# Patient Record
Sex: MALE | Race: WHITE | NOT HISPANIC OR LATINO | Employment: OTHER
[De-identification: names, ages, dates, MRNs, and addresses within clinical notes are randomized per-mention and may not be internally consistent; named-entity substitution may affect disease eponyms.]

---

## 2017-02-21 ENCOUNTER — LAB SERVICES (OUTPATIENT)
Dept: OTHER | Age: 62
End: 2017-02-21

## 2017-02-21 ENCOUNTER — CHARTING TRANS (OUTPATIENT)
Dept: OTHER | Age: 62
End: 2017-02-21

## 2017-02-21 ASSESSMENT — PAIN SCALES - GENERAL: PAINLEVEL_OUTOF10: 10

## 2017-02-23 ENCOUNTER — CHARTING TRANS (OUTPATIENT)
Dept: OTHER | Age: 62
End: 2017-02-23

## 2017-02-23 LAB
ALBUMIN SERPL-MCNC: 3.4 G/DL (ref 3.6–5.1)
ALBUMIN/GLOB SERPL: 1 (ref 1–2.4)
ALP SERPL-CCNC: 114 UNITS/L (ref 45–117)
ALT SERPL-CCNC: 72 UNITS/L
ANION GAP SERPL CALC-SCNC: 14 MMOL/L (ref 10–20)
AST SERPL-CCNC: 52 UNITS/L
BILIRUB SERPL-MCNC: 0.9 MG/DL (ref 0.2–1)
BUN SERPL-MCNC: 15 MG/DL (ref 6–20)
BUN/CREAT SERPL: 18 (ref 7–25)
CALCIUM SERPL-MCNC: 9 MG/DL (ref 8.4–10.2)
CHLORIDE SERPL-SCNC: 99 MMOL/L (ref 98–107)
CHOLEST SERPL-MCNC: 238 MG/DL
CHOLEST/HDLC SERPL: 6.8
CO2 SERPL-SCNC: 28 MMOL/L (ref 21–32)
CREAT SERPL-MCNC: 0.82 MG/DL (ref 0.67–1.17)
GLOBULIN SER-MCNC: 3.5 G/DL (ref 2–4)
GLUCOSE SERPL-MCNC: 361 MG/DL (ref 65–99)
HBA1C MFR BLD: 13.3 % (ref 4.5–5.6)
HDLC SERPL-MCNC: 35 MG/DL
LDLC SERPL CALC-MCNC: 160 MG/DL
LENGTH OF FAST TIME PATIENT: ABNORMAL HRS
LENGTH OF FAST TIME PATIENT: ABNORMAL HRS
NONHDLC SERPL-MCNC: 203 MG/DL
POTASSIUM SERPL-SCNC: 4.1 MMOL/L (ref 3.4–5.1)
SODIUM SERPL-SCNC: 137 MMOL/L (ref 135–145)
TOTAL PROTEIN: 6.9 G/DL (ref 6.4–8.2)
TRIGL SERPL-MCNC: 213 MG/DL

## 2017-03-03 ENCOUNTER — CHARTING TRANS (OUTPATIENT)
Dept: OTHER | Age: 62
End: 2017-03-03

## 2017-04-11 ENCOUNTER — LAB SERVICES (OUTPATIENT)
Dept: OTHER | Age: 62
End: 2017-04-11

## 2017-04-11 ENCOUNTER — CHARTING TRANS (OUTPATIENT)
Dept: OTHER | Age: 62
End: 2017-04-11

## 2017-04-11 LAB
APPEARANCE: CLEAR
BILIRUBIN: NEGATIVE
COLOR: YELLOW
GLUCOSE U: 1000
GLUCOSE: 202
KETONES: NORMAL
LEUKOCYTE ESTERASE: NEGATIVE
NITRITE: NEGATIVE
OCCULT BLOOD: NORMAL
PH: 5
PROTEIN: 30
URINE SPEC GRAVITY: 1.02
UROBILINOGEN: 0.2

## 2017-04-13 ENCOUNTER — HOSPITAL (OUTPATIENT)
Dept: OTHER | Age: 62
End: 2017-04-13
Attending: INTERNAL MEDICINE

## 2017-04-13 ENCOUNTER — CHARTING TRANS (OUTPATIENT)
Dept: OTHER | Age: 62
End: 2017-04-13

## 2017-04-24 ENCOUNTER — CHARTING TRANS (OUTPATIENT)
Dept: OTHER | Age: 62
End: 2017-04-24

## 2017-12-14 ENCOUNTER — LAB SERVICES (OUTPATIENT)
Dept: OTHER | Age: 62
End: 2017-12-14

## 2017-12-15 LAB
ALBUMIN SERPL-MCNC: 3.4 G/DL (ref 3.6–5.1)
ALBUMIN/GLOB SERPL: 0.9 (ref 1–2.4)
ALP SERPL-CCNC: 124 UNITS/L (ref 45–117)
ALT SERPL-CCNC: 60 UNITS/L
ANION GAP SERPL CALC-SCNC: 14 MMOL/L (ref 10–20)
AST SERPL-CCNC: 33 UNITS/L
BILIRUB SERPL-MCNC: 0.8 MG/DL (ref 0.2–1)
BUN SERPL-MCNC: 15 MG/DL (ref 6–20)
BUN/CREAT SERPL: 15 (ref 7–25)
CALCIUM SERPL-MCNC: 9.3 MG/DL (ref 8.4–10.2)
CHLORIDE SERPL-SCNC: 98 MMOL/L (ref 98–107)
CHOLEST SERPL-MCNC: 282 MG/DL
CHOLEST/HDLC SERPL: 7.6
CO2 SERPL-SCNC: 31 MMOL/L (ref 21–32)
CREAT SERPL-MCNC: 0.98 MG/DL (ref 0.67–1.17)
CREATININE RANDOM URINE: 102 MG/DL
GLOBULIN SER-MCNC: 3.7 G/DL (ref 2–4)
GLUCOSE SERPL-MCNC: 417 MG/DL (ref 65–99)
HBA1C MFR BLD: 16.4 % (ref 4.5–5.6)
HDLC SERPL-MCNC: 37 MG/DL
LDLC SERPL CALC-MCNC: 177 MG/DL
LENGTH OF FAST TIME PATIENT: 12 HRS
LENGTH OF FAST TIME PATIENT: 12 HRS
MICROALBUMIN UR-MCNC: 24.6 MG/DL
MICROALBUMIN/CREAT UR: 241.2 MCG/MG
NONHDLC SERPL-MCNC: 245 MG/DL
POTASSIUM SERPL-SCNC: 4.3 MMOL/L (ref 3.4–5.1)
SODIUM SERPL-SCNC: 139 MMOL/L (ref 135–145)
TOTAL PROTEIN: 7.1 G/DL (ref 6.4–8.2)
TRIGL SERPL-MCNC: 339 MG/DL

## 2017-12-19 ENCOUNTER — CHARTING TRANS (OUTPATIENT)
Dept: OTHER | Age: 62
End: 2017-12-19

## 2018-01-05 ENCOUNTER — CHARTING TRANS (OUTPATIENT)
Dept: OTHER | Age: 63
End: 2018-01-05

## 2018-07-19 ENCOUNTER — LAB SERVICES (OUTPATIENT)
Dept: OTHER | Age: 63
End: 2018-07-19

## 2018-07-20 LAB
ALBUMIN SERPL-MCNC: 3.3 G/DL (ref 3.6–5.1)
ALBUMIN/GLOB SERPL: 0.9 (ref 1–2.4)
ALP SERPL-CCNC: 119 UNITS/L (ref 45–117)
ALT SERPL-CCNC: 34 UNITS/L
ANION GAP SERPL CALC-SCNC: 13 MMOL/L (ref 10–20)
AST SERPL-CCNC: 23 UNITS/L
BILIRUB SERPL-MCNC: 0.7 MG/DL (ref 0.2–1)
BUN SERPL-MCNC: 17 MG/DL (ref 6–20)
BUN/CREAT SERPL: 20 (ref 7–25)
CALCIUM SERPL-MCNC: 8.9 MG/DL (ref 8.4–10.2)
CHLORIDE SERPL-SCNC: 99 MMOL/L (ref 98–107)
CHOLEST SERPL-MCNC: 222 MG/DL
CHOLEST/HDLC SERPL: 7.2
CO2 SERPL-SCNC: 29 MMOL/L (ref 21–32)
CREAT SERPL-MCNC: 0.84 MG/DL (ref 0.67–1.17)
GLOBULIN SER-MCNC: 3.8 G/DL (ref 2–4)
GLUCOSE SERPL-MCNC: 362 MG/DL (ref 65–99)
HBA1C MFR BLD: 13.9 % (ref 4.5–5.6)
HDLC SERPL-MCNC: 31 MG/DL
LDLC SERPL CALC-MCNC: 149 MG/DL
LENGTH OF FAST TIME PATIENT: 12 HRS
LENGTH OF FAST TIME PATIENT: 12 HRS
NONHDLC SERPL-MCNC: 191 MG/DL
POTASSIUM SERPL-SCNC: 4.3 MMOL/L (ref 3.4–5.1)
SODIUM SERPL-SCNC: 137 MMOL/L (ref 135–145)
TOTAL PROTEIN: 7.1 G/DL (ref 6.4–8.2)
TRIGL SERPL-MCNC: 208 MG/DL

## 2018-08-07 ENCOUNTER — CHARTING TRANS (OUTPATIENT)
Dept: OTHER | Age: 63
End: 2018-08-07

## 2018-08-07 ENCOUNTER — LAB SERVICES (OUTPATIENT)
Dept: OTHER | Age: 63
End: 2018-08-07

## 2018-08-07 LAB
APPEARANCE: CLEAR
BILIRUBIN: NEGATIVE
COLOR: YELLOW
GLUCOSE U: >1000
GLUCOSE: 193
KETONES: NEGATIVE
LEUKOCYTE ESTERASE: NEGATIVE
NITRITE: NEGATIVE
OCCULT BLOOD: NORMAL
PH: 5
PROTEIN: 100
URINE SPEC GRAVITY: 1.02
UROBILINOGEN: 0.2

## 2018-10-31 VITALS
SYSTOLIC BLOOD PRESSURE: 162 MMHG | WEIGHT: 315 LBS | HEART RATE: 95 BPM | OXYGEN SATURATION: 93 % | RESPIRATION RATE: 18 BRPM | HEIGHT: 72 IN | DIASTOLIC BLOOD PRESSURE: 94 MMHG | TEMPERATURE: 97.1 F | BODY MASS INDEX: 42.66 KG/M2

## 2018-11-02 VITALS
HEART RATE: 81 BPM | SYSTOLIC BLOOD PRESSURE: 128 MMHG | WEIGHT: 315 LBS | HEIGHT: 72 IN | DIASTOLIC BLOOD PRESSURE: 82 MMHG | TEMPERATURE: 97.3 F | BODY MASS INDEX: 42.66 KG/M2 | RESPIRATION RATE: 18 BRPM

## 2018-11-03 VITALS
HEIGHT: 72 IN | SYSTOLIC BLOOD PRESSURE: 142 MMHG | DIASTOLIC BLOOD PRESSURE: 94 MMHG | TEMPERATURE: 99.1 F | HEART RATE: 94 BPM | RESPIRATION RATE: 18 BRPM | BODY MASS INDEX: 42.66 KG/M2 | WEIGHT: 315 LBS

## 2018-11-03 VITALS
HEIGHT: 72 IN | WEIGHT: 315 LBS | RESPIRATION RATE: 18 BRPM | HEART RATE: 93 BPM | SYSTOLIC BLOOD PRESSURE: 136 MMHG | BODY MASS INDEX: 42.66 KG/M2 | DIASTOLIC BLOOD PRESSURE: 84 MMHG | TEMPERATURE: 97.4 F

## 2018-11-05 VITALS
HEART RATE: 110 BPM | SYSTOLIC BLOOD PRESSURE: 140 MMHG | DIASTOLIC BLOOD PRESSURE: 74 MMHG | WEIGHT: 315 LBS | OXYGEN SATURATION: 96 % | HEIGHT: 72 IN | TEMPERATURE: 97.6 F | BODY MASS INDEX: 42.66 KG/M2

## 2018-11-05 VITALS
SYSTOLIC BLOOD PRESSURE: 142 MMHG | DIASTOLIC BLOOD PRESSURE: 88 MMHG | HEIGHT: 72 IN | HEART RATE: 88 BPM | TEMPERATURE: 97.1 F | RESPIRATION RATE: 18 BRPM | WEIGHT: 315 LBS | BODY MASS INDEX: 42.66 KG/M2

## 2019-02-15 ENCOUNTER — TELEPHONE (OUTPATIENT)
Dept: SCHEDULING | Age: 64
End: 2019-02-15

## 2019-02-15 RX ORDER — GLIMEPIRIDE 2 MG/1
TABLET ORAL
Qty: 14 TABLET | Refills: 0 | Status: SHIPPED | OUTPATIENT
Start: 2019-02-15 | End: 2019-03-11 | Stop reason: SDUPTHER

## 2019-02-15 RX ORDER — METFORMIN HYDROCHLORIDE 500 MG/1
1000 TABLET, EXTENDED RELEASE ORAL 2 TIMES DAILY WITH MEALS
Qty: 56 TABLET | Refills: 0 | Status: SHIPPED | OUTPATIENT
Start: 2019-02-15 | End: 2019-03-11 | Stop reason: SDUPTHER

## 2019-02-15 RX ORDER — GLIMEPIRIDE 2 MG/1
1 TABLET ORAL DAILY
COMMUNITY
Start: 2018-08-07 | End: 2019-02-15 | Stop reason: SDUPTHER

## 2019-02-15 RX ORDER — METFORMIN HYDROCHLORIDE 500 MG/1
2 TABLET, EXTENDED RELEASE ORAL 2 TIMES DAILY WITH MEALS
COMMUNITY
Start: 2018-08-07 | End: 2019-02-15 | Stop reason: SDUPTHER

## 2019-02-19 RX ORDER — LISINOPRIL 10 MG/1
TABLET ORAL
COMMUNITY
Start: 2018-09-10 | End: 2019-03-11 | Stop reason: SDUPTHER

## 2019-02-19 RX ORDER — LANCETS
EACH MISCELLANEOUS
COMMUNITY
Start: 2018-08-07 | End: 2019-08-07

## 2019-02-19 RX ORDER — ATORVASTATIN CALCIUM 10 MG/1
TABLET, FILM COATED ORAL
COMMUNITY
Start: 2018-08-07 | End: 2019-03-11 | Stop reason: SDUPTHER

## 2019-02-26 ENCOUNTER — LAB SERVICES (OUTPATIENT)
Dept: FAMILY MEDICINE | Age: 64
End: 2019-02-26

## 2019-02-26 PROCEDURE — 83036 HEMOGLOBIN GLYCOSYLATED A1C: CPT | Performed by: NURSE PRACTITIONER

## 2019-02-26 PROCEDURE — 80053 COMPREHEN METABOLIC PANEL: CPT | Performed by: NURSE PRACTITIONER

## 2019-02-26 PROCEDURE — 80061 LIPID PANEL: CPT | Performed by: NURSE PRACTITIONER

## 2019-02-26 PROCEDURE — 36415 COLL VENOUS BLD VENIPUNCTURE: CPT

## 2019-02-27 LAB
ALBUMIN SERPL-MCNC: 3.3 G/DL (ref 3.6–5.1)
ALBUMIN/GLOB SERPL: 1 {RATIO} (ref 1–2.4)
ALP SERPL-CCNC: 95 UNITS/L (ref 45–117)
ALT SERPL-CCNC: 33 UNITS/L
ANION GAP SERPL CALC-SCNC: 12 MMOL/L (ref 10–20)
AST SERPL-CCNC: 20 UNITS/L
BILIRUB SERPL-MCNC: 0.9 MG/DL (ref 0.2–1)
BUN SERPL-MCNC: 18 MG/DL (ref 6–20)
BUN/CREAT SERPL: 20 (ref 7–25)
CALCIUM SERPL-MCNC: 8.7 MG/DL (ref 8.4–10.2)
CHLORIDE SERPL-SCNC: 101 MMOL/L (ref 98–107)
CHOLEST SERPL-MCNC: 205 MG/DL
CHOLEST/HDLC SERPL: 5.7 {RATIO}
CO2 SERPL-SCNC: 30 MMOL/L (ref 21–32)
CREAT SERPL-MCNC: 0.92 MG/DL (ref 0.67–1.17)
FASTING STATUS PATIENT QL REPORTED: ABNORMAL HRS
GLOBULIN SER-MCNC: 3.4 G/DL (ref 2–4)
GLUCOSE SERPL-MCNC: 284 MG/DL (ref 65–99)
HBA1C MFR BLD: 12.3 % (ref 4.5–5.6)
HDLC SERPL-MCNC: 36 MG/DL
LDLC SERPL-MCNC: 127 MG/DL
LENGTH OF FAST TIME PATIENT: ABNORMAL HRS
NONHDLC SERPL-MCNC: 169 MG/DL
POTASSIUM SERPL-SCNC: 4.5 MMOL/L (ref 3.4–5.1)
PROT SERPL-MCNC: 6.7 G/DL (ref 6.4–8.2)
SODIUM SERPL-SCNC: 139 MMOL/L (ref 135–145)
TRIGL SERPL-MCNC: 212 MG/DL

## 2019-03-02 RX ORDER — METFORMIN HYDROCHLORIDE 500 MG/1
TABLET, EXTENDED RELEASE ORAL
Qty: 120 TABLET | Refills: 0 | OUTPATIENT
Start: 2019-03-02

## 2019-03-09 ENCOUNTER — TELEPHONE (OUTPATIENT)
Dept: SCHEDULING | Age: 64
End: 2019-03-09

## 2019-03-09 RX ORDER — METFORMIN HYDROCHLORIDE 500 MG/1
1000 TABLET, EXTENDED RELEASE ORAL 2 TIMES DAILY WITH MEALS
Qty: 56 TABLET | Refills: 0 | Status: CANCELLED | OUTPATIENT
Start: 2019-03-09 | End: 2020-04-11

## 2019-03-11 ENCOUNTER — OFFICE VISIT (OUTPATIENT)
Dept: FAMILY MEDICINE | Age: 64
End: 2019-03-11

## 2019-03-11 VITALS
OXYGEN SATURATION: 94 % | HEART RATE: 103 BPM | WEIGHT: 315 LBS | BODY MASS INDEX: 42.66 KG/M2 | DIASTOLIC BLOOD PRESSURE: 84 MMHG | SYSTOLIC BLOOD PRESSURE: 134 MMHG | RESPIRATION RATE: 18 BRPM | HEIGHT: 72 IN

## 2019-03-11 DIAGNOSIS — I10 BENIGN ESSENTIAL HYPERTENSION: ICD-10-CM

## 2019-03-11 DIAGNOSIS — Z71.89 COUNSELING REGARDING GOALS OF CARE: ICD-10-CM

## 2019-03-11 DIAGNOSIS — G89.29 CHRONIC PAIN OF RIGHT KNEE: ICD-10-CM

## 2019-03-11 DIAGNOSIS — M25.561 CHRONIC PAIN OF RIGHT KNEE: ICD-10-CM

## 2019-03-11 DIAGNOSIS — G47.33 OBSTRUCTIVE SLEEP APNEA: ICD-10-CM

## 2019-03-11 DIAGNOSIS — E66.01 MORBID OBESITY WITH BODY MASS INDEX (BMI) GREATER THAN OR EQUAL TO 50 (CMD): ICD-10-CM

## 2019-03-11 DIAGNOSIS — E78.00 HYPERCHOLESTEROLEMIA: ICD-10-CM

## 2019-03-11 DIAGNOSIS — Z79.4 TYPE 2 DIABETES MELLITUS WITH HYPERGLYCEMIA, WITH LONG-TERM CURRENT USE OF INSULIN (CMD): Primary | ICD-10-CM

## 2019-03-11 DIAGNOSIS — E11.65 TYPE 2 DIABETES MELLITUS WITH HYPERGLYCEMIA, WITH LONG-TERM CURRENT USE OF INSULIN (CMD): Primary | ICD-10-CM

## 2019-03-11 PROBLEM — M25.569 KNEE PAIN, CHRONIC: Status: ACTIVE | Noted: 2017-02-21

## 2019-03-11 LAB
ALBUMIN SERPL-MCNC: 10 G/DL
BILIRUB UR QL STRIP: NEGATIVE
CLARITY, POC: CLEAR
COLOR UR: YELLOW
CREAT UR-MCNC: 50 MG/DL
ERYTHROCYTES, POC: NORMAL
GLUCOSE UR-MCNC: NORMAL MG/DL
HYALINE CASTS, POC: NORMAL
KETONES, POC: NEGATIVE
LEUKOCYTE ESTERASE UR QL STRIP: NORMAL
NITRITE UR QL STRIP: NORMAL
OCCULT BLOOD, POC: NEGATIVE
PH UR STRIP: 5.5 [PH]
PROT UR-MCNC: NEGATIVE G/DL
S PYO AG THROAT QL: NORMAL
SP GR UR STRIP: 1.01
SQUAMOUS EPIS, POC: NORMAL
UROBILINOGEN UR-MCNC: 1 MG/DL (ref 0–1)
WBC UR QL: NEGATIVE

## 2019-03-11 PROCEDURE — 82043 UR ALBUMIN QUANTITATIVE: CPT | Performed by: NURSE PRACTITIONER

## 2019-03-11 PROCEDURE — 3075F SYST BP GE 130 - 139MM HG: CPT | Performed by: NURSE PRACTITIONER

## 2019-03-11 PROCEDURE — 99215 OFFICE O/P EST HI 40 MIN: CPT | Performed by: NURSE PRACTITIONER

## 2019-03-11 PROCEDURE — 81003 URINALYSIS AUTO W/O SCOPE: CPT | Performed by: NURSE PRACTITIONER

## 2019-03-11 PROCEDURE — 3079F DIAST BP 80-89 MM HG: CPT | Performed by: NURSE PRACTITIONER

## 2019-03-11 RX ORDER — ATORVASTATIN CALCIUM 10 MG/1
10 TABLET, FILM COATED ORAL DAILY
Qty: 90 TABLET | Refills: 0 | Status: SHIPPED | OUTPATIENT
Start: 2019-03-11 | End: 2019-07-02 | Stop reason: DRUGHIGH

## 2019-03-11 RX ORDER — LISINOPRIL 10 MG/1
10 TABLET ORAL DAILY
Qty: 90 TABLET | Refills: 0 | Status: SHIPPED | OUTPATIENT
Start: 2019-03-11 | End: 2019-06-08 | Stop reason: SDUPTHER

## 2019-03-11 RX ORDER — GLIMEPIRIDE 2 MG/1
TABLET ORAL
Qty: 90 TABLET | Refills: 0 | Status: SHIPPED | OUTPATIENT
Start: 2019-03-11 | End: 2019-08-30 | Stop reason: SDUPTHER

## 2019-03-11 RX ORDER — METFORMIN HYDROCHLORIDE 500 MG/1
1000 TABLET, EXTENDED RELEASE ORAL 2 TIMES DAILY WITH MEALS
Qty: 360 TABLET | Refills: 0 | Status: SHIPPED | OUTPATIENT
Start: 2019-03-11 | End: 2019-09-21 | Stop reason: SDUPTHER

## 2019-03-11 SDOH — HEALTH STABILITY: MENTAL HEALTH: HOW OFTEN DO YOU HAVE A DRINK CONTAINING ALCOHOL?: NEVER

## 2019-03-11 ASSESSMENT — ENCOUNTER SYMPTOMS
ACTIVITY CHANGE: 1
FATIGUE: 1
POLYPHAGIA: 1
GASTROINTESTINAL NEGATIVE: 1
RESPIRATORY NEGATIVE: 1
EYES NEGATIVE: 1
POLYDIPSIA: 1
NUMBNESS: 1

## 2019-03-12 ENCOUNTER — TELEPHONE (OUTPATIENT)
Dept: SCHEDULING | Age: 64
End: 2019-03-12

## 2019-03-13 ENCOUNTER — TELEPHONE (OUTPATIENT)
Dept: SCHEDULING | Age: 64
End: 2019-03-13

## 2019-03-19 ENCOUNTER — TELEPHONE (OUTPATIENT)
Dept: SCHEDULING | Age: 64
End: 2019-03-19

## 2019-05-01 ENCOUNTER — TELEPHONE (OUTPATIENT)
Dept: SCHEDULING | Age: 64
End: 2019-05-01

## 2019-05-02 ENCOUNTER — TELEPHONE (OUTPATIENT)
Dept: SCHEDULING | Age: 64
End: 2019-05-02

## 2019-06-07 ENCOUNTER — TELEPHONE (OUTPATIENT)
Dept: SCHEDULING | Age: 64
End: 2019-06-07

## 2019-06-07 RX ORDER — LISINOPRIL 10 MG/1
10 TABLET ORAL DAILY
Qty: 90 TABLET | Refills: 0 | Status: CANCELLED | OUTPATIENT
Start: 2019-06-07 | End: 2020-06-06

## 2019-06-08 ENCOUNTER — TELEPHONE (OUTPATIENT)
Dept: FAMILY MEDICINE | Age: 64
End: 2019-06-08

## 2019-06-08 DIAGNOSIS — I10 BENIGN ESSENTIAL HYPERTENSION: Primary | ICD-10-CM

## 2019-06-08 RX ORDER — LISINOPRIL 10 MG/1
10 TABLET ORAL DAILY
Qty: 90 TABLET | Refills: 0 | Status: SHIPPED | OUTPATIENT
Start: 2019-06-08 | End: 2019-09-21 | Stop reason: SDUPTHER

## 2019-06-18 ENCOUNTER — TELEPHONE (OUTPATIENT)
Dept: SCHEDULING | Age: 64
End: 2019-06-18

## 2019-06-19 ENCOUNTER — TELEPHONE (OUTPATIENT)
Dept: SCHEDULING | Age: 64
End: 2019-06-19

## 2019-06-19 DIAGNOSIS — E11.65 TYPE 2 DIABETES MELLITUS WITH HYPERGLYCEMIA, WITH LONG-TERM CURRENT USE OF INSULIN (CMD): Primary | ICD-10-CM

## 2019-06-19 DIAGNOSIS — Z79.4 TYPE 2 DIABETES MELLITUS WITH HYPERGLYCEMIA, WITH LONG-TERM CURRENT USE OF INSULIN (CMD): Primary | ICD-10-CM

## 2019-06-19 DIAGNOSIS — E78.00 HYPERCHOLESTEROLEMIA: ICD-10-CM

## 2019-06-19 RX ORDER — PEN NEEDLE, DIABETIC 32GX 5/32"
NEEDLE, DISPOSABLE MISCELLANEOUS
Qty: 100 EACH | Refills: 0 | Status: SHIPPED | OUTPATIENT
Start: 2019-06-19 | End: 2019-09-21 | Stop reason: SDUPTHER

## 2019-06-25 ENCOUNTER — LAB SERVICES (OUTPATIENT)
Dept: FAMILY MEDICINE | Age: 64
End: 2019-06-25

## 2019-06-25 DIAGNOSIS — Z79.4 TYPE 2 DIABETES MELLITUS WITH HYPERGLYCEMIA, WITH LONG-TERM CURRENT USE OF INSULIN (CMD): ICD-10-CM

## 2019-06-25 DIAGNOSIS — E11.65 TYPE 2 DIABETES MELLITUS WITH HYPERGLYCEMIA, WITH LONG-TERM CURRENT USE OF INSULIN (CMD): ICD-10-CM

## 2019-06-25 DIAGNOSIS — E78.00 HYPERCHOLESTEROLEMIA: ICD-10-CM

## 2019-06-25 PROCEDURE — 36415 COLL VENOUS BLD VENIPUNCTURE: CPT

## 2019-06-25 PROCEDURE — 83036 HEMOGLOBIN GLYCOSYLATED A1C: CPT | Performed by: NURSE PRACTITIONER

## 2019-06-25 PROCEDURE — 80061 LIPID PANEL: CPT | Performed by: NURSE PRACTITIONER

## 2019-06-25 PROCEDURE — 80053 COMPREHEN METABOLIC PANEL: CPT | Performed by: NURSE PRACTITIONER

## 2019-06-26 LAB
ALBUMIN SERPL-MCNC: 3.6 G/DL (ref 3.6–5.1)
ALBUMIN/GLOB SERPL: 1.1 {RATIO} (ref 1–2.4)
ALP SERPL-CCNC: 88 UNITS/L (ref 45–117)
ALT SERPL-CCNC: 22 UNITS/L
ANION GAP SERPL CALC-SCNC: 12 MMOL/L (ref 10–20)
AST SERPL-CCNC: 16 UNITS/L
BILIRUB SERPL-MCNC: 1 MG/DL (ref 0.2–1)
BUN SERPL-MCNC: 14 MG/DL (ref 6–20)
BUN/CREAT SERPL: 17 (ref 7–25)
CALCIUM SERPL-MCNC: 8.7 MG/DL (ref 8.4–10.2)
CHLORIDE SERPL-SCNC: 106 MMOL/L (ref 98–107)
CHOLEST SERPL-MCNC: 190 MG/DL
CHOLEST/HDLC SERPL: 4.9 {RATIO}
CO2 SERPL-SCNC: 29 MMOL/L (ref 21–32)
CREAT SERPL-MCNC: 0.82 MG/DL (ref 0.67–1.17)
FASTING STATUS PATIENT QL REPORTED: ABNORMAL HRS
GLOBULIN SER-MCNC: 3.4 G/DL (ref 2–4)
GLUCOSE SERPL-MCNC: 143 MG/DL (ref 65–99)
HBA1C MFR BLD: 9.2 % (ref 4.5–5.6)
HDLC SERPL-MCNC: 39 MG/DL
LDLC SERPL-MCNC: 129 MG/DL
LENGTH OF FAST TIME PATIENT: ABNORMAL HRS
NONHDLC SERPL-MCNC: 151 MG/DL
POTASSIUM SERPL-SCNC: 4.2 MMOL/L (ref 3.4–5.1)
PROT SERPL-MCNC: 7 G/DL (ref 6.4–8.2)
SODIUM SERPL-SCNC: 143 MMOL/L (ref 135–145)
TRIGL SERPL-MCNC: 109 MG/DL

## 2019-07-01 ENCOUNTER — APPOINTMENT (OUTPATIENT)
Dept: FAMILY MEDICINE | Age: 64
End: 2019-07-01

## 2019-07-01 DIAGNOSIS — E11.65 TYPE 2 DIABETES MELLITUS WITH HYPERGLYCEMIA, WITH LONG-TERM CURRENT USE OF INSULIN (CMD): Primary | ICD-10-CM

## 2019-07-01 DIAGNOSIS — Z79.4 TYPE 2 DIABETES MELLITUS WITH HYPERGLYCEMIA, WITH LONG-TERM CURRENT USE OF INSULIN (CMD): Primary | ICD-10-CM

## 2019-07-02 ENCOUNTER — OFFICE VISIT (OUTPATIENT)
Dept: FAMILY MEDICINE | Age: 64
End: 2019-07-02

## 2019-07-02 VITALS
RESPIRATION RATE: 18 BRPM | WEIGHT: 315 LBS | TEMPERATURE: 96.6 F | SYSTOLIC BLOOD PRESSURE: 146 MMHG | HEIGHT: 74 IN | BODY MASS INDEX: 40.43 KG/M2 | HEART RATE: 119 BPM | DIASTOLIC BLOOD PRESSURE: 86 MMHG

## 2019-07-02 DIAGNOSIS — Z71.89 COUNSELING REGARDING GOALS OF CARE: ICD-10-CM

## 2019-07-02 DIAGNOSIS — E78.00 HYPERCHOLESTEROLEMIA: ICD-10-CM

## 2019-07-02 DIAGNOSIS — E11.65 TYPE 2 DIABETES MELLITUS WITH HYPERGLYCEMIA, WITH LONG-TERM CURRENT USE OF INSULIN (CMD): Primary | ICD-10-CM

## 2019-07-02 DIAGNOSIS — R06.09 DYSPNEA ON EXERTION: ICD-10-CM

## 2019-07-02 DIAGNOSIS — I10 BENIGN ESSENTIAL HYPERTENSION: ICD-10-CM

## 2019-07-02 DIAGNOSIS — E66.01 MORBID OBESITY WITH BODY MASS INDEX (BMI) GREATER THAN OR EQUAL TO 50 (CMD): ICD-10-CM

## 2019-07-02 DIAGNOSIS — Z79.4 TYPE 2 DIABETES MELLITUS WITH HYPERGLYCEMIA, WITH LONG-TERM CURRENT USE OF INSULIN (CMD): Primary | ICD-10-CM

## 2019-07-02 PROCEDURE — 99214 OFFICE O/P EST MOD 30 MIN: CPT | Performed by: NURSE PRACTITIONER

## 2019-07-02 PROCEDURE — 3079F DIAST BP 80-89 MM HG: CPT | Performed by: NURSE PRACTITIONER

## 2019-07-02 PROCEDURE — 3077F SYST BP >= 140 MM HG: CPT | Performed by: NURSE PRACTITIONER

## 2019-07-02 RX ORDER — MOMETASONE FUROATE 1 MG/G
OINTMENT TOPICAL DAILY
Qty: 45 G | Refills: 0 | Status: SHIPPED | OUTPATIENT
Start: 2019-07-02 | End: 2019-11-10 | Stop reason: SDUPTHER

## 2019-07-02 RX ORDER — ATORVASTATIN CALCIUM 20 MG/1
20 TABLET, FILM COATED ORAL DAILY
Qty: 90 TABLET | Refills: 3 | Status: SHIPPED | OUTPATIENT
Start: 2019-07-02 | End: 2019-08-13 | Stop reason: DRUGHIGH

## 2019-07-02 ASSESSMENT — ENCOUNTER SYMPTOMS
POLYPHAGIA: 0
EYES NEGATIVE: 1
NUMBNESS: 1
UNEXPECTED WEIGHT CHANGE: 1
CONSTIPATION: 1
SHORTNESS OF BREATH: 1
FATIGUE: 1
POLYDIPSIA: 0
NERVOUS/ANXIOUS: 1
WHEEZING: 0
ALLERGIC/IMMUNOLOGIC NEGATIVE: 1
BACK PAIN: 1

## 2019-08-02 ENCOUNTER — TELEPHONE (OUTPATIENT)
Dept: INTERNAL MEDICINE | Age: 64
End: 2019-08-02

## 2019-08-09 ASSESSMENT — ENCOUNTER SYMPTOMS
VOMITING: 0
SHORTNESS OF BREATH: 0
PARESTHESIAS: 0
ALTERED MENTAL STATUS: 0
NEAR-SYNCOPE: 0
CHILLS: 0
NUMBNESS: 0
COLOR CHANGE: 0
NAUSEA: 0
SYNCOPE: 0
DIZZINESS: 0
ABDOMINAL PAIN: 0

## 2019-08-13 ENCOUNTER — OFFICE VISIT (OUTPATIENT)
Dept: CARDIOLOGY | Age: 64
End: 2019-08-13

## 2019-08-13 VITALS
WEIGHT: 315 LBS | DIASTOLIC BLOOD PRESSURE: 74 MMHG | HEART RATE: 100 BPM | SYSTOLIC BLOOD PRESSURE: 132 MMHG | BODY MASS INDEX: 44.1 KG/M2 | HEIGHT: 71 IN

## 2019-08-13 DIAGNOSIS — E78.00 HYPERCHOLESTEROLEMIA: ICD-10-CM

## 2019-08-13 DIAGNOSIS — Z79.4 TYPE 2 DIABETES MELLITUS WITH HYPERGLYCEMIA, WITH LONG-TERM CURRENT USE OF INSULIN (CMD): Primary | ICD-10-CM

## 2019-08-13 DIAGNOSIS — E11.65 TYPE 2 DIABETES MELLITUS WITH HYPERGLYCEMIA, WITH LONG-TERM CURRENT USE OF INSULIN (CMD): Primary | ICD-10-CM

## 2019-08-13 DIAGNOSIS — I10 BENIGN ESSENTIAL HYPERTENSION: ICD-10-CM

## 2019-08-13 DIAGNOSIS — E66.01 MORBID OBESITY WITH BODY MASS INDEX (BMI) GREATER THAN OR EQUAL TO 50 (CMD): ICD-10-CM

## 2019-08-13 DIAGNOSIS — R06.09 DYSPNEA ON EXERTION: ICD-10-CM

## 2019-08-13 PROCEDURE — 99244 OFF/OP CNSLTJ NEW/EST MOD 40: CPT | Performed by: INTERNAL MEDICINE

## 2019-08-13 PROCEDURE — 3078F DIAST BP <80 MM HG: CPT | Performed by: INTERNAL MEDICINE

## 2019-08-13 PROCEDURE — 3075F SYST BP GE 130 - 139MM HG: CPT | Performed by: INTERNAL MEDICINE

## 2019-08-13 RX ORDER — ATORVASTATIN CALCIUM 40 MG/1
40 TABLET, FILM COATED ORAL DAILY
Qty: 30 TABLET | Refills: 3 | Status: SHIPPED | OUTPATIENT
Start: 2019-08-13 | End: 2020-01-29 | Stop reason: SDUPTHER

## 2019-08-13 SDOH — HEALTH STABILITY: PHYSICAL HEALTH: ON AVERAGE, HOW MANY MINUTES DO YOU ENGAGE IN EXERCISE AT THIS LEVEL?: PATIENT DECLINED

## 2019-08-13 SDOH — HEALTH STABILITY: PHYSICAL HEALTH
ON AVERAGE, HOW MANY DAYS PER WEEK DO YOU ENGAGE IN MODERATE TO STRENUOUS EXERCISE (LIKE A BRISK WALK)?: PATIENT DECLINED

## 2019-08-13 SDOH — HEALTH STABILITY: MENTAL HEALTH: HOW OFTEN DO YOU HAVE A DRINK CONTAINING ALCOHOL?: NEVER

## 2019-08-13 ASSESSMENT — PATIENT HEALTH QUESTIONNAIRE - PHQ9
SUM OF ALL RESPONSES TO PHQ9 QUESTIONS 1 AND 2: 0
1. LITTLE INTEREST OR PLEASURE IN DOING THINGS: NOT AT ALL
SUM OF ALL RESPONSES TO PHQ9 QUESTIONS 1 AND 2: 0
2. FEELING DOWN, DEPRESSED OR HOPELESS: NOT AT ALL

## 2019-08-21 ENCOUNTER — TELEPHONE (OUTPATIENT)
Dept: CARDIOLOGY | Age: 64
End: 2019-08-21

## 2019-08-21 ENCOUNTER — DIAGNOSTIC TRANS (OUTPATIENT)
Dept: OTHER | Age: 64
End: 2019-08-21

## 2019-08-21 LAB — LV EF: NORMAL %

## 2019-08-21 PROCEDURE — 93306 TTE W/DOPPLER COMPLETE: CPT | Performed by: INTERNAL MEDICINE

## 2019-08-23 ENCOUNTER — HOSPITAL (OUTPATIENT)
Dept: OTHER | Age: 64
End: 2019-08-23
Attending: INTERNAL MEDICINE

## 2019-08-23 PROCEDURE — 78452 HT MUSCLE IMAGE SPECT MULT: CPT | Performed by: INTERNAL MEDICINE

## 2019-08-23 PROCEDURE — 93016 CV STRESS TEST SUPVJ ONLY: CPT | Performed by: INTERNAL MEDICINE

## 2019-08-23 PROCEDURE — 93018 CV STRESS TEST I&R ONLY: CPT | Performed by: INTERNAL MEDICINE

## 2019-08-26 ENCOUNTER — CLINICAL ABSTRACT (OUTPATIENT)
Dept: CARDIOLOGY | Age: 64
End: 2019-08-26

## 2019-08-27 ENCOUNTER — TELEPHONE (OUTPATIENT)
Dept: CARDIOLOGY | Age: 64
End: 2019-08-27

## 2019-08-30 DIAGNOSIS — E11.65 TYPE 2 DIABETES MELLITUS WITH HYPERGLYCEMIA, WITH LONG-TERM CURRENT USE OF INSULIN (CMD): Primary | ICD-10-CM

## 2019-08-30 DIAGNOSIS — Z79.4 TYPE 2 DIABETES MELLITUS WITH HYPERGLYCEMIA, WITH LONG-TERM CURRENT USE OF INSULIN (CMD): Primary | ICD-10-CM

## 2019-08-30 RX ORDER — GLIMEPIRIDE 2 MG/1
TABLET ORAL
Qty: 30 TABLET | Refills: 0 | Status: SHIPPED | OUTPATIENT
Start: 2019-08-30 | End: 2019-11-12 | Stop reason: SDUPTHER

## 2019-09-11 ENCOUNTER — HOSPITAL (OUTPATIENT)
Dept: OTHER | Age: 64
End: 2019-09-11
Attending: INTERNAL MEDICINE

## 2019-09-21 DIAGNOSIS — I10 BENIGN ESSENTIAL HYPERTENSION: ICD-10-CM

## 2019-09-23 RX ORDER — LISINOPRIL 10 MG/1
10 TABLET ORAL DAILY
Qty: 90 TABLET | Refills: 0 | Status: SHIPPED | OUTPATIENT
Start: 2019-09-23 | End: 2020-01-29 | Stop reason: SDUPTHER

## 2019-09-23 RX ORDER — METFORMIN HYDROCHLORIDE 500 MG/1
1000 TABLET, EXTENDED RELEASE ORAL 2 TIMES DAILY WITH MEALS
Qty: 360 TABLET | Refills: 0 | Status: SHIPPED | OUTPATIENT
Start: 2019-09-23 | End: 2019-11-20 | Stop reason: SDUPTHER

## 2019-09-24 ENCOUNTER — TELEPHONE (OUTPATIENT)
Dept: SCHEDULING | Age: 64
End: 2019-09-24

## 2019-10-23 ENCOUNTER — TELEPHONE (OUTPATIENT)
Dept: FAMILY MEDICINE | Age: 64
End: 2019-10-23

## 2019-10-24 ENCOUNTER — TELEPHONE (OUTPATIENT)
Dept: FAMILY MEDICINE | Age: 64
End: 2019-10-24

## 2019-10-30 ENCOUNTER — LAB SERVICES (OUTPATIENT)
Dept: FAMILY MEDICINE | Age: 64
End: 2019-10-30

## 2019-10-30 DIAGNOSIS — E11.65 TYPE 2 DIABETES MELLITUS WITH HYPERGLYCEMIA, WITH LONG-TERM CURRENT USE OF INSULIN (CMD): ICD-10-CM

## 2019-10-30 DIAGNOSIS — Z79.4 TYPE 2 DIABETES MELLITUS WITH HYPERGLYCEMIA, WITH LONG-TERM CURRENT USE OF INSULIN (CMD): ICD-10-CM

## 2019-10-30 PROCEDURE — 83036 HEMOGLOBIN GLYCOSYLATED A1C: CPT | Performed by: NURSE PRACTITIONER

## 2019-10-30 PROCEDURE — 36415 COLL VENOUS BLD VENIPUNCTURE: CPT

## 2019-10-31 LAB — HBA1C MFR BLD: 11.6 % (ref 4.5–5.6)

## 2019-11-02 ENCOUNTER — TELEPHONE (OUTPATIENT)
Dept: FAMILY MEDICINE | Age: 64
End: 2019-11-02

## 2019-11-11 RX ORDER — MOMETASONE FUROATE 1 MG/G
OINTMENT TOPICAL
Qty: 45 G | Refills: 0 | Status: SHIPPED | OUTPATIENT
Start: 2019-11-11 | End: 2020-01-29

## 2019-11-12 ENCOUNTER — OFFICE VISIT (OUTPATIENT)
Dept: FAMILY MEDICINE | Age: 64
End: 2019-11-12

## 2019-11-12 ENCOUNTER — APPOINTMENT (OUTPATIENT)
Dept: CARDIOLOGY | Age: 64
End: 2019-11-12

## 2019-11-12 ENCOUNTER — TELEPHONE (OUTPATIENT)
Dept: FAMILY MEDICINE | Age: 64
End: 2019-11-12

## 2019-11-12 ENCOUNTER — TELEPHONE (OUTPATIENT)
Dept: SCHEDULING | Age: 64
End: 2019-11-12

## 2019-11-12 VITALS
HEIGHT: 71 IN | DIASTOLIC BLOOD PRESSURE: 86 MMHG | WEIGHT: 315 LBS | BODY MASS INDEX: 44.1 KG/M2 | TEMPERATURE: 96.8 F | RESPIRATION RATE: 20 BRPM | OXYGEN SATURATION: 96 % | SYSTOLIC BLOOD PRESSURE: 158 MMHG | HEART RATE: 104 BPM

## 2019-11-12 DIAGNOSIS — G89.29 CHRONIC PAIN OF RIGHT KNEE: ICD-10-CM

## 2019-11-12 DIAGNOSIS — Z79.4 TYPE 2 DIABETES MELLITUS WITH HYPERGLYCEMIA, WITH LONG-TERM CURRENT USE OF INSULIN (CMD): ICD-10-CM

## 2019-11-12 DIAGNOSIS — Z79.4 TYPE 2 DIABETES MELLITUS WITH HYPERGLYCEMIA, WITH LONG-TERM CURRENT USE OF INSULIN (CMD): Primary | ICD-10-CM

## 2019-11-12 DIAGNOSIS — Z28.21 REFUSED INFLUENZA VACCINE: ICD-10-CM

## 2019-11-12 DIAGNOSIS — M25.561 CHRONIC PAIN OF RIGHT KNEE: ICD-10-CM

## 2019-11-12 DIAGNOSIS — E11.65 TYPE 2 DIABETES MELLITUS WITH HYPERGLYCEMIA, WITH LONG-TERM CURRENT USE OF INSULIN (CMD): ICD-10-CM

## 2019-11-12 DIAGNOSIS — E11.65 TYPE 2 DIABETES MELLITUS WITH HYPERGLYCEMIA, WITH LONG-TERM CURRENT USE OF INSULIN (CMD): Primary | ICD-10-CM

## 2019-11-12 PROBLEM — N20.0 KIDNEY STONE: Status: ACTIVE | Noted: 2019-11-12

## 2019-11-12 PROBLEM — G47.33 OSA (OBSTRUCTIVE SLEEP APNEA): Status: ACTIVE | Noted: 2019-11-12

## 2019-11-12 PROBLEM — N20.0 KIDNEY STONE: Status: RESOLVED | Noted: 2019-11-12 | Resolved: 2019-11-12

## 2019-11-12 PROCEDURE — 3077F SYST BP >= 140 MM HG: CPT | Performed by: PHYSICIAN ASSISTANT

## 2019-11-12 PROCEDURE — 3079F DIAST BP 80-89 MM HG: CPT | Performed by: PHYSICIAN ASSISTANT

## 2019-11-12 PROCEDURE — 99212 OFFICE O/P EST SF 10 MIN: CPT | Performed by: PHYSICIAN ASSISTANT

## 2019-11-12 RX ORDER — GLIMEPIRIDE 2 MG/1
TABLET ORAL
Qty: 30 TABLET | Refills: 0 | Status: CANCELLED | OUTPATIENT
Start: 2019-11-12

## 2019-11-12 RX ORDER — GLIMEPIRIDE 2 MG/1
TABLET ORAL
Qty: 14 TABLET | Refills: 0 | Status: SHIPPED | OUTPATIENT
Start: 2019-11-12 | End: 2019-11-13 | Stop reason: SDUPTHER

## 2019-11-13 RX ORDER — GLIMEPIRIDE 2 MG/1
TABLET ORAL
Qty: 14 TABLET | Refills: 0 | Status: SHIPPED | OUTPATIENT
Start: 2019-11-13 | End: 2019-11-20

## 2019-11-13 RX ORDER — GLIMEPIRIDE 2 MG/1
TABLET ORAL
Qty: 90 TABLET | Refills: 0 | OUTPATIENT
Start: 2019-11-13

## 2019-11-20 ENCOUNTER — OFFICE VISIT (OUTPATIENT)
Dept: FAMILY MEDICINE | Age: 64
End: 2019-11-20

## 2019-11-20 VITALS
DIASTOLIC BLOOD PRESSURE: 80 MMHG | TEMPERATURE: 97.8 F | RESPIRATION RATE: 18 BRPM | BODY MASS INDEX: 44.1 KG/M2 | WEIGHT: 315 LBS | HEIGHT: 71 IN | SYSTOLIC BLOOD PRESSURE: 146 MMHG | HEART RATE: 108 BPM

## 2019-11-20 DIAGNOSIS — Z91.148 NON COMPLIANCE W MEDICATION REGIMEN: ICD-10-CM

## 2019-11-20 DIAGNOSIS — M25.561 CHRONIC PAIN OF RIGHT KNEE: ICD-10-CM

## 2019-11-20 DIAGNOSIS — E11.65 TYPE 2 DIABETES MELLITUS WITH HYPERGLYCEMIA, WITH LONG-TERM CURRENT USE OF INSULIN (CMD): Primary | ICD-10-CM

## 2019-11-20 DIAGNOSIS — G89.29 CHRONIC PAIN OF RIGHT KNEE: ICD-10-CM

## 2019-11-20 DIAGNOSIS — Z79.4 TYPE 2 DIABETES MELLITUS WITH HYPERGLYCEMIA, WITH LONG-TERM CURRENT USE OF INSULIN (CMD): Primary | ICD-10-CM

## 2019-11-20 DIAGNOSIS — I10 BENIGN ESSENTIAL HYPERTENSION: ICD-10-CM

## 2019-11-20 DIAGNOSIS — E66.01 MORBID OBESITY WITH BODY MASS INDEX (BMI) GREATER THAN OR EQUAL TO 50 (CMD): ICD-10-CM

## 2019-11-20 DIAGNOSIS — Z71.89 COUNSELING REGARDING GOALS OF CARE: ICD-10-CM

## 2019-11-20 PROCEDURE — 99214 OFFICE O/P EST MOD 30 MIN: CPT | Performed by: NURSE PRACTITIONER

## 2019-11-20 PROCEDURE — 3077F SYST BP >= 140 MM HG: CPT | Performed by: NURSE PRACTITIONER

## 2019-11-20 PROCEDURE — 3079F DIAST BP 80-89 MM HG: CPT | Performed by: NURSE PRACTITIONER

## 2019-11-20 RX ORDER — METFORMIN HYDROCHLORIDE 500 MG/1
1000 TABLET, EXTENDED RELEASE ORAL 2 TIMES DAILY WITH MEALS
Qty: 360 TABLET | Refills: 0 | Status: SHIPPED | OUTPATIENT
Start: 2019-11-20 | End: 2020-01-29 | Stop reason: SDUPTHER

## 2019-11-20 ASSESSMENT — ENCOUNTER SYMPTOMS
HEMATOLOGIC/LYMPHATIC NEGATIVE: 1
ALLERGIC/IMMUNOLOGIC NEGATIVE: 1
WHEEZING: 0
POLYPHAGIA: 1
SHORTNESS OF BREATH: 1
FATIGUE: 1
POLYDIPSIA: 1
BACK PAIN: 1
GASTROINTESTINAL NEGATIVE: 1
ACTIVITY CHANGE: 1

## 2019-11-24 ENCOUNTER — TELEPHONE (OUTPATIENT)
Dept: SCHEDULING | Age: 64
End: 2019-11-24

## 2019-11-26 ENCOUNTER — TELEPHONE (OUTPATIENT)
Dept: SCHEDULING | Age: 64
End: 2019-11-26

## 2019-11-29 ENCOUNTER — TELEPHONE (OUTPATIENT)
Dept: SCHEDULING | Age: 64
End: 2019-11-29

## 2019-12-02 ENCOUNTER — TELEPHONE (OUTPATIENT)
Dept: SCHEDULING | Age: 64
End: 2019-12-02

## 2019-12-05 ENCOUNTER — TELEPHONE (OUTPATIENT)
Dept: SCHEDULING | Age: 64
End: 2019-12-05

## 2019-12-09 ENCOUNTER — CASE MANAGEMENT (OUTPATIENT)
Dept: CARE COORDINATION | Age: 64
End: 2019-12-09

## 2019-12-12 ENCOUNTER — CASE MANAGEMENT (OUTPATIENT)
Dept: CARE COORDINATION | Age: 64
End: 2019-12-12

## 2019-12-13 ENCOUNTER — TELEPHONE (OUTPATIENT)
Dept: SCHEDULING | Age: 64
End: 2019-12-13

## 2019-12-31 ENCOUNTER — CASE MANAGEMENT (OUTPATIENT)
Dept: CARE COORDINATION | Age: 64
End: 2019-12-31

## 2020-01-10 ENCOUNTER — CASE MANAGEMENT (OUTPATIENT)
Dept: CARE COORDINATION | Age: 65
End: 2020-01-10

## 2020-01-10 ENCOUNTER — TELEPHONE (OUTPATIENT)
Dept: CARE COORDINATION | Age: 65
End: 2020-01-10

## 2020-01-17 ENCOUNTER — CASE MANAGEMENT (OUTPATIENT)
Dept: CARE COORDINATION | Age: 65
End: 2020-01-17

## 2020-01-17 SDOH — ECONOMIC STABILITY: INCOME INSECURITY: HOW HARD IS IT FOR YOU TO PAY FOR THE VERY BASICS LIKE FOOD, HOUSING, MEDICAL CARE, AND HEATING?: NOT HARD AT ALL

## 2020-01-17 SDOH — ECONOMIC STABILITY: HOUSING INSECURITY: WHAT IS YOUR LIVING SITUATION TODAY?: I HAVE HOUSING

## 2020-01-17 SDOH — HEALTH STABILITY: MENTAL HEALTH
STRESS IS WHEN SOMEONE FEELS TENSE, NERVOUS, ANXIOUS, OR CAN'T SLEEP AT NIGHT BECAUSE THEIR MIND IS TROUBLED. HOW STRESSED ARE YOU?: TO SOME EXTENT

## 2020-01-17 SDOH — ECONOMIC STABILITY: FOOD INSECURITY: WITHIN THE PAST 12 MONTHS, THE FOOD YOU BOUGHT JUST DIDN'T LAST AND YOU DIDN'T HAVE MONEY TO GET MORE.: NEVER TRUE

## 2020-01-17 SDOH — ECONOMIC STABILITY: FOOD INSECURITY: WITHIN THE PAST 12 MONTHS, YOU WORRIED THAT YOUR FOOD WOULD RUN OUT BEFORE YOU GOT MONEY TO BUY MORE.: NEVER TRUE

## 2020-01-17 SDOH — ECONOMIC STABILITY: TRANSPORTATION INSECURITY
IN THE PAST 12 MONTHS, HAS THE LACK OF TRANSPORTATION KEPT YOU FROM MEDICAL APPOINTMENTS OR FROM GETTING MEDICATIONS?: NO

## 2020-01-17 SDOH — ECONOMIC STABILITY: TRANSPORTATION INSECURITY
IN THE PAST 12 MONTHS, HAS LACK OF TRANSPORTATION KEPT YOU FROM MEETINGS, WORK, OR FROM GETTING THINGS NEEDED FOR DAILY LIVING?: NO

## 2020-01-17 SDOH — HEALTH STABILITY: PHYSICAL HEALTH: ON AVERAGE, HOW MANY MINUTES DO YOU ENGAGE IN EXERCISE AT THIS LEVEL?: 0 MIN

## 2020-01-17 SDOH — HEALTH STABILITY: PHYSICAL HEALTH: ON AVERAGE, HOW MANY DAYS PER WEEK DO YOU ENGAGE IN MODERATE TO STRENUOUS EXERCISE (LIKE A BRISK WALK)?: 0 DAYS

## 2020-01-17 ASSESSMENT — ACTIVITIES OF DAILY LIVING (ADL)
BATHING: INDEPENDENT
MOBILITY: NO IMPAIRMENT OF MUSCULOSKELETAL, FINE OR GROSS MOTOR SKILLS
AMBULATION: INDEPENDENT
TOILETING: INDEPENDENT
EATING: INDEPENDENT
DME_IN_USE: GLUCOMETER
DRIVING: INDEPENDENT
FUNCTIONAL_EVALUATION: PERFORMS ADLS INDEPENDENTLY
GROOMING: INDEPENDENT
DME_IN_USE: YES
DRESSING: INDEPENDENT

## 2020-01-17 ASSESSMENT — SLEEP AND FATIGUE QUESTIONNAIRES
HOURS OF UNINTERRUPTED SLEEP: 8
SLEEP DESCRIPTORS: REPORTS NO PROBLEM

## 2020-01-20 ENCOUNTER — TELEPHONE (OUTPATIENT)
Dept: FAMILY MEDICINE | Age: 65
End: 2020-01-20

## 2020-01-29 ENCOUNTER — OFFICE VISIT (OUTPATIENT)
Dept: FAMILY MEDICINE | Age: 65
End: 2020-01-29

## 2020-01-29 ENCOUNTER — CASE MANAGEMENT (OUTPATIENT)
Dept: CARE COORDINATION | Age: 65
End: 2020-01-29

## 2020-01-29 VITALS
HEIGHT: 71 IN | TEMPERATURE: 97 F | DIASTOLIC BLOOD PRESSURE: 88 MMHG | BODY MASS INDEX: 44.1 KG/M2 | WEIGHT: 315 LBS | HEART RATE: 100 BPM | RESPIRATION RATE: 16 BRPM | SYSTOLIC BLOOD PRESSURE: 138 MMHG

## 2020-01-29 DIAGNOSIS — I10 BENIGN ESSENTIAL HYPERTENSION: ICD-10-CM

## 2020-01-29 DIAGNOSIS — Z71.89 GOALS OF CARE, COUNSELING/DISCUSSION: ICD-10-CM

## 2020-01-29 DIAGNOSIS — E78.00 HYPERCHOLESTEROLEMIA: ICD-10-CM

## 2020-01-29 DIAGNOSIS — E66.01 CLASS 3 SEVERE OBESITY DUE TO EXCESS CALORIES WITH SERIOUS COMORBIDITY AND BODY MASS INDEX (BMI) OF 45.0 TO 49.9 IN ADULT (CMD): ICD-10-CM

## 2020-01-29 DIAGNOSIS — E11.65 TYPE 2 DIABETES MELLITUS WITH HYPERGLYCEMIA, WITH LONG-TERM CURRENT USE OF INSULIN (CMD): Primary | ICD-10-CM

## 2020-01-29 DIAGNOSIS — B37.89 CANDIDA RASH OF GROIN: ICD-10-CM

## 2020-01-29 DIAGNOSIS — Z79.4 TYPE 2 DIABETES MELLITUS WITH HYPERGLYCEMIA, WITH LONG-TERM CURRENT USE OF INSULIN (CMD): Primary | ICD-10-CM

## 2020-01-29 PROCEDURE — 83036 HEMOGLOBIN GLYCOSYLATED A1C: CPT | Performed by: NURSE PRACTITIONER

## 2020-01-29 PROCEDURE — 3075F SYST BP GE 130 - 139MM HG: CPT | Performed by: NURSE PRACTITIONER

## 2020-01-29 PROCEDURE — 99214 OFFICE O/P EST MOD 30 MIN: CPT | Performed by: NURSE PRACTITIONER

## 2020-01-29 PROCEDURE — 3079F DIAST BP 80-89 MM HG: CPT | Performed by: NURSE PRACTITIONER

## 2020-01-29 RX ORDER — ATORVASTATIN CALCIUM 40 MG/1
40 TABLET, FILM COATED ORAL DAILY
Qty: 30 TABLET | Refills: 3 | Status: SHIPPED | OUTPATIENT
Start: 2020-01-29 | End: 2020-05-04 | Stop reason: SDUPTHER

## 2020-01-29 RX ORDER — TERBINAFINE HYDROCHLORIDE 250 MG/1
250 TABLET ORAL DAILY
Qty: 14 TABLET | Refills: 0 | Status: SHIPPED | OUTPATIENT
Start: 2020-01-29 | End: 2020-07-08

## 2020-01-29 RX ORDER — INSULIN LISPRO 100 [IU]/ML
INJECTION, SOLUTION INTRAVENOUS; SUBCUTANEOUS
Qty: 15 ML | Refills: 3 | Status: SHIPPED | OUTPATIENT
Start: 2020-01-29 | End: 2020-03-04

## 2020-01-29 RX ORDER — METFORMIN HYDROCHLORIDE 500 MG/1
1000 TABLET, EXTENDED RELEASE ORAL 2 TIMES DAILY WITH MEALS
Qty: 360 TABLET | Refills: 0 | Status: SHIPPED | OUTPATIENT
Start: 2020-01-29 | End: 2020-07-31

## 2020-01-29 RX ORDER — LISINOPRIL 10 MG/1
10 TABLET ORAL DAILY
Qty: 90 TABLET | Refills: 3 | Status: SHIPPED | OUTPATIENT
Start: 2020-01-29 | End: 2020-07-31

## 2020-01-29 RX ORDER — NYSTATIN 100000 U/G
CREAM TOPICAL 2 TIMES DAILY
Qty: 60 G | Refills: 1 | Status: SHIPPED | OUTPATIENT
Start: 2020-01-29 | End: 2020-07-08

## 2020-01-29 SDOH — ECONOMIC STABILITY: HOUSING INSECURITY: WHAT IS YOUR LIVING SITUATION TODAY?: I HAVE HOUSING

## 2020-01-29 SDOH — ECONOMIC STABILITY: HOUSING INSECURITY: ARE YOU WORRIED ABOUT LOSING YOUR HOUSING?: NO

## 2020-01-29 ASSESSMENT — ENCOUNTER SYMPTOMS
APPETITE CHANGE: 0
WEAKNESS: 0
PHOTOPHOBIA: 0
ACTIVITY CHANGE: 0
RHINORRHEA: 0
DIARRHEA: 0
GASTROINTESTINAL NEGATIVE: 1
CHILLS: 0
SINUS PRESSURE: 0
LIGHT-HEADEDNESS: 0
FATIGUE: 0
DIZZINESS: 0
COUGH: 0
EYES NEGATIVE: 1
FEVER: 0
SORE THROAT: 0
COLOR CHANGE: 0
NUMBNESS: 1
POLYPHAGIA: 0
CONSTITUTIONAL NEGATIVE: 1
DIAPHORESIS: 0
HEADACHES: 0
SHORTNESS OF BREATH: 0
CHEST TIGHTNESS: 0
POLYDIPSIA: 0
WHEEZING: 0
RESPIRATORY NEGATIVE: 1
ALLERGIC/IMMUNOLOGIC NEGATIVE: 1
ABDOMINAL PAIN: 0
HEMATOLOGIC/LYMPHATIC NEGATIVE: 1
VOMITING: 0
CONSTIPATION: 0
ENDOCRINE NEGATIVE: 1

## 2020-01-30 LAB — HBA1C MFR BLD: 11.1 % (ref 4.5–5.6)

## 2020-02-03 ENCOUNTER — TELEPHONE (OUTPATIENT)
Dept: SCHEDULING | Age: 65
End: 2020-02-03

## 2020-02-03 ENCOUNTER — TELEPHONE (OUTPATIENT)
Dept: FAMILY MEDICINE | Age: 65
End: 2020-02-03

## 2020-02-04 ENCOUNTER — TELEPHONE (OUTPATIENT)
Dept: SCHEDULING | Age: 65
End: 2020-02-04

## 2020-02-05 ENCOUNTER — TELEPHONE (OUTPATIENT)
Dept: SCHEDULING | Age: 65
End: 2020-02-05

## 2020-02-20 ENCOUNTER — CASE MANAGEMENT (OUTPATIENT)
Dept: CARE COORDINATION | Age: 65
End: 2020-02-20

## 2020-02-21 ENCOUNTER — CASE MANAGEMENT (OUTPATIENT)
Dept: CARE COORDINATION | Age: 65
End: 2020-02-21

## 2020-02-24 ENCOUNTER — CASE MANAGEMENT (OUTPATIENT)
Dept: CARE COORDINATION | Age: 65
End: 2020-02-24

## 2020-02-24 ENCOUNTER — TELEPHONE (OUTPATIENT)
Dept: CARE COORDINATION | Age: 65
End: 2020-02-24

## 2020-03-04 ENCOUNTER — OFFICE VISIT (OUTPATIENT)
Dept: FAMILY MEDICINE | Age: 65
End: 2020-03-04

## 2020-03-04 VITALS
OXYGEN SATURATION: 99 % | HEIGHT: 71 IN | SYSTOLIC BLOOD PRESSURE: 144 MMHG | RESPIRATION RATE: 20 BRPM | TEMPERATURE: 96.9 F | HEART RATE: 111 BPM | WEIGHT: 315 LBS | DIASTOLIC BLOOD PRESSURE: 94 MMHG | BODY MASS INDEX: 44.1 KG/M2

## 2020-03-04 DIAGNOSIS — Z79.4 TYPE 2 DIABETES MELLITUS WITH HYPERGLYCEMIA, WITH LONG-TERM CURRENT USE OF INSULIN (CMD): Primary | ICD-10-CM

## 2020-03-04 DIAGNOSIS — E78.00 HYPERCHOLESTEROLEMIA: ICD-10-CM

## 2020-03-04 DIAGNOSIS — Z71.89 COUNSELING REGARDING GOALS OF CARE: ICD-10-CM

## 2020-03-04 DIAGNOSIS — E11.65 TYPE 2 DIABETES MELLITUS WITH HYPERGLYCEMIA, WITH LONG-TERM CURRENT USE OF INSULIN (CMD): Primary | ICD-10-CM

## 2020-03-04 DIAGNOSIS — I10 BENIGN ESSENTIAL HYPERTENSION: ICD-10-CM

## 2020-03-04 DIAGNOSIS — R06.09 DYSPNEA ON EXERTION: ICD-10-CM

## 2020-03-04 PROCEDURE — 3080F DIAST BP >= 90 MM HG: CPT | Performed by: NURSE PRACTITIONER

## 2020-03-04 PROCEDURE — 3077F SYST BP >= 140 MM HG: CPT | Performed by: NURSE PRACTITIONER

## 2020-03-04 PROCEDURE — 99214 OFFICE O/P EST MOD 30 MIN: CPT | Performed by: NURSE PRACTITIONER

## 2020-03-04 ASSESSMENT — ENCOUNTER SYMPTOMS
SORE THROAT: 0
RESPIRATORY NEGATIVE: 1
APPETITE CHANGE: 0
GASTROINTESTINAL NEGATIVE: 1
NEUROLOGICAL NEGATIVE: 1
EYE PAIN: 0
EYES NEGATIVE: 1
DIAPHORESIS: 0
DIZZINESS: 0
COUGH: 0
WEAKNESS: 0
ENDOCRINE NEGATIVE: 1
RHINORRHEA: 0
ALLERGIC/IMMUNOLOGIC NEGATIVE: 1
HEADACHES: 0
POLYPHAGIA: 0
DIARRHEA: 0
CONSTITUTIONAL NEGATIVE: 1
WHEEZING: 0
VOMITING: 0
CHEST TIGHTNESS: 0
POLYDIPSIA: 0
ABDOMINAL PAIN: 0
FEVER: 0
FATIGUE: 0
TROUBLE SWALLOWING: 0
CHILLS: 0
PSYCHIATRIC NEGATIVE: 1
NAUSEA: 0
ABDOMINAL DISTENTION: 0
SHORTNESS OF BREATH: 0
ACTIVITY CHANGE: 0
CONSTIPATION: 0
SINUS PAIN: 0
LIGHT-HEADEDNESS: 0
HEMATOLOGIC/LYMPHATIC NEGATIVE: 1

## 2020-03-13 ENCOUNTER — TELEPHONE (OUTPATIENT)
Dept: SCHEDULING | Age: 65
End: 2020-03-13

## 2020-03-13 DIAGNOSIS — E11.65 TYPE 2 DIABETES MELLITUS WITH HYPERGLYCEMIA, WITH LONG-TERM CURRENT USE OF INSULIN (CMD): Primary | ICD-10-CM

## 2020-03-13 DIAGNOSIS — Z79.4 TYPE 2 DIABETES MELLITUS WITH HYPERGLYCEMIA, WITH LONG-TERM CURRENT USE OF INSULIN (CMD): Primary | ICD-10-CM

## 2020-03-14 ENCOUNTER — TELEPHONE (OUTPATIENT)
Dept: SCHEDULING | Age: 65
End: 2020-03-14

## 2020-03-16 ENCOUNTER — TELEPHONE (OUTPATIENT)
Dept: SCHEDULING | Age: 65
End: 2020-03-16

## 2020-03-18 ENCOUNTER — CASE MANAGEMENT (OUTPATIENT)
Dept: CARE COORDINATION | Age: 65
End: 2020-03-18

## 2020-04-09 ENCOUNTER — CASE MANAGEMENT (OUTPATIENT)
Dept: CARE COORDINATION | Age: 65
End: 2020-04-09

## 2020-05-04 ENCOUNTER — CASE MANAGEMENT (OUTPATIENT)
Dept: CARE COORDINATION | Age: 65
End: 2020-05-04

## 2020-05-04 DIAGNOSIS — E11.65 TYPE 2 DIABETES MELLITUS WITH HYPERGLYCEMIA, WITH LONG-TERM CURRENT USE OF INSULIN (CMD): ICD-10-CM

## 2020-05-04 DIAGNOSIS — Z79.4 TYPE 2 DIABETES MELLITUS WITH HYPERGLYCEMIA, WITH LONG-TERM CURRENT USE OF INSULIN (CMD): ICD-10-CM

## 2020-05-04 RX ORDER — ATORVASTATIN CALCIUM 40 MG/1
40 TABLET, FILM COATED ORAL DAILY
Qty: 90 TABLET | Refills: 2 | Status: SHIPPED | OUTPATIENT
Start: 2020-05-04 | End: 2020-05-07 | Stop reason: SDUPTHER

## 2020-05-07 ENCOUNTER — CASE MANAGEMENT (OUTPATIENT)
Dept: CARE COORDINATION | Age: 65
End: 2020-05-07

## 2020-05-07 ENCOUNTER — OFFICE VISIT (OUTPATIENT)
Dept: FAMILY MEDICINE | Age: 65
End: 2020-05-07

## 2020-05-07 ENCOUNTER — TELEPHONE (OUTPATIENT)
Dept: SCHEDULING | Age: 65
End: 2020-05-07

## 2020-05-07 DIAGNOSIS — I10 BENIGN ESSENTIAL HYPERTENSION: ICD-10-CM

## 2020-05-07 DIAGNOSIS — Z71.89 COUNSELING REGARDING GOALS OF CARE: ICD-10-CM

## 2020-05-07 DIAGNOSIS — R60.0 EDEMA OF BOTH LEGS: ICD-10-CM

## 2020-05-07 DIAGNOSIS — N50.89 SCROTAL SWELLING: ICD-10-CM

## 2020-05-07 DIAGNOSIS — E11.65 TYPE 2 DIABETES MELLITUS WITH HYPERGLYCEMIA, WITH LONG-TERM CURRENT USE OF INSULIN (CMD): Primary | ICD-10-CM

## 2020-05-07 DIAGNOSIS — R06.09 DYSPNEA ON EXERTION: ICD-10-CM

## 2020-05-07 DIAGNOSIS — G89.29 CHRONIC PAIN OF RIGHT KNEE: ICD-10-CM

## 2020-05-07 DIAGNOSIS — Z79.4 TYPE 2 DIABETES MELLITUS WITH HYPERGLYCEMIA, WITH LONG-TERM CURRENT USE OF INSULIN (CMD): Primary | ICD-10-CM

## 2020-05-07 DIAGNOSIS — E66.01 MORBID OBESITY WITH BODY MASS INDEX (BMI) GREATER THAN OR EQUAL TO 50 (CMD): ICD-10-CM

## 2020-05-07 DIAGNOSIS — M25.561 CHRONIC PAIN OF RIGHT KNEE: ICD-10-CM

## 2020-05-07 PROCEDURE — 36415 COLL VENOUS BLD VENIPUNCTURE: CPT

## 2020-05-07 PROCEDURE — 99215 OFFICE O/P EST HI 40 MIN: CPT | Performed by: FAMILY MEDICINE

## 2020-05-07 RX ORDER — ATORVASTATIN CALCIUM 40 MG/1
40 TABLET, FILM COATED ORAL DAILY
Qty: 90 TABLET | Refills: 2 | Status: SHIPPED | OUTPATIENT
Start: 2020-05-07 | End: 2020-11-11 | Stop reason: SDUPTHER

## 2020-05-07 SDOH — HEALTH STABILITY: MENTAL HEALTH: HOW OFTEN DO YOU HAVE A DRINK CONTAINING ALCOHOL?: NEVER

## 2020-05-07 ASSESSMENT — ENCOUNTER SYMPTOMS
FEVER: 0
NUMBNESS: 0
WHEEZING: 0
FATIGUE: 1
DIAPHORESIS: 0
APPETITE CHANGE: 0
GASTROINTESTINAL NEGATIVE: 1
ACTIVITY CHANGE: 1
AGITATION: 0
CHILLS: 0
SHORTNESS OF BREATH: 1
COUGH: 0
WEAKNESS: 1

## 2020-05-07 ASSESSMENT — PATIENT HEALTH QUESTIONNAIRE - PHQ9
1. LITTLE INTEREST OR PLEASURE IN DOING THINGS: NOT AT ALL
2. FEELING DOWN, DEPRESSED OR HOPELESS: NOT AT ALL
SUM OF ALL RESPONSES TO PHQ9 QUESTIONS 1 AND 2: 0
SUM OF ALL RESPONSES TO PHQ9 QUESTIONS 1 AND 2: 0

## 2020-05-07 ASSESSMENT — PAIN SCALES - GENERAL: PAINLEVEL: 9-10

## 2020-05-08 LAB
ALBUMIN SERPL-MCNC: 3 G/DL (ref 3.6–5.1)
ALBUMIN/GLOB SERPL: 0.8 {RATIO} (ref 1–2.4)
ALP SERPL-CCNC: 91 UNITS/L (ref 45–117)
ALT SERPL-CCNC: 31 UNITS/L
ANION GAP SERPL CALC-SCNC: 9 MMOL/L (ref 10–20)
AST SERPL-CCNC: 20 UNITS/L
BASOPHILS # BLD: 0.1 K/MCL (ref 0–0.3)
BASOPHILS NFR BLD: 1 %
BILIRUB SERPL-MCNC: 0.6 MG/DL (ref 0.2–1)
BUN SERPL-MCNC: 20 MG/DL (ref 6–20)
BUN/CREAT SERPL: 19 (ref 7–25)
CALCIUM SERPL-MCNC: 8.8 MG/DL (ref 8.4–10.2)
CHLORIDE SERPL-SCNC: 106 MMOL/L (ref 98–107)
CHOLEST SERPL-MCNC: 196 MG/DL
CHOLEST/HDLC SERPL: 7.3 {RATIO}
CO2 SERPL-SCNC: 29 MMOL/L (ref 21–32)
CREAT SERPL-MCNC: 1.08 MG/DL (ref 0.67–1.17)
DIFFERENTIAL METHOD BLD: ABNORMAL
EOSINOPHIL # BLD: 0.2 K/MCL (ref 0.1–0.5)
EOSINOPHIL NFR BLD: 3 %
ERYTHROCYTE [DISTWIDTH] IN BLOOD: 14.1 % (ref 11–15)
GLOBULIN SER-MCNC: 3.7 G/DL (ref 2–4)
GLUCOSE SERPL-MCNC: 255 MG/DL (ref 65–99)
HBA1C MFR BLD: 11.1 % (ref 4.5–5.6)
HCT VFR BLD CALC: 44.8 % (ref 39–51)
HDLC SERPL-MCNC: 27 MG/DL
HGB BLD-MCNC: 14.2 G/DL (ref 13–17)
IMM GRANULOCYTES # BLD AUTO: 0.1 K/MCL (ref 0–0.2)
IMM GRANULOCYTES NFR BLD: 1 %
LDLC SERPL CALC-MCNC: 121 MG/DL
LENGTH OF FAST TIME PATIENT: ABNORMAL HRS
LENGTH OF FAST TIME PATIENT: ABNORMAL HRS
LYMPHOCYTES # BLD: 2.1 K/MCL (ref 1–4)
LYMPHOCYTES NFR BLD: 26 %
MCH RBC QN AUTO: 28.3 PG (ref 26–34)
MCHC RBC AUTO-ENTMCNC: 31.7 G/DL (ref 32–36.5)
MCV RBC AUTO: 89.2 FL (ref 78–100)
MONOCYTES # BLD: 0.7 K/MCL (ref 0.3–0.9)
MONOCYTES NFR BLD: 8 %
NEUTROPHILS # BLD: 5 K/MCL (ref 1.8–7.7)
NEUTROPHILS NFR BLD: 61 %
NONHDLC SERPL-MCNC: 169 MG/DL
NRBC BLD MANUAL-RTO: 0 /100 WBC
PLATELET # BLD: 221 K/MCL (ref 140–450)
POTASSIUM SERPL-SCNC: 4.1 MMOL/L (ref 3.4–5.1)
PROT SERPL-MCNC: 6.7 G/DL (ref 6.4–8.2)
RBC # BLD: 5.02 MIL/MCL (ref 4.5–5.9)
SODIUM SERPL-SCNC: 140 MMOL/L (ref 135–145)
TRIGL SERPL-MCNC: 240 MG/DL
WBC # BLD: 8.1 K/MCL (ref 4.2–11)

## 2020-05-09 ENCOUNTER — TELEPHONE (OUTPATIENT)
Dept: SCHEDULING | Age: 65
End: 2020-05-09

## 2020-05-11 ENCOUNTER — TELEPHONE (OUTPATIENT)
Dept: SCHEDULING | Age: 65
End: 2020-05-11

## 2020-05-13 ENCOUNTER — TELEPHONE (OUTPATIENT)
Dept: SCHEDULING | Age: 65
End: 2020-05-13

## 2020-05-14 ENCOUNTER — TELEPHONE (OUTPATIENT)
Dept: CARE COORDINATION | Age: 65
End: 2020-05-14

## 2020-05-15 ENCOUNTER — APPOINTMENT (OUTPATIENT)
Dept: CT IMAGING | Age: 65
DRG: 292 | End: 2020-05-15
Attending: EMERGENCY MEDICINE

## 2020-05-15 ENCOUNTER — HOSPITAL ENCOUNTER (INPATIENT)
Age: 65
LOS: 6 days | Discharge: HOME HEALTH | DRG: 292 | End: 2020-05-21
Attending: EMERGENCY MEDICINE | Admitting: INTERNAL MEDICINE

## 2020-05-15 DIAGNOSIS — I70.0 AORTIC ATHEROSCLEROSIS (CMD): ICD-10-CM

## 2020-05-15 DIAGNOSIS — I25.10 CORONARY ARTERY DISEASE INVOLVING NATIVE CORONARY ARTERY OF NATIVE HEART WITHOUT ANGINA PECTORIS: ICD-10-CM

## 2020-05-15 DIAGNOSIS — E66.01 MORBID OBESITY WITH BODY MASS INDEX OF 50.0-59.9 IN ADULT (CMD): ICD-10-CM

## 2020-05-15 DIAGNOSIS — E11.65 UNCONTROLLED TYPE 2 DIABETES MELLITUS WITH HYPERGLYCEMIA (CMD): ICD-10-CM

## 2020-05-15 DIAGNOSIS — I24.89 DEMAND ISCHEMIA OF MYOCARDIUM: ICD-10-CM

## 2020-05-15 DIAGNOSIS — G47.33 OBSTRUCTIVE SLEEP APNEA SYNDROME: ICD-10-CM

## 2020-05-15 DIAGNOSIS — R60.1 GENERALIZED EDEMA: ICD-10-CM

## 2020-05-15 DIAGNOSIS — E66.01 MORBID OBESITY WITH BODY MASS INDEX (BMI) GREATER THAN OR EQUAL TO 50 (CMD): ICD-10-CM

## 2020-05-15 DIAGNOSIS — Z91.199 NONCOMPLIANCE WITH CPAP TREATMENT: ICD-10-CM

## 2020-05-15 DIAGNOSIS — E78.5 DYSLIPIDEMIA: ICD-10-CM

## 2020-05-15 DIAGNOSIS — I10 UNCONTROLLED HYPERTENSION: ICD-10-CM

## 2020-05-15 DIAGNOSIS — R59.1 LYMPHADENOPATHY, GENERALIZED: ICD-10-CM

## 2020-05-15 DIAGNOSIS — I50.31 ACUTE DIASTOLIC HEART FAILURE (CMD): ICD-10-CM

## 2020-05-15 DIAGNOSIS — I50.33 ACUTE ON CHRONIC DIASTOLIC CONGESTIVE HEART FAILURE (CMD): Primary | ICD-10-CM

## 2020-05-15 PROBLEM — N28.9 RENAL INSUFFICIENCY: Status: ACTIVE | Noted: 2020-05-15

## 2020-05-15 LAB
ALBUMIN SERPL-MCNC: 2.9 G/DL (ref 3.6–5.1)
ALBUMIN/GLOB SERPL: 0.7 {RATIO} (ref 1–2.4)
ALP SERPL-CCNC: 97 UNITS/L (ref 45–117)
ALT SERPL-CCNC: 32 UNITS/L
ANION GAP SERPL CALC-SCNC: 10 MMOL/L (ref 10–20)
APPEARANCE UR: CLEAR
AST SERPL-CCNC: 26 UNITS/L
BASOPHILS # BLD: 0.1 K/MCL (ref 0–0.3)
BASOPHILS NFR BLD: 1 %
BILIRUB SERPL-MCNC: 0.5 MG/DL (ref 0.2–1)
BILIRUB UR QL STRIP: NEGATIVE
BUN SERPL-MCNC: 20 MG/DL (ref 6–20)
BUN/CREAT SERPL: 17 (ref 7–25)
CALCIUM SERPL-MCNC: 8.7 MG/DL (ref 8.4–10.2)
CHLORIDE SERPL-SCNC: 109 MMOL/L (ref 98–107)
CO2 SERPL-SCNC: 29 MMOL/L (ref 21–32)
COLOR UR: YELLOW
CREAT SERPL-MCNC: 1.19 MG/DL (ref 0.67–1.17)
DIFFERENTIAL METHOD BLD: NORMAL
EOSINOPHIL # BLD: 0.2 K/MCL (ref 0.1–0.5)
EOSINOPHIL NFR BLD: 2 %
ERYTHROCYTE [DISTWIDTH] IN BLOOD: 14 % (ref 11–15)
GLOBULIN SER-MCNC: 4.3 G/DL (ref 2–4)
GLUCOSE SERPL-MCNC: 160 MG/DL (ref 65–99)
GLUCOSE UR STRIP-MCNC: NEGATIVE MG/DL
HCT VFR BLD CALC: 43.2 % (ref 39–51)
HGB BLD-MCNC: 13.9 G/DL (ref 13–17)
HGB UR QL STRIP: NEGATIVE
KETONES UR STRIP-MCNC: NEGATIVE MG/DL
LEUKOCYTE ESTERASE UR QL STRIP: NEGATIVE
LYMPHOCYTES # BLD: 2.6 K/MCL (ref 1–4)
LYMPHOCYTES NFR BLD: 27 %
MAGNESIUM SERPL-MCNC: 2 MG/DL (ref 1.7–2.4)
MCH RBC QN AUTO: 28.1 PG (ref 26–34)
MCHC RBC AUTO-ENTMCNC: 32.2 G/DL (ref 32–36.5)
MCV RBC AUTO: 87.3 FL (ref 78–100)
MONOCYTES # BLD: 0.8 K/MCL (ref 0.3–0.9)
MONOCYTES NFR BLD: 8 %
NEUTROPHILS # BLD: 6.1 K/MCL (ref 1.8–7.7)
NEUTROPHILS NFR BLD: 62 %
NITRITE UR QL STRIP: NEGATIVE
NRBC BLD MANUAL-RTO: 0 /100 WBC
NT-PROBNP SERPL-MCNC: 456 PG/ML
PH UR STRIP: 6 UNITS (ref 5–7)
PLATELET # BLD: 240 K/MCL (ref 140–450)
POTASSIUM SERPL-SCNC: 4 MMOL/L (ref 3.4–5.1)
PROCALCITONIN SERPL IA-MCNC: <0.05 NG/ML
PROT SERPL-MCNC: 7.2 G/DL (ref 6.4–8.2)
PROT UR STRIP-MCNC: NEGATIVE MG/DL
RBC # BLD: 4.95 MIL/MCL (ref 4.5–5.9)
SODIUM SERPL-SCNC: 144 MMOL/L (ref 135–145)
SP GR UR STRIP: 1.02 (ref 1–1.03)
SPECIMEN SOURCE: ABNORMAL
TROPONIN I SERPL HS-MCNC: 0.08 NG/ML
UROBILINOGEN UR STRIP-MCNC: 4 MG/DL (ref 0–1)
WBC # BLD: 9.8 K/MCL (ref 4.2–11)

## 2020-05-15 PROCEDURE — 99223 1ST HOSP IP/OBS HIGH 75: CPT | Performed by: INTERNAL MEDICINE

## 2020-05-15 PROCEDURE — 71260 CT THORAX DX C+: CPT

## 2020-05-15 PROCEDURE — 99285 EMERGENCY DEPT VISIT HI MDM: CPT

## 2020-05-15 PROCEDURE — 10002800 HB RX 250 W HCPCS: Performed by: EMERGENCY MEDICINE

## 2020-05-15 PROCEDURE — 84484 ASSAY OF TROPONIN QUANT: CPT

## 2020-05-15 PROCEDURE — 93005 ELECTROCARDIOGRAM TRACING: CPT | Performed by: EMERGENCY MEDICINE

## 2020-05-15 PROCEDURE — 80053 COMPREHEN METABOLIC PANEL: CPT

## 2020-05-15 PROCEDURE — 96374 THER/PROPH/DIAG INJ IV PUSH: CPT

## 2020-05-15 PROCEDURE — 74177 CT ABD & PELVIS W/CONTRAST: CPT

## 2020-05-15 PROCEDURE — 10002805 HB CONTRAST AGENT: Performed by: EMERGENCY MEDICINE

## 2020-05-15 PROCEDURE — 10003585 HB ROOM CHARGE INTERMEDIATE CARE

## 2020-05-15 PROCEDURE — 83735 ASSAY OF MAGNESIUM: CPT

## 2020-05-15 PROCEDURE — 87635 SARS-COV-2 COVID-19 AMP PRB: CPT

## 2020-05-15 PROCEDURE — 84145 PROCALCITONIN (PCT): CPT

## 2020-05-15 PROCEDURE — 81003 URINALYSIS AUTO W/O SCOPE: CPT

## 2020-05-15 PROCEDURE — 83880 ASSAY OF NATRIURETIC PEPTIDE: CPT

## 2020-05-15 PROCEDURE — 85025 COMPLETE CBC W/AUTO DIFF WBC: CPT

## 2020-05-15 RX ORDER — FUROSEMIDE 10 MG/ML
80 INJECTION INTRAMUSCULAR; INTRAVENOUS ONCE
Status: COMPLETED | OUTPATIENT
Start: 2020-05-15 | End: 2020-05-15

## 2020-05-15 RX ADMIN — IOHEXOL 110 ML: 350 INJECTION, SOLUTION INTRAVENOUS at 21:45

## 2020-05-15 RX ADMIN — FUROSEMIDE 80 MG: 10 INJECTION, SOLUTION INTRAMUSCULAR; INTRAVENOUS at 21:54

## 2020-05-15 SDOH — HEALTH STABILITY: MENTAL HEALTH: HOW OFTEN DO YOU HAVE A DRINK CONTAINING ALCOHOL?: NEVER

## 2020-05-15 ASSESSMENT — ENCOUNTER SYMPTOMS
CHEST TIGHTNESS: 0
SLEEP DISTURBANCE: 0
BACK PAIN: 1
CONSTITUTIONAL NEGATIVE: 1
SHORTNESS OF BREATH: 1
ALLERGIC/IMMUNOLOGIC NEGATIVE: 1
DIARRHEA: 0
PSYCHIATRIC NEGATIVE: 1
CHILLS: 0
HEMATOLOGIC/LYMPHATIC NEGATIVE: 1
EYES NEGATIVE: 1
NAUSEA: 0
WEAKNESS: 1
WHEEZING: 1
ABDOMINAL PAIN: 0
BRUISES/BLEEDS EASILY: 0
BACK PAIN: 0
WOUND: 0
FEVER: 0
SORE THROAT: 0
ABDOMINAL DISTENTION: 1
NEUROLOGICAL NEGATIVE: 1
ENDOCRINE COMMENTS: HYPERGLYCEMIA
VOMITING: 0
COUGH: 0
HEADACHES: 0

## 2020-05-15 ASSESSMENT — PAIN DESCRIPTION - PAIN TYPE: TYPE: ACUTE PAIN

## 2020-05-15 ASSESSMENT — PAIN SCALES - GENERAL: PAINLEVEL_OUTOF10: 3

## 2020-05-16 LAB
ALBUMIN SERPL-MCNC: 3 G/DL (ref 3.6–5.1)
ALBUMIN/GLOB SERPL: 0.7 {RATIO} (ref 1–2.4)
ALP SERPL-CCNC: 88 UNITS/L (ref 45–117)
ALT SERPL-CCNC: 28 UNITS/L
ANION GAP SERPL CALC-SCNC: 8 MMOL/L (ref 10–20)
AST SERPL-CCNC: 18 UNITS/L
ATRIAL RATE (BPM): 100
ATRIAL RATE (BPM): 94
BASOPHILS # BLD: 0.1 K/MCL (ref 0–0.3)
BASOPHILS NFR BLD: 1 %
BILIRUB SERPL-MCNC: 0.6 MG/DL (ref 0.2–1)
BUN SERPL-MCNC: 20 MG/DL (ref 6–20)
BUN/CREAT SERPL: 19 (ref 7–25)
CALCIUM SERPL-MCNC: 8.5 MG/DL (ref 8.4–10.2)
CHLORIDE SERPL-SCNC: 104 MMOL/L (ref 98–107)
CO2 SERPL-SCNC: 34 MMOL/L (ref 21–32)
CREAT SERPL-MCNC: 1.08 MG/DL (ref 0.67–1.17)
DIFFERENTIAL METHOD BLD: ABNORMAL
EOSINOPHIL # BLD: 0.2 K/MCL (ref 0.1–0.5)
EOSINOPHIL NFR BLD: 3 %
ERYTHROCYTE [DISTWIDTH] IN BLOOD: 13.9 % (ref 11–15)
GLOBULIN SER-MCNC: 4.2 G/DL (ref 2–4)
GLUCOSE BLDC GLUCOMTR-MCNC: 108 MG/DL (ref 70–99)
GLUCOSE BLDC GLUCOMTR-MCNC: 171 MG/DL (ref 70–99)
GLUCOSE BLDC GLUCOMTR-MCNC: 183 MG/DL (ref 70–99)
GLUCOSE BLDC GLUCOMTR-MCNC: 249 MG/DL (ref 70–99)
GLUCOSE BLDC GLUCOMTR-MCNC: 95 MG/DL (ref 70–99)
GLUCOSE SERPL-MCNC: 100 MG/DL (ref 65–99)
HCT VFR BLD CALC: 43.4 % (ref 39–51)
HGB BLD-MCNC: 14.1 G/DL (ref 13–17)
IMM GRANULOCYTES # BLD AUTO: 0 K/MCL (ref 0–0.2)
IMM GRANULOCYTES NFR BLD: 0 %
LYMPHOCYTES # BLD: 1.8 K/MCL (ref 1–4)
LYMPHOCYTES NFR BLD: 20 %
MAGNESIUM SERPL-MCNC: 2.1 MG/DL (ref 1.7–2.4)
MCH RBC QN AUTO: 28.5 PG (ref 26–34)
MCHC RBC AUTO-ENTMCNC: 32.5 G/DL (ref 32–36.5)
MCV RBC AUTO: 87.9 FL (ref 78–100)
MONOCYTES # BLD: 1 K/MCL (ref 0.3–0.9)
MONOCYTES NFR BLD: 11 %
NEUTROPHILS # BLD: 5.7 K/MCL (ref 1.8–7.7)
NEUTROPHILS NFR BLD: 65 %
NRBC BLD MANUAL-RTO: 0 /100 WBC
P AXIS (DEGREES): 50
P AXIS (DEGREES): 54
PLATELET # BLD: 239 K/MCL (ref 140–450)
POTASSIUM SERPL-SCNC: 3.4 MMOL/L (ref 3.4–5.1)
PR-INTERVAL (MSEC): 182
PR-INTERVAL (MSEC): 184
PROT SERPL-MCNC: 7.2 G/DL (ref 6.4–8.2)
QRS-INTERVAL (MSEC): 84
QRS-INTERVAL (MSEC): 96
QT-INTERVAL (MSEC): 324
QT-INTERVAL (MSEC): 358
QTC: 418
QTC: 448
R AXIS (DEGREES): 55
R AXIS (DEGREES): 57
RBC # BLD: 4.94 MIL/MCL (ref 4.5–5.9)
REPORT TEXT: NORMAL
REPORT TEXT: NORMAL
SARS-COV-2 RNA SPEC QL NAA+PROBE: NOT DETECTED
SERVICE CMNT-IMP: NORMAL
SODIUM SERPL-SCNC: 143 MMOL/L (ref 135–145)
SPECIMEN SOURCE: NORMAL
T AXIS (DEGREES): -133
T AXIS (DEGREES): -137
TROPONIN I SERPL HS-MCNC: 0.09 NG/ML
TROPONIN I SERPL HS-MCNC: 0.09 NG/ML
VENTRICULAR RATE EKG/MIN (BPM): 100
VENTRICULAR RATE EKG/MIN (BPM): 94
WBC # BLD: 8.8 K/MCL (ref 4.2–11)

## 2020-05-16 PROCEDURE — 10002803 HB RX 637: Performed by: INTERNAL MEDICINE

## 2020-05-16 PROCEDURE — 10004651 HB RX, NO CHARGE ITEM: Performed by: INTERNAL MEDICINE

## 2020-05-16 PROCEDURE — 80053 COMPREHEN METABOLIC PANEL: CPT

## 2020-05-16 PROCEDURE — 93005 ELECTROCARDIOGRAM TRACING: CPT | Performed by: INTERNAL MEDICINE

## 2020-05-16 PROCEDURE — 10002800 HB RX 250 W HCPCS: Performed by: INTERNAL MEDICINE

## 2020-05-16 PROCEDURE — 99223 1ST HOSP IP/OBS HIGH 75: CPT | Performed by: INTERNAL MEDICINE

## 2020-05-16 PROCEDURE — 36415 COLL VENOUS BLD VENIPUNCTURE: CPT

## 2020-05-16 PROCEDURE — 99233 SBSQ HOSP IP/OBS HIGH 50: CPT | Performed by: INTERNAL MEDICINE

## 2020-05-16 PROCEDURE — 85025 COMPLETE CBC W/AUTO DIFF WBC: CPT

## 2020-05-16 PROCEDURE — 84484 ASSAY OF TROPONIN QUANT: CPT

## 2020-05-16 PROCEDURE — 83735 ASSAY OF MAGNESIUM: CPT

## 2020-05-16 PROCEDURE — 10003585 HB ROOM CHARGE INTERMEDIATE CARE

## 2020-05-16 RX ORDER — INSULIN GLARGINE 100 [IU]/ML
50 INJECTION, SOLUTION SUBCUTANEOUS
Status: DISCONTINUED | OUTPATIENT
Start: 2020-05-17 | End: 2020-05-17

## 2020-05-16 RX ORDER — 0.9 % SODIUM CHLORIDE 0.9 %
2 VIAL (ML) INJECTION EVERY 12 HOURS SCHEDULED
Status: DISCONTINUED | OUTPATIENT
Start: 2020-05-16 | End: 2020-05-21 | Stop reason: HOSPADM

## 2020-05-16 RX ORDER — NITROGLYCERIN 0.4 MG/1
0.4 TABLET SUBLINGUAL EVERY 5 MIN PRN
Status: DISCONTINUED | OUTPATIENT
Start: 2020-05-16 | End: 2020-05-21 | Stop reason: HOSPADM

## 2020-05-16 RX ORDER — DEXTROSE MONOHYDRATE 50 MG/ML
INJECTION, SOLUTION INTRAVENOUS CONTINUOUS PRN
Status: DISCONTINUED | OUTPATIENT
Start: 2020-05-16 | End: 2020-05-21 | Stop reason: HOSPADM

## 2020-05-16 RX ORDER — GLUCOSAMINE/MSM/CHONDROITIN A 500-83-400
50 TABLET ORAL DAILY
COMMUNITY
End: 2021-04-09 | Stop reason: ALTCHOICE

## 2020-05-16 RX ORDER — POLYETHYLENE GLYCOL 3350 17 G/17G
17 POWDER, FOR SOLUTION ORAL DAILY PRN
Status: DISCONTINUED | OUTPATIENT
Start: 2020-05-16 | End: 2020-05-21 | Stop reason: HOSPADM

## 2020-05-16 RX ORDER — MAGNESIUM SULFATE 1 G/100ML
1 INJECTION INTRAVENOUS DAILY PRN
Status: DISCONTINUED | OUTPATIENT
Start: 2020-05-16 | End: 2020-05-21 | Stop reason: HOSPADM

## 2020-05-16 RX ORDER — INSULIN GLARGINE 100 [IU]/ML
50 INJECTION, SOLUTION SUBCUTANEOUS EVERY 12 HOURS
Status: DISCONTINUED | OUTPATIENT
Start: 2020-05-16 | End: 2020-05-16

## 2020-05-16 RX ORDER — POTASSIUM CHLORIDE 20 MEQ/1
40 TABLET, EXTENDED RELEASE ORAL EVERY 4 HOURS PRN
Status: DISCONTINUED | OUTPATIENT
Start: 2020-05-16 | End: 2020-05-21 | Stop reason: HOSPADM

## 2020-05-16 RX ORDER — POTASSIUM CHLORIDE 20 MEQ/1
20 TABLET, EXTENDED RELEASE ORAL 2 TIMES DAILY WITH MEALS
Status: DISCONTINUED | OUTPATIENT
Start: 2020-05-16 | End: 2020-05-21 | Stop reason: HOSPADM

## 2020-05-16 RX ORDER — ATORVASTATIN CALCIUM 40 MG/1
40 TABLET, FILM COATED ORAL DAILY
Status: DISCONTINUED | OUTPATIENT
Start: 2020-05-16 | End: 2020-05-21 | Stop reason: HOSPADM

## 2020-05-16 RX ORDER — NYSTATIN 100000 U/G
CREAM TOPICAL 2 TIMES DAILY
Status: DISCONTINUED | OUTPATIENT
Start: 2020-05-16 | End: 2020-05-21 | Stop reason: HOSPADM

## 2020-05-16 RX ORDER — ENOXAPARIN SODIUM 100 MG/ML
40 INJECTION SUBCUTANEOUS DAILY
Status: DISCONTINUED | OUTPATIENT
Start: 2020-05-16 | End: 2020-05-16

## 2020-05-16 RX ORDER — ACETAMINOPHEN 325 MG/1
650 TABLET ORAL EVERY 4 HOURS PRN
Status: DISCONTINUED | OUTPATIENT
Start: 2020-05-16 | End: 2020-05-21 | Stop reason: HOSPADM

## 2020-05-16 RX ORDER — NICOTINE POLACRILEX 4 MG
30 LOZENGE BUCCAL PRN
Status: DISCONTINUED | OUTPATIENT
Start: 2020-05-16 | End: 2020-05-21 | Stop reason: HOSPADM

## 2020-05-16 RX ORDER — POTASSIUM CHLORIDE 20 MEQ/1
20 TABLET, EXTENDED RELEASE ORAL EVERY 4 HOURS PRN
Status: DISCONTINUED | OUTPATIENT
Start: 2020-05-16 | End: 2020-05-21 | Stop reason: HOSPADM

## 2020-05-16 RX ORDER — POTASSIUM CHLORIDE 14.9 MG/ML
20 INJECTION INTRAVENOUS EVERY 4 HOURS PRN
Status: DISCONTINUED | OUTPATIENT
Start: 2020-05-16 | End: 2020-05-21 | Stop reason: HOSPADM

## 2020-05-16 RX ORDER — ONDANSETRON 2 MG/ML
4 INJECTION INTRAMUSCULAR; INTRAVENOUS 2 TIMES DAILY PRN
Status: DISCONTINUED | OUTPATIENT
Start: 2020-05-16 | End: 2020-05-21 | Stop reason: HOSPADM

## 2020-05-16 RX ORDER — FUROSEMIDE 10 MG/ML
40 INJECTION INTRAMUSCULAR; INTRAVENOUS 2 TIMES DAILY
Status: DISCONTINUED | OUTPATIENT
Start: 2020-05-16 | End: 2020-05-17

## 2020-05-16 RX ORDER — LISINOPRIL 10 MG/1
10 TABLET ORAL DAILY
Status: DISCONTINUED | OUTPATIENT
Start: 2020-05-16 | End: 2020-05-21 | Stop reason: HOSPADM

## 2020-05-16 RX ORDER — NICOTINE POLACRILEX 4 MG
15 LOZENGE BUCCAL PRN
Status: DISCONTINUED | OUTPATIENT
Start: 2020-05-16 | End: 2020-05-21 | Stop reason: HOSPADM

## 2020-05-16 RX ORDER — AMOXICILLIN 250 MG
2 CAPSULE ORAL DAILY PRN
Status: DISCONTINUED | OUTPATIENT
Start: 2020-05-16 | End: 2020-05-21 | Stop reason: HOSPADM

## 2020-05-16 RX ORDER — DEXTROSE MONOHYDRATE 25 G/50ML
25 INJECTION, SOLUTION INTRAVENOUS PRN
Status: DISCONTINUED | OUTPATIENT
Start: 2020-05-16 | End: 2020-05-21 | Stop reason: HOSPADM

## 2020-05-16 RX ORDER — ENOXAPARIN SODIUM 100 MG/ML
40 INJECTION SUBCUTANEOUS EVERY 12 HOURS
Status: DISCONTINUED | OUTPATIENT
Start: 2020-05-16 | End: 2020-05-21 | Stop reason: HOSPADM

## 2020-05-16 RX ORDER — MAGNESIUM HYDROXIDE/ALUMINUM HYDROXICE/SIMETHICONE 120; 1200; 1200 MG/30ML; MG/30ML; MG/30ML
30 SUSPENSION ORAL EVERY 4 HOURS PRN
Status: DISCONTINUED | OUTPATIENT
Start: 2020-05-16 | End: 2020-05-21 | Stop reason: HOSPADM

## 2020-05-16 RX ORDER — POTASSIUM CHLORIDE 1.5 G/1.58G
40 POWDER, FOR SOLUTION ORAL EVERY 4 HOURS PRN
Status: DISCONTINUED | OUTPATIENT
Start: 2020-05-16 | End: 2020-05-21 | Stop reason: HOSPADM

## 2020-05-16 RX ORDER — DEXTROSE MONOHYDRATE 25 G/50ML
12.5 INJECTION, SOLUTION INTRAVENOUS PRN
Status: DISCONTINUED | OUTPATIENT
Start: 2020-05-16 | End: 2020-05-21 | Stop reason: HOSPADM

## 2020-05-16 RX ORDER — POTASSIUM CHLORIDE 1.5 G/1.58G
20 POWDER, FOR SOLUTION ORAL EVERY 4 HOURS PRN
Status: DISCONTINUED | OUTPATIENT
Start: 2020-05-16 | End: 2020-05-21 | Stop reason: HOSPADM

## 2020-05-16 RX ADMIN — SODIUM CHLORIDE, PRESERVATIVE FREE 2 ML: 5 INJECTION INTRAVENOUS at 02:23

## 2020-05-16 RX ADMIN — POTASSIUM CHLORIDE 20 MEQ: 20 TABLET, EXTENDED RELEASE ORAL at 16:30

## 2020-05-16 RX ADMIN — LISINOPRIL 10 MG: 10 TABLET ORAL at 09:09

## 2020-05-16 RX ADMIN — FUROSEMIDE 40 MG: 10 INJECTION, SOLUTION INTRAMUSCULAR; INTRAVENOUS at 21:24

## 2020-05-16 RX ADMIN — FUROSEMIDE 40 MG: 10 INJECTION, SOLUTION INTRAMUSCULAR; INTRAVENOUS at 09:14

## 2020-05-16 RX ADMIN — INSULIN GLARGINE 50 UNITS: 100 INJECTION, SOLUTION SUBCUTANEOUS at 02:22

## 2020-05-16 RX ADMIN — ENOXAPARIN SODIUM 40 MG: 40 INJECTION SUBCUTANEOUS at 09:14

## 2020-05-16 RX ADMIN — ENOXAPARIN SODIUM 40 MG: 40 INJECTION SUBCUTANEOUS at 21:27

## 2020-05-16 RX ADMIN — SODIUM CHLORIDE, PRESERVATIVE FREE 2 ML: 5 INJECTION INTRAVENOUS at 21:26

## 2020-05-16 RX ADMIN — INSULIN LISPRO 2 UNITS: 100 INJECTION, SOLUTION INTRAVENOUS; SUBCUTANEOUS at 09:10

## 2020-05-16 RX ADMIN — NYSTATIN: 100000 CREAM TOPICAL at 21:26

## 2020-05-16 RX ADMIN — INSULIN LISPRO 4 UNITS: 100 INJECTION, SOLUTION INTRAVENOUS; SUBCUTANEOUS at 13:02

## 2020-05-16 RX ADMIN — POTASSIUM CHLORIDE 20 MEQ: 20 TABLET, EXTENDED RELEASE ORAL at 11:09

## 2020-05-16 RX ADMIN — DESMOPRESSIN ACETATE 40 MG: 0.2 TABLET ORAL at 09:09

## 2020-05-16 RX ADMIN — SODIUM CHLORIDE, PRESERVATIVE FREE 2 ML: 5 INJECTION INTRAVENOUS at 09:15

## 2020-05-16 RX ADMIN — INSULIN LISPRO 4 UNITS: 100 INJECTION, SOLUTION INTRAVENOUS; SUBCUTANEOUS at 16:30

## 2020-05-16 SDOH — HEALTH STABILITY: MENTAL HEALTH: HOW OFTEN DO YOU HAVE A DRINK CONTAINING ALCOHOL?: NEVER

## 2020-05-16 ASSESSMENT — LIFESTYLE VARIABLES
HOW MANY STANDARD DRINKS CONTAINING ALCOHOL DO YOU HAVE ON A TYPICAL DAY: 0,1 OR 2
ALCOHOL_USE_STATUS: NO OR LOW RISK WITH VALIDATED TOOL
AUDIT-C TOTAL SCORE: 1
HOW OFTEN DO YOU HAVE 6 OR MORE DRINKS ON ONE OCCASION: NEVER
HOW OFTEN DO YOU HAVE A DRINK CONTAINING ALCOHOL: MONTHLY OR LESS

## 2020-05-16 ASSESSMENT — PATIENT HEALTH QUESTIONNAIRE - PHQ9
IS PATIENT ABLE TO COMPLETE PHQ2 OR PHQ9: NO, DEFER TO LATER TIME
2. FEELING DOWN, DEPRESSED OR HOPELESS: NOT AT ALL
SUM OF ALL RESPONSES TO PHQ9 QUESTIONS 1 AND 2: 0
1. LITTLE INTEREST OR PLEASURE IN DOING THINGS: NOT AT ALL
IS PATIENT ABLE TO COMPLETE PHQ2 OR PHQ9: YES
IS PATIENT ABLE TO COMPLETE PHQ2 OR PHQ9: NO, DEFER TO LATER TIME

## 2020-05-16 ASSESSMENT — COGNITIVE AND FUNCTIONAL STATUS - GENERAL
BECAUSE OF A PHYSICAL, MENTAL, OR EMOTIONAL CONDITION, DO YOU HAVE SERIOUS DIFFICULTY CONCENTRATING, REMEMBERING OR MAKING DECISIONS: NO
ARE YOU BLIND OR DO YOU HAVE SERIOUS DIFFICULTY SEEING, EVEN WHEN WEARING GLASSES: NO
DO YOU HAVE SERIOUS DIFFICULTY WALKING OR CLIMBING STAIRS: YES
ARE YOU DEAF OR DO YOU HAVE SERIOUS DIFFICULTY  HEARING: NO
BECAUSE OF A PHYSICAL, MENTAL, OR EMOTIONAL CONDITION, DO YOU HAVE DIFFICULTY DOING ERRANDS ALONE: YES
DO YOU HAVE DIFFICULTY DRESSING OR BATHING: YES

## 2020-05-16 ASSESSMENT — COLUMBIA-SUICIDE SEVERITY RATING SCALE - C-SSRS
1. WITHIN THE PAST MONTH, HAVE YOU WISHED YOU WERE DEAD OR WISHED YOU COULD GO TO SLEEP AND NOT WAKE UP?: NO
2. HAVE YOU ACTUALLY HAD ANY THOUGHTS OF KILLING YOURSELF?: NO
6. HAVE YOU EVER DONE ANYTHING, STARTED TO DO ANYTHING, OR PREPARED TO DO ANYTHING TO END YOUR LIFE?: NO
IS THE PATIENT ABLE TO COMPLETE C-SSRS: YES

## 2020-05-16 ASSESSMENT — ACTIVITIES OF DAILY LIVING (ADL)
CHRONIC_PAIN_PRESENT: NO
RECENT_DECLINE_ADL: NO
ADL_SHORT_OF_BREATH: YES
ADL_SCORE: 12
ADL_BEFORE_ADMISSION: INDEPENDENT

## 2020-05-16 ASSESSMENT — PAIN SCALES - GENERAL
PAINLEVEL_OUTOF10: 0
PAINLEVEL_OUTOF10: 0
PAINLEVEL_OUTOF10: 2

## 2020-05-17 LAB
ANION GAP SERPL CALC-SCNC: 7 MMOL/L (ref 10–20)
BUN SERPL-MCNC: 20 MG/DL (ref 6–20)
BUN/CREAT SERPL: 19 (ref 7–25)
CALCIUM SERPL-MCNC: 9.1 MG/DL (ref 8.4–10.2)
CHLORIDE SERPL-SCNC: 102 MMOL/L (ref 98–107)
CO2 SERPL-SCNC: 34 MMOL/L (ref 21–32)
CREAT SERPL-MCNC: 1.04 MG/DL (ref 0.67–1.17)
GLUCOSE BLDC GLUCOMTR-MCNC: 157 MG/DL (ref 70–99)
GLUCOSE BLDC GLUCOMTR-MCNC: 180 MG/DL (ref 70–99)
GLUCOSE BLDC GLUCOMTR-MCNC: 211 MG/DL (ref 70–99)
GLUCOSE BLDC GLUCOMTR-MCNC: 258 MG/DL (ref 70–99)
GLUCOSE SERPL-MCNC: 208 MG/DL (ref 65–99)
POTASSIUM SERPL-SCNC: 4.3 MMOL/L (ref 3.4–5.1)
SODIUM SERPL-SCNC: 139 MMOL/L (ref 135–145)

## 2020-05-17 PROCEDURE — 10004651 HB RX, NO CHARGE ITEM: Performed by: INTERNAL MEDICINE

## 2020-05-17 PROCEDURE — 36415 COLL VENOUS BLD VENIPUNCTURE: CPT

## 2020-05-17 PROCEDURE — 80048 BASIC METABOLIC PNL TOTAL CA: CPT

## 2020-05-17 PROCEDURE — 10002803 HB RX 637: Performed by: INTERNAL MEDICINE

## 2020-05-17 PROCEDURE — 99233 SBSQ HOSP IP/OBS HIGH 50: CPT | Performed by: INTERNAL MEDICINE

## 2020-05-17 PROCEDURE — 10002800 HB RX 250 W HCPCS: Performed by: INTERNAL MEDICINE

## 2020-05-17 PROCEDURE — 10003585 HB ROOM CHARGE INTERMEDIATE CARE

## 2020-05-17 RX ORDER — FUROSEMIDE 10 MG/ML
40 INJECTION INTRAMUSCULAR; INTRAVENOUS EVERY 8 HOURS
Status: DISCONTINUED | OUTPATIENT
Start: 2020-05-17 | End: 2020-05-20

## 2020-05-17 RX ORDER — INSULIN GLARGINE 100 [IU]/ML
50 INJECTION, SOLUTION SUBCUTANEOUS EVERY 12 HOURS SCHEDULED
Status: DISCONTINUED | OUTPATIENT
Start: 2020-05-17 | End: 2020-05-21 | Stop reason: HOSPADM

## 2020-05-17 RX ADMIN — INSULIN LISPRO 6 UNITS: 100 INJECTION, SOLUTION INTRAVENOUS; SUBCUTANEOUS at 08:39

## 2020-05-17 RX ADMIN — SODIUM CHLORIDE, PRESERVATIVE FREE 2 ML: 5 INJECTION INTRAVENOUS at 08:38

## 2020-05-17 RX ADMIN — INSULIN LISPRO 4 UNITS: 100 INJECTION, SOLUTION INTRAVENOUS; SUBCUTANEOUS at 16:56

## 2020-05-17 RX ADMIN — FUROSEMIDE 40 MG: 10 INJECTION, SOLUTION INTRAMUSCULAR; INTRAVENOUS at 08:38

## 2020-05-17 RX ADMIN — ACETAMINOPHEN 650 MG: 325 TABLET ORAL at 08:37

## 2020-05-17 RX ADMIN — POTASSIUM CHLORIDE 20 MEQ: 20 TABLET, EXTENDED RELEASE ORAL at 16:56

## 2020-05-17 RX ADMIN — SODIUM CHLORIDE, PRESERVATIVE FREE 2 ML: 5 INJECTION INTRAVENOUS at 21:15

## 2020-05-17 RX ADMIN — ENOXAPARIN SODIUM 40 MG: 40 INJECTION SUBCUTANEOUS at 08:37

## 2020-05-17 RX ADMIN — NYSTATIN: 100000 CREAM TOPICAL at 08:41

## 2020-05-17 RX ADMIN — INSULIN GLARGINE 50 UNITS: 100 INJECTION, SOLUTION SUBCUTANEOUS at 08:38

## 2020-05-17 RX ADMIN — DESMOPRESSIN ACETATE 40 MG: 0.2 TABLET ORAL at 08:38

## 2020-05-17 RX ADMIN — INSULIN GLARGINE 50 UNITS: 100 INJECTION, SOLUTION SUBCUTANEOUS at 21:13

## 2020-05-17 RX ADMIN — NYSTATIN: 100000 CREAM TOPICAL at 21:16

## 2020-05-17 RX ADMIN — FUROSEMIDE 40 MG: 10 INJECTION, SOLUTION INTRAMUSCULAR; INTRAVENOUS at 16:56

## 2020-05-17 RX ADMIN — POTASSIUM CHLORIDE 20 MEQ: 20 TABLET, EXTENDED RELEASE ORAL at 08:38

## 2020-05-17 RX ADMIN — INSULIN LISPRO 8 UNITS: 100 INJECTION, SOLUTION INTRAVENOUS; SUBCUTANEOUS at 12:08

## 2020-05-17 RX ADMIN — ENOXAPARIN SODIUM 40 MG: 40 INJECTION SUBCUTANEOUS at 21:14

## 2020-05-17 RX ADMIN — LISINOPRIL 10 MG: 10 TABLET ORAL at 08:38

## 2020-05-17 ASSESSMENT — PAIN SCALES - GENERAL
PAINLEVEL_OUTOF10: 3
PAINLEVEL_OUTOF10: 3
PAINLEVEL_OUTOF10: 0

## 2020-05-17 ASSESSMENT — PATIENT HEALTH QUESTIONNAIRE - PHQ9: IS PATIENT ABLE TO COMPLETE PHQ2 OR PHQ9: NO, DEFER TO LATER TIME

## 2020-05-18 ENCOUNTER — CASE MANAGEMENT (OUTPATIENT)
Dept: CARE COORDINATION | Age: 65
End: 2020-05-18

## 2020-05-18 ENCOUNTER — APPOINTMENT (OUTPATIENT)
Dept: CARDIOLOGY | Age: 65
DRG: 292 | End: 2020-05-18
Attending: INTERNAL MEDICINE

## 2020-05-18 LAB
ANION GAP SERPL CALC-SCNC: 7 MMOL/L (ref 10–20)
BUN SERPL-MCNC: 22 MG/DL (ref 6–20)
BUN/CREAT SERPL: 20 (ref 7–25)
CALCIUM SERPL-MCNC: 8.9 MG/DL (ref 8.4–10.2)
CHLORIDE SERPL-SCNC: 99 MMOL/L (ref 98–107)
CO2 SERPL-SCNC: 37 MMOL/L (ref 21–32)
CREAT SERPL-MCNC: 1.09 MG/DL (ref 0.67–1.17)
GLUCOSE BLDC GLUCOMTR-MCNC: 149 MG/DL (ref 70–99)
GLUCOSE BLDC GLUCOMTR-MCNC: 154 MG/DL (ref 70–99)
GLUCOSE BLDC GLUCOMTR-MCNC: 159 MG/DL (ref 70–99)
GLUCOSE SERPL-MCNC: 160 MG/DL (ref 65–99)
POTASSIUM SERPL-SCNC: 3.7 MMOL/L (ref 3.4–5.1)
SODIUM SERPL-SCNC: 139 MMOL/L (ref 135–145)

## 2020-05-18 PROCEDURE — 10003585 HB ROOM CHARGE INTERMEDIATE CARE

## 2020-05-18 PROCEDURE — 99232 SBSQ HOSP IP/OBS MODERATE 35: CPT | Performed by: INTERNAL MEDICINE

## 2020-05-18 PROCEDURE — 78452 HT MUSCLE IMAGE SPECT MULT: CPT

## 2020-05-18 PROCEDURE — 93016 CV STRESS TEST SUPVJ ONLY: CPT | Performed by: INTERNAL MEDICINE

## 2020-05-18 PROCEDURE — 93306 TTE W/DOPPLER COMPLETE: CPT | Performed by: INTERNAL MEDICINE

## 2020-05-18 PROCEDURE — 10006150 HB RX 343: Performed by: INTERNAL MEDICINE

## 2020-05-18 PROCEDURE — 10002800 HB RX 250 W HCPCS: Performed by: INTERNAL MEDICINE

## 2020-05-18 PROCEDURE — 93017 CV STRESS TEST TRACING ONLY: CPT

## 2020-05-18 PROCEDURE — 78452 HT MUSCLE IMAGE SPECT MULT: CPT | Performed by: INTERNAL MEDICINE

## 2020-05-18 PROCEDURE — 80048 BASIC METABOLIC PNL TOTAL CA: CPT

## 2020-05-18 PROCEDURE — A9500 TC99M SESTAMIBI: HCPCS | Performed by: INTERNAL MEDICINE

## 2020-05-18 PROCEDURE — 10004651 HB RX, NO CHARGE ITEM: Performed by: INTERNAL MEDICINE

## 2020-05-18 PROCEDURE — 10002803 HB RX 637: Performed by: INTERNAL MEDICINE

## 2020-05-18 PROCEDURE — 93018 CV STRESS TEST I&R ONLY: CPT | Performed by: INTERNAL MEDICINE

## 2020-05-18 PROCEDURE — 10002805 HB CONTRAST AGENT: Performed by: INTERNAL MEDICINE

## 2020-05-18 PROCEDURE — 93306 TTE W/DOPPLER COMPLETE: CPT

## 2020-05-18 PROCEDURE — 36415 COLL VENOUS BLD VENIPUNCTURE: CPT

## 2020-05-18 RX ORDER — VITAMIN B COMPLEX
50 TABLET ORAL DAILY
Status: DISCONTINUED | OUTPATIENT
Start: 2020-05-18 | End: 2020-05-21 | Stop reason: HOSPADM

## 2020-05-18 RX ORDER — TETRAKIS(2-METHOXYISOBUTYLISOCYANIDE)COPPER(I) TETRAFLUOROBORATE 1 MG/ML
24 INJECTION, POWDER, LYOPHILIZED, FOR SOLUTION INTRAVENOUS ONCE
Status: COMPLETED | OUTPATIENT
Start: 2020-05-18 | End: 2020-05-18

## 2020-05-18 RX ORDER — CARVEDILOL 6.25 MG/1
6.25 TABLET ORAL EVERY 12 HOURS SCHEDULED
Status: DISCONTINUED | OUTPATIENT
Start: 2020-05-18 | End: 2020-05-20

## 2020-05-18 RX ORDER — REGADENOSON 0.08 MG/ML
0.4 INJECTION, SOLUTION INTRAVENOUS ONCE
Status: COMPLETED | OUTPATIENT
Start: 2020-05-18 | End: 2020-05-18

## 2020-05-18 RX ORDER — TETRAKIS(2-METHOXYISOBUTYLISOCYANIDE)COPPER(I) TETRAFLUOROBORATE 1 MG/ML
8 INJECTION, POWDER, LYOPHILIZED, FOR SOLUTION INTRAVENOUS ONCE
Status: COMPLETED | OUTPATIENT
Start: 2020-05-18 | End: 2020-05-18

## 2020-05-18 RX ADMIN — VITAMIN D, TAB 1000IU (100/BT) 50 MCG: 25 TAB at 15:19

## 2020-05-18 RX ADMIN — ENOXAPARIN SODIUM 40 MG: 40 INJECTION SUBCUTANEOUS at 21:17

## 2020-05-18 RX ADMIN — REGADENOSON 0.4 MG: 0.08 INJECTION, SOLUTION INTRAVENOUS at 10:00

## 2020-05-18 RX ADMIN — DESMOPRESSIN ACETATE 40 MG: 0.2 TABLET ORAL at 11:41

## 2020-05-18 RX ADMIN — FUROSEMIDE 40 MG: 10 INJECTION, SOLUTION INTRAMUSCULAR; INTRAVENOUS at 01:17

## 2020-05-18 RX ADMIN — INSULIN GLARGINE 50 UNITS: 100 INJECTION, SOLUTION SUBCUTANEOUS at 21:17

## 2020-05-18 RX ADMIN — INSULIN LISPRO 4 UNITS: 100 INJECTION, SOLUTION INTRAVENOUS; SUBCUTANEOUS at 07:47

## 2020-05-18 RX ADMIN — INSULIN LISPRO 4 UNITS: 100 INJECTION, SOLUTION INTRAVENOUS; SUBCUTANEOUS at 17:56

## 2020-05-18 RX ADMIN — TETRAKIS(2-METHOXYISOBUTYLISOCYANIDE)COPPER(I) TETRAFLUOROBORATE 11 MILLICURIE: 1 INJECTION, POWDER, LYOPHILIZED, FOR SOLUTION INTRAVENOUS at 07:35

## 2020-05-18 RX ADMIN — FUROSEMIDE 40 MG: 10 INJECTION, SOLUTION INTRAMUSCULAR; INTRAVENOUS at 17:56

## 2020-05-18 RX ADMIN — PERFLUTREN 3 ML: 6.52 INJECTION, SUSPENSION INTRAVENOUS at 11:15

## 2020-05-18 RX ADMIN — INSULIN LISPRO 2 UNITS: 100 INJECTION, SOLUTION INTRAVENOUS; SUBCUTANEOUS at 12:46

## 2020-05-18 RX ADMIN — NYSTATIN: 100000 CREAM TOPICAL at 11:41

## 2020-05-18 RX ADMIN — POTASSIUM CHLORIDE 20 MEQ: 20 TABLET, EXTENDED RELEASE ORAL at 17:56

## 2020-05-18 RX ADMIN — LISINOPRIL 10 MG: 10 TABLET ORAL at 11:41

## 2020-05-18 RX ADMIN — SODIUM CHLORIDE, PRESERVATIVE FREE 2 ML: 5 INJECTION INTRAVENOUS at 11:44

## 2020-05-18 RX ADMIN — POTASSIUM CHLORIDE 20 MEQ: 20 TABLET, EXTENDED RELEASE ORAL at 11:44

## 2020-05-18 RX ADMIN — SODIUM CHLORIDE, PRESERVATIVE FREE 2 ML: 5 INJECTION INTRAVENOUS at 21:17

## 2020-05-18 RX ADMIN — TETRAKIS(2-METHOXYISOBUTYLISOCYANIDE)COPPER(I) TETRAFLUOROBORATE 29.8 MILLICURIE: 1 INJECTION, POWDER, LYOPHILIZED, FOR SOLUTION INTRAVENOUS at 09:55

## 2020-05-18 RX ADMIN — ENOXAPARIN SODIUM 40 MG: 40 INJECTION SUBCUTANEOUS at 11:43

## 2020-05-18 RX ADMIN — INSULIN GLARGINE 50 UNITS: 100 INJECTION, SOLUTION SUBCUTANEOUS at 07:47

## 2020-05-18 RX ADMIN — CARVEDILOL 6.25 MG: 6.25 TABLET, FILM COATED ORAL at 17:55

## 2020-05-18 RX ADMIN — FUROSEMIDE 40 MG: 10 INJECTION, SOLUTION INTRAMUSCULAR; INTRAVENOUS at 11:43

## 2020-05-18 ASSESSMENT — PAIN SCALES - GENERAL
PAINLEVEL_OUTOF10: 0
PAINLEVEL_OUTOF10: 0

## 2020-05-18 ASSESSMENT — PATIENT HEALTH QUESTIONNAIRE - PHQ9: SUM OF ALL RESPONSES TO PHQ9 QUESTIONS 1 AND 2: 0

## 2020-05-19 LAB
ANION GAP SERPL CALC-SCNC: 10 MMOL/L (ref 10–20)
BUN SERPL-MCNC: 24 MG/DL (ref 6–20)
BUN/CREAT SERPL: 21 (ref 7–25)
CALCIUM SERPL-MCNC: 8.8 MG/DL (ref 8.4–10.2)
CHLORIDE SERPL-SCNC: 100 MMOL/L (ref 98–107)
CO2 SERPL-SCNC: 34 MMOL/L (ref 21–32)
CREAT SERPL-MCNC: 1.17 MG/DL (ref 0.67–1.17)
GLUCOSE BLDC GLUCOMTR-MCNC: 148 MG/DL (ref 70–99)
GLUCOSE BLDC GLUCOMTR-MCNC: 152 MG/DL (ref 70–99)
GLUCOSE BLDC GLUCOMTR-MCNC: 204 MG/DL (ref 70–99)
GLUCOSE BLDC GLUCOMTR-MCNC: 227 MG/DL (ref 70–99)
GLUCOSE BLDC GLUCOMTR-MCNC: 227 MG/DL (ref 70–99)
GLUCOSE SERPL-MCNC: 145 MG/DL (ref 65–99)
MAGNESIUM SERPL-MCNC: 2.1 MG/DL (ref 1.7–2.4)
NT-PROBNP SERPL-MCNC: 226 PG/ML
POTASSIUM SERPL-SCNC: 4 MMOL/L (ref 3.4–5.1)
SODIUM SERPL-SCNC: 140 MMOL/L (ref 135–145)

## 2020-05-19 PROCEDURE — 10002800 HB RX 250 W HCPCS: Performed by: INTERNAL MEDICINE

## 2020-05-19 PROCEDURE — 99233 SBSQ HOSP IP/OBS HIGH 50: CPT | Performed by: INTERNAL MEDICINE

## 2020-05-19 PROCEDURE — 10002803 HB RX 637: Performed by: INTERNAL MEDICINE

## 2020-05-19 PROCEDURE — 10003585 HB ROOM CHARGE INTERMEDIATE CARE

## 2020-05-19 PROCEDURE — 99232 SBSQ HOSP IP/OBS MODERATE 35: CPT | Performed by: INTERNAL MEDICINE

## 2020-05-19 PROCEDURE — 83880 ASSAY OF NATRIURETIC PEPTIDE: CPT

## 2020-05-19 PROCEDURE — 36415 COLL VENOUS BLD VENIPUNCTURE: CPT

## 2020-05-19 PROCEDURE — 10004651 HB RX, NO CHARGE ITEM: Performed by: INTERNAL MEDICINE

## 2020-05-19 PROCEDURE — 80048 BASIC METABOLIC PNL TOTAL CA: CPT

## 2020-05-19 PROCEDURE — 83735 ASSAY OF MAGNESIUM: CPT

## 2020-05-19 RX ORDER — SPIRONOLACTONE 25 MG/1
25 TABLET ORAL DAILY
Status: DISCONTINUED | OUTPATIENT
Start: 2020-05-19 | End: 2020-05-21 | Stop reason: HOSPADM

## 2020-05-19 RX ADMIN — FUROSEMIDE 40 MG: 10 INJECTION, SOLUTION INTRAMUSCULAR; INTRAVENOUS at 09:00

## 2020-05-19 RX ADMIN — NYSTATIN: 100000 CREAM TOPICAL at 09:00

## 2020-05-19 RX ADMIN — ENOXAPARIN SODIUM 40 MG: 40 INJECTION SUBCUTANEOUS at 21:32

## 2020-05-19 RX ADMIN — CARVEDILOL 6.25 MG: 6.25 TABLET, FILM COATED ORAL at 09:00

## 2020-05-19 RX ADMIN — SPIRONOLACTONE 25 MG: 25 TABLET, FILM COATED ORAL at 09:00

## 2020-05-19 RX ADMIN — INSULIN GLARGINE 50 UNITS: 100 INJECTION, SOLUTION SUBCUTANEOUS at 09:00

## 2020-05-19 RX ADMIN — VITAMIN D, TAB 1000IU (100/BT) 50 MCG: 25 TAB at 09:00

## 2020-05-19 RX ADMIN — SODIUM CHLORIDE, PRESERVATIVE FREE 2 ML: 5 INJECTION INTRAVENOUS at 21:32

## 2020-05-19 RX ADMIN — CARVEDILOL 6.25 MG: 6.25 TABLET, FILM COATED ORAL at 21:31

## 2020-05-19 RX ADMIN — FUROSEMIDE 40 MG: 10 INJECTION, SOLUTION INTRAMUSCULAR; INTRAVENOUS at 16:04

## 2020-05-19 RX ADMIN — ACETAMINOPHEN 650 MG: 325 TABLET ORAL at 00:27

## 2020-05-19 RX ADMIN — POTASSIUM CHLORIDE 20 MEQ: 20 TABLET, EXTENDED RELEASE ORAL at 09:00

## 2020-05-19 RX ADMIN — INSULIN GLARGINE 50 UNITS: 100 INJECTION, SOLUTION SUBCUTANEOUS at 22:07

## 2020-05-19 RX ADMIN — ACETAMINOPHEN 650 MG: 325 TABLET ORAL at 22:06

## 2020-05-19 RX ADMIN — SODIUM CHLORIDE, PRESERVATIVE FREE 2 ML: 5 INJECTION INTRAVENOUS at 09:00

## 2020-05-19 RX ADMIN — POTASSIUM CHLORIDE 20 MEQ: 20 TABLET, EXTENDED RELEASE ORAL at 16:05

## 2020-05-19 RX ADMIN — ENOXAPARIN SODIUM 40 MG: 40 INJECTION SUBCUTANEOUS at 09:00

## 2020-05-19 RX ADMIN — FUROSEMIDE 40 MG: 10 INJECTION, SOLUTION INTRAMUSCULAR; INTRAVENOUS at 00:28

## 2020-05-19 RX ADMIN — DESMOPRESSIN ACETATE 40 MG: 0.2 TABLET ORAL at 09:00

## 2020-05-19 RX ADMIN — LISINOPRIL 10 MG: 10 TABLET ORAL at 09:00

## 2020-05-19 RX ADMIN — INSULIN LISPRO 4 UNITS: 100 INJECTION, SOLUTION INTRAVENOUS; SUBCUTANEOUS at 12:15

## 2020-05-19 RX ADMIN — INSULIN LISPRO 4 UNITS: 100 INJECTION, SOLUTION INTRAVENOUS; SUBCUTANEOUS at 17:00

## 2020-05-19 ASSESSMENT — PAIN SCALES - GENERAL
PAINLEVEL_OUTOF10: 3
PAINLEVEL_OUTOF10: 2
PAINLEVEL_OUTOF10: 0
PAINLEVEL_OUTOF10: 3

## 2020-05-20 LAB
ANION GAP SERPL CALC-SCNC: 6 MMOL/L (ref 10–20)
BUN SERPL-MCNC: 29 MG/DL (ref 6–20)
BUN/CREAT SERPL: 24 (ref 7–25)
CALCIUM SERPL-MCNC: 8.7 MG/DL (ref 8.4–10.2)
CHLORIDE SERPL-SCNC: 100 MMOL/L (ref 98–107)
CO2 SERPL-SCNC: 36 MMOL/L (ref 21–32)
CREAT SERPL-MCNC: 1.22 MG/DL (ref 0.67–1.17)
GLUCOSE BLDC GLUCOMTR-MCNC: 106 MG/DL (ref 70–99)
GLUCOSE BLDC GLUCOMTR-MCNC: 136 MG/DL (ref 70–99)
GLUCOSE BLDC GLUCOMTR-MCNC: 153 MG/DL (ref 70–99)
GLUCOSE BLDC GLUCOMTR-MCNC: 155 MG/DL (ref 70–99)
GLUCOSE BLDC GLUCOMTR-MCNC: 211 MG/DL (ref 70–99)
GLUCOSE SERPL-MCNC: 121 MG/DL (ref 65–99)
MAGNESIUM SERPL-MCNC: 2.2 MG/DL (ref 1.7–2.4)
POTASSIUM SERPL-SCNC: 3.8 MMOL/L (ref 3.4–5.1)
SODIUM SERPL-SCNC: 138 MMOL/L (ref 135–145)

## 2020-05-20 PROCEDURE — 10002800 HB RX 250 W HCPCS: Performed by: INTERNAL MEDICINE

## 2020-05-20 PROCEDURE — 99232 SBSQ HOSP IP/OBS MODERATE 35: CPT | Performed by: INTERNAL MEDICINE

## 2020-05-20 PROCEDURE — 10002803 HB RX 637: Performed by: INTERNAL MEDICINE

## 2020-05-20 PROCEDURE — 10004651 HB RX, NO CHARGE ITEM: Performed by: INTERNAL MEDICINE

## 2020-05-20 PROCEDURE — 80048 BASIC METABOLIC PNL TOTAL CA: CPT

## 2020-05-20 PROCEDURE — 83735 ASSAY OF MAGNESIUM: CPT

## 2020-05-20 PROCEDURE — 10003585 HB ROOM CHARGE INTERMEDIATE CARE

## 2020-05-20 PROCEDURE — 99233 SBSQ HOSP IP/OBS HIGH 50: CPT | Performed by: INTERNAL MEDICINE

## 2020-05-20 PROCEDURE — 36415 COLL VENOUS BLD VENIPUNCTURE: CPT

## 2020-05-20 RX ORDER — TORSEMIDE 20 MG/1
20 TABLET ORAL 2 TIMES DAILY
Status: DISCONTINUED | OUTPATIENT
Start: 2020-05-20 | End: 2020-05-21 | Stop reason: HOSPADM

## 2020-05-20 RX ORDER — TORSEMIDE 20 MG/1
20 TABLET ORAL 2 TIMES DAILY
Status: DISCONTINUED | OUTPATIENT
Start: 2020-05-20 | End: 2020-05-20

## 2020-05-20 RX ADMIN — INSULIN GLARGINE 50 UNITS: 100 INJECTION, SOLUTION SUBCUTANEOUS at 21:01

## 2020-05-20 RX ADMIN — VITAMIN D, TAB 1000IU (100/BT) 50 MCG: 25 TAB at 09:06

## 2020-05-20 RX ADMIN — POTASSIUM CHLORIDE 20 MEQ: 20 TABLET, EXTENDED RELEASE ORAL at 17:53

## 2020-05-20 RX ADMIN — POTASSIUM CHLORIDE 20 MEQ: 20 TABLET, EXTENDED RELEASE ORAL at 09:06

## 2020-05-20 RX ADMIN — NYSTATIN: 100000 CREAM TOPICAL at 21:03

## 2020-05-20 RX ADMIN — ACETAMINOPHEN 650 MG: 325 TABLET ORAL at 04:59

## 2020-05-20 RX ADMIN — LISINOPRIL 10 MG: 10 TABLET ORAL at 09:06

## 2020-05-20 RX ADMIN — INSULIN LISPRO 4 UNITS: 100 INJECTION, SOLUTION INTRAVENOUS; SUBCUTANEOUS at 17:53

## 2020-05-20 RX ADMIN — NYSTATIN: 100000 CREAM TOPICAL at 09:08

## 2020-05-20 RX ADMIN — SODIUM CHLORIDE, PRESERVATIVE FREE 2 ML: 5 INJECTION INTRAVENOUS at 21:03

## 2020-05-20 RX ADMIN — POTASSIUM CHLORIDE 20 MEQ: 1500 TABLET, EXTENDED RELEASE ORAL at 21:01

## 2020-05-20 RX ADMIN — SPIRONOLACTONE 25 MG: 25 TABLET, FILM COATED ORAL at 09:06

## 2020-05-20 RX ADMIN — TORSEMIDE 20 MG: 20 TABLET ORAL at 17:52

## 2020-05-20 RX ADMIN — CARVEDILOL 6.25 MG: 6.25 TABLET, FILM COATED ORAL at 09:06

## 2020-05-20 RX ADMIN — SODIUM CHLORIDE, PRESERVATIVE FREE 2 ML: 5 INJECTION INTRAVENOUS at 09:08

## 2020-05-20 RX ADMIN — ENOXAPARIN SODIUM 40 MG: 40 INJECTION SUBCUTANEOUS at 09:07

## 2020-05-20 RX ADMIN — ENOXAPARIN SODIUM 40 MG: 40 INJECTION SUBCUTANEOUS at 21:01

## 2020-05-20 RX ADMIN — INSULIN LISPRO 2 UNITS: 100 INJECTION, SOLUTION INTRAVENOUS; SUBCUTANEOUS at 09:07

## 2020-05-20 RX ADMIN — INSULIN GLARGINE 50 UNITS: 100 INJECTION, SOLUTION SUBCUTANEOUS at 09:07

## 2020-05-20 RX ADMIN — FUROSEMIDE 40 MG: 10 INJECTION, SOLUTION INTRAMUSCULAR; INTRAVENOUS at 00:39

## 2020-05-20 RX ADMIN — INSULIN LISPRO 4 UNITS: 100 INJECTION, SOLUTION INTRAVENOUS; SUBCUTANEOUS at 12:53

## 2020-05-20 RX ADMIN — DESMOPRESSIN ACETATE 40 MG: 0.2 TABLET ORAL at 09:06

## 2020-05-20 RX ADMIN — FUROSEMIDE 40 MG: 10 INJECTION, SOLUTION INTRAMUSCULAR; INTRAVENOUS at 09:06

## 2020-05-20 ASSESSMENT — PAIN SCALES - GENERAL
PAINLEVEL_OUTOF10: 0
PAINLEVEL_OUTOF10: 3
PAINLEVEL_OUTOF10: 0

## 2020-05-21 ENCOUNTER — TELEPHONE (OUTPATIENT)
Dept: SCHEDULING | Age: 65
End: 2020-05-21

## 2020-05-21 VITALS
OXYGEN SATURATION: 93 % | SYSTOLIC BLOOD PRESSURE: 108 MMHG | BODY MASS INDEX: 42.66 KG/M2 | DIASTOLIC BLOOD PRESSURE: 73 MMHG | TEMPERATURE: 97.7 F | WEIGHT: 315 LBS | HEIGHT: 72 IN | RESPIRATION RATE: 16 BRPM | HEART RATE: 79 BPM

## 2020-05-21 DIAGNOSIS — I50.33 ACUTE ON CHRONIC DIASTOLIC CONGESTIVE HEART FAILURE (CMD): Primary | ICD-10-CM

## 2020-05-21 LAB
ANION GAP SERPL CALC-SCNC: 8 MMOL/L (ref 10–20)
BASOPHILS # BLD: 0.1 K/MCL (ref 0–0.3)
BASOPHILS NFR BLD: 1 %
BUN SERPL-MCNC: 28 MG/DL (ref 6–20)
BUN/CREAT SERPL: 24 (ref 7–25)
CALCIUM SERPL-MCNC: 8.7 MG/DL (ref 8.4–10.2)
CHLORIDE SERPL-SCNC: 101 MMOL/L (ref 98–107)
CO2 SERPL-SCNC: 36 MMOL/L (ref 21–32)
CREAT SERPL-MCNC: 1.19 MG/DL (ref 0.67–1.17)
DIFFERENTIAL METHOD BLD: NORMAL
EOSINOPHIL # BLD: 0.2 K/MCL (ref 0.1–0.5)
EOSINOPHIL NFR BLD: 2 %
ERYTHROCYTE [DISTWIDTH] IN BLOOD: 13.7 % (ref 11–15)
GLUCOSE BLDC GLUCOMTR-MCNC: 79 MG/DL (ref 70–99)
GLUCOSE BLDC GLUCOMTR-MCNC: 81 MG/DL (ref 70–99)
GLUCOSE SERPL-MCNC: 85 MG/DL (ref 65–99)
HCT VFR BLD CALC: 46.4 % (ref 39–51)
HGB BLD-MCNC: 15.2 G/DL (ref 13–17)
IMM GRANULOCYTES # BLD AUTO: 0 K/MCL (ref 0–0.2)
IMM GRANULOCYTES NFR BLD: 1 %
LYMPHOCYTES # BLD: 2.2 K/MCL (ref 1–4)
LYMPHOCYTES NFR BLD: 25 %
MAGNESIUM SERPL-MCNC: 2.3 MG/DL (ref 1.7–2.4)
MCH RBC QN AUTO: 28.5 PG (ref 26–34)
MCHC RBC AUTO-ENTMCNC: 32.8 G/DL (ref 32–36.5)
MCV RBC AUTO: 87.1 FL (ref 78–100)
MONOCYTES # BLD: 0.9 K/MCL (ref 0.3–0.9)
MONOCYTES NFR BLD: 10 %
NEUTROPHILS # BLD: 5.4 K/MCL (ref 1.8–7.7)
NEUTROPHILS NFR BLD: 61 %
NRBC BLD MANUAL-RTO: 0 /100 WBC
NT-PROBNP SERPL-MCNC: 152 PG/ML
PLATELET # BLD: 262 K/MCL (ref 140–450)
POTASSIUM SERPL-SCNC: 3.9 MMOL/L (ref 3.4–5.1)
RBC # BLD: 5.33 MIL/MCL (ref 4.5–5.9)
SODIUM SERPL-SCNC: 141 MMOL/L (ref 135–145)
WBC # BLD: 8.8 K/MCL (ref 4.2–11)

## 2020-05-21 PROCEDURE — 10002803 HB RX 637: Performed by: INTERNAL MEDICINE

## 2020-05-21 PROCEDURE — 99232 SBSQ HOSP IP/OBS MODERATE 35: CPT | Performed by: INTERNAL MEDICINE

## 2020-05-21 PROCEDURE — 10004651 HB RX, NO CHARGE ITEM: Performed by: INTERNAL MEDICINE

## 2020-05-21 PROCEDURE — 83735 ASSAY OF MAGNESIUM: CPT

## 2020-05-21 PROCEDURE — 83880 ASSAY OF NATRIURETIC PEPTIDE: CPT

## 2020-05-21 PROCEDURE — 10002800 HB RX 250 W HCPCS: Performed by: INTERNAL MEDICINE

## 2020-05-21 PROCEDURE — 85025 COMPLETE CBC W/AUTO DIFF WBC: CPT

## 2020-05-21 PROCEDURE — 36415 COLL VENOUS BLD VENIPUNCTURE: CPT

## 2020-05-21 PROCEDURE — 99239 HOSP IP/OBS DSCHRG MGMT >30: CPT | Performed by: INTERNAL MEDICINE

## 2020-05-21 PROCEDURE — 80048 BASIC METABOLIC PNL TOTAL CA: CPT

## 2020-05-21 RX ORDER — SPIRONOLACTONE 25 MG/1
25 TABLET ORAL DAILY
Qty: 30 TABLET | Refills: 3 | Status: ON HOLD | OUTPATIENT
Start: 2020-05-22 | End: 2020-07-13 | Stop reason: HOSPADM

## 2020-05-21 RX ORDER — TORSEMIDE 20 MG/1
20 TABLET ORAL 2 TIMES DAILY
Qty: 60 TABLET | Refills: 3 | Status: SHIPPED | OUTPATIENT
Start: 2020-05-21 | End: 2020-07-01 | Stop reason: ALTCHOICE

## 2020-05-21 RX ADMIN — DESMOPRESSIN ACETATE 40 MG: 0.2 TABLET ORAL at 08:06

## 2020-05-21 RX ADMIN — SODIUM CHLORIDE, PRESERVATIVE FREE 2 ML: 5 INJECTION INTRAVENOUS at 08:08

## 2020-05-21 RX ADMIN — INSULIN GLARGINE 50 UNITS: 100 INJECTION, SOLUTION SUBCUTANEOUS at 08:08

## 2020-05-21 RX ADMIN — TORSEMIDE 20 MG: 20 TABLET ORAL at 08:05

## 2020-05-21 RX ADMIN — INSULIN LISPRO 4 UNITS: 100 INJECTION, SOLUTION INTRAVENOUS; SUBCUTANEOUS at 08:07

## 2020-05-21 RX ADMIN — ENOXAPARIN SODIUM 40 MG: 40 INJECTION SUBCUTANEOUS at 08:07

## 2020-05-21 RX ADMIN — NYSTATIN: 100000 CREAM TOPICAL at 08:08

## 2020-05-21 RX ADMIN — SPIRONOLACTONE 25 MG: 25 TABLET, FILM COATED ORAL at 08:05

## 2020-05-21 RX ADMIN — VITAMIN D, TAB 1000IU (100/BT) 50 MCG: 25 TAB at 08:06

## 2020-05-21 RX ADMIN — LISINOPRIL 10 MG: 10 TABLET ORAL at 08:06

## 2020-05-21 ASSESSMENT — PAIN SCALES - GENERAL: PAINLEVEL_OUTOF10: 0

## 2020-05-22 ENCOUNTER — TELEPHONE (OUTPATIENT)
Dept: SCHEDULING | Age: 65
End: 2020-05-22

## 2020-05-22 ENCOUNTER — CASE MANAGEMENT (OUTPATIENT)
Dept: CARE COORDINATION | Age: 65
End: 2020-05-22

## 2020-05-22 ASSESSMENT — ACTIVITIES OF DAILY LIVING (ADL)
DME_IN_USE: GLUCOMETER;SCALE
FUNCTIONAL_EVALUATION: PERFORMS ADLS INDEPENDENTLY
EATING: INDEPENDENT
TOILETING: INDEPENDENT
GROOMING: INDEPENDENT
BATHING: INDEPENDENT
AMBULATION: INDEPENDENT
DRESSING: INDEPENDENT
DME_IN_USE: YES
DRIVING: INDEPENDENT

## 2020-05-22 ASSESSMENT — SLEEP AND FATIGUE QUESTIONNAIRES
SLEEP DESCRIPTORS: OTHER: FREE TEXT BOX
HOURS OF UNINTERRUPTED SLEEP: 3

## 2020-05-26 ENCOUNTER — TELEPHONE (OUTPATIENT)
Dept: CARDIOLOGY | Age: 65
End: 2020-05-26

## 2020-05-27 ENCOUNTER — CASE MANAGEMENT (OUTPATIENT)
Dept: CARE COORDINATION | Age: 65
End: 2020-05-27

## 2020-05-28 ENCOUNTER — CASE MANAGEMENT (OUTPATIENT)
Dept: CARE COORDINATION | Age: 65
End: 2020-05-28

## 2020-06-01 ENCOUNTER — TELEPHONE (OUTPATIENT)
Dept: SCHEDULING | Age: 65
End: 2020-06-01

## 2020-06-01 ENCOUNTER — CASE MANAGEMENT (OUTPATIENT)
Dept: CARE COORDINATION | Age: 65
End: 2020-06-01

## 2020-06-01 ASSESSMENT — SLEEP AND FATIGUE QUESTIONNAIRES: HOURS OF UNINTERRUPTED SLEEP: 4

## 2020-06-08 ENCOUNTER — CASE MANAGEMENT (OUTPATIENT)
Dept: CARE COORDINATION | Age: 65
End: 2020-06-08

## 2020-06-08 ASSESSMENT — SLEEP AND FATIGUE QUESTIONNAIRES: HOURS OF UNINTERRUPTED SLEEP: 4

## 2020-06-09 ENCOUNTER — TELEPHONE (OUTPATIENT)
Dept: SCHEDULING | Age: 65
End: 2020-06-09

## 2020-06-09 DIAGNOSIS — E11.65 TYPE 2 DIABETES MELLITUS WITH HYPERGLYCEMIA, WITH LONG-TERM CURRENT USE OF INSULIN (CMD): ICD-10-CM

## 2020-06-09 DIAGNOSIS — Z79.4 TYPE 2 DIABETES MELLITUS WITH HYPERGLYCEMIA, WITH LONG-TERM CURRENT USE OF INSULIN (CMD): ICD-10-CM

## 2020-06-10 ENCOUNTER — TELEPHONE (OUTPATIENT)
Dept: SCHEDULING | Age: 65
End: 2020-06-10

## 2020-06-15 ENCOUNTER — CASE MANAGEMENT (OUTPATIENT)
Dept: CARE COORDINATION | Age: 65
End: 2020-06-15

## 2020-06-17 ENCOUNTER — CASE MANAGEMENT (OUTPATIENT)
Dept: CARE COORDINATION | Age: 65
End: 2020-06-17

## 2020-06-20 ENCOUNTER — CASE MANAGEMENT (OUTPATIENT)
Dept: CARE COORDINATION | Age: 65
End: 2020-06-20

## 2020-06-26 ENCOUNTER — CASE MANAGEMENT (OUTPATIENT)
Dept: CARE COORDINATION | Age: 65
End: 2020-06-26

## 2020-06-29 ENCOUNTER — TELEPHONE (OUTPATIENT)
Dept: CARDIOLOGY | Age: 65
End: 2020-06-29

## 2020-07-01 ENCOUNTER — OFFICE VISIT (OUTPATIENT)
Dept: CARDIOLOGY | Age: 65
End: 2020-07-01

## 2020-07-01 VITALS
WEIGHT: 310 LBS | HEIGHT: 71 IN | DIASTOLIC BLOOD PRESSURE: 70 MMHG | BODY MASS INDEX: 43.4 KG/M2 | RESPIRATION RATE: 16 BRPM | HEART RATE: 72 BPM | SYSTOLIC BLOOD PRESSURE: 114 MMHG

## 2020-07-01 DIAGNOSIS — I50.33 ACUTE ON CHRONIC DIASTOLIC CONGESTIVE HEART FAILURE (CMD): ICD-10-CM

## 2020-07-01 DIAGNOSIS — E11.65 TYPE 2 DIABETES MELLITUS WITH HYPERGLYCEMIA, WITH LONG-TERM CURRENT USE OF INSULIN (CMD): ICD-10-CM

## 2020-07-01 DIAGNOSIS — E78.00 HYPERCHOLESTEROLEMIA: ICD-10-CM

## 2020-07-01 DIAGNOSIS — Z79.4 TYPE 2 DIABETES MELLITUS WITH HYPERGLYCEMIA, WITH LONG-TERM CURRENT USE OF INSULIN (CMD): ICD-10-CM

## 2020-07-01 DIAGNOSIS — R60.1 ANASARCA: Primary | ICD-10-CM

## 2020-07-01 DIAGNOSIS — I10 BENIGN ESSENTIAL HYPERTENSION: ICD-10-CM

## 2020-07-01 PROCEDURE — 99213 OFFICE O/P EST LOW 20 MIN: CPT | Performed by: INTERNAL MEDICINE

## 2020-07-01 RX ORDER — TORSEMIDE 20 MG/1
40 TABLET ORAL 2 TIMES DAILY
Qty: 60 TABLET | Refills: 3 | Status: SHIPPED | OUTPATIENT
Start: 2020-07-01 | End: 2020-07-31

## 2020-07-01 SDOH — HEALTH STABILITY: MENTAL HEALTH: HOW OFTEN DO YOU HAVE A DRINK CONTAINING ALCOHOL?: NEVER

## 2020-07-01 SDOH — ECONOMIC STABILITY: FOOD INSECURITY: WITHIN THE PAST 12 MONTHS, YOU WORRIED THAT YOUR FOOD WOULD RUN OUT BEFORE YOU GOT MONEY TO BUY MORE.: NEVER TRUE

## 2020-07-01 SDOH — ECONOMIC STABILITY: INCOME INSECURITY: HOW HARD IS IT FOR YOU TO PAY FOR THE VERY BASICS LIKE FOOD, HOUSING, MEDICAL CARE, AND HEATING?: NOT HARD AT ALL

## 2020-07-01 SDOH — ECONOMIC STABILITY: FOOD INSECURITY: WITHIN THE PAST 12 MONTHS, THE FOOD YOU BOUGHT JUST DIDN'T LAST AND YOU DIDN'T HAVE MONEY TO GET MORE.: NEVER TRUE

## 2020-07-01 SDOH — HEALTH STABILITY: PHYSICAL HEALTH: ON AVERAGE, HOW MANY DAYS PER WEEK DO YOU ENGAGE IN MODERATE TO STRENUOUS EXERCISE (LIKE A BRISK WALK)?: 0 DAYS

## 2020-07-01 SDOH — HEALTH STABILITY: PHYSICAL HEALTH: ON AVERAGE, HOW MANY MINUTES DO YOU ENGAGE IN EXERCISE AT THIS LEVEL?: 0 MIN

## 2020-07-01 ASSESSMENT — ENCOUNTER SYMPTOMS
NAUSEA: 0
NUMBNESS: 0
ABDOMINAL PAIN: 0
SYNCOPE: 0
SHORTNESS OF BREATH: 0
COLOR CHANGE: 0
DIZZINESS: 0
ALTERED MENTAL STATUS: 0
PARESTHESIAS: 0
NEAR-SYNCOPE: 0
VOMITING: 0
CHILLS: 0

## 2020-07-01 ASSESSMENT — PATIENT HEALTH QUESTIONNAIRE - PHQ9
SUM OF ALL RESPONSES TO PHQ9 QUESTIONS 1 AND 2: 0
CLINICAL INTERPRETATION OF PHQ9 SCORE: NO FURTHER SCREENING NEEDED
CLINICAL INTERPRETATION OF PHQ2 SCORE: NO FURTHER SCREENING NEEDED
1. LITTLE INTEREST OR PLEASURE IN DOING THINGS: NOT AT ALL
2. FEELING DOWN, DEPRESSED OR HOPELESS: NOT AT ALL
SUM OF ALL RESPONSES TO PHQ9 QUESTIONS 1 AND 2: 0

## 2020-07-07 DIAGNOSIS — I50.33 ACUTE ON CHRONIC DIASTOLIC CONGESTIVE HEART FAILURE (CMD): Primary | ICD-10-CM

## 2020-07-08 ENCOUNTER — APPOINTMENT (OUTPATIENT)
Dept: GENERAL RADIOLOGY | Age: 65
DRG: 853 | End: 2020-07-08
Attending: PODIATRIST

## 2020-07-08 ENCOUNTER — APPOINTMENT (OUTPATIENT)
Dept: ULTRASOUND IMAGING | Age: 65
End: 2020-07-08
Attending: INTERNAL MEDICINE

## 2020-07-08 ENCOUNTER — TELEPHONE (OUTPATIENT)
Dept: CARDIOLOGY | Age: 65
End: 2020-07-08

## 2020-07-08 ENCOUNTER — HOSPITAL ENCOUNTER (INPATIENT)
Age: 65
LOS: 5 days | Discharge: NURSING, REHAB OR LONG-TERM CARE | DRG: 853 | End: 2020-07-13
Attending: EMERGENCY MEDICINE | Admitting: INTERNAL MEDICINE

## 2020-07-08 DIAGNOSIS — L97.528 DIABETIC ULCER OF TOE OF LEFT FOOT ASSOCIATED WITH TYPE 2 DIABETES MELLITUS, WITH OTHER ULCER SEVERITY (CMD): Primary | ICD-10-CM

## 2020-07-08 DIAGNOSIS — E11.621 DIABETIC ULCER OF TOE OF LEFT FOOT ASSOCIATED WITH TYPE 2 DIABETES MELLITUS, WITH OTHER ULCER SEVERITY (CMD): Primary | ICD-10-CM

## 2020-07-08 PROBLEM — A41.9 SEVERE SEPSIS (CMD): Status: ACTIVE | Noted: 2020-07-08

## 2020-07-08 PROBLEM — E13.51: Chronic | Status: ACTIVE | Noted: 2020-07-08

## 2020-07-08 PROBLEM — R65.20 SEVERE SEPSIS (CMD): Status: ACTIVE | Noted: 2020-07-08

## 2020-07-08 PROBLEM — N17.0 ACUTE KIDNEY INJURY (AKI) WITH ACUTE TUBULAR NECROSIS (ATN) (CMD): Status: ACTIVE | Noted: 2020-07-08

## 2020-07-08 LAB
ANION GAP SERPL CALC-SCNC: 15 MMOL/L (ref 10–20)
APTT PPP: 32 SEC (ref 22–32)
BASOPHILS # BLD: 0.1 K/MCL (ref 0–0.3)
BASOPHILS NFR BLD: 0 %
BUN SERPL-MCNC: 92 MG/DL (ref 6–20)
BUN/CREAT SERPL: 30 (ref 7–25)
CALCIUM SERPL-MCNC: 9 MG/DL (ref 8.4–10.2)
CHLORIDE SERPL-SCNC: 91 MMOL/L (ref 98–107)
CO2 SERPL-SCNC: 27 MMOL/L (ref 21–32)
CREAT SERPL-MCNC: 3.05 MG/DL (ref 0.67–1.17)
DIFFERENTIAL METHOD BLD: ABNORMAL
EOSINOPHIL # BLD: 0 K/MCL (ref 0.1–0.5)
EOSINOPHIL NFR BLD: 0 %
ERYTHROCYTE [DISTWIDTH] IN BLOOD: 13.1 % (ref 11–15)
GLUCOSE BLDC GLUCOMTR-MCNC: 216 MG/DL (ref 70–99)
GLUCOSE BLDC GLUCOMTR-MCNC: 233 MG/DL (ref 70–99)
GLUCOSE BLDC GLUCOMTR-MCNC: 234 MG/DL (ref 70–99)
GLUCOSE SERPL-MCNC: 234 MG/DL (ref 65–99)
HCT VFR BLD CALC: 37.1 % (ref 39–51)
HGB BLD-MCNC: 12.4 G/DL (ref 13–17)
IMM GRANULOCYTES # BLD AUTO: 0.5 K/MCL (ref 0–0.2)
IMM GRANULOCYTES NFR BLD: 2 %
INR PPP: 1
LACTATE BLDV-MCNC: 2.2 MMOL/L
LACTATE BLDV-SCNC: 1.4 MMOL/L (ref 0–2)
LYMPHOCYTES # BLD: 1.8 K/MCL (ref 1–4)
LYMPHOCYTES NFR BLD: 8 %
MCH RBC QN AUTO: 28.1 PG (ref 26–34)
MCHC RBC AUTO-ENTMCNC: 33.4 G/DL (ref 32–36.5)
MCV RBC AUTO: 83.9 FL (ref 78–100)
MONOCYTES # BLD: 2.2 K/MCL (ref 0.3–0.9)
MONOCYTES NFR BLD: 10 %
NEUTROPHILS # BLD: 18.6 K/MCL (ref 1.8–7.7)
NEUTROPHILS NFR BLD: 80 %
NRBC BLD MANUAL-RTO: 0 /100 WBC
PLAT MORPH BLD: NORMAL
PLATELET # BLD: 276 K/MCL (ref 140–450)
POTASSIUM SERPL-SCNC: 3.8 MMOL/L (ref 3.4–5.1)
PROTHROMBIN TIME: 10.8 SEC (ref 9.7–11.8)
RBC # BLD: 4.42 MIL/MCL (ref 4.5–5.9)
RBC MORPH BLD: NORMAL
SARS-COV-2 RNA RESP QL NAA+PROBE: NOT DETECTED
SERVICE CMNT-IMP: NORMAL
SODIUM SERPL-SCNC: 129 MMOL/L (ref 135–145)
SPECIMEN SOURCE: NORMAL
WBC # BLD: 23.1 K/MCL (ref 4.2–11)
WBC MORPH BLD: NORMAL

## 2020-07-08 PROCEDURE — 87186 SC STD MICRODIL/AGAR DIL: CPT

## 2020-07-08 PROCEDURE — 73630 X-RAY EXAM OF FOOT: CPT

## 2020-07-08 PROCEDURE — 10000002 HB ROOM CHARGE MED SURG

## 2020-07-08 PROCEDURE — 10002801 HB RX 250 W/O HCPCS: Performed by: INTERNAL MEDICINE

## 2020-07-08 PROCEDURE — 10002807 HB RX 258: Performed by: INTERNAL MEDICINE

## 2020-07-08 PROCEDURE — 10002800 HB RX 250 W HCPCS: Performed by: EMERGENCY MEDICINE

## 2020-07-08 PROCEDURE — 99223 1ST HOSP IP/OBS HIGH 75: CPT

## 2020-07-08 PROCEDURE — 80048 BASIC METABOLIC PNL TOTAL CA: CPT

## 2020-07-08 PROCEDURE — 85730 THROMBOPLASTIN TIME PARTIAL: CPT

## 2020-07-08 PROCEDURE — 99223 1ST HOSP IP/OBS HIGH 75: CPT | Performed by: INTERNAL MEDICINE

## 2020-07-08 PROCEDURE — 83605 ASSAY OF LACTIC ACID: CPT

## 2020-07-08 PROCEDURE — 85025 COMPLETE CBC W/AUTO DIFF WBC: CPT

## 2020-07-08 PROCEDURE — 10002800 HB RX 250 W HCPCS: Performed by: INTERNAL MEDICINE

## 2020-07-08 PROCEDURE — 87077 CULTURE AEROBIC IDENTIFY: CPT

## 2020-07-08 PROCEDURE — 10002807 HB RX 258: Performed by: EMERGENCY MEDICINE

## 2020-07-08 PROCEDURE — 99284 EMERGENCY DEPT VISIT MOD MDM: CPT

## 2020-07-08 PROCEDURE — C9803 HOPD COVID-19 SPEC COLLECT: HCPCS

## 2020-07-08 PROCEDURE — 10004281 HB COUNTER-STAFF TIME PER 15 MIN

## 2020-07-08 PROCEDURE — 85610 PROTHROMBIN TIME: CPT

## 2020-07-08 PROCEDURE — 36415 COLL VENOUS BLD VENIPUNCTURE: CPT

## 2020-07-08 PROCEDURE — 87076 CULTURE ANAEROBE IDENT EACH: CPT

## 2020-07-08 PROCEDURE — 87040 BLOOD CULTURE FOR BACTERIA: CPT

## 2020-07-08 PROCEDURE — 10004651 HB RX, NO CHARGE ITEM: Performed by: INTERNAL MEDICINE

## 2020-07-08 PROCEDURE — 87635 SARS-COV-2 COVID-19 AMP PRB: CPT

## 2020-07-08 PROCEDURE — 87070 CULTURE OTHR SPECIMN AEROBIC: CPT

## 2020-07-08 RX ORDER — INSULIN GLARGINE 100 [IU]/ML
50 INJECTION, SOLUTION SUBCUTANEOUS EVERY 12 HOURS SCHEDULED
Status: DISCONTINUED | OUTPATIENT
Start: 2020-07-08 | End: 2020-07-13 | Stop reason: HOSPADM

## 2020-07-08 RX ORDER — NICOTINE POLACRILEX 4 MG
30 LOZENGE BUCCAL PRN
Status: DISCONTINUED | OUTPATIENT
Start: 2020-07-08 | End: 2020-07-13 | Stop reason: HOSPADM

## 2020-07-08 RX ORDER — DEXTROSE MONOHYDRATE 25 G/50ML
12.5 INJECTION, SOLUTION INTRAVENOUS PRN
Status: DISCONTINUED | OUTPATIENT
Start: 2020-07-08 | End: 2020-07-13 | Stop reason: HOSPADM

## 2020-07-08 RX ORDER — ATORVASTATIN CALCIUM 40 MG/1
40 TABLET, FILM COATED ORAL DAILY
Status: DISCONTINUED | OUTPATIENT
Start: 2020-07-08 | End: 2020-07-08

## 2020-07-08 RX ORDER — ENOXAPARIN SODIUM 100 MG/ML
40 INJECTION SUBCUTANEOUS EVERY EVENING
Status: DISCONTINUED | OUTPATIENT
Start: 2020-07-08 | End: 2020-07-10

## 2020-07-08 RX ORDER — LINEZOLID 2 MG/ML
600 INJECTION, SOLUTION INTRAVENOUS EVERY 12 HOURS SCHEDULED
Status: DISCONTINUED | OUTPATIENT
Start: 2020-07-08 | End: 2020-07-10

## 2020-07-08 RX ORDER — 0.9 % SODIUM CHLORIDE 0.9 %
2 VIAL (ML) INJECTION EVERY 12 HOURS SCHEDULED
Status: DISCONTINUED | OUTPATIENT
Start: 2020-07-08 | End: 2020-07-13 | Stop reason: HOSPADM

## 2020-07-08 RX ORDER — ATORVASTATIN CALCIUM 40 MG/1
40 TABLET, FILM COATED ORAL DAILY
Status: DISCONTINUED | OUTPATIENT
Start: 2020-07-09 | End: 2020-07-13 | Stop reason: HOSPADM

## 2020-07-08 RX ORDER — DEXTROSE MONOHYDRATE 50 MG/ML
INJECTION, SOLUTION INTRAVENOUS CONTINUOUS PRN
Status: DISCONTINUED | OUTPATIENT
Start: 2020-07-08 | End: 2020-07-13 | Stop reason: HOSPADM

## 2020-07-08 RX ORDER — SODIUM CHLORIDE 9 MG/ML
INJECTION, SOLUTION INTRAVENOUS CONTINUOUS
Status: DISCONTINUED | OUTPATIENT
Start: 2020-07-08 | End: 2020-07-10

## 2020-07-08 RX ORDER — NICOTINE POLACRILEX 4 MG
15 LOZENGE BUCCAL PRN
Status: DISCONTINUED | OUTPATIENT
Start: 2020-07-08 | End: 2020-07-13 | Stop reason: HOSPADM

## 2020-07-08 RX ORDER — VITAMIN B COMPLEX
50 TABLET ORAL DAILY
Status: DISCONTINUED | OUTPATIENT
Start: 2020-07-09 | End: 2020-07-13 | Stop reason: HOSPADM

## 2020-07-08 RX ORDER — DEXTROSE MONOHYDRATE 25 G/50ML
25 INJECTION, SOLUTION INTRAVENOUS PRN
Status: DISCONTINUED | OUTPATIENT
Start: 2020-07-08 | End: 2020-07-13 | Stop reason: HOSPADM

## 2020-07-08 RX ADMIN — METRONIDAZOLE 500 MG: 500 INJECTION, SOLUTION INTRAVENOUS at 22:20

## 2020-07-08 RX ADMIN — VANCOMYCIN HYDROCHLORIDE 1500 MG: 10 INJECTION, POWDER, LYOPHILIZED, FOR SOLUTION INTRAVENOUS at 17:42

## 2020-07-08 RX ADMIN — SODIUM CHLORIDE: 0.9 INJECTION, SOLUTION INTRAVENOUS at 14:30

## 2020-07-08 RX ADMIN — VANCOMYCIN HYDROCHLORIDE 1000 MG: 1 INJECTION, POWDER, LYOPHILIZED, FOR SOLUTION INTRAVENOUS at 12:41

## 2020-07-08 RX ADMIN — INSULIN LISPRO 2 UNITS: 100 INJECTION, SOLUTION INTRAVENOUS; SUBCUTANEOUS at 17:48

## 2020-07-08 RX ADMIN — INSULIN GLARGINE 50 UNITS: 100 INJECTION, SOLUTION SUBCUTANEOUS at 20:48

## 2020-07-08 RX ADMIN — CEFEPIME HYDROCHLORIDE 1000 MG: 1 INJECTION, POWDER, FOR SOLUTION INTRAMUSCULAR; INTRAVENOUS at 12:11

## 2020-07-08 RX ADMIN — CEFEPIME HYDROCHLORIDE 1000 MG: 1 INJECTION, POWDER, FOR SOLUTION INTRAMUSCULAR; INTRAVENOUS at 20:42

## 2020-07-08 RX ADMIN — SODIUM CHLORIDE, PRESERVATIVE FREE 2 ML: 5 INJECTION INTRAVENOUS at 20:49

## 2020-07-08 RX ADMIN — ENOXAPARIN SODIUM 40 MG: 40 INJECTION SUBCUTANEOUS at 17:42

## 2020-07-08 RX ADMIN — LINEZOLID 600 MG: 600 INJECTION, SOLUTION INTRAVENOUS at 21:13

## 2020-07-08 RX ADMIN — METRONIDAZOLE 500 MG: 500 INJECTION, SOLUTION INTRAVENOUS at 14:55

## 2020-07-08 ASSESSMENT — ACTIVITIES OF DAILY LIVING (ADL)
ADL_BEFORE_ADMISSION: INDEPENDENT
CHRONIC_PAIN_PRESENT: NO
ADL_SHORT_OF_BREATH: YES
ADL_SCORE: 12
RECENT_DECLINE_ADL: YES, ACUTE ILLNESS WITHOUT THERAPY NEEDS

## 2020-07-08 ASSESSMENT — COGNITIVE AND FUNCTIONAL STATUS - GENERAL
DO YOU HAVE SERIOUS DIFFICULTY WALKING OR CLIMBING STAIRS: YES
ARE YOU BLIND OR DO YOU HAVE SERIOUS DIFFICULTY SEEING, EVEN WHEN WEARING GLASSES: NO
BECAUSE OF A PHYSICAL, MENTAL, OR EMOTIONAL CONDITION, DO YOU HAVE SERIOUS DIFFICULTY CONCENTRATING, REMEMBERING OR MAKING DECISIONS: NO
BECAUSE OF A PHYSICAL, MENTAL, OR EMOTIONAL CONDITION, DO YOU HAVE DIFFICULTY DOING ERRANDS ALONE: NO
ARE YOU DEAF OR DO YOU HAVE SERIOUS DIFFICULTY  HEARING: NO
DO YOU HAVE DIFFICULTY DRESSING OR BATHING: NO

## 2020-07-08 ASSESSMENT — LIFESTYLE VARIABLES
AUDIT-C TOTAL SCORE: 3
HOW OFTEN DO YOU HAVE 6 OR MORE DRINKS ON ONE OCCASION: NEVER
HOW MANY STANDARD DRINKS CONTAINING ALCOHOL DO YOU HAVE ON A TYPICAL DAY: 3 OR 4
HOW OFTEN DO YOU HAVE A DRINK CONTAINING ALCOHOL: 2 TO 4 TIMES PER MONTH
ALCOHOL_USE_STATUS: NO OR LOW RISK WITH VALIDATED TOOL

## 2020-07-08 ASSESSMENT — PAIN SCALES - GENERAL
PAINLEVEL_OUTOF10: 3
PAINLEVEL_OUTOF10: 3

## 2020-07-09 ENCOUNTER — APPOINTMENT (OUTPATIENT)
Dept: CARDIOLOGY | Age: 65
End: 2020-07-09
Attending: INTERNAL MEDICINE

## 2020-07-09 ENCOUNTER — ANESTHESIA EVENT (OUTPATIENT)
Dept: SURGERY | Age: 65
DRG: 853 | End: 2020-07-09

## 2020-07-09 ENCOUNTER — APPOINTMENT (OUTPATIENT)
Dept: ULTRASOUND IMAGING | Age: 65
DRG: 853 | End: 2020-07-09
Attending: PODIATRIST

## 2020-07-09 ENCOUNTER — ANESTHESIA (OUTPATIENT)
Dept: SURGERY | Age: 65
DRG: 853 | End: 2020-07-09

## 2020-07-09 ENCOUNTER — CASE MANAGEMENT (OUTPATIENT)
Dept: CARE COORDINATION | Age: 65
End: 2020-07-09

## 2020-07-09 LAB
ANION GAP SERPL CALC-SCNC: 12 MMOL/L (ref 10–20)
BASOPHILS # BLD: 0.1 K/MCL (ref 0–0.3)
BASOPHILS NFR BLD: 0 %
BUN SERPL-MCNC: 84 MG/DL (ref 6–20)
BUN/CREAT SERPL: 43 (ref 7–25)
CALCIUM SERPL-MCNC: 8.3 MG/DL (ref 8.4–10.2)
CHLORIDE SERPL-SCNC: 98 MMOL/L (ref 98–107)
CO2 SERPL-SCNC: 27 MMOL/L (ref 21–32)
CREAT SERPL-MCNC: 1.94 MG/DL (ref 0.67–1.17)
DIFFERENTIAL METHOD BLD: ABNORMAL
EOSINOPHIL # BLD: 0.1 K/MCL (ref 0.1–0.5)
EOSINOPHIL NFR BLD: 0 %
ERYTHROCYTE [DISTWIDTH] IN BLOOD: 12.9 % (ref 11–15)
GLUCOSE BLDC GLUCOMTR-MCNC: 184 MG/DL (ref 70–99)
GLUCOSE BLDC GLUCOMTR-MCNC: 210 MG/DL (ref 70–99)
GLUCOSE BLDC GLUCOMTR-MCNC: 213 MG/DL (ref 70–99)
GLUCOSE SERPL-MCNC: 210 MG/DL (ref 65–99)
HCT VFR BLD CALC: 35.8 % (ref 39–51)
HGB BLD-MCNC: 12.2 G/DL (ref 13–17)
IMM GRANULOCYTES # BLD AUTO: 0.2 K/MCL (ref 0–0.2)
IMM GRANULOCYTES NFR BLD: 1 %
LYMPHOCYTES # BLD: 1.7 K/MCL (ref 1–4)
LYMPHOCYTES NFR BLD: 9 %
MCH RBC QN AUTO: 28.2 PG (ref 26–34)
MCHC RBC AUTO-ENTMCNC: 34.1 G/DL (ref 32–36.5)
MCV RBC AUTO: 82.9 FL (ref 78–100)
MONOCYTES # BLD: 2 K/MCL (ref 0.3–0.9)
MONOCYTES NFR BLD: 11 %
NEUTROPHILS # BLD: 14.5 K/MCL (ref 1.8–7.7)
NEUTROPHILS NFR BLD: 79 %
NRBC BLD MANUAL-RTO: 0 /100 WBC
PLATELET # BLD: 278 K/MCL (ref 140–450)
POTASSIUM SERPL-SCNC: 3.7 MMOL/L (ref 3.4–5.1)
RBC # BLD: 4.32 MIL/MCL (ref 4.5–5.9)
SODIUM SERPL-SCNC: 133 MMOL/L (ref 135–145)
WBC # BLD: 18.6 K/MCL (ref 4.2–11)

## 2020-07-09 PROCEDURE — 10000002 HB ROOM CHARGE MED SURG

## 2020-07-09 PROCEDURE — 80048 BASIC METABOLIC PNL TOTAL CA: CPT

## 2020-07-09 PROCEDURE — 10002803 HB RX 637: Performed by: PODIATRIST

## 2020-07-09 PROCEDURE — 87186 SC STD MICRODIL/AGAR DIL: CPT

## 2020-07-09 PROCEDURE — 13001086 HB INCENTIVE SPIROMETER W INSTRUCT

## 2020-07-09 PROCEDURE — 10002800 HB RX 250 W HCPCS: Performed by: ANESTHESIOLOGY

## 2020-07-09 PROCEDURE — 88305 TISSUE EXAM BY PATHOLOGIST: CPT

## 2020-07-09 PROCEDURE — 10002807 HB RX 258: Performed by: INTERNAL MEDICINE

## 2020-07-09 PROCEDURE — 36415 COLL VENOUS BLD VENIPUNCTURE: CPT

## 2020-07-09 PROCEDURE — 10002801 HB RX 250 W/O HCPCS: Performed by: INTERNAL MEDICINE

## 2020-07-09 PROCEDURE — 10002801 HB RX 250 W/O HCPCS: Performed by: NURSE ANESTHETIST, CERTIFIED REGISTERED

## 2020-07-09 PROCEDURE — 13000115 HB ORTHO BASIC CASE EA ADD MINUTE: Performed by: PODIATRIST

## 2020-07-09 PROCEDURE — 10004651 HB RX, NO CHARGE ITEM: Performed by: INTERNAL MEDICINE

## 2020-07-09 PROCEDURE — 10002800 HB RX 250 W HCPCS: Performed by: NURSE ANESTHETIST, CERTIFIED REGISTERED

## 2020-07-09 PROCEDURE — 10004281 HB COUNTER-STAFF TIME PER 15 MIN

## 2020-07-09 PROCEDURE — 13000003 HB ANESTHESIA  GENERAL EA ADD MINUTE: Performed by: PODIATRIST

## 2020-07-09 PROCEDURE — 0Y6N0ZD DETACHMENT AT LEFT FOOT, PARTIAL 4TH RAY, OPEN APPROACH: ICD-10-PCS | Performed by: PODIATRIST

## 2020-07-09 PROCEDURE — 13000002 HB ANESTHESIA  GENERAL  S/U + 1ST 15 MIN: Performed by: PODIATRIST

## 2020-07-09 PROCEDURE — 10002801 HB RX 250 W/O HCPCS: Performed by: PODIATRIST

## 2020-07-09 PROCEDURE — 10002800 HB RX 250 W HCPCS: Performed by: INTERNAL MEDICINE

## 2020-07-09 PROCEDURE — 0Y6N0ZC DETACHMENT AT LEFT FOOT, PARTIAL 3RD RAY, OPEN APPROACH: ICD-10-PCS | Performed by: PODIATRIST

## 2020-07-09 PROCEDURE — 99233 SBSQ HOSP IP/OBS HIGH 50: CPT | Performed by: INTERNAL MEDICINE

## 2020-07-09 PROCEDURE — 10006023 HB SUPPLY 272: Performed by: PODIATRIST

## 2020-07-09 PROCEDURE — 0Y6N0ZF DETACHMENT AT LEFT FOOT, PARTIAL 5TH RAY, OPEN APPROACH: ICD-10-PCS | Performed by: PODIATRIST

## 2020-07-09 PROCEDURE — 82962 GLUCOSE BLOOD TEST: CPT

## 2020-07-09 PROCEDURE — 88307 TISSUE EXAM BY PATHOLOGIST: CPT

## 2020-07-09 PROCEDURE — 85025 COMPLETE CBC W/AUTO DIFF WBC: CPT

## 2020-07-09 PROCEDURE — 13000114 HB ORTHO BASIC CASE S/U + 1ST 15 MIN: Performed by: PODIATRIST

## 2020-07-09 PROCEDURE — 87075 CULTR BACTERIA EXCEPT BLOOD: CPT

## 2020-07-09 PROCEDURE — 10002803 HB RX 637: Performed by: INTERNAL MEDICINE

## 2020-07-09 PROCEDURE — 87076 CULTURE ANAEROBE IDENT EACH: CPT

## 2020-07-09 PROCEDURE — 87077 CULTURE AEROBIC IDENTIFY: CPT

## 2020-07-09 PROCEDURE — 10002807 HB RX 258: Performed by: NURSE ANESTHETIST, CERTIFIED REGISTERED

## 2020-07-09 PROCEDURE — 88311 DECALCIFY TISSUE: CPT

## 2020-07-09 PROCEDURE — 10004451 HB PACU RECOVERY 1ST 30 MINUTES: Performed by: PODIATRIST

## 2020-07-09 PROCEDURE — 10002016 HB COUNTER INCENTIVE SPIROMETRY

## 2020-07-09 PROCEDURE — 93926 LOWER EXTREMITY STUDY: CPT

## 2020-07-09 PROCEDURE — 10004452 HB PACU ADDL 30 MINUTES: Performed by: PODIATRIST

## 2020-07-09 RX ORDER — ONDANSETRON 2 MG/ML
INJECTION INTRAMUSCULAR; INTRAVENOUS
Status: DISPENSED
Start: 2020-07-09 | End: 2020-07-10

## 2020-07-09 RX ORDER — DEXTROSE MONOHYDRATE 25 G/50ML
25 INJECTION, SOLUTION INTRAVENOUS PRN
Status: DISCONTINUED | OUTPATIENT
Start: 2020-07-09 | End: 2020-07-09 | Stop reason: HOSPADM

## 2020-07-09 RX ORDER — ONDANSETRON 2 MG/ML
INJECTION INTRAMUSCULAR; INTRAVENOUS PRN
Status: DISCONTINUED | OUTPATIENT
Start: 2020-07-09 | End: 2020-07-09

## 2020-07-09 RX ORDER — PROCHLORPERAZINE EDISYLATE 5 MG/ML
5 INJECTION INTRAMUSCULAR; INTRAVENOUS EVERY 4 HOURS PRN
Status: DISCONTINUED | OUTPATIENT
Start: 2020-07-09 | End: 2020-07-09 | Stop reason: HOSPADM

## 2020-07-09 RX ORDER — SODIUM CHLORIDE, SODIUM LACTATE, POTASSIUM CHLORIDE, CALCIUM CHLORIDE 600; 310; 30; 20 MG/100ML; MG/100ML; MG/100ML; MG/100ML
INJECTION, SOLUTION INTRAVENOUS CONTINUOUS
Status: DISCONTINUED | OUTPATIENT
Start: 2020-07-09 | End: 2020-07-09 | Stop reason: HOSPADM

## 2020-07-09 RX ORDER — ONDANSETRON 4 MG/1
4 TABLET, ORALLY DISINTEGRATING ORAL EVERY 12 HOURS PRN
Status: DISCONTINUED | OUTPATIENT
Start: 2020-07-09 | End: 2020-07-13 | Stop reason: HOSPADM

## 2020-07-09 RX ORDER — ALBUTEROL SULFATE 2.5 MG/3ML
5 SOLUTION RESPIRATORY (INHALATION) ONCE
Status: DISCONTINUED | OUTPATIENT
Start: 2020-07-09 | End: 2020-07-09 | Stop reason: HOSPADM

## 2020-07-09 RX ORDER — HYDROCODONE BITARTRATE AND ACETAMINOPHEN 5; 325 MG/1; MG/1
1 TABLET ORAL EVERY 4 HOURS PRN
Status: DISCONTINUED | OUTPATIENT
Start: 2020-07-09 | End: 2020-07-13 | Stop reason: HOSPADM

## 2020-07-09 RX ORDER — LIDOCAINE HYDROCHLORIDE 20 MG/ML
INJECTION, SOLUTION INFILTRATION; PERINEURAL PRN
Status: DISCONTINUED | OUTPATIENT
Start: 2020-07-09 | End: 2020-07-09

## 2020-07-09 RX ORDER — HUMAN INSULIN 100 [IU]/ML
INJECTION, SOLUTION SUBCUTANEOUS
Status: DISCONTINUED | OUTPATIENT
Start: 2020-07-09 | End: 2020-07-09 | Stop reason: HOSPADM

## 2020-07-09 RX ORDER — NICOTINE POLACRILEX 4 MG
30 LOZENGE BUCCAL
Status: DISCONTINUED | OUTPATIENT
Start: 2020-07-09 | End: 2020-07-09 | Stop reason: HOSPADM

## 2020-07-09 RX ORDER — PROPOFOL 10 MG/ML
INJECTION, EMULSION INTRAVENOUS PRN
Status: DISCONTINUED | OUTPATIENT
Start: 2020-07-09 | End: 2020-07-09

## 2020-07-09 RX ORDER — ACETAMINOPHEN 325 MG/1
650 TABLET ORAL EVERY 4 HOURS PRN
Status: DISCONTINUED | OUTPATIENT
Start: 2020-07-09 | End: 2020-07-09 | Stop reason: HOSPADM

## 2020-07-09 RX ORDER — SCOLOPAMINE TRANSDERMAL SYSTEM 1 MG/1
1 PATCH, EXTENDED RELEASE TRANSDERMAL
Status: DISCONTINUED | OUTPATIENT
Start: 2020-07-09 | End: 2020-07-09 | Stop reason: HOSPADM

## 2020-07-09 RX ORDER — LIDOCAINE HYDROCHLORIDE 10 MG/ML
5-10 INJECTION, SOLUTION INFILTRATION; PERINEURAL PRN
Status: DISCONTINUED | OUTPATIENT
Start: 2020-07-09 | End: 2020-07-09 | Stop reason: HOSPADM

## 2020-07-09 RX ORDER — ONDANSETRON 2 MG/ML
4 INJECTION INTRAMUSCULAR; INTRAVENOUS ONCE
Status: COMPLETED | OUTPATIENT
Start: 2020-07-09 | End: 2020-07-09

## 2020-07-09 RX ORDER — HYDRALAZINE HYDROCHLORIDE 20 MG/ML
5 INJECTION INTRAMUSCULAR; INTRAVENOUS EVERY 10 MIN PRN
Status: DISCONTINUED | OUTPATIENT
Start: 2020-07-09 | End: 2020-07-09 | Stop reason: HOSPADM

## 2020-07-09 RX ORDER — MAGNESIUM HYDROXIDE 1200 MG/15ML
LIQUID ORAL PRN
Status: DISCONTINUED | OUTPATIENT
Start: 2020-07-09 | End: 2020-07-09 | Stop reason: HOSPADM

## 2020-07-09 RX ORDER — BUPIVACAINE HYDROCHLORIDE 5 MG/ML
INJECTION, SOLUTION EPIDURAL; INTRACAUDAL PRN
Status: DISCONTINUED | OUTPATIENT
Start: 2020-07-09 | End: 2020-07-09 | Stop reason: HOSPADM

## 2020-07-09 RX ORDER — ONDANSETRON 2 MG/ML
4 INJECTION INTRAMUSCULAR; INTRAVENOUS EVERY 12 HOURS PRN
Status: DISCONTINUED | OUTPATIENT
Start: 2020-07-09 | End: 2020-07-13 | Stop reason: HOSPADM

## 2020-07-09 RX ORDER — FAMOTIDINE 20 MG/1
20 TABLET, FILM COATED ORAL
Status: DISCONTINUED | OUTPATIENT
Start: 2020-07-09 | End: 2020-07-09 | Stop reason: HOSPADM

## 2020-07-09 RX ORDER — SODIUM CHLORIDE 9 MG/ML
INJECTION, SOLUTION INTRAVENOUS CONTINUOUS PRN
Status: DISCONTINUED | OUTPATIENT
Start: 2020-07-09 | End: 2020-07-09

## 2020-07-09 RX ADMIN — INSULIN GLARGINE 25 UNITS: 100 INJECTION, SOLUTION SUBCUTANEOUS at 08:34

## 2020-07-09 RX ADMIN — LIDOCAINE HYDROCHLORIDE 5 ML: 20 INJECTION, SOLUTION INFILTRATION; PERINEURAL at 14:52

## 2020-07-09 RX ADMIN — METRONIDAZOLE 500 MG: 500 INJECTION, SOLUTION INTRAVENOUS at 22:46

## 2020-07-09 RX ADMIN — METRONIDAZOLE 500 MG: 500 INJECTION, SOLUTION INTRAVENOUS at 05:05

## 2020-07-09 RX ADMIN — DESMOPRESSIN ACETATE 40 MG: 0.2 TABLET ORAL at 08:34

## 2020-07-09 RX ADMIN — MORPHINE SULFATE 2 MG: 2 INJECTION, SOLUTION INTRAMUSCULAR; INTRAVENOUS at 21:28

## 2020-07-09 RX ADMIN — SODIUM CHLORIDE, PRESERVATIVE FREE 2 ML: 5 INJECTION INTRAVENOUS at 08:33

## 2020-07-09 RX ADMIN — ONDANSETRON 4 MG: 2 INJECTION INTRAMUSCULAR; INTRAVENOUS at 16:21

## 2020-07-09 RX ADMIN — FENTANYL CITRATE 50 MCG: 50 INJECTION INTRAMUSCULAR; INTRAVENOUS at 15:10

## 2020-07-09 RX ADMIN — VANCOMYCIN HYDROCHLORIDE 1500 MG: 10 INJECTION, POWDER, LYOPHILIZED, FOR SOLUTION INTRAVENOUS at 19:50

## 2020-07-09 RX ADMIN — SODIUM CHLORIDE: 9 INJECTION, SOLUTION INTRAVENOUS at 14:25

## 2020-07-09 RX ADMIN — PROPOFOL 200 MG: 10 INJECTION, EMULSION INTRAVENOUS at 14:52

## 2020-07-09 RX ADMIN — ONDANSETRON 4 MG: 2 INJECTION INTRAMUSCULAR; INTRAVENOUS at 15:22

## 2020-07-09 RX ADMIN — INSULIN GLARGINE 50 UNITS: 100 INJECTION, SOLUTION SUBCUTANEOUS at 21:33

## 2020-07-09 RX ADMIN — SODIUM CHLORIDE, PRESERVATIVE FREE 2 ML: 5 INJECTION INTRAVENOUS at 21:55

## 2020-07-09 RX ADMIN — FENTANYL CITRATE 50 MCG: 50 INJECTION INTRAMUSCULAR; INTRAVENOUS at 15:25

## 2020-07-09 RX ADMIN — LINEZOLID 600 MG: 600 INJECTION, SOLUTION INTRAVENOUS at 21:37

## 2020-07-09 RX ADMIN — LINEZOLID 600 MG: 600 INJECTION, SOLUTION INTRAVENOUS at 08:37

## 2020-07-09 RX ADMIN — CEFEPIME HYDROCHLORIDE 1000 MG: 1 INJECTION, POWDER, FOR SOLUTION INTRAMUSCULAR; INTRAVENOUS at 22:09

## 2020-07-09 RX ADMIN — INSULIN LISPRO 2 UNITS: 100 INJECTION, SOLUTION INTRAVENOUS; SUBCUTANEOUS at 09:39

## 2020-07-09 RX ADMIN — ENOXAPARIN SODIUM 40 MG: 40 INJECTION SUBCUTANEOUS at 19:55

## 2020-07-09 RX ADMIN — FENTANYL CITRATE 50 MCG: 50 INJECTION INTRAMUSCULAR; INTRAVENOUS at 14:48

## 2020-07-09 RX ADMIN — FENTANYL CITRATE 50 MCG: 50 INJECTION INTRAMUSCULAR; INTRAVENOUS at 14:58

## 2020-07-09 RX ADMIN — VITAMIN D, TAB 1000IU (100/BT) 50 MCG: 25 TAB at 08:34

## 2020-07-09 RX ADMIN — METRONIDAZOLE 500 MG: 500 INJECTION, SOLUTION INTRAVENOUS at 13:56

## 2020-07-09 RX ADMIN — HYDROCODONE BITARTRATE AND ACETAMINOPHEN 1 TABLET: 5; 325 TABLET ORAL at 19:52

## 2020-07-09 RX ADMIN — HYDROMORPHONE HYDROCHLORIDE 0.5 MG: 1 INJECTION, SOLUTION INTRAMUSCULAR; INTRAVENOUS; SUBCUTANEOUS at 22:46

## 2020-07-09 RX ADMIN — CEFEPIME HYDROCHLORIDE 1000 MG: 1 INJECTION, POWDER, FOR SOLUTION INTRAMUSCULAR; INTRAVENOUS at 11:08

## 2020-07-09 ASSESSMENT — PAIN SCALES - GENERAL
PAINLEVEL_OUTOF10: 3
PAINLEVEL_OUTOF10: 3
PAINLEVEL_OUTOF10: 7

## 2020-07-09 ASSESSMENT — PAIN SCALES - WONG BAKER: WONGBAKER_NUMERICALRESPONSE: 2

## 2020-07-10 ENCOUNTER — CASE MANAGEMENT (OUTPATIENT)
Dept: CARE COORDINATION | Age: 65
End: 2020-07-10

## 2020-07-10 ENCOUNTER — APPOINTMENT (OUTPATIENT)
Dept: GENERAL RADIOLOGY | Age: 65
DRG: 853 | End: 2020-07-10
Attending: PODIATRIST

## 2020-07-10 LAB
ANION GAP SERPL CALC-SCNC: 13 MMOL/L (ref 10–20)
BASOPHILS # BLD: 0 K/MCL (ref 0–0.3)
BASOPHILS NFR BLD: 0 %
BUN SERPL-MCNC: 46 MG/DL (ref 6–20)
BUN/CREAT SERPL: 35 (ref 7–25)
CALCIUM SERPL-MCNC: 8.7 MG/DL (ref 8.4–10.2)
CHLORIDE SERPL-SCNC: 101 MMOL/L (ref 98–107)
CO2 SERPL-SCNC: 25 MMOL/L (ref 21–32)
CREAT SERPL-MCNC: 1.33 MG/DL (ref 0.67–1.17)
DIFFERENTIAL METHOD BLD: ABNORMAL
EOSINOPHIL # BLD: 0 K/MCL (ref 0.1–0.5)
EOSINOPHIL NFR BLD: 0 %
ERYTHROCYTE [DISTWIDTH] IN BLOOD: 13.1 % (ref 11–15)
GLUCOSE BLDC GLUCOMTR-MCNC: 101 MG/DL (ref 70–99)
GLUCOSE BLDC GLUCOMTR-MCNC: 108 MG/DL (ref 70–99)
GLUCOSE BLDC GLUCOMTR-MCNC: 115 MG/DL (ref 70–99)
GLUCOSE BLDC GLUCOMTR-MCNC: 146 MG/DL (ref 70–99)
GLUCOSE BLDC GLUCOMTR-MCNC: 61 MG/DL (ref 70–99)
GLUCOSE BLDC GLUCOMTR-MCNC: 63 MG/DL (ref 70–99)
GLUCOSE SERPL-MCNC: 152 MG/DL (ref 65–99)
HCT VFR BLD CALC: 36.2 % (ref 39–51)
HGB BLD-MCNC: 12.1 G/DL (ref 13–17)
LYMPHOCYTES # BLD: 1.9 K/MCL (ref 1–4)
LYMPHOCYTES NFR BLD: 10 %
MCH RBC QN AUTO: 28.3 PG (ref 26–34)
MCHC RBC AUTO-ENTMCNC: 33.4 G/DL (ref 32–36.5)
MCV RBC AUTO: 84.6 FL (ref 78–100)
MONOCYTES # BLD: 1.2 K/MCL (ref 0.3–0.9)
MONOCYTES NFR BLD: 7 %
NEUTROPHILS # BLD: 13.9 K/MCL (ref 1.8–7.7)
NEUTS BAND NFR BLD: 4 % (ref 0–10)
NEUTS SEG NFR BLD: 78 %
NRBC BLD MANUAL-RTO: 0 /100 WBC
PLAT MORPH BLD: NORMAL
PLATELET # BLD: 283 K/MCL (ref 140–450)
POTASSIUM SERPL-SCNC: 4.1 MMOL/L (ref 3.4–5.1)
RBC # BLD: 4.28 MIL/MCL (ref 4.5–5.9)
RBC MORPH BLD: NORMAL
SODIUM SERPL-SCNC: 135 MMOL/L (ref 135–145)
VANCOMYCIN TROUGH SERPL-MCNC: 9 MCG/ML (ref 10–20)
VARIANT LYMPHS NFR BLD: 1 % (ref 0–5)
WBC # BLD: 17 K/MCL (ref 4.2–11)
WBC MORPH BLD: NORMAL

## 2020-07-10 PROCEDURE — 97166 OT EVAL MOD COMPLEX 45 MIN: CPT

## 2020-07-10 PROCEDURE — 73630 X-RAY EXAM OF FOOT: CPT

## 2020-07-10 PROCEDURE — 10002807 HB RX 258: Performed by: INTERNAL MEDICINE

## 2020-07-10 PROCEDURE — 10002801 HB RX 250 W/O HCPCS: Performed by: INTERNAL MEDICINE

## 2020-07-10 PROCEDURE — 85025 COMPLETE CBC W/AUTO DIFF WBC: CPT

## 2020-07-10 PROCEDURE — 97606 NEG PRS WND THER DME>50 SQCM: CPT

## 2020-07-10 PROCEDURE — 97161 PT EVAL LOW COMPLEX 20 MIN: CPT

## 2020-07-10 PROCEDURE — 80202 ASSAY OF VANCOMYCIN: CPT

## 2020-07-10 PROCEDURE — 80048 BASIC METABOLIC PNL TOTAL CA: CPT

## 2020-07-10 PROCEDURE — 99232 SBSQ HOSP IP/OBS MODERATE 35: CPT | Performed by: INTERNAL MEDICINE

## 2020-07-10 PROCEDURE — 36415 COLL VENOUS BLD VENIPUNCTURE: CPT

## 2020-07-10 PROCEDURE — 10004651 HB RX, NO CHARGE ITEM: Performed by: INTERNAL MEDICINE

## 2020-07-10 PROCEDURE — 10002803 HB RX 637: Performed by: INTERNAL MEDICINE

## 2020-07-10 PROCEDURE — 10002800 HB RX 250 W HCPCS: Performed by: INTERNAL MEDICINE

## 2020-07-10 PROCEDURE — 10000002 HB ROOM CHARGE MED SURG

## 2020-07-10 PROCEDURE — 97161 PT EVAL LOW COMPLEX 20 MIN: CPT | Performed by: PHYSICAL THERAPIST

## 2020-07-10 PROCEDURE — 99233 SBSQ HOSP IP/OBS HIGH 50: CPT | Performed by: INTERNAL MEDICINE

## 2020-07-10 PROCEDURE — 97530 THERAPEUTIC ACTIVITIES: CPT | Performed by: PHYSICAL THERAPIST

## 2020-07-10 PROCEDURE — 10002803 HB RX 637: Performed by: PODIATRIST

## 2020-07-10 PROCEDURE — 97530 THERAPEUTIC ACTIVITIES: CPT

## 2020-07-10 RX ORDER — TORSEMIDE 20 MG/1
20 TABLET ORAL DAILY
Status: DISCONTINUED | OUTPATIENT
Start: 2020-07-10 | End: 2020-07-13 | Stop reason: HOSPADM

## 2020-07-10 RX ORDER — ENOXAPARIN SODIUM 100 MG/ML
40 INJECTION SUBCUTANEOUS EVERY 12 HOURS
Status: DISCONTINUED | OUTPATIENT
Start: 2020-07-10 | End: 2020-07-13 | Stop reason: HOSPADM

## 2020-07-10 RX ADMIN — INSULIN LISPRO 25 UNITS: 100 INJECTION, SOLUTION INTRAVENOUS; SUBCUTANEOUS at 08:25

## 2020-07-10 RX ADMIN — INSULIN GLARGINE 50 UNITS: 100 INJECTION, SOLUTION SUBCUTANEOUS at 08:29

## 2020-07-10 RX ADMIN — VITAMIN D, TAB 1000IU (100/BT) 50 MCG: 25 TAB at 08:25

## 2020-07-10 RX ADMIN — METRONIDAZOLE 500 MG: 500 INJECTION, SOLUTION INTRAVENOUS at 06:30

## 2020-07-10 RX ADMIN — TORSEMIDE 20 MG: 20 TABLET ORAL at 12:18

## 2020-07-10 RX ADMIN — INSULIN LISPRO 25 UNITS: 100 INJECTION, SOLUTION INTRAVENOUS; SUBCUTANEOUS at 12:18

## 2020-07-10 RX ADMIN — SODIUM CHLORIDE, PRESERVATIVE FREE 2 ML: 5 INJECTION INTRAVENOUS at 21:21

## 2020-07-10 RX ADMIN — CEFEPIME HYDROCHLORIDE 1000 MG: 1 INJECTION, POWDER, FOR SOLUTION INTRAMUSCULAR; INTRAVENOUS at 21:23

## 2020-07-10 RX ADMIN — HYDROCODONE BITARTRATE AND ACETAMINOPHEN 1 TABLET: 5; 325 TABLET ORAL at 12:55

## 2020-07-10 RX ADMIN — LINEZOLID 600 MG: 600 INJECTION, SOLUTION INTRAVENOUS at 08:29

## 2020-07-10 RX ADMIN — INSULIN GLARGINE 50 UNITS: 100 INJECTION, SOLUTION SUBCUTANEOUS at 21:24

## 2020-07-10 RX ADMIN — CEFEPIME HYDROCHLORIDE 1000 MG: 1 INJECTION, POWDER, FOR SOLUTION INTRAMUSCULAR; INTRAVENOUS at 08:28

## 2020-07-10 RX ADMIN — ENOXAPARIN SODIUM 40 MG: 40 INJECTION SUBCUTANEOUS at 20:50

## 2020-07-10 RX ADMIN — DESMOPRESSIN ACETATE 40 MG: 0.2 TABLET ORAL at 08:25

## 2020-07-10 RX ADMIN — HYDROMORPHONE HYDROCHLORIDE 0.5 MG: 1 INJECTION, SOLUTION INTRAMUSCULAR; INTRAVENOUS; SUBCUTANEOUS at 08:14

## 2020-07-10 RX ADMIN — METRONIDAZOLE 500 MG: 500 INJECTION, SOLUTION INTRAVENOUS at 22:07

## 2020-07-10 RX ADMIN — METRONIDAZOLE 500 MG: 500 INJECTION, SOLUTION INTRAVENOUS at 13:01

## 2020-07-10 RX ADMIN — SODIUM CHLORIDE, PRESERVATIVE FREE 2 ML: 5 INJECTION INTRAVENOUS at 08:23

## 2020-07-10 ASSESSMENT — COGNITIVE AND FUNCTIONAL STATUS - GENERAL
BASIC_MOBILITY_RAW_SCORE: 10
HELP NEEDED FOR TOILETING: A LOT
HELP NEEDED DRESSING REGULAR UPPER BODY CLOTHING: A LITTLE
BASIC_MOBILITY_CONVERTED_SCORE: 28.13
APPLIED_COGNITIVE_RAW_SCORE: 24
HELP NEEDED DRESSING REGULAR LOWER BODY CLOTHING: TOTAL
DAILY_ACTIVITY_CONVERTED_SCORE: 35.96
APPLIED_COGNITIVE_CONVERTED_SCORE: 62.21
HELP NEEDED FOR BATHING: A LOT
DAILY_ACTIVITY_RAW_SCORE: 16

## 2020-07-10 ASSESSMENT — PAIN SCALES - GENERAL
PAINLEVEL_OUTOF10: 5
PAINLEVEL_OUTOF10: 3
PAINLEVEL_OUTOF10: 4
PAINLEVEL_OUTOF10: 2
PAINLEVEL_OUTOF10: 3

## 2020-07-10 ASSESSMENT — ACTIVITIES OF DAILY LIVING (ADL)
PRIOR_ADL_TOILETING: INDEPENDENT
PRIOR_ADL: INDEPENDENT
PRIOR_ADL: INDEPENDENT

## 2020-07-11 LAB
ANION GAP SERPL CALC-SCNC: 8 MMOL/L (ref 10–20)
BASOPHILS # BLD: 0.2 K/MCL (ref 0–0.3)
BASOPHILS NFR BLD: 1 %
BUN SERPL-MCNC: 37 MG/DL (ref 6–20)
BUN/CREAT SERPL: 26 (ref 7–25)
CALCIUM SERPL-MCNC: 8.7 MG/DL (ref 8.4–10.2)
CHLORIDE SERPL-SCNC: 101 MMOL/L (ref 98–107)
CO2 SERPL-SCNC: 31 MMOL/L (ref 21–32)
CREAT SERPL-MCNC: 1.42 MG/DL (ref 0.67–1.17)
DIFFERENTIAL METHOD BLD: ABNORMAL
EOSINOPHIL # BLD: 0.2 K/MCL (ref 0.1–0.5)
EOSINOPHIL NFR BLD: 1 %
ERYTHROCYTE [DISTWIDTH] IN BLOOD: 13.2 % (ref 11–15)
GLUCOSE BLDC GLUCOMTR-MCNC: 107 MG/DL (ref 70–99)
GLUCOSE BLDC GLUCOMTR-MCNC: 120 MG/DL (ref 70–99)
GLUCOSE BLDC GLUCOMTR-MCNC: 151 MG/DL (ref 70–99)
GLUCOSE BLDC GLUCOMTR-MCNC: 184 MG/DL (ref 70–99)
GLUCOSE BLDC GLUCOMTR-MCNC: 80 MG/DL (ref 70–99)
GLUCOSE SERPL-MCNC: 76 MG/DL (ref 65–99)
HCT VFR BLD CALC: 36.8 % (ref 39–51)
HGB BLD-MCNC: 12.2 G/DL (ref 13–17)
LYMPHOCYTES # BLD: 2.2 K/MCL (ref 1–4)
LYMPHOCYTES NFR BLD: 12 %
MCH RBC QN AUTO: 28.2 PG (ref 26–34)
MCHC RBC AUTO-ENTMCNC: 33.2 G/DL (ref 32–36.5)
MCV RBC AUTO: 85 FL (ref 78–100)
MONOCYTES # BLD: 1.3 K/MCL (ref 0.3–0.9)
MONOCYTES NFR BLD: 8 %
NEUTROPHILS # BLD: 12.9 K/MCL (ref 1.8–7.7)
NEUTS SEG NFR BLD: 77 %
NRBC BLD MANUAL-RTO: 0 /100 WBC
PLAT MORPH BLD: NORMAL
PLATELET # BLD: 324 K/MCL (ref 140–450)
POTASSIUM SERPL-SCNC: 3.7 MMOL/L (ref 3.4–5.1)
RBC # BLD: 4.33 MIL/MCL (ref 4.5–5.9)
RBC MORPH BLD: NORMAL
SODIUM SERPL-SCNC: 136 MMOL/L (ref 135–145)
VARIANT LYMPHS NFR BLD: 1 % (ref 0–5)
WBC # BLD: 16.7 K/MCL (ref 4.2–11)
WBC MORPH BLD: NORMAL

## 2020-07-11 PROCEDURE — 10002801 HB RX 250 W/O HCPCS: Performed by: INTERNAL MEDICINE

## 2020-07-11 PROCEDURE — 36415 COLL VENOUS BLD VENIPUNCTURE: CPT

## 2020-07-11 PROCEDURE — 10004651 HB RX, NO CHARGE ITEM: Performed by: INTERNAL MEDICINE

## 2020-07-11 PROCEDURE — 85025 COMPLETE CBC W/AUTO DIFF WBC: CPT

## 2020-07-11 PROCEDURE — 13003291 HB INS MIDLINE W/GUIDE INCL CATH

## 2020-07-11 PROCEDURE — 10002807 HB RX 258: Performed by: INTERNAL MEDICINE

## 2020-07-11 PROCEDURE — 80048 BASIC METABOLIC PNL TOTAL CA: CPT

## 2020-07-11 PROCEDURE — 99232 SBSQ HOSP IP/OBS MODERATE 35: CPT | Performed by: NURSE PRACTITIONER

## 2020-07-11 PROCEDURE — 10002803 HB RX 637: Performed by: INTERNAL MEDICINE

## 2020-07-11 PROCEDURE — 10002803 HB RX 637: Performed by: PODIATRIST

## 2020-07-11 PROCEDURE — 99233 SBSQ HOSP IP/OBS HIGH 50: CPT | Performed by: INTERNAL MEDICINE

## 2020-07-11 PROCEDURE — 10000002 HB ROOM CHARGE MED SURG

## 2020-07-11 PROCEDURE — 02HV33Z INSERTION OF INFUSION DEVICE INTO SUPERIOR VENA CAVA, PERCUTANEOUS APPROACH: ICD-10-PCS | Performed by: INTERNAL MEDICINE

## 2020-07-11 PROCEDURE — 10002800 HB RX 250 W HCPCS: Performed by: INTERNAL MEDICINE

## 2020-07-11 RX ADMIN — ENOXAPARIN SODIUM 40 MG: 40 INJECTION SUBCUTANEOUS at 21:16

## 2020-07-11 RX ADMIN — CEFEPIME HYDROCHLORIDE 1000 MG: 1 INJECTION, POWDER, FOR SOLUTION INTRAMUSCULAR; INTRAVENOUS at 21:23

## 2020-07-11 RX ADMIN — METRONIDAZOLE 500 MG: 500 INJECTION, SOLUTION INTRAVENOUS at 15:50

## 2020-07-11 RX ADMIN — VITAMIN D, TAB 1000IU (100/BT) 50 MCG: 25 TAB at 09:50

## 2020-07-11 RX ADMIN — INSULIN GLARGINE 50 UNITS: 100 INJECTION, SOLUTION SUBCUTANEOUS at 21:16

## 2020-07-11 RX ADMIN — HYDROCODONE BITARTRATE AND ACETAMINOPHEN 1 TABLET: 5; 325 TABLET ORAL at 10:08

## 2020-07-11 RX ADMIN — HYDROCODONE BITARTRATE AND ACETAMINOPHEN 1 TABLET: 5; 325 TABLET ORAL at 01:34

## 2020-07-11 RX ADMIN — METRONIDAZOLE 500 MG: 500 INJECTION, SOLUTION INTRAVENOUS at 21:22

## 2020-07-11 RX ADMIN — INSULIN GLARGINE 50 UNITS: 100 INJECTION, SOLUTION SUBCUTANEOUS at 09:51

## 2020-07-11 RX ADMIN — INSULIN LISPRO 26 UNITS: 100 INJECTION, SOLUTION INTRAVENOUS; SUBCUTANEOUS at 13:44

## 2020-07-11 RX ADMIN — METRONIDAZOLE 500 MG: 500 INJECTION, SOLUTION INTRAVENOUS at 07:00

## 2020-07-11 RX ADMIN — SODIUM CHLORIDE, PRESERVATIVE FREE 2 ML: 5 INJECTION INTRAVENOUS at 09:55

## 2020-07-11 RX ADMIN — HYDROCODONE BITARTRATE AND ACETAMINOPHEN 1 TABLET: 5; 325 TABLET ORAL at 22:57

## 2020-07-11 RX ADMIN — DESMOPRESSIN ACETATE 40 MG: 0.2 TABLET ORAL at 09:50

## 2020-07-11 RX ADMIN — INSULIN LISPRO 10 UNITS: 100 INJECTION, SOLUTION INTRAVENOUS; SUBCUTANEOUS at 17:54

## 2020-07-11 RX ADMIN — CEFEPIME HYDROCHLORIDE 1000 MG: 1 INJECTION, POWDER, FOR SOLUTION INTRAMUSCULAR; INTRAVENOUS at 15:52

## 2020-07-11 RX ADMIN — ENOXAPARIN SODIUM 40 MG: 40 INJECTION SUBCUTANEOUS at 09:51

## 2020-07-11 RX ADMIN — TORSEMIDE 20 MG: 20 TABLET ORAL at 09:50

## 2020-07-11 RX ADMIN — HYDROCODONE BITARTRATE AND ACETAMINOPHEN 1 TABLET: 5; 325 TABLET ORAL at 18:53

## 2020-07-11 RX ADMIN — VANCOMYCIN HYDROCHLORIDE 1750 MG: 10 INJECTION, POWDER, LYOPHILIZED, FOR SOLUTION INTRAVENOUS at 12:32

## 2020-07-11 RX ADMIN — CEFEPIME HYDROCHLORIDE 1000 MG: 1 INJECTION, POWDER, FOR SOLUTION INTRAMUSCULAR; INTRAVENOUS at 06:54

## 2020-07-11 RX ADMIN — SODIUM CHLORIDE, PRESERVATIVE FREE 2 ML: 5 INJECTION INTRAVENOUS at 21:23

## 2020-07-11 ASSESSMENT — PAIN SCALES - GENERAL
PAINLEVEL_OUTOF10: 4
PAINLEVEL_OUTOF10: 8
PAINLEVEL_OUTOF10: 0
PAINLEVEL_OUTOF10: 5
PAINLEVEL_OUTOF10: 4
PAINLEVEL_OUTOF10: 4
PAINLEVEL_OUTOF10: 2
PAINLEVEL_OUTOF10: 0

## 2020-07-11 ASSESSMENT — PAIN SCALES - WONG BAKER: WONGBAKER_NUMERICALRESPONSE: 8

## 2020-07-12 LAB
ANION GAP SERPL CALC-SCNC: 8 MMOL/L (ref 10–20)
BUN SERPL-MCNC: 33 MG/DL (ref 6–20)
BUN/CREAT SERPL: 27 (ref 7–25)
CALCIUM SERPL-MCNC: 8.8 MG/DL (ref 8.4–10.2)
CHLORIDE SERPL-SCNC: 100 MMOL/L (ref 98–107)
CO2 SERPL-SCNC: 34 MMOL/L (ref 21–32)
CREAT SERPL-MCNC: 1.22 MG/DL (ref 0.67–1.17)
GLUCOSE BLDC GLUCOMTR-MCNC: 120 MG/DL (ref 70–99)
GLUCOSE BLDC GLUCOMTR-MCNC: 153 MG/DL (ref 70–99)
GLUCOSE BLDC GLUCOMTR-MCNC: 187 MG/DL (ref 70–99)
GLUCOSE BLDC GLUCOMTR-MCNC: 94 MG/DL (ref 70–99)
GLUCOSE SERPL-MCNC: 97 MG/DL (ref 65–99)
POTASSIUM SERPL-SCNC: 3.6 MMOL/L (ref 3.4–5.1)
SODIUM SERPL-SCNC: 138 MMOL/L (ref 135–145)

## 2020-07-12 PROCEDURE — 10002807 HB RX 258: Performed by: INTERNAL MEDICINE

## 2020-07-12 PROCEDURE — 10002803 HB RX 637: Performed by: INTERNAL MEDICINE

## 2020-07-12 PROCEDURE — 99232 SBSQ HOSP IP/OBS MODERATE 35: CPT | Performed by: INTERNAL MEDICINE

## 2020-07-12 PROCEDURE — 10002801 HB RX 250 W/O HCPCS: Performed by: INTERNAL MEDICINE

## 2020-07-12 PROCEDURE — 10000002 HB ROOM CHARGE MED SURG

## 2020-07-12 PROCEDURE — 10004651 HB RX, NO CHARGE ITEM: Performed by: INTERNAL MEDICINE

## 2020-07-12 PROCEDURE — 10004281 HB COUNTER-STAFF TIME PER 15 MIN

## 2020-07-12 PROCEDURE — 10002800 HB RX 250 W HCPCS: Performed by: INTERNAL MEDICINE

## 2020-07-12 PROCEDURE — 36415 COLL VENOUS BLD VENIPUNCTURE: CPT

## 2020-07-12 PROCEDURE — 10002803 HB RX 637: Performed by: PODIATRIST

## 2020-07-12 PROCEDURE — 80048 BASIC METABOLIC PNL TOTAL CA: CPT

## 2020-07-12 RX ORDER — LISINOPRIL 5 MG/1
5 TABLET ORAL DAILY
Status: DISCONTINUED | OUTPATIENT
Start: 2020-07-12 | End: 2020-07-13 | Stop reason: HOSPADM

## 2020-07-12 RX ADMIN — LISINOPRIL 5 MG: 5 TABLET ORAL at 17:57

## 2020-07-12 RX ADMIN — INSULIN GLARGINE 50 UNITS: 100 INJECTION, SOLUTION SUBCUTANEOUS at 11:03

## 2020-07-12 RX ADMIN — INSULIN GLARGINE 50 UNITS: 100 INJECTION, SOLUTION SUBCUTANEOUS at 21:40

## 2020-07-12 RX ADMIN — CEFEPIME HYDROCHLORIDE 1000 MG: 1 INJECTION, POWDER, FOR SOLUTION INTRAMUSCULAR; INTRAVENOUS at 14:57

## 2020-07-12 RX ADMIN — INSULIN LISPRO 11 UNITS: 100 INJECTION, SOLUTION INTRAVENOUS; SUBCUTANEOUS at 17:31

## 2020-07-12 RX ADMIN — HYDROCODONE BITARTRATE AND ACETAMINOPHEN 1 TABLET: 5; 325 TABLET ORAL at 15:30

## 2020-07-12 RX ADMIN — TORSEMIDE 20 MG: 20 TABLET ORAL at 09:07

## 2020-07-12 RX ADMIN — METRONIDAZOLE 500 MG: 500 INJECTION, SOLUTION INTRAVENOUS at 05:53

## 2020-07-12 RX ADMIN — INSULIN LISPRO 10 UNITS: 100 INJECTION, SOLUTION INTRAVENOUS; SUBCUTANEOUS at 13:06

## 2020-07-12 RX ADMIN — ENOXAPARIN SODIUM 40 MG: 40 INJECTION SUBCUTANEOUS at 21:40

## 2020-07-12 RX ADMIN — HYDROCODONE BITARTRATE AND ACETAMINOPHEN 1 TABLET: 5; 325 TABLET ORAL at 22:31

## 2020-07-12 RX ADMIN — CEFEPIME HYDROCHLORIDE 1000 MG: 1 INJECTION, POWDER, FOR SOLUTION INTRAMUSCULAR; INTRAVENOUS at 05:53

## 2020-07-12 RX ADMIN — VITAMIN D, TAB 1000IU (100/BT) 50 MCG: 25 TAB at 09:07

## 2020-07-12 RX ADMIN — HYDROCODONE BITARTRATE AND ACETAMINOPHEN 1 TABLET: 5; 325 TABLET ORAL at 04:25

## 2020-07-12 RX ADMIN — VANCOMYCIN HYDROCHLORIDE 1750 MG: 10 INJECTION, POWDER, LYOPHILIZED, FOR SOLUTION INTRAVENOUS at 12:35

## 2020-07-12 RX ADMIN — SODIUM CHLORIDE, PRESERVATIVE FREE 2 ML: 5 INJECTION INTRAVENOUS at 09:09

## 2020-07-12 RX ADMIN — CEFEPIME HYDROCHLORIDE 1000 MG: 1 INJECTION, POWDER, FOR SOLUTION INTRAMUSCULAR; INTRAVENOUS at 22:01

## 2020-07-12 RX ADMIN — METRONIDAZOLE 500 MG: 500 INJECTION, SOLUTION INTRAVENOUS at 22:02

## 2020-07-12 RX ADMIN — DESMOPRESSIN ACETATE 40 MG: 0.2 TABLET ORAL at 09:07

## 2020-07-12 RX ADMIN — METRONIDAZOLE 500 MG: 500 INJECTION, SOLUTION INTRAVENOUS at 14:58

## 2020-07-12 RX ADMIN — ENOXAPARIN SODIUM 40 MG: 40 INJECTION SUBCUTANEOUS at 09:07

## 2020-07-12 RX ADMIN — SODIUM CHLORIDE, PRESERVATIVE FREE 2 ML: 5 INJECTION INTRAVENOUS at 22:00

## 2020-07-12 ASSESSMENT — PAIN SCALES - GENERAL
PAINLEVEL_OUTOF10: 4
PAINLEVEL_OUTOF10: 0
PAINLEVEL_OUTOF10: 4
PAINLEVEL_OUTOF10: 4
PAINLEVEL_OUTOF10: 0
PAINLEVEL_OUTOF10: 5
PAINLEVEL_OUTOF10: 5
PAINLEVEL_OUTOF10: 3

## 2020-07-12 ASSESSMENT — PAIN DESCRIPTION - PAIN TYPE: TYPE: ACUTE PAIN

## 2020-07-13 ENCOUNTER — TRANSCRIBE ORDERS (OUTPATIENT)
Dept: POST ACUTE CARE | Age: 65
End: 2020-07-13

## 2020-07-13 VITALS
SYSTOLIC BLOOD PRESSURE: 101 MMHG | DIASTOLIC BLOOD PRESSURE: 69 MMHG | OXYGEN SATURATION: 95 % | WEIGHT: 315 LBS | HEART RATE: 96 BPM | BODY MASS INDEX: 44.1 KG/M2 | HEIGHT: 71 IN | RESPIRATION RATE: 16 BRPM | TEMPERATURE: 97.9 F

## 2020-07-13 LAB
ANION GAP SERPL CALC-SCNC: 6 MMOL/L (ref 10–20)
BACTERIA BLD CULT: NORMAL
BACTERIA BLD CULT: NORMAL
BACTERIA SPEC ANAEROBE+AEROBE CULT: ABNORMAL
BASOPHILS # BLD: 0 K/MCL (ref 0–0.3)
BASOPHILS NFR BLD: 0 %
BUN SERPL-MCNC: 36 MG/DL (ref 6–20)
BUN/CREAT SERPL: 27 (ref 7–25)
CALCIUM SERPL-MCNC: 8.7 MG/DL (ref 8.4–10.2)
CHLORIDE SERPL-SCNC: 99 MMOL/L (ref 98–107)
CO2 SERPL-SCNC: 35 MMOL/L (ref 21–32)
CREAT SERPL-MCNC: 1.34 MG/DL (ref 0.67–1.17)
DIFFERENTIAL METHOD BLD: ABNORMAL
EOSINOPHIL # BLD: 0 K/MCL (ref 0.1–0.5)
EOSINOPHIL NFR BLD: 0 %
ERYTHROCYTE [DISTWIDTH] IN BLOOD: 13.2 % (ref 11–15)
GLUCOSE BLDC GLUCOMTR-MCNC: 144 MG/DL (ref 70–99)
GLUCOSE BLDC GLUCOMTR-MCNC: 157 MG/DL (ref 70–99)
GLUCOSE BLDC GLUCOMTR-MCNC: 212 MG/DL (ref 70–99)
GLUCOSE SERPL-MCNC: 141 MG/DL (ref 65–99)
GRAM STN SPEC: ABNORMAL
HCT VFR BLD CALC: 38.3 % (ref 39–51)
HGB BLD-MCNC: 12.6 G/DL (ref 13–17)
LYMPHOCYTES # BLD: 2.4 K/MCL (ref 1–4)
LYMPHOCYTES NFR BLD: 17 %
MCH RBC QN AUTO: 28.1 PG (ref 26–34)
MCHC RBC AUTO-ENTMCNC: 32.9 G/DL (ref 32–36.5)
MCV RBC AUTO: 85.5 FL (ref 78–100)
METAMYELOCYTES NFR BLD: 1 % (ref 0–2)
MONOCYTES # BLD: 0.7 K/MCL (ref 0.3–0.9)
MONOCYTES NFR BLD: 5 %
NEUTROPHILS # BLD: 11 K/MCL (ref 1.8–7.7)
NEUTS SEG NFR BLD: 77 %
NRBC BLD MANUAL-RTO: 0 /100 WBC
ORGANISM: ABNORMAL
PLAT MORPH BLD: NORMAL
PLATELET # BLD: 401 K/MCL (ref 140–450)
POTASSIUM SERPL-SCNC: 3.7 MMOL/L (ref 3.4–5.1)
RBC # BLD: 4.48 MIL/MCL (ref 4.5–5.9)
RBC MORPH BLD: NORMAL
REPORT STATUS (RPT): ABNORMAL
REPORT STATUS (RPT): NORMAL
REPORT STATUS (RPT): NORMAL
SODIUM SERPL-SCNC: 136 MMOL/L (ref 135–145)
SPECIMEN SOURCE: ABNORMAL
SPECIMEN SOURCE: NORMAL
SPECIMEN SOURCE: NORMAL
WBC # BLD: 14.3 K/MCL (ref 4.2–11)
WBC MORPH BLD: NORMAL

## 2020-07-13 PROCEDURE — 10002803 HB RX 637: Performed by: INTERNAL MEDICINE

## 2020-07-13 PROCEDURE — 99239 HOSP IP/OBS DSCHRG MGMT >30: CPT | Performed by: INTERNAL MEDICINE

## 2020-07-13 PROCEDURE — 10002800 HB RX 250 W HCPCS: Performed by: INTERNAL MEDICINE

## 2020-07-13 PROCEDURE — 80048 BASIC METABOLIC PNL TOTAL CA: CPT

## 2020-07-13 PROCEDURE — 10002801 HB RX 250 W/O HCPCS: Performed by: INTERNAL MEDICINE

## 2020-07-13 PROCEDURE — 10002807 HB RX 258: Performed by: INTERNAL MEDICINE

## 2020-07-13 PROCEDURE — 36415 COLL VENOUS BLD VENIPUNCTURE: CPT

## 2020-07-13 PROCEDURE — 10004651 HB RX, NO CHARGE ITEM: Performed by: INTERNAL MEDICINE

## 2020-07-13 PROCEDURE — 85025 COMPLETE CBC W/AUTO DIFF WBC: CPT

## 2020-07-13 RX ORDER — ENOXAPARIN SODIUM 100 MG/ML
40 INJECTION SUBCUTANEOUS EVERY 12 HOURS
INPATIENT
Start: 2020-07-13 | End: 2020-07-28 | Stop reason: ALTCHOICE

## 2020-07-13 RX ORDER — HYDROCODONE BITARTRATE AND ACETAMINOPHEN 5; 325 MG/1; MG/1
1 TABLET ORAL EVERY 6 HOURS PRN
Qty: 20 TABLET | Refills: 0 | Status: SHIPPED | OUTPATIENT
Start: 2020-07-13 | End: 2020-08-04

## 2020-07-13 RX ADMIN — VANCOMYCIN HYDROCHLORIDE 1750 MG: 10 INJECTION, POWDER, LYOPHILIZED, FOR SOLUTION INTRAVENOUS at 13:12

## 2020-07-13 RX ADMIN — INSULIN LISPRO 14 UNITS: 100 INJECTION, SOLUTION INTRAVENOUS; SUBCUTANEOUS at 18:20

## 2020-07-13 RX ADMIN — VITAMIN D, TAB 1000IU (100/BT) 50 MCG: 25 TAB at 09:16

## 2020-07-13 RX ADMIN — METRONIDAZOLE 500 MG: 500 INJECTION, SOLUTION INTRAVENOUS at 05:30

## 2020-07-13 RX ADMIN — HYDROMORPHONE HYDROCHLORIDE 0.5 MG: 1 INJECTION, SOLUTION INTRAMUSCULAR; INTRAVENOUS; SUBCUTANEOUS at 10:20

## 2020-07-13 RX ADMIN — CEFEPIME HYDROCHLORIDE 1000 MG: 1 INJECTION, POWDER, FOR SOLUTION INTRAMUSCULAR; INTRAVENOUS at 05:29

## 2020-07-13 RX ADMIN — INSULIN LISPRO 12 UNITS: 100 INJECTION, SOLUTION INTRAVENOUS; SUBCUTANEOUS at 09:18

## 2020-07-13 RX ADMIN — LISINOPRIL 5 MG: 5 TABLET ORAL at 09:17

## 2020-07-13 RX ADMIN — SODIUM CHLORIDE, PRESERVATIVE FREE 2 ML: 5 INJECTION INTRAVENOUS at 09:20

## 2020-07-13 RX ADMIN — INSULIN GLARGINE 50 UNITS: 100 INJECTION, SOLUTION SUBCUTANEOUS at 09:17

## 2020-07-13 RX ADMIN — DESMOPRESSIN ACETATE 40 MG: 0.2 TABLET ORAL at 09:16

## 2020-07-13 RX ADMIN — TORSEMIDE 20 MG: 20 TABLET ORAL at 09:16

## 2020-07-13 RX ADMIN — PIPERACILLIN AND TAZOBACTAM 3.38 G: 3; .375 INJECTION, POWDER, LYOPHILIZED, FOR SOLUTION INTRAVENOUS; PARENTERAL at 16:12

## 2020-07-13 RX ADMIN — INSULIN LISPRO 13 UNITS: 100 INJECTION, SOLUTION INTRAVENOUS; SUBCUTANEOUS at 13:12

## 2020-07-13 RX ADMIN — ENOXAPARIN SODIUM 40 MG: 40 INJECTION SUBCUTANEOUS at 09:16

## 2020-07-13 ASSESSMENT — PAIN SCALES - GENERAL
PAINLEVEL_OUTOF10: 1
PAINLEVEL_OUTOF10: 0
PAINLEVEL_OUTOF10: 6

## 2020-07-14 ENCOUNTER — NURSING HOME VISIT (OUTPATIENT)
Dept: POST ACUTE CARE | Age: 65
End: 2020-07-14

## 2020-07-14 ENCOUNTER — CASE MANAGEMENT (OUTPATIENT)
Dept: CARE COORDINATION | Age: 65
End: 2020-07-14

## 2020-07-14 DIAGNOSIS — E11.628 TYPE 2 DIABETES MELLITUS WITH OTHER SKIN COMPLICATION, WITH LONG-TERM CURRENT USE OF INSULIN (CMD): ICD-10-CM

## 2020-07-14 DIAGNOSIS — Z89.429 STATUS POST AMPUTATION OF TOE (CMD): ICD-10-CM

## 2020-07-14 DIAGNOSIS — I10 ESSENTIAL HYPERTENSION: ICD-10-CM

## 2020-07-14 DIAGNOSIS — E78.5 HYPERLIPIDEMIA, UNSPECIFIED HYPERLIPIDEMIA TYPE: ICD-10-CM

## 2020-07-14 DIAGNOSIS — N17.9 ACUTE KIDNEY INJURY (CMD): ICD-10-CM

## 2020-07-14 DIAGNOSIS — R53.81 DEBILITY: ICD-10-CM

## 2020-07-14 DIAGNOSIS — Z79.4 TYPE 2 DIABETES MELLITUS WITH OTHER SKIN COMPLICATION, WITH LONG-TERM CURRENT USE OF INSULIN (CMD): ICD-10-CM

## 2020-07-14 DIAGNOSIS — A41.9 SEPSIS, DUE TO UNSPECIFIED ORGANISM, UNSPECIFIED WHETHER ACUTE ORGAN DYSFUNCTION PRESENT (CMD): Primary | ICD-10-CM

## 2020-07-14 DIAGNOSIS — I73.9 PVD (PERIPHERAL VASCULAR DISEASE) (CMD): ICD-10-CM

## 2020-07-14 PROBLEM — Z89.422: Status: ACTIVE | Noted: 2020-07-14

## 2020-07-14 LAB
ANNOTATION COMMENT IMP: ABNORMAL
BACTERIA SPEC ANAEROBE+AEROBE CULT: ABNORMAL
GRAM STN SPEC: ABNORMAL
ORGANISM: ABNORMAL
REPORT STATUS (RPT): ABNORMAL
SPECIMEN SOURCE: ABNORMAL

## 2020-07-14 PROCEDURE — 1160F RVW MEDS BY RX/DR IN RCRD: CPT | Performed by: NURSE PRACTITIONER

## 2020-07-14 PROCEDURE — 1126F AMNT PAIN NOTED NONE PRSNT: CPT | Performed by: NURSE PRACTITIONER

## 2020-07-14 PROCEDURE — 99497 ADVNCD CARE PLAN 30 MIN: CPT | Performed by: NURSE PRACTITIONER

## 2020-07-14 PROCEDURE — 99310 SBSQ NF CARE HIGH MDM 45: CPT | Performed by: NURSE PRACTITIONER

## 2020-07-14 PROCEDURE — 1158F ADVNC CARE PLAN TLK DOCD: CPT | Performed by: NURSE PRACTITIONER

## 2020-07-14 PROCEDURE — 1170F FXNL STATUS ASSESSED: CPT | Performed by: NURSE PRACTITIONER

## 2020-07-14 SDOH — HEALTH STABILITY: MENTAL HEALTH: HOW OFTEN DO YOU HAVE A DRINK CONTAINING ALCOHOL?: NEVER

## 2020-07-14 ASSESSMENT — PAIN SCALES - GENERAL: PAINLEVEL: 0

## 2020-07-16 LAB — PATHOLOGIST NAME: NORMAL

## 2020-07-17 ENCOUNTER — NURSING HOME VISIT (OUTPATIENT)
Dept: POST ACUTE CARE | Age: 65
End: 2020-07-17

## 2020-07-17 DIAGNOSIS — R53.81 DEBILITY: ICD-10-CM

## 2020-07-17 DIAGNOSIS — N17.9 ACUTE KIDNEY INJURY (CMD): ICD-10-CM

## 2020-07-17 DIAGNOSIS — Z89.429 STATUS POST AMPUTATION OF TOE (CMD): ICD-10-CM

## 2020-07-17 DIAGNOSIS — A41.9 SEPSIS, DUE TO UNSPECIFIED ORGANISM, UNSPECIFIED WHETHER ACUTE ORGAN DYSFUNCTION PRESENT (CMD): Primary | ICD-10-CM

## 2020-07-17 PROCEDURE — 99309 SBSQ NF CARE MODERATE MDM 30: CPT | Performed by: NURSE PRACTITIONER

## 2020-07-17 PROCEDURE — 1126F AMNT PAIN NOTED NONE PRSNT: CPT | Performed by: NURSE PRACTITIONER

## 2020-07-17 PROCEDURE — 1170F FXNL STATUS ASSESSED: CPT | Performed by: NURSE PRACTITIONER

## 2020-07-17 SDOH — HEALTH STABILITY: MENTAL HEALTH: HOW OFTEN DO YOU HAVE A DRINK CONTAINING ALCOHOL?: NEVER

## 2020-07-17 ASSESSMENT — PAIN SCALES - GENERAL: PAINLEVEL: 0

## 2020-07-20 ENCOUNTER — TELEPHONE (OUTPATIENT)
Dept: CARE COORDINATION | Age: 65
End: 2020-07-20

## 2020-07-20 ENCOUNTER — CASE MANAGEMENT (OUTPATIENT)
Dept: CARE COORDINATION | Age: 65
End: 2020-07-20

## 2020-07-22 ENCOUNTER — NURSING HOME VISIT (OUTPATIENT)
Dept: POST ACUTE CARE | Age: 65
End: 2020-07-22

## 2020-07-22 PROCEDURE — X1094 NO CHARGE VISIT: HCPCS | Performed by: NURSE PRACTITIONER

## 2020-07-23 ENCOUNTER — OFFICE VISIT (OUTPATIENT)
Dept: CARDIOLOGY | Age: 65
End: 2020-07-23

## 2020-07-23 ENCOUNTER — HOSPITAL ENCOUNTER (OUTPATIENT)
Dept: WOUND CARE | Age: 65
Discharge: STILL ADMITTED OR ADMINISTRATIVE | End: 2020-07-23
Attending: PODIATRIST

## 2020-07-23 VITALS
HEART RATE: 94 BPM | OXYGEN SATURATION: 94 % | SYSTOLIC BLOOD PRESSURE: 182 MMHG | DIASTOLIC BLOOD PRESSURE: 87 MMHG | RESPIRATION RATE: 20 BRPM | TEMPERATURE: 98.6 F

## 2020-07-23 VITALS
BODY MASS INDEX: 41.72 KG/M2 | HEART RATE: 98 BPM | WEIGHT: 298 LBS | DIASTOLIC BLOOD PRESSURE: 70 MMHG | HEIGHT: 71 IN | RESPIRATION RATE: 16 BRPM | SYSTOLIC BLOOD PRESSURE: 122 MMHG

## 2020-07-23 DIAGNOSIS — E11.621 TYPE 2 DIABETES MELLITUS WITH FOOT ULCER, UNSPECIFIED WHETHER LONG TERM INSULIN USE (CMD): Primary | ICD-10-CM

## 2020-07-23 DIAGNOSIS — E11.65 TYPE 2 DIABETES MELLITUS WITH HYPERGLYCEMIA, WITH LONG-TERM CURRENT USE OF INSULIN (CMD): ICD-10-CM

## 2020-07-23 DIAGNOSIS — S98.912D: ICD-10-CM

## 2020-07-23 DIAGNOSIS — E78.00 HYPERCHOLESTEROLEMIA: ICD-10-CM

## 2020-07-23 DIAGNOSIS — I10 BENIGN ESSENTIAL HYPERTENSION: ICD-10-CM

## 2020-07-23 DIAGNOSIS — Z89.422 STATUS POST AMPUTATION OF LESSER TOE, LEFT (CMD): ICD-10-CM

## 2020-07-23 DIAGNOSIS — E13.51 PERIPHERAL VASCULAR DISEASE DUE TO SECONDARY DIABETES MELLITUS (CMD): Chronic | ICD-10-CM

## 2020-07-23 DIAGNOSIS — N17.0 ACUTE KIDNEY INJURY (AKI) WITH ACUTE TUBULAR NECROSIS (ATN) (CMD): ICD-10-CM

## 2020-07-23 DIAGNOSIS — A41.9 SEVERE SEPSIS (CMD): ICD-10-CM

## 2020-07-23 DIAGNOSIS — L97.509 TYPE 2 DIABETES MELLITUS WITH FOOT ULCER, UNSPECIFIED WHETHER LONG TERM INSULIN USE (CMD): Primary | ICD-10-CM

## 2020-07-23 DIAGNOSIS — I50.33 ACUTE ON CHRONIC DIASTOLIC CONGESTIVE HEART FAILURE (CMD): Primary | ICD-10-CM

## 2020-07-23 DIAGNOSIS — Z79.4 TYPE 2 DIABETES MELLITUS WITH HYPERGLYCEMIA, WITH LONG-TERM CURRENT USE OF INSULIN (CMD): ICD-10-CM

## 2020-07-23 DIAGNOSIS — I73.9 PVD (PERIPHERAL VASCULAR DISEASE) (CMD): ICD-10-CM

## 2020-07-23 DIAGNOSIS — R65.20 SEVERE SEPSIS (CMD): ICD-10-CM

## 2020-07-23 DIAGNOSIS — R60.1 ANASARCA: ICD-10-CM

## 2020-07-23 PROCEDURE — 11045 DBRDMT SUBQ TISS EACH ADDL: CPT

## 2020-07-23 PROCEDURE — 99214 OFFICE O/P EST MOD 30 MIN: CPT | Performed by: NURSE PRACTITIONER

## 2020-07-23 PROCEDURE — 11042 DBRDMT SUBQ TIS 1ST 20SQCM/<: CPT

## 2020-07-23 RX ORDER — TRAMADOL HYDROCHLORIDE 50 MG/1
1 TABLET ORAL EVERY 6 HOURS PRN
Refills: 0 | Status: ON HOLD | COMMUNITY
Start: 2020-07-20 | End: 2020-11-02

## 2020-07-23 SDOH — HEALTH STABILITY: MENTAL HEALTH: HOW OFTEN DO YOU HAVE A DRINK CONTAINING ALCOHOL?: NEVER

## 2020-07-23 ASSESSMENT — ENCOUNTER SYMPTOMS
FEVER: 0
BRUISES/BLEEDS EASILY: 0
CHILLS: 0
HEMOPTYSIS: 0
HEMATOCHEZIA: 0
ALLERGIC/IMMUNOLOGIC COMMENTS: NO NEW FOOD ALLERGIES
COUGH: 0
SUSPICIOUS LESIONS: 0
WEIGHT LOSS: 1
WEIGHT GAIN: 0

## 2020-07-23 ASSESSMENT — PAIN SCALES - GENERAL: PAINLEVEL_OUTOF10: 0

## 2020-07-24 ENCOUNTER — NURSING HOME VISIT (OUTPATIENT)
Dept: POST ACUTE CARE | Age: 65
End: 2020-07-24

## 2020-07-24 VITALS
DIASTOLIC BLOOD PRESSURE: 68 MMHG | SYSTOLIC BLOOD PRESSURE: 127 MMHG | BODY MASS INDEX: 43.93 KG/M2 | RESPIRATION RATE: 18 BRPM | TEMPERATURE: 97.9 F | WEIGHT: 315 LBS | OXYGEN SATURATION: 96 % | HEART RATE: 80 BPM

## 2020-07-24 DIAGNOSIS — I10 BENIGN ESSENTIAL HYPERTENSION: ICD-10-CM

## 2020-07-24 DIAGNOSIS — E13.51 PERIPHERAL VASCULAR DISEASE DUE TO SECONDARY DIABETES MELLITUS (CMD): Primary | Chronic | ICD-10-CM

## 2020-07-24 DIAGNOSIS — E11.65 TYPE 2 DIABETES MELLITUS WITH HYPERGLYCEMIA, WITH LONG-TERM CURRENT USE OF INSULIN (CMD): ICD-10-CM

## 2020-07-24 DIAGNOSIS — Z79.4 TYPE 2 DIABETES MELLITUS WITH HYPERGLYCEMIA, WITH LONG-TERM CURRENT USE OF INSULIN (CMD): ICD-10-CM

## 2020-07-24 PROCEDURE — 1160F RVW MEDS BY RX/DR IN RCRD: CPT | Performed by: NURSE PRACTITIONER

## 2020-07-24 PROCEDURE — 1125F AMNT PAIN NOTED PAIN PRSNT: CPT | Performed by: NURSE PRACTITIONER

## 2020-07-24 PROCEDURE — 99309 SBSQ NF CARE MODERATE MDM 30: CPT | Performed by: NURSE PRACTITIONER

## 2020-07-24 PROCEDURE — 1170F FXNL STATUS ASSESSED: CPT | Performed by: NURSE PRACTITIONER

## 2020-07-27 ENCOUNTER — CASE MANAGEMENT (OUTPATIENT)
Dept: CARE COORDINATION | Age: 65
End: 2020-07-27

## 2020-07-28 ENCOUNTER — CASE MANAGEMENT (OUTPATIENT)
Dept: CARE COORDINATION | Age: 65
End: 2020-07-28

## 2020-07-28 ENCOUNTER — NURSING HOME VISIT (OUTPATIENT)
Dept: POST ACUTE CARE | Age: 65
End: 2020-07-28

## 2020-07-28 DIAGNOSIS — I10 BENIGN ESSENTIAL HYPERTENSION: ICD-10-CM

## 2020-07-28 DIAGNOSIS — N17.9 ACUTE KIDNEY INJURY (CMD): ICD-10-CM

## 2020-07-28 DIAGNOSIS — E11.65 TYPE 2 DIABETES MELLITUS WITH HYPERGLYCEMIA, WITH LONG-TERM CURRENT USE OF INSULIN (CMD): ICD-10-CM

## 2020-07-28 DIAGNOSIS — Z89.429 STATUS POST AMPUTATION OF TOE (CMD): ICD-10-CM

## 2020-07-28 DIAGNOSIS — E13.51 PERIPHERAL VASCULAR DISEASE DUE TO SECONDARY DIABETES MELLITUS (CMD): Primary | ICD-10-CM

## 2020-07-28 DIAGNOSIS — R53.81 DEBILITY: ICD-10-CM

## 2020-07-28 DIAGNOSIS — Z79.4 TYPE 2 DIABETES MELLITUS WITH HYPERGLYCEMIA, WITH LONG-TERM CURRENT USE OF INSULIN (CMD): ICD-10-CM

## 2020-07-28 DIAGNOSIS — A41.9 SEPSIS, DUE TO UNSPECIFIED ORGANISM, UNSPECIFIED WHETHER ACUTE ORGAN DYSFUNCTION PRESENT (CMD): ICD-10-CM

## 2020-07-28 PROCEDURE — 99316 NF DSCHRG MGMT 30 MIN+: CPT | Performed by: NURSE PRACTITIONER

## 2020-07-28 PROCEDURE — 1170F FXNL STATUS ASSESSED: CPT | Performed by: NURSE PRACTITIONER

## 2020-07-28 PROCEDURE — 1126F AMNT PAIN NOTED NONE PRSNT: CPT | Performed by: NURSE PRACTITIONER

## 2020-07-28 PROCEDURE — 1160F RVW MEDS BY RX/DR IN RCRD: CPT | Performed by: NURSE PRACTITIONER

## 2020-07-28 SDOH — HEALTH STABILITY: MENTAL HEALTH: HOW OFTEN DO YOU HAVE A DRINK CONTAINING ALCOHOL?: NEVER

## 2020-07-28 ASSESSMENT — PAIN SCALES - GENERAL: PAINLEVEL: 0

## 2020-07-30 ENCOUNTER — HOSPITAL ENCOUNTER (OUTPATIENT)
Dept: WOUND CARE | Age: 65
Discharge: STILL ADMITTED OR ADMINISTRATIVE | End: 2020-07-30
Attending: PODIATRIST

## 2020-07-30 VITALS — TEMPERATURE: 95.5 F

## 2020-07-30 DIAGNOSIS — Z89.422 STATUS POST AMPUTATION OF LESSER TOE, LEFT (CMD): Primary | ICD-10-CM

## 2020-07-30 DIAGNOSIS — S98.312D AMPUTATION AT MIDFOOT, LEFT, SUBSEQUENT ENCOUNTER (CMD): ICD-10-CM

## 2020-07-30 DIAGNOSIS — L97.424 DIABETIC ULCER OF LEFT MIDFOOT ASSOCIATED WITH TYPE 2 DIABETES MELLITUS, WITH NECROSIS OF BONE (CMD): ICD-10-CM

## 2020-07-30 DIAGNOSIS — E11.621 DIABETIC ULCER OF LEFT MIDFOOT ASSOCIATED WITH TYPE 2 DIABETES MELLITUS, WITH NECROSIS OF BONE (CMD): ICD-10-CM

## 2020-07-30 PROCEDURE — 11042 DBRDMT SUBQ TIS 1ST 20SQCM/<: CPT

## 2020-07-30 PROCEDURE — 11045 DBRDMT SUBQ TISS EACH ADDL: CPT

## 2020-07-30 SDOH — HEALTH STABILITY: MENTAL HEALTH: HOW OFTEN DO YOU HAVE A DRINK CONTAINING ALCOHOL?: NEVER

## 2020-07-30 ASSESSMENT — PAIN SCALES - GENERAL: PAINLEVEL_OUTOF10: 0

## 2020-07-31 ENCOUNTER — TELEPHONE (OUTPATIENT)
Dept: CARDIOLOGY | Age: 65
End: 2020-07-31

## 2020-07-31 ENCOUNTER — NURSING HOME VISIT (OUTPATIENT)
Dept: POST ACUTE CARE | Age: 65
End: 2020-07-31

## 2020-07-31 DIAGNOSIS — I10 BENIGN ESSENTIAL HYPERTENSION: ICD-10-CM

## 2020-07-31 DIAGNOSIS — Z79.4 DIABETES MELLITUS WITH INSULIN THERAPY (CMD): ICD-10-CM

## 2020-07-31 DIAGNOSIS — N17.9 ACUTE KIDNEY INJURY (CMD): ICD-10-CM

## 2020-07-31 DIAGNOSIS — E11.9 DIABETES MELLITUS WITH INSULIN THERAPY (CMD): ICD-10-CM

## 2020-07-31 DIAGNOSIS — R53.81 DEBILITY: ICD-10-CM

## 2020-07-31 DIAGNOSIS — Z79.4 TYPE 2 DIABETES MELLITUS WITH HYPERGLYCEMIA, WITH LONG-TERM CURRENT USE OF INSULIN (CMD): ICD-10-CM

## 2020-07-31 DIAGNOSIS — E11.65 TYPE 2 DIABETES MELLITUS WITH HYPERGLYCEMIA, WITH LONG-TERM CURRENT USE OF INSULIN (CMD): ICD-10-CM

## 2020-07-31 DIAGNOSIS — Z79.4 TYPE 2 DIABETES MELLITUS WITH OTHER SKIN COMPLICATION, WITH LONG-TERM CURRENT USE OF INSULIN (CMD): ICD-10-CM

## 2020-07-31 DIAGNOSIS — I73.9 PVD (PERIPHERAL VASCULAR DISEASE) (CMD): ICD-10-CM

## 2020-07-31 DIAGNOSIS — E13.51 PERIPHERAL VASCULAR DISEASE DUE TO SECONDARY DIABETES MELLITUS (CMD): ICD-10-CM

## 2020-07-31 DIAGNOSIS — Z89.429 STATUS POST AMPUTATION OF TOE (CMD): ICD-10-CM

## 2020-07-31 DIAGNOSIS — A41.9 SEPSIS, DUE TO UNSPECIFIED ORGANISM, UNSPECIFIED WHETHER ACUTE ORGAN DYSFUNCTION PRESENT (CMD): Primary | ICD-10-CM

## 2020-07-31 DIAGNOSIS — E78.5 HYPERLIPIDEMIA, UNSPECIFIED HYPERLIPIDEMIA TYPE: ICD-10-CM

## 2020-07-31 DIAGNOSIS — E11.628 TYPE 2 DIABETES MELLITUS WITH OTHER SKIN COMPLICATION, WITH LONG-TERM CURRENT USE OF INSULIN (CMD): ICD-10-CM

## 2020-07-31 PROCEDURE — 1160F RVW MEDS BY RX/DR IN RCRD: CPT | Performed by: CLINICAL NURSE SPECIALIST

## 2020-07-31 PROCEDURE — 99310 SBSQ NF CARE HIGH MDM 45: CPT | Performed by: CLINICAL NURSE SPECIALIST

## 2020-07-31 PROCEDURE — 1170F FXNL STATUS ASSESSED: CPT | Performed by: CLINICAL NURSE SPECIALIST

## 2020-07-31 PROCEDURE — 1126F AMNT PAIN NOTED NONE PRSNT: CPT | Performed by: CLINICAL NURSE SPECIALIST

## 2020-07-31 RX ORDER — ENOXAPARIN SODIUM 100 MG/ML
40 INJECTION SUBCUTANEOUS 2 TIMES DAILY
Status: ON HOLD | COMMUNITY
End: 2020-11-02

## 2020-07-31 RX ORDER — LISINOPRIL 5 MG/1
5 TABLET ORAL DAILY
COMMUNITY
End: 2020-08-04

## 2020-07-31 RX ORDER — FAMOTIDINE 20 MG/1
20 TABLET, FILM COATED ORAL DAILY
COMMUNITY
End: 2020-11-11 | Stop reason: SDUPTHER

## 2020-07-31 RX ORDER — ERTAPENEM 1 G/1
1 INJECTION, POWDER, LYOPHILIZED, FOR SOLUTION INTRAMUSCULAR; INTRAVENOUS EVERY 24 HOURS
COMMUNITY
End: 2020-08-31

## 2020-07-31 RX ORDER — POTASSIUM CHLORIDE 1.5 G/1.58G
20 POWDER, FOR SOLUTION ORAL DAILY
COMMUNITY
End: 2020-08-04

## 2020-07-31 RX ORDER — TORSEMIDE 20 MG/1
40 TABLET ORAL DAILY
COMMUNITY
End: 2020-08-04

## 2020-07-31 RX ORDER — LANOLIN ALCOHOL/MO/W.PET/CERES
3 CREAM (GRAM) TOPICAL NIGHTLY
COMMUNITY
End: 2020-11-11 | Stop reason: ALTCHOICE

## 2020-07-31 ASSESSMENT — ENCOUNTER SYMPTOMS
WEAKNESS: 1
WOUND: 1

## 2020-07-31 ASSESSMENT — PAIN SCALES - GENERAL: PAINLEVEL: 0

## 2020-08-03 ENCOUNTER — CASE MANAGEMENT (OUTPATIENT)
Dept: CARE COORDINATION | Age: 65
End: 2020-08-03

## 2020-08-04 ENCOUNTER — APPOINTMENT (OUTPATIENT)
Dept: CARDIOLOGY | Age: 65
End: 2020-08-04

## 2020-08-04 ENCOUNTER — NURSING HOME VISIT (OUTPATIENT)
Dept: POST ACUTE CARE | Age: 65
End: 2020-08-04

## 2020-08-04 DIAGNOSIS — K59.00 CONSTIPATION, UNSPECIFIED CONSTIPATION TYPE: ICD-10-CM

## 2020-08-04 DIAGNOSIS — I10 BENIGN ESSENTIAL HYPERTENSION: ICD-10-CM

## 2020-08-04 DIAGNOSIS — Z89.429 STATUS POST AMPUTATION OF TOE (CMD): ICD-10-CM

## 2020-08-04 DIAGNOSIS — Z79.4 TYPE 2 DIABETES MELLITUS WITH OTHER SKIN COMPLICATION, WITH LONG-TERM CURRENT USE OF INSULIN (CMD): ICD-10-CM

## 2020-08-04 DIAGNOSIS — R53.81 DEBILITY: ICD-10-CM

## 2020-08-04 DIAGNOSIS — E11.65 TYPE 2 DIABETES MELLITUS WITH HYPERGLYCEMIA, WITH LONG-TERM CURRENT USE OF INSULIN (CMD): ICD-10-CM

## 2020-08-04 DIAGNOSIS — N17.9 ACUTE KIDNEY INJURY (CMD): Primary | ICD-10-CM

## 2020-08-04 DIAGNOSIS — E11.628 TYPE 2 DIABETES MELLITUS WITH OTHER SKIN COMPLICATION, WITH LONG-TERM CURRENT USE OF INSULIN (CMD): ICD-10-CM

## 2020-08-04 DIAGNOSIS — Z79.4 TYPE 2 DIABETES MELLITUS WITH HYPERGLYCEMIA, WITH LONG-TERM CURRENT USE OF INSULIN (CMD): ICD-10-CM

## 2020-08-04 DIAGNOSIS — I73.9 PVD (PERIPHERAL VASCULAR DISEASE) (CMD): ICD-10-CM

## 2020-08-04 DIAGNOSIS — A41.9 SEPSIS, DUE TO UNSPECIFIED ORGANISM, UNSPECIFIED WHETHER ACUTE ORGAN DYSFUNCTION PRESENT (CMD): ICD-10-CM

## 2020-08-04 PROCEDURE — 99310 SBSQ NF CARE HIGH MDM 45: CPT | Performed by: CLINICAL NURSE SPECIALIST

## 2020-08-04 PROCEDURE — 1160F RVW MEDS BY RX/DR IN RCRD: CPT | Performed by: CLINICAL NURSE SPECIALIST

## 2020-08-04 PROCEDURE — 1157F ADVNC CARE PLAN IN RCRD: CPT | Performed by: CLINICAL NURSE SPECIALIST

## 2020-08-04 PROCEDURE — 1126F AMNT PAIN NOTED NONE PRSNT: CPT | Performed by: CLINICAL NURSE SPECIALIST

## 2020-08-04 PROCEDURE — 1170F FXNL STATUS ASSESSED: CPT | Performed by: CLINICAL NURSE SPECIALIST

## 2020-08-04 RX ORDER — HYDROCODONE BITARTRATE AND ACETAMINOPHEN 5; 325 MG/1; MG/1
1 TABLET ORAL 2 TIMES DAILY PRN
COMMUNITY
End: 2020-11-11 | Stop reason: ALTCHOICE

## 2020-08-04 RX ORDER — ACETAMINOPHEN 325 MG/1
650 TABLET ORAL EVERY 6 HOURS PRN
Status: ON HOLD | COMMUNITY
End: 2020-11-11

## 2020-08-04 RX ORDER — POLYETHYLENE GLYCOL 3350 17 G/17G
17 POWDER, FOR SOLUTION ORAL DAILY PRN
COMMUNITY
End: 2020-11-11 | Stop reason: ALTCHOICE

## 2020-08-04 RX ORDER — BISACODYL 10 MG
10 SUPPOSITORY, RECTAL RECTAL DAILY PRN
Status: ON HOLD | COMMUNITY
End: 2020-11-02

## 2020-08-04 RX ORDER — AMOXICILLIN 250 MG
2 CAPSULE ORAL
Status: ON HOLD | COMMUNITY
End: 2020-11-02

## 2020-08-04 ASSESSMENT — PAIN SCALES - GENERAL: PAINLEVEL: 0

## 2020-08-04 ASSESSMENT — ENCOUNTER SYMPTOMS
WOUND: 1
CONSTIPATION: 1
WEAKNESS: 1

## 2020-08-06 ENCOUNTER — HOSPITAL ENCOUNTER (OUTPATIENT)
Dept: WOUND CARE | Age: 65
Discharge: STILL ADMITTED OR ADMINISTRATIVE | End: 2020-08-06
Attending: PODIATRIST

## 2020-08-06 VITALS — TEMPERATURE: 96.7 F

## 2020-08-06 DIAGNOSIS — E11.621 TYPE 2 DIABETES MELLITUS WITH FOOT ULCER, UNSPECIFIED WHETHER LONG TERM INSULIN USE (CMD): ICD-10-CM

## 2020-08-06 DIAGNOSIS — I73.9 PVD (PERIPHERAL VASCULAR DISEASE) (CMD): Primary | ICD-10-CM

## 2020-08-06 DIAGNOSIS — Z89.422 STATUS POST AMPUTATION OF LESSER TOE, LEFT (CMD): ICD-10-CM

## 2020-08-06 DIAGNOSIS — L97.509 TYPE 2 DIABETES MELLITUS WITH FOOT ULCER, UNSPECIFIED WHETHER LONG TERM INSULIN USE (CMD): ICD-10-CM

## 2020-08-06 DIAGNOSIS — S98.312S AMPUTATION AT MIDFOOT, LEFT, SEQUELA (CMD): ICD-10-CM

## 2020-08-06 PROCEDURE — 11045 DBRDMT SUBQ TISS EACH ADDL: CPT

## 2020-08-06 PROCEDURE — 11042 DBRDMT SUBQ TIS 1ST 20SQCM/<: CPT

## 2020-08-06 SDOH — HEALTH STABILITY: MENTAL HEALTH: HOW OFTEN DO YOU HAVE A DRINK CONTAINING ALCOHOL?: NEVER

## 2020-08-06 ASSESSMENT — PAIN SCALES - GENERAL: PAINLEVEL_OUTOF10: 0

## 2020-08-07 ENCOUNTER — NURSING HOME VISIT (OUTPATIENT)
Dept: POST ACUTE CARE | Age: 65
End: 2020-08-07

## 2020-08-07 DIAGNOSIS — Z89.429 STATUS POST AMPUTATION OF TOE (CMD): ICD-10-CM

## 2020-08-07 DIAGNOSIS — I10 BENIGN ESSENTIAL HYPERTENSION: ICD-10-CM

## 2020-08-07 DIAGNOSIS — A41.9 SEPSIS, DUE TO UNSPECIFIED ORGANISM, UNSPECIFIED WHETHER ACUTE ORGAN DYSFUNCTION PRESENT (CMD): ICD-10-CM

## 2020-08-07 DIAGNOSIS — Z79.4 TYPE 2 DIABETES MELLITUS WITH OTHER SKIN COMPLICATION, WITH LONG-TERM CURRENT USE OF INSULIN (CMD): ICD-10-CM

## 2020-08-07 DIAGNOSIS — N17.9 ACUTE KIDNEY INJURY (CMD): Primary | ICD-10-CM

## 2020-08-07 DIAGNOSIS — E11.628 TYPE 2 DIABETES MELLITUS WITH OTHER SKIN COMPLICATION, WITH LONG-TERM CURRENT USE OF INSULIN (CMD): ICD-10-CM

## 2020-08-07 DIAGNOSIS — R53.81 DEBILITY: ICD-10-CM

## 2020-08-07 DIAGNOSIS — I73.9 PVD (PERIPHERAL VASCULAR DISEASE) (CMD): ICD-10-CM

## 2020-08-07 PROCEDURE — 1126F AMNT PAIN NOTED NONE PRSNT: CPT | Performed by: NURSE PRACTITIONER

## 2020-08-07 PROCEDURE — 99309 SBSQ NF CARE MODERATE MDM 30: CPT | Performed by: NURSE PRACTITIONER

## 2020-08-07 PROCEDURE — 1170F FXNL STATUS ASSESSED: CPT | Performed by: NURSE PRACTITIONER

## 2020-08-07 SDOH — HEALTH STABILITY: MENTAL HEALTH: HOW OFTEN DO YOU HAVE A DRINK CONTAINING ALCOHOL?: NEVER

## 2020-08-07 ASSESSMENT — PAIN SCALES - GENERAL: PAINLEVEL: 0

## 2020-08-10 ENCOUNTER — CASE MANAGEMENT (OUTPATIENT)
Dept: CARE COORDINATION | Age: 65
End: 2020-08-10

## 2020-08-11 ENCOUNTER — OFFICE VISIT (OUTPATIENT)
Dept: CARDIOLOGY | Age: 65
End: 2020-08-11

## 2020-08-11 ENCOUNTER — NURSING HOME VISIT (OUTPATIENT)
Dept: POST ACUTE CARE | Age: 65
End: 2020-08-11

## 2020-08-11 ENCOUNTER — APPOINTMENT (OUTPATIENT)
Dept: CARDIOLOGY | Age: 65
End: 2020-08-11

## 2020-08-11 VITALS
HEART RATE: 64 BPM | BODY MASS INDEX: 43.24 KG/M2 | HEIGHT: 71 IN | RESPIRATION RATE: 16 BRPM | SYSTOLIC BLOOD PRESSURE: 100 MMHG | DIASTOLIC BLOOD PRESSURE: 70 MMHG

## 2020-08-11 DIAGNOSIS — Z89.429 STATUS POST AMPUTATION OF TOE (CMD): ICD-10-CM

## 2020-08-11 DIAGNOSIS — I50.33 ACUTE ON CHRONIC DIASTOLIC CONGESTIVE HEART FAILURE (CMD): ICD-10-CM

## 2020-08-11 DIAGNOSIS — E11.65 TYPE 2 DIABETES MELLITUS WITH HYPERGLYCEMIA, WITH LONG-TERM CURRENT USE OF INSULIN (CMD): ICD-10-CM

## 2020-08-11 DIAGNOSIS — A41.9 SEPSIS, DUE TO UNSPECIFIED ORGANISM, UNSPECIFIED WHETHER ACUTE ORGAN DYSFUNCTION PRESENT (CMD): ICD-10-CM

## 2020-08-11 DIAGNOSIS — I10 BENIGN ESSENTIAL HYPERTENSION: ICD-10-CM

## 2020-08-11 DIAGNOSIS — Z89.422 STATUS POST AMPUTATION OF LESSER TOE, LEFT (CMD): Primary | ICD-10-CM

## 2020-08-11 DIAGNOSIS — R53.81 DEBILITY: ICD-10-CM

## 2020-08-11 DIAGNOSIS — N17.9 ACUTE KIDNEY INJURY (CMD): Primary | ICD-10-CM

## 2020-08-11 DIAGNOSIS — E78.5 HYPERLIPIDEMIA, UNSPECIFIED HYPERLIPIDEMIA TYPE: ICD-10-CM

## 2020-08-11 DIAGNOSIS — R06.09 DYSPNEA ON EXERTION: ICD-10-CM

## 2020-08-11 DIAGNOSIS — Z79.4 TYPE 2 DIABETES MELLITUS WITH HYPERGLYCEMIA, WITH LONG-TERM CURRENT USE OF INSULIN (CMD): ICD-10-CM

## 2020-08-11 PROCEDURE — 99213 OFFICE O/P EST LOW 20 MIN: CPT | Performed by: INTERNAL MEDICINE

## 2020-08-11 PROCEDURE — 99309 SBSQ NF CARE MODERATE MDM 30: CPT | Performed by: NURSE PRACTITIONER

## 2020-08-11 PROCEDURE — 1126F AMNT PAIN NOTED NONE PRSNT: CPT | Performed by: NURSE PRACTITIONER

## 2020-08-11 PROCEDURE — 1170F FXNL STATUS ASSESSED: CPT | Performed by: NURSE PRACTITIONER

## 2020-08-11 RX ORDER — ASCORBIC ACID 500 MG
500 TABLET ORAL DAILY
COMMUNITY
End: 2022-09-01

## 2020-08-11 RX ORDER — TRIAMCINOLONE ACETONIDE 40 MG/ML
INJECTION, SUSPENSION INTRA-ARTICULAR; INTRAMUSCULAR
COMMUNITY
End: 2020-08-25 | Stop reason: ALTCHOICE

## 2020-08-11 RX ORDER — ENEMA 19; 7 G/133ML; G/133ML
1 ENEMA RECTAL ONCE
Status: ON HOLD | COMMUNITY
End: 2020-11-02

## 2020-08-11 SDOH — HEALTH STABILITY: MENTAL HEALTH: HOW OFTEN DO YOU HAVE A DRINK CONTAINING ALCOHOL?: NEVER

## 2020-08-11 SDOH — HEALTH STABILITY: PHYSICAL HEALTH: ON AVERAGE, HOW MANY DAYS PER WEEK DO YOU ENGAGE IN MODERATE TO STRENUOUS EXERCISE (LIKE A BRISK WALK)?: 0 DAYS

## 2020-08-11 SDOH — ECONOMIC STABILITY: FOOD INSECURITY: WITHIN THE PAST 12 MONTHS, THE FOOD YOU BOUGHT JUST DIDN'T LAST AND YOU DIDN'T HAVE MONEY TO GET MORE.: NEVER TRUE

## 2020-08-11 SDOH — HEALTH STABILITY: PHYSICAL HEALTH: ON AVERAGE, HOW MANY MINUTES DO YOU ENGAGE IN EXERCISE AT THIS LEVEL?: 0 MIN

## 2020-08-11 SDOH — ECONOMIC STABILITY: FOOD INSECURITY: WITHIN THE PAST 12 MONTHS, YOU WORRIED THAT YOUR FOOD WOULD RUN OUT BEFORE YOU GOT MONEY TO BUY MORE.: NEVER TRUE

## 2020-08-11 SDOH — ECONOMIC STABILITY: INCOME INSECURITY: HOW HARD IS IT FOR YOU TO PAY FOR THE VERY BASICS LIKE FOOD, HOUSING, MEDICAL CARE, AND HEATING?: NOT HARD AT ALL

## 2020-08-11 ASSESSMENT — PATIENT HEALTH QUESTIONNAIRE - PHQ9
1. LITTLE INTEREST OR PLEASURE IN DOING THINGS: NOT AT ALL
SUM OF ALL RESPONSES TO PHQ9 QUESTIONS 1 AND 2: 0
2. FEELING DOWN, DEPRESSED OR HOPELESS: NOT AT ALL
CLINICAL INTERPRETATION OF PHQ2 SCORE: NO FURTHER SCREENING NEEDED
SUM OF ALL RESPONSES TO PHQ9 QUESTIONS 1 AND 2: 0
CLINICAL INTERPRETATION OF PHQ9 SCORE: NO FURTHER SCREENING NEEDED

## 2020-08-11 ASSESSMENT — ENCOUNTER SYMPTOMS
PARESTHESIAS: 0
COLOR CHANGE: 0
ABDOMINAL PAIN: 0
ALTERED MENTAL STATUS: 0
NUMBNESS: 0
NEAR-SYNCOPE: 0
SYNCOPE: 0
DIZZINESS: 0
SHORTNESS OF BREATH: 0
VOMITING: 0
NAUSEA: 0
CHILLS: 0

## 2020-08-11 ASSESSMENT — PAIN SCALES - GENERAL: PAINLEVEL: 0

## 2020-08-12 ENCOUNTER — CASE MANAGEMENT (OUTPATIENT)
Dept: CARE COORDINATION | Age: 65
End: 2020-08-12

## 2020-08-13 ENCOUNTER — HOSPITAL ENCOUNTER (OUTPATIENT)
Dept: WOUND CARE | Age: 65
Discharge: STILL ADMITTED OR ADMINISTRATIVE | End: 2020-08-13
Attending: PODIATRIST

## 2020-08-13 VITALS — TEMPERATURE: 98 F

## 2020-08-13 DIAGNOSIS — L97.423 DIABETIC ULCER OF LEFT MIDFOOT ASSOCIATED WITH TYPE 2 DIABETES MELLITUS, WITH NECROSIS OF MUSCLE (CMD): ICD-10-CM

## 2020-08-13 DIAGNOSIS — L97.509 TYPE 2 DIABETES MELLITUS WITH FOOT ULCER, UNSPECIFIED WHETHER LONG TERM INSULIN USE (CMD): Primary | ICD-10-CM

## 2020-08-13 DIAGNOSIS — E11.621 DIABETIC ULCER OF LEFT MIDFOOT ASSOCIATED WITH TYPE 2 DIABETES MELLITUS, WITH NECROSIS OF MUSCLE (CMD): ICD-10-CM

## 2020-08-13 DIAGNOSIS — Z89.422 STATUS POST AMPUTATION OF LESSER TOE, LEFT (CMD): ICD-10-CM

## 2020-08-13 DIAGNOSIS — I73.9 PVD (PERIPHERAL VASCULAR DISEASE) (CMD): ICD-10-CM

## 2020-08-13 DIAGNOSIS — E11.621 TYPE 2 DIABETES MELLITUS WITH FOOT ULCER, UNSPECIFIED WHETHER LONG TERM INSULIN USE (CMD): Primary | ICD-10-CM

## 2020-08-13 DIAGNOSIS — S98.912S: ICD-10-CM

## 2020-08-13 PROCEDURE — 11042 DBRDMT SUBQ TIS 1ST 20SQCM/<: CPT

## 2020-08-13 PROCEDURE — 11045 DBRDMT SUBQ TISS EACH ADDL: CPT

## 2020-08-13 SDOH — HEALTH STABILITY: MENTAL HEALTH: HOW OFTEN DO YOU HAVE A DRINK CONTAINING ALCOHOL?: NEVER

## 2020-08-13 ASSESSMENT — PAIN SCALES - GENERAL: PAINLEVEL_OUTOF10: 0

## 2020-08-14 ENCOUNTER — NURSING HOME VISIT (OUTPATIENT)
Dept: POST ACUTE CARE | Age: 65
End: 2020-08-14

## 2020-08-14 DIAGNOSIS — N17.9 ACUTE KIDNEY INJURY (CMD): Primary | ICD-10-CM

## 2020-08-14 DIAGNOSIS — A41.9 SEPSIS, DUE TO UNSPECIFIED ORGANISM, UNSPECIFIED WHETHER ACUTE ORGAN DYSFUNCTION PRESENT (CMD): ICD-10-CM

## 2020-08-14 DIAGNOSIS — Z89.429 STATUS POST AMPUTATION OF TOE (CMD): ICD-10-CM

## 2020-08-14 DIAGNOSIS — R53.81 DEBILITY: ICD-10-CM

## 2020-08-14 PROCEDURE — 1170F FXNL STATUS ASSESSED: CPT | Performed by: NURSE PRACTITIONER

## 2020-08-14 PROCEDURE — 99309 SBSQ NF CARE MODERATE MDM 30: CPT | Performed by: NURSE PRACTITIONER

## 2020-08-14 PROCEDURE — 1126F AMNT PAIN NOTED NONE PRSNT: CPT | Performed by: NURSE PRACTITIONER

## 2020-08-14 SDOH — HEALTH STABILITY: MENTAL HEALTH: HOW OFTEN DO YOU HAVE A DRINK CONTAINING ALCOHOL?: NEVER

## 2020-08-14 ASSESSMENT — PAIN SCALES - GENERAL: PAINLEVEL: 0

## 2020-08-18 ENCOUNTER — CASE MANAGEMENT (OUTPATIENT)
Dept: CARE COORDINATION | Age: 65
End: 2020-08-18

## 2020-08-19 ENCOUNTER — V-VISIT (OUTPATIENT)
Dept: POST ACUTE CARE | Age: 65
End: 2020-08-19

## 2020-08-19 DIAGNOSIS — A41.9 SEPSIS, DUE TO UNSPECIFIED ORGANISM, UNSPECIFIED WHETHER ACUTE ORGAN DYSFUNCTION PRESENT (CMD): ICD-10-CM

## 2020-08-19 DIAGNOSIS — Z89.429 STATUS POST AMPUTATION OF TOE (CMD): Primary | ICD-10-CM

## 2020-08-19 DIAGNOSIS — R53.81 DEBILITY: ICD-10-CM

## 2020-08-19 PROCEDURE — 99310 SBSQ NF CARE HIGH MDM 45: CPT | Performed by: NURSE PRACTITIONER

## 2020-08-19 PROCEDURE — 1170F FXNL STATUS ASSESSED: CPT | Performed by: NURSE PRACTITIONER

## 2020-08-19 PROCEDURE — 1126F AMNT PAIN NOTED NONE PRSNT: CPT | Performed by: NURSE PRACTITIONER

## 2020-08-19 SDOH — HEALTH STABILITY: MENTAL HEALTH: HOW OFTEN DO YOU HAVE A DRINK CONTAINING ALCOHOL?: NEVER

## 2020-08-19 ASSESSMENT — PAIN SCALES - GENERAL: PAINLEVEL: 0

## 2020-08-21 ENCOUNTER — NURSING HOME VISIT (OUTPATIENT)
Dept: POST ACUTE CARE | Age: 65
End: 2020-08-21

## 2020-08-21 DIAGNOSIS — Z89.429 STATUS POST AMPUTATION OF TOE (CMD): Primary | ICD-10-CM

## 2020-08-21 DIAGNOSIS — D72.829 LEUKOCYTOSIS, UNSPECIFIED TYPE: ICD-10-CM

## 2020-08-21 DIAGNOSIS — R53.81 DEBILITY: ICD-10-CM

## 2020-08-21 PROCEDURE — 1126F AMNT PAIN NOTED NONE PRSNT: CPT | Performed by: NURSE PRACTITIONER

## 2020-08-21 PROCEDURE — 99310 SBSQ NF CARE HIGH MDM 45: CPT | Performed by: NURSE PRACTITIONER

## 2020-08-21 PROCEDURE — 1160F RVW MEDS BY RX/DR IN RCRD: CPT | Performed by: NURSE PRACTITIONER

## 2020-08-21 SDOH — HEALTH STABILITY: MENTAL HEALTH: HOW OFTEN DO YOU HAVE A DRINK CONTAINING ALCOHOL?: NEVER

## 2020-08-21 ASSESSMENT — PAIN SCALES - GENERAL: PAINLEVEL: 0

## 2020-08-25 ENCOUNTER — NURSING HOME VISIT (OUTPATIENT)
Dept: POST ACUTE CARE | Age: 65
End: 2020-08-25

## 2020-08-25 DIAGNOSIS — Z79.4 TYPE 2 DIABETES MELLITUS WITH OTHER SKIN COMPLICATION, WITH LONG-TERM CURRENT USE OF INSULIN (CMD): ICD-10-CM

## 2020-08-25 DIAGNOSIS — R53.81 DEBILITY: ICD-10-CM

## 2020-08-25 DIAGNOSIS — Z89.429 STATUS POST AMPUTATION OF TOE (CMD): Primary | ICD-10-CM

## 2020-08-25 DIAGNOSIS — D72.829 LEUKOCYTOSIS, UNSPECIFIED TYPE: ICD-10-CM

## 2020-08-25 DIAGNOSIS — E11.628 TYPE 2 DIABETES MELLITUS WITH OTHER SKIN COMPLICATION, WITH LONG-TERM CURRENT USE OF INSULIN (CMD): ICD-10-CM

## 2020-08-25 PROCEDURE — 1126F AMNT PAIN NOTED NONE PRSNT: CPT | Performed by: NURSE PRACTITIONER

## 2020-08-25 PROCEDURE — 99310 SBSQ NF CARE HIGH MDM 45: CPT | Performed by: NURSE PRACTITIONER

## 2020-08-25 PROCEDURE — 1160F RVW MEDS BY RX/DR IN RCRD: CPT | Performed by: NURSE PRACTITIONER

## 2020-08-25 SDOH — HEALTH STABILITY: MENTAL HEALTH: HOW OFTEN DO YOU HAVE A DRINK CONTAINING ALCOHOL?: NEVER

## 2020-08-25 ASSESSMENT — PAIN SCALES - GENERAL: PAINLEVEL: 0

## 2020-08-27 ENCOUNTER — HOSPITAL ENCOUNTER (OUTPATIENT)
Dept: WOUND CARE | Age: 65
Discharge: STILL ADMITTED OR ADMINISTRATIVE | End: 2020-08-27
Attending: PODIATRIST

## 2020-08-27 VITALS — TEMPERATURE: 98.1 F

## 2020-08-27 DIAGNOSIS — S98.912S AMPUTATION OF LEFT FOOT, SEQUELA (CMD): ICD-10-CM

## 2020-08-27 DIAGNOSIS — Z89.422 STATUS POST AMPUTATION OF LESSER TOE, LEFT (CMD): Primary | ICD-10-CM

## 2020-08-27 DIAGNOSIS — E11.621 TYPE 2 DIABETES MELLITUS WITH FOOT ULCER, UNSPECIFIED WHETHER LONG TERM INSULIN USE (CMD): ICD-10-CM

## 2020-08-27 DIAGNOSIS — L97.509 TYPE 2 DIABETES MELLITUS WITH FOOT ULCER, UNSPECIFIED WHETHER LONG TERM INSULIN USE (CMD): ICD-10-CM

## 2020-08-27 DIAGNOSIS — I73.9 PVD (PERIPHERAL VASCULAR DISEASE) (CMD): ICD-10-CM

## 2020-08-27 PROCEDURE — 11045 DBRDMT SUBQ TISS EACH ADDL: CPT

## 2020-08-27 PROCEDURE — 11042 DBRDMT SUBQ TIS 1ST 20SQCM/<: CPT

## 2020-08-27 SDOH — HEALTH STABILITY: MENTAL HEALTH: HOW OFTEN DO YOU HAVE A DRINK CONTAINING ALCOHOL?: NEVER

## 2020-08-31 ENCOUNTER — HOSPITAL ENCOUNTER (OUTPATIENT)
Dept: WOUND CARE | Age: 65
Discharge: STILL ADMITTED OR ADMINISTRATIVE | End: 2020-08-31
Attending: SPECIALIST

## 2020-08-31 VITALS — TEMPERATURE: 97 F

## 2020-08-31 DIAGNOSIS — Z89.422 STATUS POST AMPUTATION OF LESSER TOE, LEFT (CMD): Primary | ICD-10-CM

## 2020-08-31 PROCEDURE — 99213 OFFICE O/P EST LOW 20 MIN: CPT

## 2020-08-31 SDOH — HEALTH STABILITY: MENTAL HEALTH: HOW OFTEN DO YOU HAVE A DRINK CONTAINING ALCOHOL?: NEVER

## 2020-08-31 ASSESSMENT — PAIN SCALES - GENERAL: PAINLEVEL_OUTOF10: 0

## 2020-09-01 ENCOUNTER — CASE MANAGEMENT (OUTPATIENT)
Dept: CARE COORDINATION | Age: 65
End: 2020-09-01

## 2020-09-03 ENCOUNTER — HOSPITAL ENCOUNTER (OUTPATIENT)
Dept: WOUND CARE | Age: 65
Discharge: STILL ADMITTED OR ADMINISTRATIVE | End: 2020-09-03
Attending: SPECIALIST

## 2020-09-03 VITALS — TEMPERATURE: 98.2 F

## 2020-09-03 DIAGNOSIS — L97.529 TYPE 2 DIABETES MELLITUS WITH LEFT DIABETIC FOOT ULCER (CMD): ICD-10-CM

## 2020-09-03 DIAGNOSIS — E11.69 TYPE 2 DIABETES MELLITUS WITH OTHER SPECIFIED COMPLICATION, UNSPECIFIED WHETHER LONG TERM INSULIN USE (CMD): Primary | ICD-10-CM

## 2020-09-03 DIAGNOSIS — Z89.422 STATUS POST AMPUTATION OF LESSER TOE, LEFT (CMD): ICD-10-CM

## 2020-09-03 DIAGNOSIS — Z01.812 PRE-PROCEDURAL LABORATORY EXAMINATION: Primary | ICD-10-CM

## 2020-09-03 DIAGNOSIS — Z89.432 S/P TRANSMETATARSAL AMPUTATION OF FOOT, LEFT (CMD): ICD-10-CM

## 2020-09-03 DIAGNOSIS — E11.621 TYPE 2 DIABETES MELLITUS WITH LEFT DIABETIC FOOT ULCER (CMD): ICD-10-CM

## 2020-09-03 PROCEDURE — 11046 DBRDMT MUSC&/FSCA EA ADDL: CPT

## 2020-09-03 PROCEDURE — 11043 DBRDMT MUSC&/FSCA 1ST 20/<: CPT

## 2020-09-03 RX ORDER — SPIRONOLACTONE 25 MG/1
25 TABLET ORAL DAILY
COMMUNITY
End: 2020-11-11 | Stop reason: SDUPTHER

## 2020-09-03 RX ORDER — TORSEMIDE 10 MG/1
10 TABLET ORAL DAILY
COMMUNITY
End: 2020-11-11 | Stop reason: SDUPTHER

## 2020-09-03 SDOH — HEALTH STABILITY: MENTAL HEALTH: HOW OFTEN DO YOU HAVE A DRINK CONTAINING ALCOHOL?: NEVER

## 2020-09-03 ASSESSMENT — ACTIVITIES OF DAILY LIVING (ADL)
ADL_SCORE: 1
MOBILITY_ASSIST_DEVICES: STANDARD WALKER;WHEELCHAIR
BATHING: NEEDS ASSISTANCE
ADL_BEFORE_ADMISSION: NEEDS/REQUIRES ASSISTANCE

## 2020-09-03 ASSESSMENT — PAIN SCALES - GENERAL: PAINLEVEL_OUTOF10: 0

## 2020-09-05 ENCOUNTER — HOSPITAL ENCOUNTER (OUTPATIENT)
Age: 65
Discharge: NURSING, REHAB OR LONG-TERM CARE | End: 2020-09-05
Attending: SPECIALIST | Admitting: SPECIALIST

## 2020-09-05 ENCOUNTER — ANESTHESIA (OUTPATIENT)
Dept: SURGERY | Age: 65
End: 2020-09-05

## 2020-09-05 ENCOUNTER — ANESTHESIA EVENT (OUTPATIENT)
Dept: SURGERY | Age: 65
End: 2020-09-05

## 2020-09-05 VITALS
TEMPERATURE: 97.8 F | HEART RATE: 86 BPM | WEIGHT: 300 LBS | RESPIRATION RATE: 14 BRPM | DIASTOLIC BLOOD PRESSURE: 80 MMHG | HEIGHT: 72 IN | BODY MASS INDEX: 40.63 KG/M2 | OXYGEN SATURATION: 95 % | SYSTOLIC BLOOD PRESSURE: 138 MMHG

## 2020-09-05 LAB
GLUCOSE BLDC GLUCOMTR-MCNC: 146 MG/DL (ref 70–99)
SARS-COV-2 RNA RESP QL NAA+PROBE: NOT DETECTED
SERVICE CMNT-IMP: NORMAL
SPECIMEN SOURCE: NORMAL

## 2020-09-05 PROCEDURE — 13000003 HB ANESTHESIA  GENERAL EA ADD MINUTE: Performed by: SPECIALIST

## 2020-09-05 PROCEDURE — 10002807 HB RX 258

## 2020-09-05 PROCEDURE — 13000001 HB PHASE II RECOVERY EA 30 MINUTES: Performed by: SPECIALIST

## 2020-09-05 PROCEDURE — 10002800 HB RX 250 W HCPCS

## 2020-09-05 PROCEDURE — 13000036 HB COMPLEX  CASE S/U + 1ST 15 MIN: Performed by: SPECIALIST

## 2020-09-05 PROCEDURE — 10004651 HB RX, NO CHARGE ITEM: Performed by: SPECIALIST

## 2020-09-05 PROCEDURE — 10002803 HB RX 637: Performed by: SPECIALIST

## 2020-09-05 PROCEDURE — 10004451 HB PACU RECOVERY 1ST 30 MINUTES: Performed by: SPECIALIST

## 2020-09-05 PROCEDURE — 82962 GLUCOSE BLOOD TEST: CPT

## 2020-09-05 PROCEDURE — 13000037 HB COMPLEX CASE EACH ADD MINUTE: Performed by: SPECIALIST

## 2020-09-05 PROCEDURE — 10002803 HB RX 637

## 2020-09-05 PROCEDURE — 10004452 HB PACU ADDL 30 MINUTES: Performed by: SPECIALIST

## 2020-09-05 PROCEDURE — 10002801 HB RX 250 W/O HCPCS

## 2020-09-05 PROCEDURE — 10002801 HB RX 250 W/O HCPCS: Performed by: SPECIALIST

## 2020-09-05 PROCEDURE — 87635 SARS-COV-2 COVID-19 AMP PRB: CPT

## 2020-09-05 PROCEDURE — 13000002 HB ANESTHESIA  GENERAL  S/U + 1ST 15 MIN: Performed by: SPECIALIST

## 2020-09-05 RX ORDER — 0.9 % SODIUM CHLORIDE 0.9 %
2 VIAL (ML) INJECTION EVERY 12 HOURS SCHEDULED
Status: DISCONTINUED | OUTPATIENT
Start: 2020-09-05 | End: 2020-09-05 | Stop reason: HOSPADM

## 2020-09-05 RX ORDER — MAGNESIUM HYDROXIDE 1200 MG/15ML
LIQUID ORAL PRN
Status: DISCONTINUED | OUTPATIENT
Start: 2020-09-05 | End: 2020-09-05 | Stop reason: HOSPADM

## 2020-09-05 RX ORDER — LIDOCAINE HYDROCHLORIDE 20 MG/ML
INJECTION, SOLUTION INFILTRATION; PERINEURAL PRN
Status: DISCONTINUED | OUTPATIENT
Start: 2020-09-05 | End: 2020-09-05

## 2020-09-05 RX ORDER — FAMOTIDINE 20 MG/1
20 TABLET, FILM COATED ORAL
Status: DISCONTINUED | OUTPATIENT
Start: 2020-09-05 | End: 2020-09-05 | Stop reason: HOSPADM

## 2020-09-05 RX ORDER — SODIUM CHLORIDE, SODIUM LACTATE, POTASSIUM CHLORIDE, CALCIUM CHLORIDE 600; 310; 30; 20 MG/100ML; MG/100ML; MG/100ML; MG/100ML
INJECTION, SOLUTION INTRAVENOUS CONTINUOUS
Status: DISCONTINUED | OUTPATIENT
Start: 2020-09-05 | End: 2020-09-05 | Stop reason: HOSPADM

## 2020-09-05 RX ORDER — MINERAL OIL
OIL (ML) MISCELLANEOUS PRN
Status: DISCONTINUED | OUTPATIENT
Start: 2020-09-05 | End: 2020-09-05 | Stop reason: HOSPADM

## 2020-09-05 RX ORDER — PROPOFOL 10 MG/ML
INJECTION, EMULSION INTRAVENOUS PRN
Status: DISCONTINUED | OUTPATIENT
Start: 2020-09-05 | End: 2020-09-05

## 2020-09-05 RX ORDER — MIDAZOLAM HYDROCHLORIDE 1 MG/ML
INJECTION, SOLUTION INTRAMUSCULAR; INTRAVENOUS PRN
Status: DISCONTINUED | OUTPATIENT
Start: 2020-09-05 | End: 2020-09-05

## 2020-09-05 RX ORDER — SODIUM CHLORIDE 9 MG/ML
INJECTION, SOLUTION INTRAVENOUS CONTINUOUS
Status: DISCONTINUED | OUTPATIENT
Start: 2020-09-05 | End: 2020-09-05 | Stop reason: HOSPADM

## 2020-09-05 RX ORDER — HYDRALAZINE HYDROCHLORIDE 20 MG/ML
5 INJECTION INTRAMUSCULAR; INTRAVENOUS EVERY 10 MIN PRN
Status: DISCONTINUED | OUTPATIENT
Start: 2020-09-05 | End: 2020-09-05 | Stop reason: HOSPADM

## 2020-09-05 RX ORDER — LIDOCAINE HYDROCHLORIDE 10 MG/ML
5-10 INJECTION, SOLUTION INFILTRATION; PERINEURAL PRN
Status: DISCONTINUED | OUTPATIENT
Start: 2020-09-05 | End: 2020-09-05 | Stop reason: HOSPADM

## 2020-09-05 RX ORDER — ONDANSETRON 2 MG/ML
4 INJECTION INTRAMUSCULAR; INTRAVENOUS 2 TIMES DAILY PRN
Status: DISCONTINUED | OUTPATIENT
Start: 2020-09-05 | End: 2020-09-05 | Stop reason: HOSPADM

## 2020-09-05 RX ORDER — ALBUTEROL SULFATE 2.5 MG/3ML
5 SOLUTION RESPIRATORY (INHALATION) ONCE
Status: DISCONTINUED | OUTPATIENT
Start: 2020-09-05 | End: 2020-09-05 | Stop reason: HOSPADM

## 2020-09-05 RX ORDER — PROCHLORPERAZINE EDISYLATE 5 MG/ML
5 INJECTION INTRAMUSCULAR; INTRAVENOUS EVERY 4 HOURS PRN
Status: DISCONTINUED | OUTPATIENT
Start: 2020-09-05 | End: 2020-09-05 | Stop reason: HOSPADM

## 2020-09-05 RX ORDER — HYDROCODONE BITARTRATE AND ACETAMINOPHEN 5; 325 MG/1; MG/1
1-2 TABLET ORAL EVERY 4 HOURS PRN
Status: DISCONTINUED | OUTPATIENT
Start: 2020-09-05 | End: 2020-09-05 | Stop reason: HOSPADM

## 2020-09-05 RX ORDER — NALOXONE HCL 0.4 MG/ML
0.2 VIAL (ML) INJECTION EVERY 5 MIN PRN
Status: DISCONTINUED | OUTPATIENT
Start: 2020-09-05 | End: 2020-09-05 | Stop reason: HOSPADM

## 2020-09-05 RX ORDER — DEXTROSE MONOHYDRATE 25 G/50ML
25 INJECTION, SOLUTION INTRAVENOUS PRN
Status: DISCONTINUED | OUTPATIENT
Start: 2020-09-05 | End: 2020-09-05 | Stop reason: HOSPADM

## 2020-09-05 RX ORDER — METOCLOPRAMIDE HYDROCHLORIDE 5 MG/ML
5 INJECTION INTRAMUSCULAR; INTRAVENOUS EVERY 6 HOURS PRN
Status: DISCONTINUED | OUTPATIENT
Start: 2020-09-05 | End: 2020-09-05 | Stop reason: HOSPADM

## 2020-09-05 RX ORDER — SCOLOPAMINE TRANSDERMAL SYSTEM 1 MG/1
1 PATCH, EXTENDED RELEASE TRANSDERMAL PRN
Status: DISCONTINUED | OUTPATIENT
Start: 2020-09-05 | End: 2020-09-05 | Stop reason: HOSPADM

## 2020-09-05 RX ORDER — ONDANSETRON 2 MG/ML
4 INJECTION INTRAMUSCULAR; INTRAVENOUS ONCE
Status: DISCONTINUED | OUTPATIENT
Start: 2020-09-05 | End: 2020-09-05 | Stop reason: HOSPADM

## 2020-09-05 RX ORDER — ACETAMINOPHEN 500 MG
1000 TABLET ORAL
Status: COMPLETED | OUTPATIENT
Start: 2020-09-05 | End: 2020-09-05

## 2020-09-05 RX ORDER — ACETAMINOPHEN 325 MG/1
650 TABLET ORAL EVERY 4 HOURS PRN
Status: DISCONTINUED | OUTPATIENT
Start: 2020-09-05 | End: 2020-09-05 | Stop reason: HOSPADM

## 2020-09-05 RX ORDER — SODIUM CHLORIDE, SODIUM LACTATE, POTASSIUM CHLORIDE, CALCIUM CHLORIDE 600; 310; 30; 20 MG/100ML; MG/100ML; MG/100ML; MG/100ML
INJECTION, SOLUTION INTRAVENOUS CONTINUOUS PRN
Status: DISCONTINUED | OUTPATIENT
Start: 2020-09-05 | End: 2020-09-05

## 2020-09-05 RX ORDER — FAMOTIDINE 20 MG/1
20 TABLET, FILM COATED ORAL
Status: COMPLETED | OUTPATIENT
Start: 2020-09-05 | End: 2020-09-05

## 2020-09-05 RX ORDER — HUMAN INSULIN 100 [IU]/ML
INJECTION, SOLUTION SUBCUTANEOUS
Status: DISCONTINUED | OUTPATIENT
Start: 2020-09-05 | End: 2020-09-05 | Stop reason: HOSPADM

## 2020-09-05 RX ORDER — GABAPENTIN 300 MG/1
600 CAPSULE ORAL
Status: COMPLETED | OUTPATIENT
Start: 2020-09-05 | End: 2020-09-05

## 2020-09-05 RX ORDER — CHLORHEXIDINE GLUCONATE ORAL RINSE 1.2 MG/ML
15 SOLUTION DENTAL
Status: COMPLETED | OUTPATIENT
Start: 2020-09-05 | End: 2020-09-05

## 2020-09-05 RX ORDER — DIPHENHYDRAMINE HYDROCHLORIDE 50 MG/ML
25 INJECTION INTRAMUSCULAR; INTRAVENOUS
Status: DISCONTINUED | OUTPATIENT
Start: 2020-09-05 | End: 2020-09-05 | Stop reason: HOSPADM

## 2020-09-05 RX ORDER — ONDANSETRON 2 MG/ML
INJECTION INTRAMUSCULAR; INTRAVENOUS PRN
Status: DISCONTINUED | OUTPATIENT
Start: 2020-09-05 | End: 2020-09-05

## 2020-09-05 RX ORDER — NICOTINE POLACRILEX 4 MG
30 LOZENGE BUCCAL
Status: DISCONTINUED | OUTPATIENT
Start: 2020-09-05 | End: 2020-09-05 | Stop reason: HOSPADM

## 2020-09-05 RX ADMIN — GABAPENTIN 600 MG: 300 CAPSULE ORAL at 09:52

## 2020-09-05 RX ADMIN — CHLORHEXIDINE GLUCONATE 15 ML: 1.2 RINSE ORAL at 09:53

## 2020-09-05 RX ADMIN — MIDAZOLAM HYDROCHLORIDE 2 MG: 1 INJECTION, SOLUTION INTRAMUSCULAR; INTRAVENOUS at 11:33

## 2020-09-05 RX ADMIN — FAMOTIDINE 20 MG: 20 TABLET ORAL at 09:52

## 2020-09-05 RX ADMIN — SODIUM CHLORIDE, SODIUM LACTATE, POTASSIUM CHLORIDE, AND CALCIUM CHLORIDE: .6; .31; .03; .02 INJECTION, SOLUTION INTRAVENOUS at 10:04

## 2020-09-05 RX ADMIN — CEFAZOLIN SODIUM 3000 MG: 300 INJECTION, POWDER, LYOPHILIZED, FOR SOLUTION INTRAVENOUS at 11:43

## 2020-09-05 RX ADMIN — ONDANSETRON 4 MG: 2 INJECTION INTRAMUSCULAR; INTRAVENOUS at 12:17

## 2020-09-05 RX ADMIN — ACETAMINOPHEN 1000 MG: 500 TABLET ORAL at 09:52

## 2020-09-05 RX ADMIN — PROPOFOL 300 MG: 10 INJECTION, EMULSION INTRAVENOUS at 11:35

## 2020-09-05 RX ADMIN — SODIUM CHLORIDE, POTASSIUM CHLORIDE, SODIUM LACTATE AND CALCIUM CHLORIDE: 600; 310; 30; 20 INJECTION, SOLUTION INTRAVENOUS at 11:21

## 2020-09-05 RX ADMIN — LIDOCAINE HYDROCHLORIDE 5 ML: 20 INJECTION, SOLUTION INFILTRATION; PERINEURAL at 11:35

## 2020-09-05 RX ADMIN — FENTANYL CITRATE 100 MCG: 50 INJECTION INTRAMUSCULAR; INTRAVENOUS at 11:35

## 2020-09-05 ASSESSMENT — PAIN SCALES - GENERAL
PAINLEVEL_OUTOF10: 0
PAINLEVEL_OUTOF10: 0

## 2020-09-14 ENCOUNTER — TELEPHONE (OUTPATIENT)
Dept: WOUND CARE | Age: 65
End: 2020-09-14

## 2020-09-21 ENCOUNTER — HOSPITAL ENCOUNTER (OUTPATIENT)
Dept: WOUND CARE | Age: 65
Discharge: STILL ADMITTED OR ADMINISTRATIVE | End: 2020-09-21
Attending: SPECIALIST

## 2020-09-21 VITALS — TEMPERATURE: 96.7 F

## 2020-09-21 DIAGNOSIS — Z89.422 STATUS POST AMPUTATION OF LESSER TOE, LEFT (CMD): Primary | ICD-10-CM

## 2020-09-21 PROCEDURE — 99213 OFFICE O/P EST LOW 20 MIN: CPT

## 2020-09-21 SDOH — HEALTH STABILITY: MENTAL HEALTH: HOW OFTEN DO YOU HAVE A DRINK CONTAINING ALCOHOL?: NEVER

## 2020-09-21 ASSESSMENT — PAIN SCALES - GENERAL: PAINLEVEL_OUTOF10: 0

## 2020-09-24 ENCOUNTER — TELEPHONE (OUTPATIENT)
Dept: SCHEDULING | Age: 65
End: 2020-09-24

## 2020-09-28 ENCOUNTER — HOSPITAL ENCOUNTER (OUTPATIENT)
Dept: WOUND CARE | Age: 65
Discharge: STILL ADMITTED OR ADMINISTRATIVE | End: 2020-09-28
Attending: SPECIALIST

## 2020-09-28 VITALS — TEMPERATURE: 96.9 F

## 2020-09-28 DIAGNOSIS — Z89.422 STATUS POST AMPUTATION OF LESSER TOE, LEFT (CMD): Primary | ICD-10-CM

## 2020-09-28 PROCEDURE — 99213 OFFICE O/P EST LOW 20 MIN: CPT

## 2020-09-28 SDOH — HEALTH STABILITY: MENTAL HEALTH: HOW OFTEN DO YOU HAVE A DRINK CONTAINING ALCOHOL?: NEVER

## 2020-09-28 ASSESSMENT — PAIN SCALES - GENERAL: PAINLEVEL_OUTOF10: 0

## 2020-09-29 ENCOUNTER — TELEPHONE (OUTPATIENT)
Dept: SCHEDULING | Age: 65
End: 2020-09-29

## 2020-09-29 DIAGNOSIS — Z79.4 DIABETES MELLITUS WITH INSULIN THERAPY (CMD): ICD-10-CM

## 2020-09-29 DIAGNOSIS — Z89.429 STATUS POST AMPUTATION OF TOE (CMD): Primary | ICD-10-CM

## 2020-09-29 DIAGNOSIS — E11.9 DIABETES MELLITUS WITH INSULIN THERAPY (CMD): ICD-10-CM

## 2020-09-30 ENCOUNTER — TELEPHONE (OUTPATIENT)
Dept: FAMILY MEDICINE | Age: 65
End: 2020-09-30

## 2020-10-01 ENCOUNTER — TELEPHONE (OUTPATIENT)
Dept: SCHEDULING | Age: 65
End: 2020-10-01

## 2020-10-05 ENCOUNTER — HOSPITAL ENCOUNTER (OUTPATIENT)
Dept: WOUND CARE | Age: 65
Discharge: STILL ADMITTED OR ADMINISTRATIVE | End: 2020-10-05
Attending: SPECIALIST

## 2020-10-05 VITALS — TEMPERATURE: 96.4 F

## 2020-10-05 DIAGNOSIS — Z89.422 STATUS POST AMPUTATION OF LESSER TOE, LEFT (CMD): Primary | ICD-10-CM

## 2020-10-05 PROCEDURE — 99213 OFFICE O/P EST LOW 20 MIN: CPT

## 2020-10-05 SDOH — HEALTH STABILITY: MENTAL HEALTH: HOW OFTEN DO YOU HAVE A DRINK CONTAINING ALCOHOL?: NEVER

## 2020-10-07 ENCOUNTER — TELEPHONE (OUTPATIENT)
Dept: SCHEDULING | Age: 65
End: 2020-10-07

## 2020-10-08 ENCOUNTER — TRANSCRIBE ORDERS (OUTPATIENT)
Dept: POST ACUTE CARE | Age: 65
End: 2020-10-08

## 2020-10-08 DIAGNOSIS — Z79.4 TYPE 2 DIABETES MELLITUS WITH OTHER SKIN COMPLICATION, WITH LONG-TERM CURRENT USE OF INSULIN (CMD): ICD-10-CM

## 2020-10-08 DIAGNOSIS — R53.81 DEBILITY: ICD-10-CM

## 2020-10-08 DIAGNOSIS — A41.9 SEPSIS, DUE TO UNSPECIFIED ORGANISM, UNSPECIFIED WHETHER ACUTE ORGAN DYSFUNCTION PRESENT (CMD): Primary | ICD-10-CM

## 2020-10-08 DIAGNOSIS — Z89.429 STATUS POST AMPUTATION OF TOE (CMD): ICD-10-CM

## 2020-10-08 DIAGNOSIS — D72.829 LEUKOCYTOSIS, UNSPECIFIED TYPE: ICD-10-CM

## 2020-10-08 DIAGNOSIS — Z79.4 TYPE 2 DIABETES MELLITUS WITH HYPERGLYCEMIA, WITH LONG-TERM CURRENT USE OF INSULIN (CMD): ICD-10-CM

## 2020-10-08 DIAGNOSIS — E11.65 TYPE 2 DIABETES MELLITUS WITH HYPERGLYCEMIA, WITH LONG-TERM CURRENT USE OF INSULIN (CMD): ICD-10-CM

## 2020-10-08 DIAGNOSIS — E11.628 TYPE 2 DIABETES MELLITUS WITH OTHER SKIN COMPLICATION, WITH LONG-TERM CURRENT USE OF INSULIN (CMD): ICD-10-CM

## 2020-10-12 ENCOUNTER — TELEPHONE (OUTPATIENT)
Dept: SCHEDULING | Age: 65
End: 2020-10-12

## 2020-10-13 ENCOUNTER — TELEPHONE (OUTPATIENT)
Dept: SCHEDULING | Age: 65
End: 2020-10-13

## 2020-10-13 ENCOUNTER — APPOINTMENT (OUTPATIENT)
Dept: FAMILY MEDICINE | Age: 65
End: 2020-10-13

## 2020-10-19 ENCOUNTER — HOSPITAL ENCOUNTER (OUTPATIENT)
Dept: WOUND CARE | Age: 65
Discharge: STILL ADMITTED OR ADMINISTRATIVE | End: 2020-10-19
Attending: SPECIALIST

## 2020-10-19 ENCOUNTER — APPOINTMENT (OUTPATIENT)
Dept: WOUND CARE | Age: 65
End: 2020-10-19
Attending: SPECIALIST

## 2020-10-19 VITALS — TEMPERATURE: 97.3 F

## 2020-10-19 DIAGNOSIS — Z89.422 STATUS POST AMPUTATION OF LESSER TOE, LEFT (CMD): Primary | ICD-10-CM

## 2020-10-19 PROCEDURE — 99212 OFFICE O/P EST SF 10 MIN: CPT

## 2020-10-19 SDOH — HEALTH STABILITY: MENTAL HEALTH: HOW OFTEN DO YOU HAVE A DRINK CONTAINING ALCOHOL?: NEVER

## 2020-10-19 ASSESSMENT — PAIN SCALES - GENERAL: PAINLEVEL_OUTOF10: 0

## 2020-11-02 ENCOUNTER — HOSPITAL ENCOUNTER (INPATIENT)
Age: 65
LOS: 2 days | Discharge: HOME HEALTH | DRG: 291 | End: 2020-11-04
Attending: EMERGENCY MEDICINE | Admitting: INTERNAL MEDICINE

## 2020-11-02 ENCOUNTER — APPOINTMENT (OUTPATIENT)
Dept: GENERAL RADIOLOGY | Age: 65
DRG: 291 | End: 2020-11-02
Attending: EMERGENCY MEDICINE

## 2020-11-02 DIAGNOSIS — I50.23 ACUTE ON CHRONIC SYSTOLIC CONGESTIVE HEART FAILURE (CMD): Primary | ICD-10-CM

## 2020-11-02 PROBLEM — N18.30 CHRONIC KIDNEY DISEASE, STAGE III (MODERATE) (CMD): Status: ACTIVE | Noted: 2020-11-02

## 2020-11-02 LAB
ALBUMIN SERPL-MCNC: 3 G/DL (ref 3.6–5.1)
ALBUMIN/GLOB SERPL: 0.6 {RATIO} (ref 1–2.4)
ALP SERPL-CCNC: 108 UNITS/L (ref 45–117)
ALT SERPL-CCNC: 20 UNITS/L
ANION GAP SERPL CALC-SCNC: 11 MMOL/L (ref 10–20)
AST SERPL-CCNC: 18 UNITS/L
BASOPHILS # BLD: 0.1 K/MCL (ref 0–0.3)
BASOPHILS NFR BLD: 1 %
BILIRUB SERPL-MCNC: 0.5 MG/DL (ref 0.2–1)
BUN SERPL-MCNC: 44 MG/DL (ref 6–20)
BUN/CREAT SERPL: 28 (ref 7–25)
CALCIUM SERPL-MCNC: 8.8 MG/DL (ref 8.4–10.2)
CHLORIDE SERPL-SCNC: 99 MMOL/L (ref 98–107)
CO2 SERPL-SCNC: 28 MMOL/L (ref 21–32)
CREAT SERPL-MCNC: 1.6 MG/DL (ref 0.67–1.17)
DIFFERENTIAL METHOD BLD: ABNORMAL
EOSINOPHIL # BLD: 0.3 K/MCL (ref 0.1–0.5)
EOSINOPHIL NFR BLD: 3 %
ERYTHROCYTE [DISTWIDTH] IN BLOOD: 14.3 % (ref 11–15)
GLOBULIN SER-MCNC: 5 G/DL (ref 2–4)
GLUCOSE BLDC GLUCOMTR-MCNC: 200 MG/DL (ref 70–99)
GLUCOSE BLDC GLUCOMTR-MCNC: 95 MG/DL (ref 70–99)
GLUCOSE SERPL-MCNC: 154 MG/DL (ref 65–99)
HCT VFR BLD CALC: 39.4 % (ref 39–51)
HGB BLD-MCNC: 12.9 G/DL (ref 13–17)
IMM GRANULOCYTES # BLD AUTO: 0 K/MCL (ref 0–0.2)
IMM GRANULOCYTES NFR BLD: 0 %
LYMPHOCYTES # BLD: 2.5 K/MCL (ref 1–4)
LYMPHOCYTES NFR BLD: 27 %
MCH RBC QN AUTO: 29.1 PG (ref 26–34)
MCHC RBC AUTO-ENTMCNC: 32.7 G/DL (ref 32–36.5)
MCV RBC AUTO: 88.7 FL (ref 78–100)
MONOCYTES # BLD: 0.8 K/MCL (ref 0.3–0.9)
MONOCYTES NFR BLD: 9 %
NEUTROPHILS # BLD: 5.6 K/MCL (ref 1.8–7.7)
NEUTROPHILS NFR BLD: 60 %
NRBC BLD MANUAL-RTO: 0 /100 WBC
NT-PROBNP SERPL-MCNC: 748 PG/ML
PLATELET # BLD: 284 K/MCL (ref 140–450)
POTASSIUM SERPL-SCNC: 4.3 MMOL/L (ref 3.4–5.1)
PROT SERPL-MCNC: 8 G/DL (ref 6.4–8.2)
RBC # BLD: 4.44 MIL/MCL (ref 4.5–5.9)
SODIUM SERPL-SCNC: 134 MMOL/L (ref 135–145)
TROPONIN I SERPL HS-MCNC: 0.15 NG/ML
TROPONIN I SERPL HS-MCNC: 0.15 NG/ML
TROPONIN I SERPL HS-MCNC: 0.16 NG/ML
WBC # BLD: 9.3 K/MCL (ref 4.2–11)

## 2020-11-02 PROCEDURE — 93005 ELECTROCARDIOGRAM TRACING: CPT | Performed by: EMERGENCY MEDICINE

## 2020-11-02 PROCEDURE — 10002800 HB RX 250 W HCPCS: Performed by: INTERNAL MEDICINE

## 2020-11-02 PROCEDURE — 99223 1ST HOSP IP/OBS HIGH 75: CPT | Performed by: INTERNAL MEDICINE

## 2020-11-02 PROCEDURE — 10003585 HB ROOM CHARGE INTERMEDIATE CARE

## 2020-11-02 PROCEDURE — 84484 ASSAY OF TROPONIN QUANT: CPT

## 2020-11-02 PROCEDURE — 10002801 HB RX 250 W/O HCPCS: Performed by: EMERGENCY MEDICINE

## 2020-11-02 PROCEDURE — 83880 ASSAY OF NATRIURETIC PEPTIDE: CPT

## 2020-11-02 PROCEDURE — 80053 COMPREHEN METABOLIC PANEL: CPT

## 2020-11-02 PROCEDURE — 99284 EMERGENCY DEPT VISIT MOD MDM: CPT

## 2020-11-02 PROCEDURE — 71045 X-RAY EXAM CHEST 1 VIEW: CPT

## 2020-11-02 PROCEDURE — 85025 COMPLETE CBC W/AUTO DIFF WBC: CPT

## 2020-11-02 PROCEDURE — 10004281 HB COUNTER-STAFF TIME PER 15 MIN

## 2020-11-02 PROCEDURE — 36415 COLL VENOUS BLD VENIPUNCTURE: CPT

## 2020-11-02 PROCEDURE — 10002803 HB RX 637: Performed by: INTERNAL MEDICINE

## 2020-11-02 PROCEDURE — 10004651 HB RX, NO CHARGE ITEM: Performed by: INTERNAL MEDICINE

## 2020-11-02 PROCEDURE — U0003 INFECTIOUS AGENT DETECTION BY NUCLEIC ACID (DNA OR RNA); SEVERE ACUTE RESPIRATORY SYNDROME CORONAVIRUS 2 (SARS-COV-2) (CORONAVIRUS DISEASE [COVID-19]), AMPLIFIED PROBE TECHNIQUE, MAKING USE OF HIGH THROUGHPUT TECHNOLOGIES AS DESCRIBED BY CMS-2020-01-R: HCPCS

## 2020-11-02 RX ORDER — NICOTINE POLACRILEX 4 MG
15 LOZENGE BUCCAL PRN
Status: DISCONTINUED | OUTPATIENT
Start: 2020-11-02 | End: 2020-11-04 | Stop reason: HOSPADM

## 2020-11-02 RX ORDER — INSULIN GLARGINE 100 [IU]/ML
50 INJECTION, SOLUTION SUBCUTANEOUS EVERY 12 HOURS SCHEDULED
Status: DISCONTINUED | OUTPATIENT
Start: 2020-11-02 | End: 2020-11-04 | Stop reason: HOSPADM

## 2020-11-02 RX ORDER — INSULIN LISPRO 100 [IU]/ML
16 INJECTION, SOLUTION INTRAVENOUS; SUBCUTANEOUS
Status: ON HOLD | COMMUNITY
End: 2020-11-04 | Stop reason: SDUPTHER

## 2020-11-02 RX ORDER — DEXTROSE MONOHYDRATE 25 G/50ML
25 INJECTION, SOLUTION INTRAVENOUS PRN
Status: DISCONTINUED | OUTPATIENT
Start: 2020-11-02 | End: 2020-11-04 | Stop reason: HOSPADM

## 2020-11-02 RX ORDER — BISACODYL 10 MG
10 SUPPOSITORY, RECTAL RECTAL DAILY PRN
Status: DISCONTINUED | OUTPATIENT
Start: 2020-11-02 | End: 2020-11-04 | Stop reason: HOSPADM

## 2020-11-02 RX ORDER — INSULIN GLARGINE 100 [IU]/ML
50 INJECTION, SOLUTION SUBCUTANEOUS 2 TIMES DAILY
Status: ON HOLD | COMMUNITY
End: 2021-04-26 | Stop reason: HOSPADM

## 2020-11-02 RX ORDER — POLYETHYLENE GLYCOL 3350 17 G/17G
17 POWDER, FOR SOLUTION ORAL DAILY PRN
Status: DISCONTINUED | OUTPATIENT
Start: 2020-11-02 | End: 2020-11-04 | Stop reason: HOSPADM

## 2020-11-02 RX ORDER — LANOLIN ALCOHOL/MO/W.PET/CERES
3 CREAM (GRAM) TOPICAL NIGHTLY
Status: DISCONTINUED | OUTPATIENT
Start: 2020-11-02 | End: 2020-11-04 | Stop reason: HOSPADM

## 2020-11-02 RX ORDER — ASCORBIC ACID 500 MG
500 TABLET ORAL 2 TIMES DAILY
Status: DISCONTINUED | OUTPATIENT
Start: 2020-11-02 | End: 2020-11-04 | Stop reason: HOSPADM

## 2020-11-02 RX ORDER — DEXTROSE MONOHYDRATE 50 MG/ML
INJECTION, SOLUTION INTRAVENOUS CONTINUOUS PRN
Status: DISCONTINUED | OUTPATIENT
Start: 2020-11-02 | End: 2020-11-04 | Stop reason: HOSPADM

## 2020-11-02 RX ORDER — ONDANSETRON 2 MG/ML
4 INJECTION INTRAMUSCULAR; INTRAVENOUS 4 TIMES DAILY PRN
Status: DISCONTINUED | OUTPATIENT
Start: 2020-11-02 | End: 2020-11-04 | Stop reason: HOSPADM

## 2020-11-02 RX ORDER — ACETAMINOPHEN 325 MG/1
650 TABLET ORAL EVERY 6 HOURS PRN
Status: DISCONTINUED | OUTPATIENT
Start: 2020-11-02 | End: 2020-11-02 | Stop reason: SDUPTHER

## 2020-11-02 RX ORDER — TRAMADOL HYDROCHLORIDE 50 MG/1
50 TABLET ORAL EVERY 6 HOURS PRN
Status: DISCONTINUED | OUTPATIENT
Start: 2020-11-02 | End: 2020-11-04 | Stop reason: HOSPADM

## 2020-11-02 RX ORDER — BISACODYL 10 MG
10 SUPPOSITORY, RECTAL RECTAL DAILY PRN
Status: DISCONTINUED | OUTPATIENT
Start: 2020-11-02 | End: 2020-11-02 | Stop reason: SDUPTHER

## 2020-11-02 RX ORDER — BUMETANIDE 0.25 MG/ML
1 INJECTION INTRAMUSCULAR; INTRAVENOUS ONCE
Status: COMPLETED | OUTPATIENT
Start: 2020-11-02 | End: 2020-11-02

## 2020-11-02 RX ORDER — ACETAMINOPHEN 325 MG/1
650 TABLET ORAL EVERY 4 HOURS PRN
Status: DISCONTINUED | OUTPATIENT
Start: 2020-11-02 | End: 2020-11-04 | Stop reason: HOSPADM

## 2020-11-02 RX ORDER — FAMOTIDINE 20 MG/1
20 TABLET, FILM COATED ORAL DAILY
Status: DISCONTINUED | OUTPATIENT
Start: 2020-11-02 | End: 2020-11-04 | Stop reason: HOSPADM

## 2020-11-02 RX ORDER — VITAMIN B COMPLEX
50 TABLET ORAL DAILY
Status: DISCONTINUED | OUTPATIENT
Start: 2020-11-02 | End: 2020-11-04 | Stop reason: HOSPADM

## 2020-11-02 RX ORDER — AMOXICILLIN 250 MG
2 CAPSULE ORAL DAILY PRN
Status: DISCONTINUED | OUTPATIENT
Start: 2020-11-02 | End: 2020-11-04 | Stop reason: HOSPADM

## 2020-11-02 RX ORDER — AMOXICILLIN 250 MG
2 CAPSULE ORAL
Status: DISCONTINUED | OUTPATIENT
Start: 2020-11-02 | End: 2020-11-02 | Stop reason: SDUPTHER

## 2020-11-02 RX ORDER — 0.9 % SODIUM CHLORIDE 0.9 %
2 VIAL (ML) INJECTION EVERY 12 HOURS SCHEDULED
Status: DISCONTINUED | OUTPATIENT
Start: 2020-11-02 | End: 2020-11-04 | Stop reason: HOSPADM

## 2020-11-02 RX ORDER — SPIRONOLACTONE 25 MG/1
25 TABLET ORAL DAILY
Status: DISCONTINUED | OUTPATIENT
Start: 2020-11-02 | End: 2020-11-04 | Stop reason: HOSPADM

## 2020-11-02 RX ORDER — DEXTROSE MONOHYDRATE 25 G/50ML
12.5 INJECTION, SOLUTION INTRAVENOUS PRN
Status: DISCONTINUED | OUTPATIENT
Start: 2020-11-02 | End: 2020-11-04 | Stop reason: HOSPADM

## 2020-11-02 RX ORDER — ENOXAPARIN SODIUM 100 MG/ML
40 INJECTION SUBCUTANEOUS 2 TIMES DAILY
Status: DISCONTINUED | OUTPATIENT
Start: 2020-11-02 | End: 2020-11-04 | Stop reason: HOSPADM

## 2020-11-02 RX ORDER — ATORVASTATIN CALCIUM 40 MG/1
40 TABLET, FILM COATED ORAL NIGHTLY
Status: DISCONTINUED | OUTPATIENT
Start: 2020-11-02 | End: 2020-11-04 | Stop reason: HOSPADM

## 2020-11-02 RX ORDER — NICOTINE POLACRILEX 4 MG
30 LOZENGE BUCCAL PRN
Status: DISCONTINUED | OUTPATIENT
Start: 2020-11-02 | End: 2020-11-04 | Stop reason: HOSPADM

## 2020-11-02 RX ADMIN — BUMETANIDE 1 MG: 0.25 INJECTION INTRAMUSCULAR; INTRAVENOUS at 13:58

## 2020-11-02 RX ADMIN — FAMOTIDINE 20 MG: 20 TABLET, FILM COATED ORAL at 21:20

## 2020-11-02 RX ADMIN — SODIUM CHLORIDE 2 ML: 9 INJECTION, SOLUTION INTRAMUSCULAR; INTRAVENOUS; SUBCUTANEOUS at 21:23

## 2020-11-02 RX ADMIN — Medication 50 MCG: at 21:20

## 2020-11-02 RX ADMIN — OXYCODONE HYDROCHLORIDE AND ACETAMINOPHEN 500 MG: 500 TABLET ORAL at 21:21

## 2020-11-02 RX ADMIN — SPIRONOLACTONE 25 MG: 25 TABLET, FILM COATED ORAL at 21:21

## 2020-11-02 RX ADMIN — DESMOPRESSIN ACETATE 40 MG: 0.2 TABLET ORAL at 21:21

## 2020-11-02 RX ADMIN — INSULIN GLARGINE 50 UNITS: 100 INJECTION, SOLUTION SUBCUTANEOUS at 22:11

## 2020-11-02 RX ADMIN — ENOXAPARIN SODIUM 40 MG: 40 INJECTION SUBCUTANEOUS at 22:33

## 2020-11-02 RX ADMIN — Medication 3 MG: at 21:21

## 2020-11-02 SDOH — HEALTH STABILITY: MENTAL HEALTH: HOW OFTEN DO YOU HAVE A DRINK CONTAINING ALCOHOL?: NEVER

## 2020-11-02 ASSESSMENT — LIFESTYLE VARIABLES
HOW MANY STANDARD DRINKS CONTAINING ALCOHOL DO YOU HAVE ON A TYPICAL DAY: 0,1 OR 2
HOW OFTEN DO YOU HAVE A DRINK CONTAINING ALCOHOL: 2 TO 4 TIMES PER MONTH
HOW MANY STANDARD DRINKS CONTAINING ALCOHOL DO YOU HAVE ON A TYPICAL DAY: 0,1 OR 2
AUDIT-C TOTAL SCORE: 2
AUDIT-C TOTAL SCORE: 2
HOW OFTEN DO YOU HAVE 6 OR MORE DRINKS ON ONE OCCASION: NEVER
ALCOHOL_USE_STATUS: NO OR LOW RISK WITH VALIDATED TOOL
ALCOHOL_USE_STATUS: NO OR LOW RISK WITH VALIDATED TOOL
HOW OFTEN DO YOU HAVE 6 OR MORE DRINKS ON ONE OCCASION: NEVER
HOW OFTEN DO YOU HAVE A DRINK CONTAINING ALCOHOL: 2 TO 4 TIMES PER MONTH

## 2020-11-02 ASSESSMENT — PATIENT HEALTH QUESTIONNAIRE - PHQ9
CLINICAL INTERPRETATION OF PHQ9 SCORE: NO FURTHER SCREENING NEEDED
CLINICAL INTERPRETATION OF PHQ2 SCORE: NO FURTHER SCREENING NEEDED
IS PATIENT ABLE TO COMPLETE PHQ2 OR PHQ9: YES
2. FEELING DOWN, DEPRESSED OR HOPELESS: NOT AT ALL
1. LITTLE INTEREST OR PLEASURE IN DOING THINGS: NOT AT ALL
IS PATIENT ABLE TO COMPLETE PHQ2 OR PHQ9: YES
SUM OF ALL RESPONSES TO PHQ9 QUESTIONS 1 AND 2: 0
SUM OF ALL RESPONSES TO PHQ9 QUESTIONS 1 AND 2: 0

## 2020-11-02 ASSESSMENT — ENCOUNTER SYMPTOMS
FEVER: 0
EYES NEGATIVE: 1
ENDOCRINE NEGATIVE: 1
GASTROINTESTINAL NEGATIVE: 1
SHORTNESS OF BREATH: 1
WHEEZING: 0
UNEXPECTED WEIGHT CHANGE: 1
PSYCHIATRIC NEGATIVE: 1
COUGH: 0
HEMATOLOGIC/LYMPHATIC NEGATIVE: 1
ALLERGIC/IMMUNOLOGIC NEGATIVE: 1
FATIGUE: 0

## 2020-11-02 ASSESSMENT — ACTIVITIES OF DAILY LIVING (ADL)
ADL_SHORT_OF_BREATH: NO
CHRONIC_PAIN_PRESENT: NO
RECENT_DECLINE_ADL: NO
RECENT_DECLINE_ADL: NO
CHRONIC_PAIN_PRESENT: NO
ADL_SHORT_OF_BREATH: YES
MOBILITY_ASSIST_DEVICES: STANDARD WALKER
ADL_BEFORE_ADMISSION: INDEPENDENT
ADL_SCORE: 12
ADL_BEFORE_ADMISSION: INDEPENDENT
ADL_SCORE: 12

## 2020-11-02 ASSESSMENT — COGNITIVE AND FUNCTIONAL STATUS - GENERAL
ARE YOU DEAF OR DO YOU HAVE SERIOUS DIFFICULTY  HEARING: NO
ARE YOU BLIND OR DO YOU HAVE SERIOUS DIFFICULTY SEEING, EVEN WHEN WEARING GLASSES: NO
ARE YOU DEAF OR DO YOU HAVE SERIOUS DIFFICULTY  HEARING: NO
BECAUSE OF A PHYSICAL, MENTAL, OR EMOTIONAL CONDITION, DO YOU HAVE SERIOUS DIFFICULTY CONCENTRATING, REMEMBERING OR MAKING DECISIONS: NO
BECAUSE OF A PHYSICAL, MENTAL, OR EMOTIONAL CONDITION, DO YOU HAVE DIFFICULTY DOING ERRANDS ALONE: NO
ARE YOU BLIND OR DO YOU HAVE SERIOUS DIFFICULTY SEEING, EVEN WHEN WEARING GLASSES: NO
DO YOU HAVE DIFFICULTY DRESSING OR BATHING: NO
DO YOU HAVE SERIOUS DIFFICULTY WALKING OR CLIMBING STAIRS: YES

## 2020-11-02 ASSESSMENT — COLUMBIA-SUICIDE SEVERITY RATING SCALE - C-SSRS
HOW LONG AGO DID YOU DO ANY OF THESE?: OVER A YEAR AGO
HOW LONG AGO DID YOU DO ANY OF THESE?: OVER A YEAR AGO
6. HAVE YOU EVER DONE ANYTHING, STARTED TO DO ANYTHING, OR PREPARED TO DO ANYTHING TO END YOUR LIFE?: YES
6. HAVE YOU EVER DONE ANYTHING, STARTED TO DO ANYTHING, OR PREPARED TO DO ANYTHING TO END YOUR LIFE?: YES
IS THE PATIENT ABLE TO COMPLETE C-SSRS: YES
2. HAVE YOU ACTUALLY HAD ANY THOUGHTS OF KILLING YOURSELF?: NO
2. HAVE YOU ACTUALLY HAD ANY THOUGHTS OF KILLING YOURSELF?: NO
1. WITHIN THE PAST MONTH, HAVE YOU WISHED YOU WERE DEAD OR WISHED YOU COULD GO TO SLEEP AND NOT WAKE UP?: NO
1. WITHIN THE PAST MONTH, HAVE YOU WISHED YOU WERE DEAD OR WISHED YOU COULD GO TO SLEEP AND NOT WAKE UP?: NO
IS THE PATIENT ABLE TO COMPLETE C-SSRS: YES

## 2020-11-02 ASSESSMENT — PAIN SCALES - GENERAL
PAINLEVEL_OUTOF10: 0

## 2020-11-03 LAB
ALBUMIN SERPL-MCNC: 2.8 G/DL (ref 3.6–5.1)
ALBUMIN/GLOB SERPL: 0.6 {RATIO} (ref 1–2.4)
ALP SERPL-CCNC: 107 UNITS/L (ref 45–117)
ALT SERPL-CCNC: 18 UNITS/L
ANION GAP SERPL CALC-SCNC: 8 MMOL/L (ref 10–20)
AST SERPL-CCNC: 18 UNITS/L
ATRIAL RATE (BPM): 83
BASOPHILS # BLD: 0.1 K/MCL (ref 0–0.3)
BASOPHILS NFR BLD: 1 %
BILIRUB SERPL-MCNC: 0.6 MG/DL (ref 0.2–1)
BUN SERPL-MCNC: 42 MG/DL (ref 6–20)
BUN/CREAT SERPL: 24 (ref 7–25)
CALCIUM SERPL-MCNC: 9.1 MG/DL (ref 8.4–10.2)
CHLORIDE SERPL-SCNC: 101 MMOL/L (ref 98–107)
CO2 SERPL-SCNC: 33 MMOL/L (ref 21–32)
CREAT SERPL-MCNC: 1.76 MG/DL (ref 0.67–1.17)
DIFFERENTIAL METHOD BLD: ABNORMAL
EOSINOPHIL # BLD: 0.2 K/MCL (ref 0.1–0.5)
EOSINOPHIL NFR BLD: 3 %
ERYTHROCYTE [DISTWIDTH] IN BLOOD: 14.2 % (ref 11–15)
GLOBULIN SER-MCNC: 4.6 G/DL (ref 2–4)
GLUCOSE BLDC GLUCOMTR-MCNC: 137 MG/DL (ref 70–99)
GLUCOSE BLDC GLUCOMTR-MCNC: 73 MG/DL (ref 70–99)
GLUCOSE BLDC GLUCOMTR-MCNC: 96 MG/DL (ref 70–99)
GLUCOSE SERPL-MCNC: 150 MG/DL (ref 65–99)
HBA1C MFR BLD: 7 % (ref 4.5–5.6)
HCT VFR BLD CALC: 36.9 % (ref 39–51)
HGB BLD-MCNC: 12.2 G/DL (ref 13–17)
IMM GRANULOCYTES # BLD AUTO: 0 K/MCL (ref 0–0.2)
IMM GRANULOCYTES NFR BLD: 0 %
LYMPHOCYTES # BLD: 2.6 K/MCL (ref 1–4)
LYMPHOCYTES NFR BLD: 29 %
MAGNESIUM SERPL-MCNC: 2.4 MG/DL (ref 1.7–2.4)
MCH RBC QN AUTO: 29.3 PG (ref 26–34)
MCHC RBC AUTO-ENTMCNC: 33.1 G/DL (ref 32–36.5)
MCV RBC AUTO: 88.7 FL (ref 78–100)
MONOCYTES # BLD: 0.7 K/MCL (ref 0.3–0.9)
MONOCYTES NFR BLD: 8 %
NEUTROPHILS # BLD: 5.4 K/MCL (ref 1.8–7.7)
NEUTROPHILS NFR BLD: 59 %
NRBC BLD MANUAL-RTO: 0 /100 WBC
P AXIS (DEGREES): 63
PLATELET # BLD: 266 K/MCL (ref 140–450)
POTASSIUM SERPL-SCNC: 4.2 MMOL/L (ref 3.4–5.1)
PR-INTERVAL (MSEC): 196
PROT SERPL-MCNC: 7.4 G/DL (ref 6.4–8.2)
QRS-INTERVAL (MSEC): 84
QT-INTERVAL (MSEC): 370
QTC: 435
R AXIS (DEGREES): 54
RBC # BLD: 4.16 MIL/MCL (ref 4.5–5.9)
REPORT TEXT: NORMAL
SARS-COV-2 RNA RESP QL NAA+PROBE: NOT DETECTED
SERVICE CMNT-IMP: NORMAL
SODIUM SERPL-SCNC: 138 MMOL/L (ref 135–145)
SPECIMEN SOURCE: NORMAL
T AXIS (DEGREES): 159
VENTRICULAR RATE EKG/MIN (BPM): 83
WBC # BLD: 9 K/MCL (ref 4.2–11)

## 2020-11-03 PROCEDURE — 10004281 HB COUNTER-STAFF TIME PER 15 MIN

## 2020-11-03 PROCEDURE — 10002801 HB RX 250 W/O HCPCS: Performed by: INTERNAL MEDICINE

## 2020-11-03 PROCEDURE — 10004651 HB RX, NO CHARGE ITEM: Performed by: INTERNAL MEDICINE

## 2020-11-03 PROCEDURE — 99232 SBSQ HOSP IP/OBS MODERATE 35: CPT | Performed by: INTERNAL MEDICINE

## 2020-11-03 PROCEDURE — 99233 SBSQ HOSP IP/OBS HIGH 50: CPT | Performed by: INTERNAL MEDICINE

## 2020-11-03 PROCEDURE — 10002803 HB RX 637: Performed by: INTERNAL MEDICINE

## 2020-11-03 PROCEDURE — 36415 COLL VENOUS BLD VENIPUNCTURE: CPT

## 2020-11-03 PROCEDURE — 10003585 HB ROOM CHARGE INTERMEDIATE CARE

## 2020-11-03 PROCEDURE — 10002800 HB RX 250 W HCPCS: Performed by: INTERNAL MEDICINE

## 2020-11-03 PROCEDURE — 83735 ASSAY OF MAGNESIUM: CPT

## 2020-11-03 PROCEDURE — 85025 COMPLETE CBC W/AUTO DIFF WBC: CPT

## 2020-11-03 PROCEDURE — 80053 COMPREHEN METABOLIC PANEL: CPT

## 2020-11-03 PROCEDURE — 83036 HEMOGLOBIN GLYCOSYLATED A1C: CPT

## 2020-11-03 RX ORDER — BUMETANIDE 0.25 MG/ML
1 INJECTION INTRAMUSCULAR; INTRAVENOUS 2 TIMES DAILY
Status: DISCONTINUED | OUTPATIENT
Start: 2020-11-03 | End: 2020-11-04

## 2020-11-03 RX ORDER — METOPROLOL SUCCINATE 25 MG/1
25 TABLET, EXTENDED RELEASE ORAL 2 TIMES DAILY
Status: DISCONTINUED | OUTPATIENT
Start: 2020-11-03 | End: 2020-11-04 | Stop reason: HOSPADM

## 2020-11-03 RX ORDER — METOPROLOL SUCCINATE 25 MG/1
12.5 TABLET, EXTENDED RELEASE ORAL 2 TIMES DAILY
Status: DISCONTINUED | OUTPATIENT
Start: 2020-11-03 | End: 2020-11-03

## 2020-11-03 RX ADMIN — METOPROLOL SUCCINATE 25 MG: 25 TABLET, EXTENDED RELEASE ORAL at 20:44

## 2020-11-03 RX ADMIN — METOPROLOL SUCCINATE 12.5 MG: 25 TABLET, EXTENDED RELEASE ORAL at 04:02

## 2020-11-03 RX ADMIN — SPIRONOLACTONE 25 MG: 25 TABLET, FILM COATED ORAL at 09:42

## 2020-11-03 RX ADMIN — MORPHINE SULFATE 2 MG: 2 INJECTION, SOLUTION INTRAMUSCULAR; INTRAVENOUS at 04:04

## 2020-11-03 RX ADMIN — SODIUM CHLORIDE 2 ML: 9 INJECTION, SOLUTION INTRAMUSCULAR; INTRAVENOUS; SUBCUTANEOUS at 10:09

## 2020-11-03 RX ADMIN — BUMETANIDE 1 MG: 0.25 INJECTION INTRAMUSCULAR; INTRAVENOUS at 09:43

## 2020-11-03 RX ADMIN — INSULIN GLARGINE 50 UNITS: 100 INJECTION, SOLUTION SUBCUTANEOUS at 20:45

## 2020-11-03 RX ADMIN — BUMETANIDE 1 MG: 0.25 INJECTION INTRAMUSCULAR; INTRAVENOUS at 20:45

## 2020-11-03 RX ADMIN — TRAMADOL HYDROCHLORIDE 50 MG: 50 TABLET, FILM COATED ORAL at 00:58

## 2020-11-03 RX ADMIN — Medication 50 MCG: at 09:39

## 2020-11-03 RX ADMIN — FAMOTIDINE 20 MG: 20 TABLET, FILM COATED ORAL at 09:38

## 2020-11-03 RX ADMIN — OXYCODONE HYDROCHLORIDE AND ACETAMINOPHEN 500 MG: 500 TABLET ORAL at 20:45

## 2020-11-03 RX ADMIN — INSULIN LISPRO 12 UNITS: 100 INJECTION, SOLUTION INTRAVENOUS; SUBCUTANEOUS at 09:43

## 2020-11-03 RX ADMIN — INSULIN GLARGINE 50 UNITS: 100 INJECTION, SOLUTION SUBCUTANEOUS at 09:44

## 2020-11-03 RX ADMIN — ENOXAPARIN SODIUM 40 MG: 40 INJECTION SUBCUTANEOUS at 20:45

## 2020-11-03 RX ADMIN — BUMETANIDE 1 MG: 0.25 INJECTION INTRAMUSCULAR; INTRAVENOUS at 04:02

## 2020-11-03 RX ADMIN — OXYCODONE HYDROCHLORIDE AND ACETAMINOPHEN 500 MG: 500 TABLET ORAL at 09:38

## 2020-11-03 RX ADMIN — ENOXAPARIN SODIUM 40 MG: 40 INJECTION SUBCUTANEOUS at 09:43

## 2020-11-03 RX ADMIN — DESMOPRESSIN ACETATE 40 MG: 0.2 TABLET ORAL at 20:44

## 2020-11-03 RX ADMIN — METOPROLOL SUCCINATE 12.5 MG: 25 TABLET, EXTENDED RELEASE ORAL at 09:45

## 2020-11-03 RX ADMIN — SODIUM CHLORIDE 2 ML: 9 INJECTION, SOLUTION INTRAMUSCULAR; INTRAVENOUS; SUBCUTANEOUS at 20:57

## 2020-11-03 ASSESSMENT — PAIN SCALES - GENERAL
PAINLEVEL_OUTOF10: 0
PAINLEVEL_OUTOF10: 9
PAINLEVEL_OUTOF10: 7
PAINLEVEL_OUTOF10: 0

## 2020-11-03 ASSESSMENT — COGNITIVE AND FUNCTIONAL STATUS - GENERAL
DO YOU HAVE SERIOUS DIFFICULTY WALKING OR CLIMBING STAIRS: YES
DO YOU HAVE DIFFICULTY DRESSING OR BATHING: YES

## 2020-11-03 ASSESSMENT — ACTIVITIES OF DAILY LIVING (ADL): PRIOR_ADL: INDEPENDENT

## 2020-11-04 VITALS
DIASTOLIC BLOOD PRESSURE: 79 MMHG | BODY MASS INDEX: 42.46 KG/M2 | HEIGHT: 72 IN | SYSTOLIC BLOOD PRESSURE: 127 MMHG | TEMPERATURE: 97.9 F | OXYGEN SATURATION: 98 % | WEIGHT: 313.49 LBS | HEART RATE: 76 BPM | RESPIRATION RATE: 18 BRPM

## 2020-11-04 LAB
ANION GAP SERPL CALC-SCNC: 7 MMOL/L (ref 10–20)
APPEARANCE UR: CLEAR
BILIRUB UR QL STRIP: NEGATIVE
BUN SERPL-MCNC: 45 MG/DL (ref 6–20)
BUN/CREAT SERPL: 23 (ref 7–25)
CALCIUM SERPL-MCNC: 9.2 MG/DL (ref 8.4–10.2)
CHLORIDE SERPL-SCNC: 98 MMOL/L (ref 98–107)
CO2 SERPL-SCNC: 35 MMOL/L (ref 21–32)
COLOR UR: YELLOW
CREAT SERPL-MCNC: 1.97 MG/DL (ref 0.67–1.17)
GLUCOSE BLDC GLUCOMTR-MCNC: 175 MG/DL (ref 70–99)
GLUCOSE BLDC GLUCOMTR-MCNC: 94 MG/DL (ref 70–99)
GLUCOSE SERPL-MCNC: 81 MG/DL (ref 65–99)
GLUCOSE UR STRIP-MCNC: NEGATIVE MG/DL
HGB UR QL STRIP: NEGATIVE
KETONES UR STRIP-MCNC: NEGATIVE MG/DL
LEUKOCYTE ESTERASE UR QL STRIP: NEGATIVE
MAGNESIUM SERPL-MCNC: 2.5 MG/DL (ref 1.7–2.4)
NITRITE UR QL STRIP: NEGATIVE
PH UR STRIP: 5 UNITS (ref 5–7)
POTASSIUM SERPL-SCNC: 3.8 MMOL/L (ref 3.4–5.1)
PROT UR STRIP-MCNC: NEGATIVE MG/DL
SODIUM SERPL-SCNC: 136 MMOL/L (ref 135–145)
SP GR UR STRIP: 1.01 (ref 1–1.03)
SPECIMEN SOURCE: NORMAL
UROBILINOGEN UR STRIP-MCNC: 0.2 MG/DL (ref 0–1)

## 2020-11-04 PROCEDURE — 81003 URINALYSIS AUTO W/O SCOPE: CPT

## 2020-11-04 PROCEDURE — 10002803 HB RX 637: Performed by: INTERNAL MEDICINE

## 2020-11-04 PROCEDURE — 80048 BASIC METABOLIC PNL TOTAL CA: CPT

## 2020-11-04 PROCEDURE — 10004651 HB RX, NO CHARGE ITEM: Performed by: INTERNAL MEDICINE

## 2020-11-04 PROCEDURE — 10002800 HB RX 250 W HCPCS: Performed by: INTERNAL MEDICINE

## 2020-11-04 PROCEDURE — 99232 SBSQ HOSP IP/OBS MODERATE 35: CPT | Performed by: INTERNAL MEDICINE

## 2020-11-04 PROCEDURE — 36415 COLL VENOUS BLD VENIPUNCTURE: CPT

## 2020-11-04 PROCEDURE — 83735 ASSAY OF MAGNESIUM: CPT

## 2020-11-04 PROCEDURE — 99239 HOSP IP/OBS DSCHRG MGMT >30: CPT | Performed by: INTERNAL MEDICINE

## 2020-11-04 RX ORDER — INSULIN LISPRO 100 [IU]/ML
12 INJECTION, SOLUTION INTRAVENOUS; SUBCUTANEOUS
Qty: 15 ML | Refills: 12 | Status: ON HOLD | OUTPATIENT
Start: 2020-11-04 | End: 2021-07-01

## 2020-11-04 RX ORDER — METOPROLOL SUCCINATE 25 MG/1
25 TABLET, EXTENDED RELEASE ORAL 2 TIMES DAILY
Qty: 60 TABLET | Refills: 3 | Status: SHIPPED | OUTPATIENT
Start: 2020-11-04 | End: 2021-07-08

## 2020-11-04 RX ORDER — TORSEMIDE 10 MG/1
10 TABLET ORAL DAILY
Status: DISCONTINUED | OUTPATIENT
Start: 2020-11-04 | End: 2020-11-04 | Stop reason: HOSPADM

## 2020-11-04 RX ORDER — POTASSIUM CHLORIDE 20 MEQ/1
40 TABLET, EXTENDED RELEASE ORAL ONCE
Status: COMPLETED | OUTPATIENT
Start: 2020-11-04 | End: 2020-11-04

## 2020-11-04 RX ADMIN — ENOXAPARIN SODIUM 40 MG: 40 INJECTION SUBCUTANEOUS at 08:43

## 2020-11-04 RX ADMIN — TORSEMIDE 10 MG: 10 TABLET ORAL at 08:43

## 2020-11-04 RX ADMIN — SODIUM CHLORIDE 2 ML: 9 INJECTION, SOLUTION INTRAMUSCULAR; INTRAVENOUS; SUBCUTANEOUS at 08:44

## 2020-11-04 RX ADMIN — INSULIN LISPRO 13 UNITS: 100 INJECTION, SOLUTION INTRAVENOUS; SUBCUTANEOUS at 12:44

## 2020-11-04 RX ADMIN — POTASSIUM CHLORIDE 40 MEQ: 1500 TABLET, EXTENDED RELEASE ORAL at 08:42

## 2020-11-04 RX ADMIN — OXYCODONE HYDROCHLORIDE AND ACETAMINOPHEN 500 MG: 500 TABLET ORAL at 08:42

## 2020-11-04 RX ADMIN — Medication 3 MG: at 02:07

## 2020-11-04 RX ADMIN — ACETAMINOPHEN 650 MG: 325 TABLET, FILM COATED ORAL at 08:42

## 2020-11-04 RX ADMIN — Medication 50 MCG: at 08:42

## 2020-11-04 RX ADMIN — SPIRONOLACTONE 25 MG: 25 TABLET, FILM COATED ORAL at 08:42

## 2020-11-04 RX ADMIN — FAMOTIDINE 20 MG: 20 TABLET, FILM COATED ORAL at 08:43

## 2020-11-04 RX ADMIN — METOPROLOL SUCCINATE 25 MG: 25 TABLET, EXTENDED RELEASE ORAL at 08:41

## 2020-11-04 RX ADMIN — INSULIN GLARGINE 50 UNITS: 100 INJECTION, SOLUTION SUBCUTANEOUS at 08:43

## 2020-11-04 ASSESSMENT — PAIN SCALES - GENERAL: PAINLEVEL_OUTOF10: 4

## 2020-11-05 ENCOUNTER — TELEPHONE (OUTPATIENT)
Dept: SCHEDULING | Age: 65
End: 2020-11-05

## 2020-11-06 ENCOUNTER — TELEPHONE (OUTPATIENT)
Dept: SCHEDULING | Age: 65
End: 2020-11-06

## 2020-11-10 ENCOUNTER — TELEPHONE (OUTPATIENT)
Dept: CARDIOLOGY | Age: 65
End: 2020-11-10

## 2020-11-11 ENCOUNTER — OFFICE VISIT (OUTPATIENT)
Dept: CARDIOLOGY | Age: 65
DRG: 622 | End: 2020-11-11
Attending: INTERNAL MEDICINE

## 2020-11-11 ENCOUNTER — APPOINTMENT (OUTPATIENT)
Dept: GENERAL RADIOLOGY | Age: 65
DRG: 622 | End: 2020-11-11

## 2020-11-11 ENCOUNTER — HOSPITAL ENCOUNTER (INPATIENT)
Age: 65
LOS: 2 days | Discharge: HOME HEALTH | DRG: 622 | End: 2020-11-13
Attending: EMERGENCY MEDICINE | Admitting: INTERNAL MEDICINE

## 2020-11-11 ENCOUNTER — APPOINTMENT (OUTPATIENT)
Dept: ULTRASOUND IMAGING | Age: 65
DRG: 622 | End: 2020-11-11

## 2020-11-11 VITALS — BODY MASS INDEX: 44.52 KG/M2 | DIASTOLIC BLOOD PRESSURE: 62 MMHG | SYSTOLIC BLOOD PRESSURE: 112 MMHG | WEIGHT: 315 LBS

## 2020-11-11 DIAGNOSIS — R63.5 WEIGHT GAIN: ICD-10-CM

## 2020-11-11 DIAGNOSIS — M79.672 LEFT FOOT PAIN: ICD-10-CM

## 2020-11-11 DIAGNOSIS — I50.33 ACUTE ON CHRONIC HEART FAILURE WITH PRESERVED EJECTION FRACTION (CMD): Primary | ICD-10-CM

## 2020-11-11 DIAGNOSIS — M86.9 OSTEOMYELITIS OF LEFT FOOT, UNSPECIFIED TYPE (CMD): Primary | ICD-10-CM

## 2020-11-11 DIAGNOSIS — I12.9 HYPERTENSIVE CHRONIC KIDNEY DISEASE, UNSPECIFIED CKD STAGE: ICD-10-CM

## 2020-11-11 DIAGNOSIS — R06.09 DYSPNEA ON EXERTION: ICD-10-CM

## 2020-11-11 LAB
ALBUMIN SERPL-MCNC: 2.9 G/DL (ref 3.6–5.1)
ALBUMIN SERPL-MCNC: 3.2 G/DL (ref 3.6–5.1)
ALBUMIN/GLOB SERPL: 0.6 {RATIO} (ref 1–2.4)
ALBUMIN/GLOB SERPL: 0.7 {RATIO} (ref 1–2.4)
ALP SERPL-CCNC: 103 UNITS/L (ref 45–117)
ALP SERPL-CCNC: 104 UNITS/L (ref 45–117)
ALT SERPL-CCNC: 16 UNITS/L
ALT SERPL-CCNC: 17 UNITS/L
ANION GAP SERPL CALC-SCNC: 10 MMOL/L (ref 10–20)
ANION GAP SERPL CALC-SCNC: 8 MMOL/L (ref 10–20)
AST SERPL-CCNC: 14 UNITS/L
AST SERPL-CCNC: 16 UNITS/L
BASOPHILS # BLD: 0.1 K/MCL (ref 0–0.3)
BASOPHILS NFR BLD: 1 %
BILIRUB SERPL-MCNC: 0.3 MG/DL (ref 0.2–1)
BILIRUB SERPL-MCNC: 0.4 MG/DL (ref 0.2–1)
BUN SERPL-MCNC: 53 MG/DL (ref 6–20)
BUN SERPL-MCNC: 54 MG/DL (ref 6–20)
BUN/CREAT SERPL: 32 (ref 7–25)
BUN/CREAT SERPL: 34 (ref 7–25)
CALCIUM SERPL-MCNC: 8.6 MG/DL (ref 8.4–10.2)
CALCIUM SERPL-MCNC: 9.1 MG/DL (ref 8.4–10.2)
CHLORIDE SERPL-SCNC: 103 MMOL/L (ref 98–107)
CHLORIDE SERPL-SCNC: 104 MMOL/L (ref 98–107)
CO2 SERPL-SCNC: 28 MMOL/L (ref 21–32)
CO2 SERPL-SCNC: 31 MMOL/L (ref 21–32)
CREAT SERPL-MCNC: 1.59 MG/DL (ref 0.67–1.17)
CREAT SERPL-MCNC: 1.68 MG/DL (ref 0.67–1.17)
CRP SERPL-MCNC: 0.5 MG/DL
DIFFERENTIAL METHOD BLD: ABNORMAL
EOSINOPHIL # BLD: 0.3 K/MCL (ref 0.1–0.5)
EOSINOPHIL NFR BLD: 3 %
ERYTHROCYTE [DISTWIDTH] IN BLOOD: 13.9 % (ref 11–15)
ERYTHROCYTE [DISTWIDTH] IN BLOOD: 14 % (ref 11–15)
ERYTHROCYTE [SEDIMENTATION RATE] IN BLOOD BY WESTERGREN METHOD: 69 MM/HR (ref 0–20)
GLOBULIN SER-MCNC: 4.7 G/DL (ref 2–4)
GLOBULIN SER-MCNC: 4.8 G/DL (ref 2–4)
GLUCOSE BLDC GLUCOMTR-MCNC: 139 MG/DL (ref 70–99)
GLUCOSE SERPL-MCNC: 116 MG/DL (ref 65–99)
GLUCOSE SERPL-MCNC: 215 MG/DL (ref 65–99)
HCT VFR BLD CALC: 37.5 % (ref 39–51)
HCT VFR BLD CALC: 37.6 % (ref 39–51)
HGB BLD-MCNC: 12 G/DL (ref 13–17)
HGB BLD-MCNC: 12.2 G/DL (ref 13–17)
IMM GRANULOCYTES # BLD AUTO: 0 K/MCL (ref 0–0.2)
IMM GRANULOCYTES NFR BLD: 0 %
LYMPHOCYTES # BLD: 2.5 K/MCL (ref 1–4)
LYMPHOCYTES NFR BLD: 28 %
MCH RBC QN AUTO: 28.9 PG (ref 26–34)
MCH RBC QN AUTO: 29.1 PG (ref 26–34)
MCHC RBC AUTO-ENTMCNC: 31.9 G/DL (ref 32–36.5)
MCHC RBC AUTO-ENTMCNC: 32.5 G/DL (ref 32–36.5)
MCV RBC AUTO: 89.5 FL (ref 78–100)
MCV RBC AUTO: 90.6 FL (ref 78–100)
MONOCYTES # BLD: 0.8 K/MCL (ref 0.3–0.9)
MONOCYTES NFR BLD: 9 %
NEUTROPHILS # BLD: 5.3 K/MCL (ref 1.8–7.7)
NEUTROPHILS NFR BLD: 59 %
NRBC BLD MANUAL-RTO: 0 /100 WBC
NRBC BLD MANUAL-RTO: 0 /100 WBC
NT-PROBNP SERPL-MCNC: 740 PG/ML
PLATELET # BLD: 273 K/MCL (ref 140–450)
PLATELET # BLD: 284 K/MCL (ref 140–450)
POTASSIUM SERPL-SCNC: 4.4 MMOL/L (ref 3.4–5.1)
POTASSIUM SERPL-SCNC: 5 MMOL/L (ref 3.4–5.1)
PROT SERPL-MCNC: 7.7 G/DL (ref 6.4–8.2)
PROT SERPL-MCNC: 7.9 G/DL (ref 6.4–8.2)
RBC # BLD: 4.15 MIL/MCL (ref 4.5–5.9)
RBC # BLD: 4.19 MIL/MCL (ref 4.5–5.9)
SODIUM SERPL-SCNC: 137 MMOL/L (ref 135–145)
SODIUM SERPL-SCNC: 138 MMOL/L (ref 135–145)
URATE SERPL-MCNC: 8.8 MG/DL (ref 3.5–7.2)
WBC # BLD: 10 K/MCL (ref 4.2–11)
WBC # BLD: 9 K/MCL (ref 4.2–11)

## 2020-11-11 PROCEDURE — 85027 COMPLETE CBC AUTOMATED: CPT

## 2020-11-11 PROCEDURE — 73630 X-RAY EXAM OF FOOT: CPT

## 2020-11-11 PROCEDURE — 83880 ASSAY OF NATRIURETIC PEPTIDE: CPT

## 2020-11-11 PROCEDURE — 99214 OFFICE O/P EST MOD 30 MIN: CPT | Performed by: NURSE PRACTITIONER

## 2020-11-11 PROCEDURE — 84550 ASSAY OF BLOOD/URIC ACID: CPT

## 2020-11-11 PROCEDURE — 10002807 HB RX 258: Performed by: EMERGENCY MEDICINE

## 2020-11-11 PROCEDURE — 80053 COMPREHEN METABOLIC PANEL: CPT

## 2020-11-11 PROCEDURE — 10002801 HB RX 250 W/O HCPCS: Performed by: EMERGENCY MEDICINE

## 2020-11-11 PROCEDURE — 85652 RBC SED RATE AUTOMATED: CPT

## 2020-11-11 PROCEDURE — 85025 COMPLETE CBC W/AUTO DIFF WBC: CPT

## 2020-11-11 PROCEDURE — 87040 BLOOD CULTURE FOR BACTERIA: CPT

## 2020-11-11 PROCEDURE — 36415 COLL VENOUS BLD VENIPUNCTURE: CPT

## 2020-11-11 PROCEDURE — 3078F DIAST BP <80 MM HG: CPT | Performed by: NURSE PRACTITIONER

## 2020-11-11 PROCEDURE — 3074F SYST BP LT 130 MM HG: CPT | Performed by: NURSE PRACTITIONER

## 2020-11-11 PROCEDURE — 93971 EXTREMITY STUDY: CPT

## 2020-11-11 PROCEDURE — 10002803 HB RX 637: Performed by: PHYSICIAN ASSISTANT

## 2020-11-11 PROCEDURE — 99285 EMERGENCY DEPT VISIT HI MDM: CPT

## 2020-11-11 PROCEDURE — U0003 INFECTIOUS AGENT DETECTION BY NUCLEIC ACID (DNA OR RNA); SEVERE ACUTE RESPIRATORY SYNDROME CORONAVIRUS 2 (SARS-COV-2) (CORONAVIRUS DISEASE [COVID-19]), AMPLIFIED PROBE TECHNIQUE, MAKING USE OF HIGH THROUGHPUT TECHNOLOGIES AS DESCRIBED BY CMS-2020-01-R: HCPCS

## 2020-11-11 PROCEDURE — 86140 C-REACTIVE PROTEIN: CPT

## 2020-11-11 PROCEDURE — 10000002 HB ROOM CHARGE MED SURG

## 2020-11-11 PROCEDURE — 10002800 HB RX 250 W HCPCS: Performed by: EMERGENCY MEDICINE

## 2020-11-11 RX ORDER — FAMOTIDINE 20 MG/1
20 TABLET, FILM COATED ORAL DAILY
Qty: 30 TABLET | Refills: 3 | Status: SHIPPED | OUTPATIENT
Start: 2020-11-11 | End: 2021-02-15

## 2020-11-11 RX ORDER — TORSEMIDE 10 MG/1
20 TABLET ORAL DAILY
Qty: 60 TABLET | Refills: 3 | Status: SHIPPED | OUTPATIENT
Start: 2020-11-11 | End: 2021-02-15

## 2020-11-11 RX ORDER — ATORVASTATIN CALCIUM 40 MG/1
40 TABLET, FILM COATED ORAL DAILY
Qty: 90 TABLET | Refills: 2 | Status: ON HOLD | OUTPATIENT
Start: 2020-11-11 | End: 2021-04-17 | Stop reason: SDUPTHER

## 2020-11-11 RX ORDER — ACETAMINOPHEN 325 MG/1
650 TABLET ORAL EVERY 6 HOURS PRN
Status: DISCONTINUED | OUTPATIENT
Start: 2020-11-11 | End: 2020-11-13 | Stop reason: HOSPADM

## 2020-11-11 RX ORDER — HYDROCODONE BITARTRATE AND ACETAMINOPHEN 5; 325 MG/1; MG/1
1 TABLET ORAL ONCE
Status: COMPLETED | OUTPATIENT
Start: 2020-11-11 | End: 2020-11-11

## 2020-11-11 RX ORDER — DEXTROSE MONOHYDRATE 25 G/50ML
12.5 INJECTION, SOLUTION INTRAVENOUS PRN
Status: DISCONTINUED | OUTPATIENT
Start: 2020-11-11 | End: 2020-11-13 | Stop reason: HOSPADM

## 2020-11-11 RX ORDER — DEXTROSE MONOHYDRATE 50 MG/ML
INJECTION, SOLUTION INTRAVENOUS CONTINUOUS PRN
Status: DISCONTINUED | OUTPATIENT
Start: 2020-11-11 | End: 2020-11-13 | Stop reason: HOSPADM

## 2020-11-11 RX ORDER — NICOTINE POLACRILEX 4 MG
15 LOZENGE BUCCAL PRN
Status: DISCONTINUED | OUTPATIENT
Start: 2020-11-11 | End: 2020-11-13 | Stop reason: HOSPADM

## 2020-11-11 RX ORDER — DEXTROSE MONOHYDRATE 25 G/50ML
25 INJECTION, SOLUTION INTRAVENOUS PRN
Status: DISCONTINUED | OUTPATIENT
Start: 2020-11-11 | End: 2020-11-13 | Stop reason: HOSPADM

## 2020-11-11 RX ORDER — NICOTINE POLACRILEX 4 MG
30 LOZENGE BUCCAL PRN
Status: DISCONTINUED | OUTPATIENT
Start: 2020-11-11 | End: 2020-11-13 | Stop reason: HOSPADM

## 2020-11-11 RX ORDER — SPIRONOLACTONE 25 MG/1
25 TABLET ORAL DAILY
Qty: 30 TABLET | Refills: 3 | Status: SHIPPED | OUTPATIENT
Start: 2020-11-11 | End: 2021-02-15

## 2020-11-11 RX ORDER — ALLOPURINOL 100 MG/1
100 TABLET ORAL DAILY
Qty: 30 TABLET | Refills: 3 | Status: SHIPPED | OUTPATIENT
Start: 2020-11-11 | End: 2021-07-12

## 2020-11-11 RX ADMIN — METRONIDAZOLE 500 MG: 500 INJECTION, SOLUTION INTRAVENOUS at 16:45

## 2020-11-11 RX ADMIN — HYDROCODONE BITARTRATE AND ACETAMINOPHEN 1 TABLET: 5; 325 TABLET ORAL at 16:00

## 2020-11-11 RX ADMIN — CEFEPIME HYDROCHLORIDE 1000 MG: 1 INJECTION, POWDER, FOR SOLUTION INTRAMUSCULAR; INTRAVENOUS at 17:20

## 2020-11-11 RX ADMIN — Medication 2000 MG: at 16:55

## 2020-11-11 SDOH — HEALTH STABILITY: MENTAL HEALTH: HOW OFTEN DO YOU HAVE A DRINK CONTAINING ALCOHOL?: NEVER

## 2020-11-11 ASSESSMENT — COGNITIVE AND FUNCTIONAL STATUS - GENERAL
DO YOU HAVE DIFFICULTY DRESSING OR BATHING: NO
DO YOU HAVE SERIOUS DIFFICULTY WALKING OR CLIMBING STAIRS: NO
ARE YOU BLIND OR DO YOU HAVE SERIOUS DIFFICULTY SEEING, EVEN WHEN WEARING GLASSES: NO
ARE YOU DEAF OR DO YOU HAVE SERIOUS DIFFICULTY  HEARING: NO
BECAUSE OF A PHYSICAL, MENTAL, OR EMOTIONAL CONDITION, DO YOU HAVE SERIOUS DIFFICULTY CONCENTRATING, REMEMBERING OR MAKING DECISIONS: NO
BECAUSE OF A PHYSICAL, MENTAL, OR EMOTIONAL CONDITION, DO YOU HAVE DIFFICULTY DOING ERRANDS ALONE: NO

## 2020-11-11 ASSESSMENT — ACTIVITIES OF DAILY LIVING (ADL)
MOBILITY_ASSIST_DEVICES: STANDARD WALKER
RECENT_DECLINE_ADL: NO
ADL_SHORT_OF_BREATH: YES
ADL_BEFORE_ADMISSION: INDEPENDENT
ADL_BEFORE_ADMISSION: INDEPENDENT
ADL_SHORT_OF_BREATH: YES
ADL_SCORE: 12
ADL_SCORE: 12
MOBILITY_ASSIST_DEVICES: STANDARD WALKER;WHEELCHAIR
RECENT_DECLINE_ADL: NO

## 2020-11-11 ASSESSMENT — LIFESTYLE VARIABLES
ALCOHOL_USE_STATUS: NO OR LOW RISK WITH VALIDATED TOOL
AUDIT-C TOTAL SCORE: 0
HOW OFTEN DO YOU HAVE A DRINK CONTAINING ALCOHOL: NEVER
HOW OFTEN DO YOU HAVE 6 OR MORE DRINKS ON ONE OCCASION: NEVER
HOW MANY STANDARD DRINKS CONTAINING ALCOHOL DO YOU HAVE ON A TYPICAL DAY: 0,1 OR 2
HOW OFTEN DO YOU HAVE 6 OR MORE DRINKS ON ONE OCCASION: NEVER
HOW OFTEN DO YOU HAVE A DRINK CONTAINING ALCOHOL: NEVER
ALCOHOL_USE_STATUS: NO OR LOW RISK WITH VALIDATED TOOL
HOW MANY STANDARD DRINKS CONTAINING ALCOHOL DO YOU HAVE ON A TYPICAL DAY: 0,1 OR 2
AUDIT-C TOTAL SCORE: 0

## 2020-11-11 ASSESSMENT — ENCOUNTER SYMPTOMS
ABDOMINAL PAIN: 0
WOUND: 1
SHORTNESS OF BREATH: 0
BACK PAIN: 0
FEVER: 0
WEAKNESS: 0
NERVOUS/ANXIOUS: 0

## 2020-11-11 ASSESSMENT — PATIENT HEALTH QUESTIONNAIRE - PHQ9
CLINICAL INTERPRETATION OF PHQ2 SCORE: NO FURTHER SCREENING NEEDED
SUM OF ALL RESPONSES TO PHQ9 QUESTIONS 1 AND 2: 0
CLINICAL INTERPRETATION OF PHQ9 SCORE: NO FURTHER SCREENING NEEDED
2. FEELING DOWN, DEPRESSED OR HOPELESS: NOT AT ALL
IS PATIENT ABLE TO COMPLETE PHQ2 OR PHQ9: YES
1. LITTLE INTEREST OR PLEASURE IN DOING THINGS: NOT AT ALL
SUM OF ALL RESPONSES TO PHQ9 QUESTIONS 1 AND 2: 0

## 2020-11-11 ASSESSMENT — COLUMBIA-SUICIDE SEVERITY RATING SCALE - C-SSRS
1. WITHIN THE PAST MONTH, HAVE YOU WISHED YOU WERE DEAD OR WISHED YOU COULD GO TO SLEEP AND NOT WAKE UP?: NO
6. HAVE YOU EVER DONE ANYTHING, STARTED TO DO ANYTHING, OR PREPARED TO DO ANYTHING TO END YOUR LIFE?: NO
IS THE PATIENT ABLE TO COMPLETE C-SSRS: YES
2. HAVE YOU ACTUALLY HAD ANY THOUGHTS OF KILLING YOURSELF?: NO

## 2020-11-11 ASSESSMENT — PAIN SCALES - GENERAL: PAINLEVEL_OUTOF10: 0

## 2020-11-12 ENCOUNTER — APPOINTMENT (OUTPATIENT)
Dept: GENERAL RADIOLOGY | Age: 65
DRG: 622 | End: 2020-11-12
Attending: PODIATRIST

## 2020-11-12 PROBLEM — G47.33 OSA (OBSTRUCTIVE SLEEP APNEA): Status: ACTIVE | Noted: 2019-11-12

## 2020-11-12 PROBLEM — I27.0: Status: ACTIVE | Noted: 2020-11-12

## 2020-11-12 PROBLEM — E66.01 MORBID OBESITY WITH BMI OF 40.0-44.9, ADULT (CMD): Status: ACTIVE | Noted: 2020-11-12

## 2020-11-12 PROBLEM — D64.9 ANEMIA: Status: ACTIVE | Noted: 2020-11-12

## 2020-11-12 PROBLEM — N18.9 CKD (CHRONIC KIDNEY DISEASE): Status: ACTIVE | Noted: 2020-05-15

## 2020-11-12 PROBLEM — R70.0 ELEVATED SED RATE: Status: ACTIVE | Noted: 2020-11-12

## 2020-11-12 PROBLEM — M86.9 OSTEOMYELITIS OF LEFT FOOT (CMD): Status: ACTIVE | Noted: 2020-11-12

## 2020-11-12 LAB
ALBUMIN SERPL-MCNC: 2.6 G/DL (ref 3.6–5.1)
ALBUMIN/GLOB SERPL: 0.6 {RATIO} (ref 1–2.4)
ALP SERPL-CCNC: 91 UNITS/L (ref 45–117)
ALT SERPL-CCNC: 15 UNITS/L
ANION GAP SERPL CALC-SCNC: 8 MMOL/L (ref 10–20)
AST SERPL-CCNC: 18 UNITS/L
BASOPHILS # BLD: 0.1 K/MCL (ref 0–0.3)
BASOPHILS NFR BLD: 1 %
BILIRUB SERPL-MCNC: 0.4 MG/DL (ref 0.2–1)
BUN SERPL-MCNC: 43 MG/DL (ref 6–20)
BUN/CREAT SERPL: 29 (ref 7–25)
CALCIUM SERPL-MCNC: 8.6 MG/DL (ref 8.4–10.2)
CHLORIDE SERPL-SCNC: 110 MMOL/L (ref 98–107)
CO2 SERPL-SCNC: 27 MMOL/L (ref 21–32)
CREAT SERPL-MCNC: 1.48 MG/DL (ref 0.67–1.17)
DIFFERENTIAL METHOD BLD: ABNORMAL
EOSINOPHIL # BLD: 0.3 K/MCL (ref 0.1–0.5)
EOSINOPHIL NFR BLD: 4 %
ERYTHROCYTE [DISTWIDTH] IN BLOOD: 14 % (ref 11–15)
GLOBULIN SER-MCNC: 4.3 G/DL (ref 2–4)
GLUCOSE BLDC GLUCOMTR-MCNC: 102 MG/DL (ref 70–99)
GLUCOSE BLDC GLUCOMTR-MCNC: 116 MG/DL (ref 70–99)
GLUCOSE BLDC GLUCOMTR-MCNC: 194 MG/DL (ref 70–99)
GLUCOSE BLDC GLUCOMTR-MCNC: 203 MG/DL (ref 70–99)
GLUCOSE BLDC GLUCOMTR-MCNC: 66 MG/DL (ref 70–99)
GLUCOSE SERPL-MCNC: 94 MG/DL (ref 65–99)
HBA1C MFR BLD: 7.1 % (ref 4.5–5.6)
HCT VFR BLD CALC: 35 % (ref 39–51)
HGB BLD-MCNC: 11.2 G/DL (ref 13–17)
IMM GRANULOCYTES # BLD AUTO: 0 K/MCL (ref 0–0.2)
IMM GRANULOCYTES NFR BLD: 0 %
LYMPHOCYTES # BLD: 2.3 K/MCL (ref 1–4)
LYMPHOCYTES NFR BLD: 28 %
MAGNESIUM SERPL-MCNC: 2.6 MG/DL (ref 1.7–2.4)
MCH RBC QN AUTO: 28.9 PG (ref 26–34)
MCHC RBC AUTO-ENTMCNC: 32 G/DL (ref 32–36.5)
MCV RBC AUTO: 90.4 FL (ref 78–100)
MONOCYTES # BLD: 0.7 K/MCL (ref 0.3–0.9)
MONOCYTES NFR BLD: 9 %
NEUTROPHILS # BLD: 4.8 K/MCL (ref 1.8–7.7)
NEUTROPHILS NFR BLD: 58 %
NRBC BLD MANUAL-RTO: 0 /100 WBC
PLATELET # BLD: 245 K/MCL (ref 140–450)
POTASSIUM SERPL-SCNC: 4.2 MMOL/L (ref 3.4–5.1)
PROT SERPL-MCNC: 6.9 G/DL (ref 6.4–8.2)
RBC # BLD: 3.87 MIL/MCL (ref 4.5–5.9)
SARS-COV-2 RNA RESP QL NAA+PROBE: NOT DETECTED
SERVICE CMNT-IMP: NORMAL
SODIUM SERPL-SCNC: 141 MMOL/L (ref 135–145)
SPECIMEN SOURCE: NORMAL
WBC # BLD: 8.3 K/MCL (ref 4.2–11)

## 2020-11-12 PROCEDURE — 10002801 HB RX 250 W/O HCPCS: Performed by: INTERNAL MEDICINE

## 2020-11-12 PROCEDURE — 10004651 HB RX, NO CHARGE ITEM: Performed by: INTERNAL MEDICINE

## 2020-11-12 PROCEDURE — 73630 X-RAY EXAM OF FOOT: CPT

## 2020-11-12 PROCEDURE — 85025 COMPLETE CBC W/AUTO DIFF WBC: CPT

## 2020-11-12 PROCEDURE — 0JBR0ZZ EXCISION OF LEFT FOOT SUBCUTANEOUS TISSUE AND FASCIA, OPEN APPROACH: ICD-10-PCS | Performed by: PODIATRIST

## 2020-11-12 PROCEDURE — 83036 HEMOGLOBIN GLYCOSYLATED A1C: CPT

## 2020-11-12 PROCEDURE — 80053 COMPREHEN METABOLIC PANEL: CPT

## 2020-11-12 PROCEDURE — 36415 COLL VENOUS BLD VENIPUNCTURE: CPT

## 2020-11-12 PROCEDURE — 10000002 HB ROOM CHARGE MED SURG

## 2020-11-12 PROCEDURE — 83735 ASSAY OF MAGNESIUM: CPT

## 2020-11-12 PROCEDURE — 10002800 HB RX 250 W HCPCS: Performed by: INTERNAL MEDICINE

## 2020-11-12 PROCEDURE — 10002807 HB RX 258: Performed by: INTERNAL MEDICINE

## 2020-11-12 PROCEDURE — 10002800 HB RX 250 W HCPCS: Performed by: NURSE PRACTITIONER

## 2020-11-12 PROCEDURE — 10002803 HB RX 637: Performed by: NURSE PRACTITIONER

## 2020-11-12 RX ORDER — INSULIN LISPRO 100 [IU]/ML
12 INJECTION, SOLUTION INTRAVENOUS; SUBCUTANEOUS
Status: DISCONTINUED | OUTPATIENT
Start: 2020-11-12 | End: 2020-11-13 | Stop reason: HOSPADM

## 2020-11-12 RX ORDER — TORSEMIDE 20 MG/1
20 TABLET ORAL DAILY
Status: DISCONTINUED | OUTPATIENT
Start: 2020-11-12 | End: 2020-11-13 | Stop reason: HOSPADM

## 2020-11-12 RX ORDER — SPIRONOLACTONE 25 MG/1
25 TABLET ORAL DAILY
Status: DISCONTINUED | OUTPATIENT
Start: 2020-11-12 | End: 2020-11-13 | Stop reason: HOSPADM

## 2020-11-12 RX ORDER — ASCORBIC ACID 500 MG
500 TABLET ORAL 2 TIMES DAILY
Status: DISCONTINUED | OUTPATIENT
Start: 2020-11-12 | End: 2020-11-13 | Stop reason: HOSPADM

## 2020-11-12 RX ORDER — METOPROLOL SUCCINATE 25 MG/1
25 TABLET, EXTENDED RELEASE ORAL 2 TIMES DAILY
Status: DISCONTINUED | OUTPATIENT
Start: 2020-11-12 | End: 2020-11-13 | Stop reason: HOSPADM

## 2020-11-12 RX ORDER — INSULIN GLARGINE 100 [IU]/ML
50 INJECTION, SOLUTION SUBCUTANEOUS EVERY 12 HOURS SCHEDULED
Status: DISCONTINUED | OUTPATIENT
Start: 2020-11-12 | End: 2020-11-13 | Stop reason: HOSPADM

## 2020-11-12 RX ORDER — FAMOTIDINE 20 MG/1
20 TABLET, FILM COATED ORAL DAILY
Status: DISCONTINUED | OUTPATIENT
Start: 2020-11-12 | End: 2020-11-13 | Stop reason: HOSPADM

## 2020-11-12 RX ORDER — ATORVASTATIN CALCIUM 40 MG/1
40 TABLET, FILM COATED ORAL DAILY
Status: DISCONTINUED | OUTPATIENT
Start: 2020-11-12 | End: 2020-11-13 | Stop reason: HOSPADM

## 2020-11-12 RX ORDER — VITAMIN B COMPLEX
50 TABLET ORAL DAILY
Status: DISCONTINUED | OUTPATIENT
Start: 2020-11-12 | End: 2020-11-13 | Stop reason: HOSPADM

## 2020-11-12 RX ORDER — POLYETHYLENE GLYCOL 3350 17 G/17G
17 POWDER, FOR SOLUTION ORAL DAILY PRN
Status: DISCONTINUED | OUTPATIENT
Start: 2020-11-12 | End: 2020-11-13 | Stop reason: HOSPADM

## 2020-11-12 RX ORDER — ALLOPURINOL 100 MG/1
100 TABLET ORAL DAILY
Status: DISCONTINUED | OUTPATIENT
Start: 2020-11-12 | End: 2020-11-13 | Stop reason: HOSPADM

## 2020-11-12 RX ORDER — MAGNESIUM HYDROXIDE/ALUMINUM HYDROXICE/SIMETHICONE 120; 1200; 1200 MG/30ML; MG/30ML; MG/30ML
30 SUSPENSION ORAL EVERY 4 HOURS PRN
Status: DISCONTINUED | OUTPATIENT
Start: 2020-11-12 | End: 2020-11-13 | Stop reason: HOSPADM

## 2020-11-12 RX ADMIN — INSULIN GLARGINE 50 UNITS: 100 INJECTION, SOLUTION SUBCUTANEOUS at 22:43

## 2020-11-12 RX ADMIN — METRONIDAZOLE 500 MG: 500 INJECTION, SOLUTION INTRAVENOUS at 17:08

## 2020-11-12 RX ADMIN — CEFEPIME HYDROCHLORIDE 1000 MG: 1 INJECTION, POWDER, FOR SOLUTION INTRAMUSCULAR; INTRAVENOUS at 17:24

## 2020-11-12 RX ADMIN — CEFEPIME HYDROCHLORIDE 1000 MG: 1 INJECTION, POWDER, FOR SOLUTION INTRAMUSCULAR; INTRAVENOUS at 10:31

## 2020-11-12 RX ADMIN — FAMOTIDINE 20 MG: 20 TABLET, FILM COATED ORAL at 13:08

## 2020-11-12 RX ADMIN — OXYCODONE HYDROCHLORIDE AND ACETAMINOPHEN 500 MG: 500 TABLET ORAL at 22:42

## 2020-11-12 RX ADMIN — OXYCODONE HYDROCHLORIDE AND ACETAMINOPHEN 500 MG: 500 TABLET ORAL at 13:08

## 2020-11-12 RX ADMIN — INSULIN LISPRO 12 UNITS: 100 INJECTION, SOLUTION INTRAVENOUS; SUBCUTANEOUS at 13:35

## 2020-11-12 RX ADMIN — DESMOPRESSIN ACETATE 40 MG: 0.2 TABLET ORAL at 13:09

## 2020-11-12 RX ADMIN — ACETAMINOPHEN 650 MG: 325 TABLET, FILM COATED ORAL at 02:41

## 2020-11-12 RX ADMIN — VANCOMYCIN HYDROCHLORIDE 1000 MG: 1 INJECTION, POWDER, LYOPHILIZED, FOR SOLUTION INTRAVENOUS at 05:04

## 2020-11-12 RX ADMIN — TORSEMIDE 20 MG: 20 TABLET ORAL at 13:09

## 2020-11-12 RX ADMIN — ALLOPURINOL 100 MG: 100 TABLET ORAL at 13:09

## 2020-11-12 RX ADMIN — CEFEPIME HYDROCHLORIDE 1000 MG: 1 INJECTION, POWDER, FOR SOLUTION INTRAMUSCULAR; INTRAVENOUS at 01:48

## 2020-11-12 RX ADMIN — Medication 50 MCG: at 13:09

## 2020-11-12 RX ADMIN — SPIRONOLACTONE 25 MG: 25 TABLET, FILM COATED ORAL at 13:09

## 2020-11-12 RX ADMIN — METRONIDAZOLE 500 MG: 500 INJECTION, SOLUTION INTRAVENOUS at 00:45

## 2020-11-12 RX ADMIN — VANCOMYCIN HYDROCHLORIDE 1250 MG: 10 INJECTION, POWDER, LYOPHILIZED, FOR SOLUTION INTRAVENOUS at 18:07

## 2020-11-12 RX ADMIN — METRONIDAZOLE 500 MG: 500 INJECTION, SOLUTION INTRAVENOUS at 08:21

## 2020-11-12 RX ADMIN — METOPROLOL SUCCINATE 25 MG: 25 TABLET, EXTENDED RELEASE ORAL at 13:09

## 2020-11-12 RX ADMIN — INSULIN LISPRO 2 UNITS: 100 INJECTION, SOLUTION INTRAVENOUS; SUBCUTANEOUS at 13:35

## 2020-11-12 RX ADMIN — METOPROLOL SUCCINATE 25 MG: 25 TABLET, EXTENDED RELEASE ORAL at 22:42

## 2020-11-12 ASSESSMENT — ACTIVITIES OF DAILY LIVING (ADL)
RECENT_DECLINE_ADL: NO
ADL_BEFORE_ADMISSION: INDEPENDENT
CHRONIC_PAIN_PRESENT: NO
ADL_SHORT_OF_BREATH: NO
MOBILITY_ASSIST_DEVICES: WHEELCHAIR;STANDARD WALKER
ADL_SCORE: 12

## 2020-11-12 ASSESSMENT — PAIN SCALES - WONG BAKER
WONGBAKER_NUMERICALRESPONSE: 0
WONGBAKER_NUMERICALRESPONSE: 2

## 2020-11-12 ASSESSMENT — COGNITIVE AND FUNCTIONAL STATUS - GENERAL
ARE YOU DEAF OR DO YOU HAVE SERIOUS DIFFICULTY  HEARING: NO
ARE YOU BLIND OR DO YOU HAVE SERIOUS DIFFICULTY SEEING, EVEN WHEN WEARING GLASSES: NO

## 2020-11-12 ASSESSMENT — LIFESTYLE VARIABLES
HOW OFTEN DO YOU HAVE A DRINK CONTAINING ALCOHOL: NEVER
AUDIT-C TOTAL SCORE: 0
HOW MANY STANDARD DRINKS CONTAINING ALCOHOL DO YOU HAVE ON A TYPICAL DAY: 0,1 OR 2
ALCOHOL_USE_STATUS: NO OR LOW RISK WITH VALIDATED TOOL
HOW OFTEN DO YOU HAVE 6 OR MORE DRINKS ON ONE OCCASION: NEVER

## 2020-11-12 ASSESSMENT — PAIN SCALES - GENERAL
PAINLEVEL_OUTOF10: 0
PAINLEVEL_OUTOF10: 0
PAINLEVEL_OUTOF10: 4
PAINLEVEL_OUTOF10: 0

## 2020-11-13 VITALS
SYSTOLIC BLOOD PRESSURE: 118 MMHG | BODY MASS INDEX: 42.66 KG/M2 | TEMPERATURE: 98.1 F | DIASTOLIC BLOOD PRESSURE: 75 MMHG | HEIGHT: 72 IN | HEART RATE: 86 BPM | RESPIRATION RATE: 16 BRPM | OXYGEN SATURATION: 93 % | WEIGHT: 315 LBS

## 2020-11-13 LAB
ALBUMIN SERPL-MCNC: 2.7 G/DL (ref 3.6–5.1)
ALBUMIN/GLOB SERPL: 0.6 {RATIO} (ref 1–2.4)
ALP SERPL-CCNC: 85 UNITS/L (ref 45–117)
ALT SERPL-CCNC: 17 UNITS/L
ANION GAP SERPL CALC-SCNC: 10 MMOL/L (ref 10–20)
AST SERPL-CCNC: 18 UNITS/L
BILIRUB SERPL-MCNC: 0.4 MG/DL (ref 0.2–1)
BUN SERPL-MCNC: 40 MG/DL (ref 6–20)
BUN/CREAT SERPL: 26 (ref 7–25)
CALCIUM SERPL-MCNC: 8.6 MG/DL (ref 8.4–10.2)
CHLORIDE SERPL-SCNC: 107 MMOL/L (ref 98–107)
CO2 SERPL-SCNC: 28 MMOL/L (ref 21–32)
CREAT SERPL-MCNC: 1.53 MG/DL (ref 0.67–1.17)
ERYTHROCYTE [DISTWIDTH] IN BLOOD: 13.9 % (ref 11–15)
GLOBULIN SER-MCNC: 4.4 G/DL (ref 2–4)
GLUCOSE BLDC GLUCOMTR-MCNC: 115 MG/DL (ref 70–99)
GLUCOSE BLDC GLUCOMTR-MCNC: 99 MG/DL (ref 70–99)
GLUCOSE SERPL-MCNC: 153 MG/DL (ref 65–99)
HCT VFR BLD CALC: 35.5 % (ref 39–51)
HGB BLD-MCNC: 11.6 G/DL (ref 13–17)
MCH RBC QN AUTO: 28.9 PG (ref 26–34)
MCHC RBC AUTO-ENTMCNC: 32.7 G/DL (ref 32–36.5)
MCV RBC AUTO: 88.5 FL (ref 78–100)
NRBC BLD MANUAL-RTO: 0 /100 WBC
PLATELET # BLD: 254 K/MCL (ref 140–450)
POTASSIUM SERPL-SCNC: 4.2 MMOL/L (ref 3.4–5.1)
PROT SERPL-MCNC: 7.1 G/DL (ref 6.4–8.2)
RBC # BLD: 4.01 MIL/MCL (ref 4.5–5.9)
SODIUM SERPL-SCNC: 141 MMOL/L (ref 135–145)
WBC # BLD: 7.7 K/MCL (ref 4.2–11)

## 2020-11-13 PROCEDURE — 10002800 HB RX 250 W HCPCS: Performed by: NURSE PRACTITIONER

## 2020-11-13 PROCEDURE — 10002807 HB RX 258: Performed by: INTERNAL MEDICINE

## 2020-11-13 PROCEDURE — 85027 COMPLETE CBC AUTOMATED: CPT

## 2020-11-13 PROCEDURE — 10002803 HB RX 637: Performed by: NURSE PRACTITIONER

## 2020-11-13 PROCEDURE — 10002801 HB RX 250 W/O HCPCS: Performed by: INTERNAL MEDICINE

## 2020-11-13 PROCEDURE — 36415 COLL VENOUS BLD VENIPUNCTURE: CPT

## 2020-11-13 PROCEDURE — 10002800 HB RX 250 W HCPCS: Performed by: INTERNAL MEDICINE

## 2020-11-13 PROCEDURE — 10004651 HB RX, NO CHARGE ITEM: Performed by: INTERNAL MEDICINE

## 2020-11-13 PROCEDURE — 80053 COMPREHEN METABOLIC PANEL: CPT

## 2020-11-13 RX ORDER — AMOXICILLIN AND CLAVULANATE POTASSIUM 875; 125 MG/1; MG/1
1 TABLET, FILM COATED ORAL 2 TIMES DAILY
Qty: 20 TABLET | Refills: 0 | Status: SHIPPED | OUTPATIENT
Start: 2020-11-13 | End: 2020-11-23

## 2020-11-13 RX ADMIN — INSULIN LISPRO 12 UNITS: 100 INJECTION, SOLUTION INTRAVENOUS; SUBCUTANEOUS at 09:25

## 2020-11-13 RX ADMIN — OXYCODONE HYDROCHLORIDE AND ACETAMINOPHEN 500 MG: 500 TABLET ORAL at 09:26

## 2020-11-13 RX ADMIN — TORSEMIDE 20 MG: 20 TABLET ORAL at 09:26

## 2020-11-13 RX ADMIN — INSULIN LISPRO 12 UNITS: 100 INJECTION, SOLUTION INTRAVENOUS; SUBCUTANEOUS at 12:49

## 2020-11-13 RX ADMIN — VANCOMYCIN HYDROCHLORIDE 1250 MG: 10 INJECTION, POWDER, LYOPHILIZED, FOR SOLUTION INTRAVENOUS at 04:45

## 2020-11-13 RX ADMIN — ACETAMINOPHEN 650 MG: 325 TABLET, FILM COATED ORAL at 04:04

## 2020-11-13 RX ADMIN — DESMOPRESSIN ACETATE 40 MG: 0.2 TABLET ORAL at 09:25

## 2020-11-13 RX ADMIN — METRONIDAZOLE 500 MG: 500 INJECTION, SOLUTION INTRAVENOUS at 09:36

## 2020-11-13 RX ADMIN — ALLOPURINOL 100 MG: 100 TABLET ORAL at 09:25

## 2020-11-13 RX ADMIN — SPIRONOLACTONE 25 MG: 25 TABLET, FILM COATED ORAL at 09:26

## 2020-11-13 RX ADMIN — CEFEPIME HYDROCHLORIDE 1000 MG: 1 INJECTION, POWDER, FOR SOLUTION INTRAMUSCULAR; INTRAVENOUS at 01:59

## 2020-11-13 RX ADMIN — METOPROLOL SUCCINATE 25 MG: 25 TABLET, EXTENDED RELEASE ORAL at 09:25

## 2020-11-13 RX ADMIN — FAMOTIDINE 20 MG: 20 TABLET, FILM COATED ORAL at 09:27

## 2020-11-13 RX ADMIN — Medication 50 MCG: at 09:26

## 2020-11-13 RX ADMIN — CEFEPIME HYDROCHLORIDE 1000 MG: 1 INJECTION, POWDER, FOR SOLUTION INTRAMUSCULAR; INTRAVENOUS at 09:40

## 2020-11-13 RX ADMIN — METRONIDAZOLE 500 MG: 500 INJECTION, SOLUTION INTRAVENOUS at 00:56

## 2020-11-13 RX ADMIN — INSULIN GLARGINE 50 UNITS: 100 INJECTION, SOLUTION SUBCUTANEOUS at 09:29

## 2020-11-13 ASSESSMENT — PAIN SCALES - WONG BAKER
WONGBAKER_NUMERICALRESPONSE: 2
WONGBAKER_NUMERICALRESPONSE: 2
WONGBAKER_NUMERICALRESPONSE: 0

## 2020-11-13 ASSESSMENT — PAIN SCALES - GENERAL
PAINLEVEL_OUTOF10: 0

## 2020-11-16 LAB
BACTERIA BLD CULT: NORMAL
BACTERIA BLD CULT: NORMAL
REPORT STATUS (RPT): NORMAL
REPORT STATUS (RPT): NORMAL
SPECIMEN SOURCE: NORMAL
SPECIMEN SOURCE: NORMAL

## 2020-11-19 ENCOUNTER — HOSPITAL ENCOUNTER (OUTPATIENT)
Dept: WOUND CARE | Age: 65
Discharge: STILL ADMITTED OR ADMINISTRATIVE | End: 2020-11-19
Attending: PODIATRIST

## 2020-11-19 VITALS — TEMPERATURE: 98.2 F

## 2020-11-19 DIAGNOSIS — Z89.432 S/P TRANSMETATARSAL AMPUTATION OF FOOT, LEFT (CMD): ICD-10-CM

## 2020-11-19 DIAGNOSIS — L97.529 TYPE 2 DIABETES MELLITUS WITH LEFT DIABETIC FOOT ULCER (CMD): ICD-10-CM

## 2020-11-19 DIAGNOSIS — S98.912S AMPUTATION OF LEFT FOOT, SEQUELA (CMD): ICD-10-CM

## 2020-11-19 DIAGNOSIS — I73.9 PVD (PERIPHERAL VASCULAR DISEASE) (CMD): ICD-10-CM

## 2020-11-19 DIAGNOSIS — S90.852D FOREIGN BODY IN LEFT FOOT, SUBSEQUENT ENCOUNTER: ICD-10-CM

## 2020-11-19 DIAGNOSIS — E11.621 TYPE 2 DIABETES MELLITUS WITH LEFT DIABETIC FOOT ULCER (CMD): ICD-10-CM

## 2020-11-19 DIAGNOSIS — Z89.422 STATUS POST AMPUTATION OF LESSER TOE, LEFT (CMD): Primary | ICD-10-CM

## 2020-11-19 DIAGNOSIS — E11.69 TYPE 2 DIABETES MELLITUS WITH OTHER SPECIFIED COMPLICATION, UNSPECIFIED WHETHER LONG TERM INSULIN USE (CMD): ICD-10-CM

## 2020-11-19 PROCEDURE — 11042 DBRDMT SUBQ TIS 1ST 20SQCM/<: CPT

## 2020-11-19 SDOH — HEALTH STABILITY: MENTAL HEALTH: HOW OFTEN DO YOU HAVE A DRINK CONTAINING ALCOHOL?: NEVER

## 2020-11-19 ASSESSMENT — PAIN SCALES - GENERAL: PAINLEVEL_OUTOF10: 0

## 2020-11-20 ENCOUNTER — TELEPHONE (OUTPATIENT)
Dept: SCHEDULING | Age: 65
End: 2020-11-20

## 2020-11-21 DIAGNOSIS — Z89.422 STATUS POST AMPUTATION OF LESSER TOE, LEFT (CMD): Primary | ICD-10-CM

## 2020-11-23 ENCOUNTER — HOSPITAL ENCOUNTER (OUTPATIENT)
Dept: LAB | Age: 65
Discharge: HOME | End: 2020-11-23
Attending: PODIATRIST

## 2020-11-23 ENCOUNTER — TELEPHONE (OUTPATIENT)
Dept: SCHEDULING | Age: 65
End: 2020-11-23

## 2020-11-23 PROCEDURE — 88304 TISSUE EXAM BY PATHOLOGIST: CPT

## 2020-11-23 PROCEDURE — 87186 SC STD MICRODIL/AGAR DIL: CPT

## 2020-11-23 PROCEDURE — 87070 CULTURE OTHR SPECIMN AEROBIC: CPT

## 2020-11-23 PROCEDURE — 87077 CULTURE AEROBIC IDENTIFY: CPT

## 2020-11-23 PROCEDURE — 88300 SURGICAL PATH GROSS: CPT

## 2020-11-23 PROCEDURE — 88311 DECALCIFY TISSUE: CPT

## 2020-11-28 LAB
BACTERIA SPEC ANAEROBE+AEROBE CULT: ABNORMAL
GRAM STN SPEC: ABNORMAL
ORGANISM: ABNORMAL
REPORT STATUS (RPT): ABNORMAL
REPORT STATUS (RPT): ABNORMAL
SPECIMEN SOURCE: ABNORMAL
SPECIMEN SOURCE: ABNORMAL

## 2020-11-30 LAB — PATHOLOGIST NAME: NORMAL

## 2020-12-10 ENCOUNTER — HOSPITAL ENCOUNTER (OUTPATIENT)
Dept: WOUND CARE | Age: 65
Discharge: STILL ADMITTED OR ADMINISTRATIVE | End: 2020-12-10
Attending: PODIATRIST

## 2020-12-10 VITALS — TEMPERATURE: 96.5 F

## 2020-12-10 DIAGNOSIS — I73.9 PVD (PERIPHERAL VASCULAR DISEASE) (CMD): ICD-10-CM

## 2020-12-10 DIAGNOSIS — L97.529 TYPE 2 DIABETES MELLITUS WITH LEFT DIABETIC FOOT ULCER (CMD): ICD-10-CM

## 2020-12-10 DIAGNOSIS — S90.852D FOREIGN BODY IN LEFT FOOT, SUBSEQUENT ENCOUNTER: ICD-10-CM

## 2020-12-10 DIAGNOSIS — Z89.432 S/P TRANSMETATARSAL AMPUTATION OF FOOT, LEFT (CMD): ICD-10-CM

## 2020-12-10 DIAGNOSIS — S98.912S AMPUTATION OF LEFT FOOT, SEQUELA (CMD): ICD-10-CM

## 2020-12-10 DIAGNOSIS — E11.69 TYPE 2 DIABETES MELLITUS WITH OTHER SPECIFIED COMPLICATION, UNSPECIFIED WHETHER LONG TERM INSULIN USE (CMD): ICD-10-CM

## 2020-12-10 DIAGNOSIS — Z89.422 STATUS POST AMPUTATION OF LESSER TOE, LEFT (CMD): Primary | ICD-10-CM

## 2020-12-10 DIAGNOSIS — E11.621 TYPE 2 DIABETES MELLITUS WITH LEFT DIABETIC FOOT ULCER (CMD): ICD-10-CM

## 2020-12-10 PROCEDURE — 11042 DBRDMT SUBQ TIS 1ST 20SQCM/<: CPT

## 2020-12-10 SDOH — HEALTH STABILITY: MENTAL HEALTH: HOW OFTEN DO YOU HAVE A DRINK CONTAINING ALCOHOL?: NEVER

## 2020-12-10 ASSESSMENT — PAIN SCALES - GENERAL: PAINLEVEL_OUTOF10: 0

## 2020-12-16 ENCOUNTER — TELEPHONE (OUTPATIENT)
Dept: SCHEDULING | Age: 65
End: 2020-12-16

## 2020-12-16 RX ORDER — INSULIN LISPRO 100 [IU]/ML
12 INJECTION, SOLUTION INTRAVENOUS; SUBCUTANEOUS
Qty: 15 ML | Refills: 12 | Status: CANCELLED | OUTPATIENT
Start: 2020-12-16

## 2020-12-17 ENCOUNTER — HOSPITAL ENCOUNTER (OUTPATIENT)
Dept: WOUND CARE | Age: 65
Discharge: STILL ADMITTED OR ADMINISTRATIVE | End: 2020-12-17
Attending: PODIATRIST

## 2020-12-17 VITALS — TEMPERATURE: 97.2 F

## 2020-12-17 DIAGNOSIS — Z89.432 S/P TRANSMETATARSAL AMPUTATION OF FOOT, LEFT (CMD): ICD-10-CM

## 2020-12-17 DIAGNOSIS — E11.621 TYPE 2 DIABETES MELLITUS WITH LEFT DIABETIC FOOT ULCER (CMD): ICD-10-CM

## 2020-12-17 DIAGNOSIS — L97.529 TYPE 2 DIABETES MELLITUS WITH LEFT DIABETIC FOOT ULCER (CMD): ICD-10-CM

## 2020-12-17 DIAGNOSIS — S98.912S AMPUTATION OF LEFT FOOT, SEQUELA (CMD): ICD-10-CM

## 2020-12-17 DIAGNOSIS — I73.9 PVD (PERIPHERAL VASCULAR DISEASE) (CMD): ICD-10-CM

## 2020-12-17 DIAGNOSIS — E11.69 TYPE 2 DIABETES MELLITUS WITH OTHER SPECIFIED COMPLICATION, UNSPECIFIED WHETHER LONG TERM INSULIN USE (CMD): ICD-10-CM

## 2020-12-17 DIAGNOSIS — Z89.422 STATUS POST AMPUTATION OF LESSER TOE, LEFT (CMD): Primary | ICD-10-CM

## 2020-12-17 DIAGNOSIS — S90.852D FOREIGN BODY IN LEFT FOOT, SUBSEQUENT ENCOUNTER: ICD-10-CM

## 2020-12-17 PROCEDURE — 11042 DBRDMT SUBQ TIS 1ST 20SQCM/<: CPT

## 2020-12-17 RX ORDER — SULFAMETHOXAZOLE AND TRIMETHOPRIM 400; 80 MG/1; MG/1
1 TABLET ORAL 2 TIMES DAILY
Qty: 28 TABLET | Refills: 0 | Status: SHIPPED | OUTPATIENT
Start: 2020-12-17 | End: 2020-12-31

## 2020-12-17 SDOH — HEALTH STABILITY: MENTAL HEALTH: HOW OFTEN DO YOU HAVE A DRINK CONTAINING ALCOHOL?: NEVER

## 2020-12-17 ASSESSMENT — PAIN SCALES - GENERAL: PAINLEVEL_OUTOF10: 0

## 2020-12-22 ENCOUNTER — TELEPHONE (OUTPATIENT)
Dept: SCHEDULING | Age: 65
End: 2020-12-22

## 2020-12-31 ENCOUNTER — APPOINTMENT (OUTPATIENT)
Dept: WOUND CARE | Age: 65
End: 2020-12-31
Attending: PODIATRIST

## 2021-01-07 ENCOUNTER — HOSPITAL ENCOUNTER (OUTPATIENT)
Dept: WOUND CARE | Age: 66
Discharge: STILL ADMITTED OR ADMINISTRATIVE | End: 2021-01-07
Attending: PODIATRIST

## 2021-01-07 VITALS — TEMPERATURE: 95.5 F

## 2021-01-07 DIAGNOSIS — E11.621 TYPE 2 DIABETES MELLITUS WITH LEFT DIABETIC FOOT ULCER (CMD): ICD-10-CM

## 2021-01-07 DIAGNOSIS — E11.69 TYPE 2 DIABETES MELLITUS WITH OTHER SPECIFIED COMPLICATION, UNSPECIFIED WHETHER LONG TERM INSULIN USE (CMD): ICD-10-CM

## 2021-01-07 DIAGNOSIS — Z89.422 STATUS POST AMPUTATION OF LESSER TOE, LEFT (CMD): Primary | ICD-10-CM

## 2021-01-07 DIAGNOSIS — S98.912S AMPUTATION OF LEFT FOOT, SEQUELA (CMD): ICD-10-CM

## 2021-01-07 DIAGNOSIS — I73.9 PVD (PERIPHERAL VASCULAR DISEASE) (CMD): ICD-10-CM

## 2021-01-07 DIAGNOSIS — S90.852D FOREIGN BODY IN LEFT FOOT, SUBSEQUENT ENCOUNTER: ICD-10-CM

## 2021-01-07 DIAGNOSIS — L97.522 ULCER OF LEFT FOOT WITH FAT LAYER EXPOSED (CMD): ICD-10-CM

## 2021-01-07 DIAGNOSIS — L97.529 TYPE 2 DIABETES MELLITUS WITH LEFT DIABETIC FOOT ULCER (CMD): ICD-10-CM

## 2021-01-07 DIAGNOSIS — Z89.432 S/P TRANSMETATARSAL AMPUTATION OF FOOT, LEFT (CMD): ICD-10-CM

## 2021-01-07 PROCEDURE — 11042 DBRDMT SUBQ TIS 1ST 20SQCM/<: CPT

## 2021-01-07 SDOH — HEALTH STABILITY: MENTAL HEALTH: HOW OFTEN DO YOU HAVE A DRINK CONTAINING ALCOHOL?: NEVER

## 2021-01-07 ASSESSMENT — PAIN SCALES - GENERAL: PAINLEVEL_OUTOF10: 0

## 2021-01-21 ENCOUNTER — HOSPITAL ENCOUNTER (OUTPATIENT)
Dept: GENERAL RADIOLOGY | Age: 66
Discharge: HOME | End: 2021-01-21

## 2021-01-21 ENCOUNTER — HOSPITAL ENCOUNTER (OUTPATIENT)
Dept: WOUND CARE | Age: 66
Discharge: STILL ADMITTED OR ADMINISTRATIVE | End: 2021-01-21
Attending: PODIATRIST

## 2021-01-21 VITALS — TEMPERATURE: 96.6 F

## 2021-01-21 DIAGNOSIS — Z89.432 S/P TRANSMETATARSAL AMPUTATION OF FOOT, LEFT (CMD): ICD-10-CM

## 2021-01-21 DIAGNOSIS — S98.912S AMPUTATION OF LEFT FOOT, SEQUELA (CMD): ICD-10-CM

## 2021-01-21 DIAGNOSIS — E11.621 TYPE 2 DIABETES MELLITUS WITH LEFT DIABETIC FOOT ULCER (CMD): ICD-10-CM

## 2021-01-21 DIAGNOSIS — I73.9 PVD (PERIPHERAL VASCULAR DISEASE) (CMD): ICD-10-CM

## 2021-01-21 DIAGNOSIS — E11.69 TYPE 2 DIABETES MELLITUS WITH OTHER SPECIFIED COMPLICATION, UNSPECIFIED WHETHER LONG TERM INSULIN USE (CMD): ICD-10-CM

## 2021-01-21 DIAGNOSIS — L97.522 ULCER OF LEFT FOOT WITH FAT LAYER EXPOSED (CMD): ICD-10-CM

## 2021-01-21 DIAGNOSIS — S90.852D FOREIGN BODY IN LEFT FOOT, SUBSEQUENT ENCOUNTER: ICD-10-CM

## 2021-01-21 DIAGNOSIS — L02.612 ABSCESS OF LEFT FOOT: ICD-10-CM

## 2021-01-21 DIAGNOSIS — Z89.422 STATUS POST AMPUTATION OF LESSER TOE, LEFT (CMD): Primary | ICD-10-CM

## 2021-01-21 DIAGNOSIS — L97.529 TYPE 2 DIABETES MELLITUS WITH LEFT DIABETIC FOOT ULCER (CMD): ICD-10-CM

## 2021-01-21 PROCEDURE — 11042 DBRDMT SUBQ TIS 1ST 20SQCM/<: CPT

## 2021-01-21 PROCEDURE — 87076 CULTURE ANAEROBE IDENT EACH: CPT | Performed by: PODIATRIST

## 2021-01-21 PROCEDURE — 73630 X-RAY EXAM OF FOOT: CPT

## 2021-01-21 RX ORDER — AMOXICILLIN AND CLAVULANATE POTASSIUM 875; 125 MG/1; MG/1
1 TABLET, FILM COATED ORAL 2 TIMES DAILY
Qty: 28 TABLET | Refills: 0 | Status: SHIPPED | OUTPATIENT
Start: 2021-01-21 | End: 2021-02-04

## 2021-01-21 RX ORDER — TRAMADOL HYDROCHLORIDE 50 MG/1
50 TABLET ORAL EVERY 6 HOURS PRN
Qty: 20 TABLET | Refills: 0 | Status: SHIPPED | OUTPATIENT
Start: 2021-01-21 | End: 2021-01-29

## 2021-01-21 SDOH — HEALTH STABILITY: MENTAL HEALTH: HOW OFTEN DO YOU HAVE A DRINK CONTAINING ALCOHOL?: NEVER

## 2021-01-21 ASSESSMENT — PAIN SCALES - GENERAL: PAINLEVEL_OUTOF10: 0

## 2021-01-26 ENCOUNTER — TELEPHONE (OUTPATIENT)
Dept: WOUND CARE | Age: 66
End: 2021-01-26

## 2021-01-27 LAB
BACTERIA SPEC ANAEROBE+AEROBE CULT: ABNORMAL
GRAM STN SPEC: ABNORMAL

## 2021-01-28 ENCOUNTER — HOSPITAL ENCOUNTER (OUTPATIENT)
Dept: WOUND CARE | Age: 66
Discharge: STILL ADMITTED OR ADMINISTRATIVE | End: 2021-01-28
Attending: PODIATRIST

## 2021-01-28 VITALS — TEMPERATURE: 96.6 F

## 2021-01-28 DIAGNOSIS — Z89.432 S/P TRANSMETATARSAL AMPUTATION OF FOOT, LEFT (CMD): ICD-10-CM

## 2021-01-28 DIAGNOSIS — S91.302D UNSPECIFIED OPEN WOUND, LEFT FOOT, SUBSEQUENT ENCOUNTER: ICD-10-CM

## 2021-01-28 DIAGNOSIS — L97.529 TYPE 2 DIABETES MELLITUS WITH LEFT DIABETIC FOOT ULCER (CMD): ICD-10-CM

## 2021-01-28 DIAGNOSIS — S90.852D FOREIGN BODY IN LEFT FOOT, SUBSEQUENT ENCOUNTER: ICD-10-CM

## 2021-01-28 DIAGNOSIS — S98.912S AMPUTATION OF LEFT FOOT, SEQUELA (CMD): ICD-10-CM

## 2021-01-28 DIAGNOSIS — I73.9 PVD (PERIPHERAL VASCULAR DISEASE) (CMD): ICD-10-CM

## 2021-01-28 DIAGNOSIS — Z89.422 STATUS POST AMPUTATION OF LESSER TOE, LEFT (CMD): Primary | ICD-10-CM

## 2021-01-28 DIAGNOSIS — E11.69 TYPE 2 DIABETES MELLITUS WITH OTHER SPECIFIED COMPLICATION, UNSPECIFIED WHETHER LONG TERM INSULIN USE (CMD): ICD-10-CM

## 2021-01-28 DIAGNOSIS — E11.621 TYPE 2 DIABETES MELLITUS WITH LEFT DIABETIC FOOT ULCER (CMD): ICD-10-CM

## 2021-01-28 PROCEDURE — 11042 DBRDMT SUBQ TIS 1ST 20SQCM/<: CPT

## 2021-01-28 SDOH — HEALTH STABILITY: MENTAL HEALTH: HOW OFTEN DO YOU HAVE A DRINK CONTAINING ALCOHOL?: NEVER

## 2021-01-28 ASSESSMENT — PAIN SCALES - GENERAL: PAINLEVEL_OUTOF10: 0

## 2021-02-04 ENCOUNTER — APPOINTMENT (OUTPATIENT)
Dept: WOUND CARE | Age: 66
End: 2021-02-04
Attending: PODIATRIST

## 2021-02-12 ENCOUNTER — APPOINTMENT (OUTPATIENT)
Dept: CARDIOLOGY | Age: 66
End: 2021-02-12

## 2021-02-15 DIAGNOSIS — I50.33 ACUTE ON CHRONIC DIASTOLIC CONGESTIVE HEART FAILURE (CMD): ICD-10-CM

## 2021-02-15 DIAGNOSIS — I12.9 HYPERTENSIVE CHRONIC KIDNEY DISEASE, UNSPECIFIED CKD STAGE: ICD-10-CM

## 2021-02-15 DIAGNOSIS — E78.5 HYPERLIPIDEMIA, UNSPECIFIED HYPERLIPIDEMIA TYPE: ICD-10-CM

## 2021-02-15 DIAGNOSIS — I50.33 ACUTE ON CHRONIC HEART FAILURE WITH PRESERVED EJECTION FRACTION (CMD): Primary | ICD-10-CM

## 2021-02-15 DIAGNOSIS — E11.65 TYPE 2 DIABETES MELLITUS WITH HYPERGLYCEMIA, WITH LONG-TERM CURRENT USE OF INSULIN (CMD): ICD-10-CM

## 2021-02-15 DIAGNOSIS — R06.09 DYSPNEA ON EXERTION: ICD-10-CM

## 2021-02-15 DIAGNOSIS — Z79.4 TYPE 2 DIABETES MELLITUS WITH HYPERGLYCEMIA, WITH LONG-TERM CURRENT USE OF INSULIN (CMD): ICD-10-CM

## 2021-02-15 DIAGNOSIS — R63.5 WEIGHT GAIN: ICD-10-CM

## 2021-02-15 DIAGNOSIS — Z89.422 STATUS POST AMPUTATION OF LESSER TOE, LEFT (CMD): ICD-10-CM

## 2021-02-15 DIAGNOSIS — I50.23 ACUTE ON CHRONIC SYSTOLIC CONGESTIVE HEART FAILURE (CMD): ICD-10-CM

## 2021-02-15 RX ORDER — TORSEMIDE 10 MG/1
20 TABLET ORAL DAILY
Qty: 180 TABLET | Refills: 0 | Status: SHIPPED | OUTPATIENT
Start: 2021-02-15 | End: 2021-03-12 | Stop reason: ALTCHOICE

## 2021-02-15 RX ORDER — FAMOTIDINE 20 MG/1
20 TABLET, FILM COATED ORAL DAILY
Qty: 90 TABLET | Refills: 0 | Status: SHIPPED | OUTPATIENT
Start: 2021-02-15 | End: 2021-07-12

## 2021-02-15 RX ORDER — SPIRONOLACTONE 25 MG/1
25 TABLET ORAL DAILY
Qty: 90 TABLET | Refills: 0 | Status: ON HOLD | OUTPATIENT
Start: 2021-02-15 | End: 2021-04-17 | Stop reason: SDUPTHER

## 2021-02-16 ENCOUNTER — APPOINTMENT (OUTPATIENT)
Dept: CARDIOLOGY | Age: 66
End: 2021-02-16

## 2021-02-16 ENCOUNTER — APPOINTMENT (OUTPATIENT)
Dept: CARDIOLOGY | Age: 66
End: 2021-02-16
Attending: INTERNAL MEDICINE

## 2021-02-18 ENCOUNTER — HOSPITAL ENCOUNTER (OUTPATIENT)
Dept: WOUND CARE | Age: 66
Discharge: STILL ADMITTED OR ADMINISTRATIVE | End: 2021-02-18
Attending: PODIATRIST

## 2021-02-18 VITALS — TEMPERATURE: 96.6 F

## 2021-02-18 DIAGNOSIS — L97.529 TYPE 2 DIABETES MELLITUS WITH LEFT DIABETIC FOOT ULCER (CMD): ICD-10-CM

## 2021-02-18 DIAGNOSIS — S98.912S AMPUTATION OF LEFT FOOT, SEQUELA (CMD): ICD-10-CM

## 2021-02-18 DIAGNOSIS — S90.852D FOREIGN BODY IN LEFT FOOT, SUBSEQUENT ENCOUNTER: ICD-10-CM

## 2021-02-18 DIAGNOSIS — E11.69 TYPE 2 DIABETES MELLITUS WITH OTHER SPECIFIED COMPLICATION, UNSPECIFIED WHETHER LONG TERM INSULIN USE (CMD): ICD-10-CM

## 2021-02-18 DIAGNOSIS — Z89.422 STATUS POST AMPUTATION OF LESSER TOE, LEFT (CMD): Primary | ICD-10-CM

## 2021-02-18 DIAGNOSIS — Z89.432 S/P TRANSMETATARSAL AMPUTATION OF FOOT, LEFT (CMD): ICD-10-CM

## 2021-02-18 DIAGNOSIS — E11.621 TYPE 2 DIABETES MELLITUS WITH LEFT DIABETIC FOOT ULCER (CMD): ICD-10-CM

## 2021-02-18 DIAGNOSIS — I73.9 PVD (PERIPHERAL VASCULAR DISEASE) (CMD): ICD-10-CM

## 2021-02-18 PROCEDURE — 11042 DBRDMT SUBQ TIS 1ST 20SQCM/<: CPT

## 2021-02-18 SDOH — HEALTH STABILITY: MENTAL HEALTH: HOW OFTEN DO YOU HAVE A DRINK CONTAINING ALCOHOL?: NEVER

## 2021-02-18 ASSESSMENT — PAIN SCALES - GENERAL: PAINLEVEL_OUTOF10: 0

## 2021-02-23 ENCOUNTER — ANCILLARY PROCEDURE (OUTPATIENT)
Dept: CARDIOLOGY | Age: 66
End: 2021-02-23
Attending: INTERNAL MEDICINE

## 2021-02-23 ENCOUNTER — TELEPHONE (OUTPATIENT)
Dept: CARDIOLOGY | Age: 66
End: 2021-02-23

## 2021-02-23 ENCOUNTER — HOSPITAL ENCOUNTER (EMERGENCY)
Age: 66
Discharge: HOME | End: 2021-02-23
Attending: EMERGENCY MEDICINE

## 2021-02-23 ENCOUNTER — APPOINTMENT (OUTPATIENT)
Dept: GENERAL RADIOLOGY | Age: 66
End: 2021-02-23
Attending: EMERGENCY MEDICINE

## 2021-02-23 ENCOUNTER — APPOINTMENT (OUTPATIENT)
Dept: CARDIOLOGY | Age: 66
End: 2021-02-23

## 2021-02-23 VITALS
HEART RATE: 79 BPM | DIASTOLIC BLOOD PRESSURE: 85 MMHG | BODY MASS INDEX: 42.66 KG/M2 | SYSTOLIC BLOOD PRESSURE: 135 MMHG | HEIGHT: 72 IN | RESPIRATION RATE: 23 BRPM | WEIGHT: 315 LBS | OXYGEN SATURATION: 97 % | TEMPERATURE: 97.8 F

## 2021-02-23 DIAGNOSIS — E11.65 TYPE 2 DIABETES MELLITUS WITH HYPERGLYCEMIA, WITH LONG-TERM CURRENT USE OF INSULIN (CMD): ICD-10-CM

## 2021-02-23 DIAGNOSIS — Z79.4 TYPE 2 DIABETES MELLITUS WITH HYPERGLYCEMIA, WITH LONG-TERM CURRENT USE OF INSULIN (CMD): ICD-10-CM

## 2021-02-23 DIAGNOSIS — R42 DIZZINESS, NONSPECIFIC: Primary | ICD-10-CM

## 2021-02-23 DIAGNOSIS — I10 BENIGN ESSENTIAL HYPERTENSION: ICD-10-CM

## 2021-02-23 DIAGNOSIS — R60.1 ANASARCA: ICD-10-CM

## 2021-02-23 DIAGNOSIS — I50.33 ACUTE ON CHRONIC DIASTOLIC CONGESTIVE HEART FAILURE (CMD): ICD-10-CM

## 2021-02-23 DIAGNOSIS — E78.00 HYPERCHOLESTEROLEMIA: ICD-10-CM

## 2021-02-23 DIAGNOSIS — R79.89 TROPONIN I ABOVE REFERENCE RANGE: ICD-10-CM

## 2021-02-23 LAB
ALBUMIN SERPL-MCNC: 3 G/DL (ref 3.6–5.1)
ALBUMIN/GLOB SERPL: 0.6 {RATIO} (ref 1–2.4)
ALP SERPL-CCNC: 107 UNITS/L (ref 45–117)
ALT SERPL-CCNC: 25 UNITS/L
ANION GAP SERPL CALC-SCNC: 10 MMOL/L (ref 10–20)
APPEARANCE UR: CLEAR
AST SERPL-CCNC: 20 UNITS/L
ATRIAL RATE (BPM): 82
BACTERIA #/AREA URNS HPF: NORMAL /HPF
BASOPHILS # BLD: 0.1 K/MCL (ref 0–0.3)
BASOPHILS NFR BLD: 1 %
BILIRUB SERPL-MCNC: 0.3 MG/DL (ref 0.2–1)
BILIRUB UR QL STRIP: NEGATIVE
BUN SERPL-MCNC: 40 MG/DL (ref 6–20)
BUN/CREAT SERPL: 25 (ref 7–25)
CALCIUM SERPL-MCNC: 8.5 MG/DL (ref 8.4–10.2)
CHLORIDE SERPL-SCNC: 105 MMOL/L (ref 98–107)
CO2 SERPL-SCNC: 27 MMOL/L (ref 21–32)
COLOR UR: NORMAL
CREAT SERPL-MCNC: 1.61 MG/DL (ref 0.67–1.17)
DEPRECATED RDW RBC: 46.6 FL (ref 39–50)
EOSINOPHIL # BLD: 0.4 K/MCL (ref 0–0.5)
EOSINOPHIL NFR BLD: 4 %
ERYTHROCYTE [DISTWIDTH] IN BLOOD: 14.2 % (ref 11–15)
FASTING DURATION TIME PATIENT: ABNORMAL H
GFR SERPLBLD BASED ON 1.73 SQ M-ARVRAT: 44 ML/MIN/1.73M2
GLOBULIN SER-MCNC: 5.2 G/DL (ref 2–4)
GLUCOSE SERPL-MCNC: 111 MG/DL (ref 65–99)
GLUCOSE UR STRIP-MCNC: NEGATIVE MG/DL
HCT VFR BLD CALC: 36.1 % (ref 39–51)
HGB BLD-MCNC: 11.3 G/DL (ref 13–17)
HGB UR QL STRIP: NEGATIVE
HYALINE CASTS #/AREA URNS LPF: NORMAL /LPF
IMM GRANULOCYTES # BLD AUTO: 0.1 K/MCL (ref 0–0.2)
IMM GRANULOCYTES # BLD: 1 %
KETONES UR STRIP-MCNC: NEGATIVE MG/DL
LEUKOCYTE ESTERASE UR QL STRIP: NEGATIVE
LYMPHOCYTES # BLD: 2 K/MCL (ref 1–4)
LYMPHOCYTES NFR BLD: 21 %
MCH RBC QN AUTO: 28.3 PG (ref 26–34)
MCHC RBC AUTO-ENTMCNC: 31.3 G/DL (ref 32–36.5)
MCV RBC AUTO: 90.3 FL (ref 78–100)
MONOCYTES # BLD: 0.9 K/MCL (ref 0.3–0.9)
MONOCYTES NFR BLD: 9 %
NEUTROPHILS # BLD: 6.4 K/MCL (ref 1.8–7.7)
NEUTROPHILS NFR BLD: 64 %
NITRITE UR QL STRIP: NEGATIVE
NRBC BLD MANUAL-RTO: 0 /100 WBC
P AXIS (DEGREES): 51
PH UR STRIP: 5 [PH] (ref 5–7)
PLATELET # BLD AUTO: 257 K/MCL (ref 140–450)
POTASSIUM SERPL-SCNC: 4 MMOL/L (ref 3.4–5.1)
PR-INTERVAL (MSEC): 208
PROT SERPL-MCNC: 8.2 G/DL (ref 6.4–8.2)
PROT UR STRIP-MCNC: NEGATIVE MG/DL
QRS-INTERVAL (MSEC): 92
QT-INTERVAL (MSEC): 382
QTC: 446
R AXIS (DEGREES): 46
RBC # BLD: 4 MIL/MCL (ref 4.5–5.9)
RBC #/AREA URNS HPF: NORMAL /HPF
REPORT TEXT: NORMAL
SODIUM SERPL-SCNC: 138 MMOL/L (ref 135–145)
SP GR UR STRIP: 1.01 (ref 1–1.03)
SQUAMOUS #/AREA URNS HPF: NORMAL /HPF
T AXIS (DEGREES): 148
TROPONIN I SERPL HS-MCNC: 0.15 NG/ML
UROBILINOGEN UR STRIP-MCNC: 0.2 MG/DL
VENTRICULAR RATE EKG/MIN (BPM): 82
WBC # BLD: 9.8 K/MCL (ref 4.2–11)
WBC #/AREA URNS HPF: NORMAL /HPF

## 2021-02-23 PROCEDURE — 71045 X-RAY EXAM CHEST 1 VIEW: CPT

## 2021-02-23 PROCEDURE — 81001 URINALYSIS AUTO W/SCOPE: CPT | Performed by: EMERGENCY MEDICINE

## 2021-02-23 PROCEDURE — 99285 EMERGENCY DEPT VISIT HI MDM: CPT

## 2021-02-23 PROCEDURE — 85025 COMPLETE CBC W/AUTO DIFF WBC: CPT | Performed by: EMERGENCY MEDICINE

## 2021-02-23 PROCEDURE — 36415 COLL VENOUS BLD VENIPUNCTURE: CPT

## 2021-02-23 PROCEDURE — 80053 COMPREHEN METABOLIC PANEL: CPT | Performed by: EMERGENCY MEDICINE

## 2021-02-23 PROCEDURE — 84484 ASSAY OF TROPONIN QUANT: CPT | Performed by: EMERGENCY MEDICINE

## 2021-02-23 PROCEDURE — 93005 ELECTROCARDIOGRAM TRACING: CPT | Performed by: EMERGENCY MEDICINE

## 2021-02-23 ASSESSMENT — ENCOUNTER SYMPTOMS
CHILLS: 0
FEVER: 0
DIZZINESS: 0
BACK PAIN: 0
LIGHT-HEADEDNESS: 1
SHORTNESS OF BREATH: 0
NAUSEA: 0
VOMITING: 0
ABDOMINAL PAIN: 0
DIARRHEA: 0

## 2021-02-23 ASSESSMENT — PAIN SCALES - GENERAL: PAINLEVEL_OUTOF10: 0

## 2021-02-24 ENCOUNTER — TELEPHONE (OUTPATIENT)
Dept: CARDIOLOGY | Age: 66
End: 2021-02-24

## 2021-02-24 LAB
RAINBOW EXTRA TUBES HOLD SPECIMEN: NORMAL

## 2021-02-25 ENCOUNTER — HOSPITAL ENCOUNTER (OUTPATIENT)
Dept: WOUND CARE | Age: 66
Discharge: STILL ADMITTED OR ADMINISTRATIVE | End: 2021-02-25
Attending: PODIATRIST

## 2021-02-25 ENCOUNTER — TELEPHONE (OUTPATIENT)
Dept: CARDIOLOGY | Age: 66
End: 2021-02-25

## 2021-02-25 VITALS — TEMPERATURE: 97.7 F

## 2021-02-25 DIAGNOSIS — S90.852D FOREIGN BODY IN LEFT FOOT, SUBSEQUENT ENCOUNTER: ICD-10-CM

## 2021-02-25 DIAGNOSIS — Z89.422 STATUS POST AMPUTATION OF LESSER TOE, LEFT (CMD): Primary | ICD-10-CM

## 2021-02-25 DIAGNOSIS — L97.529 TYPE 2 DIABETES MELLITUS WITH LEFT DIABETIC FOOT ULCER (CMD): ICD-10-CM

## 2021-02-25 DIAGNOSIS — Z89.432 S/P TRANSMETATARSAL AMPUTATION OF FOOT, LEFT (CMD): ICD-10-CM

## 2021-02-25 DIAGNOSIS — E11.621 TYPE 2 DIABETES MELLITUS WITH LEFT DIABETIC FOOT ULCER (CMD): ICD-10-CM

## 2021-02-25 DIAGNOSIS — E11.69 TYPE 2 DIABETES MELLITUS WITH OTHER SPECIFIED COMPLICATION, UNSPECIFIED WHETHER LONG TERM INSULIN USE (CMD): ICD-10-CM

## 2021-02-25 DIAGNOSIS — I73.9 PVD (PERIPHERAL VASCULAR DISEASE) (CMD): ICD-10-CM

## 2021-02-25 DIAGNOSIS — S98.912S AMPUTATION OF LEFT FOOT, SEQUELA (CMD): ICD-10-CM

## 2021-02-25 PROCEDURE — 11042 DBRDMT SUBQ TIS 1ST 20SQCM/<: CPT

## 2021-02-25 SDOH — HEALTH STABILITY: MENTAL HEALTH: HOW OFTEN DO YOU HAVE A DRINK CONTAINING ALCOHOL?: NEVER

## 2021-02-25 ASSESSMENT — PAIN SCALES - GENERAL: PAINLEVEL_OUTOF10: 0

## 2021-03-04 ENCOUNTER — APPOINTMENT (OUTPATIENT)
Dept: WOUND CARE | Age: 66
End: 2021-03-04
Attending: PODIATRIST

## 2021-03-04 ENCOUNTER — CASE MANAGEMENT (OUTPATIENT)
Dept: CARE COORDINATION | Age: 66
End: 2021-03-04

## 2021-03-05 ENCOUNTER — CASE MANAGEMENT (OUTPATIENT)
Dept: CARE COORDINATION | Age: 66
End: 2021-03-05

## 2021-03-05 ENCOUNTER — HOSPITAL ENCOUNTER (OUTPATIENT)
Dept: ULTRASOUND IMAGING | Age: 66
Discharge: HOME | End: 2021-03-05
Attending: INTERNAL MEDICINE

## 2021-03-05 PROCEDURE — 93925 LOWER EXTREMITY STUDY: CPT

## 2021-03-09 ENCOUNTER — CASE MANAGEMENT (OUTPATIENT)
Dept: CARE COORDINATION | Age: 66
End: 2021-03-09

## 2021-03-09 SDOH — SOCIAL STABILITY: SOCIAL INSECURITY
WITHIN THE LAST YEAR, HAVE TO BEEN RAPED OR FORCED TO HAVE ANY KIND OF SEXUAL ACTIVITY BY YOUR PARTNER OR EX-PARTNER?: NO

## 2021-03-09 SDOH — SOCIAL STABILITY: SOCIAL INSECURITY
WITHIN THE LAST YEAR, HAVE YOU BEEN KICKED, HIT, SLAPPED, OR OTHERWISE PHYSICALLY HURT BY YOUR PARTNER OR EX-PARTNER?: NO

## 2021-03-09 SDOH — SOCIAL STABILITY: SOCIAL INSECURITY: WITHIN THE LAST YEAR, HAVE YOU BEEN AFRAID OF YOUR PARTNER OR EX-PARTNER?: NO

## 2021-03-09 SDOH — SOCIAL STABILITY: SOCIAL INSECURITY: WITHIN THE LAST YEAR, HAVE YOU BEEN HUMILIATED OR EMOTIONALLY ABUSED IN OTHER WAYS BY YOUR PARTNER OR EX-PARTNER?: NO

## 2021-03-09 SDOH — ECONOMIC STABILITY: FOOD INSECURITY: WITHIN THE PAST 12 MONTHS, THE FOOD YOU BOUGHT JUST DIDN'T LAST AND YOU DIDN'T HAVE MONEY TO GET MORE.: NEVER TRUE

## 2021-03-09 SDOH — HEALTH STABILITY: MENTAL HEALTH: HOW OFTEN DO YOU HAVE A DRINK CONTAINING ALCOHOL?: NEVER

## 2021-03-09 SDOH — ECONOMIC STABILITY: FOOD INSECURITY: WITHIN THE PAST 12 MONTHS, YOU WORRIED THAT YOUR FOOD WOULD RUN OUT BEFORE YOU GOT MONEY TO BUY MORE.: NEVER TRUE

## 2021-03-09 SDOH — HEALTH STABILITY: PHYSICAL HEALTH: ON AVERAGE, HOW MANY DAYS PER WEEK DO YOU ENGAGE IN MODERATE TO STRENUOUS EXERCISE (LIKE A BRISK WALK)?: 0 DAYS

## 2021-03-09 SDOH — HEALTH STABILITY: PHYSICAL HEALTH: ON AVERAGE, HOW MANY MINUTES DO YOU ENGAGE IN EXERCISE AT THIS LEVEL?: 0 MIN

## 2021-03-09 SDOH — ECONOMIC STABILITY: HOUSING INSECURITY: WHAT IS YOUR LIVING SITUATION TODAY?: I HAVE HOUSING

## 2021-03-09 SDOH — ECONOMIC STABILITY: INCOME INSECURITY: HOW HARD IS IT FOR YOU TO PAY FOR THE VERY BASICS LIKE FOOD, HOUSING, MEDICAL CARE, AND HEATING?: NOT HARD AT ALL

## 2021-03-09 SDOH — ECONOMIC STABILITY: HOUSING INSECURITY: ARE YOU WORRIED ABOUT LOSING YOUR HOUSING?: YES

## 2021-03-09 ASSESSMENT — ACTIVITIES OF DAILY LIVING (ADL)
FUNCTIONAL_EVALUATION: PERFORMS ADLS INDEPENDENTLY
AMBULATION: INDEPENDENT
DRIVING: INDEPENDENT
EATING: INDEPENDENT
TOILETING: INDEPENDENT
BATHING: INDEPENDENT
GROOMING: INDEPENDENT
DME_IN_USE: YES
DRESSING: INDEPENDENT
MOBILITY: NO IMPAIRMENT OF MUSCULOSKELETAL, FINE OR GROSS MOTOR SKILLS

## 2021-03-09 ASSESSMENT — PATIENT HEALTH QUESTIONNAIRE - PHQ9
CLINICAL INTERPRETATION OF PHQ2 SCORE: NO FURTHER SCREENING NEEDED
1. LITTLE INTEREST OR PLEASURE IN DOING THINGS: NOT AT ALL
CLINICAL INTERPRETATION OF PHQ9 SCORE: NO FURTHER SCREENING NEEDED
SUM OF ALL RESPONSES TO PHQ9 QUESTIONS 1 AND 2: 0
2. FEELING DOWN, DEPRESSED OR HOPELESS: NOT AT ALL
SUM OF ALL RESPONSES TO PHQ9 QUESTIONS 1 AND 2: 0

## 2021-03-09 ASSESSMENT — SLEEP AND FATIGUE QUESTIONNAIRES: HOURS OF UNINTERRUPTED SLEEP: 6

## 2021-03-11 ENCOUNTER — HOSPITAL ENCOUNTER (OUTPATIENT)
Dept: WOUND CARE | Age: 66
Discharge: STILL ADMITTED OR ADMINISTRATIVE | End: 2021-03-11
Attending: PODIATRIST

## 2021-03-11 VITALS — TEMPERATURE: 96.7 F

## 2021-03-11 DIAGNOSIS — I73.9 PVD (PERIPHERAL VASCULAR DISEASE) (CMD): ICD-10-CM

## 2021-03-11 DIAGNOSIS — E11.621 TYPE 2 DIABETES MELLITUS WITH LEFT DIABETIC FOOT ULCER (CMD): ICD-10-CM

## 2021-03-11 DIAGNOSIS — Z89.432 S/P TRANSMETATARSAL AMPUTATION OF FOOT, LEFT (CMD): ICD-10-CM

## 2021-03-11 DIAGNOSIS — Z89.422 STATUS POST AMPUTATION OF LESSER TOE, LEFT (CMD): Primary | ICD-10-CM

## 2021-03-11 DIAGNOSIS — E11.69 TYPE 2 DIABETES MELLITUS WITH OTHER SPECIFIED COMPLICATION, UNSPECIFIED WHETHER LONG TERM INSULIN USE (CMD): ICD-10-CM

## 2021-03-11 DIAGNOSIS — S98.912S AMPUTATION OF LEFT FOOT, SEQUELA (CMD): ICD-10-CM

## 2021-03-11 DIAGNOSIS — L97.529 TYPE 2 DIABETES MELLITUS WITH LEFT DIABETIC FOOT ULCER (CMD): ICD-10-CM

## 2021-03-11 PROCEDURE — 11042 DBRDMT SUBQ TIS 1ST 20SQCM/<: CPT

## 2021-03-11 ASSESSMENT — PAIN SCALES - GENERAL: PAINLEVEL_OUTOF10: 0

## 2021-03-12 ENCOUNTER — OFFICE VISIT (OUTPATIENT)
Dept: CARDIOLOGY | Age: 66
End: 2021-03-12

## 2021-03-12 VITALS
DIASTOLIC BLOOD PRESSURE: 80 MMHG | HEIGHT: 72 IN | SYSTOLIC BLOOD PRESSURE: 132 MMHG | BODY MASS INDEX: 45.87 KG/M2 | HEART RATE: 72 BPM

## 2021-03-12 DIAGNOSIS — E11.65 TYPE 2 DIABETES MELLITUS WITH HYPERGLYCEMIA, WITH LONG-TERM CURRENT USE OF INSULIN (CMD): Primary | ICD-10-CM

## 2021-03-12 DIAGNOSIS — N18.30 STAGE 3 CHRONIC KIDNEY DISEASE, UNSPECIFIED WHETHER STAGE 3A OR 3B CKD (CMD): ICD-10-CM

## 2021-03-12 DIAGNOSIS — I73.9 PVD (PERIPHERAL VASCULAR DISEASE) (CMD): ICD-10-CM

## 2021-03-12 DIAGNOSIS — Z79.4 TYPE 2 DIABETES MELLITUS WITH HYPERGLYCEMIA, WITH LONG-TERM CURRENT USE OF INSULIN (CMD): Primary | ICD-10-CM

## 2021-03-12 DIAGNOSIS — E78.00 HYPERCHOLESTEROLEMIA: ICD-10-CM

## 2021-03-12 PROCEDURE — 99214 OFFICE O/P EST MOD 30 MIN: CPT | Performed by: INTERNAL MEDICINE

## 2021-03-12 PROCEDURE — 3075F SYST BP GE 130 - 139MM HG: CPT | Performed by: INTERNAL MEDICINE

## 2021-03-12 PROCEDURE — 3079F DIAST BP 80-89 MM HG: CPT | Performed by: INTERNAL MEDICINE

## 2021-03-12 RX ORDER — TORSEMIDE 20 MG/1
40 TABLET ORAL DAILY
Qty: 30 TABLET | Refills: 0 | Status: ON HOLD | OUTPATIENT
Start: 2021-03-12 | End: 2021-04-17 | Stop reason: SDUPTHER

## 2021-03-12 SDOH — HEALTH STABILITY: MENTAL HEALTH: HOW OFTEN DO YOU HAVE A DRINK CONTAINING ALCOHOL?: NOT ASKED

## 2021-03-12 SDOH — ECONOMIC STABILITY: FOOD INSECURITY: WITHIN THE PAST 12 MONTHS, THE FOOD YOU BOUGHT JUST DIDN'T LAST AND YOU DIDN'T HAVE MONEY TO GET MORE.: NEVER TRUE

## 2021-03-12 SDOH — ECONOMIC STABILITY: FOOD INSECURITY: WITHIN THE PAST 12 MONTHS, YOU WORRIED THAT YOUR FOOD WOULD RUN OUT BEFORE YOU GOT MONEY TO BUY MORE.: NEVER TRUE

## 2021-03-12 SDOH — ECONOMIC STABILITY: INCOME INSECURITY: HOW HARD IS IT FOR YOU TO PAY FOR THE VERY BASICS LIKE FOOD, HOUSING, MEDICAL CARE, AND HEATING?: NOT HARD AT ALL

## 2021-03-12 SDOH — HEALTH STABILITY: PHYSICAL HEALTH: ON AVERAGE, HOW MANY DAYS PER WEEK DO YOU ENGAGE IN MODERATE TO STRENUOUS EXERCISE (LIKE A BRISK WALK)?: 0 DAYS

## 2021-03-12 SDOH — HEALTH STABILITY: PHYSICAL HEALTH: ON AVERAGE, HOW MANY MINUTES DO YOU ENGAGE IN EXERCISE AT THIS LEVEL?: 0 MIN

## 2021-03-12 ASSESSMENT — PATIENT HEALTH QUESTIONNAIRE - PHQ9
1. LITTLE INTEREST OR PLEASURE IN DOING THINGS: NOT AT ALL
CLINICAL INTERPRETATION OF PHQ9 SCORE: NO FURTHER SCREENING NEEDED
2. FEELING DOWN, DEPRESSED OR HOPELESS: NOT AT ALL
SUM OF ALL RESPONSES TO PHQ9 QUESTIONS 1 AND 2: 0
CLINICAL INTERPRETATION OF PHQ2 SCORE: NO FURTHER SCREENING NEEDED
SUM OF ALL RESPONSES TO PHQ9 QUESTIONS 1 AND 2: 0

## 2021-03-16 ENCOUNTER — CASE MANAGEMENT (OUTPATIENT)
Dept: CARE COORDINATION | Age: 66
End: 2021-03-16

## 2021-03-18 ENCOUNTER — HOSPITAL ENCOUNTER (OUTPATIENT)
Dept: WOUND CARE | Age: 66
Discharge: STILL ADMITTED OR ADMINISTRATIVE | End: 2021-03-18
Attending: PODIATRIST

## 2021-03-18 VITALS — TEMPERATURE: 96 F

## 2021-03-18 DIAGNOSIS — Z89.422 STATUS POST AMPUTATION OF LESSER TOE, LEFT (CMD): Primary | ICD-10-CM

## 2021-03-18 DIAGNOSIS — L97.529 TYPE 2 DIABETES MELLITUS WITH LEFT DIABETIC FOOT ULCER (CMD): ICD-10-CM

## 2021-03-18 DIAGNOSIS — Z89.432 S/P TRANSMETATARSAL AMPUTATION OF FOOT, LEFT (CMD): ICD-10-CM

## 2021-03-18 DIAGNOSIS — S90.852D FOREIGN BODY IN LEFT FOOT, SUBSEQUENT ENCOUNTER: ICD-10-CM

## 2021-03-18 DIAGNOSIS — E11.69 TYPE 2 DIABETES MELLITUS WITH OTHER SPECIFIED COMPLICATION, UNSPECIFIED WHETHER LONG TERM INSULIN USE (CMD): ICD-10-CM

## 2021-03-18 DIAGNOSIS — S98.912S AMPUTATION OF LEFT FOOT, SEQUELA (CMD): ICD-10-CM

## 2021-03-18 DIAGNOSIS — E11.621 TYPE 2 DIABETES MELLITUS WITH LEFT DIABETIC FOOT ULCER (CMD): ICD-10-CM

## 2021-03-18 DIAGNOSIS — S91.302D UNSPECIFIED OPEN WOUND, LEFT FOOT, SUBSEQUENT ENCOUNTER: ICD-10-CM

## 2021-03-18 DIAGNOSIS — I73.9 PVD (PERIPHERAL VASCULAR DISEASE) (CMD): ICD-10-CM

## 2021-03-18 PROCEDURE — 11042 DBRDMT SUBQ TIS 1ST 20SQCM/<: CPT

## 2021-03-18 SDOH — HEALTH STABILITY: MENTAL HEALTH: HOW OFTEN DO YOU HAVE A DRINK CONTAINING ALCOHOL?: NOT ASKED

## 2021-03-18 ASSESSMENT — PAIN SCALES - GENERAL: PAINLEVEL_OUTOF10: 0

## 2021-03-25 ENCOUNTER — HOSPITAL ENCOUNTER (OUTPATIENT)
Dept: WOUND CARE | Age: 66
Discharge: STILL ADMITTED OR ADMINISTRATIVE | End: 2021-03-25
Attending: PODIATRIST

## 2021-03-25 ENCOUNTER — CASE MANAGEMENT (OUTPATIENT)
Dept: CARE COORDINATION | Age: 66
End: 2021-03-25

## 2021-03-25 VITALS — TEMPERATURE: 96.6 F

## 2021-03-25 DIAGNOSIS — E11.69 TYPE 2 DIABETES MELLITUS WITH OTHER SPECIFIED COMPLICATION, UNSPECIFIED WHETHER LONG TERM INSULIN USE (CMD): ICD-10-CM

## 2021-03-25 DIAGNOSIS — E11.621 TYPE 2 DIABETES MELLITUS WITH LEFT DIABETIC FOOT ULCER (CMD): ICD-10-CM

## 2021-03-25 DIAGNOSIS — S90.852D FOREIGN BODY IN LEFT FOOT, SUBSEQUENT ENCOUNTER: ICD-10-CM

## 2021-03-25 DIAGNOSIS — L97.529 TYPE 2 DIABETES MELLITUS WITH LEFT DIABETIC FOOT ULCER (CMD): ICD-10-CM

## 2021-03-25 DIAGNOSIS — S91.302A WOUND OF LEFT FOOT: ICD-10-CM

## 2021-03-25 DIAGNOSIS — Z89.422 STATUS POST AMPUTATION OF LESSER TOE, LEFT (CMD): Primary | ICD-10-CM

## 2021-03-25 DIAGNOSIS — Z89.432 S/P TRANSMETATARSAL AMPUTATION OF FOOT, LEFT (CMD): ICD-10-CM

## 2021-03-25 DIAGNOSIS — I73.9 PVD (PERIPHERAL VASCULAR DISEASE) (CMD): ICD-10-CM

## 2021-03-25 DIAGNOSIS — S98.912S AMPUTATION OF LEFT FOOT, SEQUELA (CMD): ICD-10-CM

## 2021-03-25 PROCEDURE — 11042 DBRDMT SUBQ TIS 1ST 20SQCM/<: CPT

## 2021-03-25 SDOH — HEALTH STABILITY: MENTAL HEALTH: HOW OFTEN DO YOU HAVE A DRINK CONTAINING ALCOHOL?: NOT ASKED

## 2021-03-25 ASSESSMENT — SLEEP AND FATIGUE QUESTIONNAIRES: HOURS OF UNINTERRUPTED SLEEP: 6

## 2021-03-25 ASSESSMENT — PAIN SCALES - GENERAL: PAINLEVEL_OUTOF10: 0

## 2021-04-01 ENCOUNTER — HOSPITAL ENCOUNTER (OUTPATIENT)
Dept: GENERAL RADIOLOGY | Age: 66
Discharge: HOME | End: 2021-04-01

## 2021-04-01 ENCOUNTER — HOSPITAL ENCOUNTER (OUTPATIENT)
Dept: WOUND CARE | Age: 66
Discharge: STILL ADMITTED OR ADMINISTRATIVE | End: 2021-04-01
Attending: PODIATRIST

## 2021-04-01 ENCOUNTER — HOSPITAL ENCOUNTER (OUTPATIENT)
Dept: LAB | Age: 66
Discharge: HOME | End: 2021-04-01
Attending: INTERNAL MEDICINE

## 2021-04-01 VITALS — TEMPERATURE: 96.5 F

## 2021-04-01 DIAGNOSIS — Z89.422 STATUS POST AMPUTATION OF LESSER TOE, LEFT (CMD): ICD-10-CM

## 2021-04-01 DIAGNOSIS — E11.69 TYPE 2 DIABETES MELLITUS WITH OTHER SPECIFIED COMPLICATION, UNSPECIFIED WHETHER LONG TERM INSULIN USE (CMD): ICD-10-CM

## 2021-04-01 DIAGNOSIS — Z79.4 TYPE 2 DIABETES MELLITUS WITH OTHER SKIN ULCER, WITH LONG-TERM CURRENT USE OF INSULIN (CMD): ICD-10-CM

## 2021-04-01 DIAGNOSIS — E11.622 TYPE 2 DIABETES MELLITUS WITH OTHER SKIN ULCER, WITH LONG-TERM CURRENT USE OF INSULIN (CMD): Primary | ICD-10-CM

## 2021-04-01 DIAGNOSIS — S90.852D FOREIGN BODY IN LEFT FOOT, SUBSEQUENT ENCOUNTER: ICD-10-CM

## 2021-04-01 DIAGNOSIS — E13.51 PERIPHERAL VASCULAR DISEASE DUE TO SECONDARY DIABETES MELLITUS (CMD): ICD-10-CM

## 2021-04-01 DIAGNOSIS — L97.529 TYPE 2 DIABETES MELLITUS WITH LEFT DIABETIC FOOT ULCER (CMD): ICD-10-CM

## 2021-04-01 DIAGNOSIS — E11.622 TYPE 2 DIABETES MELLITUS WITH OTHER SKIN ULCER, WITH LONG-TERM CURRENT USE OF INSULIN (CMD): ICD-10-CM

## 2021-04-01 DIAGNOSIS — I73.9 PVD (PERIPHERAL VASCULAR DISEASE) (CMD): ICD-10-CM

## 2021-04-01 DIAGNOSIS — S98.912S AMPUTATION OF LEFT FOOT, SEQUELA (CMD): ICD-10-CM

## 2021-04-01 DIAGNOSIS — E11.29 TYPE II OR UNSPECIFIED TYPE DIABETES MELLITUS WITH RENAL MANIFESTATIONS, UNCONTROLLED(250.42) (CMD): ICD-10-CM

## 2021-04-01 DIAGNOSIS — E11.621 TYPE 2 DIABETES MELLITUS WITH LEFT DIABETIC FOOT ULCER (CMD): ICD-10-CM

## 2021-04-01 DIAGNOSIS — Z89.429 STATUS POST AMPUTATION OF TOE (CMD): ICD-10-CM

## 2021-04-01 DIAGNOSIS — Z79.4 TYPE 2 DIABETES MELLITUS WITH OTHER SKIN ULCER, WITH LONG-TERM CURRENT USE OF INSULIN (CMD): Primary | ICD-10-CM

## 2021-04-01 DIAGNOSIS — E11.65 TYPE II OR UNSPECIFIED TYPE DIABETES MELLITUS WITH RENAL MANIFESTATIONS, UNCONTROLLED(250.42) (CMD): ICD-10-CM

## 2021-04-01 DIAGNOSIS — I10 ESSENTIAL HYPERTENSION, MALIGNANT: Primary | ICD-10-CM

## 2021-04-01 DIAGNOSIS — Z89.432 S/P TRANSMETATARSAL AMPUTATION OF FOOT, LEFT (CMD): ICD-10-CM

## 2021-04-01 LAB
ALBUMIN SERPL-MCNC: 3 G/DL (ref 3.6–5.1)
ALBUMIN/GLOB SERPL: 0.6 {RATIO} (ref 1–2.4)
ALP SERPL-CCNC: 105 UNITS/L (ref 45–117)
ALT SERPL-CCNC: 19 UNITS/L
ANION GAP SERPL CALC-SCNC: 6 MMOL/L (ref 10–20)
AST SERPL-CCNC: 18 UNITS/L
BASOPHILS # BLD: 0.1 K/MCL (ref 0–0.3)
BASOPHILS NFR BLD: 1 %
BILIRUB SERPL-MCNC: 0.4 MG/DL (ref 0.2–1)
BUN SERPL-MCNC: 51 MG/DL (ref 6–20)
BUN/CREAT SERPL: 27 (ref 7–25)
CALCIUM SERPL-MCNC: 8.9 MG/DL (ref 8.4–10.2)
CHLORIDE SERPL-SCNC: 102 MMOL/L (ref 98–107)
CHOLEST SERPL-MCNC: 134 MG/DL
CHOLEST/HDLC SERPL: 4.2 {RATIO}
CO2 SERPL-SCNC: 34 MMOL/L (ref 21–32)
CREAT SERPL-MCNC: 1.9 MG/DL (ref 0.67–1.17)
CREAT UR-MCNC: 130 MG/DL
DEPRECATED RDW RBC: 44.2 FL (ref 39–50)
EOSINOPHIL # BLD: 0.3 K/MCL (ref 0–0.5)
EOSINOPHIL NFR BLD: 3 %
ERYTHROCYTE [DISTWIDTH] IN BLOOD: 13.8 % (ref 11–15)
FASTING DURATION TIME PATIENT: ABNORMAL H
FASTING DURATION TIME PATIENT: ABNORMAL H
GFR SERPLBLD BASED ON 1.73 SQ M-ARVRAT: 36 ML/MIN/1.73M2
GLOBULIN SER-MCNC: 5.3 G/DL (ref 2–4)
GLUCOSE SERPL-MCNC: 106 MG/DL (ref 65–99)
HCT VFR BLD CALC: 38 % (ref 39–51)
HDLC SERPL-MCNC: 32 MG/DL
HGB BLD-MCNC: 12.1 G/DL (ref 13–17)
IMM GRANULOCYTES # BLD AUTO: 0 K/MCL (ref 0–0.2)
IMM GRANULOCYTES # BLD: 0 %
LDLC SERPL CALC-MCNC: 72 MG/DL
LYMPHOCYTES # BLD: 2.3 K/MCL (ref 1–4)
LYMPHOCYTES NFR BLD: 23 %
MCH RBC QN AUTO: 28.2 PG (ref 26–34)
MCHC RBC AUTO-ENTMCNC: 31.8 G/DL (ref 32–36.5)
MCV RBC AUTO: 88.6 FL (ref 78–100)
MICROALBUMIN UR-MCNC: 3.49 MG/DL
MICROALBUMIN/CREAT UR: 26.8 MG/G
MONOCYTES # BLD: 0.8 K/MCL (ref 0.3–0.9)
MONOCYTES NFR BLD: 9 %
NEUTROPHILS # BLD: 6.4 K/MCL (ref 1.8–7.7)
NEUTROPHILS NFR BLD: 64 %
NONHDLC SERPL-MCNC: 102 MG/DL
NRBC BLD MANUAL-RTO: 0 /100 WBC
PLATELET # BLD AUTO: 303 K/MCL (ref 140–450)
POTASSIUM SERPL-SCNC: 3.9 MMOL/L (ref 3.4–5.1)
PROT SERPL-MCNC: 8.3 G/DL (ref 6.4–8.2)
RBC # BLD: 4.29 MIL/MCL (ref 4.5–5.9)
SODIUM SERPL-SCNC: 138 MMOL/L (ref 135–145)
TRIGL SERPL-MCNC: 148 MG/DL
TSH SERPL-ACNC: 3.92 MCUNITS/ML (ref 0.35–5)
WBC # BLD: 9.9 K/MCL (ref 4.2–11)

## 2021-04-01 PROCEDURE — 87186 SC STD MICRODIL/AGAR DIL: CPT | Performed by: PODIATRIST

## 2021-04-01 PROCEDURE — 73630 X-RAY EXAM OF FOOT: CPT

## 2021-04-01 PROCEDURE — 82570 ASSAY OF URINE CREATININE: CPT | Performed by: CLINICAL MEDICAL LABORATORY

## 2021-04-01 PROCEDURE — 82043 UR ALBUMIN QUANTITATIVE: CPT | Performed by: CLINICAL MEDICAL LABORATORY

## 2021-04-01 PROCEDURE — 11042 DBRDMT SUBQ TIS 1ST 20SQCM/<: CPT

## 2021-04-01 PROCEDURE — 85025 COMPLETE CBC W/AUTO DIFF WBC: CPT | Performed by: CLINICAL MEDICAL LABORATORY

## 2021-04-01 PROCEDURE — 36415 COLL VENOUS BLD VENIPUNCTURE: CPT | Performed by: CLINICAL MEDICAL LABORATORY

## 2021-04-01 PROCEDURE — 84443 ASSAY THYROID STIM HORMONE: CPT | Performed by: CLINICAL MEDICAL LABORATORY

## 2021-04-01 PROCEDURE — 80061 LIPID PANEL: CPT | Performed by: CLINICAL MEDICAL LABORATORY

## 2021-04-01 PROCEDURE — 80053 COMPREHEN METABOLIC PANEL: CPT | Performed by: CLINICAL MEDICAL LABORATORY

## 2021-04-01 RX ORDER — SULFAMETHOXAZOLE AND TRIMETHOPRIM 800; 160 MG/1; MG/1
1 TABLET ORAL 2 TIMES DAILY
Qty: 28 TABLET | Refills: 0 | Status: ON HOLD | OUTPATIENT
Start: 2021-04-01 | End: 2021-04-17

## 2021-04-01 ASSESSMENT — PAIN SCALES - GENERAL: PAINLEVEL_OUTOF10: 0

## 2021-04-02 ENCOUNTER — CASE MANAGEMENT (OUTPATIENT)
Dept: CARE COORDINATION | Age: 66
End: 2021-04-02

## 2021-04-05 LAB
BACTERIA SPEC ANAEROBE+AEROBE CULT: ABNORMAL
BACTERIA SPEC ANAEROBE+AEROBE CULT: ABNORMAL
GRAM STN SPEC: ABNORMAL

## 2021-04-06 ENCOUNTER — CASE MANAGEMENT (OUTPATIENT)
Dept: CARE COORDINATION | Age: 66
End: 2021-04-06

## 2021-04-06 ASSESSMENT — SLEEP AND FATIGUE QUESTIONNAIRES
HOURS OF UNINTERRUPTED SLEEP: 6
SLEEP DESCRIPTORS: RESTLESS SLEEPER

## 2021-04-08 ENCOUNTER — HOSPITAL ENCOUNTER (OUTPATIENT)
Dept: WOUND CARE | Age: 66
Discharge: STILL ADMITTED OR ADMINISTRATIVE | End: 2021-04-08
Attending: PODIATRIST

## 2021-04-08 DIAGNOSIS — I73.9 PVD (PERIPHERAL VASCULAR DISEASE) (CMD): ICD-10-CM

## 2021-04-08 DIAGNOSIS — E13.51 PERIPHERAL VASCULAR DISEASE DUE TO SECONDARY DIABETES MELLITUS (CMD): ICD-10-CM

## 2021-04-08 DIAGNOSIS — S98.912S AMPUTATION OF LEFT FOOT, SEQUELA (CMD): ICD-10-CM

## 2021-04-08 DIAGNOSIS — L97.529 TYPE 2 DIABETES MELLITUS WITH LEFT DIABETIC FOOT ULCER (CMD): ICD-10-CM

## 2021-04-08 DIAGNOSIS — E11.621 TYPE 2 DIABETES MELLITUS WITH LEFT DIABETIC FOOT ULCER (CMD): ICD-10-CM

## 2021-04-08 DIAGNOSIS — S90.852D FOREIGN BODY IN LEFT FOOT, SUBSEQUENT ENCOUNTER: ICD-10-CM

## 2021-04-08 DIAGNOSIS — Z89.422 STATUS POST AMPUTATION OF LESSER TOE, LEFT (CMD): Primary | ICD-10-CM

## 2021-04-08 DIAGNOSIS — E11.69 TYPE 2 DIABETES MELLITUS WITH OTHER SPECIFIED COMPLICATION, UNSPECIFIED WHETHER LONG TERM INSULIN USE (CMD): ICD-10-CM

## 2021-04-08 DIAGNOSIS — Z89.432 S/P TRANSMETATARSAL AMPUTATION OF FOOT, LEFT (CMD): ICD-10-CM

## 2021-04-08 DIAGNOSIS — S98.312S AMPUTATION AT MIDFOOT, LEFT, SEQUELA (CMD): ICD-10-CM

## 2021-04-08 PROCEDURE — 11042 DBRDMT SUBQ TIS 1ST 20SQCM/<: CPT

## 2021-04-08 ASSESSMENT — PAIN SCALES - GENERAL: PAINLEVEL_OUTOF10: 0

## 2021-04-09 RX ORDER — TRAMADOL HYDROCHLORIDE 50 MG/1
50 TABLET ORAL EVERY 6 HOURS PRN
Status: ON HOLD | COMMUNITY
End: 2021-07-07 | Stop reason: HOSPADM

## 2021-04-09 ASSESSMENT — ACTIVITIES OF DAILY LIVING (ADL): MOBILITY_ASSIST_DEVICES: WHEELCHAIR;FRONT-WHEELED WALKER

## 2021-04-12 ENCOUNTER — PREP FOR CASE (OUTPATIENT)
Dept: SURGERY | Age: 66
End: 2021-04-12

## 2021-04-12 ENCOUNTER — ANESTHESIA EVENT (OUTPATIENT)
Dept: SURGERY | Age: 66
DRG: 617 | End: 2021-04-12

## 2021-04-12 DIAGNOSIS — I73.9 PVD (PERIPHERAL VASCULAR DISEASE) (CMD): ICD-10-CM

## 2021-04-12 DIAGNOSIS — E11.621 DIABETIC FOOT ULCER WITH OSTEOMYELITIS (CMD): ICD-10-CM

## 2021-04-12 DIAGNOSIS — S98.312S AMPUTATION AT MIDFOOT, LEFT, SEQUELA (CMD): ICD-10-CM

## 2021-04-12 DIAGNOSIS — L97.524 ULCERATED, FOOT, LEFT, WITH NECROSIS OF BONE (CMD): Primary | ICD-10-CM

## 2021-04-12 DIAGNOSIS — L97.509 DIABETIC FOOT ULCER WITH OSTEOMYELITIS (CMD): ICD-10-CM

## 2021-04-12 DIAGNOSIS — E11.69 DIABETIC FOOT ULCER WITH OSTEOMYELITIS (CMD): ICD-10-CM

## 2021-04-12 DIAGNOSIS — M86.9 DIABETIC FOOT ULCER WITH OSTEOMYELITIS (CMD): ICD-10-CM

## 2021-04-13 ENCOUNTER — ANESTHESIA (OUTPATIENT)
Dept: SURGERY | Age: 66
DRG: 617 | End: 2021-04-13

## 2021-04-13 ENCOUNTER — HOSPITAL ENCOUNTER (INPATIENT)
Age: 66
LOS: 14 days | Discharge: HOME HEALTH | DRG: 617 | End: 2021-04-27
Attending: PODIATRIST | Admitting: PODIATRIST

## 2021-04-13 ENCOUNTER — CASE MANAGEMENT (OUTPATIENT)
Dept: CARE COORDINATION | Age: 66
End: 2021-04-13

## 2021-04-13 DIAGNOSIS — Z89.429 STATUS POST AMPUTATION OF TOE (CMD): Primary | ICD-10-CM

## 2021-04-13 DIAGNOSIS — Z79.4 TYPE 2 DIABETES MELLITUS WITH FOOT ULCER, WITH LONG-TERM CURRENT USE OF INSULIN (CMD): ICD-10-CM

## 2021-04-13 DIAGNOSIS — L97.509 TYPE 2 DIABETES MELLITUS WITH FOOT ULCER, WITH LONG-TERM CURRENT USE OF INSULIN (CMD): ICD-10-CM

## 2021-04-13 DIAGNOSIS — E11.621 TYPE 2 DIABETES MELLITUS WITH FOOT ULCER, WITH LONG-TERM CURRENT USE OF INSULIN (CMD): ICD-10-CM

## 2021-04-13 PROBLEM — I96 GANGRENE OF LEFT FOOT (CMD): Status: ACTIVE | Noted: 2021-04-13

## 2021-04-13 LAB
CREAT SERPL-MCNC: 2.6 MG/DL (ref 0.67–1.17)
GFR SERPLBLD BASED ON 1.73 SQ M-ARVRAT: 25 ML/MIN/1.73M2
GLUCOSE BLDC GLUCOMTR-MCNC: 109 MG/DL (ref 70–99)
GLUCOSE BLDC GLUCOMTR-MCNC: 128 MG/DL (ref 70–99)
GLUCOSE BLDC GLUCOMTR-MCNC: 132 MG/DL (ref 70–99)
GLUCOSE BLDC GLUCOMTR-MCNC: 205 MG/DL (ref 70–99)
SARS-COV-2 RNA RESP QL NAA+PROBE: NOT DETECTED
SERVICE CMNT-IMP: NORMAL
SERVICE CMNT-IMP: NORMAL

## 2021-04-13 PROCEDURE — 13000117 HB ORTHO COMPLEX CASE EA ADD MINUTE: Performed by: PODIATRIST

## 2021-04-13 PROCEDURE — 10004651 HB RX, NO CHARGE ITEM: Performed by: NURSE PRACTITIONER

## 2021-04-13 PROCEDURE — 10002801 HB RX 250 W/O HCPCS: Performed by: PODIATRIST

## 2021-04-13 PROCEDURE — 10004452 HB PACU ADDL 30 MINUTES: Performed by: PODIATRIST

## 2021-04-13 PROCEDURE — 13000116 HB ORTHO COMPLEX CASE S/U + 1ST 15 MIN: Performed by: PODIATRIST

## 2021-04-13 PROCEDURE — 10004651 HB RX, NO CHARGE ITEM: Performed by: PODIATRIST

## 2021-04-13 PROCEDURE — 10002800 HB RX 250 W HCPCS: Performed by: ANESTHESIOLOGY

## 2021-04-13 PROCEDURE — 82962 GLUCOSE BLOOD TEST: CPT

## 2021-04-13 PROCEDURE — U0005 INFEC AGEN DETEC AMPLI PROBE: HCPCS | Performed by: PODIATRIST

## 2021-04-13 PROCEDURE — 88307 TISSUE EXAM BY PATHOLOGIST: CPT | Performed by: PODIATRIST

## 2021-04-13 PROCEDURE — 36415 COLL VENOUS BLD VENIPUNCTURE: CPT | Performed by: PODIATRIST

## 2021-04-13 PROCEDURE — 13000006 HB ANESTHESIA MAC S/U + 1ST 15 MIN: Performed by: PODIATRIST

## 2021-04-13 PROCEDURE — 10004451 HB PACU RECOVERY 1ST 30 MINUTES: Performed by: PODIATRIST

## 2021-04-13 PROCEDURE — 10000002 HB ROOM CHARGE MED SURG

## 2021-04-13 PROCEDURE — 10002807 HB RX 258: Performed by: ANESTHESIOLOGY

## 2021-04-13 PROCEDURE — 0Y6N0ZF DETACHMENT AT LEFT FOOT, PARTIAL 5TH RAY, OPEN APPROACH: ICD-10-PCS | Performed by: PODIATRIST

## 2021-04-13 PROCEDURE — 13000007 HB ANESTHESIA MAC EA ADD MINUTE: Performed by: PODIATRIST

## 2021-04-13 PROCEDURE — 82565 ASSAY OF CREATININE: CPT | Performed by: PODIATRIST

## 2021-04-13 PROCEDURE — 13003289 HB OXYGEN THERAPY DAILY

## 2021-04-13 PROCEDURE — 10002800 HB RX 250 W HCPCS: Performed by: NURSE PRACTITIONER

## 2021-04-13 PROCEDURE — 13001086 HB INCENTIVE SPIROMETER W INSTRUCT

## 2021-04-13 PROCEDURE — 10002803 HB RX 637: Performed by: NURSE PRACTITIONER

## 2021-04-13 PROCEDURE — 0QBP0ZX EXCISION OF LEFT METATARSAL, OPEN APPROACH, DIAGNOSTIC: ICD-10-PCS | Performed by: PODIATRIST

## 2021-04-13 PROCEDURE — 10002803 HB RX 637: Performed by: PODIATRIST

## 2021-04-13 PROCEDURE — 10002801 HB RX 250 W/O HCPCS: Performed by: ANESTHESIOLOGY

## 2021-04-13 PROCEDURE — 10002803 HB RX 637: Performed by: FAMILY MEDICINE

## 2021-04-13 PROCEDURE — 10006023 HB SUPPLY 272: Performed by: PODIATRIST

## 2021-04-13 PROCEDURE — 87186 SC STD MICRODIL/AGAR DIL: CPT | Performed by: PODIATRIST

## 2021-04-13 PROCEDURE — 10002016 HB COUNTER INCENTIVE SPIROMETRY

## 2021-04-13 RX ORDER — TORSEMIDE 20 MG/1
40 TABLET ORAL DAILY
Status: DISCONTINUED | OUTPATIENT
Start: 2021-04-13 | End: 2021-04-17

## 2021-04-13 RX ORDER — AMOXICILLIN 250 MG
2 CAPSULE ORAL DAILY PRN
Status: DISCONTINUED | OUTPATIENT
Start: 2021-04-13 | End: 2021-04-27 | Stop reason: HOSPADM

## 2021-04-13 RX ORDER — ONDANSETRON 2 MG/ML
4 INJECTION INTRAMUSCULAR; INTRAVENOUS 2 TIMES DAILY PRN
Status: DISCONTINUED | OUTPATIENT
Start: 2021-04-13 | End: 2021-04-13 | Stop reason: SDUPTHER

## 2021-04-13 RX ORDER — SODIUM CHLORIDE 9 MG/ML
INJECTION, SOLUTION INTRAVENOUS CONTINUOUS
Status: DISCONTINUED | OUTPATIENT
Start: 2021-04-13 | End: 2021-04-13 | Stop reason: HOSPADM

## 2021-04-13 RX ORDER — OXYCODONE HYDROCHLORIDE 5 MG/1
10 TABLET ORAL EVERY 4 HOURS PRN
Status: DISCONTINUED | OUTPATIENT
Start: 2021-04-13 | End: 2021-04-27 | Stop reason: HOSPADM

## 2021-04-13 RX ORDER — ATORVASTATIN CALCIUM 10 MG/1
10 TABLET, FILM COATED ORAL DAILY
Status: DISCONTINUED | OUTPATIENT
Start: 2021-04-13 | End: 2021-04-27 | Stop reason: HOSPADM

## 2021-04-13 RX ORDER — NICOTINE POLACRILEX 4 MG
30 LOZENGE BUCCAL PRN
Status: DISCONTINUED | OUTPATIENT
Start: 2021-04-13 | End: 2021-04-27 | Stop reason: HOSPADM

## 2021-04-13 RX ORDER — ENOXAPARIN SODIUM 100 MG/ML
40 INJECTION SUBCUTANEOUS DAILY
Status: DISCONTINUED | OUTPATIENT
Start: 2021-04-14 | End: 2021-04-13 | Stop reason: SDUPTHER

## 2021-04-13 RX ORDER — ALLOPURINOL 100 MG/1
100 TABLET ORAL DAILY
Status: DISCONTINUED | OUTPATIENT
Start: 2021-04-13 | End: 2021-04-27 | Stop reason: HOSPADM

## 2021-04-13 RX ORDER — METOCLOPRAMIDE HYDROCHLORIDE 5 MG/ML
5 INJECTION INTRAMUSCULAR; INTRAVENOUS EVERY 6 HOURS PRN
Status: DISCONTINUED | OUTPATIENT
Start: 2021-04-13 | End: 2021-04-13 | Stop reason: HOSPADM

## 2021-04-13 RX ORDER — INSULIN GLARGINE 100 [IU]/ML
50 INJECTION, SOLUTION SUBCUTANEOUS EVERY 12 HOURS SCHEDULED
Status: DISCONTINUED | OUTPATIENT
Start: 2021-04-13 | End: 2021-04-22

## 2021-04-13 RX ORDER — SODIUM CHLORIDE, SODIUM LACTATE, POTASSIUM CHLORIDE, CALCIUM CHLORIDE 600; 310; 30; 20 MG/100ML; MG/100ML; MG/100ML; MG/100ML
INJECTION, SOLUTION INTRAVENOUS CONTINUOUS
Status: DISCONTINUED | OUTPATIENT
Start: 2021-04-13 | End: 2021-04-13 | Stop reason: HOSPADM

## 2021-04-13 RX ORDER — ONDANSETRON 2 MG/ML
4 INJECTION INTRAMUSCULAR; INTRAVENOUS 2 TIMES DAILY PRN
Status: DISCONTINUED | OUTPATIENT
Start: 2021-04-13 | End: 2021-04-13 | Stop reason: HOSPADM

## 2021-04-13 RX ORDER — ASCORBIC ACID 500 MG
500 TABLET ORAL 2 TIMES DAILY
Status: DISCONTINUED | OUTPATIENT
Start: 2021-04-13 | End: 2021-04-27 | Stop reason: HOSPADM

## 2021-04-13 RX ORDER — 0.9 % SODIUM CHLORIDE 0.9 %
2 VIAL (ML) INJECTION EVERY 12 HOURS SCHEDULED
Status: DISCONTINUED | OUTPATIENT
Start: 2021-04-13 | End: 2021-04-16 | Stop reason: SDUPTHER

## 2021-04-13 RX ORDER — ENOXAPARIN SODIUM 100 MG/ML
40 INJECTION SUBCUTANEOUS EVERY 12 HOURS
Status: DISCONTINUED | OUTPATIENT
Start: 2021-04-14 | End: 2021-04-27 | Stop reason: HOSPADM

## 2021-04-13 RX ORDER — DEXTROSE MONOHYDRATE 25 G/50ML
12.5 INJECTION, SOLUTION INTRAVENOUS PRN
Status: DISCONTINUED | OUTPATIENT
Start: 2021-04-13 | End: 2021-04-27 | Stop reason: HOSPADM

## 2021-04-13 RX ORDER — HYDRALAZINE HYDROCHLORIDE 20 MG/ML
5 INJECTION INTRAMUSCULAR; INTRAVENOUS EVERY 10 MIN PRN
Status: DISCONTINUED | OUTPATIENT
Start: 2021-04-13 | End: 2021-04-13 | Stop reason: HOSPADM

## 2021-04-13 RX ORDER — SODIUM CHLORIDE, SODIUM LACTATE, POTASSIUM CHLORIDE, CALCIUM CHLORIDE 600; 310; 30; 20 MG/100ML; MG/100ML; MG/100ML; MG/100ML
INJECTION, SOLUTION INTRAVENOUS CONTINUOUS PRN
Status: DISCONTINUED | OUTPATIENT
Start: 2021-04-13 | End: 2021-04-13

## 2021-04-13 RX ORDER — ACETAMINOPHEN 325 MG/1
650 TABLET ORAL EVERY 4 HOURS PRN
Status: DISCONTINUED | OUTPATIENT
Start: 2021-04-13 | End: 2021-04-27 | Stop reason: HOSPADM

## 2021-04-13 RX ORDER — ONDANSETRON 4 MG/1
4 TABLET, ORALLY DISINTEGRATING ORAL EVERY 12 HOURS PRN
Status: DISCONTINUED | OUTPATIENT
Start: 2021-04-13 | End: 2021-04-27 | Stop reason: HOSPADM

## 2021-04-13 RX ORDER — ONDANSETRON 2 MG/ML
4 INJECTION INTRAMUSCULAR; INTRAVENOUS EVERY 12 HOURS PRN
Status: DISCONTINUED | OUTPATIENT
Start: 2021-04-13 | End: 2021-04-27 | Stop reason: HOSPADM

## 2021-04-13 RX ORDER — FAMOTIDINE 20 MG/1
20 TABLET, FILM COATED ORAL DAILY
Status: DISCONTINUED | OUTPATIENT
Start: 2021-04-13 | End: 2021-04-27 | Stop reason: HOSPADM

## 2021-04-13 RX ORDER — ATORVASTATIN CALCIUM 40 MG/1
40 TABLET, FILM COATED ORAL DAILY
Status: DISCONTINUED | OUTPATIENT
Start: 2021-04-13 | End: 2021-04-13 | Stop reason: DRUGHIGH

## 2021-04-13 RX ORDER — SPIRONOLACTONE 25 MG/1
25 TABLET ORAL DAILY
Status: DISCONTINUED | OUTPATIENT
Start: 2021-04-13 | End: 2021-04-17

## 2021-04-13 RX ORDER — 0.9 % SODIUM CHLORIDE 0.9 %
2 VIAL (ML) INJECTION EVERY 12 HOURS SCHEDULED
Status: DISCONTINUED | OUTPATIENT
Start: 2021-04-13 | End: 2021-04-21

## 2021-04-13 RX ORDER — MIDAZOLAM HYDROCHLORIDE 1 MG/ML
INJECTION, SOLUTION INTRAMUSCULAR; INTRAVENOUS PRN
Status: DISCONTINUED | OUTPATIENT
Start: 2021-04-13 | End: 2021-04-13

## 2021-04-13 RX ORDER — DEXTROSE MONOHYDRATE 25 G/50ML
25 INJECTION, SOLUTION INTRAVENOUS PRN
Status: DISCONTINUED | OUTPATIENT
Start: 2021-04-13 | End: 2021-04-27 | Stop reason: HOSPADM

## 2021-04-13 RX ORDER — METOPROLOL SUCCINATE 25 MG/1
25 TABLET, EXTENDED RELEASE ORAL 2 TIMES DAILY
Status: DISCONTINUED | OUTPATIENT
Start: 2021-04-13 | End: 2021-04-27 | Stop reason: HOSPADM

## 2021-04-13 RX ORDER — HYDROCODONE BITARTRATE AND ACETAMINOPHEN 5; 325 MG/1; MG/1
1 TABLET ORAL EVERY 4 HOURS PRN
Status: DISCONTINUED | OUTPATIENT
Start: 2021-04-13 | End: 2021-04-27 | Stop reason: HOSPADM

## 2021-04-13 RX ORDER — 0.9 % SODIUM CHLORIDE 0.9 %
2 VIAL (ML) INJECTION EVERY 12 HOURS SCHEDULED
Status: DISCONTINUED | OUTPATIENT
Start: 2021-04-13 | End: 2021-04-13 | Stop reason: HOSPADM

## 2021-04-13 RX ORDER — SULFAMETHOXAZOLE AND TRIMETHOPRIM 800; 160 MG/1; MG/1
1 TABLET ORAL 2 TIMES DAILY
Status: DISCONTINUED | OUTPATIENT
Start: 2021-04-13 | End: 2021-04-13 | Stop reason: SDUPTHER

## 2021-04-13 RX ORDER — INSULIN LISPRO 100 [IU]/ML
12 INJECTION, SOLUTION INTRAVENOUS; SUBCUTANEOUS
Status: DISCONTINUED | OUTPATIENT
Start: 2021-04-13 | End: 2021-04-15

## 2021-04-13 RX ORDER — NICOTINE POLACRILEX 4 MG
15 LOZENGE BUCCAL PRN
Status: DISCONTINUED | OUTPATIENT
Start: 2021-04-13 | End: 2021-04-27 | Stop reason: HOSPADM

## 2021-04-13 RX ORDER — MAGNESIUM HYDROXIDE/ALUMINUM HYDROXICE/SIMETHICONE 120; 1200; 1200 MG/30ML; MG/30ML; MG/30ML
30 SUSPENSION ORAL EVERY 4 HOURS PRN
Status: DISCONTINUED | OUTPATIENT
Start: 2021-04-13 | End: 2021-04-27 | Stop reason: HOSPADM

## 2021-04-13 RX ORDER — BUPIVACAINE HYDROCHLORIDE 5 MG/ML
INJECTION, SOLUTION EPIDURAL; INTRACAUDAL PRN
Status: DISCONTINUED | OUTPATIENT
Start: 2021-04-13 | End: 2021-04-13 | Stop reason: HOSPADM

## 2021-04-13 RX ORDER — POLYETHYLENE GLYCOL 3350 17 G/17G
17 POWDER, FOR SOLUTION ORAL DAILY PRN
Status: DISCONTINUED | OUTPATIENT
Start: 2021-04-13 | End: 2021-04-27 | Stop reason: HOSPADM

## 2021-04-13 RX ADMIN — HYDROCODONE BITARTRATE AND ACETAMINOPHEN 1 TABLET: 5; 325 TABLET ORAL at 20:30

## 2021-04-13 RX ADMIN — SODIUM CHLORIDE: 9 INJECTION, SOLUTION INTRAVENOUS at 10:08

## 2021-04-13 RX ADMIN — OXYCODONE HYDROCHLORIDE AND ACETAMINOPHEN 500 MG: 500 TABLET ORAL at 22:14

## 2021-04-13 RX ADMIN — MUPIROCIN 1 APPLICATION.: 20 OINTMENT TOPICAL at 10:04

## 2021-04-13 RX ADMIN — MUPIROCIN 1 APPLICATION.: 20 OINTMENT TOPICAL at 22:19

## 2021-04-13 RX ADMIN — SODIUM CHLORIDE, PRESERVATIVE FREE 2 ML: 5 INJECTION INTRAVENOUS at 22:18

## 2021-04-13 RX ADMIN — METOPROLOL SUCCINATE 25 MG: 25 TABLET, EXTENDED RELEASE ORAL at 22:14

## 2021-04-13 RX ADMIN — VANCOMYCIN HYDROCHLORIDE 2000 MG: 10 INJECTION, POWDER, LYOPHILIZED, FOR SOLUTION INTRAVENOUS at 12:27

## 2021-04-13 RX ADMIN — TORSEMIDE 40 MG: 20 TABLET ORAL at 16:32

## 2021-04-13 RX ADMIN — PROPOFOL 25 MCG/KG/MIN: 10 INJECTION, EMULSION INTRAVENOUS at 11:10

## 2021-04-13 RX ADMIN — INSULIN GLARGINE 50 UNITS: 100 INJECTION, SOLUTION SUBCUTANEOUS at 22:15

## 2021-04-13 RX ADMIN — SODIUM CHLORIDE, POTASSIUM CHLORIDE, SODIUM LACTATE AND CALCIUM CHLORIDE: 600; 310; 30; 20 INJECTION, SOLUTION INTRAVENOUS at 10:44

## 2021-04-13 RX ADMIN — SPIRONOLACTONE 25 MG: 25 TABLET, FILM COATED ORAL at 16:32

## 2021-04-13 RX ADMIN — OXYCODONE HYDROCHLORIDE 10 MG: 5 TABLET ORAL at 22:14

## 2021-04-13 RX ADMIN — ALLOPURINOL 100 MG: 100 TABLET ORAL at 16:32

## 2021-04-13 RX ADMIN — VANCOMYCIN HYDROCHLORIDE 2000 MG: 10 INJECTION, POWDER, LYOPHILIZED, FOR SOLUTION INTRAVENOUS at 12:38

## 2021-04-13 RX ADMIN — HYDROCODONE BITARTRATE AND ACETAMINOPHEN 1 TABLET: 5; 325 TABLET ORAL at 16:37

## 2021-04-13 RX ADMIN — FAMOTIDINE 20 MG: 20 TABLET ORAL at 16:33

## 2021-04-13 RX ADMIN — SODIUM CHLORIDE, PRESERVATIVE FREE 2 ML: 5 INJECTION INTRAVENOUS at 21:00

## 2021-04-13 RX ADMIN — MIDAZOLAM HYDROCHLORIDE 4 MG: 1 INJECTION, SOLUTION INTRAMUSCULAR; INTRAVENOUS at 11:10

## 2021-04-13 RX ADMIN — ATORVASTATIN CALCIUM 10 MG: 10 TABLET, FILM COATED ORAL at 16:36

## 2021-04-13 RX ADMIN — FENTANYL CITRATE 100 MCG: 50 INJECTION, SOLUTION INTRAMUSCULAR; INTRAVENOUS at 11:10

## 2021-04-13 ASSESSMENT — COGNITIVE AND FUNCTIONAL STATUS - GENERAL
ARE YOU BLIND OR DO YOU HAVE SERIOUS DIFFICULTY SEEING, EVEN WHEN WEARING GLASSES: NO
BECAUSE OF A PHYSICAL, MENTAL, OR EMOTIONAL CONDITION, DO YOU HAVE SERIOUS DIFFICULTY CONCENTRATING, REMEMBERING OR MAKING DECISIONS: NO
DO YOU HAVE DIFFICULTY DRESSING OR BATHING: YES
ARE YOU DEAF OR DO YOU HAVE SERIOUS DIFFICULTY  HEARING: NO
DO YOU HAVE SERIOUS DIFFICULTY WALKING OR CLIMBING STAIRS: YES
BECAUSE OF A PHYSICAL, MENTAL, OR EMOTIONAL CONDITION, DO YOU HAVE DIFFICULTY DOING ERRANDS ALONE: YES

## 2021-04-13 ASSESSMENT — COLUMBIA-SUICIDE SEVERITY RATING SCALE - C-SSRS
1. WITHIN THE PAST MONTH, HAVE YOU WISHED YOU WERE DEAD OR WISHED YOU COULD GO TO SLEEP AND NOT WAKE UP?: NO
IS THE PATIENT ABLE TO COMPLETE C-SSRS: YES
6. HAVE YOU EVER DONE ANYTHING, STARTED TO DO ANYTHING, OR PREPARED TO DO ANYTHING TO END YOUR LIFE?: NO
2. HAVE YOU ACTUALLY HAD ANY THOUGHTS OF KILLING YOURSELF?: NO

## 2021-04-13 ASSESSMENT — PAIN SCALES - GENERAL
PAINLEVEL_OUTOF10: 4
PAINLEVEL_OUTOF10: 0
PAINLEVEL_OUTOF10: 2
PAINLEVEL_OUTOF10: 0
PAINLEVEL_OUTOF10: 2
PAINLEVEL_OUTOF10: 4
PAINLEVEL_OUTOF10: 4
PAINLEVEL_OUTOF10: 5

## 2021-04-13 ASSESSMENT — ACTIVITIES OF DAILY LIVING (ADL)
CHRONIC_PAIN_PRESENT: NO
RECENT_DECLINE_ADL: NO
ADL_SHORT_OF_BREATH: NO
ADL_SCORE: 12
ADL_BEFORE_ADMISSION: INDEPENDENT
MOBILITY_ASSIST_DEVICES: STANDARD WALKER;WHEELCHAIR

## 2021-04-13 ASSESSMENT — PATIENT HEALTH QUESTIONNAIRE - PHQ9
CLINICAL INTERPRETATION OF PHQ2 SCORE: NO FURTHER SCREENING NEEDED
IS PATIENT ABLE TO COMPLETE PHQ2 OR PHQ9: YES
CLINICAL INTERPRETATION OF PHQ9 SCORE: NO FURTHER SCREENING NEEDED
1. LITTLE INTEREST OR PLEASURE IN DOING THINGS: NOT AT ALL
SUM OF ALL RESPONSES TO PHQ9 QUESTIONS 1 AND 2: 0
SUM OF ALL RESPONSES TO PHQ9 QUESTIONS 1 AND 2: 0
2. FEELING DOWN, DEPRESSED OR HOPELESS: NOT AT ALL

## 2021-04-13 ASSESSMENT — LIFESTYLE VARIABLES
HOW OFTEN DO YOU HAVE 6 OR MORE DRINKS ON ONE OCCASION: NEVER
HOW OFTEN DO YOU HAVE A DRINK CONTAINING ALCOHOL: NEVER
HOW MANY STANDARD DRINKS CONTAINING ALCOHOL DO YOU HAVE ON A TYPICAL DAY: 0,1 OR 2
ALCOHOL_USE_STATUS: NO OR LOW RISK WITH VALIDATED TOOL
AUDIT-C TOTAL SCORE: 0

## 2021-04-14 ENCOUNTER — APPOINTMENT (OUTPATIENT)
Dept: GENERAL RADIOLOGY | Age: 66
DRG: 617 | End: 2021-04-14
Attending: PODIATRIST

## 2021-04-14 ENCOUNTER — CASE MANAGEMENT (OUTPATIENT)
Dept: CARE COORDINATION | Age: 66
End: 2021-04-14

## 2021-04-14 LAB
ALBUMIN SERPL-MCNC: 2.9 G/DL (ref 3.6–5.1)
ALBUMIN/GLOB SERPL: 0.6 {RATIO} (ref 1–2.4)
ALP SERPL-CCNC: 102 UNITS/L (ref 45–117)
ALT SERPL-CCNC: 19 UNITS/L
ANION GAP SERPL CALC-SCNC: 7 MMOL/L (ref 10–20)
AST SERPL-CCNC: 15 UNITS/L
BASOPHILS # BLD: 0.1 K/MCL (ref 0–0.3)
BASOPHILS NFR BLD: 1 %
BILIRUB SERPL-MCNC: 0.4 MG/DL (ref 0.2–1)
BUN SERPL-MCNC: 53 MG/DL (ref 6–20)
BUN/CREAT SERPL: 22 (ref 7–25)
CALCIUM SERPL-MCNC: 8.4 MG/DL (ref 8.4–10.2)
CHLORIDE SERPL-SCNC: 102 MMOL/L (ref 98–107)
CHOLEST SERPL-MCNC: 126 MG/DL
CHOLEST/HDLC SERPL: 4.1 {RATIO}
CO2 SERPL-SCNC: 30 MMOL/L (ref 21–32)
CREAT SERPL-MCNC: 2.38 MG/DL (ref 0.67–1.17)
DEPRECATED RDW RBC: 46.1 FL (ref 39–50)
EOSINOPHIL # BLD: 0.3 K/MCL (ref 0–0.5)
EOSINOPHIL NFR BLD: 3 %
ERYTHROCYTE [DISTWIDTH] IN BLOOD: 14.3 % (ref 11–15)
FASTING DURATION TIME PATIENT: ABNORMAL H
FASTING DURATION TIME PATIENT: ABNORMAL H
GFR SERPLBLD BASED ON 1.73 SQ M-ARVRAT: 28 ML/MIN/1.73M2
GLOBULIN SER-MCNC: 4.8 G/DL (ref 2–4)
GLUCOSE BLDC GLUCOMTR-MCNC: 124 MG/DL (ref 70–99)
GLUCOSE BLDC GLUCOMTR-MCNC: 144 MG/DL (ref 70–99)
GLUCOSE BLDC GLUCOMTR-MCNC: 146 MG/DL (ref 70–99)
GLUCOSE BLDC GLUCOMTR-MCNC: 199 MG/DL (ref 70–99)
GLUCOSE SERPL-MCNC: 162 MG/DL (ref 65–99)
HBA1C MFR BLD: 7.4 % (ref 4.5–5.6)
HCT VFR BLD CALC: 35.7 % (ref 39–51)
HDLC SERPL-MCNC: 31 MG/DL
HGB BLD-MCNC: 11.4 G/DL (ref 13–17)
IMM GRANULOCYTES # BLD AUTO: 0 K/MCL (ref 0–0.2)
IMM GRANULOCYTES # BLD: 0 %
LDLC SERPL CALC-MCNC: 60 MG/DL
LYMPHOCYTES # BLD: 2.1 K/MCL (ref 1–4)
LYMPHOCYTES NFR BLD: 23 %
MAGNESIUM SERPL-MCNC: 2.4 MG/DL (ref 1.7–2.4)
MCH RBC QN AUTO: 28.4 PG (ref 26–34)
MCHC RBC AUTO-ENTMCNC: 31.9 G/DL (ref 32–36.5)
MCV RBC AUTO: 89 FL (ref 78–100)
MONOCYTES # BLD: 1 K/MCL (ref 0.3–0.9)
MONOCYTES NFR BLD: 10 %
NEUTROPHILS # BLD: 5.8 K/MCL (ref 1.8–7.7)
NEUTROPHILS NFR BLD: 63 %
NONHDLC SERPL-MCNC: 95 MG/DL
NRBC BLD MANUAL-RTO: 0 /100 WBC
PLATELET # BLD AUTO: 248 K/MCL (ref 140–450)
POTASSIUM SERPL-SCNC: 4.1 MMOL/L (ref 3.4–5.1)
PROT SERPL-MCNC: 7.7 G/DL (ref 6.4–8.2)
RBC # BLD: 4.01 MIL/MCL (ref 4.5–5.9)
SODIUM SERPL-SCNC: 135 MMOL/L (ref 135–145)
TRIGL SERPL-MCNC: 175 MG/DL
WBC # BLD: 9.3 K/MCL (ref 4.2–11)

## 2021-04-14 PROCEDURE — 10002803 HB RX 637: Performed by: FAMILY MEDICINE

## 2021-04-14 PROCEDURE — 10002803 HB RX 637: Performed by: NURSE PRACTITIONER

## 2021-04-14 PROCEDURE — 97166 OT EVAL MOD COMPLEX 45 MIN: CPT

## 2021-04-14 PROCEDURE — 10002803 HB RX 637: Performed by: PODIATRIST

## 2021-04-14 PROCEDURE — 13003289 HB OXYGEN THERAPY DAILY

## 2021-04-14 PROCEDURE — 36415 COLL VENOUS BLD VENIPUNCTURE: CPT | Performed by: NURSE PRACTITIONER

## 2021-04-14 PROCEDURE — 10002800 HB RX 250 W HCPCS: Performed by: INTERNAL MEDICINE

## 2021-04-14 PROCEDURE — 10004651 HB RX, NO CHARGE ITEM: Performed by: NURSE PRACTITIONER

## 2021-04-14 PROCEDURE — 10002801 HB RX 250 W/O HCPCS: Performed by: INTERNAL MEDICINE

## 2021-04-14 PROCEDURE — 83735 ASSAY OF MAGNESIUM: CPT | Performed by: NURSE PRACTITIONER

## 2021-04-14 PROCEDURE — 10002800 HB RX 250 W HCPCS: Performed by: NURSE PRACTITIONER

## 2021-04-14 PROCEDURE — 10002807 HB RX 258: Performed by: INTERNAL MEDICINE

## 2021-04-14 PROCEDURE — 83036 HEMOGLOBIN GLYCOSYLATED A1C: CPT | Performed by: NURSE PRACTITIONER

## 2021-04-14 PROCEDURE — 10000002 HB ROOM CHARGE MED SURG

## 2021-04-14 PROCEDURE — 73630 X-RAY EXAM OF FOOT: CPT

## 2021-04-14 PROCEDURE — 80061 LIPID PANEL: CPT | Performed by: NURSE PRACTITIONER

## 2021-04-14 PROCEDURE — 85025 COMPLETE CBC W/AUTO DIFF WBC: CPT | Performed by: NURSE PRACTITIONER

## 2021-04-14 PROCEDURE — 97535 SELF CARE MNGMENT TRAINING: CPT

## 2021-04-14 PROCEDURE — 97162 PT EVAL MOD COMPLEX 30 MIN: CPT

## 2021-04-14 PROCEDURE — 97530 THERAPEUTIC ACTIVITIES: CPT

## 2021-04-14 PROCEDURE — 80053 COMPREHEN METABOLIC PANEL: CPT | Performed by: NURSE PRACTITIONER

## 2021-04-14 RX ADMIN — OXYCODONE HYDROCHLORIDE AND ACETAMINOPHEN 500 MG: 500 TABLET ORAL at 08:33

## 2021-04-14 RX ADMIN — INSULIN LISPRO 12 UNITS: 100 INJECTION, SOLUTION INTRAVENOUS; SUBCUTANEOUS at 18:30

## 2021-04-14 RX ADMIN — OXYCODONE HYDROCHLORIDE 5 MG: 5 TABLET ORAL at 05:00

## 2021-04-14 RX ADMIN — SODIUM CHLORIDE, PRESERVATIVE FREE 2 ML: 5 INJECTION INTRAVENOUS at 08:20

## 2021-04-14 RX ADMIN — VANCOMYCIN HYDROCHLORIDE 1500 MG: 10 INJECTION, POWDER, LYOPHILIZED, FOR SOLUTION INTRAVENOUS at 05:05

## 2021-04-14 RX ADMIN — INSULIN LISPRO 1 UNITS: 100 INJECTION, SOLUTION INTRAVENOUS; SUBCUTANEOUS at 13:14

## 2021-04-14 RX ADMIN — ALLOPURINOL 100 MG: 100 TABLET ORAL at 08:34

## 2021-04-14 RX ADMIN — INSULIN GLARGINE 50 UNITS: 100 INJECTION, SOLUTION SUBCUTANEOUS at 08:28

## 2021-04-14 RX ADMIN — MUPIROCIN 1 APPLICATION.: 20 OINTMENT TOPICAL at 21:30

## 2021-04-14 RX ADMIN — INSULIN LISPRO 12 UNITS: 100 INJECTION, SOLUTION INTRAVENOUS; SUBCUTANEOUS at 13:11

## 2021-04-14 RX ADMIN — ENOXAPARIN SODIUM 40 MG: 40 INJECTION SUBCUTANEOUS at 05:01

## 2021-04-14 RX ADMIN — OXYCODONE HYDROCHLORIDE AND ACETAMINOPHEN 500 MG: 500 TABLET ORAL at 21:29

## 2021-04-14 RX ADMIN — ENOXAPARIN SODIUM 40 MG: 40 INJECTION SUBCUTANEOUS at 17:41

## 2021-04-14 RX ADMIN — Medication 50 MCG: at 08:33

## 2021-04-14 RX ADMIN — SPIRONOLACTONE 25 MG: 25 TABLET, FILM COATED ORAL at 08:34

## 2021-04-14 RX ADMIN — METOPROLOL SUCCINATE 25 MG: 25 TABLET, EXTENDED RELEASE ORAL at 21:29

## 2021-04-14 RX ADMIN — INSULIN GLARGINE 50 UNITS: 100 INJECTION, SOLUTION SUBCUTANEOUS at 21:30

## 2021-04-14 RX ADMIN — MUPIROCIN 1 APPLICATION.: 20 OINTMENT TOPICAL at 08:21

## 2021-04-14 RX ADMIN — ATORVASTATIN CALCIUM 10 MG: 10 TABLET, FILM COATED ORAL at 08:34

## 2021-04-14 RX ADMIN — SODIUM CHLORIDE, PRESERVATIVE FREE 2 ML: 5 INJECTION INTRAVENOUS at 21:33

## 2021-04-14 RX ADMIN — TORSEMIDE 40 MG: 20 TABLET ORAL at 08:33

## 2021-04-14 RX ADMIN — FAMOTIDINE 20 MG: 20 TABLET ORAL at 08:33

## 2021-04-14 ASSESSMENT — COGNITIVE AND FUNCTIONAL STATUS - GENERAL
HELP NEEDED FOR BATHING: A LITTLE
BASIC_MOBILITY_CONVERTED_SCORE: 36.97
BASIC_MOBILITY_RAW_SCORE: 15
BECAUSE OF A PHYSICAL, MENTAL, OR EMOTIONAL CONDITION, DO YOU HAVE SERIOUS DIFFICULTY CONCENTRATING, REMEMBERING OR MAKING DECISIONS: NO
DO YOU HAVE DIFFICULTY DRESSING OR BATHING: NO
BECAUSE OF A PHYSICAL, MENTAL, OR EMOTIONAL CONDITION, DO YOU HAVE DIFFICULTY DOING ERRANDS ALONE: NO
DO YOU HAVE SERIOUS DIFFICULTY WALKING OR CLIMBING STAIRS: YES
HELP NEEDED DRESSING REGULAR LOWER BODY CLOTHING: A LOT

## 2021-04-14 ASSESSMENT — ACTIVITIES OF DAILY LIVING (ADL)
HOME_MANAGEMENT_TIME_ENTRY: 8
PRIOR_ADL: MINIMAL ASSIST (MIN)
PRIOR_ADL: MINIMAL ASSIST (MIN)

## 2021-04-14 ASSESSMENT — PAIN SCALES - GENERAL
PAINLEVEL_OUTOF10: 1
PAINLEVEL_OUTOF10: 2
PAINLEVEL_OUTOF10: 1

## 2021-04-15 LAB
ALBUMIN SERPL-MCNC: 2.8 G/DL (ref 3.6–5.1)
ALBUMIN/GLOB SERPL: 0.5 {RATIO} (ref 1–2.4)
ALP SERPL-CCNC: 101 UNITS/L (ref 45–117)
ALT SERPL-CCNC: 17 UNITS/L
ANION GAP SERPL CALC-SCNC: 7 MMOL/L (ref 10–20)
APPEARANCE UR: CLEAR
AST SERPL-CCNC: 16 UNITS/L
BACTERIA #/AREA URNS HPF: NORMAL /HPF
BILIRUB SERPL-MCNC: 0.6 MG/DL (ref 0.2–1)
BILIRUB UR QL STRIP: NEGATIVE
BUN SERPL-MCNC: 53 MG/DL (ref 6–20)
BUN/CREAT SERPL: 22 (ref 7–25)
CALCIUM SERPL-MCNC: 8.8 MG/DL (ref 8.4–10.2)
CHLORIDE SERPL-SCNC: 102 MMOL/L (ref 98–107)
CHLORIDE UR-SCNC: 78 MMOL/L
CO2 SERPL-SCNC: 32 MMOL/L (ref 21–32)
COLOR UR: NORMAL
CREAT SERPL-MCNC: 2.44 MG/DL (ref 0.67–1.17)
CREAT UR-MCNC: 42.3 MG/DL
DEPRECATED RDW RBC: 45.3 FL (ref 39–50)
ERYTHROCYTE [DISTWIDTH] IN BLOOD: 14.2 % (ref 11–15)
FASTING DURATION TIME PATIENT: ABNORMAL H
GFR SERPLBLD BASED ON 1.73 SQ M-ARVRAT: 27 ML/MIN/1.73M2
GLOBULIN SER-MCNC: 5.1 G/DL (ref 2–4)
GLUCOSE BLDC GLUCOMTR-MCNC: 125 MG/DL (ref 70–99)
GLUCOSE BLDC GLUCOMTR-MCNC: 137 MG/DL (ref 70–99)
GLUCOSE BLDC GLUCOMTR-MCNC: 193 MG/DL (ref 70–99)
GLUCOSE BLDC GLUCOMTR-MCNC: 81 MG/DL (ref 70–99)
GLUCOSE SERPL-MCNC: 90 MG/DL (ref 65–99)
GLUCOSE UR STRIP-MCNC: NEGATIVE MG/DL
HCT VFR BLD CALC: 35.5 % (ref 39–51)
HGB BLD-MCNC: 11.5 G/DL (ref 13–17)
HGB UR QL STRIP: NEGATIVE
HYALINE CASTS #/AREA URNS LPF: NORMAL /LPF
KETONES UR STRIP-MCNC: NEGATIVE MG/DL
LEUKOCYTE ESTERASE UR QL STRIP: NEGATIVE
MCH RBC QN AUTO: 28.5 PG (ref 26–34)
MCHC RBC AUTO-ENTMCNC: 32.4 G/DL (ref 32–36.5)
MCV RBC AUTO: 88.1 FL (ref 78–100)
MUCOUS THREADS URNS QL MICRO: PRESENT
NITRITE UR QL STRIP: NEGATIVE
NRBC BLD MANUAL-RTO: 0 /100 WBC
PH UR STRIP: 6 [PH] (ref 5–7)
PLATELET # BLD AUTO: 250 K/MCL (ref 140–450)
POTASSIUM SERPL-SCNC: 3.8 MMOL/L (ref 3.4–5.1)
POTASSIUM UR-SCNC: 25 MMOL/L
PROT SERPL-MCNC: 7.9 G/DL (ref 6.4–8.2)
PROT UR STRIP-MCNC: NEGATIVE MG/DL
RBC # BLD: 4.03 MIL/MCL (ref 4.5–5.9)
RBC #/AREA URNS HPF: NORMAL /HPF
SODIUM SERPL-SCNC: 137 MMOL/L (ref 135–145)
SP GR UR STRIP: 1.01 (ref 1–1.03)
SQUAMOUS #/AREA URNS HPF: NORMAL /HPF
UROBILINOGEN UR STRIP-MCNC: 0.2 MG/DL
WBC # BLD: 9.9 K/MCL (ref 4.2–11)
WBC #/AREA URNS HPF: NORMAL /HPF

## 2021-04-15 PROCEDURE — 10002803 HB RX 637: Performed by: INTERNAL MEDICINE

## 2021-04-15 PROCEDURE — 97530 THERAPEUTIC ACTIVITIES: CPT

## 2021-04-15 PROCEDURE — 10004651 HB RX, NO CHARGE ITEM: Performed by: NURSE PRACTITIONER

## 2021-04-15 PROCEDURE — 97535 SELF CARE MNGMENT TRAINING: CPT

## 2021-04-15 PROCEDURE — 84133 ASSAY OF URINE POTASSIUM: CPT | Performed by: INTERNAL MEDICINE

## 2021-04-15 PROCEDURE — 10002801 HB RX 250 W/O HCPCS: Performed by: INTERNAL MEDICINE

## 2021-04-15 PROCEDURE — 82043 UR ALBUMIN QUANTITATIVE: CPT | Performed by: INTERNAL MEDICINE

## 2021-04-15 PROCEDURE — 81001 URINALYSIS AUTO W/SCOPE: CPT | Performed by: INTERNAL MEDICINE

## 2021-04-15 PROCEDURE — 10002800 HB RX 250 W HCPCS: Performed by: NURSE PRACTITIONER

## 2021-04-15 PROCEDURE — 36415 COLL VENOUS BLD VENIPUNCTURE: CPT | Performed by: NURSE PRACTITIONER

## 2021-04-15 PROCEDURE — 10002803 HB RX 637: Performed by: FAMILY MEDICINE

## 2021-04-15 PROCEDURE — 10002807 HB RX 258: Performed by: INTERNAL MEDICINE

## 2021-04-15 PROCEDURE — 82570 ASSAY OF URINE CREATININE: CPT | Performed by: INTERNAL MEDICINE

## 2021-04-15 PROCEDURE — 10002803 HB RX 637: Performed by: NURSE PRACTITIONER

## 2021-04-15 PROCEDURE — 85027 COMPLETE CBC AUTOMATED: CPT | Performed by: NURSE PRACTITIONER

## 2021-04-15 PROCEDURE — 82436 ASSAY OF URINE CHLORIDE: CPT | Performed by: INTERNAL MEDICINE

## 2021-04-15 PROCEDURE — 10004651 HB RX, NO CHARGE ITEM: Performed by: PODIATRIST

## 2021-04-15 PROCEDURE — 10002803 HB RX 637: Performed by: PODIATRIST

## 2021-04-15 PROCEDURE — 80053 COMPREHEN METABOLIC PANEL: CPT | Performed by: NURSE PRACTITIONER

## 2021-04-15 PROCEDURE — 10002800 HB RX 250 W HCPCS: Performed by: INTERNAL MEDICINE

## 2021-04-15 PROCEDURE — 10000002 HB ROOM CHARGE MED SURG

## 2021-04-15 RX ORDER — SODIUM CHLORIDE 9 MG/ML
INJECTION, SOLUTION INTRAVENOUS CONTINUOUS
Status: DISCONTINUED | OUTPATIENT
Start: 2021-04-15 | End: 2021-04-16

## 2021-04-15 RX ORDER — POTASSIUM CHLORIDE 20 MEQ/1
40 TABLET, EXTENDED RELEASE ORAL ONCE
Status: COMPLETED | OUTPATIENT
Start: 2021-04-15 | End: 2021-04-15

## 2021-04-15 RX ADMIN — POTASSIUM CHLORIDE 40 MEQ: 1500 TABLET, EXTENDED RELEASE ORAL at 14:21

## 2021-04-15 RX ADMIN — ENOXAPARIN SODIUM 40 MG: 40 INJECTION SUBCUTANEOUS at 05:30

## 2021-04-15 RX ADMIN — SODIUM CHLORIDE: 9 INJECTION, SOLUTION INTRAVENOUS at 22:00

## 2021-04-15 RX ADMIN — ATORVASTATIN CALCIUM 10 MG: 10 TABLET, FILM COATED ORAL at 09:53

## 2021-04-15 RX ADMIN — VANCOMYCIN HYDROCHLORIDE 1500 MG: 10 INJECTION, POWDER, LYOPHILIZED, FOR SOLUTION INTRAVENOUS at 05:29

## 2021-04-15 RX ADMIN — Medication 50 MCG: at 09:53

## 2021-04-15 RX ADMIN — ALLOPURINOL 100 MG: 100 TABLET ORAL at 09:53

## 2021-04-15 RX ADMIN — MUPIROCIN 1 APPLICATION.: 20 OINTMENT TOPICAL at 20:04

## 2021-04-15 RX ADMIN — METOPROLOL SUCCINATE 25 MG: 25 TABLET, EXTENDED RELEASE ORAL at 20:03

## 2021-04-15 RX ADMIN — SODIUM CHLORIDE, PRESERVATIVE FREE 2 ML: 5 INJECTION INTRAVENOUS at 09:51

## 2021-04-15 RX ADMIN — ENOXAPARIN SODIUM 40 MG: 40 INJECTION SUBCUTANEOUS at 18:03

## 2021-04-15 RX ADMIN — OXYCODONE HYDROCHLORIDE AND ACETAMINOPHEN 500 MG: 500 TABLET ORAL at 09:53

## 2021-04-15 RX ADMIN — HYDROCODONE BITARTRATE AND ACETAMINOPHEN 1 TABLET: 5; 325 TABLET ORAL at 23:33

## 2021-04-15 RX ADMIN — OXYCODONE HYDROCHLORIDE AND ACETAMINOPHEN 500 MG: 500 TABLET ORAL at 20:03

## 2021-04-15 RX ADMIN — TORSEMIDE 40 MG: 20 TABLET ORAL at 09:53

## 2021-04-15 RX ADMIN — INSULIN GLARGINE 50 UNITS: 100 INJECTION, SOLUTION SUBCUTANEOUS at 10:55

## 2021-04-15 RX ADMIN — SPIRONOLACTONE 25 MG: 25 TABLET, FILM COATED ORAL at 09:53

## 2021-04-15 RX ADMIN — SODIUM CHLORIDE: 9 INJECTION, SOLUTION INTRAVENOUS at 15:45

## 2021-04-15 RX ADMIN — MUPIROCIN 1 APPLICATION.: 20 OINTMENT TOPICAL at 10:56

## 2021-04-15 RX ADMIN — FAMOTIDINE 20 MG: 20 TABLET ORAL at 09:53

## 2021-04-15 RX ADMIN — HYDROCODONE BITARTRATE AND ACETAMINOPHEN 1 TABLET: 5; 325 TABLET ORAL at 04:07

## 2021-04-15 RX ADMIN — METOPROLOL SUCCINATE 25 MG: 25 TABLET, EXTENDED RELEASE ORAL at 09:53

## 2021-04-15 RX ADMIN — SODIUM CHLORIDE, PRESERVATIVE FREE 2 ML: 5 INJECTION INTRAVENOUS at 05:27

## 2021-04-15 RX ADMIN — INSULIN GLARGINE 50 UNITS: 100 INJECTION, SOLUTION SUBCUTANEOUS at 21:38

## 2021-04-15 ASSESSMENT — COGNITIVE AND FUNCTIONAL STATUS - GENERAL
DAILY_ACTIVITY_CONVERTED_SCORE: 40.22
APPLIED_COGNITIVE_RAW_SCORE: 24
HELP NEEDED FOR TOILETING: A LOT
APPLIED_COGNITIVE_CONVERTED_SCORE: 62.21
HELP NEEDED FOR BATHING: A LITTLE
DAILY_ACTIVITY_RAW_SCORE: 19
HELP NEEDED DRESSING REGULAR LOWER BODY CLOTHING: A LOT
BASIC_MOBILITY_CONVERTED_SCORE: 28.13
BASIC_MOBILITY_RAW_SCORE: 10

## 2021-04-15 ASSESSMENT — ENCOUNTER SYMPTOMS
CHILLS: 0
FEVER: 0
WOUND: 1
FATIGUE: 0
VOMITING: 0
COUGH: 0
BRUISES/BLEEDS EASILY: 0
CONFUSION: 0
CONSTIPATION: 0
ABDOMINAL DISTENTION: 0
COLOR CHANGE: 0
EYES NEGATIVE: 1
EYE DISCHARGE: 0
ACTIVITY CHANGE: 1
WEAKNESS: 0
EYE ITCHING: 0
LIGHT-HEADEDNESS: 0
ABDOMINAL PAIN: 0

## 2021-04-15 ASSESSMENT — PAIN SCALES - GENERAL
PAINLEVEL_OUTOF10: 4
PAINLEVEL_OUTOF10: 0

## 2021-04-15 ASSESSMENT — ACTIVITIES OF DAILY LIVING (ADL): HOME_MANAGEMENT_TIME_ENTRY: 13

## 2021-04-16 ENCOUNTER — APPOINTMENT (OUTPATIENT)
Dept: ULTRASOUND IMAGING | Age: 66
DRG: 617 | End: 2021-04-16
Attending: INTERNAL MEDICINE

## 2021-04-16 LAB
ALBUMIN SERPL-MCNC: 2.7 G/DL (ref 3.6–5.1)
ALBUMIN/GLOB SERPL: 0.5 {RATIO} (ref 1–2.4)
ALP SERPL-CCNC: 92 UNITS/L (ref 45–117)
ALT SERPL-CCNC: 17 UNITS/L
ANION GAP SERPL CALC-SCNC: 8 MMOL/L (ref 10–20)
ASR DISCLAIMER: NORMAL
AST SERPL-CCNC: 17 UNITS/L
BASOPHILS # BLD: 0.1 K/MCL (ref 0–0.3)
BASOPHILS NFR BLD: 1 %
BILIRUB SERPL-MCNC: 0.4 MG/DL (ref 0.2–1)
BUN SERPL-MCNC: 49 MG/DL (ref 6–20)
BUN/CREAT SERPL: 23 (ref 7–25)
CALCIUM SERPL-MCNC: 8.8 MG/DL (ref 8.4–10.2)
CASE RPRT: NORMAL
CHLORIDE SERPL-SCNC: 104 MMOL/L (ref 98–107)
CLINICAL INFO: NORMAL
CO2 SERPL-SCNC: 30 MMOL/L (ref 21–32)
CREAT SERPL-MCNC: 2.14 MG/DL (ref 0.67–1.17)
CREAT UR-MCNC: 42.8 MG/DL
DEPRECATED RDW RBC: 45.6 FL (ref 39–50)
EOSINOPHIL # BLD: 0.4 K/MCL (ref 0–0.5)
EOSINOPHIL NFR BLD: 4 %
ERYTHROCYTE [DISTWIDTH] IN BLOOD: 14.3 % (ref 11–15)
FASTING DURATION TIME PATIENT: ABNORMAL H
GFR SERPLBLD BASED ON 1.73 SQ M-ARVRAT: 31 ML/MIN/1.73M2
GLOBULIN SER-MCNC: 5.3 G/DL (ref 2–4)
GLUCOSE BLDC GLUCOMTR-MCNC: 181 MG/DL (ref 70–99)
GLUCOSE BLDC GLUCOMTR-MCNC: 213 MG/DL (ref 70–99)
GLUCOSE BLDC GLUCOMTR-MCNC: 218 MG/DL (ref 70–99)
GLUCOSE BLDC GLUCOMTR-MCNC: 71 MG/DL (ref 70–99)
GLUCOSE SERPL-MCNC: 70 MG/DL (ref 65–99)
HCT VFR BLD CALC: 36.3 % (ref 39–51)
HGB BLD-MCNC: 11.6 G/DL (ref 13–17)
IMM GRANULOCYTES # BLD AUTO: 0 K/MCL (ref 0–0.2)
IMM GRANULOCYTES # BLD: 1 %
LYMPHOCYTES # BLD: 2.3 K/MCL (ref 1–4)
LYMPHOCYTES NFR BLD: 28 %
MAGNESIUM SERPL-MCNC: 2.5 MG/DL (ref 1.7–2.4)
MCH RBC QN AUTO: 28.1 PG (ref 26–34)
MCHC RBC AUTO-ENTMCNC: 32 G/DL (ref 32–36.5)
MCV RBC AUTO: 87.9 FL (ref 78–100)
MICROALBUMIN UR-MCNC: 1.01 MG/DL
MICROALBUMIN/CREAT UR: 23.6 MG/G
MONOCYTES # BLD: 0.9 K/MCL (ref 0.3–0.9)
MONOCYTES NFR BLD: 10 %
NEUTROPHILS # BLD: 4.8 K/MCL (ref 1.8–7.7)
NEUTROPHILS NFR BLD: 56 %
NRBC BLD MANUAL-RTO: 0 /100 WBC
PATH REPORT.FINAL DX SPEC: NORMAL
PATH REPORT.GROSS SPEC: NORMAL
PHOSPHATE SERPL-MCNC: 3.8 MG/DL (ref 2.4–4.7)
PLATELET # BLD AUTO: 246 K/MCL (ref 140–450)
POTASSIUM SERPL-SCNC: 3.9 MMOL/L (ref 3.4–5.1)
PROT SERPL-MCNC: 8 G/DL (ref 6.4–8.2)
RBC # BLD: 4.13 MIL/MCL (ref 4.5–5.9)
SODIUM SERPL-SCNC: 138 MMOL/L (ref 135–145)
VANCOMYCIN TROUGH SERPL-MCNC: 16.9 MCG/ML (ref 10–20)
WBC # BLD: 8.4 K/MCL (ref 4.2–11)

## 2021-04-16 PROCEDURE — 10000002 HB ROOM CHARGE MED SURG

## 2021-04-16 PROCEDURE — 10002803 HB RX 637: Performed by: PODIATRIST

## 2021-04-16 PROCEDURE — 10002803 HB RX 637: Performed by: NURSE PRACTITIONER

## 2021-04-16 PROCEDURE — 10002807 HB RX 258: Performed by: INTERNAL MEDICINE

## 2021-04-16 PROCEDURE — 85025 COMPLETE CBC W/AUTO DIFF WBC: CPT | Performed by: NURSE PRACTITIONER

## 2021-04-16 PROCEDURE — 80202 ASSAY OF VANCOMYCIN: CPT | Performed by: INTERNAL MEDICINE

## 2021-04-16 PROCEDURE — 80053 COMPREHEN METABOLIC PANEL: CPT | Performed by: NURSE PRACTITIONER

## 2021-04-16 PROCEDURE — 10002801 HB RX 250 W/O HCPCS: Performed by: INTERNAL MEDICINE

## 2021-04-16 PROCEDURE — 10004651 HB RX, NO CHARGE ITEM: Performed by: NURSE PRACTITIONER

## 2021-04-16 PROCEDURE — 97530 THERAPEUTIC ACTIVITIES: CPT

## 2021-04-16 PROCEDURE — 36415 COLL VENOUS BLD VENIPUNCTURE: CPT | Performed by: INTERNAL MEDICINE

## 2021-04-16 PROCEDURE — 84100 ASSAY OF PHOSPHORUS: CPT | Performed by: INTERNAL MEDICINE

## 2021-04-16 PROCEDURE — 10002800 HB RX 250 W HCPCS: Performed by: NURSE PRACTITIONER

## 2021-04-16 PROCEDURE — 83735 ASSAY OF MAGNESIUM: CPT | Performed by: NURSE PRACTITIONER

## 2021-04-16 PROCEDURE — 10002803 HB RX 637: Performed by: FAMILY MEDICINE

## 2021-04-16 PROCEDURE — 10002800 HB RX 250 W HCPCS: Performed by: INTERNAL MEDICINE

## 2021-04-16 PROCEDURE — 76770 US EXAM ABDO BACK WALL COMP: CPT

## 2021-04-16 RX ADMIN — SODIUM CHLORIDE, PRESERVATIVE FREE 2 ML: 5 INJECTION INTRAVENOUS at 22:25

## 2021-04-16 RX ADMIN — FAMOTIDINE 20 MG: 20 TABLET ORAL at 08:46

## 2021-04-16 RX ADMIN — HYDROCODONE BITARTRATE AND ACETAMINOPHEN 1 TABLET: 5; 325 TABLET ORAL at 11:50

## 2021-04-16 RX ADMIN — INSULIN GLARGINE 50 UNITS: 100 INJECTION, SOLUTION SUBCUTANEOUS at 22:25

## 2021-04-16 RX ADMIN — OXYCODONE HYDROCHLORIDE AND ACETAMINOPHEN 500 MG: 500 TABLET ORAL at 21:33

## 2021-04-16 RX ADMIN — MUPIROCIN 1 APPLICATION.: 20 OINTMENT TOPICAL at 22:25

## 2021-04-16 RX ADMIN — Medication 50 MCG: at 08:47

## 2021-04-16 RX ADMIN — HYDROCODONE BITARTRATE AND ACETAMINOPHEN 1 TABLET: 5; 325 TABLET ORAL at 21:34

## 2021-04-16 RX ADMIN — VANCOMYCIN HYDROCHLORIDE 1500 MG: 10 INJECTION, POWDER, LYOPHILIZED, FOR SOLUTION INTRAVENOUS at 08:41

## 2021-04-16 RX ADMIN — METOPROLOL SUCCINATE 25 MG: 25 TABLET, EXTENDED RELEASE ORAL at 21:34

## 2021-04-16 RX ADMIN — ALLOPURINOL 100 MG: 100 TABLET ORAL at 08:46

## 2021-04-16 RX ADMIN — OXYCODONE HYDROCHLORIDE AND ACETAMINOPHEN 500 MG: 500 TABLET ORAL at 08:46

## 2021-04-16 RX ADMIN — INSULIN LISPRO 2 UNITS: 100 INJECTION, SOLUTION INTRAVENOUS; SUBCUTANEOUS at 17:33

## 2021-04-16 RX ADMIN — MUPIROCIN 1 APPLICATION.: 20 OINTMENT TOPICAL at 08:48

## 2021-04-16 RX ADMIN — ATORVASTATIN CALCIUM 10 MG: 10 TABLET, FILM COATED ORAL at 08:46

## 2021-04-16 RX ADMIN — SODIUM CHLORIDE, PRESERVATIVE FREE 2 ML: 5 INJECTION INTRAVENOUS at 08:46

## 2021-04-16 RX ADMIN — METOPROLOL SUCCINATE 25 MG: 25 TABLET, EXTENDED RELEASE ORAL at 08:46

## 2021-04-16 RX ADMIN — ENOXAPARIN SODIUM 40 MG: 40 INJECTION SUBCUTANEOUS at 17:34

## 2021-04-16 RX ADMIN — ENOXAPARIN SODIUM 40 MG: 40 INJECTION SUBCUTANEOUS at 05:00

## 2021-04-16 RX ADMIN — HYDROCODONE BITARTRATE AND ACETAMINOPHEN 1 TABLET: 5; 325 TABLET ORAL at 06:46

## 2021-04-16 ASSESSMENT — COGNITIVE AND FUNCTIONAL STATUS - GENERAL
DAILY_ACTIVITY_CONVERTED_SCORE: 38.66
DAILY_ACTIVITY_RAW_SCORE: 18
HELP NEEDED FOR TOILETING: A LOT
BASIC_MOBILITY_CONVERTED_SCORE: 30.25
HELP NEEDED DRESSING REGULAR LOWER BODY CLOTHING: A LOT
APPLIED_COGNITIVE_RAW_SCORE: 24
HELP NEEDED FOR BATHING: A LOT
BASIC_MOBILITY_RAW_SCORE: 11
APPLIED_COGNITIVE_CONVERTED_SCORE: 62.21

## 2021-04-16 ASSESSMENT — ENCOUNTER SYMPTOMS
BRUISES/BLEEDS EASILY: 0
VOMITING: 0
FATIGUE: 0
CONSTIPATION: 0
WOUND: 1
ABDOMINAL DISTENTION: 0
CONSTITUTIONAL NEGATIVE: 1
ACTIVITY CHANGE: 0
FEVER: 0
CHILLS: 0
LIGHT-HEADEDNESS: 0
EYE ITCHING: 0
COLOR CHANGE: 0
COUGH: 0
WEAKNESS: 0
CONFUSION: 0
EYES NEGATIVE: 1
ABDOMINAL PAIN: 0
EYE DISCHARGE: 0

## 2021-04-16 ASSESSMENT — PAIN SCALES - GENERAL
PAINLEVEL_OUTOF10: 0
PAINLEVEL_OUTOF10: 4
PAINLEVEL_OUTOF10: 4
PAINLEVEL_OUTOF10: 3
PAINLEVEL_OUTOF10: 0
PAINLEVEL_OUTOF10: 0

## 2021-04-17 LAB
ALBUMIN SERPL-MCNC: 2.7 G/DL (ref 3.6–5.1)
ALBUMIN/GLOB SERPL: 0.5 {RATIO} (ref 1–2.4)
ALP SERPL-CCNC: 93 UNITS/L (ref 45–117)
ALT SERPL-CCNC: 17 UNITS/L
ANION GAP SERPL CALC-SCNC: 8 MMOL/L (ref 10–20)
AST SERPL-CCNC: 14 UNITS/L
BACTERIA SPEC ANAEROBE+AEROBE CULT: ABNORMAL
BACTERIA SPEC ANAEROBE+AEROBE CULT: ABNORMAL
BASOPHILS # BLD: 0.1 K/MCL (ref 0–0.3)
BASOPHILS NFR BLD: 1 %
BILIRUB SERPL-MCNC: 0.4 MG/DL (ref 0.2–1)
BUN SERPL-MCNC: 40 MG/DL (ref 6–20)
BUN/CREAT SERPL: 24 (ref 7–25)
CALCIUM SERPL-MCNC: 8.7 MG/DL (ref 8.4–10.2)
CHLORIDE SERPL-SCNC: 106 MMOL/L (ref 98–107)
CO2 SERPL-SCNC: 28 MMOL/L (ref 21–32)
CREAT SERPL-MCNC: 1.69 MG/DL (ref 0.67–1.17)
DEPRECATED RDW RBC: 46.2 FL (ref 39–50)
EOSINOPHIL # BLD: 0.4 K/MCL (ref 0–0.5)
EOSINOPHIL NFR BLD: 4 %
ERYTHROCYTE [DISTWIDTH] IN BLOOD: 14.2 % (ref 11–15)
FASTING DURATION TIME PATIENT: ABNORMAL H
GFR SERPLBLD BASED ON 1.73 SQ M-ARVRAT: 42 ML/MIN/1.73M2
GLOBULIN SER-MCNC: 5.3 G/DL (ref 2–4)
GLUCOSE BLDC GLUCOMTR-MCNC: 129 MG/DL (ref 70–99)
GLUCOSE BLDC GLUCOMTR-MCNC: 157 MG/DL (ref 70–99)
GLUCOSE BLDC GLUCOMTR-MCNC: 203 MG/DL (ref 70–99)
GLUCOSE BLDC GLUCOMTR-MCNC: 210 MG/DL (ref 70–99)
GLUCOSE SERPL-MCNC: 126 MG/DL (ref 65–99)
GRAM STN SPEC: ABNORMAL
HCT VFR BLD CALC: 36.4 % (ref 39–51)
HGB BLD-MCNC: 11.6 G/DL (ref 13–17)
IMM GRANULOCYTES # BLD AUTO: 0 K/MCL (ref 0–0.2)
IMM GRANULOCYTES # BLD: 1 %
LYMPHOCYTES # BLD: 2.1 K/MCL (ref 1–4)
LYMPHOCYTES NFR BLD: 25 %
MAGNESIUM SERPL-MCNC: 2.6 MG/DL (ref 1.7–2.4)
MCH RBC QN AUTO: 28.3 PG (ref 26–34)
MCHC RBC AUTO-ENTMCNC: 31.9 G/DL (ref 32–36.5)
MCV RBC AUTO: 88.8 FL (ref 78–100)
MONOCYTES # BLD: 0.8 K/MCL (ref 0.3–0.9)
MONOCYTES NFR BLD: 9 %
NEUTROPHILS # BLD: 5.3 K/MCL (ref 1.8–7.7)
NEUTROPHILS NFR BLD: 60 %
NRBC BLD MANUAL-RTO: 0 /100 WBC
PLATELET # BLD AUTO: 264 K/MCL (ref 140–450)
POTASSIUM SERPL-SCNC: 3.9 MMOL/L (ref 3.4–5.1)
PROT SERPL-MCNC: 8 G/DL (ref 6.4–8.2)
RBC # BLD: 4.1 MIL/MCL (ref 4.5–5.9)
SODIUM SERPL-SCNC: 138 MMOL/L (ref 135–145)
WBC # BLD: 8.7 K/MCL (ref 4.2–11)

## 2021-04-17 PROCEDURE — 83735 ASSAY OF MAGNESIUM: CPT | Performed by: NURSE PRACTITIONER

## 2021-04-17 PROCEDURE — 10002803 HB RX 637: Performed by: PODIATRIST

## 2021-04-17 PROCEDURE — 10004651 HB RX, NO CHARGE ITEM: Performed by: NURSE PRACTITIONER

## 2021-04-17 PROCEDURE — 10002803 HB RX 637: Performed by: FAMILY MEDICINE

## 2021-04-17 PROCEDURE — 10002800 HB RX 250 W HCPCS: Performed by: INTERNAL MEDICINE

## 2021-04-17 PROCEDURE — 10002807 HB RX 258: Performed by: NURSE PRACTITIONER

## 2021-04-17 PROCEDURE — 10000002 HB ROOM CHARGE MED SURG

## 2021-04-17 PROCEDURE — 85025 COMPLETE CBC W/AUTO DIFF WBC: CPT | Performed by: NURSE PRACTITIONER

## 2021-04-17 PROCEDURE — 10002800 HB RX 250 W HCPCS: Performed by: NURSE PRACTITIONER

## 2021-04-17 PROCEDURE — 10002803 HB RX 637: Performed by: NURSE PRACTITIONER

## 2021-04-17 PROCEDURE — 10002807 HB RX 258: Performed by: INTERNAL MEDICINE

## 2021-04-17 PROCEDURE — 10002801 HB RX 250 W/O HCPCS: Performed by: INTERNAL MEDICINE

## 2021-04-17 PROCEDURE — 80053 COMPREHEN METABOLIC PANEL: CPT | Performed by: NURSE PRACTITIONER

## 2021-04-17 PROCEDURE — 36415 COLL VENOUS BLD VENIPUNCTURE: CPT | Performed by: NURSE PRACTITIONER

## 2021-04-17 PROCEDURE — 10002803 HB RX 637: Performed by: INTERNAL MEDICINE

## 2021-04-17 RX ORDER — SPIRONOLACTONE 25 MG/1
12.5 TABLET ORAL DAILY
Qty: 90 TABLET | Refills: 0 | Status: SHIPPED | OUTPATIENT
Start: 2021-04-17 | End: 2021-04-27 | Stop reason: HOSPADM

## 2021-04-17 RX ORDER — POTASSIUM CHLORIDE 20 MEQ/1
40 TABLET, EXTENDED RELEASE ORAL ONCE
Status: COMPLETED | OUTPATIENT
Start: 2021-04-17 | End: 2021-04-17

## 2021-04-17 RX ORDER — SPIRONOLACTONE 25 MG/1
12.5 TABLET ORAL DAILY
Status: DISCONTINUED | OUTPATIENT
Start: 2021-04-18 | End: 2021-04-21

## 2021-04-17 RX ORDER — DOXYCYCLINE HYCLATE 100 MG
100 TABLET ORAL 2 TIMES DAILY
Qty: 14 TABLET | Refills: 0 | Status: SHIPPED | OUTPATIENT
Start: 2021-04-17 | End: 2021-04-27 | Stop reason: CLARIF

## 2021-04-17 RX ORDER — SULFAMETHOXAZOLE AND TRIMETHOPRIM 800; 160 MG/1; MG/1
1 TABLET ORAL 2 TIMES DAILY
Qty: 14 TABLET | Refills: 0 | Status: SHIPPED | OUTPATIENT
Start: 2021-04-17 | End: 2021-04-17 | Stop reason: HOSPADM

## 2021-04-17 RX ORDER — TORSEMIDE 20 MG/1
20 TABLET ORAL DAILY
Status: DISCONTINUED | OUTPATIENT
Start: 2021-04-18 | End: 2021-04-21

## 2021-04-17 RX ORDER — ATORVASTATIN CALCIUM 10 MG/1
10 TABLET, FILM COATED ORAL DAILY
Qty: 30 TABLET | Refills: 0 | Status: ON HOLD | OUTPATIENT
Start: 2021-04-17 | End: 2021-07-01

## 2021-04-17 RX ORDER — TORSEMIDE 20 MG/1
20 TABLET ORAL DAILY
Qty: 30 TABLET | Refills: 0 | Status: SHIPPED | OUTPATIENT
Start: 2021-04-17 | End: 2021-04-27 | Stop reason: HOSPADM

## 2021-04-17 RX ADMIN — MUPIROCIN 1 APPLICATION.: 20 OINTMENT TOPICAL at 21:40

## 2021-04-17 RX ADMIN — Medication 50 MCG: at 09:48

## 2021-04-17 RX ADMIN — INSULIN LISPRO 2 UNITS: 100 INJECTION, SOLUTION INTRAVENOUS; SUBCUTANEOUS at 14:34

## 2021-04-17 RX ADMIN — SODIUM CHLORIDE, PRESERVATIVE FREE 2 ML: 5 INJECTION INTRAVENOUS at 21:45

## 2021-04-17 RX ADMIN — HYDROCODONE BITARTRATE AND ACETAMINOPHEN 1 TABLET: 5; 325 TABLET ORAL at 23:45

## 2021-04-17 RX ADMIN — METOPROLOL SUCCINATE 25 MG: 25 TABLET, EXTENDED RELEASE ORAL at 09:48

## 2021-04-17 RX ADMIN — ENOXAPARIN SODIUM 40 MG: 40 INJECTION SUBCUTANEOUS at 06:54

## 2021-04-17 RX ADMIN — OXYCODONE HYDROCHLORIDE AND ACETAMINOPHEN 500 MG: 500 TABLET ORAL at 21:40

## 2021-04-17 RX ADMIN — SODIUM CHLORIDE, PRESERVATIVE FREE 2 ML: 5 INJECTION INTRAVENOUS at 09:49

## 2021-04-17 RX ADMIN — INSULIN GLARGINE 50 UNITS: 100 INJECTION, SOLUTION SUBCUTANEOUS at 11:09

## 2021-04-17 RX ADMIN — INSULIN LISPRO 2 UNITS: 100 INJECTION, SOLUTION INTRAVENOUS; SUBCUTANEOUS at 18:54

## 2021-04-17 RX ADMIN — ALLOPURINOL 100 MG: 100 TABLET ORAL at 09:48

## 2021-04-17 RX ADMIN — FAMOTIDINE 20 MG: 20 TABLET ORAL at 09:48

## 2021-04-17 RX ADMIN — HYDROCODONE BITARTRATE AND ACETAMINOPHEN 1 TABLET: 5; 325 TABLET ORAL at 02:40

## 2021-04-17 RX ADMIN — SODIUM CHLORIDE 25 ML: 0.9 INJECTION, SOLUTION INTRAVENOUS at 09:51

## 2021-04-17 RX ADMIN — INSULIN GLARGINE 50 UNITS: 100 INJECTION, SOLUTION SUBCUTANEOUS at 21:52

## 2021-04-17 RX ADMIN — VANCOMYCIN HYDROCHLORIDE 1250 MG: 10 INJECTION, POWDER, LYOPHILIZED, FOR SOLUTION INTRAVENOUS at 09:56

## 2021-04-17 RX ADMIN — MUPIROCIN 1 APPLICATION.: 20 OINTMENT TOPICAL at 09:57

## 2021-04-17 RX ADMIN — POTASSIUM CHLORIDE 40 MEQ: 1500 TABLET, EXTENDED RELEASE ORAL at 14:35

## 2021-04-17 RX ADMIN — METOPROLOL SUCCINATE 25 MG: 25 TABLET, EXTENDED RELEASE ORAL at 21:48

## 2021-04-17 RX ADMIN — ENOXAPARIN SODIUM 40 MG: 40 INJECTION SUBCUTANEOUS at 17:17

## 2021-04-17 RX ADMIN — ATORVASTATIN CALCIUM 10 MG: 10 TABLET, FILM COATED ORAL at 09:48

## 2021-04-17 RX ADMIN — OXYCODONE HYDROCHLORIDE AND ACETAMINOPHEN 500 MG: 500 TABLET ORAL at 09:48

## 2021-04-17 ASSESSMENT — PAIN SCALES - GENERAL
PAINLEVEL_OUTOF10: 0
PAINLEVEL_OUTOF10: 3
PAINLEVEL_OUTOF10: 0

## 2021-04-17 ASSESSMENT — ENCOUNTER SYMPTOMS
LIGHT-HEADEDNESS: 0
FATIGUE: 0
CONSTITUTIONAL NEGATIVE: 1
WOUND: 1
ABDOMINAL PAIN: 0
EYE ITCHING: 0
VOMITING: 0
CONSTIPATION: 0
ACTIVITY CHANGE: 0
ABDOMINAL DISTENTION: 0
COUGH: 0
BRUISES/BLEEDS EASILY: 0
CONFUSION: 0
WEAKNESS: 0
EYES NEGATIVE: 1
CHILLS: 0
COLOR CHANGE: 0
EYE DISCHARGE: 0
FEVER: 0

## 2021-04-18 LAB
GLUCOSE BLDC GLUCOMTR-MCNC: 117 MG/DL (ref 70–99)
GLUCOSE BLDC GLUCOMTR-MCNC: 152 MG/DL (ref 70–99)
GLUCOSE BLDC GLUCOMTR-MCNC: 172 MG/DL (ref 70–99)
GLUCOSE BLDC GLUCOMTR-MCNC: 195 MG/DL (ref 70–99)
GLUCOSE BLDC GLUCOMTR-MCNC: 222 MG/DL (ref 70–99)
POTASSIUM SERPL-SCNC: 4.5 MMOL/L (ref 3.4–5.1)

## 2021-04-18 PROCEDURE — 10002803 HB RX 637: Performed by: PODIATRIST

## 2021-04-18 PROCEDURE — 10002800 HB RX 250 W HCPCS: Performed by: INTERNAL MEDICINE

## 2021-04-18 PROCEDURE — 10002803 HB RX 637: Performed by: NURSE PRACTITIONER

## 2021-04-18 PROCEDURE — 10002801 HB RX 250 W/O HCPCS: Performed by: INTERNAL MEDICINE

## 2021-04-18 PROCEDURE — 10002803 HB RX 637: Performed by: INTERNAL MEDICINE

## 2021-04-18 PROCEDURE — 36415 COLL VENOUS BLD VENIPUNCTURE: CPT | Performed by: INTERNAL MEDICINE

## 2021-04-18 PROCEDURE — 10004651 HB RX, NO CHARGE ITEM: Performed by: NURSE PRACTITIONER

## 2021-04-18 PROCEDURE — 84132 ASSAY OF SERUM POTASSIUM: CPT | Performed by: INTERNAL MEDICINE

## 2021-04-18 PROCEDURE — 10002803 HB RX 637: Performed by: FAMILY MEDICINE

## 2021-04-18 PROCEDURE — 10000002 HB ROOM CHARGE MED SURG

## 2021-04-18 PROCEDURE — 10002807 HB RX 258: Performed by: INTERNAL MEDICINE

## 2021-04-18 PROCEDURE — 10002800 HB RX 250 W HCPCS: Performed by: NURSE PRACTITIONER

## 2021-04-18 RX ADMIN — Medication 50 MCG: at 10:09

## 2021-04-18 RX ADMIN — SPIRONOLACTONE 12.5 MG: 25 TABLET, FILM COATED ORAL at 10:08

## 2021-04-18 RX ADMIN — FAMOTIDINE 20 MG: 20 TABLET ORAL at 10:07

## 2021-04-18 RX ADMIN — INSULIN GLARGINE 50 UNITS: 100 INJECTION, SOLUTION SUBCUTANEOUS at 10:18

## 2021-04-18 RX ADMIN — ENOXAPARIN SODIUM 40 MG: 40 INJECTION SUBCUTANEOUS at 18:24

## 2021-04-18 RX ADMIN — SODIUM CHLORIDE, PRESERVATIVE FREE 2 ML: 5 INJECTION INTRAVENOUS at 10:11

## 2021-04-18 RX ADMIN — SODIUM CHLORIDE, PRESERVATIVE FREE 2 ML: 5 INJECTION INTRAVENOUS at 21:13

## 2021-04-18 RX ADMIN — ENOXAPARIN SODIUM 40 MG: 40 INJECTION SUBCUTANEOUS at 05:35

## 2021-04-18 RX ADMIN — METOPROLOL SUCCINATE 25 MG: 25 TABLET, EXTENDED RELEASE ORAL at 10:07

## 2021-04-18 RX ADMIN — VANCOMYCIN HYDROCHLORIDE 1250 MG: 10 INJECTION, POWDER, LYOPHILIZED, FOR SOLUTION INTRAVENOUS at 10:05

## 2021-04-18 RX ADMIN — HYDROCODONE BITARTRATE AND ACETAMINOPHEN 1 TABLET: 5; 325 TABLET ORAL at 15:54

## 2021-04-18 RX ADMIN — HYDROCODONE BITARTRATE AND ACETAMINOPHEN 1 TABLET: 5; 325 TABLET ORAL at 21:04

## 2021-04-18 RX ADMIN — METOPROLOL SUCCINATE 25 MG: 25 TABLET, EXTENDED RELEASE ORAL at 21:04

## 2021-04-18 RX ADMIN — ATORVASTATIN CALCIUM 10 MG: 10 TABLET, FILM COATED ORAL at 10:07

## 2021-04-18 RX ADMIN — MUPIROCIN 1 APPLICATION.: 20 OINTMENT TOPICAL at 10:05

## 2021-04-18 RX ADMIN — OXYCODONE HYDROCHLORIDE AND ACETAMINOPHEN 500 MG: 500 TABLET ORAL at 10:09

## 2021-04-18 RX ADMIN — TORSEMIDE 20 MG: 20 TABLET ORAL at 10:09

## 2021-04-18 RX ADMIN — ALLOPURINOL 100 MG: 100 TABLET ORAL at 10:07

## 2021-04-18 RX ADMIN — INSULIN GLARGINE 50 UNITS: 100 INJECTION, SOLUTION SUBCUTANEOUS at 21:03

## 2021-04-18 RX ADMIN — INSULIN LISPRO 2 UNITS: 100 INJECTION, SOLUTION INTRAVENOUS; SUBCUTANEOUS at 18:23

## 2021-04-18 RX ADMIN — OXYCODONE HYDROCHLORIDE AND ACETAMINOPHEN 500 MG: 500 TABLET ORAL at 21:04

## 2021-04-18 RX ADMIN — INSULIN LISPRO 1 UNITS: 100 INJECTION, SOLUTION INTRAVENOUS; SUBCUTANEOUS at 15:54

## 2021-04-18 ASSESSMENT — PAIN SCALES - GENERAL
PAINLEVEL_OUTOF10: 0
PAINLEVEL_OUTOF10: 5
PAINLEVEL_OUTOF10: 0
PAINLEVEL_OUTOF10: 3
PAINLEVEL_OUTOF10: 0

## 2021-04-19 LAB
ALBUMIN SERPL-MCNC: 2.7 G/DL (ref 3.6–5.1)
ALBUMIN/GLOB SERPL: 0.5 {RATIO} (ref 1–2.4)
ALP SERPL-CCNC: 93 UNITS/L (ref 45–117)
ALT SERPL-CCNC: 22 UNITS/L
ANION GAP SERPL CALC-SCNC: 8 MMOL/L (ref 10–20)
AST SERPL-CCNC: 20 UNITS/L
BILIRUB SERPL-MCNC: 0.4 MG/DL (ref 0.2–1)
BUN SERPL-MCNC: 37 MG/DL (ref 6–20)
BUN/CREAT SERPL: 24 (ref 7–25)
CALCIUM SERPL-MCNC: 9 MG/DL (ref 8.4–10.2)
CHLORIDE SERPL-SCNC: 107 MMOL/L (ref 98–107)
CO2 SERPL-SCNC: 29 MMOL/L (ref 21–32)
CREAT SERPL-MCNC: 1.57 MG/DL (ref 0.67–1.17)
DEPRECATED RDW RBC: 45.2 FL (ref 39–50)
ERYTHROCYTE [DISTWIDTH] IN BLOOD: 14.1 % (ref 11–15)
FASTING DURATION TIME PATIENT: ABNORMAL H
GFR SERPLBLD BASED ON 1.73 SQ M-ARVRAT: 46 ML/MIN/1.73M2
GLOBULIN SER-MCNC: 5.3 G/DL (ref 2–4)
GLUCOSE BLDC GLUCOMTR-MCNC: 107 MG/DL (ref 70–99)
GLUCOSE BLDC GLUCOMTR-MCNC: 188 MG/DL (ref 70–99)
GLUCOSE BLDC GLUCOMTR-MCNC: 201 MG/DL (ref 70–99)
GLUCOSE BLDC GLUCOMTR-MCNC: 92 MG/DL (ref 70–99)
GLUCOSE SERPL-MCNC: 82 MG/DL (ref 65–99)
HCT VFR BLD CALC: 36.1 % (ref 39–51)
HGB BLD-MCNC: 11.5 G/DL (ref 13–17)
MCH RBC QN AUTO: 28.3 PG (ref 26–34)
MCHC RBC AUTO-ENTMCNC: 31.9 G/DL (ref 32–36.5)
MCV RBC AUTO: 88.7 FL (ref 78–100)
NRBC BLD MANUAL-RTO: 0 /100 WBC
PLATELET # BLD AUTO: 276 K/MCL (ref 140–450)
POTASSIUM SERPL-SCNC: 4.3 MMOL/L (ref 3.4–5.1)
PROT SERPL-MCNC: 8 G/DL (ref 6.4–8.2)
RBC # BLD: 4.07 MIL/MCL (ref 4.5–5.9)
SODIUM SERPL-SCNC: 140 MMOL/L (ref 135–145)
WBC # BLD: 8.6 K/MCL (ref 4.2–11)

## 2021-04-19 PROCEDURE — 10002800 HB RX 250 W HCPCS: Performed by: INTERNAL MEDICINE

## 2021-04-19 PROCEDURE — 10002807 HB RX 258: Performed by: INTERNAL MEDICINE

## 2021-04-19 PROCEDURE — 36415 COLL VENOUS BLD VENIPUNCTURE: CPT | Performed by: NURSE PRACTITIONER

## 2021-04-19 PROCEDURE — 10000002 HB ROOM CHARGE MED SURG

## 2021-04-19 PROCEDURE — 10004651 HB RX, NO CHARGE ITEM: Performed by: INTERNAL MEDICINE

## 2021-04-19 PROCEDURE — 10002803 HB RX 637: Performed by: PODIATRIST

## 2021-04-19 PROCEDURE — 10002803 HB RX 637: Performed by: INTERNAL MEDICINE

## 2021-04-19 PROCEDURE — 80053 COMPREHEN METABOLIC PANEL: CPT | Performed by: NURSE PRACTITIONER

## 2021-04-19 PROCEDURE — 10002803 HB RX 637: Performed by: FAMILY MEDICINE

## 2021-04-19 PROCEDURE — 10002803 HB RX 637: Performed by: NURSE PRACTITIONER

## 2021-04-19 PROCEDURE — 85027 COMPLETE CBC AUTOMATED: CPT | Performed by: NURSE PRACTITIONER

## 2021-04-19 PROCEDURE — 10002800 HB RX 250 W HCPCS: Performed by: NURSE PRACTITIONER

## 2021-04-19 PROCEDURE — 13003291 HB INS MIDLINE W/GUIDE INCL CATH

## 2021-04-19 PROCEDURE — 10004651 HB RX, NO CHARGE ITEM: Performed by: NURSE PRACTITIONER

## 2021-04-19 PROCEDURE — 10002801 HB RX 250 W/O HCPCS: Performed by: INTERNAL MEDICINE

## 2021-04-19 RX ORDER — 0.9 % SODIUM CHLORIDE 0.9 %
10 VIAL (ML) INJECTION EVERY 12 HOURS SCHEDULED
Status: DISCONTINUED | OUTPATIENT
Start: 2021-04-19 | End: 2021-04-27 | Stop reason: HOSPADM

## 2021-04-19 RX ORDER — 0.9 % SODIUM CHLORIDE 0.9 %
10 VIAL (ML) INJECTION PRN
Status: DISCONTINUED | OUTPATIENT
Start: 2021-04-19 | End: 2021-04-26

## 2021-04-19 RX ADMIN — FAMOTIDINE 20 MG: 20 TABLET ORAL at 08:52

## 2021-04-19 RX ADMIN — TORSEMIDE 20 MG: 20 TABLET ORAL at 08:52

## 2021-04-19 RX ADMIN — OXYCODONE HYDROCHLORIDE AND ACETAMINOPHEN 500 MG: 500 TABLET ORAL at 20:35

## 2021-04-19 RX ADMIN — INSULIN LISPRO 1 UNITS: 100 INJECTION, SOLUTION INTRAVENOUS; SUBCUTANEOUS at 18:54

## 2021-04-19 RX ADMIN — VANCOMYCIN HYDROCHLORIDE 1250 MG: 10 INJECTION, POWDER, LYOPHILIZED, FOR SOLUTION INTRAVENOUS at 09:04

## 2021-04-19 RX ADMIN — ENOXAPARIN SODIUM 40 MG: 40 INJECTION SUBCUTANEOUS at 05:15

## 2021-04-19 RX ADMIN — METOPROLOL SUCCINATE 25 MG: 25 TABLET, EXTENDED RELEASE ORAL at 20:35

## 2021-04-19 RX ADMIN — SODIUM CHLORIDE, PRESERVATIVE FREE 10 ML: 5 INJECTION INTRAVENOUS at 20:35

## 2021-04-19 RX ADMIN — METOPROLOL SUCCINATE 25 MG: 25 TABLET, EXTENDED RELEASE ORAL at 08:52

## 2021-04-19 RX ADMIN — INSULIN GLARGINE 50 UNITS: 100 INJECTION, SOLUTION SUBCUTANEOUS at 20:35

## 2021-04-19 RX ADMIN — ATORVASTATIN CALCIUM 10 MG: 10 TABLET, FILM COATED ORAL at 08:54

## 2021-04-19 RX ADMIN — SODIUM CHLORIDE, PRESERVATIVE FREE 2 ML: 5 INJECTION INTRAVENOUS at 20:36

## 2021-04-19 RX ADMIN — HYDROCODONE BITARTRATE AND ACETAMINOPHEN 1 TABLET: 5; 325 TABLET ORAL at 08:52

## 2021-04-19 RX ADMIN — ALLOPURINOL 100 MG: 100 TABLET ORAL at 08:54

## 2021-04-19 RX ADMIN — SODIUM CHLORIDE, PRESERVATIVE FREE 2 ML: 5 INJECTION INTRAVENOUS at 09:07

## 2021-04-19 RX ADMIN — SPIRONOLACTONE 12.5 MG: 25 TABLET, FILM COATED ORAL at 08:53

## 2021-04-19 RX ADMIN — Medication 50 MCG: at 08:52

## 2021-04-19 RX ADMIN — INSULIN GLARGINE 50 UNITS: 100 INJECTION, SOLUTION SUBCUTANEOUS at 09:01

## 2021-04-19 RX ADMIN — ENOXAPARIN SODIUM 40 MG: 40 INJECTION SUBCUTANEOUS at 18:54

## 2021-04-19 RX ADMIN — OXYCODONE HYDROCHLORIDE AND ACETAMINOPHEN 500 MG: 500 TABLET ORAL at 08:52

## 2021-04-19 RX ADMIN — HYDROCODONE BITARTRATE AND ACETAMINOPHEN 1 TABLET: 5; 325 TABLET ORAL at 20:34

## 2021-04-19 ASSESSMENT — PAIN SCALES - GENERAL
PAINLEVEL_OUTOF10: 2
PAINLEVEL_OUTOF10: 0
PAINLEVEL_OUTOF10: 2
PAINLEVEL_OUTOF10: 0
PAINLEVEL_OUTOF10: 2
PAINLEVEL_OUTOF10: 3

## 2021-04-20 LAB
CREAT SERPL-MCNC: 1.79 MG/DL (ref 0.67–1.17)
GFR SERPLBLD BASED ON 1.73 SQ M-ARVRAT: 39 ML/MIN/1.73M2
GLUCOSE BLDC GLUCOMTR-MCNC: 109 MG/DL (ref 70–99)
GLUCOSE BLDC GLUCOMTR-MCNC: 179 MG/DL (ref 70–99)
GLUCOSE BLDC GLUCOMTR-MCNC: 190 MG/DL (ref 70–99)
GLUCOSE BLDC GLUCOMTR-MCNC: 302 MG/DL (ref 70–99)
GLUCOSE BLDC GLUCOMTR-MCNC: 77 MG/DL (ref 70–99)
VANCOMYCIN TROUGH SERPL-MCNC: 14.7 MCG/ML (ref 10–20)
VANCOMYCIN TROUGH SERPL-MCNC: 16 MCG/ML (ref 10–20)

## 2021-04-20 PROCEDURE — 10002807 HB RX 258: Performed by: INTERNAL MEDICINE

## 2021-04-20 PROCEDURE — 10004651 HB RX, NO CHARGE ITEM: Performed by: INTERNAL MEDICINE

## 2021-04-20 PROCEDURE — 10002801 HB RX 250 W/O HCPCS: Performed by: INTERNAL MEDICINE

## 2021-04-20 PROCEDURE — 36415 COLL VENOUS BLD VENIPUNCTURE: CPT | Performed by: INTERNAL MEDICINE

## 2021-04-20 PROCEDURE — 10002803 HB RX 637: Performed by: PODIATRIST

## 2021-04-20 PROCEDURE — 10004651 HB RX, NO CHARGE ITEM: Performed by: NURSE PRACTITIONER

## 2021-04-20 PROCEDURE — 10000002 HB ROOM CHARGE MED SURG

## 2021-04-20 PROCEDURE — 80202 ASSAY OF VANCOMYCIN: CPT | Performed by: INTERNAL MEDICINE

## 2021-04-20 PROCEDURE — 97530 THERAPEUTIC ACTIVITIES: CPT

## 2021-04-20 PROCEDURE — 10002803 HB RX 637: Performed by: INTERNAL MEDICINE

## 2021-04-20 PROCEDURE — 10002803 HB RX 637: Performed by: NURSE PRACTITIONER

## 2021-04-20 PROCEDURE — 10002803 HB RX 637: Performed by: FAMILY MEDICINE

## 2021-04-20 PROCEDURE — 10002800 HB RX 250 W HCPCS: Performed by: NURSE PRACTITIONER

## 2021-04-20 PROCEDURE — 82565 ASSAY OF CREATININE: CPT | Performed by: INTERNAL MEDICINE

## 2021-04-20 PROCEDURE — 10002800 HB RX 250 W HCPCS: Performed by: INTERNAL MEDICINE

## 2021-04-20 RX ADMIN — SODIUM CHLORIDE, PRESERVATIVE FREE 10 ML: 5 INJECTION INTRAVENOUS at 09:19

## 2021-04-20 RX ADMIN — OXYCODONE HYDROCHLORIDE AND ACETAMINOPHEN 500 MG: 500 TABLET ORAL at 20:48

## 2021-04-20 RX ADMIN — METOPROLOL SUCCINATE 25 MG: 25 TABLET, EXTENDED RELEASE ORAL at 09:15

## 2021-04-20 RX ADMIN — ALLOPURINOL 100 MG: 100 TABLET ORAL at 09:15

## 2021-04-20 RX ADMIN — SODIUM CHLORIDE, PRESERVATIVE FREE 2 ML: 5 INJECTION INTRAVENOUS at 21:36

## 2021-04-20 RX ADMIN — ENOXAPARIN SODIUM 40 MG: 40 INJECTION SUBCUTANEOUS at 05:07

## 2021-04-20 RX ADMIN — ENOXAPARIN SODIUM 40 MG: 40 INJECTION SUBCUTANEOUS at 17:08

## 2021-04-20 RX ADMIN — HYDROCODONE BITARTRATE AND ACETAMINOPHEN 1 TABLET: 5; 325 TABLET ORAL at 20:43

## 2021-04-20 RX ADMIN — SODIUM CHLORIDE, PRESERVATIVE FREE 10 ML: 5 INJECTION INTRAVENOUS at 20:48

## 2021-04-20 RX ADMIN — VANCOMYCIN HYDROCHLORIDE 1250 MG: 10 INJECTION, POWDER, LYOPHILIZED, FOR SOLUTION INTRAVENOUS at 10:23

## 2021-04-20 RX ADMIN — ATORVASTATIN CALCIUM 10 MG: 10 TABLET, FILM COATED ORAL at 09:15

## 2021-04-20 RX ADMIN — FAMOTIDINE 20 MG: 20 TABLET ORAL at 09:15

## 2021-04-20 RX ADMIN — OXYCODONE HYDROCHLORIDE AND ACETAMINOPHEN 500 MG: 500 TABLET ORAL at 09:15

## 2021-04-20 RX ADMIN — TORSEMIDE 20 MG: 20 TABLET ORAL at 09:15

## 2021-04-20 RX ADMIN — SPIRONOLACTONE 12.5 MG: 25 TABLET, FILM COATED ORAL at 09:15

## 2021-04-20 RX ADMIN — Medication 50 MCG: at 09:15

## 2021-04-20 RX ADMIN — INSULIN GLARGINE 50 UNITS: 100 INJECTION, SOLUTION SUBCUTANEOUS at 20:48

## 2021-04-20 RX ADMIN — SODIUM CHLORIDE, PRESERVATIVE FREE 2 ML: 5 INJECTION INTRAVENOUS at 09:16

## 2021-04-20 RX ADMIN — METOPROLOL SUCCINATE 25 MG: 25 TABLET, EXTENDED RELEASE ORAL at 20:47

## 2021-04-20 ASSESSMENT — PAIN SCALES - GENERAL
PAINLEVEL_OUTOF10: 0
PAINLEVEL_OUTOF10: 2
PAINLEVEL_OUTOF10: 0
PAINLEVEL_OUTOF10: 0

## 2021-04-20 ASSESSMENT — ENCOUNTER SYMPTOMS: PAIN SEVERITY NOW: 0

## 2021-04-20 ASSESSMENT — COGNITIVE AND FUNCTIONAL STATUS - GENERAL
BASIC_MOBILITY_CONVERTED_SCORE: 35.55
BASIC_MOBILITY_RAW_SCORE: 14

## 2021-04-21 LAB
ALBUMIN SERPL-MCNC: 3 G/DL (ref 3.6–5.1)
ALBUMIN/GLOB SERPL: 0.6 {RATIO} (ref 1–2.4)
ALP SERPL-CCNC: 103 UNITS/L (ref 45–117)
ALT SERPL-CCNC: 26 UNITS/L
ANION GAP SERPL CALC-SCNC: 8 MMOL/L (ref 10–20)
APPEARANCE UR: CLEAR
AST SERPL-CCNC: 18 UNITS/L
BACTERIA #/AREA URNS HPF: NORMAL /HPF
BASOPHILS # BLD: 0.1 K/MCL (ref 0–0.3)
BASOPHILS NFR BLD: 1 %
BILIRUB SERPL-MCNC: 0.4 MG/DL (ref 0.2–1)
BILIRUB UR QL STRIP: NEGATIVE
BUN SERPL-MCNC: 49 MG/DL (ref 6–20)
BUN/CREAT SERPL: 26 (ref 7–25)
CALCIUM SERPL-MCNC: 9.5 MG/DL (ref 8.4–10.2)
CHLORIDE SERPL-SCNC: 103 MMOL/L (ref 98–107)
CO2 SERPL-SCNC: 31 MMOL/L (ref 21–32)
COLOR UR: COLORLESS
CREAT SERPL-MCNC: 1.85 MG/DL (ref 0.67–1.17)
CREAT UR-MCNC: 29.5 MG/DL
DEPRECATED RDW RBC: 46.4 FL (ref 39–50)
EOSINOPHIL # BLD: 0.4 K/MCL (ref 0–0.5)
EOSINOPHIL NFR BLD: 4 %
ERYTHROCYTE [DISTWIDTH] IN BLOOD: 14.3 % (ref 11–15)
FASTING DURATION TIME PATIENT: ABNORMAL H
GFR SERPLBLD BASED ON 1.73 SQ M-ARVRAT: 37 ML/MIN/1.73M2
GLOBULIN SER-MCNC: 5.4 G/DL (ref 2–4)
GLUCOSE BLDC GLUCOMTR-MCNC: 120 MG/DL (ref 70–99)
GLUCOSE BLDC GLUCOMTR-MCNC: 211 MG/DL (ref 70–99)
GLUCOSE BLDC GLUCOMTR-MCNC: 215 MG/DL (ref 70–99)
GLUCOSE BLDC GLUCOMTR-MCNC: 309 MG/DL (ref 70–99)
GLUCOSE SERPL-MCNC: 119 MG/DL (ref 65–99)
GLUCOSE UR STRIP-MCNC: NEGATIVE MG/DL
HCT VFR BLD CALC: 38.2 % (ref 39–51)
HGB BLD-MCNC: 12.2 G/DL (ref 13–17)
HGB UR QL STRIP: NEGATIVE
HYALINE CASTS #/AREA URNS LPF: NORMAL /LPF
IMM GRANULOCYTES # BLD AUTO: 0 K/MCL (ref 0–0.2)
IMM GRANULOCYTES # BLD: 0 %
KETONES UR STRIP-MCNC: NEGATIVE MG/DL
LEUKOCYTE ESTERASE UR QL STRIP: NEGATIVE
LYMPHOCYTES # BLD: 2.7 K/MCL (ref 1–4)
LYMPHOCYTES NFR BLD: 29 %
MCH RBC QN AUTO: 28.4 PG (ref 26–34)
MCHC RBC AUTO-ENTMCNC: 31.9 G/DL (ref 32–36.5)
MCV RBC AUTO: 89 FL (ref 78–100)
MONOCYTES # BLD: 0.8 K/MCL (ref 0.3–0.9)
MONOCYTES NFR BLD: 8 %
MUCOUS THREADS URNS QL MICRO: PRESENT
NEUTROPHILS # BLD: 5.5 K/MCL (ref 1.8–7.7)
NEUTROPHILS NFR BLD: 58 %
NITRITE UR QL STRIP: NEGATIVE
NRBC BLD MANUAL-RTO: 0 /100 WBC
OSMOLALITY UR: 354 MOSM/KG (ref 50–1200)
PH UR STRIP: 5 [PH] (ref 5–7)
PLATELET # BLD AUTO: 294 K/MCL (ref 140–450)
POTASSIUM SERPL-SCNC: 3.9 MMOL/L (ref 3.4–5.1)
PROT SERPL-MCNC: 8.4 G/DL (ref 6.4–8.2)
PROT UR STRIP-MCNC: NEGATIVE MG/DL
RBC # BLD: 4.29 MIL/MCL (ref 4.5–5.9)
RBC #/AREA URNS HPF: NORMAL /HPF
SODIUM SERPL-SCNC: 138 MMOL/L (ref 135–145)
SODIUM UR-SCNC: 95 MMOL/L
SP GR UR STRIP: 1.01 (ref 1–1.03)
SQUAMOUS #/AREA URNS HPF: NORMAL /HPF
UROBILINOGEN UR STRIP-MCNC: 0.2 MG/DL
WBC # BLD: 9.5 K/MCL (ref 4.2–11)
WBC #/AREA URNS HPF: NORMAL /HPF

## 2021-04-21 PROCEDURE — 10002803 HB RX 637: Performed by: FAMILY MEDICINE

## 2021-04-21 PROCEDURE — 10002800 HB RX 250 W HCPCS: Performed by: INTERNAL MEDICINE

## 2021-04-21 PROCEDURE — 10002803 HB RX 637: Performed by: INTERNAL MEDICINE

## 2021-04-21 PROCEDURE — 85025 COMPLETE CBC W/AUTO DIFF WBC: CPT | Performed by: NURSE PRACTITIONER

## 2021-04-21 PROCEDURE — 84540 ASSAY OF URINE/UREA-N: CPT | Performed by: INTERNAL MEDICINE

## 2021-04-21 PROCEDURE — 10004651 HB RX, NO CHARGE ITEM: Performed by: INTERNAL MEDICINE

## 2021-04-21 PROCEDURE — 81001 URINALYSIS AUTO W/SCOPE: CPT | Performed by: INTERNAL MEDICINE

## 2021-04-21 PROCEDURE — 10002801 HB RX 250 W/O HCPCS: Performed by: INTERNAL MEDICINE

## 2021-04-21 PROCEDURE — 82570 ASSAY OF URINE CREATININE: CPT | Performed by: INTERNAL MEDICINE

## 2021-04-21 PROCEDURE — 10002800 HB RX 250 W HCPCS: Performed by: NURSE PRACTITIONER

## 2021-04-21 PROCEDURE — 84300 ASSAY OF URINE SODIUM: CPT | Performed by: INTERNAL MEDICINE

## 2021-04-21 PROCEDURE — 80053 COMPREHEN METABOLIC PANEL: CPT | Performed by: NURSE PRACTITIONER

## 2021-04-21 PROCEDURE — 36415 COLL VENOUS BLD VENIPUNCTURE: CPT | Performed by: NURSE PRACTITIONER

## 2021-04-21 PROCEDURE — 10002807 HB RX 258: Performed by: INTERNAL MEDICINE

## 2021-04-21 PROCEDURE — 83935 ASSAY OF URINE OSMOLALITY: CPT | Performed by: INTERNAL MEDICINE

## 2021-04-21 PROCEDURE — 10002803 HB RX 637: Performed by: NURSE PRACTITIONER

## 2021-04-21 PROCEDURE — 10000002 HB ROOM CHARGE MED SURG

## 2021-04-21 RX ORDER — POTASSIUM CHLORIDE 20 MEQ/1
40 TABLET, EXTENDED RELEASE ORAL ONCE
Status: DISCONTINUED | OUTPATIENT
Start: 2021-04-21 | End: 2021-04-22

## 2021-04-21 RX ADMIN — FAMOTIDINE 20 MG: 20 TABLET ORAL at 09:12

## 2021-04-21 RX ADMIN — VANCOMYCIN HYDROCHLORIDE 1250 MG: 10 INJECTION, POWDER, LYOPHILIZED, FOR SOLUTION INTRAVENOUS at 09:11

## 2021-04-21 RX ADMIN — TORSEMIDE 20 MG: 20 TABLET ORAL at 09:12

## 2021-04-21 RX ADMIN — INSULIN GLARGINE 50 UNITS: 100 INJECTION, SOLUTION SUBCUTANEOUS at 21:06

## 2021-04-21 RX ADMIN — Medication 50 MCG: at 09:12

## 2021-04-21 RX ADMIN — ENOXAPARIN SODIUM 40 MG: 40 INJECTION SUBCUTANEOUS at 17:08

## 2021-04-21 RX ADMIN — ATORVASTATIN CALCIUM 10 MG: 10 TABLET, FILM COATED ORAL at 09:12

## 2021-04-21 RX ADMIN — INSULIN LISPRO 4 UNITS: 100 INJECTION, SOLUTION INTRAVENOUS; SUBCUTANEOUS at 17:59

## 2021-04-21 RX ADMIN — INSULIN LISPRO 2 UNITS: 100 INJECTION, SOLUTION INTRAVENOUS; SUBCUTANEOUS at 11:44

## 2021-04-21 RX ADMIN — ALLOPURINOL 100 MG: 100 TABLET ORAL at 09:12

## 2021-04-21 RX ADMIN — ENOXAPARIN SODIUM 40 MG: 40 INJECTION SUBCUTANEOUS at 05:09

## 2021-04-21 RX ADMIN — SODIUM CHLORIDE, PRESERVATIVE FREE 10 ML: 5 INJECTION INTRAVENOUS at 21:09

## 2021-04-21 RX ADMIN — METOPROLOL SUCCINATE 25 MG: 25 TABLET, EXTENDED RELEASE ORAL at 21:06

## 2021-04-21 RX ADMIN — INSULIN GLARGINE 50 UNITS: 100 INJECTION, SOLUTION SUBCUTANEOUS at 09:13

## 2021-04-21 RX ADMIN — OXYCODONE HYDROCHLORIDE AND ACETAMINOPHEN 500 MG: 500 TABLET ORAL at 21:06

## 2021-04-21 RX ADMIN — SPIRONOLACTONE 12.5 MG: 25 TABLET, FILM COATED ORAL at 09:12

## 2021-04-21 RX ADMIN — OXYCODONE HYDROCHLORIDE AND ACETAMINOPHEN 500 MG: 500 TABLET ORAL at 09:12

## 2021-04-21 ASSESSMENT — PAIN SCALES - GENERAL
PAINLEVEL_OUTOF10: 1
PAINLEVEL_OUTOF10: 0

## 2021-04-21 ASSESSMENT — ENCOUNTER SYMPTOMS: FATIGUE: 1

## 2021-04-22 LAB
ALBUMIN SERPL-MCNC: 2.9 G/DL (ref 3.6–5.1)
ALBUMIN/GLOB SERPL: 0.6 {RATIO} (ref 1–2.4)
ALP SERPL-CCNC: 96 UNITS/L (ref 45–117)
ALT SERPL-CCNC: 24 UNITS/L
ANION GAP SERPL CALC-SCNC: 8 MMOL/L (ref 10–20)
AST SERPL-CCNC: 18 UNITS/L
BASOPHILS # BLD: 0.1 K/MCL (ref 0–0.3)
BASOPHILS NFR BLD: 1 %
BILIRUB SERPL-MCNC: 0.3 MG/DL (ref 0.2–1)
BUN SERPL-MCNC: 51 MG/DL (ref 6–20)
BUN/CREAT SERPL: 28 (ref 7–25)
CALCIUM SERPL-MCNC: 8.8 MG/DL (ref 8.4–10.2)
CHLORIDE SERPL-SCNC: 103 MMOL/L (ref 98–107)
CO2 SERPL-SCNC: 30 MMOL/L (ref 21–32)
CREAT SERPL-MCNC: 1.84 MG/DL (ref 0.67–1.17)
DEPRECATED RDW RBC: 45.1 FL (ref 39–50)
EOSINOPHIL # BLD: 0.3 K/MCL (ref 0–0.5)
EOSINOPHIL NFR BLD: 4 %
ERYTHROCYTE [DISTWIDTH] IN BLOOD: 14.2 % (ref 11–15)
FASTING DURATION TIME PATIENT: ABNORMAL H
GFR SERPLBLD BASED ON 1.73 SQ M-ARVRAT: 37 ML/MIN/1.73M2
GLOBULIN SER-MCNC: 5.2 G/DL (ref 2–4)
GLUCOSE BLDC GLUCOMTR-MCNC: 127 MG/DL (ref 70–99)
GLUCOSE BLDC GLUCOMTR-MCNC: 130 MG/DL (ref 70–99)
GLUCOSE BLDC GLUCOMTR-MCNC: 133 MG/DL (ref 70–99)
GLUCOSE BLDC GLUCOMTR-MCNC: 185 MG/DL (ref 70–99)
GLUCOSE BLDC GLUCOMTR-MCNC: 242 MG/DL (ref 70–99)
GLUCOSE SERPL-MCNC: 180 MG/DL (ref 65–99)
HCT VFR BLD CALC: 38.3 % (ref 39–51)
HGB BLD-MCNC: 12.2 G/DL (ref 13–17)
IMM GRANULOCYTES # BLD AUTO: 0 K/MCL (ref 0–0.2)
IMM GRANULOCYTES # BLD: 0 %
LYMPHOCYTES # BLD: 3 K/MCL (ref 1–4)
LYMPHOCYTES NFR BLD: 31 %
MAGNESIUM SERPL-MCNC: 2.2 MG/DL (ref 1.7–2.4)
MCH RBC QN AUTO: 27.9 PG (ref 26–34)
MCHC RBC AUTO-ENTMCNC: 31.9 G/DL (ref 32–36.5)
MCV RBC AUTO: 87.6 FL (ref 78–100)
MONOCYTES # BLD: 0.7 K/MCL (ref 0.3–0.9)
MONOCYTES NFR BLD: 7 %
NEUTROPHILS # BLD: 5.7 K/MCL (ref 1.8–7.7)
NEUTROPHILS NFR BLD: 57 %
NRBC BLD MANUAL-RTO: 0 /100 WBC
PLATELET # BLD AUTO: 302 K/MCL (ref 140–450)
POTASSIUM SERPL-SCNC: 4 MMOL/L (ref 3.4–5.1)
PROT SERPL-MCNC: 8.1 G/DL (ref 6.4–8.2)
RBC # BLD: 4.37 MIL/MCL (ref 4.5–5.9)
SODIUM SERPL-SCNC: 137 MMOL/L (ref 135–145)
UUN UR-MCNC: 323 MG/DL
WBC # BLD: 9.8 K/MCL (ref 4.2–11)

## 2021-04-22 PROCEDURE — 80053 COMPREHEN METABOLIC PANEL: CPT | Performed by: NURSE PRACTITIONER

## 2021-04-22 PROCEDURE — 10002800 HB RX 250 W HCPCS: Performed by: INTERNAL MEDICINE

## 2021-04-22 PROCEDURE — 10002803 HB RX 637: Performed by: NURSE PRACTITIONER

## 2021-04-22 PROCEDURE — 10002800 HB RX 250 W HCPCS: Performed by: NURSE PRACTITIONER

## 2021-04-22 PROCEDURE — 85025 COMPLETE CBC W/AUTO DIFF WBC: CPT | Performed by: NURSE PRACTITIONER

## 2021-04-22 PROCEDURE — 83735 ASSAY OF MAGNESIUM: CPT | Performed by: NURSE PRACTITIONER

## 2021-04-22 PROCEDURE — 10002803 HB RX 637: Performed by: FAMILY MEDICINE

## 2021-04-22 PROCEDURE — 36415 COLL VENOUS BLD VENIPUNCTURE: CPT | Performed by: NURSE PRACTITIONER

## 2021-04-22 PROCEDURE — 10000002 HB ROOM CHARGE MED SURG

## 2021-04-22 PROCEDURE — 10002801 HB RX 250 W/O HCPCS: Performed by: INTERNAL MEDICINE

## 2021-04-22 PROCEDURE — 10002807 HB RX 258: Performed by: INTERNAL MEDICINE

## 2021-04-22 PROCEDURE — 10004651 HB RX, NO CHARGE ITEM: Performed by: INTERNAL MEDICINE

## 2021-04-22 RX ORDER — INSULIN GLARGINE 100 [IU]/ML
53 INJECTION, SOLUTION SUBCUTANEOUS EVERY 12 HOURS SCHEDULED
Status: DISCONTINUED | OUTPATIENT
Start: 2021-04-22 | End: 2021-04-23

## 2021-04-22 RX ORDER — POTASSIUM CHLORIDE 20 MEQ/1
40 TABLET, EXTENDED RELEASE ORAL ONCE
Status: COMPLETED | OUTPATIENT
Start: 2021-04-22 | End: 2021-04-25

## 2021-04-22 RX ADMIN — SODIUM CHLORIDE, PRESERVATIVE FREE 10 ML: 5 INJECTION INTRAVENOUS at 21:08

## 2021-04-22 RX ADMIN — ENOXAPARIN SODIUM 40 MG: 40 INJECTION SUBCUTANEOUS at 06:10

## 2021-04-22 RX ADMIN — INSULIN LISPRO 1 UNITS: 100 INJECTION, SOLUTION INTRAVENOUS; SUBCUTANEOUS at 08:35

## 2021-04-22 RX ADMIN — METOPROLOL SUCCINATE 25 MG: 25 TABLET, EXTENDED RELEASE ORAL at 08:35

## 2021-04-22 RX ADMIN — OXYCODONE HYDROCHLORIDE AND ACETAMINOPHEN 500 MG: 500 TABLET ORAL at 21:06

## 2021-04-22 RX ADMIN — ATORVASTATIN CALCIUM 10 MG: 10 TABLET, FILM COATED ORAL at 08:37

## 2021-04-22 RX ADMIN — OXYCODONE HYDROCHLORIDE AND ACETAMINOPHEN 500 MG: 500 TABLET ORAL at 08:35

## 2021-04-22 RX ADMIN — FAMOTIDINE 20 MG: 20 TABLET ORAL at 08:35

## 2021-04-22 RX ADMIN — Medication 50 MCG: at 08:35

## 2021-04-22 RX ADMIN — ALLOPURINOL 100 MG: 100 TABLET ORAL at 08:35

## 2021-04-22 RX ADMIN — INSULIN GLARGINE 50 UNITS: 100 INJECTION, SOLUTION SUBCUTANEOUS at 08:36

## 2021-04-22 RX ADMIN — METOPROLOL SUCCINATE 25 MG: 25 TABLET, EXTENDED RELEASE ORAL at 21:06

## 2021-04-22 RX ADMIN — ENOXAPARIN SODIUM 40 MG: 40 INJECTION SUBCUTANEOUS at 18:31

## 2021-04-22 RX ADMIN — INSULIN GLARGINE 53 UNITS: 100 INJECTION, SOLUTION SUBCUTANEOUS at 21:06

## 2021-04-22 RX ADMIN — VANCOMYCIN HYDROCHLORIDE 1250 MG: 10 INJECTION, POWDER, LYOPHILIZED, FOR SOLUTION INTRAVENOUS at 11:04

## 2021-04-22 ASSESSMENT — PAIN SCALES - GENERAL
PAINLEVEL_OUTOF10: 0
PAINLEVEL_OUTOF10: 0

## 2021-04-22 ASSESSMENT — ENCOUNTER SYMPTOMS: FATIGUE: 1

## 2021-04-23 PROBLEM — I50.32 CHRONIC DIASTOLIC HEART FAILURE (CMD): Status: ACTIVE | Noted: 2020-05-15

## 2021-04-23 LAB
ALBUMIN SERPL-MCNC: 2.9 G/DL (ref 3.6–5.1)
ALBUMIN/GLOB SERPL: 0.6 {RATIO} (ref 1–2.4)
ALP SERPL-CCNC: 97 UNITS/L (ref 45–117)
ALT SERPL-CCNC: 25 UNITS/L
ANION GAP SERPL CALC-SCNC: 6 MMOL/L (ref 10–20)
AST SERPL-CCNC: 16 UNITS/L
BASOPHILS # BLD: 0.1 K/MCL (ref 0–0.3)
BASOPHILS NFR BLD: 1 %
BILIRUB SERPL-MCNC: 0.3 MG/DL (ref 0.2–1)
BUN SERPL-MCNC: 43 MG/DL (ref 6–20)
BUN/CREAT SERPL: 27 (ref 7–25)
CALCIUM SERPL-MCNC: 8.9 MG/DL (ref 8.4–10.2)
CHLORIDE SERPL-SCNC: 108 MMOL/L (ref 98–107)
CO2 SERPL-SCNC: 29 MMOL/L (ref 21–32)
CREAT SERPL-MCNC: 1.57 MG/DL (ref 0.67–1.17)
DEPRECATED RDW RBC: 46.1 FL (ref 39–50)
EOSINOPHIL # BLD: 0.3 K/MCL (ref 0–0.5)
EOSINOPHIL NFR BLD: 3 %
ERYTHROCYTE [DISTWIDTH] IN BLOOD: 14.3 % (ref 11–15)
FASTING DURATION TIME PATIENT: ABNORMAL H
GFR SERPLBLD BASED ON 1.73 SQ M-ARVRAT: 45 ML/MIN/1.73M2
GLOBULIN SER-MCNC: 4.8 G/DL (ref 2–4)
GLUCOSE BLDC GLUCOMTR-MCNC: 137 MG/DL (ref 70–99)
GLUCOSE BLDC GLUCOMTR-MCNC: 143 MG/DL (ref 70–99)
GLUCOSE BLDC GLUCOMTR-MCNC: 154 MG/DL (ref 70–99)
GLUCOSE BLDC GLUCOMTR-MCNC: 192 MG/DL (ref 70–99)
GLUCOSE BLDC GLUCOMTR-MCNC: 90 MG/DL (ref 70–99)
GLUCOSE SERPL-MCNC: 149 MG/DL (ref 65–99)
HCT VFR BLD CALC: 38.1 % (ref 39–51)
HGB BLD-MCNC: 11.9 G/DL (ref 13–17)
IMM GRANULOCYTES # BLD AUTO: 0 K/MCL (ref 0–0.2)
IMM GRANULOCYTES # BLD: 0 %
LYMPHOCYTES # BLD: 3 K/MCL (ref 1–4)
LYMPHOCYTES NFR BLD: 32 %
MCH RBC QN AUTO: 27.7 PG (ref 26–34)
MCHC RBC AUTO-ENTMCNC: 31.2 G/DL (ref 32–36.5)
MCV RBC AUTO: 88.8 FL (ref 78–100)
MONOCYTES # BLD: 0.8 K/MCL (ref 0.3–0.9)
MONOCYTES NFR BLD: 8 %
NEUTROPHILS # BLD: 5.4 K/MCL (ref 1.8–7.7)
NEUTROPHILS NFR BLD: 56 %
NRBC BLD MANUAL-RTO: 0 /100 WBC
PLATELET # BLD AUTO: 281 K/MCL (ref 140–450)
POTASSIUM SERPL-SCNC: 3.9 MMOL/L (ref 3.4–5.1)
PROT SERPL-MCNC: 7.7 G/DL (ref 6.4–8.2)
RBC # BLD: 4.29 MIL/MCL (ref 4.5–5.9)
SODIUM SERPL-SCNC: 139 MMOL/L (ref 135–145)
WBC # BLD: 9.6 K/MCL (ref 4.2–11)

## 2021-04-23 PROCEDURE — 10002800 HB RX 250 W HCPCS: Performed by: INTERNAL MEDICINE

## 2021-04-23 PROCEDURE — 10002801 HB RX 250 W/O HCPCS: Performed by: INTERNAL MEDICINE

## 2021-04-23 PROCEDURE — 85025 COMPLETE CBC W/AUTO DIFF WBC: CPT | Performed by: NURSE PRACTITIONER

## 2021-04-23 PROCEDURE — 10002803 HB RX 637: Performed by: PODIATRIST

## 2021-04-23 PROCEDURE — 80053 COMPREHEN METABOLIC PANEL: CPT | Performed by: NURSE PRACTITIONER

## 2021-04-23 PROCEDURE — 10002803 HB RX 637: Performed by: FAMILY MEDICINE

## 2021-04-23 PROCEDURE — 10002800 HB RX 250 W HCPCS: Performed by: NURSE PRACTITIONER

## 2021-04-23 PROCEDURE — 10000002 HB ROOM CHARGE MED SURG

## 2021-04-23 PROCEDURE — 10004651 HB RX, NO CHARGE ITEM: Performed by: INTERNAL MEDICINE

## 2021-04-23 PROCEDURE — 97530 THERAPEUTIC ACTIVITIES: CPT

## 2021-04-23 PROCEDURE — 97535 SELF CARE MNGMENT TRAINING: CPT

## 2021-04-23 PROCEDURE — 10002803 HB RX 637: Performed by: NURSE PRACTITIONER

## 2021-04-23 PROCEDURE — 36415 COLL VENOUS BLD VENIPUNCTURE: CPT | Performed by: NURSE PRACTITIONER

## 2021-04-23 PROCEDURE — 10002807 HB RX 258: Performed by: INTERNAL MEDICINE

## 2021-04-23 RX ORDER — INSULIN GLARGINE 100 [IU]/ML
55 INJECTION, SOLUTION SUBCUTANEOUS EVERY 12 HOURS SCHEDULED
Status: DISCONTINUED | OUTPATIENT
Start: 2021-04-23 | End: 2021-04-26

## 2021-04-23 RX ORDER — TORSEMIDE 10 MG/1
10 TABLET ORAL DAILY
Status: DISCONTINUED | OUTPATIENT
Start: 2021-04-24 | End: 2021-04-27 | Stop reason: HOSPADM

## 2021-04-23 RX ADMIN — OXYCODONE HYDROCHLORIDE AND ACETAMINOPHEN 500 MG: 500 TABLET ORAL at 21:16

## 2021-04-23 RX ADMIN — ENOXAPARIN SODIUM 40 MG: 40 INJECTION SUBCUTANEOUS at 06:23

## 2021-04-23 RX ADMIN — SODIUM CHLORIDE, PRESERVATIVE FREE 10 ML: 5 INJECTION INTRAVENOUS at 21:17

## 2021-04-23 RX ADMIN — ALUMINA, MAGNESIA, AND SIMETHICONE ORAL SUSPENSION REGULAR STRENGTH 30 ML: 1200; 1200; 120 SUSPENSION ORAL at 23:09

## 2021-04-23 RX ADMIN — ENOXAPARIN SODIUM 40 MG: 40 INJECTION SUBCUTANEOUS at 18:29

## 2021-04-23 RX ADMIN — Medication 50 MCG: at 09:37

## 2021-04-23 RX ADMIN — ALLOPURINOL 100 MG: 100 TABLET ORAL at 09:37

## 2021-04-23 RX ADMIN — OXYCODONE HYDROCHLORIDE AND ACETAMINOPHEN 500 MG: 500 TABLET ORAL at 09:36

## 2021-04-23 RX ADMIN — METOPROLOL SUCCINATE 25 MG: 25 TABLET, EXTENDED RELEASE ORAL at 21:16

## 2021-04-23 RX ADMIN — ATORVASTATIN CALCIUM 10 MG: 10 TABLET, FILM COATED ORAL at 09:36

## 2021-04-23 RX ADMIN — INSULIN GLARGINE 55 UNITS: 100 INJECTION, SOLUTION SUBCUTANEOUS at 09:37

## 2021-04-23 RX ADMIN — METOPROLOL SUCCINATE 25 MG: 25 TABLET, EXTENDED RELEASE ORAL at 09:36

## 2021-04-23 RX ADMIN — INSULIN GLARGINE 55 UNITS: 100 INJECTION, SOLUTION SUBCUTANEOUS at 20:24

## 2021-04-23 RX ADMIN — HYDROCODONE BITARTRATE AND ACETAMINOPHEN 1 TABLET: 5; 325 TABLET ORAL at 03:40

## 2021-04-23 RX ADMIN — VANCOMYCIN HYDROCHLORIDE 1250 MG: 10 INJECTION, POWDER, LYOPHILIZED, FOR SOLUTION INTRAVENOUS at 09:40

## 2021-04-23 RX ADMIN — FAMOTIDINE 20 MG: 20 TABLET ORAL at 09:37

## 2021-04-23 ASSESSMENT — COGNITIVE AND FUNCTIONAL STATUS - GENERAL
APPLIED_COGNITIVE_RAW_SCORE: 24
HELP NEEDED FOR BATHING: A LOT
HELP NEEDED DRESSING REGULAR LOWER BODY CLOTHING: A LOT
HELP NEEDED FOR TOILETING: A LOT
APPLIED_COGNITIVE_CONVERTED_SCORE: 62.21
DAILY_ACTIVITY_RAW_SCORE: 17
DAILY_ACTIVITY_CONVERTED_SCORE: 37.26
HELP NEEDED DRESSING REGULAR UPPER BODY CLOTHING: A LITTLE

## 2021-04-23 ASSESSMENT — ENCOUNTER SYMPTOMS: FATIGUE: 1

## 2021-04-23 ASSESSMENT — PAIN SCALES - GENERAL
PAINLEVEL_OUTOF10: 4
PAINLEVEL_OUTOF10: 0
PAINLEVEL_OUTOF10: 1
PAINLEVEL_OUTOF10: 1

## 2021-04-23 ASSESSMENT — ACTIVITIES OF DAILY LIVING (ADL): HOME_MANAGEMENT_TIME_ENTRY: 17

## 2021-04-24 LAB
ALBUMIN SERPL-MCNC: 2.8 G/DL (ref 3.6–5.1)
ALBUMIN/GLOB SERPL: 0.6 {RATIO} (ref 1–2.4)
ALP SERPL-CCNC: 97 UNITS/L (ref 45–117)
ALT SERPL-CCNC: 28 UNITS/L
ANION GAP SERPL CALC-SCNC: 6 MMOL/L (ref 10–20)
AST SERPL-CCNC: 20 UNITS/L
BASOPHILS # BLD: 0.1 K/MCL (ref 0–0.3)
BASOPHILS NFR BLD: 1 %
BILIRUB SERPL-MCNC: 0.2 MG/DL (ref 0.2–1)
BUN SERPL-MCNC: 36 MG/DL (ref 6–20)
BUN/CREAT SERPL: 25 (ref 7–25)
CALCIUM SERPL-MCNC: 8.9 MG/DL (ref 8.4–10.2)
CHLORIDE SERPL-SCNC: 110 MMOL/L (ref 98–107)
CO2 SERPL-SCNC: 29 MMOL/L (ref 21–32)
CREAT SERPL-MCNC: 1.44 MG/DL (ref 0.67–1.17)
DEPRECATED RDW RBC: 46.8 FL (ref 39–50)
EOSINOPHIL # BLD: 0.3 K/MCL (ref 0–0.5)
EOSINOPHIL NFR BLD: 4 %
ERYTHROCYTE [DISTWIDTH] IN BLOOD: 14.2 % (ref 11–15)
FASTING DURATION TIME PATIENT: ABNORMAL H
GFR SERPLBLD BASED ON 1.73 SQ M-ARVRAT: 50 ML/MIN/1.73M2
GLOBULIN SER-MCNC: 4.7 G/DL (ref 2–4)
GLUCOSE BLDC GLUCOMTR-MCNC: 134 MG/DL (ref 70–99)
GLUCOSE BLDC GLUCOMTR-MCNC: 206 MG/DL (ref 70–99)
GLUCOSE BLDC GLUCOMTR-MCNC: 277 MG/DL (ref 70–99)
GLUCOSE SERPL-MCNC: 91 MG/DL (ref 65–99)
HCT VFR BLD CALC: 37.7 % (ref 39–51)
HGB BLD-MCNC: 11.6 G/DL (ref 13–17)
IMM GRANULOCYTES # BLD AUTO: 0.1 K/MCL (ref 0–0.2)
IMM GRANULOCYTES # BLD: 1 %
LYMPHOCYTES # BLD: 2.8 K/MCL (ref 1–4)
LYMPHOCYTES NFR BLD: 31 %
MAGNESIUM SERPL-MCNC: 2.4 MG/DL (ref 1.7–2.4)
MCH RBC QN AUTO: 27.8 PG (ref 26–34)
MCHC RBC AUTO-ENTMCNC: 30.8 G/DL (ref 32–36.5)
MCV RBC AUTO: 90.4 FL (ref 78–100)
MONOCYTES # BLD: 0.6 K/MCL (ref 0.3–0.9)
MONOCYTES NFR BLD: 7 %
NEUTROPHILS # BLD: 5.3 K/MCL (ref 1.8–7.7)
NEUTROPHILS NFR BLD: 56 %
NRBC BLD MANUAL-RTO: 0 /100 WBC
PLATELET # BLD AUTO: 277 K/MCL (ref 140–450)
POTASSIUM SERPL-SCNC: 4 MMOL/L (ref 3.4–5.1)
PROT SERPL-MCNC: 7.5 G/DL (ref 6.4–8.2)
RBC # BLD: 4.17 MIL/MCL (ref 4.5–5.9)
SODIUM SERPL-SCNC: 141 MMOL/L (ref 135–145)
WBC # BLD: 9.2 K/MCL (ref 4.2–11)

## 2021-04-24 PROCEDURE — 83735 ASSAY OF MAGNESIUM: CPT | Performed by: NURSE PRACTITIONER

## 2021-04-24 PROCEDURE — 10002803 HB RX 637: Performed by: FAMILY MEDICINE

## 2021-04-24 PROCEDURE — 36415 COLL VENOUS BLD VENIPUNCTURE: CPT | Performed by: NURSE PRACTITIONER

## 2021-04-24 PROCEDURE — 10002803 HB RX 637: Performed by: NURSE PRACTITIONER

## 2021-04-24 PROCEDURE — 10002803 HB RX 637: Performed by: INTERNAL MEDICINE

## 2021-04-24 PROCEDURE — 10000002 HB ROOM CHARGE MED SURG

## 2021-04-24 PROCEDURE — 85025 COMPLETE CBC W/AUTO DIFF WBC: CPT | Performed by: NURSE PRACTITIONER

## 2021-04-24 PROCEDURE — 10002800 HB RX 250 W HCPCS: Performed by: NURSE PRACTITIONER

## 2021-04-24 PROCEDURE — 10004651 HB RX, NO CHARGE ITEM: Performed by: INTERNAL MEDICINE

## 2021-04-24 PROCEDURE — 80053 COMPREHEN METABOLIC PANEL: CPT | Performed by: NURSE PRACTITIONER

## 2021-04-24 RX ADMIN — METOPROLOL SUCCINATE 25 MG: 25 TABLET, EXTENDED RELEASE ORAL at 21:40

## 2021-04-24 RX ADMIN — INSULIN GLARGINE 55 UNITS: 100 INJECTION, SOLUTION SUBCUTANEOUS at 13:37

## 2021-04-24 RX ADMIN — ATORVASTATIN CALCIUM 10 MG: 10 TABLET, FILM COATED ORAL at 08:50

## 2021-04-24 RX ADMIN — ENOXAPARIN SODIUM 40 MG: 40 INJECTION SUBCUTANEOUS at 06:13

## 2021-04-24 RX ADMIN — SODIUM CHLORIDE, PRESERVATIVE FREE 10 ML: 5 INJECTION INTRAVENOUS at 08:51

## 2021-04-24 RX ADMIN — ENOXAPARIN SODIUM 40 MG: 40 INJECTION SUBCUTANEOUS at 21:40

## 2021-04-24 RX ADMIN — OXYCODONE HYDROCHLORIDE AND ACETAMINOPHEN 500 MG: 500 TABLET ORAL at 21:40

## 2021-04-24 RX ADMIN — FAMOTIDINE 20 MG: 20 TABLET ORAL at 08:50

## 2021-04-24 RX ADMIN — TORSEMIDE 10 MG: 10 TABLET ORAL at 08:50

## 2021-04-24 RX ADMIN — SODIUM CHLORIDE, PRESERVATIVE FREE 10 ML: 5 INJECTION INTRAVENOUS at 21:41

## 2021-04-24 RX ADMIN — METOPROLOL SUCCINATE 25 MG: 25 TABLET, EXTENDED RELEASE ORAL at 08:50

## 2021-04-24 RX ADMIN — ALLOPURINOL 100 MG: 100 TABLET ORAL at 08:50

## 2021-04-24 RX ADMIN — INSULIN GLARGINE 55 UNITS: 100 INJECTION, SOLUTION SUBCUTANEOUS at 21:40

## 2021-04-24 RX ADMIN — OXYCODONE HYDROCHLORIDE AND ACETAMINOPHEN 500 MG: 500 TABLET ORAL at 08:50

## 2021-04-24 RX ADMIN — Medication 50 MCG: at 08:50

## 2021-04-24 ASSESSMENT — PAIN SCALES - GENERAL
PAINLEVEL_OUTOF10: 0
PAINLEVEL_OUTOF10: 0

## 2021-04-25 LAB
ALBUMIN SERPL-MCNC: 2.8 G/DL (ref 3.6–5.1)
ALBUMIN/GLOB SERPL: 0.6 {RATIO} (ref 1–2.4)
ALP SERPL-CCNC: 96 UNITS/L (ref 45–117)
ALT SERPL-CCNC: 26 UNITS/L
ANION GAP SERPL CALC-SCNC: 7 MMOL/L (ref 10–20)
AST SERPL-CCNC: 18 UNITS/L
BASOPHILS # BLD: 0.1 K/MCL (ref 0–0.3)
BASOPHILS NFR BLD: 1 %
BILIRUB SERPL-MCNC: 0.3 MG/DL (ref 0.2–1)
BUN SERPL-MCNC: 37 MG/DL (ref 6–20)
BUN/CREAT SERPL: 26 (ref 7–25)
CALCIUM SERPL-MCNC: 8.8 MG/DL (ref 8.4–10.2)
CHLORIDE SERPL-SCNC: 110 MMOL/L (ref 98–107)
CO2 SERPL-SCNC: 29 MMOL/L (ref 21–32)
CREAT SERPL-MCNC: 1.45 MG/DL (ref 0.67–1.17)
DEPRECATED RDW RBC: 46.8 FL (ref 39–50)
EOSINOPHIL # BLD: 0.3 K/MCL (ref 0–0.5)
EOSINOPHIL NFR BLD: 4 %
ERYTHROCYTE [DISTWIDTH] IN BLOOD: 14.3 % (ref 11–15)
FASTING DURATION TIME PATIENT: ABNORMAL H
GFR SERPLBLD BASED ON 1.73 SQ M-ARVRAT: 50 ML/MIN/1.73M2
GLOBULIN SER-MCNC: 4.7 G/DL (ref 2–4)
GLUCOSE BLDC GLUCOMTR-MCNC: 103 MG/DL (ref 70–99)
GLUCOSE BLDC GLUCOMTR-MCNC: 104 MG/DL (ref 70–99)
GLUCOSE BLDC GLUCOMTR-MCNC: 126 MG/DL (ref 70–99)
GLUCOSE SERPL-MCNC: 66 MG/DL (ref 65–99)
HCT VFR BLD CALC: 36.9 % (ref 39–51)
HGB BLD-MCNC: 11.5 G/DL (ref 13–17)
IMM GRANULOCYTES # BLD AUTO: 0 K/MCL (ref 0–0.2)
IMM GRANULOCYTES # BLD: 0 %
LYMPHOCYTES # BLD: 3.1 K/MCL (ref 1–4)
LYMPHOCYTES NFR BLD: 33 %
MCH RBC QN AUTO: 28 PG (ref 26–34)
MCHC RBC AUTO-ENTMCNC: 31.2 G/DL (ref 32–36.5)
MCV RBC AUTO: 89.8 FL (ref 78–100)
MONOCYTES # BLD: 0.7 K/MCL (ref 0.3–0.9)
MONOCYTES NFR BLD: 8 %
NEUTROPHILS # BLD: 5 K/MCL (ref 1.8–7.7)
NEUTROPHILS NFR BLD: 54 %
NRBC BLD MANUAL-RTO: 0 /100 WBC
PLATELET # BLD AUTO: 271 K/MCL (ref 140–450)
POTASSIUM SERPL-SCNC: 3.7 MMOL/L (ref 3.4–5.1)
PROT SERPL-MCNC: 7.5 G/DL (ref 6.4–8.2)
RBC # BLD: 4.11 MIL/MCL (ref 4.5–5.9)
SODIUM SERPL-SCNC: 142 MMOL/L (ref 135–145)
WBC # BLD: 9.2 K/MCL (ref 4.2–11)

## 2021-04-25 PROCEDURE — 85025 COMPLETE CBC W/AUTO DIFF WBC: CPT | Performed by: INTERNAL MEDICINE

## 2021-04-25 PROCEDURE — 10002803 HB RX 637: Performed by: FAMILY MEDICINE

## 2021-04-25 PROCEDURE — 10004651 HB RX, NO CHARGE ITEM: Performed by: INTERNAL MEDICINE

## 2021-04-25 PROCEDURE — 10002800 HB RX 250 W HCPCS: Performed by: NURSE PRACTITIONER

## 2021-04-25 PROCEDURE — 10002803 HB RX 637: Performed by: INTERNAL MEDICINE

## 2021-04-25 PROCEDURE — 10002803 HB RX 637: Performed by: NURSE PRACTITIONER

## 2021-04-25 PROCEDURE — 10000002 HB ROOM CHARGE MED SURG

## 2021-04-25 PROCEDURE — 36415 COLL VENOUS BLD VENIPUNCTURE: CPT | Performed by: INTERNAL MEDICINE

## 2021-04-25 PROCEDURE — 10002803 HB RX 637: Performed by: PODIATRIST

## 2021-04-25 PROCEDURE — 80053 COMPREHEN METABOLIC PANEL: CPT | Performed by: INTERNAL MEDICINE

## 2021-04-25 RX ADMIN — METOPROLOL SUCCINATE 25 MG: 25 TABLET, EXTENDED RELEASE ORAL at 09:40

## 2021-04-25 RX ADMIN — FAMOTIDINE 20 MG: 20 TABLET ORAL at 09:40

## 2021-04-25 RX ADMIN — ENOXAPARIN SODIUM 40 MG: 40 INJECTION SUBCUTANEOUS at 09:43

## 2021-04-25 RX ADMIN — OXYCODONE HYDROCHLORIDE AND ACETAMINOPHEN 500 MG: 500 TABLET ORAL at 21:20

## 2021-04-25 RX ADMIN — POTASSIUM CHLORIDE 40 MEQ: 1500 TABLET, EXTENDED RELEASE ORAL at 17:25

## 2021-04-25 RX ADMIN — OXYCODONE HYDROCHLORIDE AND ACETAMINOPHEN 500 MG: 500 TABLET ORAL at 09:40

## 2021-04-25 RX ADMIN — SODIUM CHLORIDE, PRESERVATIVE FREE 10 ML: 5 INJECTION INTRAVENOUS at 09:43

## 2021-04-25 RX ADMIN — ENOXAPARIN SODIUM 40 MG: 40 INJECTION SUBCUTANEOUS at 21:20

## 2021-04-25 RX ADMIN — Medication 50 MCG: at 09:40

## 2021-04-25 RX ADMIN — METOPROLOL SUCCINATE 25 MG: 25 TABLET, EXTENDED RELEASE ORAL at 21:25

## 2021-04-25 RX ADMIN — INSULIN GLARGINE 55 UNITS: 100 INJECTION, SOLUTION SUBCUTANEOUS at 21:20

## 2021-04-25 RX ADMIN — INSULIN GLARGINE 55 UNITS: 100 INJECTION, SOLUTION SUBCUTANEOUS at 12:55

## 2021-04-25 RX ADMIN — ALLOPURINOL 100 MG: 100 TABLET ORAL at 09:40

## 2021-04-25 RX ADMIN — SODIUM CHLORIDE, PRESERVATIVE FREE 10 ML: 5 INJECTION INTRAVENOUS at 21:26

## 2021-04-25 RX ADMIN — TORSEMIDE 10 MG: 10 TABLET ORAL at 09:40

## 2021-04-25 RX ADMIN — HYDROCODONE BITARTRATE AND ACETAMINOPHEN 1 TABLET: 5; 325 TABLET ORAL at 10:01

## 2021-04-25 RX ADMIN — ATORVASTATIN CALCIUM 10 MG: 10 TABLET, FILM COATED ORAL at 09:40

## 2021-04-25 ASSESSMENT — PAIN SCALES - GENERAL
PAINLEVEL_OUTOF10: 8
PAINLEVEL_OUTOF10: 2

## 2021-04-26 LAB
GLUCOSE BLDC GLUCOMTR-MCNC: 114 MG/DL (ref 70–99)
GLUCOSE BLDC GLUCOMTR-MCNC: 153 MG/DL (ref 70–99)
GLUCOSE BLDC GLUCOMTR-MCNC: 190 MG/DL (ref 70–99)
GLUCOSE BLDC GLUCOMTR-MCNC: 232 MG/DL (ref 70–99)
GLUCOSE BLDC GLUCOMTR-MCNC: 66 MG/DL (ref 70–99)

## 2021-04-26 PROCEDURE — 10002803 HB RX 637: Performed by: FAMILY MEDICINE

## 2021-04-26 PROCEDURE — 10002803 HB RX 637: Performed by: NURSE PRACTITIONER

## 2021-04-26 PROCEDURE — 10002803 HB RX 637: Performed by: INTERNAL MEDICINE

## 2021-04-26 PROCEDURE — 10002800 HB RX 250 W HCPCS: Performed by: NURSE PRACTITIONER

## 2021-04-26 PROCEDURE — 97530 THERAPEUTIC ACTIVITIES: CPT

## 2021-04-26 PROCEDURE — 10004651 HB RX, NO CHARGE ITEM: Performed by: INTERNAL MEDICINE

## 2021-04-26 PROCEDURE — 10000002 HB ROOM CHARGE MED SURG

## 2021-04-26 RX ORDER — ACETAMINOPHEN 325 MG/1
650 TABLET ORAL EVERY 4 HOURS PRN
Status: DISCONTINUED | OUTPATIENT
Start: 2021-04-26 | End: 2021-04-26 | Stop reason: SDUPTHER

## 2021-04-26 RX ORDER — INSULIN GLARGINE 100 [IU]/ML
52 INJECTION, SOLUTION SUBCUTANEOUS EVERY 12 HOURS SCHEDULED
Qty: 10 ML | Refills: 0 | Status: SHIPPED
Start: 2021-04-26 | End: 2022-09-01

## 2021-04-26 RX ORDER — TORSEMIDE 10 MG/1
10 TABLET ORAL DAILY
Qty: 1 TABLET | Refills: 0 | Status: SHIPPED
Start: 2021-04-27 | End: 2021-04-27

## 2021-04-26 RX ORDER — INSULIN GLARGINE 100 [IU]/ML
52 INJECTION, SOLUTION SUBCUTANEOUS EVERY 12 HOURS SCHEDULED
Status: DISCONTINUED | OUTPATIENT
Start: 2021-04-26 | End: 2021-04-27 | Stop reason: HOSPADM

## 2021-04-26 RX ADMIN — METOPROLOL SUCCINATE 25 MG: 25 TABLET, EXTENDED RELEASE ORAL at 21:55

## 2021-04-26 RX ADMIN — METOPROLOL SUCCINATE 25 MG: 25 TABLET, EXTENDED RELEASE ORAL at 09:07

## 2021-04-26 RX ADMIN — FAMOTIDINE 20 MG: 20 TABLET ORAL at 09:08

## 2021-04-26 RX ADMIN — ENOXAPARIN SODIUM 40 MG: 40 INJECTION SUBCUTANEOUS at 17:16

## 2021-04-26 RX ADMIN — INSULIN GLARGINE 52 UNITS: 100 INJECTION, SOLUTION SUBCUTANEOUS at 21:44

## 2021-04-26 RX ADMIN — OXYCODONE HYDROCHLORIDE AND ACETAMINOPHEN 500 MG: 500 TABLET ORAL at 09:08

## 2021-04-26 RX ADMIN — ATORVASTATIN CALCIUM 10 MG: 10 TABLET, FILM COATED ORAL at 09:08

## 2021-04-26 RX ADMIN — ALLOPURINOL 100 MG: 100 TABLET ORAL at 09:08

## 2021-04-26 RX ADMIN — Medication 50 MCG: at 09:08

## 2021-04-26 RX ADMIN — OXYCODONE HYDROCHLORIDE AND ACETAMINOPHEN 500 MG: 500 TABLET ORAL at 21:44

## 2021-04-26 RX ADMIN — INSULIN LISPRO 2 UNITS: 100 INJECTION, SOLUTION INTRAVENOUS; SUBCUTANEOUS at 18:36

## 2021-04-26 RX ADMIN — TORSEMIDE 10 MG: 10 TABLET ORAL at 09:08

## 2021-04-26 RX ADMIN — INSULIN GLARGINE 52 UNITS: 100 INJECTION, SOLUTION SUBCUTANEOUS at 09:08

## 2021-04-26 RX ADMIN — ENOXAPARIN SODIUM 40 MG: 40 INJECTION SUBCUTANEOUS at 05:07

## 2021-04-26 RX ADMIN — SODIUM CHLORIDE, PRESERVATIVE FREE 10 ML: 5 INJECTION INTRAVENOUS at 21:46

## 2021-04-26 ASSESSMENT — COGNITIVE AND FUNCTIONAL STATUS - GENERAL
BASIC_MOBILITY_CONVERTED_SCORE: 39.67
BASIC_MOBILITY_RAW_SCORE: 17

## 2021-04-26 ASSESSMENT — PAIN SCALES - GENERAL
PAINLEVEL_OUTOF10: 1
PAINLEVEL_OUTOF10: 0

## 2021-04-26 ASSESSMENT — ENCOUNTER SYMPTOMS: PAIN SEVERITY NOW: 0

## 2021-04-27 VITALS
WEIGHT: 313.71 LBS | SYSTOLIC BLOOD PRESSURE: 109 MMHG | BODY MASS INDEX: 42.49 KG/M2 | DIASTOLIC BLOOD PRESSURE: 68 MMHG | HEART RATE: 73 BPM | HEIGHT: 72 IN | TEMPERATURE: 97.7 F | OXYGEN SATURATION: 96 % | RESPIRATION RATE: 16 BRPM

## 2021-04-27 LAB
ALBUMIN SERPL-MCNC: 2.9 G/DL (ref 3.6–5.1)
ALBUMIN/GLOB SERPL: 0.6 {RATIO} (ref 1–2.4)
ALP SERPL-CCNC: 103 UNITS/L (ref 45–117)
ALT SERPL-CCNC: 26 UNITS/L
ANION GAP SERPL CALC-SCNC: 11 MMOL/L (ref 10–20)
AST SERPL-CCNC: 19 UNITS/L
BASOPHILS # BLD: 0.1 K/MCL (ref 0–0.3)
BASOPHILS NFR BLD: 1 %
BILIRUB SERPL-MCNC: 0.3 MG/DL (ref 0.2–1)
BUN SERPL-MCNC: 37 MG/DL (ref 6–20)
BUN/CREAT SERPL: 25 (ref 7–25)
CALCIUM SERPL-MCNC: 9.1 MG/DL (ref 8.4–10.2)
CHLORIDE SERPL-SCNC: 106 MMOL/L (ref 98–107)
CO2 SERPL-SCNC: 26 MMOL/L (ref 21–32)
CREAT SERPL-MCNC: 1.46 MG/DL (ref 0.67–1.17)
DEPRECATED RDW RBC: 45.6 FL (ref 39–50)
EOSINOPHIL # BLD: 0.3 K/MCL (ref 0–0.5)
EOSINOPHIL NFR BLD: 3 %
ERYTHROCYTE [DISTWIDTH] IN BLOOD: 14.2 % (ref 11–15)
FASTING DURATION TIME PATIENT: ABNORMAL H
GFR SERPLBLD BASED ON 1.73 SQ M-ARVRAT: 49 ML/MIN/1.73M2
GLOBULIN SER-MCNC: 4.8 G/DL (ref 2–4)
GLUCOSE BLDC GLUCOMTR-MCNC: 96 MG/DL (ref 70–99)
GLUCOSE SERPL-MCNC: 92 MG/DL (ref 65–99)
HCT VFR BLD CALC: 38.9 % (ref 39–51)
HGB BLD-MCNC: 12.5 G/DL (ref 13–17)
IMM GRANULOCYTES # BLD AUTO: 0 K/MCL (ref 0–0.2)
IMM GRANULOCYTES # BLD: 0 %
LYMPHOCYTES # BLD: 3.1 K/MCL (ref 1–4)
LYMPHOCYTES NFR BLD: 31 %
MAGNESIUM SERPL-MCNC: 2.1 MG/DL (ref 1.7–2.4)
MCH RBC QN AUTO: 28.3 PG (ref 26–34)
MCHC RBC AUTO-ENTMCNC: 32.1 G/DL (ref 32–36.5)
MCV RBC AUTO: 88 FL (ref 78–100)
MONOCYTES # BLD: 0.7 K/MCL (ref 0.3–0.9)
MONOCYTES NFR BLD: 8 %
NEUTROPHILS # BLD: 5.6 K/MCL (ref 1.8–7.7)
NEUTROPHILS NFR BLD: 57 %
NRBC BLD MANUAL-RTO: 0 /100 WBC
PLATELET # BLD AUTO: 267 K/MCL (ref 140–450)
POTASSIUM SERPL-SCNC: 3.7 MMOL/L (ref 3.4–5.1)
PROT SERPL-MCNC: 7.7 G/DL (ref 6.4–8.2)
RBC # BLD: 4.42 MIL/MCL (ref 4.5–5.9)
SODIUM SERPL-SCNC: 139 MMOL/L (ref 135–145)
WBC # BLD: 9.8 K/MCL (ref 4.2–11)

## 2021-04-27 PROCEDURE — 10002803 HB RX 637: Performed by: NURSE PRACTITIONER

## 2021-04-27 PROCEDURE — 36415 COLL VENOUS BLD VENIPUNCTURE: CPT | Performed by: NURSE PRACTITIONER

## 2021-04-27 PROCEDURE — 10002803 HB RX 637: Performed by: FAMILY MEDICINE

## 2021-04-27 PROCEDURE — 80053 COMPREHEN METABOLIC PANEL: CPT | Performed by: NURSE PRACTITIONER

## 2021-04-27 PROCEDURE — 83735 ASSAY OF MAGNESIUM: CPT | Performed by: NURSE PRACTITIONER

## 2021-04-27 PROCEDURE — 10002803 HB RX 637: Performed by: INTERNAL MEDICINE

## 2021-04-27 PROCEDURE — 10002800 HB RX 250 W HCPCS: Performed by: NURSE PRACTITIONER

## 2021-04-27 PROCEDURE — 85025 COMPLETE CBC W/AUTO DIFF WBC: CPT | Performed by: NURSE PRACTITIONER

## 2021-04-27 RX ORDER — POTASSIUM CHLORIDE 20 MEQ/1
40 TABLET, EXTENDED RELEASE ORAL ONCE
Status: DISCONTINUED | OUTPATIENT
Start: 2021-04-27 | End: 2021-04-27 | Stop reason: HOSPADM

## 2021-04-27 RX ORDER — TORSEMIDE 10 MG/1
10 TABLET ORAL DAILY
Qty: 30 TABLET | Refills: 0 | Status: ON HOLD | OUTPATIENT
Start: 2021-04-27 | End: 2021-07-01 | Stop reason: CLARIF

## 2021-04-27 RX ADMIN — Medication 50 MCG: at 08:47

## 2021-04-27 RX ADMIN — FAMOTIDINE 20 MG: 20 TABLET ORAL at 08:47

## 2021-04-27 RX ADMIN — ENOXAPARIN SODIUM 40 MG: 40 INJECTION SUBCUTANEOUS at 05:35

## 2021-04-27 RX ADMIN — OXYCODONE HYDROCHLORIDE AND ACETAMINOPHEN 500 MG: 500 TABLET ORAL at 08:47

## 2021-04-27 RX ADMIN — ATORVASTATIN CALCIUM 10 MG: 10 TABLET, FILM COATED ORAL at 08:47

## 2021-04-27 RX ADMIN — TORSEMIDE 10 MG: 10 TABLET ORAL at 08:47

## 2021-04-27 RX ADMIN — ALLOPURINOL 100 MG: 100 TABLET ORAL at 08:47

## 2021-04-27 RX ADMIN — INSULIN GLARGINE 52 UNITS: 100 INJECTION, SOLUTION SUBCUTANEOUS at 08:47

## 2021-04-27 ASSESSMENT — PAIN SCALES - GENERAL: PAINLEVEL_OUTOF10: 0

## 2021-04-28 ENCOUNTER — CASE MANAGEMENT (OUTPATIENT)
Dept: CARE COORDINATION | Age: 66
End: 2021-04-28

## 2021-04-28 ASSESSMENT — SLEEP AND FATIGUE QUESTIONNAIRES: SLEEP DESCRIPTORS: REPORTS NO PROBLEM

## 2021-05-06 ENCOUNTER — HOSPITAL ENCOUNTER (OUTPATIENT)
Dept: WOUND CARE | Age: 66
Discharge: STILL ADMITTED OR ADMINISTRATIVE | End: 2021-05-06
Attending: PODIATRIST

## 2021-05-06 VITALS — TEMPERATURE: 96.1 F

## 2021-05-06 DIAGNOSIS — L97.529 TYPE 2 DIABETES MELLITUS WITH LEFT DIABETIC FOOT ULCER (CMD): ICD-10-CM

## 2021-05-06 DIAGNOSIS — E11.69 TYPE 2 DIABETES MELLITUS WITH OTHER SPECIFIED COMPLICATION, UNSPECIFIED WHETHER LONG TERM INSULIN USE (CMD): ICD-10-CM

## 2021-05-06 DIAGNOSIS — Z89.432 S/P TRANSMETATARSAL AMPUTATION OF FOOT, LEFT (CMD): ICD-10-CM

## 2021-05-06 DIAGNOSIS — E11.621 TYPE 2 DIABETES MELLITUS WITH LEFT DIABETIC FOOT ULCER (CMD): ICD-10-CM

## 2021-05-06 DIAGNOSIS — Z89.429 STATUS POST AMPUTATION OF TOE (CMD): ICD-10-CM

## 2021-05-06 DIAGNOSIS — S98.312S AMPUTATION AT MIDFOOT, LEFT, SEQUELA (CMD): ICD-10-CM

## 2021-05-06 DIAGNOSIS — I73.9 PVD (PERIPHERAL VASCULAR DISEASE) (CMD): ICD-10-CM

## 2021-05-06 DIAGNOSIS — S98.912S AMPUTATION OF LEFT FOOT, SEQUELA (CMD): ICD-10-CM

## 2021-05-06 DIAGNOSIS — Z89.422 STATUS POST AMPUTATION OF LESSER TOE, LEFT (CMD): Primary | ICD-10-CM

## 2021-05-06 PROCEDURE — 11042 DBRDMT SUBQ TIS 1ST 20SQCM/<: CPT

## 2021-05-06 ASSESSMENT — PAIN SCALES - GENERAL: PAINLEVEL_OUTOF10: 0

## 2021-05-07 ENCOUNTER — LAB REQUISITION (OUTPATIENT)
Dept: LAB | Age: 66
End: 2021-05-07

## 2021-05-07 ENCOUNTER — CASE MANAGEMENT (OUTPATIENT)
Dept: CARE COORDINATION | Age: 66
End: 2021-05-07

## 2021-05-07 DIAGNOSIS — I50.40 UNSPECIFIED COMBINED SYSTOLIC (CONGESTIVE) AND DIASTOLIC (CONGESTIVE) HEART FAILURE (CMD): ICD-10-CM

## 2021-05-07 DIAGNOSIS — E11.8 TYPE 2 DIABETES MELLITUS WITH UNSPECIFIED COMPLICATIONS (CMD): ICD-10-CM

## 2021-05-07 LAB
ALBUMIN SERPL-MCNC: 3 G/DL (ref 3.6–5.1)
ALBUMIN/GLOB SERPL: 0.7 {RATIO} (ref 1–2.4)
ALP SERPL-CCNC: 129 UNITS/L (ref 45–117)
ALT SERPL-CCNC: 33 UNITS/L
ANION GAP SERPL CALC-SCNC: 10 MMOL/L (ref 10–20)
AST SERPL-CCNC: 21 UNITS/L
BASOPHILS # BLD: 0.1 K/MCL (ref 0–0.3)
BASOPHILS NFR BLD: 1 %
BILIRUB SERPL-MCNC: 0.2 MG/DL (ref 0.2–1)
BUN SERPL-MCNC: 34 MG/DL (ref 6–20)
BUN/CREAT SERPL: 20 (ref 7–25)
CALCIUM SERPL-MCNC: 8.5 MG/DL (ref 8.4–10.2)
CHLORIDE SERPL-SCNC: 104 MMOL/L (ref 98–107)
CO2 SERPL-SCNC: 30 MMOL/L (ref 21–32)
CREAT SERPL-MCNC: 1.68 MG/DL (ref 0.67–1.17)
DEPRECATED RDW RBC: 49.7 FL (ref 39–50)
EOSINOPHIL # BLD: 0.3 K/MCL (ref 0–0.5)
EOSINOPHIL NFR BLD: 4 %
ERYTHROCYTE [DISTWIDTH] IN BLOOD: 14.7 % (ref 11–15)
FASTING DURATION TIME PATIENT: ABNORMAL H
GFR SERPLBLD BASED ON 1.73 SQ M-ARVRAT: 42 ML/MIN/1.73M2
GLOBULIN SER-MCNC: 4.1 G/DL (ref 2–4)
GLUCOSE SERPL-MCNC: 103 MG/DL (ref 65–99)
HCT VFR BLD CALC: 37.2 % (ref 39–51)
HGB BLD-MCNC: 11.6 G/DL (ref 13–17)
IMM GRANULOCYTES # BLD AUTO: 0 K/MCL (ref 0–0.2)
IMM GRANULOCYTES # BLD: 0 %
LYMPHOCYTES # BLD: 2.8 K/MCL (ref 1–4)
LYMPHOCYTES NFR BLD: 31 %
MCH RBC QN AUTO: 28.6 PG (ref 26–34)
MCHC RBC AUTO-ENTMCNC: 31.2 G/DL (ref 32–36.5)
MCV RBC AUTO: 91.6 FL (ref 78–100)
MONOCYTES # BLD: 0.6 K/MCL (ref 0.3–0.9)
MONOCYTES NFR BLD: 7 %
NEUTROPHILS # BLD: 5.4 K/MCL (ref 1.8–7.7)
NEUTROPHILS NFR BLD: 57 %
NRBC BLD MANUAL-RTO: 0 /100 WBC
PLATELET # BLD AUTO: 284 K/MCL (ref 140–450)
POTASSIUM SERPL-SCNC: 4.1 MMOL/L (ref 3.4–5.1)
PROT SERPL-MCNC: 7.1 G/DL (ref 6.4–8.2)
RBC # BLD: 4.06 MIL/MCL (ref 4.5–5.9)
SODIUM SERPL-SCNC: 140 MMOL/L (ref 135–145)
WBC # BLD: 9.2 K/MCL (ref 4.2–11)

## 2021-05-11 ENCOUNTER — TELEPHONE (OUTPATIENT)
Dept: WOUND CARE | Age: 66
End: 2021-05-11

## 2021-05-13 ENCOUNTER — HOSPITAL ENCOUNTER (OUTPATIENT)
Dept: WOUND CARE | Age: 66
Discharge: STILL ADMITTED OR ADMINISTRATIVE | End: 2021-05-13
Attending: PODIATRIST

## 2021-05-13 VITALS — TEMPERATURE: 97.6 F

## 2021-05-13 DIAGNOSIS — E13.51 PERIPHERAL VASCULAR DISEASE DUE TO SECONDARY DIABETES MELLITUS (CMD): ICD-10-CM

## 2021-05-13 DIAGNOSIS — E11.622 TYPE 2 DIABETES MELLITUS WITH OTHER SKIN ULCER, WITH LONG-TERM CURRENT USE OF INSULIN (CMD): ICD-10-CM

## 2021-05-13 DIAGNOSIS — L97.529 TYPE 2 DIABETES MELLITUS WITH LEFT DIABETIC FOOT ULCER (CMD): ICD-10-CM

## 2021-05-13 DIAGNOSIS — I73.9 PVD (PERIPHERAL VASCULAR DISEASE) (CMD): ICD-10-CM

## 2021-05-13 DIAGNOSIS — S98.312S AMPUTATION AT MIDFOOT, LEFT, SEQUELA (CMD): ICD-10-CM

## 2021-05-13 DIAGNOSIS — Z89.422 STATUS POST AMPUTATION OF LESSER TOE, LEFT (CMD): Primary | ICD-10-CM

## 2021-05-13 DIAGNOSIS — E11.621 TYPE 2 DIABETES MELLITUS WITH LEFT DIABETIC FOOT ULCER (CMD): ICD-10-CM

## 2021-05-13 DIAGNOSIS — S98.912S AMPUTATION OF LEFT FOOT, SEQUELA (CMD): ICD-10-CM

## 2021-05-13 DIAGNOSIS — E11.69 TYPE 2 DIABETES MELLITUS WITH OTHER SPECIFIED COMPLICATION, UNSPECIFIED WHETHER LONG TERM INSULIN USE (CMD): ICD-10-CM

## 2021-05-13 DIAGNOSIS — Z89.432 S/P TRANSMETATARSAL AMPUTATION OF FOOT, LEFT (CMD): ICD-10-CM

## 2021-05-13 DIAGNOSIS — Z79.4 TYPE 2 DIABETES MELLITUS WITH OTHER SKIN ULCER, WITH LONG-TERM CURRENT USE OF INSULIN (CMD): ICD-10-CM

## 2021-05-13 PROCEDURE — 11042 DBRDMT SUBQ TIS 1ST 20SQCM/<: CPT

## 2021-05-13 ASSESSMENT — PAIN SCALES - GENERAL: PAINLEVEL_OUTOF10: 0

## 2021-05-14 ENCOUNTER — CASE MANAGEMENT (OUTPATIENT)
Dept: CARE COORDINATION | Age: 66
End: 2021-05-14

## 2021-05-14 ASSESSMENT — ANXIETY QUESTIONNAIRES
3. WORRYING TOO MUCH ABOUT DIFFERENT THINGS: NOT AT ALL
1. FEELING NERVOUS, ANXIOUS, OR ON EDGE: 0
1. FEELING NERVOUS, ANXIOUS, OR ON EDGE: NOT AT ALL
6. BECOMING EASILY ANNOYED OR IRRITABLE: NOT AT ALL
2. NOT BEING ABLE TO STOP OR CONTROL WORRYING: 0
7. FEELING AFRAID AS IF SOMETHING AWFUL MIGHT HAPPEN: 0
6. BECOMING EASILY ANNOYED OR IRRITABLE: 0
5. BEING SO RESTLESS THAT IT IS HARD TO SIT STILL: NOT AT ALL
GAD7 TOTAL SCORE: 0
5. BEING SO RESTLESS THAT IT IS HARD TO SIT STILL: 0
7. FEELING AFRAID AS IF SOMETHING AWFUL MIGHT HAPPEN: NOT AT ALL
3. WORRYING TOO MUCH ABOUT DIFFERENT THINGS: 0
4. TROUBLE RELAXING: 0
4. TROUBLE RELAXING: NOT AT ALL
2. NOT BEING ABLE TO STOP OR CONTROL WORRYING: NOT AT ALL

## 2021-05-14 ASSESSMENT — SLEEP AND FATIGUE QUESTIONNAIRES
HOURS OF UNINTERRUPTED SLEEP: 6
SLEEP DESCRIPTORS: REPORTS NO PROBLEM

## 2021-05-20 ENCOUNTER — APPOINTMENT (OUTPATIENT)
Dept: WOUND CARE | Age: 66
End: 2021-05-20
Attending: PODIATRIST

## 2021-05-26 VITALS
BODY MASS INDEX: 43.24 KG/M2 | HEART RATE: 88 BPM | OXYGEN SATURATION: 93 % | SYSTOLIC BLOOD PRESSURE: 124 MMHG | WEIGHT: 310 LBS | RESPIRATION RATE: 20 BRPM | HEART RATE: 100 BPM | SYSTOLIC BLOOD PRESSURE: 149 MMHG | TEMPERATURE: 98.6 F | HEART RATE: 76 BPM | DIASTOLIC BLOOD PRESSURE: 56 MMHG | WEIGHT: 315 LBS | BODY MASS INDEX: 43.24 KG/M2 | SYSTOLIC BLOOD PRESSURE: 134 MMHG | WEIGHT: 315 LBS | TEMPERATURE: 97.6 F | TEMPERATURE: 97.6 F | HEART RATE: 94 BPM | WEIGHT: 310 LBS | HEIGHT: 71 IN | OXYGEN SATURATION: 95 % | BODY MASS INDEX: 45.49 KG/M2 | HEART RATE: 99 BPM | DIASTOLIC BLOOD PRESSURE: 77 MMHG | TEMPERATURE: 97.7 F | RESPIRATION RATE: 20 BRPM | RESPIRATION RATE: 20 BRPM | BODY MASS INDEX: 43.93 KG/M2 | RESPIRATION RATE: 18 BRPM | DIASTOLIC BLOOD PRESSURE: 87 MMHG | RESPIRATION RATE: 18 BRPM | WEIGHT: 310 LBS | SYSTOLIC BLOOD PRESSURE: 136 MMHG | WEIGHT: 310 LBS | HEART RATE: 100 BPM | SYSTOLIC BLOOD PRESSURE: 132 MMHG | HEART RATE: 89 BPM | HEART RATE: 87 BPM | BODY MASS INDEX: 43.24 KG/M2 | WEIGHT: 315 LBS | OXYGEN SATURATION: 98 % | OXYGEN SATURATION: 96 % | BODY MASS INDEX: 44.1 KG/M2 | BODY MASS INDEX: 43.24 KG/M2 | TEMPERATURE: 96.3 F | SYSTOLIC BLOOD PRESSURE: 120 MMHG | WEIGHT: 310 LBS | BODY MASS INDEX: 43.24 KG/M2 | WEIGHT: 310 LBS | TEMPERATURE: 96.8 F | HEART RATE: 94 BPM | SYSTOLIC BLOOD PRESSURE: 131 MMHG | SYSTOLIC BLOOD PRESSURE: 135 MMHG | TEMPERATURE: 96.5 F | HEART RATE: 80 BPM | BODY MASS INDEX: 45.49 KG/M2 | TEMPERATURE: 97.5 F | WEIGHT: 315 LBS | DIASTOLIC BLOOD PRESSURE: 81 MMHG | DIASTOLIC BLOOD PRESSURE: 67 MMHG | HEART RATE: 88 BPM | OXYGEN SATURATION: 91 % | OXYGEN SATURATION: 96 % | HEART RATE: 74 BPM | DIASTOLIC BLOOD PRESSURE: 77 MMHG | SYSTOLIC BLOOD PRESSURE: 124 MMHG | TEMPERATURE: 97.4 F | RESPIRATION RATE: 18 BRPM | RESPIRATION RATE: 22 BRPM | RESPIRATION RATE: 20 BRPM | DIASTOLIC BLOOD PRESSURE: 77 MMHG | DIASTOLIC BLOOD PRESSURE: 66 MMHG | TEMPERATURE: 96.9 F | BODY MASS INDEX: 41.28 KG/M2 | OXYGEN SATURATION: 100 % | OXYGEN SATURATION: 96 % | RESPIRATION RATE: 20 BRPM | DIASTOLIC BLOOD PRESSURE: 71 MMHG | OXYGEN SATURATION: 94 % | RESPIRATION RATE: 18 BRPM | WEIGHT: 296 LBS | DIASTOLIC BLOOD PRESSURE: 63 MMHG | BODY MASS INDEX: 43.24 KG/M2 | RESPIRATION RATE: 18 BRPM | TEMPERATURE: 98 F | SYSTOLIC BLOOD PRESSURE: 114 MMHG | BODY MASS INDEX: 43.24 KG/M2 | DIASTOLIC BLOOD PRESSURE: 70 MMHG | TEMPERATURE: 96.5 F | OXYGEN SATURATION: 98 % | WEIGHT: 310 LBS | SYSTOLIC BLOOD PRESSURE: 140 MMHG | RESPIRATION RATE: 18 BRPM

## 2021-05-27 ENCOUNTER — HOSPITAL ENCOUNTER (OUTPATIENT)
Dept: WOUND CARE | Age: 66
Discharge: STILL ADMITTED OR ADMINISTRATIVE | End: 2021-05-27
Attending: PODIATRIST

## 2021-05-27 VITALS — TEMPERATURE: 95.9 F

## 2021-05-27 DIAGNOSIS — Z89.432 S/P TRANSMETATARSAL AMPUTATION OF FOOT, LEFT (CMD): ICD-10-CM

## 2021-05-27 DIAGNOSIS — S98.912S AMPUTATION OF LEFT FOOT, SEQUELA (CMD): ICD-10-CM

## 2021-05-27 DIAGNOSIS — Z89.422 STATUS POST AMPUTATION OF LESSER TOE, LEFT (CMD): Primary | ICD-10-CM

## 2021-05-27 DIAGNOSIS — E13.51 PERIPHERAL VASCULAR DISEASE DUE TO SECONDARY DIABETES MELLITUS (CMD): ICD-10-CM

## 2021-05-27 DIAGNOSIS — L97.529 TYPE 2 DIABETES MELLITUS WITH LEFT DIABETIC FOOT ULCER (CMD): ICD-10-CM

## 2021-05-27 DIAGNOSIS — E11.621 TYPE 2 DIABETES MELLITUS WITH LEFT DIABETIC FOOT ULCER (CMD): ICD-10-CM

## 2021-05-27 DIAGNOSIS — E11.69 TYPE 2 DIABETES MELLITUS WITH OTHER SPECIFIED COMPLICATION, UNSPECIFIED WHETHER LONG TERM INSULIN USE (CMD): ICD-10-CM

## 2021-05-27 DIAGNOSIS — Z89.429 STATUS POST AMPUTATION OF TOE (CMD): ICD-10-CM

## 2021-05-27 DIAGNOSIS — S98.312S AMPUTATION AT MIDFOOT, LEFT, SEQUELA (CMD): ICD-10-CM

## 2021-05-27 DIAGNOSIS — I73.9 PVD (PERIPHERAL VASCULAR DISEASE) (CMD): ICD-10-CM

## 2021-05-27 PROCEDURE — 11042 DBRDMT SUBQ TIS 1ST 20SQCM/<: CPT

## 2021-05-27 ASSESSMENT — PAIN SCALES - GENERAL: PAINLEVEL_OUTOF10: 0

## 2021-06-03 ENCOUNTER — TELEPHONE (OUTPATIENT)
Dept: CARDIOLOGY | Age: 66
End: 2021-06-03

## 2021-06-17 ENCOUNTER — TELEPHONE (OUTPATIENT)
Dept: CARDIOLOGY | Age: 66
End: 2021-06-17

## 2021-06-17 ENCOUNTER — HOSPITAL ENCOUNTER (OUTPATIENT)
Dept: WOUND CARE | Age: 66
Discharge: STILL ADMITTED OR ADMINISTRATIVE | End: 2021-06-17
Attending: PODIATRIST

## 2021-06-17 ENCOUNTER — CASE MANAGEMENT (OUTPATIENT)
Dept: CARE COORDINATION | Age: 66
End: 2021-06-17

## 2021-06-17 VITALS — TEMPERATURE: 96.1 F

## 2021-06-17 DIAGNOSIS — S98.912S AMPUTATION OF LEFT FOOT, SEQUELA (CMD): ICD-10-CM

## 2021-06-17 DIAGNOSIS — E13.51 PERIPHERAL VASCULAR DISEASE DUE TO SECONDARY DIABETES MELLITUS (CMD): ICD-10-CM

## 2021-06-17 DIAGNOSIS — S98.312S AMPUTATION AT MIDFOOT, LEFT, SEQUELA (CMD): ICD-10-CM

## 2021-06-17 DIAGNOSIS — Z89.429 STATUS POST AMPUTATION OF TOE (CMD): ICD-10-CM

## 2021-06-17 DIAGNOSIS — Z89.422 STATUS POST AMPUTATION OF LESSER TOE, LEFT (CMD): Primary | ICD-10-CM

## 2021-06-17 DIAGNOSIS — I73.9 PVD (PERIPHERAL VASCULAR DISEASE) (CMD): ICD-10-CM

## 2021-06-17 DIAGNOSIS — E11.69 TYPE 2 DIABETES MELLITUS WITH OTHER SPECIFIED COMPLICATION, UNSPECIFIED WHETHER LONG TERM INSULIN USE (CMD): ICD-10-CM

## 2021-06-17 DIAGNOSIS — Z89.432 S/P TRANSMETATARSAL AMPUTATION OF FOOT, LEFT (CMD): ICD-10-CM

## 2021-06-17 DIAGNOSIS — L97.529 TYPE 2 DIABETES MELLITUS WITH LEFT DIABETIC FOOT ULCER (CMD): ICD-10-CM

## 2021-06-17 DIAGNOSIS — E11.621 TYPE 2 DIABETES MELLITUS WITH LEFT DIABETIC FOOT ULCER (CMD): ICD-10-CM

## 2021-06-17 PROCEDURE — 11042 DBRDMT SUBQ TIS 1ST 20SQCM/<: CPT

## 2021-06-22 ENCOUNTER — CASE MANAGEMENT (OUTPATIENT)
Dept: CARE COORDINATION | Age: 66
End: 2021-06-22

## 2021-06-22 ASSESSMENT — ANXIETY QUESTIONNAIRES
5. BEING SO RESTLESS THAT IT IS HARD TO SIT STILL: 0
1. FEELING NERVOUS, ANXIOUS, OR ON EDGE: NOT AT ALL
5. BEING SO RESTLESS THAT IT IS HARD TO SIT STILL: NOT AT ALL
1. FEELING NERVOUS, ANXIOUS, OR ON EDGE: 0
2. NOT BEING ABLE TO STOP OR CONTROL WORRYING: 0
3. WORRYING TOO MUCH ABOUT DIFFERENT THINGS: NOT AT ALL
2. NOT BEING ABLE TO STOP OR CONTROL WORRYING: NOT AT ALL
6. BECOMING EASILY ANNOYED OR IRRITABLE: 0
3. WORRYING TOO MUCH ABOUT DIFFERENT THINGS: 0
GAD7 TOTAL SCORE: 0
6. BECOMING EASILY ANNOYED OR IRRITABLE: NOT AT ALL
4. TROUBLE RELAXING: NOT AT ALL
7. FEELING AFRAID AS IF SOMETHING AWFUL MIGHT HAPPEN: NOT AT ALL
4. TROUBLE RELAXING: 0
7. FEELING AFRAID AS IF SOMETHING AWFUL MIGHT HAPPEN: 0

## 2021-06-22 ASSESSMENT — SLEEP AND FATIGUE QUESTIONNAIRES
SLEEP DESCRIPTORS: REPORTS NO PROBLEM
HOURS OF UNINTERRUPTED SLEEP: 6

## 2021-06-24 ENCOUNTER — APPOINTMENT (OUTPATIENT)
Dept: WOUND CARE | Age: 66
End: 2021-06-24
Attending: PODIATRIST

## 2021-07-01 ENCOUNTER — APPOINTMENT (OUTPATIENT)
Dept: GENERAL RADIOLOGY | Age: 66
DRG: 291 | End: 2021-07-01
Attending: EMERGENCY MEDICINE

## 2021-07-01 ENCOUNTER — HOSPITAL ENCOUNTER (INPATIENT)
Age: 66
LOS: 5 days | Discharge: HOME HEALTH | DRG: 291 | End: 2021-07-07
Attending: EMERGENCY MEDICINE | Admitting: INTERNAL MEDICINE

## 2021-07-01 ENCOUNTER — APPOINTMENT (OUTPATIENT)
Dept: ULTRASOUND IMAGING | Age: 66
End: 2021-07-01
Attending: INTERNAL MEDICINE

## 2021-07-01 ENCOUNTER — APPOINTMENT (OUTPATIENT)
Dept: ULTRASOUND IMAGING | Age: 66
DRG: 291 | End: 2021-07-01
Attending: EMERGENCY MEDICINE

## 2021-07-01 DIAGNOSIS — R60.0 BILATERAL LEG EDEMA: ICD-10-CM

## 2021-07-01 DIAGNOSIS — I50.9 ACUTE ON CHRONIC CONGESTIVE HEART FAILURE, UNSPECIFIED HEART FAILURE TYPE (CMD): ICD-10-CM

## 2021-07-01 DIAGNOSIS — M86.9 OSTEOMYELITIS OF LEFT FOOT, UNSPECIFIED TYPE (CMD): ICD-10-CM

## 2021-07-01 DIAGNOSIS — R53.1 WEAKNESS GENERALIZED: Primary | ICD-10-CM

## 2021-07-01 DIAGNOSIS — N50.89 SCROTAL SWELLING: ICD-10-CM

## 2021-07-01 PROBLEM — R79.89 TROPONIN LEVEL ELEVATED: Status: ACTIVE | Noted: 2021-07-01

## 2021-07-01 PROBLEM — R79.89 ELEVATED BRAIN NATRIURETIC PEPTIDE (BNP) LEVEL: Status: ACTIVE | Noted: 2021-07-01

## 2021-07-01 LAB
ALBUMIN SERPL-MCNC: 3.1 G/DL (ref 3.6–5.1)
ALBUMIN/GLOB SERPL: 0.6 {RATIO} (ref 1–2.4)
ALP SERPL-CCNC: 124 UNITS/L (ref 45–117)
ALT SERPL-CCNC: 19 UNITS/L
ANION GAP SERPL CALC-SCNC: 7 MMOL/L (ref 10–20)
APPEARANCE UR: CLEAR
AST SERPL-CCNC: 14 UNITS/L
ATRIAL RATE (BPM): 82
BASOPHILS # BLD: 0.1 K/MCL (ref 0–0.3)
BASOPHILS NFR BLD: 1 %
BILIRUB SERPL-MCNC: 0.4 MG/DL (ref 0.2–1)
BILIRUB UR QL STRIP: NEGATIVE
BUN SERPL-MCNC: 29 MG/DL (ref 6–20)
BUN/CREAT SERPL: 17 (ref 7–25)
CALCIUM SERPL-MCNC: 9.2 MG/DL (ref 8.4–10.2)
CHLORIDE SERPL-SCNC: 103 MMOL/L (ref 98–107)
CO2 SERPL-SCNC: 33 MMOL/L (ref 21–32)
COLOR UR: YELLOW
CREAT SERPL-MCNC: 1.71 MG/DL (ref 0.67–1.17)
DEPRECATED RDW RBC: 46.1 FL (ref 39–50)
EOSINOPHIL # BLD: 0.2 K/MCL (ref 0–0.5)
EOSINOPHIL NFR BLD: 2 %
ERYTHROCYTE [DISTWIDTH] IN BLOOD: 14.4 % (ref 11–15)
FASTING DURATION TIME PATIENT: ABNORMAL H
GFR SERPLBLD BASED ON 1.73 SQ M-ARVRAT: 41 ML/MIN/1.73M2
GLOBULIN SER-MCNC: 5.4 G/DL (ref 2–4)
GLUCOSE BLDC GLUCOMTR-MCNC: 166 MG/DL (ref 70–99)
GLUCOSE BLDC GLUCOMTR-MCNC: 211 MG/DL (ref 70–99)
GLUCOSE SERPL-MCNC: 93 MG/DL (ref 65–99)
GLUCOSE UR STRIP-MCNC: NEGATIVE MG/DL
HCT VFR BLD CALC: 40.7 % (ref 39–51)
HGB BLD-MCNC: 12.9 G/DL (ref 13–17)
HGB UR QL STRIP: NEGATIVE
IMM GRANULOCYTES # BLD AUTO: 0.1 K/MCL (ref 0–0.2)
IMM GRANULOCYTES # BLD: 1 %
KETONES UR STRIP-MCNC: NEGATIVE MG/DL
LEUKOCYTE ESTERASE UR QL STRIP: NEGATIVE
LYMPHOCYTES # BLD: 2.2 K/MCL (ref 1–4)
LYMPHOCYTES NFR BLD: 21 %
MCH RBC QN AUTO: 27.6 PG (ref 26–34)
MCHC RBC AUTO-ENTMCNC: 31.7 G/DL (ref 32–36.5)
MCV RBC AUTO: 87 FL (ref 78–100)
MONOCYTES # BLD: 0.9 K/MCL (ref 0.3–0.9)
MONOCYTES NFR BLD: 9 %
NEUTROPHILS # BLD: 7 K/MCL (ref 1.8–7.7)
NEUTROPHILS NFR BLD: 66 %
NITRITE UR QL STRIP: NEGATIVE
NRBC BLD MANUAL-RTO: 0 /100 WBC
NT-PROBNP SERPL-MCNC: 1068 PG/ML
P AXIS (DEGREES): 43
PH UR STRIP: 5 [PH] (ref 5–7)
PLATELET # BLD AUTO: 305 K/MCL (ref 140–450)
POTASSIUM SERPL-SCNC: 3.6 MMOL/L (ref 3.4–5.1)
PR-INTERVAL (MSEC): 194
PROT SERPL-MCNC: 8.5 G/DL (ref 6.4–8.2)
PROT UR STRIP-MCNC: NEGATIVE MG/DL
QRS-INTERVAL (MSEC): 84
QT-INTERVAL (MSEC): 396
QTC: 462
R AXIS (DEGREES): 50
RAINBOW EXTRA TUBES HOLD SPECIMEN: NORMAL
RBC # BLD: 4.68 MIL/MCL (ref 4.5–5.9)
REPORT TEXT: NORMAL
SARS-COV-2 RNA RESP QL NAA+PROBE: NOT DETECTED
SERVICE CMNT-IMP: NORMAL
SERVICE CMNT-IMP: NORMAL
SODIUM SERPL-SCNC: 139 MMOL/L (ref 135–145)
SP GR UR STRIP: 1.02 (ref 1–1.03)
T AXIS (DEGREES): 175
TROPONIN I SERPL HS-MCNC: 0.15 NG/ML
TROPONIN I SERPL HS-MCNC: 0.15 NG/ML
TROPONIN I SERPL HS-MCNC: 0.16 NG/ML
UROBILINOGEN UR STRIP-MCNC: 0.2 MG/DL
VENTRICULAR RATE EKG/MIN (BPM): 82
WBC # BLD: 10.5 K/MCL (ref 4.2–11)

## 2021-07-01 PROCEDURE — 10004651 HB RX, NO CHARGE ITEM: Performed by: EMERGENCY MEDICINE

## 2021-07-01 PROCEDURE — 99285 EMERGENCY DEPT VISIT HI MDM: CPT

## 2021-07-01 PROCEDURE — G0378 HOSPITAL OBSERVATION PER HR: HCPCS

## 2021-07-01 PROCEDURE — 93005 ELECTROCARDIOGRAM TRACING: CPT | Performed by: EMERGENCY MEDICINE

## 2021-07-01 PROCEDURE — 80053 COMPREHEN METABOLIC PANEL: CPT | Performed by: EMERGENCY MEDICINE

## 2021-07-01 PROCEDURE — 81003 URINALYSIS AUTO W/O SCOPE: CPT | Performed by: EMERGENCY MEDICINE

## 2021-07-01 PROCEDURE — 84484 ASSAY OF TROPONIN QUANT: CPT | Performed by: EMERGENCY MEDICINE

## 2021-07-01 PROCEDURE — 96374 THER/PROPH/DIAG INJ IV PUSH: CPT

## 2021-07-01 PROCEDURE — 82962 GLUCOSE BLOOD TEST: CPT

## 2021-07-01 PROCEDURE — 36415 COLL VENOUS BLD VENIPUNCTURE: CPT | Performed by: NURSE PRACTITIONER

## 2021-07-01 PROCEDURE — 84484 ASSAY OF TROPONIN QUANT: CPT | Performed by: NURSE PRACTITIONER

## 2021-07-01 PROCEDURE — 83880 ASSAY OF NATRIURETIC PEPTIDE: CPT | Performed by: EMERGENCY MEDICINE

## 2021-07-01 PROCEDURE — 93970 EXTREMITY STUDY: CPT

## 2021-07-01 PROCEDURE — 99215 OFFICE O/P EST HI 40 MIN: CPT | Performed by: INTERNAL MEDICINE

## 2021-07-01 PROCEDURE — 10002800 HB RX 250 W HCPCS: Performed by: NURSE PRACTITIONER

## 2021-07-01 PROCEDURE — 85025 COMPLETE CBC W/AUTO DIFF WBC: CPT | Performed by: EMERGENCY MEDICINE

## 2021-07-01 PROCEDURE — 71045 X-RAY EXAM CHEST 1 VIEW: CPT

## 2021-07-01 PROCEDURE — 93975 VASCULAR STUDY: CPT

## 2021-07-01 PROCEDURE — 10002800 HB RX 250 W HCPCS: Performed by: INTERNAL MEDICINE

## 2021-07-01 PROCEDURE — 84484 ASSAY OF TROPONIN QUANT: CPT | Performed by: INTERNAL MEDICINE

## 2021-07-01 PROCEDURE — U0005 INFEC AGEN DETEC AMPLI PROBE: HCPCS | Performed by: EMERGENCY MEDICINE

## 2021-07-01 PROCEDURE — 10002803 HB RX 637: Performed by: INTERNAL MEDICINE

## 2021-07-01 RX ORDER — POLYETHYLENE GLYCOL 3350 17 G/17G
17 POWDER, FOR SOLUTION ORAL DAILY PRN
Status: DISCONTINUED | OUTPATIENT
Start: 2021-07-01 | End: 2021-07-07 | Stop reason: HOSPADM

## 2021-07-01 RX ORDER — ACETAMINOPHEN 325 MG/1
650 TABLET ORAL EVERY 4 HOURS PRN
Status: DISCONTINUED | OUTPATIENT
Start: 2021-07-01 | End: 2021-07-07 | Stop reason: HOSPADM

## 2021-07-01 RX ORDER — INSULIN ASPART 100 [IU]/ML
17 INJECTION, SOLUTION INTRAVENOUS; SUBCUTANEOUS 4 TIMES DAILY
COMMUNITY

## 2021-07-01 RX ORDER — DEXTROSE MONOHYDRATE 25 G/50ML
12.5 INJECTION, SOLUTION INTRAVENOUS PRN
Status: DISCONTINUED | OUTPATIENT
Start: 2021-07-01 | End: 2021-07-07 | Stop reason: HOSPADM

## 2021-07-01 RX ORDER — DEXTROSE MONOHYDRATE 25 G/50ML
25 INJECTION, SOLUTION INTRAVENOUS PRN
Status: DISCONTINUED | OUTPATIENT
Start: 2021-07-01 | End: 2021-07-07 | Stop reason: HOSPADM

## 2021-07-01 RX ORDER — MAGNESIUM HYDROXIDE/ALUMINUM HYDROXICE/SIMETHICONE 120; 1200; 1200 MG/30ML; MG/30ML; MG/30ML
30 SUSPENSION ORAL EVERY 4 HOURS PRN
Status: DISCONTINUED | OUTPATIENT
Start: 2021-07-01 | End: 2021-07-07 | Stop reason: HOSPADM

## 2021-07-01 RX ORDER — SPIRONOLACTONE 25 MG/1
25 TABLET ORAL 2 TIMES DAILY
Status: ON HOLD | COMMUNITY
End: 2021-07-07 | Stop reason: HOSPADM

## 2021-07-01 RX ORDER — ACETAMINOPHEN 500 MG
1000 TABLET ORAL ONCE
Status: COMPLETED | OUTPATIENT
Start: 2021-07-01 | End: 2021-07-01

## 2021-07-01 RX ORDER — FUROSEMIDE 10 MG/ML
40 INJECTION INTRAMUSCULAR; INTRAVENOUS EVERY 8 HOURS
Status: DISCONTINUED | OUTPATIENT
Start: 2021-07-01 | End: 2021-07-06

## 2021-07-01 RX ORDER — POTASSIUM CHLORIDE 20 MEQ/1
40 TABLET, EXTENDED RELEASE ORAL ONCE
Status: COMPLETED | OUTPATIENT
Start: 2021-07-01 | End: 2021-07-01

## 2021-07-01 RX ORDER — 0.9 % SODIUM CHLORIDE 0.9 %
2 VIAL (ML) INJECTION EVERY 12 HOURS SCHEDULED
Status: DISCONTINUED | OUTPATIENT
Start: 2021-07-01 | End: 2021-07-07 | Stop reason: HOSPADM

## 2021-07-01 RX ORDER — ENOXAPARIN SODIUM 100 MG/ML
40 INJECTION SUBCUTANEOUS EVERY 12 HOURS SCHEDULED
Status: DISCONTINUED | OUTPATIENT
Start: 2021-07-01 | End: 2021-07-07 | Stop reason: HOSPADM

## 2021-07-01 RX ORDER — ONDANSETRON 2 MG/ML
4 INJECTION INTRAMUSCULAR; INTRAVENOUS 2 TIMES DAILY PRN
Status: DISCONTINUED | OUTPATIENT
Start: 2021-07-01 | End: 2021-07-07 | Stop reason: HOSPADM

## 2021-07-01 RX ORDER — FUROSEMIDE 10 MG/ML
40 INJECTION INTRAMUSCULAR; INTRAVENOUS
Status: DISCONTINUED | OUTPATIENT
Start: 2021-07-01 | End: 2021-07-01

## 2021-07-01 RX ORDER — NICOTINE POLACRILEX 4 MG
15 LOZENGE BUCCAL PRN
Status: DISCONTINUED | OUTPATIENT
Start: 2021-07-01 | End: 2021-07-07 | Stop reason: HOSPADM

## 2021-07-01 RX ORDER — AMOXICILLIN 250 MG
2 CAPSULE ORAL DAILY PRN
Status: DISCONTINUED | OUTPATIENT
Start: 2021-07-01 | End: 2021-07-07 | Stop reason: HOSPADM

## 2021-07-01 RX ORDER — NICOTINE POLACRILEX 4 MG
30 LOZENGE BUCCAL PRN
Status: DISCONTINUED | OUTPATIENT
Start: 2021-07-01 | End: 2021-07-07 | Stop reason: HOSPADM

## 2021-07-01 RX ADMIN — POTASSIUM CHLORIDE 40 MEQ: 1500 TABLET, EXTENDED RELEASE ORAL at 18:02

## 2021-07-01 RX ADMIN — FUROSEMIDE 40 MG: 10 INJECTION, SOLUTION INTRAMUSCULAR; INTRAVENOUS at 18:02

## 2021-07-01 RX ADMIN — INSULIN LISPRO 2 UNITS: 100 INJECTION, SOLUTION INTRAVENOUS; SUBCUTANEOUS at 18:58

## 2021-07-01 RX ADMIN — ACETAMINOPHEN 1000 MG: 500 TABLET ORAL at 09:08

## 2021-07-01 ASSESSMENT — ENCOUNTER SYMPTOMS
ACTIVITY CHANGE: 1
APPETITE CHANGE: 1
SHORTNESS OF BREATH: 1
CHILLS: 0
FATIGUE: 1
FEVER: 0
ABDOMINAL PAIN: 0
ABDOMINAL DISTENTION: 1
WEAKNESS: 1

## 2021-07-01 ASSESSMENT — ACTIVITIES OF DAILY LIVING (ADL)
ADL_BEFORE_ADMISSION: INDEPENDENT
RECENT_DECLINE_ADL: YES, DECLINE IN BATHING/DRESSING/FEEDING, COLLABORATE WITH PROVIDER (T);YES, DECLINE IN AMBULATION/TRANSFERRING, COLLABORATE WITH PROVIDER (T)
MOBILITY_ASSIST_DEVICES: WHEELCHAIR
ADL_SHORT_OF_BREATH: NO
ADL_SCORE: 12
CHRONIC_PAIN_PRESENT: NO

## 2021-07-01 ASSESSMENT — PATIENT HEALTH QUESTIONNAIRE - PHQ9
CLINICAL INTERPRETATION OF PHQ9 SCORE: NO FURTHER SCREENING NEEDED
IS PATIENT ABLE TO COMPLETE PHQ2 OR PHQ9: YES
1. LITTLE INTEREST OR PLEASURE IN DOING THINGS: NOT AT ALL
SUM OF ALL RESPONSES TO PHQ9 QUESTIONS 1 AND 2: 0
CLINICAL INTERPRETATION OF PHQ2 SCORE: NO FURTHER SCREENING NEEDED
SUM OF ALL RESPONSES TO PHQ9 QUESTIONS 1 AND 2: 0
2. FEELING DOWN, DEPRESSED OR HOPELESS: NOT AT ALL

## 2021-07-01 ASSESSMENT — PAIN SCALES - GENERAL
PAINLEVEL_OUTOF10: 0
PAINLEVEL_OUTOF10: 0
PAINLEVEL_OUTOF10: 2

## 2021-07-01 ASSESSMENT — LIFESTYLE VARIABLES
ALCOHOL_USE_STATUS: NO OR LOW RISK WITH VALIDATED TOOL
AUDIT-C TOTAL SCORE: 3
HOW OFTEN DO YOU HAVE 6 OR MORE DRINKS ON ONE OCCASION: NEVER
HOW MANY STANDARD DRINKS CONTAINING ALCOHOL DO YOU HAVE ON A TYPICAL DAY: 3 OR 4
HOW OFTEN DO YOU HAVE A DRINK CONTAINING ALCOHOL: 2 TO 4 TIMES PER MONTH

## 2021-07-01 ASSESSMENT — COLUMBIA-SUICIDE SEVERITY RATING SCALE - C-SSRS
6. HAVE YOU EVER DONE ANYTHING, STARTED TO DO ANYTHING, OR PREPARED TO DO ANYTHING TO END YOUR LIFE?: NO
2. HAVE YOU ACTUALLY HAD ANY THOUGHTS OF KILLING YOURSELF?: NO
IS THE PATIENT ABLE TO COMPLETE C-SSRS: YES
1. WITHIN THE PAST MONTH, HAVE YOU WISHED YOU WERE DEAD OR WISHED YOU COULD GO TO SLEEP AND NOT WAKE UP?: NO

## 2021-07-01 ASSESSMENT — COGNITIVE AND FUNCTIONAL STATUS - GENERAL
BECAUSE OF A PHYSICAL, MENTAL, OR EMOTIONAL CONDITION, DO YOU HAVE SERIOUS DIFFICULTY CONCENTRATING, REMEMBERING OR MAKING DECISIONS: NO
BECAUSE OF A PHYSICAL, MENTAL, OR EMOTIONAL CONDITION, DO YOU HAVE DIFFICULTY DOING ERRANDS ALONE: YES
ARE YOU BLIND OR DO YOU HAVE SERIOUS DIFFICULTY SEEING, EVEN WHEN WEARING GLASSES: YES
DO YOU HAVE SERIOUS DIFFICULTY WALKING OR CLIMBING STAIRS: YES
DO YOU HAVE DIFFICULTY DRESSING OR BATHING: YES
ARE YOU DEAF OR DO YOU HAVE SERIOUS DIFFICULTY  HEARING: NO

## 2021-07-02 ENCOUNTER — APPOINTMENT (OUTPATIENT)
Dept: CARDIOLOGY | Age: 66
DRG: 291 | End: 2021-07-02
Attending: INTERNAL MEDICINE

## 2021-07-02 ENCOUNTER — APPOINTMENT (OUTPATIENT)
Dept: GENERAL RADIOLOGY | Age: 66
DRG: 291 | End: 2021-07-02
Attending: PODIATRIST

## 2021-07-02 PROBLEM — S91.331A PUNCTURE WOUND OF RIGHT FOOT: Status: ACTIVE | Noted: 2021-07-02

## 2021-07-02 PROBLEM — S91.302A OPEN WOUND OF LEFT FOOT: Status: ACTIVE | Noted: 2021-07-02

## 2021-07-02 LAB
ALBUMIN SERPL-MCNC: 2.7 G/DL (ref 3.6–5.1)
ALBUMIN/GLOB SERPL: 0.5 {RATIO} (ref 1–2.4)
ALP SERPL-CCNC: 111 UNITS/L (ref 45–117)
ALT SERPL-CCNC: 16 UNITS/L
ANION GAP SERPL CALC-SCNC: 7 MMOL/L (ref 10–20)
AST SERPL-CCNC: 14 UNITS/L
BASOPHILS # BLD: 0.1 K/MCL (ref 0–0.3)
BASOPHILS NFR BLD: 1 %
BILIRUB SERPL-MCNC: 0.6 MG/DL (ref 0.2–1)
BUN SERPL-MCNC: 25 MG/DL (ref 6–20)
BUN/CREAT SERPL: 17 (ref 7–25)
CALCIUM SERPL-MCNC: 8.9 MG/DL (ref 8.4–10.2)
CHLORIDE SERPL-SCNC: 106 MMOL/L (ref 98–107)
CO2 SERPL-SCNC: 32 MMOL/L (ref 21–32)
CREAT SERPL-MCNC: 1.51 MG/DL (ref 0.67–1.17)
DEPRECATED RDW RBC: 45.7 FL (ref 39–50)
EOSINOPHIL # BLD: 0.3 K/MCL (ref 0–0.5)
EOSINOPHIL NFR BLD: 3 %
ERYTHROCYTE [DISTWIDTH] IN BLOOD: 14.1 % (ref 11–15)
FASTING DURATION TIME PATIENT: ABNORMAL H
GFR SERPLBLD BASED ON 1.73 SQ M-ARVRAT: 47 ML/MIN/1.73M2
GLOBULIN SER-MCNC: 5.1 G/DL (ref 2–4)
GLUCOSE BLDC GLUCOMTR-MCNC: 151 MG/DL (ref 70–99)
GLUCOSE BLDC GLUCOMTR-MCNC: 154 MG/DL (ref 70–99)
GLUCOSE BLDC GLUCOMTR-MCNC: 155 MG/DL (ref 70–99)
GLUCOSE BLDC GLUCOMTR-MCNC: 181 MG/DL (ref 70–99)
GLUCOSE SERPL-MCNC: 156 MG/DL (ref 65–99)
HBA1C MFR BLD: 7.6 % (ref 4.5–5.6)
HCT VFR BLD CALC: 38.1 % (ref 39–51)
HGB BLD-MCNC: 11.8 G/DL (ref 13–17)
IMM GRANULOCYTES # BLD AUTO: 0 K/MCL (ref 0–0.2)
IMM GRANULOCYTES # BLD: 0 %
LYMPHOCYTES # BLD: 2 K/MCL (ref 1–4)
LYMPHOCYTES NFR BLD: 22 %
MAGNESIUM SERPL-MCNC: 2.3 MG/DL (ref 1.7–2.4)
MCH RBC QN AUTO: 27.2 PG (ref 26–34)
MCHC RBC AUTO-ENTMCNC: 31 G/DL (ref 32–36.5)
MCV RBC AUTO: 87.8 FL (ref 78–100)
MONOCYTES # BLD: 0.7 K/MCL (ref 0.3–0.9)
MONOCYTES NFR BLD: 7 %
NEUTROPHILS # BLD: 6 K/MCL (ref 1.8–7.7)
NEUTROPHILS NFR BLD: 67 %
NRBC BLD MANUAL-RTO: 0 /100 WBC
PLATELET # BLD AUTO: 266 K/MCL (ref 140–450)
POTASSIUM SERPL-SCNC: 3.7 MMOL/L (ref 3.4–5.1)
PROT SERPL-MCNC: 7.8 G/DL (ref 6.4–8.2)
RBC # BLD: 4.34 MIL/MCL (ref 4.5–5.9)
SODIUM SERPL-SCNC: 141 MMOL/L (ref 135–145)
TROPONIN I SERPL HS-MCNC: 0.16 NG/ML
WBC # BLD: 9 K/MCL (ref 4.2–11)

## 2021-07-02 PROCEDURE — 96372 THER/PROPH/DIAG INJ SC/IM: CPT

## 2021-07-02 PROCEDURE — 84484 ASSAY OF TROPONIN QUANT: CPT | Performed by: INTERNAL MEDICINE

## 2021-07-02 PROCEDURE — 85025 COMPLETE CBC W/AUTO DIFF WBC: CPT | Performed by: NURSE PRACTITIONER

## 2021-07-02 PROCEDURE — 93306 TTE W/DOPPLER COMPLETE: CPT

## 2021-07-02 PROCEDURE — 10002803 HB RX 637: Performed by: CLINICAL NURSE SPECIALIST

## 2021-07-02 PROCEDURE — 83036 HEMOGLOBIN GLYCOSYLATED A1C: CPT | Performed by: NURSE PRACTITIONER

## 2021-07-02 PROCEDURE — 10003585 HB ROOM CHARGE INTERMEDIATE CARE

## 2021-07-02 PROCEDURE — 96376 TX/PRO/DX INJ SAME DRUG ADON: CPT

## 2021-07-02 PROCEDURE — 83735 ASSAY OF MAGNESIUM: CPT | Performed by: NURSE PRACTITIONER

## 2021-07-02 PROCEDURE — 82962 GLUCOSE BLOOD TEST: CPT

## 2021-07-02 PROCEDURE — 73630 X-RAY EXAM OF FOOT: CPT

## 2021-07-02 PROCEDURE — 93306 TTE W/DOPPLER COMPLETE: CPT | Performed by: INTERNAL MEDICINE

## 2021-07-02 PROCEDURE — 36415 COLL VENOUS BLD VENIPUNCTURE: CPT | Performed by: NURSE PRACTITIONER

## 2021-07-02 PROCEDURE — 99232 SBSQ HOSP IP/OBS MODERATE 35: CPT | Performed by: INTERNAL MEDICINE

## 2021-07-02 PROCEDURE — 80053 COMPREHEN METABOLIC PANEL: CPT | Performed by: NURSE PRACTITIONER

## 2021-07-02 PROCEDURE — 10002803 HB RX 637: Performed by: INTERNAL MEDICINE

## 2021-07-02 PROCEDURE — 10004651 HB RX, NO CHARGE ITEM: Performed by: NURSE PRACTITIONER

## 2021-07-02 PROCEDURE — 10002805 HB CONTRAST AGENT: Performed by: INTERNAL MEDICINE

## 2021-07-02 PROCEDURE — 10002800 HB RX 250 W HCPCS: Performed by: INTERNAL MEDICINE

## 2021-07-02 PROCEDURE — 10002800 HB RX 250 W HCPCS: Performed by: NURSE PRACTITIONER

## 2021-07-02 PROCEDURE — G0378 HOSPITAL OBSERVATION PER HR: HCPCS

## 2021-07-02 RX ORDER — METOPROLOL SUCCINATE 25 MG/1
25 TABLET, EXTENDED RELEASE ORAL DAILY
Status: DISCONTINUED | OUTPATIENT
Start: 2021-07-02 | End: 2021-07-03

## 2021-07-02 RX ORDER — POTASSIUM CHLORIDE 20 MEQ/1
40 TABLET, EXTENDED RELEASE ORAL ONCE
Status: COMPLETED | OUTPATIENT
Start: 2021-07-02 | End: 2021-07-02

## 2021-07-02 RX ADMIN — FUROSEMIDE 40 MG: 10 INJECTION, SOLUTION INTRAMUSCULAR; INTRAVENOUS at 00:46

## 2021-07-02 RX ADMIN — SODIUM CHLORIDE, PRESERVATIVE FREE 2 ML: 5 INJECTION INTRAVENOUS at 20:06

## 2021-07-02 RX ADMIN — INSULIN LISPRO 1 UNITS: 100 INJECTION, SOLUTION INTRAVENOUS; SUBCUTANEOUS at 09:19

## 2021-07-02 RX ADMIN — INSULIN LISPRO 1 UNITS: 100 INJECTION, SOLUTION INTRAVENOUS; SUBCUTANEOUS at 12:19

## 2021-07-02 RX ADMIN — INSULIN LISPRO 1 UNITS: 100 INJECTION, SOLUTION INTRAVENOUS; SUBCUTANEOUS at 18:22

## 2021-07-02 RX ADMIN — SODIUM CHLORIDE, PRESERVATIVE FREE 2 ML: 5 INJECTION INTRAVENOUS at 09:26

## 2021-07-02 RX ADMIN — ENOXAPARIN SODIUM 40 MG: 40 INJECTION SUBCUTANEOUS at 09:19

## 2021-07-02 RX ADMIN — ACETAMINOPHEN 650 MG: 325 TABLET ORAL at 02:27

## 2021-07-02 RX ADMIN — METOPROLOL SUCCINATE 25 MG: 25 TABLET, EXTENDED RELEASE ORAL at 12:25

## 2021-07-02 RX ADMIN — ENOXAPARIN SODIUM 40 MG: 40 INJECTION SUBCUTANEOUS at 20:06

## 2021-07-02 RX ADMIN — FUROSEMIDE 40 MG: 10 INJECTION, SOLUTION INTRAMUSCULAR; INTRAVENOUS at 09:19

## 2021-07-02 RX ADMIN — POTASSIUM CHLORIDE 40 MEQ: 1500 TABLET, EXTENDED RELEASE ORAL at 09:19

## 2021-07-02 RX ADMIN — PERFLUTREN 3 ML: 6.52 INJECTION, SUSPENSION INTRAVENOUS at 11:48

## 2021-07-02 RX ADMIN — FUROSEMIDE 40 MG: 10 INJECTION, SOLUTION INTRAMUSCULAR; INTRAVENOUS at 17:26

## 2021-07-02 ASSESSMENT — PAIN SCALES - GENERAL
PAINLEVEL_OUTOF10: 0

## 2021-07-02 ASSESSMENT — ENCOUNTER SYMPTOMS: FATIGUE: 1

## 2021-07-03 ENCOUNTER — APPOINTMENT (OUTPATIENT)
Dept: GENERAL RADIOLOGY | Age: 66
DRG: 291 | End: 2021-07-03
Attending: INTERNAL MEDICINE

## 2021-07-03 LAB
ALBUMIN SERPL-MCNC: 3 G/DL (ref 3.6–5.1)
ALBUMIN/GLOB SERPL: 0.6 {RATIO} (ref 1–2.4)
ALP SERPL-CCNC: 116 UNITS/L (ref 45–117)
ALT SERPL-CCNC: 16 UNITS/L
ANION GAP SERPL CALC-SCNC: 8 MMOL/L (ref 10–20)
AST SERPL-CCNC: 13 UNITS/L
BASOPHILS # BLD: 0.1 K/MCL (ref 0–0.3)
BASOPHILS NFR BLD: 1 %
BILIRUB SERPL-MCNC: 0.7 MG/DL (ref 0.2–1)
BUN SERPL-MCNC: 29 MG/DL (ref 6–20)
BUN/CREAT SERPL: 17 (ref 7–25)
CALCIUM SERPL-MCNC: 9 MG/DL (ref 8.4–10.2)
CHLORIDE SERPL-SCNC: 104 MMOL/L (ref 98–107)
CO2 SERPL-SCNC: 30 MMOL/L (ref 21–32)
CREAT SERPL-MCNC: 1.67 MG/DL (ref 0.67–1.17)
DEPRECATED RDW RBC: 45.3 FL (ref 39–50)
EOSINOPHIL # BLD: 0.3 K/MCL (ref 0–0.5)
EOSINOPHIL NFR BLD: 3 %
ERYTHROCYTE [DISTWIDTH] IN BLOOD: 14.1 % (ref 11–15)
FASTING DURATION TIME PATIENT: ABNORMAL H
GFR SERPLBLD BASED ON 1.73 SQ M-ARVRAT: 42 ML/MIN/1.73M2
GLOBULIN SER-MCNC: 5.1 G/DL (ref 2–4)
GLUCOSE BLDC GLUCOMTR-MCNC: 166 MG/DL (ref 70–99)
GLUCOSE BLDC GLUCOMTR-MCNC: 176 MG/DL (ref 70–99)
GLUCOSE BLDC GLUCOMTR-MCNC: 267 MG/DL (ref 70–99)
GLUCOSE SERPL-MCNC: 178 MG/DL (ref 65–99)
HCT VFR BLD CALC: 41.4 % (ref 39–51)
HGB BLD-MCNC: 13 G/DL (ref 13–17)
IMM GRANULOCYTES # BLD AUTO: 0 K/MCL (ref 0–0.2)
IMM GRANULOCYTES # BLD: 0 %
LYMPHOCYTES # BLD: 2.1 K/MCL (ref 1–4)
LYMPHOCYTES NFR BLD: 23 %
MAGNESIUM SERPL-MCNC: 2.3 MG/DL (ref 1.7–2.4)
MCH RBC QN AUTO: 27.7 PG (ref 26–34)
MCHC RBC AUTO-ENTMCNC: 31.4 G/DL (ref 32–36.5)
MCV RBC AUTO: 88.1 FL (ref 78–100)
MONOCYTES # BLD: 0.8 K/MCL (ref 0.3–0.9)
MONOCYTES NFR BLD: 8 %
NEUTROPHILS # BLD: 6 K/MCL (ref 1.8–7.7)
NEUTROPHILS NFR BLD: 65 %
NRBC BLD MANUAL-RTO: 0 /100 WBC
PLATELET # BLD AUTO: 292 K/MCL (ref 140–450)
POTASSIUM SERPL-SCNC: 3.9 MMOL/L (ref 3.4–5.1)
PROT SERPL-MCNC: 8.1 G/DL (ref 6.4–8.2)
RBC # BLD: 4.7 MIL/MCL (ref 4.5–5.9)
SODIUM SERPL-SCNC: 138 MMOL/L (ref 135–145)
WBC # BLD: 9.3 K/MCL (ref 4.2–11)

## 2021-07-03 PROCEDURE — 10002803 HB RX 637: Performed by: CLINICAL NURSE SPECIALIST

## 2021-07-03 PROCEDURE — 97530 THERAPEUTIC ACTIVITIES: CPT

## 2021-07-03 PROCEDURE — 97165 OT EVAL LOW COMPLEX 30 MIN: CPT

## 2021-07-03 PROCEDURE — 73030 X-RAY EXAM OF SHOULDER: CPT

## 2021-07-03 PROCEDURE — 10003585 HB ROOM CHARGE INTERMEDIATE CARE

## 2021-07-03 PROCEDURE — 10002803 HB RX 637: Performed by: NURSE PRACTITIONER

## 2021-07-03 PROCEDURE — 83735 ASSAY OF MAGNESIUM: CPT | Performed by: NURSE PRACTITIONER

## 2021-07-03 PROCEDURE — 97162 PT EVAL MOD COMPLEX 30 MIN: CPT

## 2021-07-03 PROCEDURE — 36415 COLL VENOUS BLD VENIPUNCTURE: CPT | Performed by: NURSE PRACTITIONER

## 2021-07-03 PROCEDURE — 99232 SBSQ HOSP IP/OBS MODERATE 35: CPT | Performed by: INTERNAL MEDICINE

## 2021-07-03 PROCEDURE — 10004651 HB RX, NO CHARGE ITEM: Performed by: NURSE PRACTITIONER

## 2021-07-03 PROCEDURE — 10002800 HB RX 250 W HCPCS: Performed by: NURSE PRACTITIONER

## 2021-07-03 PROCEDURE — 80053 COMPREHEN METABOLIC PANEL: CPT | Performed by: NURSE PRACTITIONER

## 2021-07-03 PROCEDURE — 10002800 HB RX 250 W HCPCS: Performed by: INTERNAL MEDICINE

## 2021-07-03 PROCEDURE — 85025 COMPLETE CBC W/AUTO DIFF WBC: CPT | Performed by: NURSE PRACTITIONER

## 2021-07-03 RX ORDER — METOPROLOL SUCCINATE 25 MG/1
25 TABLET, EXTENDED RELEASE ORAL 2 TIMES DAILY
Status: DISCONTINUED | OUTPATIENT
Start: 2021-07-03 | End: 2021-07-07 | Stop reason: HOSPADM

## 2021-07-03 RX ADMIN — INSULIN LISPRO 1 UNITS: 100 INJECTION, SOLUTION INTRAVENOUS; SUBCUTANEOUS at 09:39

## 2021-07-03 RX ADMIN — METOPROLOL SUCCINATE 25 MG: 25 TABLET, EXTENDED RELEASE ORAL at 20:02

## 2021-07-03 RX ADMIN — METOPROLOL SUCCINATE 25 MG: 25 TABLET, EXTENDED RELEASE ORAL at 09:04

## 2021-07-03 RX ADMIN — FUROSEMIDE 40 MG: 10 INJECTION, SOLUTION INTRAMUSCULAR; INTRAVENOUS at 09:04

## 2021-07-03 RX ADMIN — ENOXAPARIN SODIUM 40 MG: 40 INJECTION SUBCUTANEOUS at 20:02

## 2021-07-03 RX ADMIN — FUROSEMIDE 40 MG: 10 INJECTION, SOLUTION INTRAMUSCULAR; INTRAVENOUS at 01:33

## 2021-07-03 RX ADMIN — ENOXAPARIN SODIUM 40 MG: 40 INJECTION SUBCUTANEOUS at 09:04

## 2021-07-03 RX ADMIN — FUROSEMIDE 40 MG: 10 INJECTION, SOLUTION INTRAMUSCULAR; INTRAVENOUS at 18:20

## 2021-07-03 RX ADMIN — SODIUM CHLORIDE, PRESERVATIVE FREE 2 ML: 5 INJECTION INTRAVENOUS at 09:37

## 2021-07-03 RX ADMIN — INSULIN LISPRO 1 UNITS: 100 INJECTION, SOLUTION INTRAVENOUS; SUBCUTANEOUS at 19:03

## 2021-07-03 RX ADMIN — SODIUM CHLORIDE, PRESERVATIVE FREE 2 ML: 5 INJECTION INTRAVENOUS at 20:12

## 2021-07-03 RX ADMIN — ACETAMINOPHEN 650 MG: 325 TABLET ORAL at 01:45

## 2021-07-03 ASSESSMENT — COGNITIVE AND FUNCTIONAL STATUS - GENERAL
HELP NEEDED DRESSING REGULAR LOWER BODY CLOTHING: A LOT
BASIC_MOBILITY_CONVERTED_SCORE: 38.32
BASIC_MOBILITY_RAW_SCORE: 16
APPLIED_COGNITIVE_CONVERTED_SCORE: 62.21
DAILY_ACTIVITY_RAW_SCORE: 20
DAILY_ACTIVITY_CONVERTED_SCORE: 42.03
APPLIED_COGNITIVE_RAW_SCORE: 24
HELP NEEDED FOR BATHING: A LOT

## 2021-07-03 ASSESSMENT — PAIN SCALES - GENERAL
PAINLEVEL_OUTOF10: 0
PAINLEVEL_OUTOF10: 0
PAINLEVEL_OUTOF10: 3

## 2021-07-03 ASSESSMENT — ACTIVITIES OF DAILY LIVING (ADL)
PRIOR_ADL_TOILETING: INDEPENDENT
PRIOR_ADL: INDEPENDENT

## 2021-07-03 ASSESSMENT — ENCOUNTER SYMPTOMS
PAIN SEVERITY NOW: 6
FATIGUE: 1

## 2021-07-04 LAB
ANION GAP SERPL CALC-SCNC: 8 MMOL/L (ref 10–20)
BUN SERPL-MCNC: 34 MG/DL (ref 6–20)
BUN/CREAT SERPL: 21 (ref 7–25)
CALCIUM SERPL-MCNC: 9 MG/DL (ref 8.4–10.2)
CHLORIDE SERPL-SCNC: 102 MMOL/L (ref 98–107)
CO2 SERPL-SCNC: 31 MMOL/L (ref 21–32)
CREAT SERPL-MCNC: 1.64 MG/DL (ref 0.67–1.17)
FASTING DURATION TIME PATIENT: ABNORMAL H
GFR SERPLBLD BASED ON 1.73 SQ M-ARVRAT: 43 ML/MIN/1.73M2
GLUCOSE BLDC GLUCOMTR-MCNC: 199 MG/DL (ref 70–99)
GLUCOSE BLDC GLUCOMTR-MCNC: 210 MG/DL (ref 70–99)
GLUCOSE BLDC GLUCOMTR-MCNC: 286 MG/DL (ref 70–99)
GLUCOSE SERPL-MCNC: 207 MG/DL (ref 65–99)
POTASSIUM SERPL-SCNC: 3.8 MMOL/L (ref 3.4–5.1)
RAINBOW EXTRA TUBES HOLD SPECIMEN: NORMAL
SODIUM SERPL-SCNC: 137 MMOL/L (ref 135–145)

## 2021-07-04 PROCEDURE — 80048 BASIC METABOLIC PNL TOTAL CA: CPT | Performed by: INTERNAL MEDICINE

## 2021-07-04 PROCEDURE — 10003585 HB ROOM CHARGE INTERMEDIATE CARE

## 2021-07-04 PROCEDURE — 10002803 HB RX 637: Performed by: NURSE PRACTITIONER

## 2021-07-04 PROCEDURE — 10002800 HB RX 250 W HCPCS: Performed by: INTERNAL MEDICINE

## 2021-07-04 PROCEDURE — 36415 COLL VENOUS BLD VENIPUNCTURE: CPT | Performed by: INTERNAL MEDICINE

## 2021-07-04 PROCEDURE — 10004651 HB RX, NO CHARGE ITEM: Performed by: NURSE PRACTITIONER

## 2021-07-04 PROCEDURE — 99232 SBSQ HOSP IP/OBS MODERATE 35: CPT | Performed by: INTERNAL MEDICINE

## 2021-07-04 PROCEDURE — 10002800 HB RX 250 W HCPCS: Performed by: NURSE PRACTITIONER

## 2021-07-04 RX ADMIN — METOPROLOL SUCCINATE 25 MG: 25 TABLET, EXTENDED RELEASE ORAL at 20:47

## 2021-07-04 RX ADMIN — FUROSEMIDE 40 MG: 10 INJECTION, SOLUTION INTRAMUSCULAR; INTRAVENOUS at 17:37

## 2021-07-04 RX ADMIN — FUROSEMIDE 40 MG: 10 INJECTION, SOLUTION INTRAMUSCULAR; INTRAVENOUS at 10:49

## 2021-07-04 RX ADMIN — METOPROLOL SUCCINATE 25 MG: 25 TABLET, EXTENDED RELEASE ORAL at 10:49

## 2021-07-04 RX ADMIN — ENOXAPARIN SODIUM 40 MG: 40 INJECTION SUBCUTANEOUS at 20:48

## 2021-07-04 RX ADMIN — SODIUM CHLORIDE, PRESERVATIVE FREE 2 ML: 5 INJECTION INTRAVENOUS at 10:19

## 2021-07-04 RX ADMIN — SODIUM CHLORIDE, PRESERVATIVE FREE 2 ML: 5 INJECTION INTRAVENOUS at 20:51

## 2021-07-04 RX ADMIN — FUROSEMIDE 40 MG: 10 INJECTION, SOLUTION INTRAMUSCULAR; INTRAVENOUS at 00:45

## 2021-07-04 RX ADMIN — INSULIN LISPRO 2 UNITS: 100 INJECTION, SOLUTION INTRAVENOUS; SUBCUTANEOUS at 18:02

## 2021-07-04 ASSESSMENT — PAIN SCALES - GENERAL
PAINLEVEL_OUTOF10: 0
PAINLEVEL_OUTOF10: 0

## 2021-07-04 ASSESSMENT — ENCOUNTER SYMPTOMS: FATIGUE: 1

## 2021-07-05 LAB
ANION GAP SERPL CALC-SCNC: 8 MMOL/L (ref 10–20)
BUN SERPL-MCNC: 38 MG/DL (ref 6–20)
BUN/CREAT SERPL: 23 (ref 7–25)
CALCIUM SERPL-MCNC: 9.1 MG/DL (ref 8.4–10.2)
CHLORIDE SERPL-SCNC: 100 MMOL/L (ref 98–107)
CO2 SERPL-SCNC: 32 MMOL/L (ref 21–32)
CREAT SERPL-MCNC: 1.67 MG/DL (ref 0.67–1.17)
FASTING DURATION TIME PATIENT: ABNORMAL H
GFR SERPLBLD BASED ON 1.73 SQ M-ARVRAT: 42 ML/MIN/1.73M2
GLUCOSE BLDC GLUCOMTR-MCNC: 197 MG/DL (ref 70–99)
GLUCOSE BLDC GLUCOMTR-MCNC: 206 MG/DL (ref 70–99)
GLUCOSE BLDC GLUCOMTR-MCNC: 210 MG/DL (ref 70–99)
GLUCOSE BLDC GLUCOMTR-MCNC: 255 MG/DL (ref 70–99)
GLUCOSE SERPL-MCNC: 214 MG/DL (ref 65–99)
POTASSIUM SERPL-SCNC: 3.6 MMOL/L (ref 3.4–5.1)
SODIUM SERPL-SCNC: 136 MMOL/L (ref 135–145)

## 2021-07-05 PROCEDURE — 10004651 HB RX, NO CHARGE ITEM: Performed by: NURSE PRACTITIONER

## 2021-07-05 PROCEDURE — X1094 NO CHARGE VISIT: HCPCS | Performed by: INTERNAL MEDICINE

## 2021-07-05 PROCEDURE — 10002803 HB RX 637: Performed by: INTERNAL MEDICINE

## 2021-07-05 PROCEDURE — 10003585 HB ROOM CHARGE INTERMEDIATE CARE

## 2021-07-05 PROCEDURE — 36415 COLL VENOUS BLD VENIPUNCTURE: CPT | Performed by: INTERNAL MEDICINE

## 2021-07-05 PROCEDURE — 80048 BASIC METABOLIC PNL TOTAL CA: CPT | Performed by: INTERNAL MEDICINE

## 2021-07-05 PROCEDURE — 10002800 HB RX 250 W HCPCS: Performed by: INTERNAL MEDICINE

## 2021-07-05 PROCEDURE — 10002803 HB RX 637: Performed by: NURSE PRACTITIONER

## 2021-07-05 PROCEDURE — 10002800 HB RX 250 W HCPCS: Performed by: NURSE PRACTITIONER

## 2021-07-05 PROCEDURE — 99232 SBSQ HOSP IP/OBS MODERATE 35: CPT | Performed by: INTERNAL MEDICINE

## 2021-07-05 RX ORDER — POTASSIUM CHLORIDE 20 MEQ/1
40 TABLET, EXTENDED RELEASE ORAL ONCE
Status: COMPLETED | OUTPATIENT
Start: 2021-07-05 | End: 2021-07-05

## 2021-07-05 RX ADMIN — FUROSEMIDE 40 MG: 10 INJECTION, SOLUTION INTRAMUSCULAR; INTRAVENOUS at 18:26

## 2021-07-05 RX ADMIN — FUROSEMIDE 40 MG: 10 INJECTION, SOLUTION INTRAMUSCULAR; INTRAVENOUS at 09:33

## 2021-07-05 RX ADMIN — INSULIN LISPRO 1 UNITS: 100 INJECTION, SOLUTION INTRAVENOUS; SUBCUTANEOUS at 09:33

## 2021-07-05 RX ADMIN — FUROSEMIDE 40 MG: 10 INJECTION, SOLUTION INTRAMUSCULAR; INTRAVENOUS at 01:09

## 2021-07-05 RX ADMIN — SODIUM CHLORIDE, PRESERVATIVE FREE 2 ML: 5 INJECTION INTRAVENOUS at 09:38

## 2021-07-05 RX ADMIN — ENOXAPARIN SODIUM 40 MG: 40 INJECTION SUBCUTANEOUS at 21:36

## 2021-07-05 RX ADMIN — METOPROLOL SUCCINATE 25 MG: 25 TABLET, EXTENDED RELEASE ORAL at 09:32

## 2021-07-05 RX ADMIN — INSULIN LISPRO 3 UNITS: 100 INJECTION, SOLUTION INTRAVENOUS; SUBCUTANEOUS at 18:56

## 2021-07-05 RX ADMIN — ENOXAPARIN SODIUM 40 MG: 40 INJECTION SUBCUTANEOUS at 09:33

## 2021-07-05 RX ADMIN — SODIUM CHLORIDE, PRESERVATIVE FREE 2 ML: 5 INJECTION INTRAVENOUS at 21:36

## 2021-07-05 RX ADMIN — POTASSIUM CHLORIDE 40 MEQ: 1500 TABLET, EXTENDED RELEASE ORAL at 09:32

## 2021-07-05 RX ADMIN — INSULIN LISPRO 2 UNITS: 100 INJECTION, SOLUTION INTRAVENOUS; SUBCUTANEOUS at 14:57

## 2021-07-05 RX ADMIN — METOPROLOL SUCCINATE 25 MG: 25 TABLET, EXTENDED RELEASE ORAL at 21:36

## 2021-07-05 ASSESSMENT — ENCOUNTER SYMPTOMS: FATIGUE: 1

## 2021-07-05 ASSESSMENT — PAIN SCALES - GENERAL
PAINLEVEL_OUTOF10: 0
PAINLEVEL_OUTOF10: 0

## 2021-07-06 ENCOUNTER — CASE MANAGEMENT (OUTPATIENT)
Dept: CARE COORDINATION | Age: 66
End: 2021-07-06

## 2021-07-06 LAB
ALBUMIN SERPL-MCNC: 3 G/DL (ref 3.6–5.1)
ALBUMIN/GLOB SERPL: 0.6 {RATIO} (ref 1–2.4)
ALP SERPL-CCNC: 118 UNITS/L (ref 45–117)
ALT SERPL-CCNC: 26 UNITS/L
ANION GAP SERPL CALC-SCNC: 8 MMOL/L (ref 10–20)
AST SERPL-CCNC: 21 UNITS/L
BASOPHILS # BLD: 0.1 K/MCL (ref 0–0.3)
BASOPHILS NFR BLD: 1 %
BILIRUB SERPL-MCNC: 0.4 MG/DL (ref 0.2–1)
BUN SERPL-MCNC: 40 MG/DL (ref 6–20)
BUN/CREAT SERPL: 24 (ref 7–25)
CALCIUM SERPL-MCNC: 9.2 MG/DL (ref 8.4–10.2)
CHLORIDE SERPL-SCNC: 100 MMOL/L (ref 98–107)
CO2 SERPL-SCNC: 32 MMOL/L (ref 21–32)
CREAT SERPL-MCNC: 1.66 MG/DL (ref 0.67–1.17)
DEPRECATED RDW RBC: 44.3 FL (ref 39–50)
EOSINOPHIL # BLD: 0.3 K/MCL (ref 0–0.5)
EOSINOPHIL NFR BLD: 3 %
ERYTHROCYTE [DISTWIDTH] IN BLOOD: 13.9 % (ref 11–15)
FASTING DURATION TIME PATIENT: ABNORMAL H
GFR SERPLBLD BASED ON 1.73 SQ M-ARVRAT: 42 ML/MIN/1.73M2
GLOBULIN SER-MCNC: 5.3 G/DL (ref 2–4)
GLUCOSE BLDC GLUCOMTR-MCNC: 205 MG/DL (ref 70–99)
GLUCOSE BLDC GLUCOMTR-MCNC: 225 MG/DL (ref 70–99)
GLUCOSE BLDC GLUCOMTR-MCNC: 263 MG/DL (ref 70–99)
GLUCOSE BLDC GLUCOMTR-MCNC: 289 MG/DL (ref 70–99)
GLUCOSE SERPL-MCNC: 208 MG/DL (ref 65–99)
HCT VFR BLD CALC: 39.9 % (ref 39–51)
HGB BLD-MCNC: 12.6 G/DL (ref 13–17)
IMM GRANULOCYTES # BLD AUTO: 0 K/MCL (ref 0–0.2)
IMM GRANULOCYTES # BLD: 0 %
LYMPHOCYTES # BLD: 2.9 K/MCL (ref 1–4)
LYMPHOCYTES NFR BLD: 30 %
MCH RBC QN AUTO: 27.8 PG (ref 26–34)
MCHC RBC AUTO-ENTMCNC: 31.6 G/DL (ref 32–36.5)
MCV RBC AUTO: 88.1 FL (ref 78–100)
MONOCYTES # BLD: 0.8 K/MCL (ref 0.3–0.9)
MONOCYTES NFR BLD: 8 %
NEUTROPHILS # BLD: 5.7 K/MCL (ref 1.8–7.7)
NEUTROPHILS NFR BLD: 58 %
NRBC BLD MANUAL-RTO: 0 /100 WBC
PLATELET # BLD AUTO: 317 K/MCL (ref 140–450)
POTASSIUM SERPL-SCNC: 3.8 MMOL/L (ref 3.4–5.1)
PROT SERPL-MCNC: 8.3 G/DL (ref 6.4–8.2)
RBC # BLD: 4.53 MIL/MCL (ref 4.5–5.9)
SODIUM SERPL-SCNC: 136 MMOL/L (ref 135–145)
WBC # BLD: 9.9 K/MCL (ref 4.2–11)

## 2021-07-06 PROCEDURE — 36415 COLL VENOUS BLD VENIPUNCTURE: CPT | Performed by: INTERNAL MEDICINE

## 2021-07-06 PROCEDURE — 10002800 HB RX 250 W HCPCS: Performed by: INTERNAL MEDICINE

## 2021-07-06 PROCEDURE — 99232 SBSQ HOSP IP/OBS MODERATE 35: CPT | Performed by: INTERNAL MEDICINE

## 2021-07-06 PROCEDURE — 10002803 HB RX 637: Performed by: INTERNAL MEDICINE

## 2021-07-06 PROCEDURE — 85025 COMPLETE CBC W/AUTO DIFF WBC: CPT | Performed by: INTERNAL MEDICINE

## 2021-07-06 PROCEDURE — 80053 COMPREHEN METABOLIC PANEL: CPT | Performed by: INTERNAL MEDICINE

## 2021-07-06 PROCEDURE — 10002803 HB RX 637: Performed by: NURSE PRACTITIONER

## 2021-07-06 PROCEDURE — 10002800 HB RX 250 W HCPCS: Performed by: NURSE PRACTITIONER

## 2021-07-06 PROCEDURE — 10004651 HB RX, NO CHARGE ITEM: Performed by: NURSE PRACTITIONER

## 2021-07-06 PROCEDURE — 10003585 HB ROOM CHARGE INTERMEDIATE CARE

## 2021-07-06 RX ORDER — TORSEMIDE 20 MG/1
20 TABLET ORAL
Status: DISCONTINUED | OUTPATIENT
Start: 2021-07-06 | End: 2021-07-07 | Stop reason: HOSPADM

## 2021-07-06 RX ORDER — TORSEMIDE 20 MG/1
40 TABLET ORAL DAILY
Status: DISCONTINUED | OUTPATIENT
Start: 2021-07-07 | End: 2021-07-07 | Stop reason: HOSPADM

## 2021-07-06 RX ORDER — INSULIN GLARGINE 100 [IU]/ML
20 INJECTION, SOLUTION SUBCUTANEOUS EVERY 12 HOURS SCHEDULED
Status: DISCONTINUED | OUTPATIENT
Start: 2021-07-06 | End: 2021-07-07

## 2021-07-06 RX ORDER — POTASSIUM CHLORIDE 20 MEQ/1
40 TABLET, EXTENDED RELEASE ORAL ONCE
Status: COMPLETED | OUTPATIENT
Start: 2021-07-06 | End: 2021-07-06

## 2021-07-06 RX ADMIN — INSULIN LISPRO 3 UNITS: 100 INJECTION, SOLUTION INTRAVENOUS; SUBCUTANEOUS at 13:17

## 2021-07-06 RX ADMIN — METOPROLOL SUCCINATE 25 MG: 25 TABLET, EXTENDED RELEASE ORAL at 20:32

## 2021-07-06 RX ADMIN — INSULIN LISPRO 2 UNITS: 100 INJECTION, SOLUTION INTRAVENOUS; SUBCUTANEOUS at 08:38

## 2021-07-06 RX ADMIN — INSULIN GLARGINE 20 UNITS: 100 INJECTION, SOLUTION SUBCUTANEOUS at 20:33

## 2021-07-06 RX ADMIN — SODIUM CHLORIDE, PRESERVATIVE FREE 2 ML: 5 INJECTION INTRAVENOUS at 20:33

## 2021-07-06 RX ADMIN — SODIUM CHLORIDE, PRESERVATIVE FREE 2 ML: 5 INJECTION INTRAVENOUS at 08:45

## 2021-07-06 RX ADMIN — FUROSEMIDE 40 MG: 10 INJECTION, SOLUTION INTRAMUSCULAR; INTRAVENOUS at 08:38

## 2021-07-06 RX ADMIN — ENOXAPARIN SODIUM 40 MG: 40 INJECTION SUBCUTANEOUS at 08:38

## 2021-07-06 RX ADMIN — INSULIN LISPRO 2 UNITS: 100 INJECTION, SOLUTION INTRAVENOUS; SUBCUTANEOUS at 18:26

## 2021-07-06 RX ADMIN — TORSEMIDE 20 MG: 20 TABLET ORAL at 17:15

## 2021-07-06 RX ADMIN — POTASSIUM CHLORIDE 40 MEQ: 1500 TABLET, EXTENDED RELEASE ORAL at 13:18

## 2021-07-06 RX ADMIN — METOPROLOL SUCCINATE 25 MG: 25 TABLET, EXTENDED RELEASE ORAL at 08:38

## 2021-07-06 RX ADMIN — FUROSEMIDE 40 MG: 10 INJECTION, SOLUTION INTRAMUSCULAR; INTRAVENOUS at 01:36

## 2021-07-06 RX ADMIN — ENOXAPARIN SODIUM 40 MG: 40 INJECTION SUBCUTANEOUS at 20:33

## 2021-07-06 ASSESSMENT — PAIN SCALES - GENERAL
PAINLEVEL_OUTOF10: 0
PAINLEVEL_OUTOF10: 0

## 2021-07-06 ASSESSMENT — ENCOUNTER SYMPTOMS: FATIGUE: 1

## 2021-07-07 VITALS
RESPIRATION RATE: 18 BRPM | WEIGHT: 315 LBS | DIASTOLIC BLOOD PRESSURE: 81 MMHG | BODY MASS INDEX: 42.66 KG/M2 | HEART RATE: 78 BPM | TEMPERATURE: 97.9 F | HEIGHT: 72 IN | SYSTOLIC BLOOD PRESSURE: 134 MMHG | OXYGEN SATURATION: 91 %

## 2021-07-07 LAB
ALBUMIN SERPL-MCNC: 3.1 G/DL (ref 3.6–5.1)
ALBUMIN/GLOB SERPL: 0.6 {RATIO} (ref 1–2.4)
ALP SERPL-CCNC: 115 UNITS/L (ref 45–117)
ALT SERPL-CCNC: 32 UNITS/L
ANION GAP SERPL CALC-SCNC: 10 MMOL/L (ref 10–20)
AST SERPL-CCNC: 23 UNITS/L
BASOPHILS # BLD: 0.1 K/MCL (ref 0–0.3)
BASOPHILS NFR BLD: 1 %
BILIRUB SERPL-MCNC: 0.5 MG/DL (ref 0.2–1)
BUN SERPL-MCNC: 46 MG/DL (ref 6–20)
BUN/CREAT SERPL: 25 (ref 7–25)
CALCIUM SERPL-MCNC: 9.1 MG/DL (ref 8.4–10.2)
CHLORIDE SERPL-SCNC: 100 MMOL/L (ref 98–107)
CO2 SERPL-SCNC: 30 MMOL/L (ref 21–32)
CREAT SERPL-MCNC: 1.83 MG/DL (ref 0.67–1.17)
DEPRECATED RDW RBC: 42.9 FL (ref 39–50)
EOSINOPHIL # BLD: 0.2 K/MCL (ref 0–0.5)
EOSINOPHIL NFR BLD: 2 %
ERYTHROCYTE [DISTWIDTH] IN BLOOD: 13.9 % (ref 11–15)
FASTING DURATION TIME PATIENT: ABNORMAL H
GFR SERPLBLD BASED ON 1.73 SQ M-ARVRAT: 38 ML/MIN/1.73M2
GLOBULIN SER-MCNC: 5 G/DL (ref 2–4)
GLUCOSE BLDC GLUCOMTR-MCNC: 204 MG/DL (ref 70–99)
GLUCOSE BLDC GLUCOMTR-MCNC: 249 MG/DL (ref 70–99)
GLUCOSE BLDC GLUCOMTR-MCNC: 273 MG/DL (ref 70–99)
GLUCOSE SERPL-MCNC: 208 MG/DL (ref 65–99)
HCT VFR BLD CALC: 39.6 % (ref 39–51)
HGB BLD-MCNC: 12.7 G/DL (ref 13–17)
IMM GRANULOCYTES # BLD AUTO: 0 K/MCL (ref 0–0.2)
IMM GRANULOCYTES # BLD: 0 %
LYMPHOCYTES # BLD: 3 K/MCL (ref 1–4)
LYMPHOCYTES NFR BLD: 30 %
MAGNESIUM SERPL-MCNC: 2.4 MG/DL (ref 1.7–2.4)
MCH RBC QN AUTO: 27.5 PG (ref 26–34)
MCHC RBC AUTO-ENTMCNC: 32.1 G/DL (ref 32–36.5)
MCV RBC AUTO: 85.9 FL (ref 78–100)
MONOCYTES # BLD: 0.8 K/MCL (ref 0.3–0.9)
MONOCYTES NFR BLD: 8 %
NEUTROPHILS # BLD: 6 K/MCL (ref 1.8–7.7)
NEUTROPHILS NFR BLD: 59 %
NRBC BLD MANUAL-RTO: 0 /100 WBC
PLATELET # BLD AUTO: 310 K/MCL (ref 140–450)
POTASSIUM SERPL-SCNC: 3.8 MMOL/L (ref 3.4–5.1)
PROT SERPL-MCNC: 8.1 G/DL (ref 6.4–8.2)
RBC # BLD: 4.61 MIL/MCL (ref 4.5–5.9)
SODIUM SERPL-SCNC: 136 MMOL/L (ref 135–145)
WBC # BLD: 10.2 K/MCL (ref 4.2–11)

## 2021-07-07 PROCEDURE — 80053 COMPREHEN METABOLIC PANEL: CPT | Performed by: NURSE PRACTITIONER

## 2021-07-07 PROCEDURE — 10002803 HB RX 637: Performed by: INTERNAL MEDICINE

## 2021-07-07 PROCEDURE — 83735 ASSAY OF MAGNESIUM: CPT | Performed by: NURSE PRACTITIONER

## 2021-07-07 PROCEDURE — 10004651 HB RX, NO CHARGE ITEM: Performed by: NURSE PRACTITIONER

## 2021-07-07 PROCEDURE — 10002803 HB RX 637: Performed by: NURSE PRACTITIONER

## 2021-07-07 PROCEDURE — 10002800 HB RX 250 W HCPCS: Performed by: NURSE PRACTITIONER

## 2021-07-07 PROCEDURE — 36415 COLL VENOUS BLD VENIPUNCTURE: CPT | Performed by: NURSE PRACTITIONER

## 2021-07-07 PROCEDURE — 85025 COMPLETE CBC W/AUTO DIFF WBC: CPT | Performed by: NURSE PRACTITIONER

## 2021-07-07 RX ORDER — TORSEMIDE 20 MG/1
40 TABLET ORAL DAILY
Qty: 60 TABLET | Refills: 0 | Status: SHIPPED | OUTPATIENT
Start: 2021-07-08 | End: 2021-07-15 | Stop reason: DRUGHIGH

## 2021-07-07 RX ORDER — POTASSIUM CHLORIDE 20 MEQ/1
40 TABLET, EXTENDED RELEASE ORAL ONCE
Status: COMPLETED | OUTPATIENT
Start: 2021-07-07 | End: 2021-07-07

## 2021-07-07 RX ORDER — INSULIN GLARGINE 100 [IU]/ML
5 INJECTION, SOLUTION SUBCUTANEOUS ONCE
Status: DISCONTINUED | OUTPATIENT
Start: 2021-07-07 | End: 2021-07-07 | Stop reason: HOSPADM

## 2021-07-07 RX ORDER — INSULIN GLARGINE 100 [IU]/ML
25 INJECTION, SOLUTION SUBCUTANEOUS EVERY 12 HOURS SCHEDULED
Status: DISCONTINUED | OUTPATIENT
Start: 2021-07-07 | End: 2021-07-07 | Stop reason: HOSPADM

## 2021-07-07 RX ORDER — TORSEMIDE 20 MG/1
20 TABLET ORAL
Qty: 30 TABLET | Refills: 0 | Status: SHIPPED | OUTPATIENT
Start: 2021-07-07 | End: 2021-07-15 | Stop reason: CLARIF

## 2021-07-07 RX ADMIN — POTASSIUM CHLORIDE 40 MEQ: 1500 TABLET, EXTENDED RELEASE ORAL at 08:16

## 2021-07-07 RX ADMIN — TORSEMIDE 40 MG: 20 TABLET ORAL at 08:18

## 2021-07-07 RX ADMIN — SODIUM CHLORIDE, PRESERVATIVE FREE 2 ML: 5 INJECTION INTRAVENOUS at 08:17

## 2021-07-07 RX ADMIN — METOPROLOL SUCCINATE 25 MG: 25 TABLET, EXTENDED RELEASE ORAL at 08:16

## 2021-07-07 RX ADMIN — INSULIN GLARGINE 20 UNITS: 100 INJECTION, SOLUTION SUBCUTANEOUS at 08:16

## 2021-07-07 RX ADMIN — INSULIN LISPRO 2 UNITS: 100 INJECTION, SOLUTION INTRAVENOUS; SUBCUTANEOUS at 08:15

## 2021-07-07 RX ADMIN — ENOXAPARIN SODIUM 40 MG: 40 INJECTION SUBCUTANEOUS at 08:17

## 2021-07-07 RX ADMIN — INSULIN LISPRO 2 UNITS: 100 INJECTION, SOLUTION INTRAVENOUS; SUBCUTANEOUS at 12:10

## 2021-07-07 ASSESSMENT — PAIN SCALES - GENERAL: PAINLEVEL_OUTOF10: 0

## 2021-07-08 ENCOUNTER — CASE MANAGEMENT (OUTPATIENT)
Dept: CARE COORDINATION | Age: 66
End: 2021-07-08

## 2021-07-08 ENCOUNTER — APPOINTMENT (OUTPATIENT)
Dept: WOUND CARE | Age: 66
End: 2021-07-08
Attending: PODIATRIST

## 2021-07-08 DIAGNOSIS — I50.23 ACUTE ON CHRONIC SYSTOLIC CONGESTIVE HEART FAILURE (CMD): ICD-10-CM

## 2021-07-08 RX ORDER — METOPROLOL SUCCINATE 25 MG/1
TABLET, EXTENDED RELEASE ORAL
Qty: 180 TABLET | Refills: 3 | Status: SHIPPED | OUTPATIENT
Start: 2021-07-08 | End: 2022-08-26 | Stop reason: DRUGHIGH

## 2021-07-09 ENCOUNTER — CASE MANAGEMENT (OUTPATIENT)
Dept: CARE COORDINATION | Age: 66
End: 2021-07-09

## 2021-07-12 ENCOUNTER — CASE MANAGEMENT (OUTPATIENT)
Dept: CARE COORDINATION | Age: 66
End: 2021-07-12

## 2021-07-12 RX ORDER — ALLOPURINOL 100 MG/1
TABLET ORAL
Qty: 30 TABLET | Refills: 3 | Status: SHIPPED | OUTPATIENT
Start: 2021-07-12

## 2021-07-12 RX ORDER — ALLOPURINOL 100 MG/1
100 TABLET ORAL DAILY
Qty: 30 TABLET | Refills: 3 | Status: SHIPPED | OUTPATIENT
Start: 2021-07-12 | End: 2021-07-15 | Stop reason: SDUPTHER

## 2021-07-12 RX ORDER — FAMOTIDINE 20 MG/1
20 TABLET, FILM COATED ORAL DAILY
Qty: 90 TABLET | Refills: 0 | Status: ON HOLD | OUTPATIENT
Start: 2021-07-12 | End: 2022-02-03

## 2021-07-12 RX ORDER — FAMOTIDINE 20 MG/1
20 TABLET, FILM COATED ORAL DAILY
Qty: 90 TABLET | Refills: 0 | Status: SHIPPED | OUTPATIENT
Start: 2021-07-12 | End: 2022-09-09 | Stop reason: SDUPTHER

## 2021-07-13 ENCOUNTER — CASE MANAGEMENT (OUTPATIENT)
Dept: CARE COORDINATION | Age: 66
End: 2021-07-13

## 2021-07-13 ASSESSMENT — SLEEP AND FATIGUE QUESTIONNAIRES
HOURS OF UNINTERRUPTED SLEEP: 2
SLEEP DESCRIPTORS: ENURESIS

## 2021-07-14 ENCOUNTER — TELEPHONE (OUTPATIENT)
Dept: CARDIOLOGY | Age: 66
End: 2021-07-14

## 2021-07-15 ENCOUNTER — HOSPITAL ENCOUNTER (OUTPATIENT)
Dept: WOUND CARE | Age: 66
Discharge: STILL ADMITTED OR ADMINISTRATIVE | End: 2021-07-15
Attending: PODIATRIST

## 2021-07-15 ENCOUNTER — OFFICE VISIT (OUTPATIENT)
Dept: CARDIOLOGY | Age: 66
End: 2021-07-15
Attending: NURSE PRACTITIONER

## 2021-07-15 VITALS
WEIGHT: 315 LBS | HEART RATE: 80 BPM | DIASTOLIC BLOOD PRESSURE: 72 MMHG | BODY MASS INDEX: 45.75 KG/M2 | SYSTOLIC BLOOD PRESSURE: 120 MMHG

## 2021-07-15 VITALS — TEMPERATURE: 96.1 F

## 2021-07-15 DIAGNOSIS — N18.30 STAGE 3 CHRONIC KIDNEY DISEASE, UNSPECIFIED WHETHER STAGE 3A OR 3B CKD (CMD): ICD-10-CM

## 2021-07-15 DIAGNOSIS — Z89.432 S/P TRANSMETATARSAL AMPUTATION OF FOOT, LEFT (CMD): ICD-10-CM

## 2021-07-15 DIAGNOSIS — Z89.429 STATUS POST AMPUTATION OF TOE (CMD): ICD-10-CM

## 2021-07-15 DIAGNOSIS — S98.912S AMPUTATION OF LEFT FOOT, SEQUELA (CMD): ICD-10-CM

## 2021-07-15 DIAGNOSIS — E11.621 DIABETIC ULCER OF OTHER PART OF RIGHT FOOT ASSOCIATED WITH TYPE 2 DIABETES MELLITUS, UNSPECIFIED ULCER STAGE (CMD): ICD-10-CM

## 2021-07-15 DIAGNOSIS — Z89.422 STATUS POST AMPUTATION OF LESSER TOE, LEFT (CMD): Primary | ICD-10-CM

## 2021-07-15 DIAGNOSIS — E11.69 TYPE 2 DIABETES MELLITUS WITH OTHER SPECIFIED COMPLICATION, UNSPECIFIED WHETHER LONG TERM INSULIN USE (CMD): ICD-10-CM

## 2021-07-15 DIAGNOSIS — L97.529 TYPE 2 DIABETES MELLITUS WITH LEFT DIABETIC FOOT ULCER (CMD): ICD-10-CM

## 2021-07-15 DIAGNOSIS — L97.519 DIABETIC ULCER OF OTHER PART OF RIGHT FOOT ASSOCIATED WITH TYPE 2 DIABETES MELLITUS, UNSPECIFIED ULCER STAGE (CMD): ICD-10-CM

## 2021-07-15 DIAGNOSIS — E13.51 PERIPHERAL VASCULAR DISEASE DUE TO SECONDARY DIABETES MELLITUS (CMD): ICD-10-CM

## 2021-07-15 DIAGNOSIS — S98.312S AMPUTATION AT MIDFOOT, LEFT, SEQUELA (CMD): ICD-10-CM

## 2021-07-15 DIAGNOSIS — I73.9 PVD (PERIPHERAL VASCULAR DISEASE) (CMD): ICD-10-CM

## 2021-07-15 DIAGNOSIS — I50.33 ACUTE ON CHRONIC DIASTOLIC CONGESTIVE HEART FAILURE (CMD): ICD-10-CM

## 2021-07-15 DIAGNOSIS — R06.09 DYSPNEA ON EXERTION: ICD-10-CM

## 2021-07-15 DIAGNOSIS — E11.621 TYPE 2 DIABETES MELLITUS WITH LEFT DIABETIC FOOT ULCER (CMD): ICD-10-CM

## 2021-07-15 DIAGNOSIS — R63.5 WEIGHT GAIN: ICD-10-CM

## 2021-07-15 DIAGNOSIS — E87.6 HYPOKALEMIA: Primary | ICD-10-CM

## 2021-07-15 LAB
ALBUMIN SERPL-MCNC: 3.1 G/DL (ref 3.6–5.1)
ALBUMIN/GLOB SERPL: 0.6 {RATIO} (ref 1–2.4)
ALP SERPL-CCNC: 115 UNITS/L (ref 45–117)
ALT SERPL-CCNC: 24 UNITS/L
ANION GAP SERPL CALC-SCNC: 7 MMOL/L (ref 10–20)
AST SERPL-CCNC: 20 UNITS/L
BILIRUB SERPL-MCNC: 0.6 MG/DL (ref 0.2–1)
BUN SERPL-MCNC: 47 MG/DL (ref 6–20)
BUN/CREAT SERPL: 26 (ref 7–25)
CALCIUM SERPL-MCNC: 9 MG/DL (ref 8.4–10.2)
CHLORIDE SERPL-SCNC: 100 MMOL/L (ref 98–107)
CO2 SERPL-SCNC: 34 MMOL/L (ref 21–32)
CREAT SERPL-MCNC: 1.78 MG/DL (ref 0.67–1.17)
DEPRECATED RDW RBC: 44.4 FL (ref 39–50)
ERYTHROCYTE [DISTWIDTH] IN BLOOD: 13.9 % (ref 11–15)
FASTING DURATION TIME PATIENT: ABNORMAL H
GFR SERPLBLD BASED ON 1.73 SQ M-ARVRAT: 39 ML/MIN/1.73M2
GLOBULIN SER-MCNC: 5.3 G/DL (ref 2–4)
GLUCOSE SERPL-MCNC: 85 MG/DL (ref 65–99)
HCT VFR BLD CALC: 38.8 % (ref 39–51)
HGB BLD-MCNC: 12.3 G/DL (ref 13–17)
MCH RBC QN AUTO: 27.4 PG (ref 26–34)
MCHC RBC AUTO-ENTMCNC: 31.7 G/DL (ref 32–36.5)
MCV RBC AUTO: 86.4 FL (ref 78–100)
NRBC BLD MANUAL-RTO: 0 /100 WBC
NT-PROBNP SERPL-MCNC: 669 PG/ML
PLATELET # BLD AUTO: 302 K/MCL (ref 140–450)
POTASSIUM SERPL-SCNC: 3.4 MMOL/L (ref 3.4–5.1)
PROT SERPL-MCNC: 8.4 G/DL (ref 6.4–8.2)
RBC # BLD: 4.49 MIL/MCL (ref 4.5–5.9)
SODIUM SERPL-SCNC: 138 MMOL/L (ref 135–145)
WBC # BLD: 9.5 K/MCL (ref 4.2–11)

## 2021-07-15 PROCEDURE — 85027 COMPLETE CBC AUTOMATED: CPT | Performed by: NURSE PRACTITIONER

## 2021-07-15 PROCEDURE — 11042 DBRDMT SUBQ TIS 1ST 20SQCM/<: CPT

## 2021-07-15 PROCEDURE — 80053 COMPREHEN METABOLIC PANEL: CPT | Performed by: NURSE PRACTITIONER

## 2021-07-15 PROCEDURE — 99214 OFFICE O/P EST MOD 30 MIN: CPT | Performed by: NURSE PRACTITIONER

## 2021-07-15 PROCEDURE — 10002803 HB RX 637: Performed by: INTERNAL MEDICINE

## 2021-07-15 PROCEDURE — 3074F SYST BP LT 130 MM HG: CPT | Performed by: NURSE PRACTITIONER

## 2021-07-15 PROCEDURE — 36415 COLL VENOUS BLD VENIPUNCTURE: CPT | Performed by: NURSE PRACTITIONER

## 2021-07-15 PROCEDURE — 3078F DIAST BP <80 MM HG: CPT | Performed by: NURSE PRACTITIONER

## 2021-07-15 PROCEDURE — 83880 ASSAY OF NATRIURETIC PEPTIDE: CPT | Performed by: NURSE PRACTITIONER

## 2021-07-15 RX ORDER — POTASSIUM CHLORIDE 20 MEQ/1
40 TABLET, EXTENDED RELEASE ORAL ONCE
Status: COMPLETED | OUTPATIENT
Start: 2021-07-15 | End: 2021-07-15

## 2021-07-15 RX ORDER — TORSEMIDE 20 MG/1
40 TABLET ORAL 2 TIMES DAILY
Qty: 120 TABLET | Refills: 3 | Status: SHIPPED | OUTPATIENT
Start: 2021-07-15 | End: 2021-07-21 | Stop reason: DRUGHIGH

## 2021-07-15 RX ORDER — SPIRONOLACTONE 25 MG/1
12.5 TABLET ORAL DAILY
Qty: 30 TABLET | Refills: 3 | Status: ON HOLD | OUTPATIENT
Start: 2021-07-15 | End: 2022-02-07 | Stop reason: HOSPADM

## 2021-07-15 RX ADMIN — POTASSIUM CHLORIDE 40 MEQ: 20 TABLET, EXTENDED RELEASE ORAL at 13:26

## 2021-07-15 ASSESSMENT — PAIN SCALES - GENERAL: PAINLEVEL_OUTOF10: 0

## 2021-07-19 ENCOUNTER — CASE MANAGEMENT (OUTPATIENT)
Dept: CARE COORDINATION | Age: 66
End: 2021-07-19

## 2021-07-20 ENCOUNTER — CASE MANAGEMENT (OUTPATIENT)
Dept: CARE COORDINATION | Age: 66
End: 2021-07-20

## 2021-07-20 ENCOUNTER — TELEPHONE (OUTPATIENT)
Dept: CARDIOLOGY | Age: 66
End: 2021-07-20

## 2021-07-21 ENCOUNTER — OFFICE VISIT (OUTPATIENT)
Dept: CARDIOLOGY | Age: 66
End: 2021-07-21
Attending: NURSE PRACTITIONER

## 2021-07-21 VITALS
BODY MASS INDEX: 46.58 KG/M2 | WEIGHT: 315 LBS | SYSTOLIC BLOOD PRESSURE: 118 MMHG | HEART RATE: 82 BPM | RESPIRATION RATE: 22 BRPM | DIASTOLIC BLOOD PRESSURE: 66 MMHG

## 2021-07-21 DIAGNOSIS — R63.5 WEIGHT GAIN: ICD-10-CM

## 2021-07-21 DIAGNOSIS — I50.33 ACUTE ON CHRONIC DIASTOLIC CONGESTIVE HEART FAILURE (CMD): Primary | ICD-10-CM

## 2021-07-21 DIAGNOSIS — R06.00 DYSPNEA, UNSPECIFIED TYPE: ICD-10-CM

## 2021-07-21 DIAGNOSIS — R06.09 DYSPNEA ON EXERTION: ICD-10-CM

## 2021-07-21 DIAGNOSIS — N18.30 STAGE 3 CHRONIC KIDNEY DISEASE, UNSPECIFIED WHETHER STAGE 3A OR 3B CKD (CMD): ICD-10-CM

## 2021-07-21 LAB
ALBUMIN SERPL-MCNC: 3 G/DL (ref 3.6–5.1)
ALBUMIN/GLOB SERPL: 0.6 {RATIO} (ref 1–2.4)
ALP SERPL-CCNC: 122 UNITS/L (ref 45–117)
ALT SERPL-CCNC: 28 UNITS/L
ANION GAP SERPL CALC-SCNC: 8 MMOL/L (ref 10–20)
AST SERPL-CCNC: 24 UNITS/L
BILIRUB SERPL-MCNC: 0.3 MG/DL (ref 0.2–1)
BUN SERPL-MCNC: 51 MG/DL (ref 6–20)
BUN/CREAT SERPL: 26 (ref 7–25)
CALCIUM SERPL-MCNC: 9 MG/DL (ref 8.4–10.2)
CHLORIDE SERPL-SCNC: 98 MMOL/L (ref 98–107)
CO2 SERPL-SCNC: 33 MMOL/L (ref 21–32)
CREAT SERPL-MCNC: 1.96 MG/DL (ref 0.67–1.17)
FASTING DURATION TIME PATIENT: ABNORMAL H
GFR SERPLBLD BASED ON 1.73 SQ M-ARVRAT: 35 ML/MIN/1.73M2
GLOBULIN SER-MCNC: 5.2 G/DL (ref 2–4)
GLUCOSE SERPL-MCNC: 248 MG/DL (ref 65–99)
NT-PROBNP SERPL-MCNC: 759 PG/ML
POTASSIUM SERPL-SCNC: 4.1 MMOL/L (ref 3.4–5.1)
PROT SERPL-MCNC: 8.2 G/DL (ref 6.4–8.2)
SODIUM SERPL-SCNC: 135 MMOL/L (ref 135–145)

## 2021-07-21 PROCEDURE — 99214 OFFICE O/P EST MOD 30 MIN: CPT | Performed by: NURSE PRACTITIONER

## 2021-07-21 PROCEDURE — 3078F DIAST BP <80 MM HG: CPT | Performed by: NURSE PRACTITIONER

## 2021-07-21 PROCEDURE — 83880 ASSAY OF NATRIURETIC PEPTIDE: CPT | Performed by: NURSE PRACTITIONER

## 2021-07-21 PROCEDURE — 36415 COLL VENOUS BLD VENIPUNCTURE: CPT | Performed by: NURSE PRACTITIONER

## 2021-07-21 PROCEDURE — 3074F SYST BP LT 130 MM HG: CPT | Performed by: NURSE PRACTITIONER

## 2021-07-21 PROCEDURE — 80053 COMPREHEN METABOLIC PANEL: CPT | Performed by: NURSE PRACTITIONER

## 2021-07-21 RX ORDER — BUMETANIDE 0.25 MG/ML
2 INJECTION INTRAMUSCULAR; INTRAVENOUS ONCE
Status: DISCONTINUED | OUTPATIENT
Start: 2021-07-21 | End: 2021-07-21

## 2021-07-21 RX ORDER — TORSEMIDE 20 MG/1
60 TABLET ORAL DAILY
Qty: 90 TABLET | Refills: 3 | Status: ON HOLD | OUTPATIENT
Start: 2021-07-21 | End: 2022-02-07 | Stop reason: HOSPADM

## 2021-07-21 RX ORDER — TORSEMIDE 20 MG/1
60 TABLET ORAL DAILY
Qty: 90 TABLET | Refills: 3 | Status: SHIPPED | OUTPATIENT
Start: 2021-07-21 | End: 2021-07-21 | Stop reason: SDUPTHER

## 2021-07-22 ENCOUNTER — CASE MANAGEMENT (OUTPATIENT)
Dept: CARE COORDINATION | Age: 66
End: 2021-07-22

## 2021-07-23 ENCOUNTER — CASE MANAGEMENT (OUTPATIENT)
Dept: CARE COORDINATION | Age: 66
End: 2021-07-23

## 2021-07-26 ENCOUNTER — CASE MANAGEMENT (OUTPATIENT)
Dept: CARE COORDINATION | Age: 66
End: 2021-07-26

## 2021-07-27 ENCOUNTER — CASE MANAGEMENT (OUTPATIENT)
Dept: CARE COORDINATION | Age: 66
End: 2021-07-27

## 2021-07-28 ENCOUNTER — TELEPHONE (OUTPATIENT)
Dept: CARDIOLOGY | Age: 66
End: 2021-07-28

## 2021-07-28 ENCOUNTER — CASE MANAGEMENT (OUTPATIENT)
Dept: CARE COORDINATION | Age: 66
End: 2021-07-28

## 2021-07-29 ENCOUNTER — CASE MANAGEMENT (OUTPATIENT)
Dept: CARE COORDINATION | Age: 66
End: 2021-07-29

## 2021-07-29 ENCOUNTER — HOSPITAL ENCOUNTER (OUTPATIENT)
Dept: WOUND CARE | Age: 66
Discharge: STILL ADMITTED OR ADMINISTRATIVE | End: 2021-07-29
Attending: PODIATRIST

## 2021-07-29 VITALS — TEMPERATURE: 96.1 F

## 2021-07-29 DIAGNOSIS — S91.301S UNSPECIFIED OPEN WOUND, RIGHT FOOT, SEQUELA: ICD-10-CM

## 2021-07-29 DIAGNOSIS — S98.912S AMPUTATION OF LEFT FOOT, SEQUELA (CMD): ICD-10-CM

## 2021-07-29 DIAGNOSIS — L97.519 DIABETIC ULCER OF OTHER PART OF RIGHT FOOT ASSOCIATED WITH TYPE 2 DIABETES MELLITUS, UNSPECIFIED ULCER STAGE (CMD): ICD-10-CM

## 2021-07-29 DIAGNOSIS — Z89.429 STATUS POST AMPUTATION OF TOE (CMD): ICD-10-CM

## 2021-07-29 DIAGNOSIS — S98.312S AMPUTATION AT MIDFOOT, LEFT, SEQUELA (CMD): ICD-10-CM

## 2021-07-29 DIAGNOSIS — Z89.422 STATUS POST AMPUTATION OF LESSER TOE, LEFT (CMD): Primary | ICD-10-CM

## 2021-07-29 DIAGNOSIS — I73.9 PVD (PERIPHERAL VASCULAR DISEASE) (CMD): ICD-10-CM

## 2021-07-29 DIAGNOSIS — E11.621 TYPE 2 DIABETES MELLITUS WITH LEFT DIABETIC FOOT ULCER (CMD): ICD-10-CM

## 2021-07-29 DIAGNOSIS — E13.51 PERIPHERAL VASCULAR DISEASE DUE TO SECONDARY DIABETES MELLITUS (CMD): ICD-10-CM

## 2021-07-29 DIAGNOSIS — L97.529 TYPE 2 DIABETES MELLITUS WITH LEFT DIABETIC FOOT ULCER (CMD): ICD-10-CM

## 2021-07-29 DIAGNOSIS — Z89.432 S/P TRANSMETATARSAL AMPUTATION OF FOOT, LEFT (CMD): ICD-10-CM

## 2021-07-29 DIAGNOSIS — E11.621 DIABETIC ULCER OF OTHER PART OF RIGHT FOOT ASSOCIATED WITH TYPE 2 DIABETES MELLITUS, UNSPECIFIED ULCER STAGE (CMD): ICD-10-CM

## 2021-07-29 DIAGNOSIS — E11.69 TYPE 2 DIABETES MELLITUS WITH OTHER SPECIFIED COMPLICATION, UNSPECIFIED WHETHER LONG TERM INSULIN USE (CMD): ICD-10-CM

## 2021-07-29 LAB — GLUCOSE BLDC GLUCOMTR-MCNC: 146 MG/DL (ref 70–99)

## 2021-07-29 PROCEDURE — 82962 GLUCOSE BLOOD TEST: CPT

## 2021-07-29 PROCEDURE — 11042 DBRDMT SUBQ TIS 1ST 20SQCM/<: CPT

## 2021-07-29 ASSESSMENT — PAIN SCALES - GENERAL: PAINLEVEL_OUTOF10: 0

## 2021-07-30 ENCOUNTER — CASE MANAGEMENT (OUTPATIENT)
Dept: CARE COORDINATION | Age: 66
End: 2021-07-30

## 2021-07-30 ENCOUNTER — OFFICE VISIT (OUTPATIENT)
Dept: CARDIOLOGY | Age: 66
End: 2021-07-30

## 2021-07-30 VITALS
WEIGHT: 315 LBS | DIASTOLIC BLOOD PRESSURE: 70 MMHG | HEART RATE: 70 BPM | BODY MASS INDEX: 46.93 KG/M2 | SYSTOLIC BLOOD PRESSURE: 120 MMHG

## 2021-07-30 DIAGNOSIS — I50.32 CHRONIC DIASTOLIC HEART FAILURE (CMD): Primary | ICD-10-CM

## 2021-07-30 PROCEDURE — 3078F DIAST BP <80 MM HG: CPT | Performed by: INTERNAL MEDICINE

## 2021-07-30 PROCEDURE — 99214 OFFICE O/P EST MOD 30 MIN: CPT | Performed by: INTERNAL MEDICINE

## 2021-07-30 PROCEDURE — 3074F SYST BP LT 130 MM HG: CPT | Performed by: INTERNAL MEDICINE

## 2021-07-30 RX ORDER — ISOSORBIDE MONONITRATE 30 MG/1
30 TABLET, EXTENDED RELEASE ORAL DAILY
Qty: 30 TABLET | Refills: 3 | Status: SHIPPED | OUTPATIENT
Start: 2021-07-30 | End: 2023-09-22

## 2021-08-02 ENCOUNTER — CASE MANAGEMENT (OUTPATIENT)
Dept: CARE COORDINATION | Age: 66
End: 2021-08-02

## 2021-08-02 ENCOUNTER — LAB REQUISITION (OUTPATIENT)
Dept: LAB | Age: 66
End: 2021-08-02

## 2021-08-02 DIAGNOSIS — I50.40 UNSPECIFIED COMBINED SYSTOLIC (CONGESTIVE) AND DIASTOLIC (CONGESTIVE) HEART FAILURE (CMD): ICD-10-CM

## 2021-08-02 DIAGNOSIS — E10.8 TYPE 1 DIABETES MELLITUS WITH UNSPECIFIED COMPLICATIONS (CMD): ICD-10-CM

## 2021-08-02 LAB
ALBUMIN SERPL-MCNC: 3 G/DL (ref 3.6–5.1)
ALBUMIN/GLOB SERPL: 0.6 {RATIO} (ref 1–2.4)
ALP SERPL-CCNC: 116 UNITS/L (ref 45–117)
ALT SERPL-CCNC: 30 UNITS/L
ANION GAP SERPL CALC-SCNC: 13 MMOL/L (ref 10–20)
AST SERPL-CCNC: 21 UNITS/L
BILIRUB SERPL-MCNC: 0.4 MG/DL (ref 0.2–1)
BUN SERPL-MCNC: 44 MG/DL (ref 6–20)
BUN/CREAT SERPL: 23 (ref 7–25)
CALCIUM SERPL-MCNC: 8.4 MG/DL (ref 8.4–10.2)
CHLORIDE SERPL-SCNC: 95 MMOL/L (ref 98–107)
CO2 SERPL-SCNC: 30 MMOL/L (ref 21–32)
CREAT SERPL-MCNC: 1.89 MG/DL (ref 0.67–1.17)
FASTING DURATION TIME PATIENT: ABNORMAL H
GFR SERPLBLD BASED ON 1.73 SQ M-ARVRAT: 36 ML/MIN/1.73M2
GLOBULIN SER-MCNC: 5 G/DL (ref 2–4)
GLUCOSE SERPL-MCNC: 288 MG/DL (ref 65–99)
NT-PROBNP SERPL-MCNC: 634 PG/ML
POTASSIUM SERPL-SCNC: 3.8 MMOL/L (ref 3.4–5.1)
PROT SERPL-MCNC: 8 G/DL (ref 6.4–8.2)
SODIUM SERPL-SCNC: 134 MMOL/L (ref 135–145)

## 2021-08-03 ENCOUNTER — CASE MANAGEMENT (OUTPATIENT)
Dept: CARE COORDINATION | Age: 66
End: 2021-08-03

## 2021-08-03 LAB — RAINBOW EXTRA TUBES HOLD SPECIMEN: NORMAL

## 2021-08-26 ENCOUNTER — HOSPITAL ENCOUNTER (OUTPATIENT)
Dept: WOUND CARE | Age: 66
Discharge: STILL ADMITTED OR ADMINISTRATIVE | End: 2021-08-26
Attending: PODIATRIST

## 2021-08-26 VITALS — TEMPERATURE: 97 F

## 2021-08-26 DIAGNOSIS — E11.621 TYPE 2 DIABETES MELLITUS WITH LEFT DIABETIC FOOT ULCER (CMD): ICD-10-CM

## 2021-08-26 DIAGNOSIS — E13.51 PERIPHERAL VASCULAR DISEASE DUE TO SECONDARY DIABETES MELLITUS (CMD): ICD-10-CM

## 2021-08-26 DIAGNOSIS — Z89.432 S/P TRANSMETATARSAL AMPUTATION OF FOOT, LEFT (CMD): ICD-10-CM

## 2021-08-26 DIAGNOSIS — S91.302D OPEN WOUND OF LEFT FOOT, SUBSEQUENT ENCOUNTER: ICD-10-CM

## 2021-08-26 DIAGNOSIS — S98.312S AMPUTATION AT MIDFOOT, LEFT, SEQUELA (CMD): ICD-10-CM

## 2021-08-26 DIAGNOSIS — I73.9 PVD (PERIPHERAL VASCULAR DISEASE) (CMD): ICD-10-CM

## 2021-08-26 DIAGNOSIS — S98.912S AMPUTATION OF LEFT FOOT, SEQUELA (CMD): ICD-10-CM

## 2021-08-26 DIAGNOSIS — E11.69 TYPE 2 DIABETES MELLITUS WITH OTHER SPECIFIED COMPLICATION, UNSPECIFIED WHETHER LONG TERM INSULIN USE (CMD): ICD-10-CM

## 2021-08-26 DIAGNOSIS — L97.412 MIDFOOT ULCERATION, RIGHT, WITH FAT LAYER EXPOSED (CMD): ICD-10-CM

## 2021-08-26 DIAGNOSIS — L97.529 TYPE 2 DIABETES MELLITUS WITH LEFT DIABETIC FOOT ULCER (CMD): ICD-10-CM

## 2021-08-26 DIAGNOSIS — Z89.422 STATUS POST AMPUTATION OF LESSER TOE, LEFT (CMD): Primary | ICD-10-CM

## 2021-08-26 PROCEDURE — 11042 DBRDMT SUBQ TIS 1ST 20SQCM/<: CPT

## 2021-08-26 ASSESSMENT — PAIN SCALES - GENERAL: PAINLEVEL_OUTOF10: 0

## 2021-09-09 ENCOUNTER — HOSPITAL ENCOUNTER (OUTPATIENT)
Dept: WOUND CARE | Age: 66
Discharge: STILL ADMITTED OR ADMINISTRATIVE | End: 2021-09-09
Attending: PODIATRIST

## 2021-09-09 VITALS — TEMPERATURE: 96.2 F

## 2021-09-09 DIAGNOSIS — L97.529 TYPE 2 DIABETES MELLITUS WITH LEFT DIABETIC FOOT ULCER (CMD): ICD-10-CM

## 2021-09-09 DIAGNOSIS — S91.302D OPEN WOUND OF LEFT FOOT, SUBSEQUENT ENCOUNTER: ICD-10-CM

## 2021-09-09 DIAGNOSIS — Z89.432 S/P TRANSMETATARSAL AMPUTATION OF FOOT, LEFT (CMD): ICD-10-CM

## 2021-09-09 DIAGNOSIS — E11.69 TYPE 2 DIABETES MELLITUS WITH OTHER SPECIFIED COMPLICATION, UNSPECIFIED WHETHER LONG TERM INSULIN USE (CMD): ICD-10-CM

## 2021-09-09 DIAGNOSIS — Z89.422 STATUS POST AMPUTATION OF LESSER TOE, LEFT (CMD): ICD-10-CM

## 2021-09-09 DIAGNOSIS — L97.412 MIDFOOT ULCERATION, RIGHT, WITH FAT LAYER EXPOSED (CMD): Primary | ICD-10-CM

## 2021-09-09 DIAGNOSIS — I73.9 PVD (PERIPHERAL VASCULAR DISEASE) (CMD): ICD-10-CM

## 2021-09-09 DIAGNOSIS — E11.621 TYPE 2 DIABETES MELLITUS WITH LEFT DIABETIC FOOT ULCER (CMD): ICD-10-CM

## 2021-09-09 PROCEDURE — 11042 DBRDMT SUBQ TIS 1ST 20SQCM/<: CPT

## 2021-09-09 ASSESSMENT — PAIN SCALES - GENERAL: PAINLEVEL_OUTOF10: 0

## 2021-09-13 ENCOUNTER — LAB REQUISITION (OUTPATIENT)
Dept: LAB | Age: 66
End: 2021-09-13

## 2021-09-13 DIAGNOSIS — I50.41 ACUTE COMBINED SYSTOLIC (CONGESTIVE) AND DIASTOLIC (CONGESTIVE) HEART FAILURE (CMD): ICD-10-CM

## 2021-09-13 LAB
ALBUMIN SERPL-MCNC: 3.2 G/DL (ref 3.6–5.1)
ALBUMIN/GLOB SERPL: 0.7 {RATIO} (ref 1–2.4)
ALP SERPL-CCNC: 127 UNITS/L (ref 45–117)
ALT SERPL-CCNC: 30 UNITS/L
ANION GAP SERPL CALC-SCNC: 9 MMOL/L (ref 10–20)
AST SERPL-CCNC: 22 UNITS/L
BASOPHILS # BLD: 0.1 K/MCL (ref 0–0.3)
BASOPHILS NFR BLD: 1 %
BILIRUB SERPL-MCNC: 0.4 MG/DL (ref 0.2–1)
BUN SERPL-MCNC: 55 MG/DL (ref 6–20)
BUN/CREAT SERPL: 27 (ref 7–25)
CALCIUM SERPL-MCNC: 8.8 MG/DL (ref 8.4–10.2)
CHLORIDE SERPL-SCNC: 101 MMOL/L (ref 98–107)
CO2 SERPL-SCNC: 31 MMOL/L (ref 21–32)
CREAT SERPL-MCNC: 2.05 MG/DL (ref 0.67–1.17)
DEPRECATED RDW RBC: 47.2 FL (ref 39–50)
EOSINOPHIL # BLD: 0.3 K/MCL (ref 0–0.5)
EOSINOPHIL NFR BLD: 3 %
ERYTHROCYTE [DISTWIDTH] IN BLOOD: 14.7 % (ref 11–15)
FASTING DURATION TIME PATIENT: ABNORMAL H
GFR SERPLBLD BASED ON 1.73 SQ M-ARVRAT: 33 ML/MIN
GLOBULIN SER-MCNC: 4.9 G/DL (ref 2–4)
GLUCOSE SERPL-MCNC: 138 MG/DL (ref 65–99)
HCT VFR BLD CALC: 40.4 % (ref 39–51)
HGB BLD-MCNC: 12.7 G/DL (ref 13–17)
IMM GRANULOCYTES # BLD AUTO: 0 K/MCL (ref 0–0.2)
IMM GRANULOCYTES # BLD: 1 %
LYMPHOCYTES # BLD: 2.2 K/MCL (ref 1–4)
LYMPHOCYTES NFR BLD: 25 %
MCH RBC QN AUTO: 27.5 PG (ref 26–34)
MCHC RBC AUTO-ENTMCNC: 31.4 G/DL (ref 32–36.5)
MCV RBC AUTO: 87.6 FL (ref 78–100)
MONOCYTES # BLD: 0.6 K/MCL (ref 0.3–0.9)
MONOCYTES NFR BLD: 7 %
NEUTROPHILS # BLD: 5.7 K/MCL (ref 1.8–7.7)
NEUTROPHILS NFR BLD: 63 %
NRBC BLD MANUAL-RTO: 0 /100 WBC
PLATELET # BLD AUTO: 314 K/MCL (ref 140–450)
POTASSIUM SERPL-SCNC: 4 MMOL/L (ref 3.4–5.1)
PROT SERPL-MCNC: 8.1 G/DL (ref 6.4–8.2)
RBC # BLD: 4.61 MIL/MCL (ref 4.5–5.9)
SODIUM SERPL-SCNC: 137 MMOL/L (ref 135–145)
WBC # BLD: 8.9 K/MCL (ref 4.2–11)

## 2021-09-13 PROCEDURE — 80053 COMPREHEN METABOLIC PANEL: CPT | Performed by: CLINICAL MEDICAL LABORATORY

## 2021-09-13 PROCEDURE — 85025 COMPLETE CBC W/AUTO DIFF WBC: CPT | Performed by: CLINICAL MEDICAL LABORATORY

## 2021-09-14 LAB — RAINBOW EXTRA TUBES HOLD SPECIMEN: NORMAL

## 2021-09-23 ENCOUNTER — HOSPITAL ENCOUNTER (OUTPATIENT)
Dept: WOUND CARE | Age: 66
Discharge: STILL ADMITTED OR ADMINISTRATIVE | End: 2021-09-23
Attending: PODIATRIST

## 2021-09-23 ENCOUNTER — HOSPITAL ENCOUNTER (OUTPATIENT)
Dept: GENERAL RADIOLOGY | Age: 66
Discharge: HOME | End: 2021-09-23

## 2021-09-23 VITALS — TEMPERATURE: 96.8 F

## 2021-09-23 DIAGNOSIS — E13.51 PERIPHERAL VASCULAR DISEASE DUE TO SECONDARY DIABETES MELLITUS (CMD): ICD-10-CM

## 2021-09-23 DIAGNOSIS — S98.912S AMPUTATION OF LEFT FOOT, SEQUELA (CMD): ICD-10-CM

## 2021-09-23 DIAGNOSIS — I73.9 PVD (PERIPHERAL VASCULAR DISEASE) (CMD): Primary | ICD-10-CM

## 2021-09-23 DIAGNOSIS — Z89.422 STATUS POST AMPUTATION OF LESSER TOE, LEFT (CMD): ICD-10-CM

## 2021-09-23 DIAGNOSIS — L97.412 MIDFOOT ULCERATION, RIGHT, WITH FAT LAYER EXPOSED (CMD): ICD-10-CM

## 2021-09-23 DIAGNOSIS — S91.302D OPEN WOUND OF LEFT FOOT, SUBSEQUENT ENCOUNTER: ICD-10-CM

## 2021-09-23 DIAGNOSIS — Z89.432 S/P TRANSMETATARSAL AMPUTATION OF FOOT, LEFT (CMD): ICD-10-CM

## 2021-09-23 DIAGNOSIS — E11.69 TYPE 2 DIABETES MELLITUS WITH OTHER SPECIFIED COMPLICATION, UNSPECIFIED WHETHER LONG TERM INSULIN USE (CMD): ICD-10-CM

## 2021-09-23 DIAGNOSIS — S98.312S AMPUTATION AT MIDFOOT, LEFT, SEQUELA (CMD): ICD-10-CM

## 2021-09-23 DIAGNOSIS — E11.621 TYPE 2 DIABETES MELLITUS WITH LEFT DIABETIC FOOT ULCER (CMD): ICD-10-CM

## 2021-09-23 DIAGNOSIS — L97.529 TYPE 2 DIABETES MELLITUS WITH LEFT DIABETIC FOOT ULCER (CMD): ICD-10-CM

## 2021-09-23 DIAGNOSIS — S91.301A WOUND OF RIGHT FOOT: ICD-10-CM

## 2021-09-23 PROCEDURE — 73630 X-RAY EXAM OF FOOT: CPT

## 2021-09-23 PROCEDURE — 11042 DBRDMT SUBQ TIS 1ST 20SQCM/<: CPT

## 2021-09-23 ASSESSMENT — PAIN SCALES - GENERAL: PAINLEVEL_OUTOF10: 0

## 2021-09-28 ENCOUNTER — HOSPITAL ENCOUNTER (OUTPATIENT)
Dept: MRI IMAGING | Age: 66
Discharge: HOME | End: 2021-09-28
Attending: PODIATRIST

## 2021-09-28 DIAGNOSIS — S91.301A WOUND OF RIGHT FOOT: ICD-10-CM

## 2021-09-28 DIAGNOSIS — L97.529 TYPE 2 DIABETES MELLITUS WITH LEFT DIABETIC FOOT ULCER (CMD): ICD-10-CM

## 2021-09-28 DIAGNOSIS — I73.9 PVD (PERIPHERAL VASCULAR DISEASE) (CMD): ICD-10-CM

## 2021-09-28 DIAGNOSIS — E11.621 TYPE 2 DIABETES MELLITUS WITH LEFT DIABETIC FOOT ULCER (CMD): ICD-10-CM

## 2021-09-28 PROCEDURE — G1004 CDSM NDSC: HCPCS

## 2021-09-28 PROCEDURE — 73718 MRI LOWER EXTREMITY W/O DYE: CPT

## 2021-10-04 ENCOUNTER — CASE MANAGEMENT (OUTPATIENT)
Dept: CARE COORDINATION | Age: 66
End: 2021-10-04

## 2021-10-06 ENCOUNTER — CASE MANAGEMENT (OUTPATIENT)
Dept: CARE COORDINATION | Age: 66
End: 2021-10-06

## 2021-10-07 ENCOUNTER — HOSPITAL ENCOUNTER (OUTPATIENT)
Dept: WOUND CARE | Age: 66
Discharge: STILL ADMITTED OR ADMINISTRATIVE | End: 2021-10-07
Attending: PODIATRIST

## 2021-10-07 VITALS — TEMPERATURE: 97.1 F

## 2021-10-07 DIAGNOSIS — L97.412 MIDFOOT ULCERATION, RIGHT, WITH FAT LAYER EXPOSED (CMD): ICD-10-CM

## 2021-10-07 DIAGNOSIS — S98.912S AMPUTATION OF LEFT FOOT, SEQUELA (CMD): ICD-10-CM

## 2021-10-07 DIAGNOSIS — M86.9 OSTEOMYELITIS OF TOE OF RIGHT FOOT (CMD): ICD-10-CM

## 2021-10-07 DIAGNOSIS — L97.529 TYPE 2 DIABETES MELLITUS WITH LEFT DIABETIC FOOT ULCER (CMD): ICD-10-CM

## 2021-10-07 DIAGNOSIS — S98.312S AMPUTATION AT MIDFOOT, LEFT, SEQUELA (CMD): ICD-10-CM

## 2021-10-07 DIAGNOSIS — S91.302D OPEN WOUND OF LEFT FOOT, SUBSEQUENT ENCOUNTER: ICD-10-CM

## 2021-10-07 DIAGNOSIS — E13.51 PERIPHERAL VASCULAR DISEASE DUE TO SECONDARY DIABETES MELLITUS (CMD): ICD-10-CM

## 2021-10-07 DIAGNOSIS — I73.9 PVD (PERIPHERAL VASCULAR DISEASE) (CMD): ICD-10-CM

## 2021-10-07 DIAGNOSIS — Z89.422 STATUS POST AMPUTATION OF LESSER TOE, LEFT (CMD): Primary | ICD-10-CM

## 2021-10-07 DIAGNOSIS — E11.621 TYPE 2 DIABETES MELLITUS WITH LEFT DIABETIC FOOT ULCER (CMD): ICD-10-CM

## 2021-10-07 DIAGNOSIS — E11.69 TYPE 2 DIABETES MELLITUS WITH OTHER SPECIFIED COMPLICATION, UNSPECIFIED WHETHER LONG TERM INSULIN USE (CMD): ICD-10-CM

## 2021-10-07 DIAGNOSIS — Z89.432 S/P TRANSMETATARSAL AMPUTATION OF FOOT, LEFT (CMD): ICD-10-CM

## 2021-10-07 PROCEDURE — 11042 DBRDMT SUBQ TIS 1ST 20SQCM/<: CPT

## 2021-10-07 ASSESSMENT — PAIN SCALES - GENERAL: PAINLEVEL_OUTOF10: 0

## 2021-10-15 ENCOUNTER — CLINICAL ABSTRACT (OUTPATIENT)
Dept: INFUSION THERAPY | Age: 66
End: 2021-10-15

## 2021-10-15 DIAGNOSIS — M86.171 OTHER ACUTE OSTEOMYELITIS OF RIGHT FOOT (CMD): Primary | ICD-10-CM

## 2021-10-15 PROBLEM — M86.9 OSTEOMYELITIS OF RIGHT FOOT  (CMD): Status: ACTIVE | Noted: 2021-10-15

## 2021-10-15 RX ORDER — HEPARIN SODIUM,PORCINE/PF 10 UNIT/ML
50 SYRINGE (ML) INTRAVENOUS ONCE
Status: CANCELLED | OUTPATIENT
Start: 2021-10-16 | End: 2021-10-16

## 2021-10-15 RX ORDER — 0.9 % SODIUM CHLORIDE 0.9 %
10 VIAL (ML) INJECTION PRN
Status: CANCELLED | OUTPATIENT
Start: 2021-10-16

## 2021-10-15 RX ORDER — HEPARIN SODIUM,PORCINE/PF 10 UNIT/ML
50 SYRINGE (ML) INTRAVENOUS PRN
Status: CANCELLED | OUTPATIENT
Start: 2021-10-16

## 2021-10-16 ENCOUNTER — HOSPITAL ENCOUNTER (OUTPATIENT)
Dept: INFUSION THERAPY | Age: 66
Discharge: STILL ADMITTED OR ADMINISTRATIVE | End: 2021-10-16
Attending: INTERNAL MEDICINE

## 2021-10-16 ENCOUNTER — APPOINTMENT (OUTPATIENT)
Dept: INFUSION THERAPY | Age: 66
End: 2021-10-16

## 2021-10-16 VITALS
RESPIRATION RATE: 16 BRPM | DIASTOLIC BLOOD PRESSURE: 82 MMHG | OXYGEN SATURATION: 98 % | BODY MASS INDEX: 42.66 KG/M2 | TEMPERATURE: 97.8 F | HEIGHT: 72 IN | SYSTOLIC BLOOD PRESSURE: 146 MMHG | WEIGHT: 315 LBS | HEART RATE: 72 BPM

## 2021-10-16 DIAGNOSIS — M86.171 OTHER ACUTE OSTEOMYELITIS OF RIGHT FOOT (CMD): Primary | ICD-10-CM

## 2021-10-16 PROCEDURE — 96365 THER/PROPH/DIAG IV INF INIT: CPT

## 2021-10-16 PROCEDURE — 96366 THER/PROPH/DIAG IV INF ADDON: CPT

## 2021-10-16 PROCEDURE — 10002800 HB RX 250 W HCPCS: Performed by: INTERNAL MEDICINE

## 2021-10-16 PROCEDURE — 10002807 HB RX 258: Performed by: INTERNAL MEDICINE

## 2021-10-16 RX ORDER — 0.9 % SODIUM CHLORIDE 0.9 %
10 VIAL (ML) INJECTION PRN
Status: DISCONTINUED | OUTPATIENT
Start: 2021-10-16 | End: 2021-10-18 | Stop reason: HOSPADM

## 2021-10-16 RX ORDER — 0.9 % SODIUM CHLORIDE 0.9 %
10 VIAL (ML) INJECTION PRN
Status: CANCELLED | OUTPATIENT
Start: 2021-10-17

## 2021-10-16 RX ADMIN — VANCOMYCIN HYDROCHLORIDE 1500 MG: 10 INJECTION, POWDER, LYOPHILIZED, FOR SOLUTION INTRAVENOUS at 10:38

## 2021-10-16 RX ADMIN — SODIUM CHLORIDE 250 ML: 9 INJECTION, SOLUTION INTRAVENOUS at 10:39

## 2021-10-16 ASSESSMENT — PAIN SCALES - GENERAL: PAINLEVEL_OUTOF10: 0

## 2021-10-17 ENCOUNTER — HOSPITAL ENCOUNTER (OUTPATIENT)
Dept: INFUSION THERAPY | Age: 66
Discharge: STILL ADMITTED OR ADMINISTRATIVE | End: 2021-10-17
Attending: INTERNAL MEDICINE

## 2021-10-17 VITALS
TEMPERATURE: 97.6 F | DIASTOLIC BLOOD PRESSURE: 85 MMHG | OXYGEN SATURATION: 96 % | RESPIRATION RATE: 20 BRPM | SYSTOLIC BLOOD PRESSURE: 137 MMHG | HEART RATE: 90 BPM

## 2021-10-17 DIAGNOSIS — M86.171 OTHER ACUTE OSTEOMYELITIS OF RIGHT FOOT (CMD): Primary | ICD-10-CM

## 2021-10-17 PROCEDURE — 10002807 HB RX 258: Performed by: INTERNAL MEDICINE

## 2021-10-17 PROCEDURE — 96366 THER/PROPH/DIAG IV INF ADDON: CPT

## 2021-10-17 PROCEDURE — 96365 THER/PROPH/DIAG IV INF INIT: CPT

## 2021-10-17 PROCEDURE — 10002800 HB RX 250 W HCPCS: Performed by: INTERNAL MEDICINE

## 2021-10-17 RX ORDER — 0.9 % SODIUM CHLORIDE 0.9 %
10 VIAL (ML) INJECTION PRN
Status: DISCONTINUED | OUTPATIENT
Start: 2021-10-17 | End: 2021-10-19 | Stop reason: HOSPADM

## 2021-10-17 RX ORDER — DIPHENHYDRAMINE HYDROCHLORIDE 50 MG/ML
25 INJECTION INTRAMUSCULAR; INTRAVENOUS ONCE
Status: CANCELLED
Start: 2021-10-18 | End: 2021-10-18

## 2021-10-17 RX ORDER — 0.9 % SODIUM CHLORIDE 0.9 %
10 VIAL (ML) INJECTION PRN
Status: CANCELLED | OUTPATIENT
Start: 2021-10-18

## 2021-10-17 RX ADMIN — VANCOMYCIN HYDROCHLORIDE 1500 MG: 1 INJECTION, POWDER, LYOPHILIZED, FOR SOLUTION INTRAVENOUS at 08:47

## 2021-10-17 RX ADMIN — SODIUM CHLORIDE 250 ML: 0.9 INJECTION, SOLUTION INTRAVENOUS at 08:45

## 2021-10-17 ASSESSMENT — PAIN SCALES - GENERAL
PAINLEVEL_OUTOF10: 0
PAINLEVEL: 0

## 2021-10-18 ENCOUNTER — HOSPITAL ENCOUNTER (OUTPATIENT)
Dept: INFUSION THERAPY | Age: 66
Discharge: STILL ADMITTED OR ADMINISTRATIVE | End: 2021-10-18
Attending: INTERNAL MEDICINE

## 2021-10-18 VITALS
SYSTOLIC BLOOD PRESSURE: 109 MMHG | RESPIRATION RATE: 16 BRPM | OXYGEN SATURATION: 95 % | DIASTOLIC BLOOD PRESSURE: 72 MMHG | TEMPERATURE: 97.2 F | HEART RATE: 81 BPM

## 2021-10-18 DIAGNOSIS — M86.171 OTHER ACUTE OSTEOMYELITIS OF RIGHT FOOT (CMD): Primary | ICD-10-CM

## 2021-10-18 LAB
ALBUMIN SERPL-MCNC: 2.8 G/DL (ref 3.6–5.1)
ALBUMIN/GLOB SERPL: 0.5 {RATIO} (ref 1–2.4)
ALP SERPL-CCNC: 96 UNITS/L (ref 45–117)
ALT SERPL-CCNC: 18 UNITS/L
ANION GAP SERPL CALC-SCNC: 10 MMOL/L (ref 10–20)
AST SERPL-CCNC: 20 UNITS/L
BASOPHILS # BLD: 0.1 K/MCL (ref 0–0.3)
BASOPHILS NFR BLD: 1 %
BILIRUB SERPL-MCNC: 0.4 MG/DL (ref 0.2–1)
BUN SERPL-MCNC: 45 MG/DL (ref 6–20)
BUN/CREAT SERPL: 24 (ref 7–25)
CALCIUM SERPL-MCNC: 9.1 MG/DL (ref 8.4–10.2)
CHLORIDE SERPL-SCNC: 102 MMOL/L (ref 98–107)
CO2 SERPL-SCNC: 29 MMOL/L (ref 21–32)
CREAT SERPL-MCNC: 1.87 MG/DL (ref 0.67–1.17)
DEPRECATED RDW RBC: 46.6 FL (ref 39–50)
EOSINOPHIL # BLD: 0.3 K/MCL (ref 0–0.5)
EOSINOPHIL NFR BLD: 3 %
ERYTHROCYTE [DISTWIDTH] IN BLOOD: 14.6 % (ref 11–15)
ERYTHROCYTE [SEDIMENTATION RATE] IN BLOOD BY WESTERGREN METHOD: >120 MM/HR (ref 0–20)
FASTING DURATION TIME PATIENT: ABNORMAL H
GFR SERPLBLD BASED ON 1.73 SQ M-ARVRAT: 37 ML/MIN
GLOBULIN SER-MCNC: 5.8 G/DL (ref 2–4)
GLUCOSE SERPL-MCNC: 123 MG/DL (ref 70–99)
HCT VFR BLD CALC: 37 % (ref 39–51)
HGB BLD-MCNC: 11.6 G/DL (ref 13–17)
IMM GRANULOCYTES # BLD AUTO: 0.1 K/MCL (ref 0–0.2)
IMM GRANULOCYTES # BLD: 1 %
LYMPHOCYTES # BLD: 2.3 K/MCL (ref 1–4)
LYMPHOCYTES NFR BLD: 21 %
MCH RBC QN AUTO: 27.4 PG (ref 26–34)
MCHC RBC AUTO-ENTMCNC: 31.4 G/DL (ref 32–36.5)
MCV RBC AUTO: 87.3 FL (ref 78–100)
MONOCYTES # BLD: 0.7 K/MCL (ref 0.3–0.9)
MONOCYTES NFR BLD: 6 %
NEUTROPHILS # BLD: 7.7 K/MCL (ref 1.8–7.7)
NEUTROPHILS NFR BLD: 68 %
NRBC BLD MANUAL-RTO: 0 /100 WBC
PLATELET # BLD AUTO: 415 K/MCL (ref 140–450)
POTASSIUM SERPL-SCNC: 4.1 MMOL/L (ref 3.4–5.1)
PROT SERPL-MCNC: 8.6 G/DL (ref 6.4–8.2)
RBC # BLD: 4.24 MIL/MCL (ref 4.5–5.9)
SODIUM SERPL-SCNC: 137 MMOL/L (ref 135–145)
VANCOMYCIN TROUGH SERPL-MCNC: 15.7 MCG/ML (ref 10–20)
WBC # BLD: 11.1 K/MCL (ref 4.2–11)

## 2021-10-18 PROCEDURE — 85652 RBC SED RATE AUTOMATED: CPT | Performed by: INTERNAL MEDICINE

## 2021-10-18 PROCEDURE — 80053 COMPREHEN METABOLIC PANEL: CPT | Performed by: INTERNAL MEDICINE

## 2021-10-18 PROCEDURE — 10002800 HB RX 250 W HCPCS: Performed by: INTERNAL MEDICINE

## 2021-10-18 PROCEDURE — 36592 COLLECT BLOOD FROM PICC: CPT

## 2021-10-18 PROCEDURE — 10004651 HB RX, NO CHARGE ITEM: Performed by: INTERNAL MEDICINE

## 2021-10-18 PROCEDURE — 10002807 HB RX 258: Performed by: INTERNAL MEDICINE

## 2021-10-18 PROCEDURE — 85025 COMPLETE CBC W/AUTO DIFF WBC: CPT | Performed by: INTERNAL MEDICINE

## 2021-10-18 PROCEDURE — 80202 ASSAY OF VANCOMYCIN: CPT | Performed by: INTERNAL MEDICINE

## 2021-10-18 PROCEDURE — 96366 THER/PROPH/DIAG IV INF ADDON: CPT

## 2021-10-18 PROCEDURE — 96365 THER/PROPH/DIAG IV INF INIT: CPT

## 2021-10-18 RX ORDER — DIPHENHYDRAMINE HYDROCHLORIDE 50 MG/ML
25 INJECTION INTRAMUSCULAR; INTRAVENOUS ONCE
Status: CANCELLED
Start: 2021-10-25 | End: 2021-10-25

## 2021-10-18 RX ORDER — 0.9 % SODIUM CHLORIDE 0.9 %
10 VIAL (ML) INJECTION PRN
Status: DISCONTINUED | OUTPATIENT
Start: 2021-10-18 | End: 2021-10-20 | Stop reason: HOSPADM

## 2021-10-18 RX ORDER — 0.9 % SODIUM CHLORIDE 0.9 %
10 VIAL (ML) INJECTION PRN
Status: CANCELLED | OUTPATIENT
Start: 2021-10-25

## 2021-10-18 RX ADMIN — SODIUM CHLORIDE 250 ML: 0.9 INJECTION, SOLUTION INTRAVENOUS at 10:57

## 2021-10-18 RX ADMIN — VANCOMYCIN HYDROCHLORIDE 1500 MG: 1 INJECTION, POWDER, LYOPHILIZED, FOR SOLUTION INTRAVENOUS at 10:57

## 2021-10-18 RX ADMIN — SODIUM CHLORIDE, PRESERVATIVE FREE 10 ML: 5 INJECTION INTRAVENOUS at 13:19

## 2021-10-18 ASSESSMENT — PAIN SCALES - GENERAL: PAINLEVEL_OUTOF10: 0

## 2021-10-19 ENCOUNTER — HOSPITAL ENCOUNTER (OUTPATIENT)
Dept: INFUSION THERAPY | Age: 66
Discharge: STILL ADMITTED OR ADMINISTRATIVE | End: 2021-10-19
Attending: INTERNAL MEDICINE

## 2021-10-19 VITALS
HEART RATE: 84 BPM | TEMPERATURE: 97.9 F | DIASTOLIC BLOOD PRESSURE: 79 MMHG | OXYGEN SATURATION: 98 % | RESPIRATION RATE: 16 BRPM | SYSTOLIC BLOOD PRESSURE: 148 MMHG

## 2021-10-19 DIAGNOSIS — M86.171 OTHER ACUTE OSTEOMYELITIS OF RIGHT FOOT (CMD): Primary | ICD-10-CM

## 2021-10-19 PROCEDURE — 10004651 HB RX, NO CHARGE ITEM: Performed by: INTERNAL MEDICINE

## 2021-10-19 PROCEDURE — 10002807 HB RX 258: Performed by: INTERNAL MEDICINE

## 2021-10-19 PROCEDURE — 10002800 HB RX 250 W HCPCS: Performed by: INTERNAL MEDICINE

## 2021-10-19 PROCEDURE — 96366 THER/PROPH/DIAG IV INF ADDON: CPT

## 2021-10-19 PROCEDURE — 96365 THER/PROPH/DIAG IV INF INIT: CPT

## 2021-10-19 RX ORDER — 0.9 % SODIUM CHLORIDE 0.9 %
10 VIAL (ML) INJECTION PRN
Status: DISCONTINUED | OUTPATIENT
Start: 2021-10-19 | End: 2021-10-21 | Stop reason: HOSPADM

## 2021-10-19 RX ORDER — 0.9 % SODIUM CHLORIDE 0.9 %
10 VIAL (ML) INJECTION PRN
Status: CANCELLED | OUTPATIENT
Start: 2021-10-20

## 2021-10-19 RX ORDER — DIPHENHYDRAMINE HYDROCHLORIDE 50 MG/ML
25 INJECTION INTRAMUSCULAR; INTRAVENOUS ONCE
Status: CANCELLED
Start: 2021-10-20 | End: 2021-10-20

## 2021-10-19 RX ADMIN — SODIUM CHLORIDE, PRESERVATIVE FREE 10 ML: 5 INJECTION INTRAVENOUS at 10:54

## 2021-10-19 RX ADMIN — SODIUM CHLORIDE, PRESERVATIVE FREE 10 ML: 5 INJECTION INTRAVENOUS at 12:50

## 2021-10-19 RX ADMIN — SODIUM CHLORIDE 250 ML: 9 INJECTION, SOLUTION INTRAVENOUS at 11:03

## 2021-10-19 RX ADMIN — VANCOMYCIN HYDROCHLORIDE 1500 MG: 1 INJECTION, POWDER, LYOPHILIZED, FOR SOLUTION INTRAVENOUS at 11:04

## 2021-10-19 ASSESSMENT — PAIN SCALES - GENERAL: PAINLEVEL_OUTOF10: 0

## 2021-10-20 ENCOUNTER — HOSPITAL ENCOUNTER (OUTPATIENT)
Dept: INFUSION THERAPY | Age: 66
Discharge: STILL ADMITTED OR ADMINISTRATIVE | End: 2021-10-20
Attending: INTERNAL MEDICINE

## 2021-10-20 VITALS
TEMPERATURE: 97.2 F | OXYGEN SATURATION: 99 % | RESPIRATION RATE: 18 BRPM | SYSTOLIC BLOOD PRESSURE: 121 MMHG | DIASTOLIC BLOOD PRESSURE: 82 MMHG | HEART RATE: 72 BPM

## 2021-10-20 DIAGNOSIS — M86.171 OTHER ACUTE OSTEOMYELITIS OF RIGHT FOOT (CMD): Primary | ICD-10-CM

## 2021-10-20 LAB
ALBUMIN SERPL-MCNC: 2.7 G/DL (ref 3.6–5.1)
ALBUMIN/GLOB SERPL: 0.6 {RATIO} (ref 1–2.4)
ALP SERPL-CCNC: 95 UNITS/L (ref 45–117)
ALT SERPL-CCNC: 19 UNITS/L
ANION GAP SERPL CALC-SCNC: 6 MMOL/L (ref 10–20)
AST SERPL-CCNC: 19 UNITS/L
BILIRUB SERPL-MCNC: 0.3 MG/DL (ref 0.2–1)
BUN SERPL-MCNC: 37 MG/DL (ref 6–20)
BUN/CREAT SERPL: 21 (ref 7–25)
CALCIUM SERPL-MCNC: 9 MG/DL (ref 8.4–10.2)
CHLORIDE SERPL-SCNC: 108 MMOL/L (ref 98–107)
CO2 SERPL-SCNC: 29 MMOL/L (ref 21–32)
CREAT SERPL-MCNC: 1.75 MG/DL (ref 0.67–1.17)
FASTING DURATION TIME PATIENT: ABNORMAL H
GFR SERPLBLD BASED ON 1.73 SQ M-ARVRAT: 40 ML/MIN
GLOBULIN SER-MCNC: 4.7 G/DL (ref 2–4)
GLUCOSE SERPL-MCNC: 133 MG/DL (ref 70–99)
POTASSIUM SERPL-SCNC: 4.6 MMOL/L (ref 3.4–5.1)
PROT SERPL-MCNC: 7.4 G/DL (ref 6.4–8.2)
RAINBOW EXTRA TUBES HOLD SPECIMEN: NORMAL
SODIUM SERPL-SCNC: 138 MMOL/L (ref 135–145)
VANCOMYCIN TROUGH SERPL-MCNC: 16.4 MCG/ML (ref 10–20)

## 2021-10-20 PROCEDURE — 80053 COMPREHEN METABOLIC PANEL: CPT | Performed by: INTERNAL MEDICINE

## 2021-10-20 PROCEDURE — 36592 COLLECT BLOOD FROM PICC: CPT

## 2021-10-20 PROCEDURE — 10004651 HB RX, NO CHARGE ITEM: Performed by: INTERNAL MEDICINE

## 2021-10-20 PROCEDURE — 96365 THER/PROPH/DIAG IV INF INIT: CPT

## 2021-10-20 PROCEDURE — 10002807 HB RX 258: Performed by: INTERNAL MEDICINE

## 2021-10-20 PROCEDURE — 96366 THER/PROPH/DIAG IV INF ADDON: CPT

## 2021-10-20 PROCEDURE — 80202 ASSAY OF VANCOMYCIN: CPT | Performed by: INTERNAL MEDICINE

## 2021-10-20 PROCEDURE — 10002800 HB RX 250 W HCPCS: Performed by: INTERNAL MEDICINE

## 2021-10-20 RX ORDER — 0.9 % SODIUM CHLORIDE 0.9 %
10 VIAL (ML) INJECTION PRN
Status: CANCELLED | OUTPATIENT
Start: 2021-10-25

## 2021-10-20 RX ORDER — DIPHENHYDRAMINE HYDROCHLORIDE 50 MG/ML
25 INJECTION INTRAMUSCULAR; INTRAVENOUS ONCE
Status: CANCELLED
Start: 2021-10-25 | End: 2021-10-25

## 2021-10-20 RX ORDER — 0.9 % SODIUM CHLORIDE 0.9 %
10 VIAL (ML) INJECTION PRN
Status: DISCONTINUED | OUTPATIENT
Start: 2021-10-20 | End: 2021-10-22 | Stop reason: HOSPADM

## 2021-10-20 RX ADMIN — SODIUM CHLORIDE 20 ML: 0.9 INJECTION, SOLUTION INTRAVENOUS at 10:40

## 2021-10-20 RX ADMIN — SODIUM CHLORIDE, PRESERVATIVE FREE 10 ML: 5 INJECTION INTRAVENOUS at 12:30

## 2021-10-20 RX ADMIN — VANCOMYCIN HYDROCHLORIDE 1500 MG: 1 INJECTION, POWDER, LYOPHILIZED, FOR SOLUTION INTRAVENOUS at 10:41

## 2021-10-20 RX ADMIN — SODIUM CHLORIDE, PRESERVATIVE FREE 10 ML: 5 INJECTION INTRAVENOUS at 10:38

## 2021-10-20 ASSESSMENT — PAIN SCALES - GENERAL
PAINLEVEL: 0
PAINLEVEL_OUTOF10: 0

## 2021-10-21 ENCOUNTER — HOSPITAL ENCOUNTER (OUTPATIENT)
Dept: INFUSION THERAPY | Age: 66
Discharge: STILL ADMITTED OR ADMINISTRATIVE | End: 2021-10-21
Attending: INTERNAL MEDICINE

## 2021-10-21 ENCOUNTER — HOSPITAL ENCOUNTER (OUTPATIENT)
Dept: WOUND CARE | Age: 66
Discharge: STILL ADMITTED OR ADMINISTRATIVE | End: 2021-10-21
Attending: PODIATRIST

## 2021-10-21 VITALS
RESPIRATION RATE: 18 BRPM | OXYGEN SATURATION: 96 % | SYSTOLIC BLOOD PRESSURE: 117 MMHG | HEART RATE: 85 BPM | TEMPERATURE: 97.5 F | DIASTOLIC BLOOD PRESSURE: 77 MMHG

## 2021-10-21 DIAGNOSIS — E11.621 TYPE 2 DIABETES MELLITUS WITH LEFT DIABETIC FOOT ULCER (CMD): ICD-10-CM

## 2021-10-21 DIAGNOSIS — L97.529 TYPE 2 DIABETES MELLITUS WITH LEFT DIABETIC FOOT ULCER (CMD): ICD-10-CM

## 2021-10-21 DIAGNOSIS — S91.302D OPEN WOUND OF LEFT FOOT, SUBSEQUENT ENCOUNTER: ICD-10-CM

## 2021-10-21 DIAGNOSIS — L97.412 MIDFOOT ULCERATION, RIGHT, WITH FAT LAYER EXPOSED (CMD): ICD-10-CM

## 2021-10-21 DIAGNOSIS — Z89.422 STATUS POST AMPUTATION OF LESSER TOE, LEFT (CMD): Primary | ICD-10-CM

## 2021-10-21 DIAGNOSIS — M86.9 OSTEOMYELITIS OF TOE OF RIGHT FOOT (CMD): ICD-10-CM

## 2021-10-21 DIAGNOSIS — I73.9 PVD (PERIPHERAL VASCULAR DISEASE) (CMD): ICD-10-CM

## 2021-10-21 DIAGNOSIS — E11.69 TYPE 2 DIABETES MELLITUS WITH OTHER SPECIFIED COMPLICATION, UNSPECIFIED WHETHER LONG TERM INSULIN USE (CMD): ICD-10-CM

## 2021-10-21 DIAGNOSIS — Z89.432 S/P TRANSMETATARSAL AMPUTATION OF FOOT, LEFT (CMD): ICD-10-CM

## 2021-10-21 DIAGNOSIS — M86.171 OTHER ACUTE OSTEOMYELITIS OF RIGHT FOOT (CMD): Primary | ICD-10-CM

## 2021-10-21 DIAGNOSIS — M86.171 OTHER ACUTE OSTEOMYELITIS OF RIGHT FOOT (CMD): ICD-10-CM

## 2021-10-21 PROCEDURE — 11042 DBRDMT SUBQ TIS 1ST 20SQCM/<: CPT

## 2021-10-21 PROCEDURE — 10002800 HB RX 250 W HCPCS: Performed by: INTERNAL MEDICINE

## 2021-10-21 PROCEDURE — 96365 THER/PROPH/DIAG IV INF INIT: CPT

## 2021-10-21 PROCEDURE — 96366 THER/PROPH/DIAG IV INF ADDON: CPT

## 2021-10-21 PROCEDURE — 10002807 HB RX 258: Performed by: INTERNAL MEDICINE

## 2021-10-21 RX ORDER — VANCOMYCIN HYDROCHLORIDE 500 MG/10ML
INJECTION, POWDER, LYOPHILIZED, FOR SOLUTION INTRAVENOUS
COMMUNITY
End: 2021-12-02

## 2021-10-21 RX ORDER — DIPHENHYDRAMINE HYDROCHLORIDE 50 MG/ML
25 INJECTION INTRAMUSCULAR; INTRAVENOUS ONCE
Status: CANCELLED
Start: 2021-10-23 | End: 2021-10-25

## 2021-10-21 RX ORDER — CLINDAMYCIN HYDROCHLORIDE 300 MG/1
CAPSULE ORAL
Status: ON HOLD | COMMUNITY
Start: 2021-10-11 | End: 2022-02-03

## 2021-10-21 RX ORDER — CIPROFLOXACIN 500 MG/1
500 TABLET, FILM COATED ORAL EVERY 24 HOURS
Status: ON HOLD | COMMUNITY
Start: 2021-10-15 | End: 2022-02-03

## 2021-10-21 RX ORDER — 0.9 % SODIUM CHLORIDE 0.9 %
10 VIAL (ML) INJECTION PRN
Status: CANCELLED | OUTPATIENT
Start: 2021-10-25

## 2021-10-21 RX ORDER — 0.9 % SODIUM CHLORIDE 0.9 %
10 VIAL (ML) INJECTION PRN
Status: DISCONTINUED | OUTPATIENT
Start: 2021-10-21 | End: 2021-10-23 | Stop reason: HOSPADM

## 2021-10-21 RX ADMIN — SODIUM CHLORIDE 250 ML: 0.9 INJECTION, SOLUTION INTRAVENOUS at 08:37

## 2021-10-21 RX ADMIN — VANCOMYCIN HYDROCHLORIDE 1500 MG: 1 INJECTION, POWDER, LYOPHILIZED, FOR SOLUTION INTRAVENOUS at 08:41

## 2021-10-21 ASSESSMENT — PAIN SCALES - GENERAL
PAINLEVEL: 0
PAINLEVEL_OUTOF10: 0

## 2021-10-22 ENCOUNTER — HOSPITAL ENCOUNTER (OUTPATIENT)
Dept: INFUSION THERAPY | Age: 66
Discharge: STILL ADMITTED OR ADMINISTRATIVE | End: 2021-10-22
Attending: INTERNAL MEDICINE

## 2021-10-22 VITALS
RESPIRATION RATE: 18 BRPM | OXYGEN SATURATION: 97 % | HEART RATE: 81 BPM | DIASTOLIC BLOOD PRESSURE: 72 MMHG | TEMPERATURE: 98.1 F | SYSTOLIC BLOOD PRESSURE: 106 MMHG

## 2021-10-22 DIAGNOSIS — M86.171 OTHER ACUTE OSTEOMYELITIS OF RIGHT FOOT (CMD): Primary | ICD-10-CM

## 2021-10-22 PROCEDURE — 10002800 HB RX 250 W HCPCS: Performed by: INTERNAL MEDICINE

## 2021-10-22 PROCEDURE — 10002807 HB RX 258: Performed by: INTERNAL MEDICINE

## 2021-10-22 PROCEDURE — 96366 THER/PROPH/DIAG IV INF ADDON: CPT

## 2021-10-22 PROCEDURE — 96365 THER/PROPH/DIAG IV INF INIT: CPT

## 2021-10-22 RX ORDER — 0.9 % SODIUM CHLORIDE 0.9 %
10 VIAL (ML) INJECTION PRN
Status: DISCONTINUED | OUTPATIENT
Start: 2021-10-22 | End: 2021-10-24 | Stop reason: HOSPADM

## 2021-10-22 RX ORDER — DIPHENHYDRAMINE HYDROCHLORIDE 50 MG/ML
25 INJECTION INTRAMUSCULAR; INTRAVENOUS ONCE
Status: CANCELLED
Start: 2021-10-23 | End: 2021-10-25

## 2021-10-22 RX ORDER — 0.9 % SODIUM CHLORIDE 0.9 %
10 VIAL (ML) INJECTION PRN
Status: CANCELLED | OUTPATIENT
Start: 2021-10-23

## 2021-10-22 RX ADMIN — SODIUM CHLORIDE 250 ML: 0.9 INJECTION, SOLUTION INTRAVENOUS at 10:02

## 2021-10-22 RX ADMIN — VANCOMYCIN HYDROCHLORIDE 1500 MG: 1 INJECTION, POWDER, LYOPHILIZED, FOR SOLUTION INTRAVENOUS at 10:03

## 2021-10-23 ENCOUNTER — HOSPITAL ENCOUNTER (OUTPATIENT)
Dept: INFUSION THERAPY | Age: 66
Discharge: STILL ADMITTED OR ADMINISTRATIVE | End: 2021-10-23
Attending: INTERNAL MEDICINE

## 2021-10-23 ENCOUNTER — APPOINTMENT (OUTPATIENT)
Dept: INFUSION THERAPY | Age: 66
End: 2021-10-23

## 2021-10-23 VITALS
OXYGEN SATURATION: 96 % | SYSTOLIC BLOOD PRESSURE: 117 MMHG | DIASTOLIC BLOOD PRESSURE: 79 MMHG | HEART RATE: 77 BPM | TEMPERATURE: 97.8 F | RESPIRATION RATE: 16 BRPM

## 2021-10-23 DIAGNOSIS — M86.171 OTHER ACUTE OSTEOMYELITIS OF RIGHT FOOT (CMD): Primary | ICD-10-CM

## 2021-10-23 PROCEDURE — 10002800 HB RX 250 W HCPCS: Performed by: INTERNAL MEDICINE

## 2021-10-23 PROCEDURE — 10002807 HB RX 258: Performed by: INTERNAL MEDICINE

## 2021-10-23 PROCEDURE — 96365 THER/PROPH/DIAG IV INF INIT: CPT

## 2021-10-23 PROCEDURE — 96366 THER/PROPH/DIAG IV INF ADDON: CPT

## 2021-10-23 RX ORDER — 0.9 % SODIUM CHLORIDE 0.9 %
10 VIAL (ML) INJECTION PRN
Status: DISCONTINUED | OUTPATIENT
Start: 2021-10-23 | End: 2021-10-25 | Stop reason: HOSPADM

## 2021-10-23 RX ORDER — DIPHENHYDRAMINE HYDROCHLORIDE 50 MG/ML
25 INJECTION INTRAMUSCULAR; INTRAVENOUS ONCE
Status: CANCELLED
Start: 2021-10-24 | End: 2021-10-25

## 2021-10-23 RX ORDER — 0.9 % SODIUM CHLORIDE 0.9 %
10 VIAL (ML) INJECTION PRN
Status: CANCELLED | OUTPATIENT
Start: 2021-10-24

## 2021-10-23 RX ADMIN — VANCOMYCIN HYDROCHLORIDE 1500 MG: 10 INJECTION, POWDER, LYOPHILIZED, FOR SOLUTION INTRAVENOUS at 11:09

## 2021-10-23 RX ADMIN — SODIUM CHLORIDE 250 ML: 0.9 INJECTION, SOLUTION INTRAVENOUS at 10:48

## 2021-10-24 ENCOUNTER — HOSPITAL ENCOUNTER (OUTPATIENT)
Dept: INFUSION THERAPY | Age: 66
Discharge: STILL ADMITTED OR ADMINISTRATIVE | End: 2021-10-24
Attending: INTERNAL MEDICINE

## 2021-10-24 ENCOUNTER — APPOINTMENT (OUTPATIENT)
Dept: INFUSION THERAPY | Age: 66
End: 2021-10-24

## 2021-10-24 VITALS
HEART RATE: 84 BPM | DIASTOLIC BLOOD PRESSURE: 67 MMHG | TEMPERATURE: 97.5 F | OXYGEN SATURATION: 95 % | RESPIRATION RATE: 18 BRPM | SYSTOLIC BLOOD PRESSURE: 121 MMHG

## 2021-10-24 DIAGNOSIS — M86.171 OTHER ACUTE OSTEOMYELITIS OF RIGHT FOOT (CMD): Primary | ICD-10-CM

## 2021-10-24 PROCEDURE — 10002800 HB RX 250 W HCPCS: Performed by: INTERNAL MEDICINE

## 2021-10-24 PROCEDURE — 10002807 HB RX 258: Performed by: INTERNAL MEDICINE

## 2021-10-24 PROCEDURE — 96366 THER/PROPH/DIAG IV INF ADDON: CPT

## 2021-10-24 PROCEDURE — 10004651 HB RX, NO CHARGE ITEM: Performed by: INTERNAL MEDICINE

## 2021-10-24 PROCEDURE — 96365 THER/PROPH/DIAG IV INF INIT: CPT

## 2021-10-24 RX ORDER — DIPHENHYDRAMINE HYDROCHLORIDE 50 MG/ML
25 INJECTION INTRAMUSCULAR; INTRAVENOUS ONCE
Status: CANCELLED
Start: 2021-10-25 | End: 2021-10-25

## 2021-10-24 RX ORDER — 0.9 % SODIUM CHLORIDE 0.9 %
10 VIAL (ML) INJECTION PRN
Status: DISCONTINUED | OUTPATIENT
Start: 2021-10-24 | End: 2021-10-26 | Stop reason: HOSPADM

## 2021-10-24 RX ORDER — 0.9 % SODIUM CHLORIDE 0.9 %
10 VIAL (ML) INJECTION PRN
Status: CANCELLED | OUTPATIENT
Start: 2021-10-25

## 2021-10-24 RX ADMIN — VANCOMYCIN HYDROCHLORIDE 1500 MG: 1 INJECTION, POWDER, LYOPHILIZED, FOR SOLUTION INTRAVENOUS at 08:32

## 2021-10-24 RX ADMIN — SODIUM CHLORIDE, PRESERVATIVE FREE 10 ML: 5 INJECTION INTRAVENOUS at 10:23

## 2021-10-24 RX ADMIN — SODIUM CHLORIDE, PRESERVATIVE FREE 10 ML: 5 INJECTION INTRAVENOUS at 08:27

## 2021-10-24 RX ADMIN — SODIUM CHLORIDE 250 ML: 0.9 INJECTION, SOLUTION INTRAVENOUS at 08:30

## 2021-10-24 ASSESSMENT — PAIN SCALES - GENERAL: PAINLEVEL_OUTOF10: 0

## 2021-10-25 ENCOUNTER — APPOINTMENT (OUTPATIENT)
Dept: INFUSION THERAPY | Age: 66
End: 2021-10-25

## 2021-10-25 ENCOUNTER — HOSPITAL ENCOUNTER (OUTPATIENT)
Dept: INFUSION THERAPY | Age: 66
Discharge: STILL ADMITTED OR ADMINISTRATIVE | End: 2021-10-25
Attending: INTERNAL MEDICINE

## 2021-10-25 VITALS
OXYGEN SATURATION: 95 % | TEMPERATURE: 97 F | HEART RATE: 84 BPM | RESPIRATION RATE: 20 BRPM | DIASTOLIC BLOOD PRESSURE: 77 MMHG | SYSTOLIC BLOOD PRESSURE: 122 MMHG

## 2021-10-25 DIAGNOSIS — M86.171 OTHER ACUTE OSTEOMYELITIS OF RIGHT FOOT (CMD): Primary | ICD-10-CM

## 2021-10-25 LAB
ALBUMIN SERPL-MCNC: 2.9 G/DL (ref 3.6–5.1)
ALBUMIN/GLOB SERPL: 0.6 {RATIO} (ref 1–2.4)
ALP SERPL-CCNC: 109 UNITS/L (ref 45–117)
ALT SERPL-CCNC: 22 UNITS/L
ANION GAP SERPL CALC-SCNC: 5 MMOL/L (ref 10–20)
AST SERPL-CCNC: 24 UNITS/L
BASOPHILS # BLD: 0.1 K/MCL (ref 0–0.3)
BASOPHILS NFR BLD: 1 %
BILIRUB SERPL-MCNC: 0.4 MG/DL (ref 0.2–1)
BUN SERPL-MCNC: 45 MG/DL (ref 6–20)
BUN/CREAT SERPL: 26 (ref 7–25)
CALCIUM SERPL-MCNC: 8.8 MG/DL (ref 8.4–10.2)
CHLORIDE SERPL-SCNC: 105 MMOL/L (ref 98–107)
CO2 SERPL-SCNC: 31 MMOL/L (ref 21–32)
CREAT SERPL-MCNC: 1.76 MG/DL (ref 0.67–1.17)
DEPRECATED RDW RBC: 47.4 FL (ref 39–50)
EOSINOPHIL # BLD: 0.3 K/MCL (ref 0–0.5)
EOSINOPHIL NFR BLD: 4 %
ERYTHROCYTE [DISTWIDTH] IN BLOOD: 14.7 % (ref 11–15)
ERYTHROCYTE [SEDIMENTATION RATE] IN BLOOD BY WESTERGREN METHOD: >120 MM/HR (ref 0–20)
FASTING DURATION TIME PATIENT: ABNORMAL H
GFR SERPLBLD BASED ON 1.73 SQ M-ARVRAT: 39 ML/MIN
GLOBULIN SER-MCNC: 4.7 G/DL (ref 2–4)
GLUCOSE SERPL-MCNC: 87 MG/DL (ref 70–99)
HCT VFR BLD CALC: 37.8 % (ref 39–51)
HGB BLD-MCNC: 11.9 G/DL (ref 13–17)
IMM GRANULOCYTES # BLD AUTO: 0 K/MCL (ref 0–0.2)
IMM GRANULOCYTES # BLD: 0 %
LYMPHOCYTES # BLD: 2.3 K/MCL (ref 1–4)
LYMPHOCYTES NFR BLD: 26 %
MCH RBC QN AUTO: 27.6 PG (ref 26–34)
MCHC RBC AUTO-ENTMCNC: 31.5 G/DL (ref 32–36.5)
MCV RBC AUTO: 87.7 FL (ref 78–100)
MONOCYTES # BLD: 0.7 K/MCL (ref 0.3–0.9)
MONOCYTES NFR BLD: 8 %
NEUTROPHILS # BLD: 5.5 K/MCL (ref 1.8–7.7)
NEUTROPHILS NFR BLD: 61 %
NRBC BLD MANUAL-RTO: 0 /100 WBC
PLATELET # BLD AUTO: 321 K/MCL (ref 140–450)
POTASSIUM SERPL-SCNC: 4.4 MMOL/L (ref 3.4–5.1)
PROT SERPL-MCNC: 7.6 G/DL (ref 6.4–8.2)
RAINBOW EXTRA TUBES HOLD SPECIMEN: NORMAL
RBC # BLD: 4.31 MIL/MCL (ref 4.5–5.9)
SODIUM SERPL-SCNC: 137 MMOL/L (ref 135–145)
VANCOMYCIN TROUGH SERPL-MCNC: 20.2 MCG/ML (ref 10–20)
WBC # BLD: 9 K/MCL (ref 4.2–11)

## 2021-10-25 PROCEDURE — 85652 RBC SED RATE AUTOMATED: CPT | Performed by: INTERNAL MEDICINE

## 2021-10-25 PROCEDURE — 80202 ASSAY OF VANCOMYCIN: CPT | Performed by: INTERNAL MEDICINE

## 2021-10-25 PROCEDURE — 85025 COMPLETE CBC W/AUTO DIFF WBC: CPT | Performed by: INTERNAL MEDICINE

## 2021-10-25 PROCEDURE — 96366 THER/PROPH/DIAG IV INF ADDON: CPT

## 2021-10-25 PROCEDURE — 36592 COLLECT BLOOD FROM PICC: CPT

## 2021-10-25 PROCEDURE — 10002807 HB RX 258: Performed by: INTERNAL MEDICINE

## 2021-10-25 PROCEDURE — 10002800 HB RX 250 W HCPCS: Performed by: INTERNAL MEDICINE

## 2021-10-25 PROCEDURE — 10004651 HB RX, NO CHARGE ITEM: Performed by: INTERNAL MEDICINE

## 2021-10-25 PROCEDURE — 80053 COMPREHEN METABOLIC PANEL: CPT | Performed by: INTERNAL MEDICINE

## 2021-10-25 PROCEDURE — 96365 THER/PROPH/DIAG IV INF INIT: CPT

## 2021-10-25 RX ORDER — 0.9 % SODIUM CHLORIDE 0.9 %
10 VIAL (ML) INJECTION PRN
Status: DISCONTINUED | OUTPATIENT
Start: 2021-10-25 | End: 2021-10-27 | Stop reason: HOSPADM

## 2021-10-25 RX ORDER — 0.9 % SODIUM CHLORIDE 0.9 %
10 VIAL (ML) INJECTION PRN
Status: CANCELLED | OUTPATIENT
Start: 2021-10-25

## 2021-10-25 RX ORDER — DIPHENHYDRAMINE HYDROCHLORIDE 50 MG/ML
25 INJECTION INTRAMUSCULAR; INTRAVENOUS ONCE
Status: CANCELLED
Start: 2021-10-25 | End: 2021-11-01

## 2021-10-25 RX ADMIN — VANCOMYCIN HYDROCHLORIDE 1250 MG: 10 INJECTION, POWDER, LYOPHILIZED, FOR SOLUTION INTRAVENOUS at 11:16

## 2021-10-25 RX ADMIN — SODIUM CHLORIDE, PRESERVATIVE FREE 10 ML: 5 INJECTION INTRAVENOUS at 12:55

## 2021-10-25 RX ADMIN — SODIUM CHLORIDE 250 ML: 0.9 INJECTION, SOLUTION INTRAVENOUS at 11:16

## 2021-10-25 ASSESSMENT — PAIN SCALES - GENERAL: PAINLEVEL_OUTOF10: 0

## 2021-10-26 ENCOUNTER — APPOINTMENT (OUTPATIENT)
Dept: INFUSION THERAPY | Age: 66
End: 2021-10-26

## 2021-10-26 ENCOUNTER — HOSPITAL ENCOUNTER (OUTPATIENT)
Dept: INFUSION THERAPY | Age: 66
Discharge: STILL ADMITTED OR ADMINISTRATIVE | End: 2021-10-26
Attending: INTERNAL MEDICINE

## 2021-10-26 VITALS
SYSTOLIC BLOOD PRESSURE: 125 MMHG | HEART RATE: 80 BPM | DIASTOLIC BLOOD PRESSURE: 72 MMHG | RESPIRATION RATE: 18 BRPM | TEMPERATURE: 97.9 F | OXYGEN SATURATION: 96 %

## 2021-10-26 DIAGNOSIS — M86.171 OTHER ACUTE OSTEOMYELITIS OF RIGHT FOOT (CMD): Primary | ICD-10-CM

## 2021-10-26 PROCEDURE — 10002800 HB RX 250 W HCPCS: Performed by: INTERNAL MEDICINE

## 2021-10-26 PROCEDURE — 10002807 HB RX 258: Performed by: INTERNAL MEDICINE

## 2021-10-26 PROCEDURE — 96366 THER/PROPH/DIAG IV INF ADDON: CPT

## 2021-10-26 PROCEDURE — 96365 THER/PROPH/DIAG IV INF INIT: CPT

## 2021-10-26 RX ORDER — DIPHENHYDRAMINE HYDROCHLORIDE 50 MG/ML
25 INJECTION INTRAMUSCULAR; INTRAVENOUS ONCE
Status: CANCELLED
Start: 2021-10-26 | End: 2021-11-01

## 2021-10-26 RX ORDER — 0.9 % SODIUM CHLORIDE 0.9 %
10 VIAL (ML) INJECTION PRN
Status: DISCONTINUED | OUTPATIENT
Start: 2021-10-26 | End: 2021-10-28 | Stop reason: HOSPADM

## 2021-10-26 RX ORDER — 0.9 % SODIUM CHLORIDE 0.9 %
10 VIAL (ML) INJECTION PRN
Status: CANCELLED | OUTPATIENT
Start: 2021-10-26

## 2021-10-26 RX ADMIN — SODIUM CHLORIDE 250 ML: 0.9 INJECTION, SOLUTION INTRAVENOUS at 09:39

## 2021-10-26 RX ADMIN — VANCOMYCIN HYDROCHLORIDE 1250 MG: 1 INJECTION, POWDER, LYOPHILIZED, FOR SOLUTION INTRAVENOUS at 09:38

## 2021-10-26 ASSESSMENT — PAIN SCALES - GENERAL: PAINLEVEL_OUTOF10: 0

## 2021-10-27 ENCOUNTER — APPOINTMENT (OUTPATIENT)
Dept: INFUSION THERAPY | Age: 66
End: 2021-10-27

## 2021-10-27 ENCOUNTER — HOSPITAL ENCOUNTER (OUTPATIENT)
Dept: INFUSION THERAPY | Age: 66
Discharge: STILL ADMITTED OR ADMINISTRATIVE | End: 2021-10-27
Attending: INTERNAL MEDICINE

## 2021-10-27 VITALS
TEMPERATURE: 97.5 F | RESPIRATION RATE: 18 BRPM | SYSTOLIC BLOOD PRESSURE: 131 MMHG | OXYGEN SATURATION: 93 % | HEART RATE: 81 BPM | DIASTOLIC BLOOD PRESSURE: 73 MMHG

## 2021-10-27 DIAGNOSIS — M86.171 OTHER ACUTE OSTEOMYELITIS OF RIGHT FOOT (CMD): Primary | ICD-10-CM

## 2021-10-27 PROCEDURE — 96366 THER/PROPH/DIAG IV INF ADDON: CPT

## 2021-10-27 PROCEDURE — 10002807 HB RX 258: Performed by: INTERNAL MEDICINE

## 2021-10-27 PROCEDURE — 96365 THER/PROPH/DIAG IV INF INIT: CPT

## 2021-10-27 PROCEDURE — 10002800 HB RX 250 W HCPCS: Performed by: INTERNAL MEDICINE

## 2021-10-27 PROCEDURE — 10004651 HB RX, NO CHARGE ITEM: Performed by: INTERNAL MEDICINE

## 2021-10-27 RX ORDER — 0.9 % SODIUM CHLORIDE 0.9 %
10 VIAL (ML) INJECTION PRN
Status: DISCONTINUED | OUTPATIENT
Start: 2021-10-27 | End: 2021-10-29 | Stop reason: HOSPADM

## 2021-10-27 RX ORDER — DIPHENHYDRAMINE HYDROCHLORIDE 50 MG/ML
25 INJECTION INTRAMUSCULAR; INTRAVENOUS ONCE
Status: CANCELLED
Start: 2021-10-29 | End: 2021-10-28

## 2021-10-27 RX ORDER — 0.9 % SODIUM CHLORIDE 0.9 %
10 VIAL (ML) INJECTION PRN
Status: CANCELLED | OUTPATIENT
Start: 2021-10-28

## 2021-10-27 RX ADMIN — SODIUM CHLORIDE 250 ML: 0.9 INJECTION, SOLUTION INTRAVENOUS at 10:10

## 2021-10-27 RX ADMIN — SODIUM CHLORIDE, PRESERVATIVE FREE 10 ML: 5 INJECTION INTRAVENOUS at 10:08

## 2021-10-27 RX ADMIN — VANCOMYCIN HYDROCHLORIDE 1250 MG: 1 INJECTION, POWDER, LYOPHILIZED, FOR SOLUTION INTRAVENOUS at 10:11

## 2021-10-27 RX ADMIN — SODIUM CHLORIDE, PRESERVATIVE FREE 10 ML: 5 INJECTION INTRAVENOUS at 11:50

## 2021-10-27 ASSESSMENT — PAIN SCALES - GENERAL: PAINLEVEL_OUTOF10: 0

## 2021-10-28 ENCOUNTER — HOSPITAL ENCOUNTER (OUTPATIENT)
Dept: INFUSION THERAPY | Age: 66
Discharge: STILL ADMITTED OR ADMINISTRATIVE | End: 2021-10-28
Attending: INTERNAL MEDICINE

## 2021-10-28 ENCOUNTER — APPOINTMENT (OUTPATIENT)
Dept: INFUSION THERAPY | Age: 66
End: 2021-10-28

## 2021-10-28 VITALS
SYSTOLIC BLOOD PRESSURE: 130 MMHG | HEART RATE: 80 BPM | TEMPERATURE: 97.2 F | DIASTOLIC BLOOD PRESSURE: 74 MMHG | OXYGEN SATURATION: 95 % | RESPIRATION RATE: 17 BRPM

## 2021-10-28 DIAGNOSIS — M86.171 OTHER ACUTE OSTEOMYELITIS OF RIGHT FOOT (CMD): Primary | ICD-10-CM

## 2021-10-28 LAB — VANCOMYCIN TROUGH SERPL-MCNC: 14.2 MCG/ML (ref 10–20)

## 2021-10-28 PROCEDURE — 10002807 HB RX 258: Performed by: INTERNAL MEDICINE

## 2021-10-28 PROCEDURE — 96365 THER/PROPH/DIAG IV INF INIT: CPT

## 2021-10-28 PROCEDURE — 10002800 HB RX 250 W HCPCS: Performed by: INTERNAL MEDICINE

## 2021-10-28 PROCEDURE — 80202 ASSAY OF VANCOMYCIN: CPT | Performed by: INTERNAL MEDICINE

## 2021-10-28 PROCEDURE — 96366 THER/PROPH/DIAG IV INF ADDON: CPT

## 2021-10-28 RX ORDER — 0.9 % SODIUM CHLORIDE 0.9 %
10 VIAL (ML) INJECTION PRN
Status: DISCONTINUED | OUTPATIENT
Start: 2021-10-28 | End: 2021-10-30 | Stop reason: HOSPADM

## 2021-10-28 RX ORDER — 0.9 % SODIUM CHLORIDE 0.9 %
10 VIAL (ML) INJECTION PRN
Status: CANCELLED | OUTPATIENT
Start: 2021-10-29

## 2021-10-28 RX ORDER — DIPHENHYDRAMINE HYDROCHLORIDE 50 MG/ML
25 INJECTION INTRAMUSCULAR; INTRAVENOUS ONCE
Status: CANCELLED
Start: 2021-10-29 | End: 2021-11-01

## 2021-10-28 RX ADMIN — VANCOMYCIN HYDROCHLORIDE 1250 MG: 1 INJECTION, POWDER, LYOPHILIZED, FOR SOLUTION INTRAVENOUS at 11:38

## 2021-10-28 RX ADMIN — SODIUM CHLORIDE 250 ML: 0.9 INJECTION, SOLUTION INTRAVENOUS at 11:36

## 2021-10-29 ENCOUNTER — HOSPITAL ENCOUNTER (OUTPATIENT)
Dept: INFUSION THERAPY | Age: 66
Discharge: STILL ADMITTED OR ADMINISTRATIVE | End: 2021-10-29
Attending: INTERNAL MEDICINE

## 2021-10-29 ENCOUNTER — APPOINTMENT (OUTPATIENT)
Dept: INFUSION THERAPY | Age: 66
End: 2021-10-29

## 2021-10-29 VITALS
OXYGEN SATURATION: 97 % | DIASTOLIC BLOOD PRESSURE: 72 MMHG | SYSTOLIC BLOOD PRESSURE: 110 MMHG | TEMPERATURE: 97.8 F | RESPIRATION RATE: 16 BRPM | HEART RATE: 85 BPM

## 2021-10-29 DIAGNOSIS — M86.171 OTHER ACUTE OSTEOMYELITIS OF RIGHT FOOT (CMD): Primary | ICD-10-CM

## 2021-10-29 PROCEDURE — 96365 THER/PROPH/DIAG IV INF INIT: CPT

## 2021-10-29 PROCEDURE — 96366 THER/PROPH/DIAG IV INF ADDON: CPT

## 2021-10-29 PROCEDURE — 10002807 HB RX 258: Performed by: INTERNAL MEDICINE

## 2021-10-29 PROCEDURE — 10002800 HB RX 250 W HCPCS: Performed by: INTERNAL MEDICINE

## 2021-10-29 RX ORDER — DIPHENHYDRAMINE HYDROCHLORIDE 50 MG/ML
25 INJECTION INTRAMUSCULAR; INTRAVENOUS ONCE
Status: CANCELLED
Start: 2021-10-30 | End: 2021-11-01

## 2021-10-29 RX ORDER — 0.9 % SODIUM CHLORIDE 0.9 %
10 VIAL (ML) INJECTION PRN
Status: DISCONTINUED | OUTPATIENT
Start: 2021-10-29 | End: 2021-10-31 | Stop reason: HOSPADM

## 2021-10-29 RX ORDER — 0.9 % SODIUM CHLORIDE 0.9 %
10 VIAL (ML) INJECTION PRN
Status: CANCELLED | OUTPATIENT
Start: 2021-10-30

## 2021-10-29 RX ADMIN — SODIUM CHLORIDE 250 ML: 0.9 INJECTION, SOLUTION INTRAVENOUS at 11:24

## 2021-10-29 RX ADMIN — VANCOMYCIN HYDROCHLORIDE 1250 MG: 1 INJECTION, POWDER, LYOPHILIZED, FOR SOLUTION INTRAVENOUS at 11:25

## 2021-10-29 ASSESSMENT — PAIN SCALES - GENERAL: PAINLEVEL_OUTOF10: 0

## 2021-10-30 ENCOUNTER — HOSPITAL ENCOUNTER (OUTPATIENT)
Dept: INFUSION THERAPY | Age: 66
Discharge: STILL ADMITTED OR ADMINISTRATIVE | End: 2021-10-30
Attending: INTERNAL MEDICINE

## 2021-10-30 ENCOUNTER — APPOINTMENT (OUTPATIENT)
Dept: INFUSION THERAPY | Age: 66
End: 2021-10-30

## 2021-10-30 VITALS
SYSTOLIC BLOOD PRESSURE: 109 MMHG | RESPIRATION RATE: 16 BRPM | HEART RATE: 81 BPM | DIASTOLIC BLOOD PRESSURE: 70 MMHG | TEMPERATURE: 97.6 F | OXYGEN SATURATION: 97 %

## 2021-10-30 DIAGNOSIS — M86.171 OTHER ACUTE OSTEOMYELITIS OF RIGHT FOOT (CMD): Primary | ICD-10-CM

## 2021-10-30 PROCEDURE — 96365 THER/PROPH/DIAG IV INF INIT: CPT

## 2021-10-30 PROCEDURE — 10002807 HB RX 258: Performed by: INTERNAL MEDICINE

## 2021-10-30 PROCEDURE — 96366 THER/PROPH/DIAG IV INF ADDON: CPT

## 2021-10-30 PROCEDURE — 10002800 HB RX 250 W HCPCS: Performed by: INTERNAL MEDICINE

## 2021-10-30 RX ORDER — 0.9 % SODIUM CHLORIDE 0.9 %
10 VIAL (ML) INJECTION PRN
Status: DISCONTINUED | OUTPATIENT
Start: 2021-10-30 | End: 2021-11-01 | Stop reason: HOSPADM

## 2021-10-30 RX ORDER — 0.9 % SODIUM CHLORIDE 0.9 %
10 VIAL (ML) INJECTION PRN
Status: CANCELLED | OUTPATIENT
Start: 2021-10-31

## 2021-10-30 RX ORDER — DIPHENHYDRAMINE HYDROCHLORIDE 50 MG/ML
25 INJECTION INTRAMUSCULAR; INTRAVENOUS ONCE
Status: CANCELLED
Start: 2021-10-31 | End: 2021-11-01

## 2021-10-30 RX ADMIN — VANCOMYCIN HYDROCHLORIDE 1250 MG: 1 INJECTION, POWDER, LYOPHILIZED, FOR SOLUTION INTRAVENOUS at 08:49

## 2021-10-30 RX ADMIN — SODIUM CHLORIDE 250 ML: 0.9 INJECTION, SOLUTION INTRAVENOUS at 08:48

## 2021-10-30 ASSESSMENT — PAIN SCALES - GENERAL
PAINLEVEL_OUTOF10: 0
PAINLEVEL: 0

## 2021-10-31 ENCOUNTER — APPOINTMENT (OUTPATIENT)
Dept: INFUSION THERAPY | Age: 66
End: 2021-10-31

## 2021-10-31 ENCOUNTER — HOSPITAL ENCOUNTER (OUTPATIENT)
Dept: INFUSION THERAPY | Age: 66
Discharge: STILL ADMITTED OR ADMINISTRATIVE | End: 2021-10-31
Attending: INTERNAL MEDICINE

## 2021-10-31 VITALS
OXYGEN SATURATION: 97 % | RESPIRATION RATE: 16 BRPM | SYSTOLIC BLOOD PRESSURE: 121 MMHG | TEMPERATURE: 97.6 F | HEART RATE: 81 BPM | DIASTOLIC BLOOD PRESSURE: 71 MMHG

## 2021-10-31 DIAGNOSIS — M86.171 OTHER ACUTE OSTEOMYELITIS OF RIGHT FOOT (CMD): Primary | ICD-10-CM

## 2021-10-31 PROCEDURE — 10002800 HB RX 250 W HCPCS: Performed by: INTERNAL MEDICINE

## 2021-10-31 PROCEDURE — 10004651 HB RX, NO CHARGE ITEM: Performed by: INTERNAL MEDICINE

## 2021-10-31 PROCEDURE — 96366 THER/PROPH/DIAG IV INF ADDON: CPT

## 2021-10-31 PROCEDURE — 96365 THER/PROPH/DIAG IV INF INIT: CPT

## 2021-10-31 PROCEDURE — 10002807 HB RX 258: Performed by: INTERNAL MEDICINE

## 2021-10-31 RX ORDER — 0.9 % SODIUM CHLORIDE 0.9 %
10 VIAL (ML) INJECTION PRN
Status: DISCONTINUED | OUTPATIENT
Start: 2021-10-31 | End: 2021-11-02 | Stop reason: HOSPADM

## 2021-10-31 RX ORDER — DIPHENHYDRAMINE HYDROCHLORIDE 50 MG/ML
25 INJECTION INTRAMUSCULAR; INTRAVENOUS ONCE
Status: CANCELLED
Start: 2021-11-01 | End: 2021-11-01

## 2021-10-31 RX ORDER — 0.9 % SODIUM CHLORIDE 0.9 %
10 VIAL (ML) INJECTION PRN
Status: CANCELLED | OUTPATIENT
Start: 2021-11-01

## 2021-10-31 RX ADMIN — SODIUM CHLORIDE, PRESERVATIVE FREE 10 ML: 5 INJECTION INTRAVENOUS at 10:25

## 2021-10-31 RX ADMIN — SODIUM CHLORIDE 250 ML: 0.9 INJECTION, SOLUTION INTRAVENOUS at 08:42

## 2021-10-31 RX ADMIN — VANCOMYCIN HYDROCHLORIDE 1250 MG: 1 INJECTION, POWDER, LYOPHILIZED, FOR SOLUTION INTRAVENOUS at 08:43

## 2021-10-31 ASSESSMENT — PAIN SCALES - GENERAL
PAINLEVEL_OUTOF10: 0
PAINLEVEL: 0

## 2021-11-01 ENCOUNTER — HOSPITAL ENCOUNTER (OUTPATIENT)
Dept: INFUSION THERAPY | Age: 66
Discharge: STILL ADMITTED OR ADMINISTRATIVE | End: 2021-11-01
Attending: INTERNAL MEDICINE

## 2021-11-01 ENCOUNTER — APPOINTMENT (OUTPATIENT)
Dept: INFUSION THERAPY | Age: 66
End: 2021-11-01

## 2021-11-01 VITALS
HEART RATE: 81 BPM | RESPIRATION RATE: 20 BRPM | OXYGEN SATURATION: 99 % | DIASTOLIC BLOOD PRESSURE: 79 MMHG | SYSTOLIC BLOOD PRESSURE: 121 MMHG | TEMPERATURE: 98.2 F

## 2021-11-01 DIAGNOSIS — M86.171 OTHER ACUTE OSTEOMYELITIS OF RIGHT FOOT (CMD): Primary | ICD-10-CM

## 2021-11-01 LAB
ALBUMIN SERPL-MCNC: 3 G/DL (ref 3.6–5.1)
ALBUMIN/GLOB SERPL: 0.6 {RATIO} (ref 1–2.4)
ALP SERPL-CCNC: 114 UNITS/L (ref 45–117)
ALT SERPL-CCNC: 31 UNITS/L
ANION GAP SERPL CALC-SCNC: 8 MMOL/L (ref 10–20)
AST SERPL-CCNC: 25 UNITS/L
BASOPHILS # BLD: 0.1 K/MCL (ref 0–0.3)
BASOPHILS NFR BLD: 1 %
BILIRUB SERPL-MCNC: 0.4 MG/DL (ref 0.2–1)
BUN SERPL-MCNC: 43 MG/DL (ref 6–20)
BUN/CREAT SERPL: 23 (ref 7–25)
CALCIUM SERPL-MCNC: 8.7 MG/DL (ref 8.4–10.2)
CHLORIDE SERPL-SCNC: 105 MMOL/L (ref 98–107)
CO2 SERPL-SCNC: 28 MMOL/L (ref 21–32)
CREAT SERPL-MCNC: 1.88 MG/DL (ref 0.67–1.17)
DEPRECATED RDW RBC: 48.6 FL (ref 39–50)
EOSINOPHIL # BLD: 0.5 K/MCL (ref 0–0.5)
EOSINOPHIL NFR BLD: 5 %
ERYTHROCYTE [DISTWIDTH] IN BLOOD: 15.4 % (ref 11–15)
ERYTHROCYTE [SEDIMENTATION RATE] IN BLOOD BY WESTERGREN METHOD: 102 MM/HR (ref 0–20)
FASTING DURATION TIME PATIENT: ABNORMAL H
GFR SERPLBLD BASED ON 1.73 SQ M-ARVRAT: 36 ML/MIN
GLOBULIN SER-MCNC: 5 G/DL (ref 2–4)
GLUCOSE SERPL-MCNC: 142 MG/DL (ref 70–99)
HCT VFR BLD CALC: 37 % (ref 39–51)
HGB BLD-MCNC: 11.5 G/DL (ref 13–17)
IMM GRANULOCYTES # BLD AUTO: 0 K/MCL (ref 0–0.2)
IMM GRANULOCYTES # BLD: 0 %
LYMPHOCYTES # BLD: 2 K/MCL (ref 1–4)
LYMPHOCYTES NFR BLD: 22 %
MCH RBC QN AUTO: 27.2 PG (ref 26–34)
MCHC RBC AUTO-ENTMCNC: 31.1 G/DL (ref 32–36.5)
MCV RBC AUTO: 87.5 FL (ref 78–100)
MONOCYTES # BLD: 0.6 K/MCL (ref 0.3–0.9)
MONOCYTES NFR BLD: 6 %
NEUTROPHILS # BLD: 5.8 K/MCL (ref 1.8–7.7)
NEUTROPHILS NFR BLD: 66 %
NRBC BLD MANUAL-RTO: 0 /100 WBC
PLATELET # BLD AUTO: 287 K/MCL (ref 140–450)
POTASSIUM SERPL-SCNC: 4.2 MMOL/L (ref 3.4–5.1)
PROT SERPL-MCNC: 8 G/DL (ref 6.4–8.2)
RAINBOW EXTRA TUBES HOLD SPECIMEN: NORMAL
RBC # BLD: 4.23 MIL/MCL (ref 4.5–5.9)
SODIUM SERPL-SCNC: 137 MMOL/L (ref 135–145)
VANCOMYCIN TROUGH SERPL-MCNC: 17.6 MCG/ML (ref 10–20)
WBC # BLD: 8.9 K/MCL (ref 4.2–11)

## 2021-11-01 PROCEDURE — 10002800 HB RX 250 W HCPCS: Performed by: INTERNAL MEDICINE

## 2021-11-01 PROCEDURE — 80053 COMPREHEN METABOLIC PANEL: CPT | Performed by: INTERNAL MEDICINE

## 2021-11-01 PROCEDURE — 85652 RBC SED RATE AUTOMATED: CPT | Performed by: INTERNAL MEDICINE

## 2021-11-01 PROCEDURE — 85025 COMPLETE CBC W/AUTO DIFF WBC: CPT | Performed by: INTERNAL MEDICINE

## 2021-11-01 PROCEDURE — 10002807 HB RX 258: Performed by: INTERNAL MEDICINE

## 2021-11-01 PROCEDURE — 96365 THER/PROPH/DIAG IV INF INIT: CPT

## 2021-11-01 PROCEDURE — 80202 ASSAY OF VANCOMYCIN: CPT | Performed by: INTERNAL MEDICINE

## 2021-11-01 PROCEDURE — 96366 THER/PROPH/DIAG IV INF ADDON: CPT

## 2021-11-01 PROCEDURE — 36592 COLLECT BLOOD FROM PICC: CPT

## 2021-11-01 RX ORDER — 0.9 % SODIUM CHLORIDE 0.9 %
10 VIAL (ML) INJECTION PRN
Status: DISCONTINUED | OUTPATIENT
Start: 2021-11-01 | End: 2021-11-03 | Stop reason: HOSPADM

## 2021-11-01 RX ORDER — 0.9 % SODIUM CHLORIDE 0.9 %
10 VIAL (ML) INJECTION PRN
Status: CANCELLED | OUTPATIENT
Start: 2021-11-02

## 2021-11-01 RX ORDER — DIPHENHYDRAMINE HYDROCHLORIDE 50 MG/ML
25 INJECTION INTRAMUSCULAR; INTRAVENOUS ONCE
Status: CANCELLED
Start: 2021-11-02 | End: 2021-11-08

## 2021-11-01 RX ADMIN — VANCOMYCIN HYDROCHLORIDE 1250 MG: 1 INJECTION, POWDER, LYOPHILIZED, FOR SOLUTION INTRAVENOUS at 08:49

## 2021-11-01 RX ADMIN — SODIUM CHLORIDE 250 ML: 0.9 INJECTION, SOLUTION INTRAVENOUS at 08:48

## 2021-11-01 ASSESSMENT — PAIN SCALES - GENERAL
PAINLEVEL: 0
PAINLEVEL_OUTOF10: 0

## 2021-11-02 ENCOUNTER — HOSPITAL ENCOUNTER (OUTPATIENT)
Dept: INFUSION THERAPY | Age: 66
Discharge: STILL ADMITTED OR ADMINISTRATIVE | End: 2021-11-02
Attending: INTERNAL MEDICINE

## 2021-11-02 ENCOUNTER — APPOINTMENT (OUTPATIENT)
Dept: INFUSION THERAPY | Age: 66
End: 2021-11-02

## 2021-11-02 VITALS
DIASTOLIC BLOOD PRESSURE: 68 MMHG | RESPIRATION RATE: 18 BRPM | SYSTOLIC BLOOD PRESSURE: 125 MMHG | OXYGEN SATURATION: 99 % | TEMPERATURE: 97.5 F | HEART RATE: 91 BPM

## 2021-11-02 DIAGNOSIS — M86.171 OTHER ACUTE OSTEOMYELITIS OF RIGHT FOOT (CMD): Primary | ICD-10-CM

## 2021-11-02 PROCEDURE — 10002807 HB RX 258: Performed by: INTERNAL MEDICINE

## 2021-11-02 PROCEDURE — 10002800 HB RX 250 W HCPCS: Performed by: INTERNAL MEDICINE

## 2021-11-02 PROCEDURE — 96366 THER/PROPH/DIAG IV INF ADDON: CPT

## 2021-11-02 PROCEDURE — 10004651 HB RX, NO CHARGE ITEM: Performed by: INTERNAL MEDICINE

## 2021-11-02 PROCEDURE — 96365 THER/PROPH/DIAG IV INF INIT: CPT

## 2021-11-02 RX ORDER — 0.9 % SODIUM CHLORIDE 0.9 %
10 VIAL (ML) INJECTION PRN
Status: CANCELLED | OUTPATIENT
Start: 2021-11-03

## 2021-11-02 RX ORDER — DIPHENHYDRAMINE HYDROCHLORIDE 50 MG/ML
25 INJECTION INTRAMUSCULAR; INTRAVENOUS ONCE
Status: CANCELLED
Start: 2021-11-03 | End: 2021-11-08

## 2021-11-02 RX ORDER — 0.9 % SODIUM CHLORIDE 0.9 %
10 VIAL (ML) INJECTION PRN
Status: DISCONTINUED | OUTPATIENT
Start: 2021-11-02 | End: 2021-11-04 | Stop reason: HOSPADM

## 2021-11-02 RX ADMIN — SODIUM CHLORIDE 250 ML: 0.9 INJECTION, SOLUTION INTRAVENOUS at 11:12

## 2021-11-02 RX ADMIN — SODIUM CHLORIDE, PRESERVATIVE FREE 10 ML: 5 INJECTION INTRAVENOUS at 11:10

## 2021-11-02 RX ADMIN — VANCOMYCIN HYDROCHLORIDE 1250 MG: 1 INJECTION, POWDER, LYOPHILIZED, FOR SOLUTION INTRAVENOUS at 11:14

## 2021-11-02 ASSESSMENT — PAIN SCALES - GENERAL: PAINLEVEL_OUTOF10: 0

## 2021-11-03 ENCOUNTER — APPOINTMENT (OUTPATIENT)
Dept: INFUSION THERAPY | Age: 66
End: 2021-11-03

## 2021-11-03 ENCOUNTER — HOSPITAL ENCOUNTER (OUTPATIENT)
Dept: INFUSION THERAPY | Age: 66
Discharge: STILL ADMITTED OR ADMINISTRATIVE | End: 2021-11-03
Attending: INTERNAL MEDICINE

## 2021-11-03 VITALS
DIASTOLIC BLOOD PRESSURE: 67 MMHG | OXYGEN SATURATION: 98 % | RESPIRATION RATE: 20 BRPM | HEART RATE: 86 BPM | SYSTOLIC BLOOD PRESSURE: 121 MMHG | TEMPERATURE: 97.8 F

## 2021-11-03 DIAGNOSIS — M86.171 OTHER ACUTE OSTEOMYELITIS OF RIGHT FOOT (CMD): Primary | ICD-10-CM

## 2021-11-03 PROCEDURE — 10002800 HB RX 250 W HCPCS: Performed by: INTERNAL MEDICINE

## 2021-11-03 PROCEDURE — 96365 THER/PROPH/DIAG IV INF INIT: CPT

## 2021-11-03 PROCEDURE — 96366 THER/PROPH/DIAG IV INF ADDON: CPT

## 2021-11-03 PROCEDURE — 10002807 HB RX 258: Performed by: INTERNAL MEDICINE

## 2021-11-03 RX ORDER — 0.9 % SODIUM CHLORIDE 0.9 %
10 VIAL (ML) INJECTION PRN
Status: CANCELLED | OUTPATIENT
Start: 2021-11-04

## 2021-11-03 RX ORDER — DIPHENHYDRAMINE HYDROCHLORIDE 50 MG/ML
25 INJECTION INTRAMUSCULAR; INTRAVENOUS ONCE
Status: CANCELLED
Start: 2021-11-04 | End: 2021-11-08

## 2021-11-03 RX ADMIN — SODIUM CHLORIDE 250 ML: 0.9 INJECTION, SOLUTION INTRAVENOUS at 09:14

## 2021-11-03 RX ADMIN — VANCOMYCIN HYDROCHLORIDE 1250 MG: 1 INJECTION, POWDER, LYOPHILIZED, FOR SOLUTION INTRAVENOUS at 09:15

## 2021-11-03 ASSESSMENT — PAIN SCALES - GENERAL: PAINLEVEL_OUTOF10: 0

## 2021-11-04 ENCOUNTER — HOSPITAL ENCOUNTER (OUTPATIENT)
Dept: INFUSION THERAPY | Age: 66
Discharge: STILL ADMITTED OR ADMINISTRATIVE | End: 2021-11-04
Attending: INTERNAL MEDICINE

## 2021-11-04 ENCOUNTER — HOSPITAL ENCOUNTER (OUTPATIENT)
Dept: WOUND CARE | Age: 66
Discharge: STILL ADMITTED OR ADMINISTRATIVE | End: 2021-11-04
Attending: PODIATRIST

## 2021-11-04 ENCOUNTER — APPOINTMENT (OUTPATIENT)
Dept: INFUSION THERAPY | Age: 66
End: 2021-11-04

## 2021-11-04 VITALS
OXYGEN SATURATION: 98 % | TEMPERATURE: 97.5 F | SYSTOLIC BLOOD PRESSURE: 119 MMHG | DIASTOLIC BLOOD PRESSURE: 73 MMHG | HEART RATE: 70 BPM | RESPIRATION RATE: 18 BRPM

## 2021-11-04 VITALS — TEMPERATURE: 97.7 F

## 2021-11-04 DIAGNOSIS — M86.9 OSTEOMYELITIS OF TOE OF RIGHT FOOT (CMD): ICD-10-CM

## 2021-11-04 DIAGNOSIS — S98.912S AMPUTATION OF LEFT FOOT, SEQUELA (CMD): ICD-10-CM

## 2021-11-04 DIAGNOSIS — S98.312S AMPUTATION AT MIDFOOT, LEFT, SEQUELA (CMD): ICD-10-CM

## 2021-11-04 DIAGNOSIS — E11.69 TYPE 2 DIABETES MELLITUS WITH OTHER SPECIFIED COMPLICATION, UNSPECIFIED WHETHER LONG TERM INSULIN USE (CMD): ICD-10-CM

## 2021-11-04 DIAGNOSIS — Z89.432 S/P TRANSMETATARSAL AMPUTATION OF FOOT, LEFT (CMD): ICD-10-CM

## 2021-11-04 DIAGNOSIS — Z89.422 STATUS POST AMPUTATION OF LESSER TOE, LEFT (CMD): ICD-10-CM

## 2021-11-04 DIAGNOSIS — I73.9 PVD (PERIPHERAL VASCULAR DISEASE) (CMD): ICD-10-CM

## 2021-11-04 DIAGNOSIS — L97.412 MIDFOOT ULCERATION, RIGHT, WITH FAT LAYER EXPOSED (CMD): ICD-10-CM

## 2021-11-04 DIAGNOSIS — E11.621 TYPE 2 DIABETES MELLITUS WITH LEFT DIABETIC FOOT ULCER (CMD): ICD-10-CM

## 2021-11-04 DIAGNOSIS — L97.529 TYPE 2 DIABETES MELLITUS WITH LEFT DIABETIC FOOT ULCER (CMD): ICD-10-CM

## 2021-11-04 DIAGNOSIS — M86.171 OTHER ACUTE OSTEOMYELITIS OF RIGHT FOOT (CMD): ICD-10-CM

## 2021-11-04 DIAGNOSIS — M86.171 OTHER ACUTE OSTEOMYELITIS OF RIGHT FOOT (CMD): Primary | ICD-10-CM

## 2021-11-04 DIAGNOSIS — S91.302D OPEN WOUND OF LEFT FOOT, SUBSEQUENT ENCOUNTER: Primary | ICD-10-CM

## 2021-11-04 LAB — VANCOMYCIN TROUGH SERPL-MCNC: 15.2 MCG/ML (ref 10–20)

## 2021-11-04 PROCEDURE — 96365 THER/PROPH/DIAG IV INF INIT: CPT

## 2021-11-04 PROCEDURE — 10002807 HB RX 258: Performed by: INTERNAL MEDICINE

## 2021-11-04 PROCEDURE — 11042 DBRDMT SUBQ TIS 1ST 20SQCM/<: CPT

## 2021-11-04 PROCEDURE — 96366 THER/PROPH/DIAG IV INF ADDON: CPT

## 2021-11-04 PROCEDURE — 80202 ASSAY OF VANCOMYCIN: CPT | Performed by: INTERNAL MEDICINE

## 2021-11-04 PROCEDURE — 36415 COLL VENOUS BLD VENIPUNCTURE: CPT

## 2021-11-04 PROCEDURE — 10002800 HB RX 250 W HCPCS: Performed by: INTERNAL MEDICINE

## 2021-11-04 RX ORDER — 0.9 % SODIUM CHLORIDE 0.9 %
10 VIAL (ML) INJECTION PRN
Status: DISCONTINUED | OUTPATIENT
Start: 2021-11-04 | End: 2021-11-06 | Stop reason: HOSPADM

## 2021-11-04 RX ORDER — 0.9 % SODIUM CHLORIDE 0.9 %
10 VIAL (ML) INJECTION PRN
Status: CANCELLED | OUTPATIENT
Start: 2021-11-05

## 2021-11-04 RX ORDER — DIPHENHYDRAMINE HYDROCHLORIDE 50 MG/ML
25 INJECTION INTRAMUSCULAR; INTRAVENOUS ONCE
Status: CANCELLED
Start: 2021-11-05 | End: 2021-11-08

## 2021-11-04 RX ADMIN — VANCOMYCIN HYDROCHLORIDE 1250 MG: 1 INJECTION, POWDER, LYOPHILIZED, FOR SOLUTION INTRAVENOUS at 13:09

## 2021-11-04 RX ADMIN — SODIUM CHLORIDE 250 ML: 0.9 INJECTION, SOLUTION INTRAVENOUS at 13:08

## 2021-11-05 ENCOUNTER — APPOINTMENT (OUTPATIENT)
Dept: INFUSION THERAPY | Age: 66
End: 2021-11-05

## 2021-11-05 ENCOUNTER — HOSPITAL ENCOUNTER (OUTPATIENT)
Dept: INFUSION THERAPY | Age: 66
Discharge: STILL ADMITTED OR ADMINISTRATIVE | End: 2021-11-05
Attending: INTERNAL MEDICINE

## 2021-11-05 ENCOUNTER — OFFICE VISIT (OUTPATIENT)
Dept: CARDIOLOGY | Age: 66
End: 2021-11-05

## 2021-11-05 VITALS
SYSTOLIC BLOOD PRESSURE: 127 MMHG | RESPIRATION RATE: 16 BRPM | DIASTOLIC BLOOD PRESSURE: 78 MMHG | TEMPERATURE: 97.6 F | HEART RATE: 75 BPM | OXYGEN SATURATION: 96 %

## 2021-11-05 VITALS
SYSTOLIC BLOOD PRESSURE: 122 MMHG | WEIGHT: 315 LBS | DIASTOLIC BLOOD PRESSURE: 64 MMHG | BODY MASS INDEX: 46.52 KG/M2 | HEART RATE: 70 BPM

## 2021-11-05 DIAGNOSIS — I73.9 PVD (PERIPHERAL VASCULAR DISEASE) (CMD): ICD-10-CM

## 2021-11-05 DIAGNOSIS — R06.09 DYSPNEA ON EXERTION: ICD-10-CM

## 2021-11-05 DIAGNOSIS — E78.5 HYPERLIPIDEMIA, UNSPECIFIED HYPERLIPIDEMIA TYPE: ICD-10-CM

## 2021-11-05 DIAGNOSIS — E11.65 TYPE 2 DIABETES MELLITUS WITH HYPERGLYCEMIA, WITH LONG-TERM CURRENT USE OF INSULIN (CMD): ICD-10-CM

## 2021-11-05 DIAGNOSIS — I50.32 CHRONIC DIASTOLIC HEART FAILURE (CMD): ICD-10-CM

## 2021-11-05 DIAGNOSIS — Z79.4 TYPE 2 DIABETES MELLITUS WITH HYPERGLYCEMIA, WITH LONG-TERM CURRENT USE OF INSULIN (CMD): ICD-10-CM

## 2021-11-05 DIAGNOSIS — M86.171 OTHER ACUTE OSTEOMYELITIS OF RIGHT FOOT (CMD): Primary | ICD-10-CM

## 2021-11-05 DIAGNOSIS — I10 BENIGN ESSENTIAL HYPERTENSION: Primary | ICD-10-CM

## 2021-11-05 DIAGNOSIS — I50.23 ACUTE ON CHRONIC SYSTOLIC CONGESTIVE HEART FAILURE (CMD): ICD-10-CM

## 2021-11-05 DIAGNOSIS — E78.00 HYPERCHOLESTEROLEMIA: ICD-10-CM

## 2021-11-05 PROCEDURE — 96366 THER/PROPH/DIAG IV INF ADDON: CPT

## 2021-11-05 PROCEDURE — 96365 THER/PROPH/DIAG IV INF INIT: CPT

## 2021-11-05 PROCEDURE — 99214 OFFICE O/P EST MOD 30 MIN: CPT | Performed by: INTERNAL MEDICINE

## 2021-11-05 PROCEDURE — 3074F SYST BP LT 130 MM HG: CPT | Performed by: INTERNAL MEDICINE

## 2021-11-05 PROCEDURE — 3078F DIAST BP <80 MM HG: CPT | Performed by: INTERNAL MEDICINE

## 2021-11-05 PROCEDURE — 10002807 HB RX 258: Performed by: INTERNAL MEDICINE

## 2021-11-05 PROCEDURE — 10002800 HB RX 250 W HCPCS: Performed by: INTERNAL MEDICINE

## 2021-11-05 RX ORDER — 0.9 % SODIUM CHLORIDE 0.9 %
10 VIAL (ML) INJECTION PRN
Status: DISCONTINUED | OUTPATIENT
Start: 2021-11-05 | End: 2021-11-07 | Stop reason: HOSPADM

## 2021-11-05 RX ORDER — DIPHENHYDRAMINE HYDROCHLORIDE 50 MG/ML
25 INJECTION INTRAMUSCULAR; INTRAVENOUS ONCE
Status: CANCELLED
Start: 2021-11-08 | End: 2021-11-08

## 2021-11-05 RX ORDER — 0.9 % SODIUM CHLORIDE 0.9 %
10 VIAL (ML) INJECTION PRN
Status: CANCELLED | OUTPATIENT
Start: 2021-11-08

## 2021-11-05 RX ADMIN — VANCOMYCIN HYDROCHLORIDE 1250 MG: 1 INJECTION, POWDER, LYOPHILIZED, FOR SOLUTION INTRAVENOUS at 12:23

## 2021-11-05 RX ADMIN — SODIUM CHLORIDE 250 ML: 0.9 INJECTION, SOLUTION INTRAVENOUS at 12:20

## 2021-11-05 SDOH — HEALTH STABILITY: PHYSICAL HEALTH: ON AVERAGE, HOW MANY DAYS PER WEEK DO YOU ENGAGE IN MODERATE TO STRENUOUS EXERCISE (LIKE A BRISK WALK)?: 0 DAYS

## 2021-11-05 SDOH — HEALTH STABILITY: PHYSICAL HEALTH: ON AVERAGE, HOW MANY MINUTES DO YOU ENGAGE IN EXERCISE AT THIS LEVEL?: 0 MIN

## 2021-11-05 ASSESSMENT — PATIENT HEALTH QUESTIONNAIRE - PHQ9
CLINICAL INTERPRETATION OF PHQ2 SCORE: NO FURTHER SCREENING NEEDED
SUM OF ALL RESPONSES TO PHQ9 QUESTIONS 1 AND 2: 0
1. LITTLE INTEREST OR PLEASURE IN DOING THINGS: NOT AT ALL
SUM OF ALL RESPONSES TO PHQ9 QUESTIONS 1 AND 2: 0
SUM OF ALL RESPONSES TO PHQ9 QUESTIONS 1 AND 2: 0
CLINICAL INTERPRETATION OF PHQ9 SCORE: NO FURTHER SCREENING NEEDED
2. FEELING DOWN, DEPRESSED OR HOPELESS: NOT AT ALL

## 2021-11-05 ASSESSMENT — PAIN SCALES - GENERAL: PAINLEVEL_OUTOF10: 0

## 2021-11-06 ENCOUNTER — APPOINTMENT (OUTPATIENT)
Dept: INFUSION THERAPY | Age: 66
End: 2021-11-06

## 2021-11-06 ENCOUNTER — HOSPITAL ENCOUNTER (OUTPATIENT)
Dept: INFUSION THERAPY | Age: 66
Discharge: STILL ADMITTED OR ADMINISTRATIVE | End: 2021-11-06
Attending: INTERNAL MEDICINE

## 2021-11-06 VITALS
HEART RATE: 87 BPM | TEMPERATURE: 97.7 F | SYSTOLIC BLOOD PRESSURE: 122 MMHG | DIASTOLIC BLOOD PRESSURE: 75 MMHG | RESPIRATION RATE: 16 BRPM | OXYGEN SATURATION: 97 %

## 2021-11-06 DIAGNOSIS — M86.171 OTHER ACUTE OSTEOMYELITIS OF RIGHT FOOT (CMD): Primary | ICD-10-CM

## 2021-11-06 PROCEDURE — 10002800 HB RX 250 W HCPCS: Performed by: INTERNAL MEDICINE

## 2021-11-06 PROCEDURE — 10004651 HB RX, NO CHARGE ITEM: Performed by: INTERNAL MEDICINE

## 2021-11-06 PROCEDURE — 96365 THER/PROPH/DIAG IV INF INIT: CPT

## 2021-11-06 PROCEDURE — 10002807 HB RX 258: Performed by: INTERNAL MEDICINE

## 2021-11-06 PROCEDURE — 96366 THER/PROPH/DIAG IV INF ADDON: CPT

## 2021-11-06 RX ORDER — 0.9 % SODIUM CHLORIDE 0.9 %
10 VIAL (ML) INJECTION PRN
Status: CANCELLED | OUTPATIENT
Start: 2021-11-08

## 2021-11-06 RX ORDER — DIPHENHYDRAMINE HYDROCHLORIDE 50 MG/ML
25 INJECTION INTRAMUSCULAR; INTRAVENOUS ONCE
Status: CANCELLED
Start: 2021-11-08 | End: 2021-11-08

## 2021-11-06 RX ORDER — 0.9 % SODIUM CHLORIDE 0.9 %
10 VIAL (ML) INJECTION PRN
Status: DISCONTINUED | OUTPATIENT
Start: 2021-11-06 | End: 2021-11-08 | Stop reason: HOSPADM

## 2021-11-06 RX ADMIN — SODIUM CHLORIDE 10 ML: 9 INJECTION, SOLUTION INTRAMUSCULAR; INTRAVENOUS; SUBCUTANEOUS at 08:05

## 2021-11-06 RX ADMIN — SODIUM CHLORIDE 250 ML: 0.9 INJECTION, SOLUTION INTRAVENOUS at 08:23

## 2021-11-06 RX ADMIN — VANCOMYCIN HYDROCHLORIDE 1250 MG: 1 INJECTION, POWDER, LYOPHILIZED, FOR SOLUTION INTRAVENOUS at 08:24

## 2021-11-06 ASSESSMENT — PAIN SCALES - GENERAL: PAINLEVEL_OUTOF10: 0

## 2021-11-07 ENCOUNTER — HOSPITAL ENCOUNTER (OUTPATIENT)
Dept: INFUSION THERAPY | Age: 66
Discharge: STILL ADMITTED OR ADMINISTRATIVE | End: 2021-11-07
Attending: INTERNAL MEDICINE

## 2021-11-07 ENCOUNTER — APPOINTMENT (OUTPATIENT)
Dept: INFUSION THERAPY | Age: 66
End: 2021-11-07

## 2021-11-07 VITALS
DIASTOLIC BLOOD PRESSURE: 78 MMHG | HEART RATE: 90 BPM | RESPIRATION RATE: 20 BRPM | TEMPERATURE: 98 F | SYSTOLIC BLOOD PRESSURE: 125 MMHG | OXYGEN SATURATION: 98 %

## 2021-11-07 DIAGNOSIS — M86.171 OTHER ACUTE OSTEOMYELITIS OF RIGHT FOOT (CMD): Primary | ICD-10-CM

## 2021-11-07 PROCEDURE — 10002800 HB RX 250 W HCPCS: Performed by: INTERNAL MEDICINE

## 2021-11-07 PROCEDURE — 96365 THER/PROPH/DIAG IV INF INIT: CPT

## 2021-11-07 PROCEDURE — 10002807 HB RX 258

## 2021-11-07 PROCEDURE — 36593 DECLOT VASCULAR DEVICE: CPT

## 2021-11-07 PROCEDURE — 10002807 HB RX 258: Performed by: INTERNAL MEDICINE

## 2021-11-07 RX ORDER — DIPHENHYDRAMINE HYDROCHLORIDE 50 MG/ML
25 INJECTION INTRAMUSCULAR; INTRAVENOUS ONCE
Status: CANCELLED
Start: 2021-11-08 | End: 2021-11-08

## 2021-11-07 RX ORDER — 0.9 % SODIUM CHLORIDE 0.9 %
10 VIAL (ML) INJECTION PRN
Status: CANCELLED | OUTPATIENT
Start: 2021-11-08

## 2021-11-07 RX ORDER — SODIUM CHLORIDE 9 MG/ML
INJECTION, SOLUTION INTRAVENOUS
Status: COMPLETED
Start: 2021-11-07 | End: 2021-11-07

## 2021-11-07 RX ORDER — 0.9 % SODIUM CHLORIDE 0.9 %
10 VIAL (ML) INJECTION PRN
Status: DISCONTINUED | OUTPATIENT
Start: 2021-11-07 | End: 2021-11-09 | Stop reason: HOSPADM

## 2021-11-07 RX ADMIN — SODIUM CHLORIDE 250 ML: 0.9 INJECTION, SOLUTION INTRAVENOUS at 08:42

## 2021-11-07 RX ADMIN — ALTEPLASE 2 MG: 2.2 INJECTION, POWDER, LYOPHILIZED, FOR SOLUTION INTRAVENOUS at 10:13

## 2021-11-07 RX ADMIN — VANCOMYCIN HYDROCHLORIDE 1250 MG: 1 INJECTION, POWDER, LYOPHILIZED, FOR SOLUTION INTRAVENOUS at 08:43

## 2021-11-07 ASSESSMENT — PAIN SCALES - GENERAL
PAINLEVEL_OUTOF10: 0
PAINLEVEL: 0

## 2021-11-08 ENCOUNTER — HOSPITAL ENCOUNTER (OUTPATIENT)
Dept: INFUSION THERAPY | Age: 66
Discharge: STILL ADMITTED OR ADMINISTRATIVE | End: 2021-11-08
Attending: INTERNAL MEDICINE

## 2021-11-08 ENCOUNTER — APPOINTMENT (OUTPATIENT)
Dept: INFUSION THERAPY | Age: 66
End: 2021-11-08

## 2021-11-08 VITALS
TEMPERATURE: 97.7 F | RESPIRATION RATE: 18 BRPM | OXYGEN SATURATION: 98 % | SYSTOLIC BLOOD PRESSURE: 128 MMHG | DIASTOLIC BLOOD PRESSURE: 87 MMHG | HEART RATE: 91 BPM

## 2021-11-08 DIAGNOSIS — M86.171 OTHER ACUTE OSTEOMYELITIS OF RIGHT FOOT (CMD): Primary | ICD-10-CM

## 2021-11-08 LAB
ALBUMIN SERPL-MCNC: 2.9 G/DL (ref 3.6–5.1)
ALBUMIN/GLOB SERPL: 0.6 {RATIO} (ref 1–2.4)
ALP SERPL-CCNC: 112 UNITS/L (ref 45–117)
ALT SERPL-CCNC: 24 UNITS/L
ANION GAP SERPL CALC-SCNC: 6 MMOL/L (ref 10–20)
AST SERPL-CCNC: 14 UNITS/L
BASOPHILS # BLD: 0.1 K/MCL (ref 0–0.3)
BASOPHILS NFR BLD: 1 %
BILIRUB SERPL-MCNC: 0.3 MG/DL (ref 0.2–1)
BUN SERPL-MCNC: 47 MG/DL (ref 6–20)
BUN/CREAT SERPL: 28 (ref 7–25)
CALCIUM SERPL-MCNC: 8.9 MG/DL (ref 8.4–10.2)
CHLORIDE SERPL-SCNC: 110 MMOL/L (ref 98–107)
CO2 SERPL-SCNC: 28 MMOL/L (ref 21–32)
CREAT SERPL-MCNC: 1.67 MG/DL (ref 0.67–1.17)
DEPRECATED RDW RBC: 49.4 FL (ref 39–50)
EOSINOPHIL # BLD: 0.5 K/MCL (ref 0–0.5)
EOSINOPHIL NFR BLD: 6 %
ERYTHROCYTE [DISTWIDTH] IN BLOOD: 15.2 % (ref 11–15)
ERYTHROCYTE [SEDIMENTATION RATE] IN BLOOD BY WESTERGREN METHOD: 81 MM/HR (ref 0–20)
FASTING DURATION TIME PATIENT: ABNORMAL H
GFR SERPLBLD BASED ON 1.73 SQ M-ARVRAT: 42 ML/MIN
GLOBULIN SER-MCNC: 4.5 G/DL (ref 2–4)
GLUCOSE SERPL-MCNC: 135 MG/DL (ref 70–99)
HCT VFR BLD CALC: 34.9 % (ref 39–51)
HGB BLD-MCNC: 10.8 G/DL (ref 13–17)
IMM GRANULOCYTES # BLD AUTO: 0 K/MCL (ref 0–0.2)
IMM GRANULOCYTES # BLD: 0 %
LYMPHOCYTES # BLD: 1.8 K/MCL (ref 1–4)
LYMPHOCYTES NFR BLD: 23 %
MCH RBC QN AUTO: 27.3 PG (ref 26–34)
MCHC RBC AUTO-ENTMCNC: 30.9 G/DL (ref 32–36.5)
MCV RBC AUTO: 88.4 FL (ref 78–100)
MONOCYTES # BLD: 0.6 K/MCL (ref 0.3–0.9)
MONOCYTES NFR BLD: 8 %
NEUTROPHILS # BLD: 5 K/MCL (ref 1.8–7.7)
NEUTROPHILS NFR BLD: 62 %
NRBC BLD MANUAL-RTO: 0 /100 WBC
PLATELET # BLD AUTO: 243 K/MCL (ref 140–450)
POTASSIUM SERPL-SCNC: 4.2 MMOL/L (ref 3.4–5.1)
PROT SERPL-MCNC: 7.4 G/DL (ref 6.4–8.2)
RAINBOW EXTRA TUBES HOLD SPECIMEN: NORMAL
RBC # BLD: 3.95 MIL/MCL (ref 4.5–5.9)
SODIUM SERPL-SCNC: 140 MMOL/L (ref 135–145)
VANCOMYCIN TROUGH SERPL-MCNC: 13.4 MCG/ML (ref 10–20)
WBC # BLD: 8 K/MCL (ref 4.2–11)

## 2021-11-08 PROCEDURE — 36592 COLLECT BLOOD FROM PICC: CPT

## 2021-11-08 PROCEDURE — 10002800 HB RX 250 W HCPCS: Performed by: INTERNAL MEDICINE

## 2021-11-08 PROCEDURE — 96366 THER/PROPH/DIAG IV INF ADDON: CPT

## 2021-11-08 PROCEDURE — 85652 RBC SED RATE AUTOMATED: CPT | Performed by: INTERNAL MEDICINE

## 2021-11-08 PROCEDURE — 85025 COMPLETE CBC W/AUTO DIFF WBC: CPT | Performed by: INTERNAL MEDICINE

## 2021-11-08 PROCEDURE — 80053 COMPREHEN METABOLIC PANEL: CPT | Performed by: INTERNAL MEDICINE

## 2021-11-08 PROCEDURE — 10002807 HB RX 258: Performed by: INTERNAL MEDICINE

## 2021-11-08 PROCEDURE — 96365 THER/PROPH/DIAG IV INF INIT: CPT

## 2021-11-08 PROCEDURE — 10004651 HB RX, NO CHARGE ITEM: Performed by: INTERNAL MEDICINE

## 2021-11-08 PROCEDURE — 80202 ASSAY OF VANCOMYCIN: CPT | Performed by: INTERNAL MEDICINE

## 2021-11-08 RX ORDER — 0.9 % SODIUM CHLORIDE 0.9 %
10 VIAL (ML) INJECTION PRN
Status: DISCONTINUED | OUTPATIENT
Start: 2021-11-08 | End: 2021-11-10 | Stop reason: HOSPADM

## 2021-11-08 RX ORDER — DIPHENHYDRAMINE HYDROCHLORIDE 50 MG/ML
25 INJECTION INTRAMUSCULAR; INTRAVENOUS ONCE
Status: CANCELLED
Start: 2021-11-09 | End: 2021-11-15

## 2021-11-08 RX ORDER — 0.9 % SODIUM CHLORIDE 0.9 %
10 VIAL (ML) INJECTION PRN
Status: CANCELLED | OUTPATIENT
Start: 2021-11-09

## 2021-11-08 RX ADMIN — SODIUM CHLORIDE, PRESERVATIVE FREE 10 ML: 5 INJECTION INTRAVENOUS at 11:05

## 2021-11-08 RX ADMIN — VANCOMYCIN HYDROCHLORIDE 1250 MG: 1 INJECTION, POWDER, LYOPHILIZED, FOR SOLUTION INTRAVENOUS at 09:20

## 2021-11-08 RX ADMIN — SODIUM CHLORIDE 250 ML: 0.9 INJECTION, SOLUTION INTRAVENOUS at 09:07

## 2021-11-08 ASSESSMENT — PAIN SCALES - GENERAL
PAINLEVEL: 0
PAINLEVEL_OUTOF10: 0

## 2021-11-09 ENCOUNTER — HOSPITAL ENCOUNTER (OUTPATIENT)
Dept: INFUSION THERAPY | Age: 66
Discharge: STILL ADMITTED OR ADMINISTRATIVE | End: 2021-11-09
Attending: INTERNAL MEDICINE

## 2021-11-09 ENCOUNTER — APPOINTMENT (OUTPATIENT)
Dept: INFUSION THERAPY | Age: 66
End: 2021-11-09

## 2021-11-09 VITALS
OXYGEN SATURATION: 97 % | DIASTOLIC BLOOD PRESSURE: 80 MMHG | HEART RATE: 89 BPM | TEMPERATURE: 97.5 F | SYSTOLIC BLOOD PRESSURE: 129 MMHG

## 2021-11-09 DIAGNOSIS — M86.171 OTHER ACUTE OSTEOMYELITIS OF RIGHT FOOT (CMD): Primary | ICD-10-CM

## 2021-11-09 PROCEDURE — 10002807 HB RX 258: Performed by: INTERNAL MEDICINE

## 2021-11-09 PROCEDURE — 10002800 HB RX 250 W HCPCS: Performed by: INTERNAL MEDICINE

## 2021-11-09 PROCEDURE — 96365 THER/PROPH/DIAG IV INF INIT: CPT

## 2021-11-09 RX ORDER — DIPHENHYDRAMINE HYDROCHLORIDE 50 MG/ML
25 INJECTION INTRAMUSCULAR; INTRAVENOUS ONCE
Status: CANCELLED
Start: 2021-11-10 | End: 2021-11-15

## 2021-11-09 RX ORDER — 0.9 % SODIUM CHLORIDE 0.9 %
10 VIAL (ML) INJECTION PRN
Status: CANCELLED | OUTPATIENT
Start: 2021-11-10

## 2021-11-09 RX ORDER — 0.9 % SODIUM CHLORIDE 0.9 %
10 VIAL (ML) INJECTION PRN
Status: DISCONTINUED | OUTPATIENT
Start: 2021-11-09 | End: 2021-11-11 | Stop reason: HOSPADM

## 2021-11-09 RX ADMIN — VANCOMYCIN HYDROCHLORIDE 1250 MG: 1 INJECTION, POWDER, LYOPHILIZED, FOR SOLUTION INTRAVENOUS at 11:02

## 2021-11-09 RX ADMIN — SODIUM CHLORIDE 250 ML: 0.9 INJECTION, SOLUTION INTRAVENOUS at 11:01

## 2021-11-10 ENCOUNTER — HOSPITAL ENCOUNTER (OUTPATIENT)
Dept: INFUSION THERAPY | Age: 66
Discharge: STILL ADMITTED OR ADMINISTRATIVE | End: 2021-11-10
Attending: INTERNAL MEDICINE

## 2021-11-10 ENCOUNTER — APPOINTMENT (OUTPATIENT)
Dept: INFUSION THERAPY | Age: 66
End: 2021-11-10

## 2021-11-10 VITALS
DIASTOLIC BLOOD PRESSURE: 70 MMHG | RESPIRATION RATE: 18 BRPM | TEMPERATURE: 98.2 F | SYSTOLIC BLOOD PRESSURE: 119 MMHG | OXYGEN SATURATION: 98 % | HEART RATE: 80 BPM

## 2021-11-10 DIAGNOSIS — M86.171 OTHER ACUTE OSTEOMYELITIS OF RIGHT FOOT (CMD): Primary | ICD-10-CM

## 2021-11-10 PROCEDURE — 96366 THER/PROPH/DIAG IV INF ADDON: CPT

## 2021-11-10 PROCEDURE — 96365 THER/PROPH/DIAG IV INF INIT: CPT

## 2021-11-10 PROCEDURE — 10004651 HB RX, NO CHARGE ITEM: Performed by: INTERNAL MEDICINE

## 2021-11-10 PROCEDURE — 10002800 HB RX 250 W HCPCS: Performed by: INTERNAL MEDICINE

## 2021-11-10 PROCEDURE — 10002807 HB RX 258: Performed by: INTERNAL MEDICINE

## 2021-11-10 RX ORDER — SODIUM CHLORIDE 9 MG/ML
INJECTION, SOLUTION INTRAVENOUS
Status: DISPENSED
Start: 2021-11-10 | End: 2021-11-10

## 2021-11-10 RX ORDER — 0.9 % SODIUM CHLORIDE 0.9 %
10 VIAL (ML) INJECTION PRN
Status: CANCELLED | OUTPATIENT
Start: 2021-11-11

## 2021-11-10 RX ORDER — 0.9 % SODIUM CHLORIDE 0.9 %
10 VIAL (ML) INJECTION PRN
Status: DISCONTINUED | OUTPATIENT
Start: 2021-11-10 | End: 2021-11-12 | Stop reason: HOSPADM

## 2021-11-10 RX ORDER — DIPHENHYDRAMINE HYDROCHLORIDE 50 MG/ML
25 INJECTION INTRAMUSCULAR; INTRAVENOUS ONCE
Status: CANCELLED
Start: 2021-11-11 | End: 2021-11-15

## 2021-11-10 RX ADMIN — SODIUM CHLORIDE 250 ML: 0.9 INJECTION, SOLUTION INTRAVENOUS at 11:14

## 2021-11-10 RX ADMIN — VANCOMYCIN HYDROCHLORIDE 1250 MG: 1 INJECTION, POWDER, LYOPHILIZED, FOR SOLUTION INTRAVENOUS at 11:17

## 2021-11-10 RX ADMIN — SODIUM CHLORIDE, PRESERVATIVE FREE 10 ML: 5 INJECTION INTRAVENOUS at 11:12

## 2021-11-10 ASSESSMENT — PAIN SCALES - GENERAL
PAINLEVEL_OUTOF10: 0
PAINLEVEL: 0

## 2021-11-11 ENCOUNTER — APPOINTMENT (OUTPATIENT)
Dept: INFUSION THERAPY | Age: 66
End: 2021-11-11

## 2021-11-11 ENCOUNTER — HOSPITAL ENCOUNTER (OUTPATIENT)
Dept: INFUSION THERAPY | Age: 66
Discharge: STILL ADMITTED OR ADMINISTRATIVE | End: 2021-11-11
Attending: INTERNAL MEDICINE

## 2021-11-11 VITALS
RESPIRATION RATE: 16 BRPM | DIASTOLIC BLOOD PRESSURE: 76 MMHG | TEMPERATURE: 97.7 F | OXYGEN SATURATION: 96 % | HEART RATE: 78 BPM | SYSTOLIC BLOOD PRESSURE: 131 MMHG

## 2021-11-11 DIAGNOSIS — M86.171 OTHER ACUTE OSTEOMYELITIS OF RIGHT FOOT (CMD): Primary | ICD-10-CM

## 2021-11-11 PROCEDURE — 96366 THER/PROPH/DIAG IV INF ADDON: CPT

## 2021-11-11 PROCEDURE — 10002800 HB RX 250 W HCPCS: Performed by: INTERNAL MEDICINE

## 2021-11-11 PROCEDURE — 10004651 HB RX, NO CHARGE ITEM: Performed by: INTERNAL MEDICINE

## 2021-11-11 PROCEDURE — 10002807 HB RX 258

## 2021-11-11 PROCEDURE — 96365 THER/PROPH/DIAG IV INF INIT: CPT

## 2021-11-11 RX ORDER — SODIUM CHLORIDE 9 MG/ML
INJECTION, SOLUTION INTRAVENOUS
Status: COMPLETED
Start: 2021-11-11 | End: 2021-11-11

## 2021-11-11 RX ORDER — DIPHENHYDRAMINE HYDROCHLORIDE 50 MG/ML
25 INJECTION INTRAMUSCULAR; INTRAVENOUS ONCE
Status: CANCELLED
Start: 2021-11-12 | End: 2021-11-15

## 2021-11-11 RX ORDER — 0.9 % SODIUM CHLORIDE 0.9 %
10 VIAL (ML) INJECTION PRN
Status: DISCONTINUED | OUTPATIENT
Start: 2021-11-11 | End: 2021-11-13 | Stop reason: HOSPADM

## 2021-11-11 RX ORDER — 0.9 % SODIUM CHLORIDE 0.9 %
10 VIAL (ML) INJECTION PRN
Status: CANCELLED | OUTPATIENT
Start: 2021-11-12

## 2021-11-11 RX ADMIN — VANCOMYCIN HYDROCHLORIDE 1250 MG: 10 INJECTION, POWDER, LYOPHILIZED, FOR SOLUTION INTRAVENOUS at 11:02

## 2021-11-11 RX ADMIN — SODIUM CHLORIDE 250 ML: 0.9 INJECTION, SOLUTION INTRAVENOUS at 11:00

## 2021-11-11 RX ADMIN — SODIUM CHLORIDE, PRESERVATIVE FREE 10 ML: 5 INJECTION INTRAVENOUS at 10:59

## 2021-11-11 ASSESSMENT — PAIN SCALES - GENERAL: PAINLEVEL: 0

## 2021-11-12 ENCOUNTER — APPOINTMENT (OUTPATIENT)
Dept: INFUSION THERAPY | Age: 66
End: 2021-11-12

## 2021-11-12 ENCOUNTER — HOSPITAL ENCOUNTER (OUTPATIENT)
Dept: INFUSION THERAPY | Age: 66
Discharge: STILL ADMITTED OR ADMINISTRATIVE | End: 2021-11-12
Attending: INTERNAL MEDICINE

## 2021-11-12 VITALS
SYSTOLIC BLOOD PRESSURE: 117 MMHG | OXYGEN SATURATION: 98 % | TEMPERATURE: 97.4 F | HEART RATE: 81 BPM | RESPIRATION RATE: 16 BRPM | DIASTOLIC BLOOD PRESSURE: 76 MMHG

## 2021-11-12 DIAGNOSIS — M86.171 OTHER ACUTE OSTEOMYELITIS OF RIGHT FOOT (CMD): Primary | ICD-10-CM

## 2021-11-12 PROCEDURE — 96365 THER/PROPH/DIAG IV INF INIT: CPT

## 2021-11-12 PROCEDURE — 10002800 HB RX 250 W HCPCS: Performed by: INTERNAL MEDICINE

## 2021-11-12 PROCEDURE — 96366 THER/PROPH/DIAG IV INF ADDON: CPT

## 2021-11-12 PROCEDURE — 10004651 HB RX, NO CHARGE ITEM: Performed by: INTERNAL MEDICINE

## 2021-11-12 PROCEDURE — 10002807 HB RX 258: Performed by: INTERNAL MEDICINE

## 2021-11-12 RX ORDER — 0.9 % SODIUM CHLORIDE 0.9 %
10 VIAL (ML) INJECTION PRN
Status: CANCELLED | OUTPATIENT
Start: 2021-11-15

## 2021-11-12 RX ORDER — 0.9 % SODIUM CHLORIDE 0.9 %
10 VIAL (ML) INJECTION PRN
Status: DISCONTINUED | OUTPATIENT
Start: 2021-11-12 | End: 2021-11-14 | Stop reason: HOSPADM

## 2021-11-12 RX ORDER — DIPHENHYDRAMINE HYDROCHLORIDE 50 MG/ML
25 INJECTION INTRAMUSCULAR; INTRAVENOUS ONCE
Status: CANCELLED
Start: 2021-11-15 | End: 2021-11-15

## 2021-11-12 RX ADMIN — VANCOMYCIN HYDROCHLORIDE 1250 MG: 1 INJECTION, POWDER, LYOPHILIZED, FOR SOLUTION INTRAVENOUS at 08:27

## 2021-11-12 RX ADMIN — SODIUM CHLORIDE 250 ML: 0.9 INJECTION, SOLUTION INTRAVENOUS at 08:26

## 2021-11-12 RX ADMIN — SODIUM CHLORIDE, PRESERVATIVE FREE 10 ML: 5 INJECTION INTRAVENOUS at 08:07

## 2021-11-12 ASSESSMENT — PAIN SCALES - GENERAL: PAINLEVEL_OUTOF10: 0

## 2021-11-13 ENCOUNTER — APPOINTMENT (OUTPATIENT)
Dept: INFUSION THERAPY | Age: 66
End: 2021-11-13

## 2021-11-13 ENCOUNTER — HOSPITAL ENCOUNTER (OUTPATIENT)
Dept: INFUSION THERAPY | Age: 66
Discharge: STILL ADMITTED OR ADMINISTRATIVE | End: 2021-11-13
Attending: INTERNAL MEDICINE

## 2021-11-13 VITALS
TEMPERATURE: 97.6 F | OXYGEN SATURATION: 98 % | RESPIRATION RATE: 18 BRPM | DIASTOLIC BLOOD PRESSURE: 74 MMHG | HEART RATE: 91 BPM | SYSTOLIC BLOOD PRESSURE: 116 MMHG

## 2021-11-13 DIAGNOSIS — M86.171 OTHER ACUTE OSTEOMYELITIS OF RIGHT FOOT (CMD): Primary | ICD-10-CM

## 2021-11-13 PROCEDURE — 10002807 HB RX 258: Performed by: INTERNAL MEDICINE

## 2021-11-13 PROCEDURE — 10002800 HB RX 250 W HCPCS: Performed by: INTERNAL MEDICINE

## 2021-11-13 PROCEDURE — 10004651 HB RX, NO CHARGE ITEM: Performed by: INTERNAL MEDICINE

## 2021-11-13 PROCEDURE — 96365 THER/PROPH/DIAG IV INF INIT: CPT

## 2021-11-13 RX ORDER — DIPHENHYDRAMINE HYDROCHLORIDE 50 MG/ML
25 INJECTION INTRAMUSCULAR; INTRAVENOUS ONCE
Status: CANCELLED
Start: 2021-11-14 | End: 2021-11-15

## 2021-11-13 RX ORDER — 0.9 % SODIUM CHLORIDE 0.9 %
10 VIAL (ML) INJECTION PRN
Status: DISCONTINUED | OUTPATIENT
Start: 2021-11-13 | End: 2021-11-15 | Stop reason: HOSPADM

## 2021-11-13 RX ORDER — 0.9 % SODIUM CHLORIDE 0.9 %
10 VIAL (ML) INJECTION PRN
Status: CANCELLED | OUTPATIENT
Start: 2021-11-14

## 2021-11-13 RX ADMIN — VANCOMYCIN HYDROCHLORIDE 1250 MG: 1 INJECTION, POWDER, LYOPHILIZED, FOR SOLUTION INTRAVENOUS at 09:46

## 2021-11-13 RX ADMIN — SODIUM CHLORIDE, PRESERVATIVE FREE 10 ML: 5 INJECTION INTRAVENOUS at 09:38

## 2021-11-13 RX ADMIN — SODIUM CHLORIDE, PRESERVATIVE FREE 10 ML: 5 INJECTION INTRAVENOUS at 11:15

## 2021-11-13 RX ADMIN — SODIUM CHLORIDE 250 ML: 0.9 INJECTION, SOLUTION INTRAVENOUS at 09:42

## 2021-11-13 ASSESSMENT — PAIN SCALES - GENERAL
PAINLEVEL_OUTOF10: 0
PAINLEVEL: 0

## 2021-11-14 ENCOUNTER — HOSPITAL ENCOUNTER (OUTPATIENT)
Dept: INFUSION THERAPY | Age: 66
Discharge: STILL ADMITTED OR ADMINISTRATIVE | End: 2021-11-14
Attending: INTERNAL MEDICINE

## 2021-11-14 ENCOUNTER — APPOINTMENT (OUTPATIENT)
Dept: INFUSION THERAPY | Age: 66
End: 2021-11-14

## 2021-11-14 VITALS
RESPIRATION RATE: 16 BRPM | HEART RATE: 89 BPM | SYSTOLIC BLOOD PRESSURE: 121 MMHG | DIASTOLIC BLOOD PRESSURE: 79 MMHG | TEMPERATURE: 97.4 F | OXYGEN SATURATION: 97 %

## 2021-11-14 DIAGNOSIS — M86.171 OTHER ACUTE OSTEOMYELITIS OF RIGHT FOOT (CMD): Primary | ICD-10-CM

## 2021-11-14 PROCEDURE — 36593 DECLOT VASCULAR DEVICE: CPT

## 2021-11-14 PROCEDURE — 96366 THER/PROPH/DIAG IV INF ADDON: CPT

## 2021-11-14 PROCEDURE — 96365 THER/PROPH/DIAG IV INF INIT: CPT

## 2021-11-14 PROCEDURE — 10002800 HB RX 250 W HCPCS: Performed by: INTERNAL MEDICINE

## 2021-11-14 PROCEDURE — 10004651 HB RX, NO CHARGE ITEM: Performed by: INTERNAL MEDICINE

## 2021-11-14 PROCEDURE — 10002807 HB RX 258: Performed by: INTERNAL MEDICINE

## 2021-11-14 RX ORDER — 0.9 % SODIUM CHLORIDE 0.9 %
10 VIAL (ML) INJECTION PRN
Status: CANCELLED | OUTPATIENT
Start: 2021-11-15

## 2021-11-14 RX ORDER — 0.9 % SODIUM CHLORIDE 0.9 %
10 VIAL (ML) INJECTION PRN
Status: DISCONTINUED | OUTPATIENT
Start: 2021-11-14 | End: 2021-11-16 | Stop reason: HOSPADM

## 2021-11-14 RX ORDER — DIPHENHYDRAMINE HYDROCHLORIDE 50 MG/ML
25 INJECTION INTRAMUSCULAR; INTRAVENOUS ONCE
Status: CANCELLED
Start: 2021-11-15 | End: 2021-11-15

## 2021-11-14 RX ADMIN — ALTEPLASE 2 MG: 2.2 INJECTION, POWDER, LYOPHILIZED, FOR SOLUTION INTRAVENOUS at 10:38

## 2021-11-14 RX ADMIN — SODIUM CHLORIDE 250 ML: 0.9 INJECTION, SOLUTION INTRAVENOUS at 08:56

## 2021-11-14 RX ADMIN — VANCOMYCIN HYDROCHLORIDE 1250 MG: 1 INJECTION, POWDER, LYOPHILIZED, FOR SOLUTION INTRAVENOUS at 08:56

## 2021-11-14 RX ADMIN — SODIUM CHLORIDE 10 ML: 9 INJECTION, SOLUTION INTRAMUSCULAR; INTRAVENOUS; SUBCUTANEOUS at 08:55

## 2021-11-14 ASSESSMENT — PAIN SCALES - GENERAL: PAINLEVEL: 0

## 2021-11-15 ENCOUNTER — HOSPITAL ENCOUNTER (OUTPATIENT)
Dept: INFUSION THERAPY | Age: 66
Discharge: STILL ADMITTED OR ADMINISTRATIVE | End: 2021-11-15
Attending: INTERNAL MEDICINE

## 2021-11-15 ENCOUNTER — APPOINTMENT (OUTPATIENT)
Dept: INFUSION THERAPY | Age: 66
End: 2021-11-15

## 2021-11-15 DIAGNOSIS — M86.171 OTHER ACUTE OSTEOMYELITIS OF RIGHT FOOT (CMD): Primary | ICD-10-CM

## 2021-11-15 LAB
ALBUMIN SERPL-MCNC: 3.3 G/DL (ref 3.6–5.1)
ALBUMIN/GLOB SERPL: 0.7 {RATIO} (ref 1–2.4)
ALP SERPL-CCNC: 132 UNITS/L (ref 45–117)
ALT SERPL-CCNC: 23 UNITS/L
ANION GAP SERPL CALC-SCNC: 8 MMOL/L (ref 10–20)
AST SERPL-CCNC: 17 UNITS/L
BASOPHILS # BLD: 0.1 K/MCL (ref 0–0.3)
BASOPHILS NFR BLD: 1 %
BILIRUB SERPL-MCNC: 0.3 MG/DL (ref 0.2–1)
BUN SERPL-MCNC: 65 MG/DL (ref 6–20)
BUN/CREAT SERPL: 33 (ref 7–25)
CALCIUM SERPL-MCNC: 8.7 MG/DL (ref 8.4–10.2)
CHLORIDE SERPL-SCNC: 103 MMOL/L (ref 98–107)
CO2 SERPL-SCNC: 30 MMOL/L (ref 21–32)
CREAT SERPL-MCNC: 2 MG/DL (ref 0.67–1.17)
DEPRECATED RDW RBC: 48.8 FL (ref 39–50)
EOSINOPHIL # BLD: 0.4 K/MCL (ref 0–0.5)
EOSINOPHIL NFR BLD: 4 %
ERYTHROCYTE [DISTWIDTH] IN BLOOD: 15.1 % (ref 11–15)
ERYTHROCYTE [SEDIMENTATION RATE] IN BLOOD BY WESTERGREN METHOD: 101 MM/HR (ref 0–20)
FASTING DURATION TIME PATIENT: ABNORMAL H
GFR SERPLBLD BASED ON 1.73 SQ M-ARVRAT: 34 ML/MIN
GLOBULIN SER-MCNC: 4.9 G/DL (ref 2–4)
GLUCOSE SERPL-MCNC: 137 MG/DL (ref 70–99)
HCT VFR BLD CALC: 38.3 % (ref 39–51)
HGB BLD-MCNC: 12 G/DL (ref 13–17)
IMM GRANULOCYTES # BLD AUTO: 0 K/MCL (ref 0–0.2)
IMM GRANULOCYTES # BLD: 0 %
LYMPHOCYTES # BLD: 1.8 K/MCL (ref 1–4)
LYMPHOCYTES NFR BLD: 19 %
MCH RBC QN AUTO: 27.6 PG (ref 26–34)
MCHC RBC AUTO-ENTMCNC: 31.3 G/DL (ref 32–36.5)
MCV RBC AUTO: 88 FL (ref 78–100)
MONOCYTES # BLD: 0.7 K/MCL (ref 0.3–0.9)
MONOCYTES NFR BLD: 8 %
NEUTROPHILS # BLD: 6.3 K/MCL (ref 1.8–7.7)
NEUTROPHILS NFR BLD: 68 %
NRBC BLD MANUAL-RTO: 0 /100 WBC
PLATELET # BLD AUTO: 271 K/MCL (ref 140–450)
POTASSIUM SERPL-SCNC: 3.9 MMOL/L (ref 3.4–5.1)
PROT SERPL-MCNC: 8.2 G/DL (ref 6.4–8.2)
RAINBOW EXTRA TUBES HOLD SPECIMEN: NORMAL
RBC # BLD: 4.35 MIL/MCL (ref 4.5–5.9)
SODIUM SERPL-SCNC: 137 MMOL/L (ref 135–145)
VANCOMYCIN TROUGH SERPL-MCNC: 17.7 MCG/ML (ref 10–20)
WBC # BLD: 9.3 K/MCL (ref 4.2–11)

## 2021-11-15 PROCEDURE — 85652 RBC SED RATE AUTOMATED: CPT | Performed by: INTERNAL MEDICINE

## 2021-11-15 PROCEDURE — 10002807 HB RX 258: Performed by: INTERNAL MEDICINE

## 2021-11-15 PROCEDURE — 85025 COMPLETE CBC W/AUTO DIFF WBC: CPT | Performed by: INTERNAL MEDICINE

## 2021-11-15 PROCEDURE — 80202 ASSAY OF VANCOMYCIN: CPT | Performed by: INTERNAL MEDICINE

## 2021-11-15 PROCEDURE — 96365 THER/PROPH/DIAG IV INF INIT: CPT

## 2021-11-15 PROCEDURE — 10002800 HB RX 250 W HCPCS: Performed by: INTERNAL MEDICINE

## 2021-11-15 PROCEDURE — 80053 COMPREHEN METABOLIC PANEL: CPT | Performed by: INTERNAL MEDICINE

## 2021-11-15 RX ORDER — DIPHENHYDRAMINE HYDROCHLORIDE 50 MG/ML
25 INJECTION INTRAMUSCULAR; INTRAVENOUS ONCE
Status: CANCELLED
Start: 2021-11-16 | End: 2021-11-22

## 2021-11-15 RX ORDER — 0.9 % SODIUM CHLORIDE 0.9 %
10 VIAL (ML) INJECTION PRN
Status: DISCONTINUED | OUTPATIENT
Start: 2021-11-15 | End: 2021-11-17 | Stop reason: HOSPADM

## 2021-11-15 RX ORDER — 0.9 % SODIUM CHLORIDE 0.9 %
10 VIAL (ML) INJECTION PRN
Status: CANCELLED | OUTPATIENT
Start: 2021-11-16

## 2021-11-15 RX ADMIN — SODIUM CHLORIDE 250 ML: 0.9 INJECTION, SOLUTION INTRAVENOUS at 10:24

## 2021-11-15 RX ADMIN — VANCOMYCIN HYDROCHLORIDE 1250 MG: 1 INJECTION, POWDER, LYOPHILIZED, FOR SOLUTION INTRAVENOUS at 10:25

## 2021-11-15 ASSESSMENT — PAIN SCALES - GENERAL: PAINLEVEL_OUTOF10: 0

## 2021-11-16 ENCOUNTER — APPOINTMENT (OUTPATIENT)
Dept: INFUSION THERAPY | Age: 66
End: 2021-11-16

## 2021-11-16 ENCOUNTER — HOSPITAL ENCOUNTER (OUTPATIENT)
Dept: INFUSION THERAPY | Age: 66
Discharge: STILL ADMITTED OR ADMINISTRATIVE | End: 2021-11-16
Attending: INTERNAL MEDICINE

## 2021-11-16 VITALS
OXYGEN SATURATION: 95 % | HEART RATE: 83 BPM | RESPIRATION RATE: 18 BRPM | SYSTOLIC BLOOD PRESSURE: 124 MMHG | DIASTOLIC BLOOD PRESSURE: 77 MMHG | TEMPERATURE: 97.9 F

## 2021-11-16 DIAGNOSIS — M86.171 OTHER ACUTE OSTEOMYELITIS OF RIGHT FOOT (CMD): Primary | ICD-10-CM

## 2021-11-16 PROCEDURE — 96366 THER/PROPH/DIAG IV INF ADDON: CPT

## 2021-11-16 PROCEDURE — 10002800 HB RX 250 W HCPCS: Performed by: INTERNAL MEDICINE

## 2021-11-16 PROCEDURE — 10002807 HB RX 258: Performed by: INTERNAL MEDICINE

## 2021-11-16 PROCEDURE — 96365 THER/PROPH/DIAG IV INF INIT: CPT

## 2021-11-16 PROCEDURE — 10004651 HB RX, NO CHARGE ITEM: Performed by: INTERNAL MEDICINE

## 2021-11-16 RX ORDER — DIPHENHYDRAMINE HYDROCHLORIDE 50 MG/ML
25 INJECTION INTRAMUSCULAR; INTRAVENOUS ONCE
Status: CANCELLED
Start: 2021-11-17 | End: 2021-11-22

## 2021-11-16 RX ORDER — 0.9 % SODIUM CHLORIDE 0.9 %
10 VIAL (ML) INJECTION PRN
Status: DISCONTINUED | OUTPATIENT
Start: 2021-11-16 | End: 2021-11-18 | Stop reason: HOSPADM

## 2021-11-16 RX ORDER — 0.9 % SODIUM CHLORIDE 0.9 %
10 VIAL (ML) INJECTION PRN
Status: CANCELLED | OUTPATIENT
Start: 2021-11-17

## 2021-11-16 RX ADMIN — SODIUM CHLORIDE, PRESERVATIVE FREE 10 ML: 5 INJECTION INTRAVENOUS at 10:47

## 2021-11-16 RX ADMIN — VANCOMYCIN HYDROCHLORIDE 1250 MG: 1 INJECTION, POWDER, LYOPHILIZED, FOR SOLUTION INTRAVENOUS at 10:50

## 2021-11-16 RX ADMIN — SODIUM CHLORIDE 250 ML: 0.9 INJECTION, SOLUTION INTRAVENOUS at 10:49

## 2021-11-16 ASSESSMENT — PAIN SCALES - GENERAL: PAINLEVEL_OUTOF10: 0

## 2021-11-17 ENCOUNTER — HOSPITAL ENCOUNTER (OUTPATIENT)
Dept: INFUSION THERAPY | Age: 66
Discharge: STILL ADMITTED OR ADMINISTRATIVE | End: 2021-11-17
Attending: INTERNAL MEDICINE

## 2021-11-17 ENCOUNTER — APPOINTMENT (OUTPATIENT)
Dept: INFUSION THERAPY | Age: 66
End: 2021-11-17

## 2021-11-17 VITALS
TEMPERATURE: 98.1 F | DIASTOLIC BLOOD PRESSURE: 75 MMHG | OXYGEN SATURATION: 98 % | HEART RATE: 89 BPM | SYSTOLIC BLOOD PRESSURE: 125 MMHG | RESPIRATION RATE: 17 BRPM

## 2021-11-17 DIAGNOSIS — M86.171 OTHER ACUTE OSTEOMYELITIS OF RIGHT FOOT (CMD): Primary | ICD-10-CM

## 2021-11-17 PROCEDURE — 96365 THER/PROPH/DIAG IV INF INIT: CPT

## 2021-11-17 PROCEDURE — 10002800 HB RX 250 W HCPCS: Performed by: INTERNAL MEDICINE

## 2021-11-17 PROCEDURE — 10002807 HB RX 258: Performed by: INTERNAL MEDICINE

## 2021-11-17 PROCEDURE — 96366 THER/PROPH/DIAG IV INF ADDON: CPT

## 2021-11-17 RX ORDER — 0.9 % SODIUM CHLORIDE 0.9 %
10 VIAL (ML) INJECTION PRN
Status: DISCONTINUED | OUTPATIENT
Start: 2021-11-17 | End: 2021-11-19 | Stop reason: HOSPADM

## 2021-11-17 RX ORDER — DIPHENHYDRAMINE HYDROCHLORIDE 50 MG/ML
25 INJECTION INTRAMUSCULAR; INTRAVENOUS ONCE
Status: CANCELLED
Start: 2021-11-18 | End: 2021-11-22

## 2021-11-17 RX ORDER — SODIUM CHLORIDE 9 MG/ML
INJECTION, SOLUTION INTRAVENOUS
Status: DISPENSED
Start: 2021-11-17 | End: 2021-11-17

## 2021-11-17 RX ORDER — 0.9 % SODIUM CHLORIDE 0.9 %
10 VIAL (ML) INJECTION PRN
Status: CANCELLED | OUTPATIENT
Start: 2021-11-18

## 2021-11-17 RX ADMIN — VANCOMYCIN HYDROCHLORIDE 1250 MG: 1 INJECTION, POWDER, LYOPHILIZED, FOR SOLUTION INTRAVENOUS at 11:17

## 2021-11-17 RX ADMIN — SODIUM CHLORIDE 250 ML: 0.9 INJECTION, SOLUTION INTRAVENOUS at 11:14

## 2021-11-18 ENCOUNTER — CLINICAL ABSTRACT (OUTPATIENT)
Dept: INFUSION THERAPY | Age: 66
End: 2021-11-18

## 2021-11-18 ENCOUNTER — HOSPITAL ENCOUNTER (OUTPATIENT)
Dept: INFUSION THERAPY | Age: 66
Discharge: STILL ADMITTED OR ADMINISTRATIVE | End: 2021-11-18
Attending: INTERNAL MEDICINE

## 2021-11-18 ENCOUNTER — APPOINTMENT (OUTPATIENT)
Dept: INFUSION THERAPY | Age: 66
End: 2021-11-18

## 2021-11-18 ENCOUNTER — HOSPITAL ENCOUNTER (OUTPATIENT)
Dept: WOUND CARE | Age: 66
Discharge: STILL ADMITTED OR ADMINISTRATIVE | End: 2021-11-18
Attending: PODIATRIST

## 2021-11-18 ENCOUNTER — APPOINTMENT (OUTPATIENT)
Dept: INFUSION THERAPY | Age: 66
End: 2021-11-18
Attending: INTERNAL MEDICINE

## 2021-11-18 VITALS — TEMPERATURE: 97.9 F

## 2021-11-18 VITALS
SYSTOLIC BLOOD PRESSURE: 108 MMHG | HEART RATE: 76 BPM | DIASTOLIC BLOOD PRESSURE: 73 MMHG | TEMPERATURE: 97.4 F | RESPIRATION RATE: 20 BRPM | OXYGEN SATURATION: 100 %

## 2021-11-18 DIAGNOSIS — S98.912S AMPUTATION OF LEFT FOOT, SEQUELA (CMD): ICD-10-CM

## 2021-11-18 DIAGNOSIS — S91.301D OPEN WOUND OF RIGHT FOOT, SUBSEQUENT ENCOUNTER: ICD-10-CM

## 2021-11-18 DIAGNOSIS — S91.302D OPEN WOUND OF LEFT FOOT, SUBSEQUENT ENCOUNTER: Primary | ICD-10-CM

## 2021-11-18 DIAGNOSIS — L97.412 MIDFOOT ULCERATION, RIGHT, WITH FAT LAYER EXPOSED (CMD): ICD-10-CM

## 2021-11-18 DIAGNOSIS — M86.171 OTHER ACUTE OSTEOMYELITIS OF RIGHT FOOT (CMD): Primary | ICD-10-CM

## 2021-11-18 DIAGNOSIS — M86.071 ACUTE HEMATOGENOUS OSTEOMYELITIS OF RIGHT FOOT (CMD): ICD-10-CM

## 2021-11-18 DIAGNOSIS — I73.9 PVD (PERIPHERAL VASCULAR DISEASE) (CMD): ICD-10-CM

## 2021-11-18 DIAGNOSIS — Z89.422 STATUS POST AMPUTATION OF LESSER TOE, LEFT (CMD): ICD-10-CM

## 2021-11-18 DIAGNOSIS — Z89.432 S/P TRANSMETATARSAL AMPUTATION OF FOOT, LEFT (CMD): ICD-10-CM

## 2021-11-18 DIAGNOSIS — M86.171 OTHER ACUTE OSTEOMYELITIS OF RIGHT FOOT (CMD): ICD-10-CM

## 2021-11-18 DIAGNOSIS — M86.9 OSTEOMYELITIS OF TOE OF RIGHT FOOT (CMD): ICD-10-CM

## 2021-11-18 DIAGNOSIS — E11.69 TYPE 2 DIABETES MELLITUS WITH OTHER SPECIFIED COMPLICATION, UNSPECIFIED WHETHER LONG TERM INSULIN USE (CMD): ICD-10-CM

## 2021-11-18 DIAGNOSIS — S98.312S AMPUTATION AT MIDFOOT, LEFT, SEQUELA (CMD): ICD-10-CM

## 2021-11-18 PROCEDURE — 10002800 HB RX 250 W HCPCS: Performed by: INTERNAL MEDICINE

## 2021-11-18 PROCEDURE — 96365 THER/PROPH/DIAG IV INF INIT: CPT

## 2021-11-18 PROCEDURE — 11042 DBRDMT SUBQ TIS 1ST 20SQCM/<: CPT

## 2021-11-18 PROCEDURE — 10002807 HB RX 258: Performed by: INTERNAL MEDICINE

## 2021-11-18 PROCEDURE — 10004651 HB RX, NO CHARGE ITEM: Performed by: INTERNAL MEDICINE

## 2021-11-18 PROCEDURE — 11044 DBRDMT BONE 1ST 20 SQ CM/<: CPT

## 2021-11-18 RX ORDER — 0.9 % SODIUM CHLORIDE 0.9 %
10 VIAL (ML) INJECTION PRN
Status: CANCELLED | OUTPATIENT
Start: 2021-11-19

## 2021-11-18 RX ORDER — DIPHENHYDRAMINE HYDROCHLORIDE 50 MG/ML
25 INJECTION INTRAMUSCULAR; INTRAVENOUS ONCE
Status: CANCELLED
Start: 2021-11-19 | End: 2021-11-22

## 2021-11-18 RX ORDER — 0.9 % SODIUM CHLORIDE 0.9 %
10 VIAL (ML) INJECTION PRN
Status: DISCONTINUED | OUTPATIENT
Start: 2021-11-18 | End: 2021-11-20 | Stop reason: HOSPADM

## 2021-11-18 RX ADMIN — SODIUM CHLORIDE, PRESERVATIVE FREE 10 ML: 5 INJECTION INTRAVENOUS at 13:43

## 2021-11-18 RX ADMIN — VANCOMYCIN HYDROCHLORIDE 1250 MG: 1 INJECTION, POWDER, LYOPHILIZED, FOR SOLUTION INTRAVENOUS at 11:53

## 2021-11-18 RX ADMIN — SODIUM CHLORIDE 250 ML: 0.9 INJECTION, SOLUTION INTRAVENOUS at 11:50

## 2021-11-18 RX ADMIN — SODIUM CHLORIDE, PRESERVATIVE FREE 10 ML: 5 INJECTION INTRAVENOUS at 11:32

## 2021-11-18 ASSESSMENT — PAIN SCALES - WONG BAKER
WONGBAKER_NUMERICALRESPONSE: 0
WONGBAKER_NUMERICALRESPONSE: 0

## 2021-11-18 ASSESSMENT — PAIN SCALES - GENERAL
PAINLEVEL_OUTOF10: 0
PAINLEVEL: 0
PAINLEVEL_OUTOF10: 0

## 2021-11-19 ENCOUNTER — HOSPITAL ENCOUNTER (OUTPATIENT)
Dept: INFUSION THERAPY | Age: 66
Discharge: STILL ADMITTED OR ADMINISTRATIVE | End: 2021-11-19
Attending: INTERNAL MEDICINE

## 2021-11-19 ENCOUNTER — APPOINTMENT (OUTPATIENT)
Dept: INFUSION THERAPY | Age: 66
End: 2021-11-19

## 2021-11-19 VITALS
TEMPERATURE: 97.5 F | DIASTOLIC BLOOD PRESSURE: 82 MMHG | OXYGEN SATURATION: 97 % | SYSTOLIC BLOOD PRESSURE: 128 MMHG | HEART RATE: 80 BPM | RESPIRATION RATE: 18 BRPM

## 2021-11-19 DIAGNOSIS — M86.171 OTHER ACUTE OSTEOMYELITIS OF RIGHT FOOT (CMD): Primary | ICD-10-CM

## 2021-11-19 PROCEDURE — 96365 THER/PROPH/DIAG IV INF INIT: CPT

## 2021-11-19 PROCEDURE — 10002800 HB RX 250 W HCPCS: Performed by: INTERNAL MEDICINE

## 2021-11-19 PROCEDURE — 10004651 HB RX, NO CHARGE ITEM: Performed by: INTERNAL MEDICINE

## 2021-11-19 PROCEDURE — 96366 THER/PROPH/DIAG IV INF ADDON: CPT

## 2021-11-19 PROCEDURE — 10002807 HB RX 258: Performed by: INTERNAL MEDICINE

## 2021-11-19 RX ORDER — 0.9 % SODIUM CHLORIDE 0.9 %
10 VIAL (ML) INJECTION PRN
Status: CANCELLED | OUTPATIENT
Start: 2021-11-20

## 2021-11-19 RX ORDER — 0.9 % SODIUM CHLORIDE 0.9 %
10 VIAL (ML) INJECTION PRN
Status: DISCONTINUED | OUTPATIENT
Start: 2021-11-19 | End: 2021-11-21 | Stop reason: HOSPADM

## 2021-11-19 RX ORDER — DIPHENHYDRAMINE HYDROCHLORIDE 50 MG/ML
25 INJECTION INTRAMUSCULAR; INTRAVENOUS ONCE
Status: CANCELLED
Start: 2021-11-20 | End: 2021-11-22

## 2021-11-19 RX ADMIN — VANCOMYCIN HYDROCHLORIDE 1250 MG: 1 INJECTION, POWDER, LYOPHILIZED, FOR SOLUTION INTRAVENOUS at 10:29

## 2021-11-19 RX ADMIN — SODIUM CHLORIDE, PRESERVATIVE FREE 10 ML: 5 INJECTION INTRAVENOUS at 10:26

## 2021-11-19 RX ADMIN — SODIUM CHLORIDE 250 ML: 0.9 INJECTION, SOLUTION INTRAVENOUS at 10:27

## 2021-11-19 ASSESSMENT — PAIN SCALES - WONG BAKER: WONGBAKER_NUMERICALRESPONSE: 0

## 2021-11-20 ENCOUNTER — HOSPITAL ENCOUNTER (OUTPATIENT)
Dept: INFUSION THERAPY | Age: 66
Discharge: STILL ADMITTED OR ADMINISTRATIVE | End: 2021-11-20
Attending: INTERNAL MEDICINE

## 2021-11-20 ENCOUNTER — APPOINTMENT (OUTPATIENT)
Dept: INFUSION THERAPY | Age: 66
End: 2021-11-20

## 2021-11-20 VITALS
HEART RATE: 81 BPM | SYSTOLIC BLOOD PRESSURE: 129 MMHG | RESPIRATION RATE: 16 BRPM | TEMPERATURE: 98.1 F | DIASTOLIC BLOOD PRESSURE: 87 MMHG | OXYGEN SATURATION: 100 %

## 2021-11-20 DIAGNOSIS — M86.171 OTHER ACUTE OSTEOMYELITIS OF RIGHT FOOT (CMD): Primary | ICD-10-CM

## 2021-11-20 PROCEDURE — 10002800 HB RX 250 W HCPCS: Performed by: INTERNAL MEDICINE

## 2021-11-20 PROCEDURE — 10002807 HB RX 258: Performed by: INTERNAL MEDICINE

## 2021-11-20 PROCEDURE — 10004651 HB RX, NO CHARGE ITEM: Performed by: INTERNAL MEDICINE

## 2021-11-20 PROCEDURE — 96366 THER/PROPH/DIAG IV INF ADDON: CPT

## 2021-11-20 PROCEDURE — 96365 THER/PROPH/DIAG IV INF INIT: CPT

## 2021-11-20 RX ORDER — 0.9 % SODIUM CHLORIDE 0.9 %
10 VIAL (ML) INJECTION PRN
Status: CANCELLED | OUTPATIENT
Start: 2021-11-21

## 2021-11-20 RX ORDER — DIPHENHYDRAMINE HYDROCHLORIDE 50 MG/ML
25 INJECTION INTRAMUSCULAR; INTRAVENOUS ONCE
Status: CANCELLED
Start: 2021-11-21 | End: 2021-11-22

## 2021-11-20 RX ORDER — 0.9 % SODIUM CHLORIDE 0.9 %
10 VIAL (ML) INJECTION PRN
Status: DISCONTINUED | OUTPATIENT
Start: 2021-11-20 | End: 2021-11-22 | Stop reason: HOSPADM

## 2021-11-20 RX ADMIN — SODIUM CHLORIDE, PRESERVATIVE FREE 10 ML: 5 INJECTION INTRAVENOUS at 09:20

## 2021-11-20 RX ADMIN — VANCOMYCIN HYDROCHLORIDE 1250 MG: 1 INJECTION, POWDER, LYOPHILIZED, FOR SOLUTION INTRAVENOUS at 09:23

## 2021-11-20 RX ADMIN — SODIUM CHLORIDE 250 ML: 0.9 INJECTION, SOLUTION INTRAVENOUS at 09:22

## 2021-11-20 ASSESSMENT — PAIN SCALES - WONG BAKER: WONGBAKER_NUMERICALRESPONSE: 0

## 2021-11-20 ASSESSMENT — PAIN SCALES - GENERAL: PAINLEVEL: 0

## 2021-11-21 ENCOUNTER — APPOINTMENT (OUTPATIENT)
Dept: INFUSION THERAPY | Age: 66
End: 2021-11-21

## 2021-11-21 ENCOUNTER — HOSPITAL ENCOUNTER (OUTPATIENT)
Dept: INFUSION THERAPY | Age: 66
Discharge: STILL ADMITTED OR ADMINISTRATIVE | End: 2021-11-21
Attending: INTERNAL MEDICINE

## 2021-11-21 VITALS
DIASTOLIC BLOOD PRESSURE: 82 MMHG | HEART RATE: 94 BPM | RESPIRATION RATE: 16 BRPM | OXYGEN SATURATION: 97 % | TEMPERATURE: 97.3 F | SYSTOLIC BLOOD PRESSURE: 135 MMHG

## 2021-11-21 DIAGNOSIS — M86.171 OTHER ACUTE OSTEOMYELITIS OF RIGHT FOOT (CMD): Primary | ICD-10-CM

## 2021-11-21 PROCEDURE — 96365 THER/PROPH/DIAG IV INF INIT: CPT

## 2021-11-21 PROCEDURE — 10002807 HB RX 258: Performed by: INTERNAL MEDICINE

## 2021-11-21 PROCEDURE — 10002800 HB RX 250 W HCPCS: Performed by: INTERNAL MEDICINE

## 2021-11-21 PROCEDURE — 96366 THER/PROPH/DIAG IV INF ADDON: CPT

## 2021-11-21 PROCEDURE — 10004651 HB RX, NO CHARGE ITEM: Performed by: INTERNAL MEDICINE

## 2021-11-21 RX ORDER — DIPHENHYDRAMINE HYDROCHLORIDE 50 MG/ML
25 INJECTION INTRAMUSCULAR; INTRAVENOUS ONCE
Status: CANCELLED
Start: 2021-11-22 | End: 2021-11-22

## 2021-11-21 RX ORDER — 0.9 % SODIUM CHLORIDE 0.9 %
10 VIAL (ML) INJECTION PRN
Status: CANCELLED | OUTPATIENT
Start: 2021-11-22

## 2021-11-21 RX ORDER — 0.9 % SODIUM CHLORIDE 0.9 %
10 VIAL (ML) INJECTION PRN
Status: DISCONTINUED | OUTPATIENT
Start: 2021-11-21 | End: 2021-11-23 | Stop reason: HOSPADM

## 2021-11-21 RX ADMIN — VANCOMYCIN HYDROCHLORIDE 1250 MG: 1 INJECTION, POWDER, LYOPHILIZED, FOR SOLUTION INTRAVENOUS at 08:03

## 2021-11-21 RX ADMIN — SODIUM CHLORIDE, PRESERVATIVE FREE 10 ML: 5 INJECTION INTRAVENOUS at 08:02

## 2021-11-21 RX ADMIN — SODIUM CHLORIDE 250 ML: 0.9 INJECTION, SOLUTION INTRAVENOUS at 08:03

## 2021-11-21 ASSESSMENT — PAIN SCALES - WONG BAKER: WONGBAKER_NUMERICALRESPONSE: 0

## 2021-11-21 ASSESSMENT — PAIN SCALES - GENERAL: PAINLEVEL: 0

## 2021-11-22 ENCOUNTER — HOSPITAL ENCOUNTER (OUTPATIENT)
Dept: INFUSION THERAPY | Age: 66
Discharge: STILL ADMITTED OR ADMINISTRATIVE | End: 2021-11-22
Attending: INTERNAL MEDICINE

## 2021-11-22 ENCOUNTER — APPOINTMENT (OUTPATIENT)
Dept: INFUSION THERAPY | Age: 66
End: 2021-11-22

## 2021-11-22 VITALS
OXYGEN SATURATION: 96 % | TEMPERATURE: 97.6 F | RESPIRATION RATE: 18 BRPM | DIASTOLIC BLOOD PRESSURE: 84 MMHG | SYSTOLIC BLOOD PRESSURE: 131 MMHG | HEART RATE: 78 BPM

## 2021-11-22 DIAGNOSIS — M86.171 OTHER ACUTE OSTEOMYELITIS OF RIGHT FOOT (CMD): Primary | ICD-10-CM

## 2021-11-22 LAB
ALBUMIN SERPL-MCNC: 3.3 G/DL (ref 3.6–5.1)
ALBUMIN/GLOB SERPL: 0.8 {RATIO} (ref 1–2.4)
ALP SERPL-CCNC: 139 UNITS/L (ref 45–117)
ALT SERPL-CCNC: 24 UNITS/L
ANION GAP SERPL CALC-SCNC: 8 MMOL/L (ref 10–20)
AST SERPL-CCNC: 20 UNITS/L
BASOPHILS # BLD: 0.1 K/MCL (ref 0–0.3)
BASOPHILS NFR BLD: 1 %
BILIRUB SERPL-MCNC: 0.5 MG/DL (ref 0.2–1)
BUN SERPL-MCNC: 50 MG/DL (ref 6–20)
BUN/CREAT SERPL: 26 (ref 7–25)
CALCIUM SERPL-MCNC: 8.8 MG/DL (ref 8.4–10.2)
CHLORIDE SERPL-SCNC: 101 MMOL/L (ref 98–107)
CO2 SERPL-SCNC: 32 MMOL/L (ref 21–32)
CREAT SERPL-MCNC: 1.89 MG/DL (ref 0.67–1.17)
DEPRECATED RDW RBC: 48.1 FL (ref 39–50)
EOSINOPHIL # BLD: 0.3 K/MCL (ref 0–0.5)
EOSINOPHIL NFR BLD: 4 %
ERYTHROCYTE [DISTWIDTH] IN BLOOD: 14.9 % (ref 11–15)
ERYTHROCYTE [SEDIMENTATION RATE] IN BLOOD BY WESTERGREN METHOD: 112 MM/HR (ref 0–20)
FASTING DURATION TIME PATIENT: ABNORMAL H
GFR SERPLBLD BASED ON 1.73 SQ M-ARVRAT: 36 ML/MIN
GLOBULIN SER-MCNC: 4.2 G/DL (ref 2–4)
GLUCOSE SERPL-MCNC: 188 MG/DL (ref 70–99)
HCT VFR BLD CALC: 38.6 % (ref 39–51)
HGB BLD-MCNC: 12.1 G/DL (ref 13–17)
IMM GRANULOCYTES # BLD AUTO: 0 K/MCL (ref 0–0.2)
IMM GRANULOCYTES # BLD: 0 %
LYMPHOCYTES # BLD: 1.9 K/MCL (ref 1–4)
LYMPHOCYTES NFR BLD: 21 %
MCH RBC QN AUTO: 27.8 PG (ref 26–34)
MCHC RBC AUTO-ENTMCNC: 31.3 G/DL (ref 32–36.5)
MCV RBC AUTO: 88.7 FL (ref 78–100)
MONOCYTES # BLD: 0.7 K/MCL (ref 0.3–0.9)
MONOCYTES NFR BLD: 8 %
NEUTROPHILS # BLD: 6.1 K/MCL (ref 1.8–7.7)
NEUTROPHILS NFR BLD: 66 %
NRBC BLD MANUAL-RTO: 0 /100 WBC
PLATELET # BLD AUTO: 289 K/MCL (ref 140–450)
POTASSIUM SERPL-SCNC: 4.1 MMOL/L (ref 3.4–5.1)
PROT SERPL-MCNC: 7.5 G/DL (ref 6.4–8.2)
RAINBOW EXTRA TUBES HOLD SPECIMEN: NORMAL
RBC # BLD: 4.35 MIL/MCL (ref 4.5–5.9)
SODIUM SERPL-SCNC: 137 MMOL/L (ref 135–145)
VANCOMYCIN TROUGH SERPL-MCNC: 20 MCG/ML (ref 10–20)
WBC # BLD: 9.2 K/MCL (ref 4.2–11)

## 2021-11-22 PROCEDURE — 96365 THER/PROPH/DIAG IV INF INIT: CPT

## 2021-11-22 PROCEDURE — 80053 COMPREHEN METABOLIC PANEL: CPT | Performed by: INTERNAL MEDICINE

## 2021-11-22 PROCEDURE — 10002807 HB RX 258: Performed by: INTERNAL MEDICINE

## 2021-11-22 PROCEDURE — 10002800 HB RX 250 W HCPCS: Performed by: INTERNAL MEDICINE

## 2021-11-22 PROCEDURE — 10004651 HB RX, NO CHARGE ITEM: Performed by: INTERNAL MEDICINE

## 2021-11-22 PROCEDURE — 85025 COMPLETE CBC W/AUTO DIFF WBC: CPT | Performed by: INTERNAL MEDICINE

## 2021-11-22 PROCEDURE — 85652 RBC SED RATE AUTOMATED: CPT | Performed by: INTERNAL MEDICINE

## 2021-11-22 PROCEDURE — 80202 ASSAY OF VANCOMYCIN: CPT | Performed by: INTERNAL MEDICINE

## 2021-11-22 PROCEDURE — 36592 COLLECT BLOOD FROM PICC: CPT

## 2021-11-22 RX ORDER — 0.9 % SODIUM CHLORIDE 0.9 %
10 VIAL (ML) INJECTION PRN
Status: CANCELLED | OUTPATIENT
Start: 2021-11-23

## 2021-11-22 RX ORDER — 0.9 % SODIUM CHLORIDE 0.9 %
10 VIAL (ML) INJECTION PRN
Status: DISCONTINUED | OUTPATIENT
Start: 2021-11-22 | End: 2021-11-24 | Stop reason: HOSPADM

## 2021-11-22 RX ORDER — DIPHENHYDRAMINE HYDROCHLORIDE 50 MG/ML
25 INJECTION INTRAMUSCULAR; INTRAVENOUS ONCE
Status: CANCELLED
Start: 2021-11-23 | End: 2021-11-29

## 2021-11-22 RX ADMIN — SODIUM CHLORIDE 250 ML: 0.9 INJECTION, SOLUTION INTRAVENOUS at 10:20

## 2021-11-22 RX ADMIN — VANCOMYCIN HYDROCHLORIDE 1000 MG: 1 INJECTION, POWDER, LYOPHILIZED, FOR SOLUTION INTRAVENOUS at 10:22

## 2021-11-22 RX ADMIN — SODIUM CHLORIDE 10 ML: 9 INJECTION, SOLUTION INTRAMUSCULAR; INTRAVENOUS; SUBCUTANEOUS at 08:29

## 2021-11-22 ASSESSMENT — PAIN SCALES - GENERAL: PAINLEVEL_OUTOF10: 0

## 2021-11-23 ENCOUNTER — HOSPITAL ENCOUNTER (OUTPATIENT)
Dept: INFUSION THERAPY | Age: 66
Discharge: STILL ADMITTED OR ADMINISTRATIVE | End: 2021-11-23
Attending: INTERNAL MEDICINE

## 2021-11-23 ENCOUNTER — APPOINTMENT (OUTPATIENT)
Dept: INFUSION THERAPY | Age: 66
End: 2021-11-23

## 2021-11-23 ENCOUNTER — HOSPITAL ENCOUNTER (OUTPATIENT)
Dept: GENERAL RADIOLOGY | Age: 66
Discharge: HOME | End: 2021-11-23
Attending: PODIATRIST

## 2021-11-23 VITALS
RESPIRATION RATE: 16 BRPM | TEMPERATURE: 97.7 F | DIASTOLIC BLOOD PRESSURE: 81 MMHG | SYSTOLIC BLOOD PRESSURE: 129 MMHG | OXYGEN SATURATION: 98 % | HEART RATE: 82 BPM

## 2021-11-23 DIAGNOSIS — M86.171 OTHER ACUTE OSTEOMYELITIS OF RIGHT FOOT (CMD): Primary | ICD-10-CM

## 2021-11-23 DIAGNOSIS — M86.071 ACUTE HEMATOGENOUS OSTEOMYELITIS OF RIGHT FOOT (CMD): ICD-10-CM

## 2021-11-23 PROCEDURE — 96365 THER/PROPH/DIAG IV INF INIT: CPT

## 2021-11-23 PROCEDURE — 10002800 HB RX 250 W HCPCS: Performed by: INTERNAL MEDICINE

## 2021-11-23 PROCEDURE — 73630 X-RAY EXAM OF FOOT: CPT

## 2021-11-23 PROCEDURE — 10002807 HB RX 258: Performed by: INTERNAL MEDICINE

## 2021-11-23 RX ORDER — 0.9 % SODIUM CHLORIDE 0.9 %
10 VIAL (ML) INJECTION PRN
Status: CANCELLED | OUTPATIENT
Start: 2021-11-24

## 2021-11-23 RX ORDER — DIPHENHYDRAMINE HYDROCHLORIDE 50 MG/ML
25 INJECTION INTRAMUSCULAR; INTRAVENOUS ONCE
Status: CANCELLED
Start: 2021-11-24 | End: 2021-11-24

## 2021-11-23 RX ORDER — 0.9 % SODIUM CHLORIDE 0.9 %
10 VIAL (ML) INJECTION PRN
Status: DISCONTINUED | OUTPATIENT
Start: 2021-11-23 | End: 2021-11-25 | Stop reason: HOSPADM

## 2021-11-23 RX ADMIN — SODIUM CHLORIDE 250 ML: 0.9 INJECTION, SOLUTION INTRAVENOUS at 11:25

## 2021-11-23 RX ADMIN — VANCOMYCIN HYDROCHLORIDE 1000 MG: 1 INJECTION, POWDER, LYOPHILIZED, FOR SOLUTION INTRAVENOUS at 11:26

## 2021-11-23 ASSESSMENT — PAIN SCALES - GENERAL: PAINLEVEL_OUTOF10: 0

## 2021-11-24 ENCOUNTER — APPOINTMENT (OUTPATIENT)
Dept: INFUSION THERAPY | Age: 66
End: 2021-11-24

## 2021-11-24 ENCOUNTER — HOSPITAL ENCOUNTER (OUTPATIENT)
Dept: INFUSION THERAPY | Age: 66
Discharge: STILL ADMITTED OR ADMINISTRATIVE | End: 2021-11-24
Attending: INTERNAL MEDICINE

## 2021-11-24 VITALS
SYSTOLIC BLOOD PRESSURE: 136 MMHG | RESPIRATION RATE: 17 BRPM | DIASTOLIC BLOOD PRESSURE: 83 MMHG | OXYGEN SATURATION: 96 % | HEART RATE: 82 BPM | TEMPERATURE: 97.5 F

## 2021-11-24 DIAGNOSIS — M86.171 OTHER ACUTE OSTEOMYELITIS OF RIGHT FOOT (CMD): Primary | ICD-10-CM

## 2021-11-24 LAB — VANCOMYCIN TROUGH SERPL-MCNC: 17.3 MCG/ML (ref 10–20)

## 2021-11-24 PROCEDURE — 10002800 HB RX 250 W HCPCS: Performed by: INTERNAL MEDICINE

## 2021-11-24 PROCEDURE — 80202 ASSAY OF VANCOMYCIN: CPT | Performed by: INTERNAL MEDICINE

## 2021-11-24 PROCEDURE — 96366 THER/PROPH/DIAG IV INF ADDON: CPT

## 2021-11-24 PROCEDURE — 96365 THER/PROPH/DIAG IV INF INIT: CPT

## 2021-11-24 PROCEDURE — 10002807 HB RX 258: Performed by: INTERNAL MEDICINE

## 2021-11-24 PROCEDURE — 36592 COLLECT BLOOD FROM PICC: CPT

## 2021-11-24 RX ORDER — 0.9 % SODIUM CHLORIDE 0.9 %
10 VIAL (ML) INJECTION PRN
Status: CANCELLED | OUTPATIENT
Start: 2021-11-25

## 2021-11-24 RX ORDER — DIPHENHYDRAMINE HYDROCHLORIDE 50 MG/ML
25 INJECTION INTRAMUSCULAR; INTRAVENOUS ONCE
Status: CANCELLED
Start: 2021-11-25 | End: 2021-11-29

## 2021-11-24 RX ORDER — 0.9 % SODIUM CHLORIDE 0.9 %
10 VIAL (ML) INJECTION PRN
Status: DISCONTINUED | OUTPATIENT
Start: 2021-11-24 | End: 2021-11-26 | Stop reason: HOSPADM

## 2021-11-24 RX ADMIN — VANCOMYCIN HYDROCHLORIDE 1000 MG: 1 INJECTION, POWDER, LYOPHILIZED, FOR SOLUTION INTRAVENOUS at 11:39

## 2021-11-24 RX ADMIN — SODIUM CHLORIDE 250 ML: 0.9 INJECTION, SOLUTION INTRAVENOUS at 11:38

## 2021-11-25 ENCOUNTER — HOSPITAL ENCOUNTER (OUTPATIENT)
Dept: INFUSION THERAPY | Age: 66
Discharge: STILL ADMITTED OR ADMINISTRATIVE | End: 2021-11-25
Attending: INTERNAL MEDICINE

## 2021-11-25 ENCOUNTER — APPOINTMENT (OUTPATIENT)
Dept: INFUSION THERAPY | Age: 66
End: 2021-11-25

## 2021-11-25 DIAGNOSIS — M86.171 OTHER ACUTE OSTEOMYELITIS OF RIGHT FOOT (CMD): Primary | ICD-10-CM

## 2021-11-25 PROCEDURE — 10002807 HB RX 258: Performed by: INTERNAL MEDICINE

## 2021-11-25 PROCEDURE — 10002807 HB RX 258

## 2021-11-25 PROCEDURE — 10004651 HB RX, NO CHARGE ITEM: Performed by: INTERNAL MEDICINE

## 2021-11-25 PROCEDURE — 10002800 HB RX 250 W HCPCS: Performed by: INTERNAL MEDICINE

## 2021-11-25 PROCEDURE — 96365 THER/PROPH/DIAG IV INF INIT: CPT

## 2021-11-25 RX ORDER — 0.9 % SODIUM CHLORIDE 0.9 %
10 VIAL (ML) INJECTION PRN
Status: DISCONTINUED | OUTPATIENT
Start: 2021-11-25 | End: 2021-11-27 | Stop reason: HOSPADM

## 2021-11-25 RX ORDER — 0.9 % SODIUM CHLORIDE 0.9 %
10 VIAL (ML) INJECTION PRN
Status: CANCELLED | OUTPATIENT
Start: 2021-11-26

## 2021-11-25 RX ORDER — SODIUM CHLORIDE 9 MG/ML
INJECTION, SOLUTION INTRAVENOUS
Status: COMPLETED
Start: 2021-11-25 | End: 2021-11-25

## 2021-11-25 RX ORDER — DIPHENHYDRAMINE HYDROCHLORIDE 50 MG/ML
25 INJECTION INTRAMUSCULAR; INTRAVENOUS ONCE
Status: CANCELLED
Start: 2021-11-26 | End: 2021-11-29

## 2021-11-25 RX ADMIN — VANCOMYCIN HYDROCHLORIDE 1000 MG: 1 INJECTION, POWDER, LYOPHILIZED, FOR SOLUTION INTRAVENOUS at 08:28

## 2021-11-25 RX ADMIN — SODIUM CHLORIDE, PRESERVATIVE FREE 10 ML: 5 INJECTION INTRAVENOUS at 08:26

## 2021-11-25 RX ADMIN — SODIUM CHLORIDE 250 ML: 0.9 INJECTION, SOLUTION INTRAVENOUS at 08:27

## 2021-11-25 ASSESSMENT — PAIN SCALES - GENERAL: PAINLEVEL_OUTOF10: 0

## 2021-11-26 ENCOUNTER — HOSPITAL ENCOUNTER (OUTPATIENT)
Dept: INFUSION THERAPY | Age: 66
Discharge: STILL ADMITTED OR ADMINISTRATIVE | End: 2021-11-26
Attending: INTERNAL MEDICINE

## 2021-11-26 ENCOUNTER — APPOINTMENT (OUTPATIENT)
Dept: INFUSION THERAPY | Age: 66
End: 2021-11-26

## 2021-11-26 VITALS
TEMPERATURE: 97.9 F | DIASTOLIC BLOOD PRESSURE: 80 MMHG | RESPIRATION RATE: 16 BRPM | HEART RATE: 83 BPM | SYSTOLIC BLOOD PRESSURE: 136 MMHG | OXYGEN SATURATION: 98 %

## 2021-11-26 DIAGNOSIS — M86.171 OTHER ACUTE OSTEOMYELITIS OF RIGHT FOOT (CMD): Primary | ICD-10-CM

## 2021-11-26 PROCEDURE — 10002807 HB RX 258: Performed by: INTERNAL MEDICINE

## 2021-11-26 PROCEDURE — 10002800 HB RX 250 W HCPCS: Performed by: INTERNAL MEDICINE

## 2021-11-26 PROCEDURE — 96365 THER/PROPH/DIAG IV INF INIT: CPT

## 2021-11-26 RX ORDER — 0.9 % SODIUM CHLORIDE 0.9 %
10 VIAL (ML) INJECTION PRN
Status: DISCONTINUED | OUTPATIENT
Start: 2021-11-26 | End: 2021-11-28 | Stop reason: HOSPADM

## 2021-11-26 RX ORDER — 0.9 % SODIUM CHLORIDE 0.9 %
10 VIAL (ML) INJECTION PRN
Status: CANCELLED | OUTPATIENT
Start: 2021-11-27

## 2021-11-26 RX ORDER — DIPHENHYDRAMINE HYDROCHLORIDE 50 MG/ML
25 INJECTION INTRAMUSCULAR; INTRAVENOUS ONCE
Status: CANCELLED
Start: 2021-11-27 | End: 2021-11-29

## 2021-11-26 RX ADMIN — SODIUM CHLORIDE 250 ML: 0.9 INJECTION, SOLUTION INTRAVENOUS at 08:50

## 2021-11-26 RX ADMIN — VANCOMYCIN HYDROCHLORIDE 1000 MG: 1 INJECTION, POWDER, LYOPHILIZED, FOR SOLUTION INTRAVENOUS at 08:51

## 2021-11-27 ENCOUNTER — HOSPITAL ENCOUNTER (OUTPATIENT)
Dept: INFUSION THERAPY | Age: 66
Discharge: STILL ADMITTED OR ADMINISTRATIVE | End: 2021-11-27
Attending: INTERNAL MEDICINE

## 2021-11-27 ENCOUNTER — APPOINTMENT (OUTPATIENT)
Dept: INFUSION THERAPY | Age: 66
End: 2021-11-27

## 2021-11-27 VITALS
SYSTOLIC BLOOD PRESSURE: 124 MMHG | HEART RATE: 75 BPM | TEMPERATURE: 98 F | RESPIRATION RATE: 18 BRPM | OXYGEN SATURATION: 99 % | DIASTOLIC BLOOD PRESSURE: 78 MMHG

## 2021-11-27 DIAGNOSIS — M86.171 OTHER ACUTE OSTEOMYELITIS OF RIGHT FOOT (CMD): Primary | ICD-10-CM

## 2021-11-27 PROCEDURE — 96365 THER/PROPH/DIAG IV INF INIT: CPT

## 2021-11-27 PROCEDURE — 10002807 HB RX 258

## 2021-11-27 PROCEDURE — 10002800 HB RX 250 W HCPCS: Performed by: INTERNAL MEDICINE

## 2021-11-27 PROCEDURE — 10002807 HB RX 258: Performed by: INTERNAL MEDICINE

## 2021-11-27 RX ORDER — SODIUM CHLORIDE 9 MG/ML
INJECTION, SOLUTION INTRAVENOUS
Status: COMPLETED
Start: 2021-11-27 | End: 2021-11-27

## 2021-11-27 RX ORDER — 0.9 % SODIUM CHLORIDE 0.9 %
10 VIAL (ML) INJECTION PRN
Status: CANCELLED | OUTPATIENT
Start: 2021-11-28

## 2021-11-27 RX ORDER — DIPHENHYDRAMINE HYDROCHLORIDE 50 MG/ML
25 INJECTION INTRAMUSCULAR; INTRAVENOUS ONCE
Status: CANCELLED
Start: 2021-11-28 | End: 2021-11-29

## 2021-11-27 RX ORDER — 0.9 % SODIUM CHLORIDE 0.9 %
10 VIAL (ML) INJECTION PRN
Status: DISCONTINUED | OUTPATIENT
Start: 2021-11-27 | End: 2021-11-29 | Stop reason: HOSPADM

## 2021-11-27 RX ADMIN — VANCOMYCIN HYDROCHLORIDE 1000 MG: 1 INJECTION, POWDER, LYOPHILIZED, FOR SOLUTION INTRAVENOUS at 09:44

## 2021-11-27 RX ADMIN — SODIUM CHLORIDE: 9 INJECTION, SOLUTION INTRAVENOUS at 09:44

## 2021-11-27 ASSESSMENT — PATIENT HEALTH QUESTIONNAIRE - PHQ9
1. LITTLE INTEREST OR PLEASURE IN DOING THINGS: NOT AT ALL
SUM OF ALL RESPONSES TO PHQ9 QUESTIONS 1 AND 2: 0
SUM OF ALL RESPONSES TO PHQ9 QUESTIONS 1 AND 2: 0
2. FEELING DOWN, DEPRESSED OR HOPELESS: NOT AT ALL
CLINICAL INTERPRETATION OF PHQ2 SCORE: NO FURTHER SCREENING NEEDED

## 2021-11-27 ASSESSMENT — PAIN SCALES - GENERAL
PAINLEVEL: 0
PAINLEVEL_OUTOF10: 0

## 2021-11-28 ENCOUNTER — HOSPITAL ENCOUNTER (OUTPATIENT)
Dept: INFUSION THERAPY | Age: 66
Discharge: STILL ADMITTED OR ADMINISTRATIVE | End: 2021-11-28

## 2021-11-28 VITALS
DIASTOLIC BLOOD PRESSURE: 81 MMHG | OXYGEN SATURATION: 97 % | TEMPERATURE: 97.6 F | HEART RATE: 110 BPM | RESPIRATION RATE: 18 BRPM | SYSTOLIC BLOOD PRESSURE: 137 MMHG

## 2021-11-28 DIAGNOSIS — M86.171 OTHER ACUTE OSTEOMYELITIS OF RIGHT FOOT (CMD): Primary | ICD-10-CM

## 2021-11-28 PROCEDURE — 10002807 HB RX 258: Performed by: INTERNAL MEDICINE

## 2021-11-28 PROCEDURE — 10002800 HB RX 250 W HCPCS: Performed by: INTERNAL MEDICINE

## 2021-11-28 PROCEDURE — 96365 THER/PROPH/DIAG IV INF INIT: CPT

## 2021-11-28 RX ORDER — 0.9 % SODIUM CHLORIDE 0.9 %
10 VIAL (ML) INJECTION PRN
Status: DISCONTINUED | OUTPATIENT
Start: 2021-11-28 | End: 2021-11-30 | Stop reason: HOSPADM

## 2021-11-28 RX ORDER — 0.9 % SODIUM CHLORIDE 0.9 %
10 VIAL (ML) INJECTION PRN
Status: CANCELLED | OUTPATIENT
Start: 2021-11-29

## 2021-11-28 RX ORDER — DIPHENHYDRAMINE HYDROCHLORIDE 50 MG/ML
25 INJECTION INTRAMUSCULAR; INTRAVENOUS ONCE
Status: CANCELLED
Start: 2021-11-29 | End: 2021-11-29

## 2021-11-28 RX ORDER — 0.9 % SODIUM CHLORIDE 0.9 %
10 VIAL (ML) INJECTION PRN
OUTPATIENT
Start: 2021-11-29

## 2021-11-28 RX ADMIN — SODIUM CHLORIDE 250 ML: 9 INJECTION, SOLUTION INTRAVENOUS at 08:20

## 2021-11-28 RX ADMIN — VANCOMYCIN HYDROCHLORIDE 1000 MG: 1 INJECTION, POWDER, LYOPHILIZED, FOR SOLUTION INTRAVENOUS at 08:20

## 2021-11-28 ASSESSMENT — PATIENT HEALTH QUESTIONNAIRE - PHQ9
CLINICAL INTERPRETATION OF PHQ2 SCORE: NO FURTHER SCREENING NEEDED
1. LITTLE INTEREST OR PLEASURE IN DOING THINGS: NOT AT ALL
SUM OF ALL RESPONSES TO PHQ9 QUESTIONS 1 AND 2: 0
2. FEELING DOWN, DEPRESSED OR HOPELESS: NOT AT ALL
SUM OF ALL RESPONSES TO PHQ9 QUESTIONS 1 AND 2: 0

## 2021-11-28 ASSESSMENT — PAIN SCALES - GENERAL
PAINLEVEL_OUTOF10: 0
PAINLEVEL: 0

## 2021-11-29 ENCOUNTER — HOSPITAL ENCOUNTER (OUTPATIENT)
Dept: HYPERBARIC MEDICINE | Age: 66
Discharge: HOME | End: 2021-11-29
Attending: INTERNAL MEDICINE

## 2021-11-29 ENCOUNTER — OFFICE VISIT (OUTPATIENT)
Dept: WOUND CARE | Age: 66
End: 2021-11-29

## 2021-11-29 ENCOUNTER — HOSPITAL ENCOUNTER (OUTPATIENT)
Dept: INFUSION THERAPY | Age: 66
Discharge: STILL ADMITTED OR ADMINISTRATIVE | End: 2021-11-29

## 2021-11-29 VITALS
SYSTOLIC BLOOD PRESSURE: 128 MMHG | DIASTOLIC BLOOD PRESSURE: 74 MMHG | TEMPERATURE: 96.8 F | HEART RATE: 90 BPM | OXYGEN SATURATION: 95 % | RESPIRATION RATE: 16 BRPM

## 2021-11-29 DIAGNOSIS — M86.171 OTHER ACUTE OSTEOMYELITIS OF RIGHT FOOT (CMD): Primary | ICD-10-CM

## 2021-11-29 DIAGNOSIS — M86.671 OTHER CHRONIC OSTEOMYELITIS OF RIGHT FOOT (CMD): Primary | ICD-10-CM

## 2021-11-29 LAB
ALBUMIN SERPL-MCNC: 3 G/DL (ref 3.6–5.1)
ALBUMIN/GLOB SERPL: 0.8 {RATIO} (ref 1–2.4)
ALP SERPL-CCNC: 119 UNITS/L (ref 45–117)
ALT SERPL-CCNC: 23 UNITS/L
ANION GAP SERPL CALC-SCNC: 5 MMOL/L (ref 10–20)
AST SERPL-CCNC: 20 UNITS/L
BASOPHILS # BLD: 0.1 K/MCL (ref 0–0.3)
BASOPHILS NFR BLD: 1 %
BILIRUB SERPL-MCNC: 0.3 MG/DL (ref 0.2–1)
BUN SERPL-MCNC: 33 MG/DL (ref 6–20)
BUN/CREAT SERPL: 21 (ref 7–25)
CALCIUM SERPL-MCNC: 8.9 MG/DL (ref 8.4–10.2)
CHLORIDE SERPL-SCNC: 108 MMOL/L (ref 98–107)
CO2 SERPL-SCNC: 29 MMOL/L (ref 21–32)
CREAT SERPL-MCNC: 1.54 MG/DL (ref 0.67–1.17)
DEPRECATED RDW RBC: 49.1 FL (ref 39–50)
EOSINOPHIL # BLD: 0.3 K/MCL (ref 0–0.5)
EOSINOPHIL NFR BLD: 4 %
ERYTHROCYTE [DISTWIDTH] IN BLOOD: 15.1 % (ref 11–15)
ERYTHROCYTE [SEDIMENTATION RATE] IN BLOOD BY WESTERGREN METHOD: 101 MM/HR (ref 0–20)
FASTING DURATION TIME PATIENT: ABNORMAL H
GFR SERPLBLD BASED ON 1.73 SQ M-ARVRAT: 46 ML/MIN
GLOBULIN SER-MCNC: 4 G/DL (ref 2–4)
GLUCOSE SERPL-MCNC: 144 MG/DL (ref 70–99)
HCT VFR BLD CALC: 35.8 % (ref 39–51)
HGB BLD-MCNC: 11.3 G/DL (ref 13–17)
IMM GRANULOCYTES # BLD AUTO: 0 K/MCL (ref 0–0.2)
IMM GRANULOCYTES # BLD: 1 %
LYMPHOCYTES # BLD: 2.1 K/MCL (ref 1–4)
LYMPHOCYTES NFR BLD: 24 %
MCH RBC QN AUTO: 28.1 PG (ref 26–34)
MCHC RBC AUTO-ENTMCNC: 31.6 G/DL (ref 32–36.5)
MCV RBC AUTO: 89.1 FL (ref 78–100)
MONOCYTES # BLD: 0.6 K/MCL (ref 0.3–0.9)
MONOCYTES NFR BLD: 7 %
NEUTROPHILS # BLD: 5.6 K/MCL (ref 1.8–7.7)
NEUTROPHILS NFR BLD: 63 %
NRBC BLD MANUAL-RTO: 0 /100 WBC
PLATELET # BLD AUTO: 232 K/MCL (ref 140–450)
POTASSIUM SERPL-SCNC: 4.4 MMOL/L (ref 3.4–5.1)
PROT SERPL-MCNC: 7 G/DL (ref 6.4–8.2)
RAINBOW EXTRA TUBES HOLD SPECIMEN: NORMAL
RBC # BLD: 4.02 MIL/MCL (ref 4.5–5.9)
SODIUM SERPL-SCNC: 138 MMOL/L (ref 135–145)
VANCOMYCIN TROUGH SERPL-MCNC: 11.7 MCG/ML (ref 10–20)
WBC # BLD: 8.7 K/MCL (ref 4.2–11)

## 2021-11-29 PROCEDURE — 80053 COMPREHEN METABOLIC PANEL: CPT | Performed by: INTERNAL MEDICINE

## 2021-11-29 PROCEDURE — 10002807 HB RX 258: Performed by: INTERNAL MEDICINE

## 2021-11-29 PROCEDURE — 80202 ASSAY OF VANCOMYCIN: CPT | Performed by: INTERNAL MEDICINE

## 2021-11-29 PROCEDURE — 10002800 HB RX 250 W HCPCS: Performed by: INTERNAL MEDICINE

## 2021-11-29 PROCEDURE — 85025 COMPLETE CBC W/AUTO DIFF WBC: CPT | Performed by: INTERNAL MEDICINE

## 2021-11-29 PROCEDURE — 85652 RBC SED RATE AUTOMATED: CPT | Performed by: INTERNAL MEDICINE

## 2021-11-29 RX ORDER — 0.9 % SODIUM CHLORIDE 0.9 %
10 VIAL (ML) INJECTION PRN
Status: CANCELLED | OUTPATIENT
Start: 2021-12-06

## 2021-11-29 RX ORDER — 0.9 % SODIUM CHLORIDE 0.9 %
10 VIAL (ML) INJECTION PRN
Status: DISCONTINUED | OUTPATIENT
Start: 2021-11-29 | End: 2021-12-01 | Stop reason: HOSPADM

## 2021-11-29 RX ORDER — DIPHENHYDRAMINE HYDROCHLORIDE 50 MG/ML
25 INJECTION INTRAMUSCULAR; INTRAVENOUS ONCE
Status: CANCELLED
Start: 2021-12-06 | End: 2021-12-06

## 2021-11-29 RX ADMIN — SODIUM CHLORIDE 250 ML: 0.9 INJECTION, SOLUTION INTRAVENOUS at 10:40

## 2021-11-29 RX ADMIN — VANCOMYCIN HYDROCHLORIDE 1000 MG: 1 INJECTION, POWDER, LYOPHILIZED, FOR SOLUTION INTRAVENOUS at 10:40

## 2021-11-30 ENCOUNTER — HOSPITAL ENCOUNTER (OUTPATIENT)
Dept: INFUSION THERAPY | Age: 66
Discharge: STILL ADMITTED OR ADMINISTRATIVE | End: 2021-11-30
Attending: INTERNAL MEDICINE

## 2021-11-30 ENCOUNTER — CLINICAL ABSTRACT (OUTPATIENT)
Dept: INFUSION THERAPY | Age: 66
End: 2021-11-30

## 2021-11-30 VITALS
SYSTOLIC BLOOD PRESSURE: 130 MMHG | OXYGEN SATURATION: 100 % | RESPIRATION RATE: 18 BRPM | TEMPERATURE: 97.5 F | DIASTOLIC BLOOD PRESSURE: 87 MMHG | HEART RATE: 78 BPM

## 2021-11-30 DIAGNOSIS — M86.171 OTHER ACUTE OSTEOMYELITIS OF RIGHT FOOT (CMD): Primary | ICD-10-CM

## 2021-11-30 PROCEDURE — 10004651 HB RX, NO CHARGE ITEM: Performed by: INTERNAL MEDICINE

## 2021-11-30 PROCEDURE — 10002807 HB RX 258: Performed by: INTERNAL MEDICINE

## 2021-11-30 PROCEDURE — 96365 THER/PROPH/DIAG IV INF INIT: CPT

## 2021-11-30 PROCEDURE — 10002800 HB RX 250 W HCPCS: Performed by: INTERNAL MEDICINE

## 2021-11-30 RX ORDER — BACITRACIN ZINC AND POLYMYXIN B SULFATE 500; 1000 [USP'U]/G; [USP'U]/G
OINTMENT TOPICAL ONCE
Status: CANCELLED
Start: 2021-12-01 | End: 2021-11-30

## 2021-11-30 RX ORDER — 0.9 % SODIUM CHLORIDE 0.9 %
10 VIAL (ML) INJECTION PRN
Status: CANCELLED | OUTPATIENT
Start: 2021-12-06

## 2021-11-30 RX ORDER — DIPHENHYDRAMINE HYDROCHLORIDE 50 MG/ML
25 INJECTION INTRAMUSCULAR; INTRAVENOUS ONCE
Status: CANCELLED
Start: 2021-12-06 | End: 2021-12-06

## 2021-11-30 RX ORDER — 0.9 % SODIUM CHLORIDE 0.9 %
10 VIAL (ML) INJECTION PRN
Status: DISCONTINUED | OUTPATIENT
Start: 2021-11-30 | End: 2021-11-30

## 2021-11-30 RX ORDER — BACITRACIN ZINC AND POLYMYXIN B SULFATE 500; 1000 [USP'U]/G; [USP'U]/G
OINTMENT TOPICAL ONCE
Status: CANCELLED
Start: 2021-12-01 | End: 2021-12-01

## 2021-11-30 RX ADMIN — SODIUM CHLORIDE 250 ML: 0.9 INJECTION, SOLUTION INTRAVENOUS at 08:06

## 2021-11-30 RX ADMIN — VANCOMYCIN HYDROCHLORIDE 1000 MG: 1 INJECTION, POWDER, LYOPHILIZED, FOR SOLUTION INTRAVENOUS at 08:07

## 2021-11-30 RX ADMIN — SODIUM CHLORIDE, PRESERVATIVE FREE 10 ML: 5 INJECTION INTRAVENOUS at 08:04

## 2021-11-30 ASSESSMENT — PAIN SCALES - GENERAL: PAINLEVEL_OUTOF10: 0

## 2021-12-01 ENCOUNTER — HOSPITAL ENCOUNTER (OUTPATIENT)
Dept: INFUSION THERAPY | Age: 66
Discharge: STILL ADMITTED OR ADMINISTRATIVE | End: 2021-12-01
Attending: INTERNAL MEDICINE

## 2021-12-01 VITALS
HEART RATE: 88 BPM | SYSTOLIC BLOOD PRESSURE: 129 MMHG | RESPIRATION RATE: 16 BRPM | TEMPERATURE: 97.9 F | DIASTOLIC BLOOD PRESSURE: 74 MMHG | OXYGEN SATURATION: 97 %

## 2021-12-01 DIAGNOSIS — M86.171 OTHER ACUTE OSTEOMYELITIS OF RIGHT FOOT (CMD): Primary | ICD-10-CM

## 2021-12-01 PROCEDURE — 10002803 HB RX 637: Performed by: INTERNAL MEDICINE

## 2021-12-01 PROCEDURE — 99211 OFF/OP EST MAY X REQ PHY/QHP: CPT

## 2021-12-01 RX ORDER — BACITRACIN ZINC AND POLYMYXIN B SULFATE 500; 1000 [USP'U]/G; [USP'U]/G
OINTMENT TOPICAL ONCE
Status: COMPLETED | OUTPATIENT
Start: 2021-12-01 | End: 2021-12-01

## 2021-12-01 RX ORDER — BACITRACIN ZINC AND POLYMYXIN B SULFATE 500; 1000 [USP'U]/G; [USP'U]/G
OINTMENT TOPICAL ONCE
Status: CANCELLED
Start: 2021-12-01 | End: 2021-12-01

## 2021-12-01 RX ORDER — 0.9 % SODIUM CHLORIDE 0.9 %
10 VIAL (ML) INJECTION PRN
OUTPATIENT
Start: 2022-01-13

## 2021-12-01 RX ADMIN — BACITRACIN ZINC AND POLYMYXIN B SULFATE: 500; 10000 OINTMENT TOPICAL at 10:15

## 2021-12-01 ASSESSMENT — PAIN SCALES - GENERAL: PAINLEVEL_OUTOF10: 0

## 2021-12-02 ENCOUNTER — HOSPITAL ENCOUNTER (OUTPATIENT)
Dept: WOUND CARE | Age: 66
Discharge: STILL ADMITTED OR ADMINISTRATIVE | End: 2021-12-02
Attending: PODIATRIST

## 2021-12-02 VITALS — TEMPERATURE: 96.7 F

## 2021-12-02 DIAGNOSIS — Z89.432 S/P TRANSMETATARSAL AMPUTATION OF FOOT, LEFT (CMD): ICD-10-CM

## 2021-12-02 DIAGNOSIS — Z89.422 STATUS POST AMPUTATION OF LESSER TOE, LEFT (CMD): ICD-10-CM

## 2021-12-02 DIAGNOSIS — L97.412 MIDFOOT ULCERATION, RIGHT, WITH FAT LAYER EXPOSED (CMD): ICD-10-CM

## 2021-12-02 DIAGNOSIS — S98.912S AMPUTATION OF LEFT FOOT, SEQUELA (CMD): ICD-10-CM

## 2021-12-02 DIAGNOSIS — E11.69 TYPE 2 DIABETES MELLITUS WITH OTHER SPECIFIED COMPLICATION, UNSPECIFIED WHETHER LONG TERM INSULIN USE (CMD): ICD-10-CM

## 2021-12-02 DIAGNOSIS — S91.302D OPEN WOUND OF LEFT FOOT, SUBSEQUENT ENCOUNTER: Primary | ICD-10-CM

## 2021-12-02 DIAGNOSIS — S98.312S AMPUTATION AT MIDFOOT, LEFT, SEQUELA (CMD): ICD-10-CM

## 2021-12-02 DIAGNOSIS — I73.9 PVD (PERIPHERAL VASCULAR DISEASE) (CMD): ICD-10-CM

## 2021-12-02 DIAGNOSIS — S91.301D OPEN WOUND OF RIGHT FOOT, SUBSEQUENT ENCOUNTER: ICD-10-CM

## 2021-12-02 PROCEDURE — 11042 DBRDMT SUBQ TIS 1ST 20SQCM/<: CPT

## 2021-12-02 ASSESSMENT — PAIN SCALES - GENERAL: PAINLEVEL_OUTOF10: 0

## 2021-12-02 ASSESSMENT — PAIN SCALES - WONG BAKER: WONGBAKER_NUMERICALRESPONSE: 0

## 2021-12-06 PROCEDURE — 80000055 HH EXTERNAL SUPPLIES

## 2021-12-06 PROCEDURE — A6252 ABSORPT DRG >16 <=48 W/O BDR: HCPCS

## 2021-12-06 PROCEDURE — 80000099 HH WOUND CARE SUPPLIES

## 2021-12-06 PROCEDURE — A4450 NON-WATERPROOF TAPE: HCPCS

## 2021-12-06 PROCEDURE — A6248 HYDROGEL DRSG GEL FILLER: HCPCS

## 2021-12-08 PROBLEM — E11.621 TYPE 2 DIABETES MELLITUS WITH RIGHT DIABETIC FOOT ULCER  (CMD): Status: ACTIVE | Noted: 2021-12-08

## 2021-12-08 PROBLEM — L97.519 TYPE 2 DIABETES MELLITUS WITH RIGHT DIABETIC FOOT ULCER (CMD): Status: ACTIVE | Noted: 2021-12-08

## 2021-12-09 ENCOUNTER — APPOINTMENT (OUTPATIENT)
Dept: HYPERBARIC MEDICINE | Age: 66
End: 2021-12-09

## 2021-12-10 ENCOUNTER — HOSPITAL ENCOUNTER (OUTPATIENT)
Dept: HYPERBARIC MEDICINE | Age: 66
Discharge: STILL ADMITTED OR ADMINISTRATIVE | End: 2021-12-10
Attending: INTERNAL MEDICINE

## 2021-12-10 DIAGNOSIS — E11.621 TYPE 2 DIABETES MELLITUS WITH RIGHT DIABETIC FOOT ULCER (CMD): Primary | ICD-10-CM

## 2021-12-10 DIAGNOSIS — L97.519 TYPE 2 DIABETES MELLITUS WITH RIGHT DIABETIC FOOT ULCER (CMD): Primary | ICD-10-CM

## 2021-12-10 LAB
GLUCOSE BLDC GLUCOMTR-MCNC: 104 MG/DL (ref 70–99)
GLUCOSE BLDC GLUCOMTR-MCNC: 149 MG/DL (ref 70–99)

## 2021-12-10 PROCEDURE — G0277 HBOT, FULL BODY CHAMBER, 30M: HCPCS

## 2021-12-10 PROCEDURE — 82962 GLUCOSE BLOOD TEST: CPT

## 2021-12-10 PROCEDURE — 10004281 HB COUNTER-STAFF TIME PER 15 MIN

## 2021-12-13 ENCOUNTER — HOSPITAL ENCOUNTER (OUTPATIENT)
Dept: HYPERBARIC MEDICINE | Age: 66
Discharge: STILL ADMITTED OR ADMINISTRATIVE | End: 2021-12-13

## 2021-12-13 DIAGNOSIS — E11.621 TYPE 2 DIABETES MELLITUS WITH RIGHT DIABETIC FOOT ULCER (CMD): Primary | ICD-10-CM

## 2021-12-13 DIAGNOSIS — L97.519 TYPE 2 DIABETES MELLITUS WITH RIGHT DIABETIC FOOT ULCER (CMD): Primary | ICD-10-CM

## 2021-12-13 LAB
GLUCOSE BLDC GLUCOMTR-MCNC: 120 MG/DL (ref 70–99)
GLUCOSE BLDC GLUCOMTR-MCNC: 84 MG/DL (ref 70–99)

## 2021-12-13 PROCEDURE — 10004281 HB COUNTER-STAFF TIME PER 15 MIN

## 2021-12-13 PROCEDURE — G0277 HBOT, FULL BODY CHAMBER, 30M: HCPCS

## 2021-12-13 PROCEDURE — 82962 GLUCOSE BLOOD TEST: CPT

## 2021-12-14 ENCOUNTER — HOSPITAL ENCOUNTER (OUTPATIENT)
Dept: HYPERBARIC MEDICINE | Age: 66
Discharge: STILL ADMITTED OR ADMINISTRATIVE | End: 2021-12-14

## 2021-12-14 DIAGNOSIS — L97.519 TYPE 2 DIABETES MELLITUS WITH RIGHT DIABETIC FOOT ULCER (CMD): Primary | ICD-10-CM

## 2021-12-14 DIAGNOSIS — E11.621 TYPE 2 DIABETES MELLITUS WITH RIGHT DIABETIC FOOT ULCER (CMD): Primary | ICD-10-CM

## 2021-12-14 LAB
GLUCOSE BLDC GLUCOMTR-MCNC: 104 MG/DL (ref 70–99)
GLUCOSE BLDC GLUCOMTR-MCNC: 82 MG/DL (ref 70–99)

## 2021-12-14 PROCEDURE — 82962 GLUCOSE BLOOD TEST: CPT

## 2021-12-14 PROCEDURE — G0277 HBOT, FULL BODY CHAMBER, 30M: HCPCS

## 2021-12-14 PROCEDURE — 10004281 HB COUNTER-STAFF TIME PER 15 MIN

## 2021-12-15 ENCOUNTER — HOSPITAL ENCOUNTER (OUTPATIENT)
Dept: HYPERBARIC MEDICINE | Age: 66
Discharge: STILL ADMITTED OR ADMINISTRATIVE | End: 2021-12-15

## 2021-12-15 DIAGNOSIS — L97.519 TYPE 2 DIABETES MELLITUS WITH RIGHT DIABETIC FOOT ULCER (CMD): Primary | ICD-10-CM

## 2021-12-15 DIAGNOSIS — E11.621 TYPE 2 DIABETES MELLITUS WITH RIGHT DIABETIC FOOT ULCER (CMD): Primary | ICD-10-CM

## 2021-12-15 LAB
GLUCOSE BLDC GLUCOMTR-MCNC: 116 MG/DL (ref 70–99)
GLUCOSE BLDC GLUCOMTR-MCNC: 165 MG/DL (ref 70–99)

## 2021-12-15 PROCEDURE — 10004281 HB COUNTER-STAFF TIME PER 15 MIN

## 2021-12-15 PROCEDURE — 82962 GLUCOSE BLOOD TEST: CPT

## 2021-12-15 PROCEDURE — G0277 HBOT, FULL BODY CHAMBER, 30M: HCPCS

## 2021-12-16 ENCOUNTER — HOSPITAL ENCOUNTER (OUTPATIENT)
Dept: HYPERBARIC MEDICINE | Age: 66
Discharge: STILL ADMITTED OR ADMINISTRATIVE | End: 2021-12-16

## 2021-12-16 ENCOUNTER — APPOINTMENT (OUTPATIENT)
Dept: WOUND CARE | Age: 66
End: 2021-12-16
Attending: PODIATRIST

## 2021-12-16 DIAGNOSIS — L97.519 TYPE 2 DIABETES MELLITUS WITH RIGHT DIABETIC FOOT ULCER (CMD): Primary | ICD-10-CM

## 2021-12-16 DIAGNOSIS — E11.621 TYPE 2 DIABETES MELLITUS WITH RIGHT DIABETIC FOOT ULCER (CMD): Primary | ICD-10-CM

## 2021-12-16 LAB
GLUCOSE BLDC GLUCOMTR-MCNC: 105 MG/DL (ref 70–99)
GLUCOSE BLDC GLUCOMTR-MCNC: 92 MG/DL (ref 70–99)

## 2021-12-16 PROCEDURE — 82962 GLUCOSE BLOOD TEST: CPT

## 2021-12-16 PROCEDURE — 10004281 HB COUNTER-STAFF TIME PER 15 MIN

## 2021-12-16 PROCEDURE — G0277 HBOT, FULL BODY CHAMBER, 30M: HCPCS

## 2021-12-17 ENCOUNTER — HOSPITAL ENCOUNTER (OUTPATIENT)
Dept: HYPERBARIC MEDICINE | Age: 66
Discharge: STILL ADMITTED OR ADMINISTRATIVE | End: 2021-12-17

## 2021-12-17 DIAGNOSIS — E11.621 TYPE 2 DIABETES MELLITUS WITH RIGHT DIABETIC FOOT ULCER (CMD): Primary | ICD-10-CM

## 2021-12-17 DIAGNOSIS — L97.519 TYPE 2 DIABETES MELLITUS WITH RIGHT DIABETIC FOOT ULCER (CMD): Primary | ICD-10-CM

## 2021-12-17 LAB
GLUCOSE BLDC GLUCOMTR-MCNC: 107 MG/DL (ref 70–99)
GLUCOSE BLDC GLUCOMTR-MCNC: 97 MG/DL (ref 70–99)

## 2021-12-17 PROCEDURE — 10004281 HB COUNTER-STAFF TIME PER 15 MIN

## 2021-12-17 PROCEDURE — G0277 HBOT, FULL BODY CHAMBER, 30M: HCPCS

## 2021-12-17 PROCEDURE — 82962 GLUCOSE BLOOD TEST: CPT

## 2021-12-20 ENCOUNTER — HOSPITAL ENCOUNTER (OUTPATIENT)
Dept: HYPERBARIC MEDICINE | Age: 66
Discharge: STILL ADMITTED OR ADMINISTRATIVE | End: 2021-12-20

## 2021-12-20 DIAGNOSIS — E11.621 TYPE 2 DIABETES MELLITUS WITH RIGHT DIABETIC FOOT ULCER (CMD): Primary | ICD-10-CM

## 2021-12-20 DIAGNOSIS — L97.519 TYPE 2 DIABETES MELLITUS WITH RIGHT DIABETIC FOOT ULCER (CMD): Primary | ICD-10-CM

## 2021-12-20 LAB
GLUCOSE BLDC GLUCOMTR-MCNC: 114 MG/DL (ref 70–99)
GLUCOSE BLDC GLUCOMTR-MCNC: 160 MG/DL (ref 70–99)

## 2021-12-20 PROCEDURE — 82962 GLUCOSE BLOOD TEST: CPT

## 2021-12-20 PROCEDURE — 10004281 HB COUNTER-STAFF TIME PER 15 MIN

## 2021-12-20 PROCEDURE — G0277 HBOT, FULL BODY CHAMBER, 30M: HCPCS

## 2021-12-21 ENCOUNTER — HOSPITAL ENCOUNTER (OUTPATIENT)
Dept: HYPERBARIC MEDICINE | Age: 66
Discharge: STILL ADMITTED OR ADMINISTRATIVE | End: 2021-12-21

## 2021-12-21 DIAGNOSIS — L97.519 TYPE 2 DIABETES MELLITUS WITH RIGHT DIABETIC FOOT ULCER (CMD): Primary | ICD-10-CM

## 2021-12-21 DIAGNOSIS — E11.621 TYPE 2 DIABETES MELLITUS WITH RIGHT DIABETIC FOOT ULCER (CMD): Primary | ICD-10-CM

## 2021-12-21 LAB
GLUCOSE BLDC GLUCOMTR-MCNC: 103 MG/DL (ref 70–99)
GLUCOSE BLDC GLUCOMTR-MCNC: 117 MG/DL (ref 70–99)

## 2021-12-21 PROCEDURE — G0277 HBOT, FULL BODY CHAMBER, 30M: HCPCS

## 2021-12-21 PROCEDURE — 82962 GLUCOSE BLOOD TEST: CPT

## 2021-12-21 PROCEDURE — 10004281 HB COUNTER-STAFF TIME PER 15 MIN

## 2021-12-22 ENCOUNTER — HOSPITAL ENCOUNTER (OUTPATIENT)
Dept: HYPERBARIC MEDICINE | Age: 66
Discharge: STILL ADMITTED OR ADMINISTRATIVE | End: 2021-12-22

## 2021-12-22 DIAGNOSIS — E11.621 TYPE 2 DIABETES MELLITUS WITH RIGHT DIABETIC FOOT ULCER (CMD): Primary | ICD-10-CM

## 2021-12-22 DIAGNOSIS — L97.519 TYPE 2 DIABETES MELLITUS WITH RIGHT DIABETIC FOOT ULCER (CMD): Primary | ICD-10-CM

## 2021-12-22 LAB
GLUCOSE BLDC GLUCOMTR-MCNC: 122 MG/DL (ref 70–99)
GLUCOSE BLDC GLUCOMTR-MCNC: 128 MG/DL (ref 70–99)

## 2021-12-22 PROCEDURE — 10004281 HB COUNTER-STAFF TIME PER 15 MIN

## 2021-12-22 PROCEDURE — G0277 HBOT, FULL BODY CHAMBER, 30M: HCPCS

## 2021-12-22 PROCEDURE — 82962 GLUCOSE BLOOD TEST: CPT

## 2021-12-23 ENCOUNTER — HOSPITAL ENCOUNTER (OUTPATIENT)
Dept: HYPERBARIC MEDICINE | Age: 66
Discharge: STILL ADMITTED OR ADMINISTRATIVE | End: 2021-12-23

## 2021-12-23 ENCOUNTER — APPOINTMENT (OUTPATIENT)
Dept: WOUND CARE | Age: 66
End: 2021-12-23
Attending: PODIATRIST

## 2021-12-23 DIAGNOSIS — E11.621 TYPE 2 DIABETES MELLITUS WITH RIGHT DIABETIC FOOT ULCER (CMD): Primary | ICD-10-CM

## 2021-12-23 DIAGNOSIS — L97.519 TYPE 2 DIABETES MELLITUS WITH RIGHT DIABETIC FOOT ULCER (CMD): Primary | ICD-10-CM

## 2021-12-23 LAB
GLUCOSE BLDC GLUCOMTR-MCNC: 147 MG/DL (ref 70–99)
GLUCOSE BLDC GLUCOMTR-MCNC: 189 MG/DL (ref 70–99)

## 2021-12-23 PROCEDURE — 10004281 HB COUNTER-STAFF TIME PER 15 MIN

## 2021-12-23 PROCEDURE — G0277 HBOT, FULL BODY CHAMBER, 30M: HCPCS

## 2021-12-23 PROCEDURE — 82962 GLUCOSE BLOOD TEST: CPT

## 2021-12-27 ENCOUNTER — HOSPITAL ENCOUNTER (OUTPATIENT)
Dept: HYPERBARIC MEDICINE | Age: 66
Discharge: STILL ADMITTED OR ADMINISTRATIVE | End: 2021-12-27

## 2021-12-27 DIAGNOSIS — E11.621 TYPE 2 DIABETES MELLITUS WITH RIGHT DIABETIC FOOT ULCER (CMD): Primary | ICD-10-CM

## 2021-12-27 DIAGNOSIS — L97.519 TYPE 2 DIABETES MELLITUS WITH RIGHT DIABETIC FOOT ULCER (CMD): Primary | ICD-10-CM

## 2021-12-27 LAB
GLUCOSE BLDC GLUCOMTR-MCNC: 105 MG/DL (ref 70–99)
GLUCOSE BLDC GLUCOMTR-MCNC: 129 MG/DL (ref 70–99)

## 2021-12-27 PROCEDURE — 82962 GLUCOSE BLOOD TEST: CPT

## 2021-12-27 PROCEDURE — 10004281 HB COUNTER-STAFF TIME PER 15 MIN

## 2021-12-27 PROCEDURE — G0277 HBOT, FULL BODY CHAMBER, 30M: HCPCS

## 2021-12-30 ENCOUNTER — HOSPITAL ENCOUNTER (OUTPATIENT)
Dept: HYPERBARIC MEDICINE | Age: 66
Discharge: STILL ADMITTED OR ADMINISTRATIVE | End: 2021-12-30

## 2021-12-30 DIAGNOSIS — E11.621 TYPE 2 DIABETES MELLITUS WITH RIGHT DIABETIC FOOT ULCER (CMD): Primary | ICD-10-CM

## 2021-12-30 DIAGNOSIS — L97.519 TYPE 2 DIABETES MELLITUS WITH RIGHT DIABETIC FOOT ULCER (CMD): Primary | ICD-10-CM

## 2021-12-30 LAB
GLUCOSE BLDC GLUCOMTR-MCNC: 132 MG/DL (ref 70–99)
GLUCOSE BLDC GLUCOMTR-MCNC: 172 MG/DL (ref 70–99)

## 2021-12-30 PROCEDURE — G0277 HBOT, FULL BODY CHAMBER, 30M: HCPCS

## 2021-12-30 PROCEDURE — 10004281 HB COUNTER-STAFF TIME PER 15 MIN

## 2021-12-30 PROCEDURE — 82962 GLUCOSE BLOOD TEST: CPT

## 2022-01-04 ENCOUNTER — HOSPITAL ENCOUNTER (OUTPATIENT)
Dept: HYPERBARIC MEDICINE | Age: 67
Discharge: STILL ADMITTED OR ADMINISTRATIVE | End: 2022-01-04

## 2022-01-04 DIAGNOSIS — L97.519 TYPE 2 DIABETES MELLITUS WITH RIGHT DIABETIC FOOT ULCER (CMD): Primary | ICD-10-CM

## 2022-01-04 DIAGNOSIS — E11.621 TYPE 2 DIABETES MELLITUS WITH RIGHT DIABETIC FOOT ULCER (CMD): Primary | ICD-10-CM

## 2022-01-04 LAB
GLUCOSE BLDC GLUCOMTR-MCNC: 163 MG/DL (ref 70–99)
GLUCOSE BLDC GLUCOMTR-MCNC: 193 MG/DL (ref 70–99)

## 2022-01-04 PROCEDURE — 10004281 HB COUNTER-STAFF TIME PER 15 MIN

## 2022-01-04 PROCEDURE — G0277 HBOT, FULL BODY CHAMBER, 30M: HCPCS

## 2022-01-04 PROCEDURE — 82962 GLUCOSE BLOOD TEST: CPT

## 2022-01-05 ENCOUNTER — HOSPITAL ENCOUNTER (OUTPATIENT)
Dept: HYPERBARIC MEDICINE | Age: 67
Discharge: STILL ADMITTED OR ADMINISTRATIVE | End: 2022-01-05

## 2022-01-05 DIAGNOSIS — E11.621 TYPE 2 DIABETES MELLITUS WITH RIGHT DIABETIC FOOT ULCER (CMD): Primary | ICD-10-CM

## 2022-01-05 DIAGNOSIS — L97.519 TYPE 2 DIABETES MELLITUS WITH RIGHT DIABETIC FOOT ULCER (CMD): Primary | ICD-10-CM

## 2022-01-05 LAB
GLUCOSE BLDC GLUCOMTR-MCNC: 171 MG/DL (ref 70–99)
GLUCOSE BLDC GLUCOMTR-MCNC: 192 MG/DL (ref 70–99)

## 2022-01-05 PROCEDURE — 82962 GLUCOSE BLOOD TEST: CPT

## 2022-01-05 PROCEDURE — 10004281 HB COUNTER-STAFF TIME PER 15 MIN

## 2022-01-05 PROCEDURE — G0277 HBOT, FULL BODY CHAMBER, 30M: HCPCS

## 2022-01-06 ENCOUNTER — HOSPITAL ENCOUNTER (OUTPATIENT)
Dept: GENERAL RADIOLOGY | Age: 67
Discharge: HOME | End: 2022-01-06

## 2022-01-06 ENCOUNTER — HOSPITAL ENCOUNTER (OUTPATIENT)
Dept: HYPERBARIC MEDICINE | Age: 67
Discharge: STILL ADMITTED OR ADMINISTRATIVE | End: 2022-01-06

## 2022-01-06 ENCOUNTER — HOSPITAL ENCOUNTER (OUTPATIENT)
Dept: WOUND CARE | Age: 67
Discharge: STILL ADMITTED OR ADMINISTRATIVE | End: 2022-01-06
Attending: PODIATRIST

## 2022-01-06 DIAGNOSIS — E11.621 TYPE 2 DIABETES MELLITUS WITH RIGHT DIABETIC FOOT ULCER (CMD): Primary | ICD-10-CM

## 2022-01-06 DIAGNOSIS — Z89.429 STATUS POST AMPUTATION OF TOE (CMD): ICD-10-CM

## 2022-01-06 DIAGNOSIS — M86.071 ACUTE HEMATOGENOUS OSTEOMYELITIS OF RIGHT FOOT (CMD): ICD-10-CM

## 2022-01-06 DIAGNOSIS — L97.519 TYPE 2 DIABETES MELLITUS WITH RIGHT DIABETIC FOOT ULCER (CMD): Primary | ICD-10-CM

## 2022-01-06 DIAGNOSIS — S91.301D OPEN WOUND OF RIGHT FOOT, SUBSEQUENT ENCOUNTER: ICD-10-CM

## 2022-01-06 DIAGNOSIS — I73.9 PVD (PERIPHERAL VASCULAR DISEASE) (CMD): ICD-10-CM

## 2022-01-06 DIAGNOSIS — S91.302D OPEN WOUND OF LEFT FOOT, SUBSEQUENT ENCOUNTER: ICD-10-CM

## 2022-01-06 DIAGNOSIS — Z89.422 STATUS POST AMPUTATION OF LESSER TOE, LEFT (CMD): ICD-10-CM

## 2022-01-06 LAB
GLUCOSE BLDC GLUCOMTR-MCNC: 185 MG/DL (ref 70–99)
GLUCOSE BLDC GLUCOMTR-MCNC: 260 MG/DL (ref 70–99)

## 2022-01-06 PROCEDURE — 10004281 HB COUNTER-STAFF TIME PER 15 MIN

## 2022-01-06 PROCEDURE — 11042 DBRDMT SUBQ TIS 1ST 20SQCM/<: CPT

## 2022-01-06 PROCEDURE — 73630 X-RAY EXAM OF FOOT: CPT

## 2022-01-06 PROCEDURE — G0277 HBOT, FULL BODY CHAMBER, 30M: HCPCS

## 2022-01-06 PROCEDURE — 82962 GLUCOSE BLOOD TEST: CPT

## 2022-01-06 ASSESSMENT — PAIN SCALES - GENERAL: PAINLEVEL_OUTOF10: 0

## 2022-01-06 ASSESSMENT — PAIN SCALES - WONG BAKER: WONGBAKER_NUMERICALRESPONSE: 0

## 2022-01-07 ENCOUNTER — HOSPITAL ENCOUNTER (OUTPATIENT)
Dept: HYPERBARIC MEDICINE | Age: 67
Discharge: STILL ADMITTED OR ADMINISTRATIVE | End: 2022-01-07

## 2022-01-07 DIAGNOSIS — E11.621 TYPE 2 DIABETES MELLITUS WITH RIGHT DIABETIC FOOT ULCER (CMD): Primary | ICD-10-CM

## 2022-01-07 DIAGNOSIS — L97.519 TYPE 2 DIABETES MELLITUS WITH RIGHT DIABETIC FOOT ULCER (CMD): Primary | ICD-10-CM

## 2022-01-07 LAB
GLUCOSE BLDC GLUCOMTR-MCNC: 151 MG/DL (ref 70–99)
GLUCOSE BLDC GLUCOMTR-MCNC: 194 MG/DL (ref 70–99)

## 2022-01-07 PROCEDURE — G0277 HBOT, FULL BODY CHAMBER, 30M: HCPCS

## 2022-01-07 PROCEDURE — 82962 GLUCOSE BLOOD TEST: CPT

## 2022-01-07 PROCEDURE — 10004281 HB COUNTER-STAFF TIME PER 15 MIN

## 2022-01-10 ENCOUNTER — HOSPITAL ENCOUNTER (OUTPATIENT)
Dept: HYPERBARIC MEDICINE | Age: 67
Discharge: STILL ADMITTED OR ADMINISTRATIVE | End: 2022-01-10

## 2022-01-10 DIAGNOSIS — E11.621 TYPE 2 DIABETES MELLITUS WITH RIGHT DIABETIC FOOT ULCER (CMD): Primary | ICD-10-CM

## 2022-01-10 DIAGNOSIS — L97.519 TYPE 2 DIABETES MELLITUS WITH RIGHT DIABETIC FOOT ULCER (CMD): Primary | ICD-10-CM

## 2022-01-10 LAB
GLUCOSE BLDC GLUCOMTR-MCNC: 115 MG/DL (ref 70–99)
GLUCOSE BLDC GLUCOMTR-MCNC: 139 MG/DL (ref 70–99)

## 2022-01-10 PROCEDURE — 10004281 HB COUNTER-STAFF TIME PER 15 MIN

## 2022-01-10 PROCEDURE — 82962 GLUCOSE BLOOD TEST: CPT

## 2022-01-10 PROCEDURE — G0277 HBOT, FULL BODY CHAMBER, 30M: HCPCS

## 2022-01-11 ENCOUNTER — HOSPITAL ENCOUNTER (OUTPATIENT)
Dept: HYPERBARIC MEDICINE | Age: 67
Discharge: STILL ADMITTED OR ADMINISTRATIVE | End: 2022-01-11

## 2022-01-11 DIAGNOSIS — L97.519 TYPE 2 DIABETES MELLITUS WITH RIGHT DIABETIC FOOT ULCER (CMD): Primary | ICD-10-CM

## 2022-01-11 DIAGNOSIS — E11.621 TYPE 2 DIABETES MELLITUS WITH RIGHT DIABETIC FOOT ULCER (CMD): Primary | ICD-10-CM

## 2022-01-11 LAB
GLUCOSE BLDC GLUCOMTR-MCNC: 104 MG/DL (ref 70–99)
GLUCOSE BLDC GLUCOMTR-MCNC: 80 MG/DL (ref 70–99)

## 2022-01-11 PROCEDURE — 10004281 HB COUNTER-STAFF TIME PER 15 MIN

## 2022-01-11 PROCEDURE — G0277 HBOT, FULL BODY CHAMBER, 30M: HCPCS

## 2022-01-11 PROCEDURE — 82962 GLUCOSE BLOOD TEST: CPT

## 2022-01-12 ENCOUNTER — HOSPITAL ENCOUNTER (OUTPATIENT)
Dept: HYPERBARIC MEDICINE | Age: 67
Discharge: STILL ADMITTED OR ADMINISTRATIVE | End: 2022-01-12

## 2022-01-12 DIAGNOSIS — L97.519 TYPE 2 DIABETES MELLITUS WITH RIGHT DIABETIC FOOT ULCER (CMD): Primary | ICD-10-CM

## 2022-01-12 DIAGNOSIS — E11.621 TYPE 2 DIABETES MELLITUS WITH RIGHT DIABETIC FOOT ULCER (CMD): Primary | ICD-10-CM

## 2022-01-12 LAB
GLUCOSE BLDC GLUCOMTR-MCNC: 196 MG/DL (ref 70–99)
GLUCOSE BLDC GLUCOMTR-MCNC: 198 MG/DL (ref 70–99)

## 2022-01-12 PROCEDURE — 10004281 HB COUNTER-STAFF TIME PER 15 MIN

## 2022-01-12 PROCEDURE — G0277 HBOT, FULL BODY CHAMBER, 30M: HCPCS

## 2022-01-12 PROCEDURE — 82962 GLUCOSE BLOOD TEST: CPT

## 2022-01-13 ENCOUNTER — HOSPITAL ENCOUNTER (OUTPATIENT)
Dept: HYPERBARIC MEDICINE | Age: 67
Discharge: STILL ADMITTED OR ADMINISTRATIVE | End: 2022-01-13

## 2022-01-13 DIAGNOSIS — E11.621 TYPE 2 DIABETES MELLITUS WITH RIGHT DIABETIC FOOT ULCER (CMD): Primary | ICD-10-CM

## 2022-01-13 DIAGNOSIS — L97.519 TYPE 2 DIABETES MELLITUS WITH RIGHT DIABETIC FOOT ULCER (CMD): Primary | ICD-10-CM

## 2022-01-13 LAB
GLUCOSE BLDC GLUCOMTR-MCNC: 166 MG/DL (ref 70–99)
GLUCOSE BLDC GLUCOMTR-MCNC: 177 MG/DL (ref 70–99)

## 2022-01-13 PROCEDURE — G0277 HBOT, FULL BODY CHAMBER, 30M: HCPCS

## 2022-01-13 PROCEDURE — 82962 GLUCOSE BLOOD TEST: CPT

## 2022-01-13 PROCEDURE — 10004281 HB COUNTER-STAFF TIME PER 15 MIN

## 2022-01-14 ENCOUNTER — HOSPITAL ENCOUNTER (OUTPATIENT)
Dept: HYPERBARIC MEDICINE | Age: 67
Discharge: STILL ADMITTED OR ADMINISTRATIVE | End: 2022-01-14

## 2022-01-14 DIAGNOSIS — E11.621 TYPE 2 DIABETES MELLITUS WITH RIGHT DIABETIC FOOT ULCER (CMD): Primary | ICD-10-CM

## 2022-01-14 DIAGNOSIS — L97.519 TYPE 2 DIABETES MELLITUS WITH RIGHT DIABETIC FOOT ULCER (CMD): Primary | ICD-10-CM

## 2022-01-14 LAB
GLUCOSE BLDC GLUCOMTR-MCNC: 119 MG/DL (ref 70–99)
GLUCOSE BLDC GLUCOMTR-MCNC: 144 MG/DL (ref 70–99)

## 2022-01-14 PROCEDURE — 82962 GLUCOSE BLOOD TEST: CPT

## 2022-01-14 PROCEDURE — G0277 HBOT, FULL BODY CHAMBER, 30M: HCPCS

## 2022-01-14 PROCEDURE — 10004281 HB COUNTER-STAFF TIME PER 15 MIN

## 2022-01-17 ENCOUNTER — HOSPITAL ENCOUNTER (OUTPATIENT)
Dept: HYPERBARIC MEDICINE | Age: 67
Discharge: STILL ADMITTED OR ADMINISTRATIVE | End: 2022-01-17

## 2022-01-17 DIAGNOSIS — E11.621 TYPE 2 DIABETES MELLITUS WITH RIGHT DIABETIC FOOT ULCER (CMD): Primary | ICD-10-CM

## 2022-01-17 DIAGNOSIS — L97.519 TYPE 2 DIABETES MELLITUS WITH RIGHT DIABETIC FOOT ULCER (CMD): Primary | ICD-10-CM

## 2022-01-17 LAB
GLUCOSE BLDC GLUCOMTR-MCNC: 125 MG/DL (ref 70–99)
GLUCOSE BLDC GLUCOMTR-MCNC: 129 MG/DL (ref 70–99)

## 2022-01-17 PROCEDURE — 10004281 HB COUNTER-STAFF TIME PER 15 MIN

## 2022-01-17 PROCEDURE — 82962 GLUCOSE BLOOD TEST: CPT

## 2022-01-17 PROCEDURE — G0277 HBOT, FULL BODY CHAMBER, 30M: HCPCS

## 2022-01-18 ENCOUNTER — HOSPITAL ENCOUNTER (OUTPATIENT)
Dept: HYPERBARIC MEDICINE | Age: 67
Discharge: STILL ADMITTED OR ADMINISTRATIVE | End: 2022-01-18

## 2022-01-18 DIAGNOSIS — E11.621 TYPE 2 DIABETES MELLITUS WITH RIGHT DIABETIC FOOT ULCER (CMD): Primary | ICD-10-CM

## 2022-01-18 DIAGNOSIS — L97.519 TYPE 2 DIABETES MELLITUS WITH RIGHT DIABETIC FOOT ULCER (CMD): Primary | ICD-10-CM

## 2022-01-18 LAB
GLUCOSE BLDC GLUCOMTR-MCNC: 115 MG/DL (ref 70–99)
GLUCOSE BLDC GLUCOMTR-MCNC: 117 MG/DL (ref 70–99)

## 2022-01-18 PROCEDURE — G0277 HBOT, FULL BODY CHAMBER, 30M: HCPCS

## 2022-01-18 PROCEDURE — 10004281 HB COUNTER-STAFF TIME PER 15 MIN

## 2022-01-18 PROCEDURE — 82962 GLUCOSE BLOOD TEST: CPT

## 2022-01-19 ENCOUNTER — HOSPITAL ENCOUNTER (OUTPATIENT)
Dept: HYPERBARIC MEDICINE | Age: 67
Discharge: STILL ADMITTED OR ADMINISTRATIVE | End: 2022-01-19

## 2022-01-19 DIAGNOSIS — E11.621 TYPE 2 DIABETES MELLITUS WITH RIGHT DIABETIC FOOT ULCER (CMD): Primary | ICD-10-CM

## 2022-01-19 DIAGNOSIS — L97.519 TYPE 2 DIABETES MELLITUS WITH RIGHT DIABETIC FOOT ULCER (CMD): Primary | ICD-10-CM

## 2022-01-19 LAB
GLUCOSE BLDC GLUCOMTR-MCNC: 141 MG/DL (ref 70–99)
GLUCOSE BLDC GLUCOMTR-MCNC: 151 MG/DL (ref 70–99)

## 2022-01-19 PROCEDURE — 10004281 HB COUNTER-STAFF TIME PER 15 MIN

## 2022-01-19 PROCEDURE — G0277 HBOT, FULL BODY CHAMBER, 30M: HCPCS

## 2022-01-19 PROCEDURE — 82962 GLUCOSE BLOOD TEST: CPT

## 2022-01-20 ENCOUNTER — HOSPITAL ENCOUNTER (OUTPATIENT)
Dept: HYPERBARIC MEDICINE | Age: 67
Discharge: STILL ADMITTED OR ADMINISTRATIVE | End: 2022-01-20

## 2022-01-20 ENCOUNTER — HOSPITAL ENCOUNTER (OUTPATIENT)
Dept: WOUND CARE | Age: 67
Discharge: STILL ADMITTED OR ADMINISTRATIVE | End: 2022-01-20
Attending: PODIATRIST

## 2022-01-20 VITALS — TEMPERATURE: 96.1 F

## 2022-01-20 DIAGNOSIS — M86.071 ACUTE HEMATOGENOUS OSTEOMYELITIS OF RIGHT FOOT (CMD): ICD-10-CM

## 2022-01-20 DIAGNOSIS — S98.312S AMPUTATION AT MIDFOOT, LEFT, SEQUELA (CMD): ICD-10-CM

## 2022-01-20 DIAGNOSIS — L97.412 MIDFOOT ULCERATION, RIGHT, WITH FAT LAYER EXPOSED (CMD): ICD-10-CM

## 2022-01-20 DIAGNOSIS — S91.302D OPEN WOUND OF LEFT FOOT, SUBSEQUENT ENCOUNTER: ICD-10-CM

## 2022-01-20 DIAGNOSIS — Z89.422 STATUS POST AMPUTATION OF LESSER TOE, LEFT (CMD): ICD-10-CM

## 2022-01-20 DIAGNOSIS — E11.621 TYPE 2 DIABETES MELLITUS WITH RIGHT DIABETIC FOOT ULCER (CMD): Primary | ICD-10-CM

## 2022-01-20 DIAGNOSIS — I73.9 PVD (PERIPHERAL VASCULAR DISEASE) (CMD): ICD-10-CM

## 2022-01-20 DIAGNOSIS — S91.301D OPEN WOUND OF RIGHT FOOT, SUBSEQUENT ENCOUNTER: ICD-10-CM

## 2022-01-20 DIAGNOSIS — L97.519 TYPE 2 DIABETES MELLITUS WITH RIGHT DIABETIC FOOT ULCER (CMD): Primary | ICD-10-CM

## 2022-01-20 DIAGNOSIS — S98.912S AMPUTATION OF LEFT FOOT, SEQUELA (CMD): ICD-10-CM

## 2022-01-20 DIAGNOSIS — Z89.429 STATUS POST AMPUTATION OF TOE (CMD): ICD-10-CM

## 2022-01-20 DIAGNOSIS — Z89.432 S/P TRANSMETATARSAL AMPUTATION OF FOOT, LEFT (CMD): ICD-10-CM

## 2022-01-20 LAB
GLUCOSE BLDC GLUCOMTR-MCNC: 127 MG/DL (ref 70–99)
GLUCOSE BLDC GLUCOMTR-MCNC: 139 MG/DL (ref 70–99)

## 2022-01-20 PROCEDURE — 10004281 HB COUNTER-STAFF TIME PER 15 MIN

## 2022-01-20 PROCEDURE — 82962 GLUCOSE BLOOD TEST: CPT

## 2022-01-20 PROCEDURE — G0277 HBOT, FULL BODY CHAMBER, 30M: HCPCS

## 2022-01-20 PROCEDURE — 11042 DBRDMT SUBQ TIS 1ST 20SQCM/<: CPT

## 2022-01-20 ASSESSMENT — PAIN SCALES - WONG BAKER: WONGBAKER_NUMERICALRESPONSE: 0

## 2022-01-20 ASSESSMENT — PAIN SCALES - GENERAL: PAINLEVEL_OUTOF10: 0

## 2022-01-24 ENCOUNTER — HOSPITAL ENCOUNTER (OUTPATIENT)
Dept: HYPERBARIC MEDICINE | Age: 67
Discharge: STILL ADMITTED OR ADMINISTRATIVE | End: 2022-01-24

## 2022-01-24 DIAGNOSIS — L97.519 TYPE 2 DIABETES MELLITUS WITH RIGHT DIABETIC FOOT ULCER (CMD): Primary | ICD-10-CM

## 2022-01-24 DIAGNOSIS — E11.621 TYPE 2 DIABETES MELLITUS WITH RIGHT DIABETIC FOOT ULCER (CMD): Primary | ICD-10-CM

## 2022-01-24 LAB
GLUCOSE BLDC GLUCOMTR-MCNC: 101 MG/DL (ref 70–99)
GLUCOSE BLDC GLUCOMTR-MCNC: 99 MG/DL (ref 70–99)

## 2022-01-24 PROCEDURE — G0277 HBOT, FULL BODY CHAMBER, 30M: HCPCS

## 2022-01-24 PROCEDURE — 82962 GLUCOSE BLOOD TEST: CPT

## 2022-01-24 PROCEDURE — 10004281 HB COUNTER-STAFF TIME PER 15 MIN

## 2022-01-25 ENCOUNTER — HOSPITAL ENCOUNTER (OUTPATIENT)
Dept: HYPERBARIC MEDICINE | Age: 67
Discharge: STILL ADMITTED OR ADMINISTRATIVE | End: 2022-01-25

## 2022-01-25 DIAGNOSIS — E11.621 TYPE 2 DIABETES MELLITUS WITH RIGHT DIABETIC FOOT ULCER (CMD): Primary | ICD-10-CM

## 2022-01-25 DIAGNOSIS — L97.519 TYPE 2 DIABETES MELLITUS WITH RIGHT DIABETIC FOOT ULCER (CMD): Primary | ICD-10-CM

## 2022-01-25 LAB
GLUCOSE BLDC GLUCOMTR-MCNC: 100 MG/DL (ref 70–99)
GLUCOSE BLDC GLUCOMTR-MCNC: 107 MG/DL (ref 70–99)

## 2022-01-25 PROCEDURE — G0277 HBOT, FULL BODY CHAMBER, 30M: HCPCS

## 2022-01-25 PROCEDURE — 82962 GLUCOSE BLOOD TEST: CPT

## 2022-01-26 ENCOUNTER — HOSPITAL ENCOUNTER (OUTPATIENT)
Dept: HYPERBARIC MEDICINE | Age: 67
Discharge: STILL ADMITTED OR ADMINISTRATIVE | End: 2022-01-26

## 2022-01-26 DIAGNOSIS — L97.519 TYPE 2 DIABETES MELLITUS WITH RIGHT DIABETIC FOOT ULCER (CMD): Primary | ICD-10-CM

## 2022-01-26 DIAGNOSIS — E11.621 TYPE 2 DIABETES MELLITUS WITH RIGHT DIABETIC FOOT ULCER (CMD): Primary | ICD-10-CM

## 2022-01-26 PROCEDURE — G0277 HBOT, FULL BODY CHAMBER, 30M: HCPCS

## 2022-01-27 ENCOUNTER — HOSPITAL ENCOUNTER (OUTPATIENT)
Dept: HYPERBARIC MEDICINE | Age: 67
Discharge: STILL ADMITTED OR ADMINISTRATIVE | End: 2022-01-27

## 2022-01-27 DIAGNOSIS — L97.519 TYPE 2 DIABETES MELLITUS WITH RIGHT DIABETIC FOOT ULCER (CMD): Primary | ICD-10-CM

## 2022-01-27 DIAGNOSIS — E11.621 TYPE 2 DIABETES MELLITUS WITH RIGHT DIABETIC FOOT ULCER (CMD): Primary | ICD-10-CM

## 2022-01-27 LAB
GLUCOSE BLDC GLUCOMTR-MCNC: 111 MG/DL (ref 70–99)
GLUCOSE BLDC GLUCOMTR-MCNC: 113 MG/DL (ref 70–99)

## 2022-01-27 PROCEDURE — G0277 HBOT, FULL BODY CHAMBER, 30M: HCPCS

## 2022-01-27 PROCEDURE — 82962 GLUCOSE BLOOD TEST: CPT

## 2022-01-28 ENCOUNTER — HOSPITAL ENCOUNTER (OUTPATIENT)
Dept: HYPERBARIC MEDICINE | Age: 67
Discharge: STILL ADMITTED OR ADMINISTRATIVE | End: 2022-01-28

## 2022-01-28 DIAGNOSIS — L97.519 TYPE 2 DIABETES MELLITUS WITH RIGHT DIABETIC FOOT ULCER (CMD): Primary | ICD-10-CM

## 2022-01-28 DIAGNOSIS — E11.621 TYPE 2 DIABETES MELLITUS WITH RIGHT DIABETIC FOOT ULCER (CMD): Primary | ICD-10-CM

## 2022-01-28 LAB
GLUCOSE BLDC GLUCOMTR-MCNC: 134 MG/DL (ref 70–99)
GLUCOSE BLDC GLUCOMTR-MCNC: 139 MG/DL (ref 70–99)

## 2022-01-28 PROCEDURE — 82962 GLUCOSE BLOOD TEST: CPT

## 2022-01-28 PROCEDURE — G0277 HBOT, FULL BODY CHAMBER, 30M: HCPCS

## 2022-01-28 PROCEDURE — 10004281 HB COUNTER-STAFF TIME PER 15 MIN

## 2022-02-03 ENCOUNTER — APPOINTMENT (OUTPATIENT)
Dept: WOUND CARE | Age: 67
End: 2022-02-03
Attending: PODIATRIST

## 2022-02-03 ENCOUNTER — APPOINTMENT (OUTPATIENT)
Dept: MRI IMAGING | Age: 67
DRG: 177 | End: 2022-02-03
Attending: EMERGENCY MEDICINE

## 2022-02-03 ENCOUNTER — HOSPITAL ENCOUNTER (INPATIENT)
Age: 67
LOS: 6 days | Discharge: HOME | DRG: 177 | End: 2022-02-09
Attending: EMERGENCY MEDICINE | Admitting: FAMILY MEDICINE

## 2022-02-03 ENCOUNTER — APPOINTMENT (OUTPATIENT)
Dept: GENERAL RADIOLOGY | Age: 67
DRG: 177 | End: 2022-02-03
Attending: EMERGENCY MEDICINE

## 2022-02-03 DIAGNOSIS — U07.1 COVID-19 VIRUS INFECTION: ICD-10-CM

## 2022-02-03 DIAGNOSIS — J96.01 ACUTE RESPIRATORY FAILURE WITH HYPOXIA (CMD): ICD-10-CM

## 2022-02-03 DIAGNOSIS — N17.9 ACUTE KIDNEY INJURY (CMD): ICD-10-CM

## 2022-02-03 DIAGNOSIS — R19.7 DIARRHEA, UNSPECIFIED TYPE: ICD-10-CM

## 2022-02-03 DIAGNOSIS — I50.9 ACUTE ON CHRONIC CONGESTIVE HEART FAILURE, UNSPECIFIED HEART FAILURE TYPE (CMD): Primary | ICD-10-CM

## 2022-02-03 LAB
ALBUMIN SERPL-MCNC: 2.2 G/DL (ref 3.6–5.1)
ALP SERPL-CCNC: 113 UNITS/L (ref 45–117)
ALT SERPL-CCNC: 35 UNITS/L
ANION GAP SERPL CALC-SCNC: 16 MMOL/L (ref 10–20)
APTT PPP: 31 SEC (ref 22–30)
AST SERPL-CCNC: 68 UNITS/L
BASOPHILS # BLD: 0 K/MCL (ref 0–0.3)
BASOPHILS NFR BLD: 0 %
BILIRUB CONJ SERPL-MCNC: 0.3 MG/DL (ref 0–0.2)
BILIRUB SERPL-MCNC: 0.7 MG/DL (ref 0.2–1)
BUN SERPL-MCNC: 74 MG/DL (ref 6–20)
BUN/CREAT SERPL: 26 (ref 7–25)
C DIFF TOX B TCDB STL QL NAA+PROBE: NOT DETECTED
CALCIUM SERPL-MCNC: 9.3 MG/DL (ref 8.4–10.2)
CHLORIDE SERPL-SCNC: 96 MMOL/L (ref 98–107)
CO2 SERPL-SCNC: 24 MMOL/L (ref 21–32)
CREAT SERPL-MCNC: 2.84 MG/DL (ref 0.67–1.17)
CRP SERPL-MCNC: 11.3 MG/DL
D DIMER PPP FEU-MCNC: 1.33 MG/L (FEU)
DEPRECATED RDW RBC: 50.3 FL (ref 39–50)
EOSINOPHIL # BLD: 0 K/MCL (ref 0–0.5)
EOSINOPHIL NFR BLD: 0 %
ERYTHROCYTE [DISTWIDTH] IN BLOOD: 15.4 % (ref 11–15)
FASTING DURATION TIME PATIENT: ABNORMAL H
FERRITIN SERPL-MCNC: 711 NG/ML (ref 26–388)
FLUAV RNA RESP QL NAA+PROBE: NOT DETECTED
FLUBV RNA RESP QL NAA+PROBE: NOT DETECTED
GFR SERPLBLD BASED ON 1.73 SQ M-ARVRAT: 22 ML/MIN
GLUCOSE BLDC GLUCOMTR-MCNC: 185 MG/DL (ref 70–99)
GLUCOSE BLDC GLUCOMTR-MCNC: 276 MG/DL (ref 70–99)
GLUCOSE SERPL-MCNC: 251 MG/DL (ref 70–99)
HCT VFR BLD CALC: 38.8 % (ref 39–51)
HGB BLD-MCNC: 12.1 G/DL (ref 13–17)
IMM GRANULOCYTES # BLD AUTO: 0.1 K/MCL (ref 0–0.2)
IMM GRANULOCYTES # BLD: 1 %
INR PPP: 1.1
LDH SERPL L TO P-CCNC: 482 UNITS/L (ref 86–234)
LIPASE SERPL-CCNC: 649 UNITS/L (ref 73–393)
LYMPHOCYTES # BLD: 1.1 K/MCL (ref 1–4)
LYMPHOCYTES NFR BLD: 15 %
MAGNESIUM SERPL-MCNC: 2.4 MG/DL (ref 1.7–2.4)
MCH RBC QN AUTO: 27.5 PG (ref 26–34)
MCHC RBC AUTO-ENTMCNC: 31.2 G/DL (ref 32–36.5)
MCV RBC AUTO: 88.2 FL (ref 78–100)
MONOCYTES # BLD: 0.5 K/MCL (ref 0.3–0.9)
MONOCYTES NFR BLD: 7 %
NEUTROPHILS # BLD: 5.5 K/MCL (ref 1.8–7.7)
NEUTROPHILS NFR BLD: 77 %
NRBC BLD MANUAL-RTO: 0 /100 WBC
NT-PROBNP SERPL-MCNC: 1003 PG/ML
PLATELET # BLD AUTO: 352 K/MCL (ref 140–450)
POTASSIUM SERPL-SCNC: 4.5 MMOL/L (ref 3.4–5.1)
PROCALCITONIN SERPL IA-MCNC: 0.25 NG/ML
PROT SERPL-MCNC: 8.5 G/DL (ref 6.4–8.2)
PROTHROMBIN TIME: 11.4 SEC (ref 9.7–11.8)
RAINBOW EXTRA TUBES HOLD SPECIMEN: NORMAL
RBC # BLD: 4.4 MIL/MCL (ref 4.5–5.9)
RSV AG NPH QL IA.RAPID: NOT DETECTED
SARS-COV-2 N GENE CT SPEC QN NAA N2: 29.5
SARS-COV-2 RNA RESP QL NAA+PROBE: DETECTED
SERVICE CMNT-IMP: ABNORMAL
SODIUM SERPL-SCNC: 131 MMOL/L (ref 135–145)
TROPONIN I SERPL DL<=0.01 NG/ML-MCNC: 577 NG/L
WBC # BLD: 7.2 K/MCL (ref 4.2–11)

## 2022-02-03 PROCEDURE — 3E0333Z INTRODUCTION OF ANTI-INFLAMMATORY INTO PERIPHERAL VEIN, PERCUTANEOUS APPROACH: ICD-10-PCS | Performed by: EMERGENCY MEDICINE

## 2022-02-03 PROCEDURE — 83036 HEMOGLOBIN GLYCOSYLATED A1C: CPT | Performed by: NURSE PRACTITIONER

## 2022-02-03 PROCEDURE — 10003585 HB ROOM CHARGE INTERMEDIATE CARE

## 2022-02-03 PROCEDURE — 71555 MRI ANGIO CHEST W OR W/O DYE: CPT

## 2022-02-03 PROCEDURE — 93005 ELECTROCARDIOGRAM TRACING: CPT | Performed by: EMERGENCY MEDICINE

## 2022-02-03 PROCEDURE — 85730 THROMBOPLASTIN TIME PARTIAL: CPT | Performed by: EMERGENCY MEDICINE

## 2022-02-03 PROCEDURE — 10004651 HB RX, NO CHARGE ITEM: Performed by: EMERGENCY MEDICINE

## 2022-02-03 PROCEDURE — 10002800 HB RX 250 W HCPCS: Performed by: INTERNAL MEDICINE

## 2022-02-03 PROCEDURE — 84145 PROCALCITONIN (PCT): CPT | Performed by: EMERGENCY MEDICINE

## 2022-02-03 PROCEDURE — G1004 CDSM NDSC: HCPCS

## 2022-02-03 PROCEDURE — 84484 ASSAY OF TROPONIN QUANT: CPT | Performed by: EMERGENCY MEDICINE

## 2022-02-03 PROCEDURE — 85610 PROTHROMBIN TIME: CPT | Performed by: EMERGENCY MEDICINE

## 2022-02-03 PROCEDURE — 0241U COVID/FLU/RSV PANEL: CPT | Performed by: EMERGENCY MEDICINE

## 2022-02-03 PROCEDURE — 85379 FIBRIN DEGRADATION QUANT: CPT | Performed by: EMERGENCY MEDICINE

## 2022-02-03 PROCEDURE — 36415 COLL VENOUS BLD VENIPUNCTURE: CPT

## 2022-02-03 PROCEDURE — 85025 COMPLETE CBC W/AUTO DIFF WBC: CPT | Performed by: EMERGENCY MEDICINE

## 2022-02-03 PROCEDURE — 99285 EMERGENCY DEPT VISIT HI MDM: CPT

## 2022-02-03 PROCEDURE — 10002803 HB RX 637: Performed by: INTERNAL MEDICINE

## 2022-02-03 PROCEDURE — 87493 C DIFF AMPLIFIED PROBE: CPT | Performed by: EMERGENCY MEDICINE

## 2022-02-03 PROCEDURE — 80076 HEPATIC FUNCTION PANEL: CPT | Performed by: EMERGENCY MEDICINE

## 2022-02-03 PROCEDURE — C9803 HOPD COVID-19 SPEC COLLECT: HCPCS

## 2022-02-03 PROCEDURE — 86140 C-REACTIVE PROTEIN: CPT | Performed by: EMERGENCY MEDICINE

## 2022-02-03 PROCEDURE — 71045 X-RAY EXAM CHEST 1 VIEW: CPT

## 2022-02-03 PROCEDURE — 10002805 HB CONTRAST AGENT: Performed by: EMERGENCY MEDICINE

## 2022-02-03 PROCEDURE — 82728 ASSAY OF FERRITIN: CPT | Performed by: EMERGENCY MEDICINE

## 2022-02-03 PROCEDURE — 80048 BASIC METABOLIC PNL TOTAL CA: CPT | Performed by: EMERGENCY MEDICINE

## 2022-02-03 PROCEDURE — A9585 GADOBUTROL INJECTION: HCPCS | Performed by: EMERGENCY MEDICINE

## 2022-02-03 PROCEDURE — 10002800 HB RX 250 W HCPCS: Performed by: EMERGENCY MEDICINE

## 2022-02-03 PROCEDURE — 13003289 HB OXYGEN THERAPY DAILY

## 2022-02-03 PROCEDURE — 83690 ASSAY OF LIPASE: CPT | Performed by: EMERGENCY MEDICINE

## 2022-02-03 PROCEDURE — 99233 SBSQ HOSP IP/OBS HIGH 50: CPT | Performed by: INTERNAL MEDICINE

## 2022-02-03 PROCEDURE — 83615 LACTATE (LD) (LDH) ENZYME: CPT | Performed by: EMERGENCY MEDICINE

## 2022-02-03 PROCEDURE — 83735 ASSAY OF MAGNESIUM: CPT | Performed by: EMERGENCY MEDICINE

## 2022-02-03 PROCEDURE — 83880 ASSAY OF NATRIURETIC PEPTIDE: CPT | Performed by: EMERGENCY MEDICINE

## 2022-02-03 RX ORDER — INSULIN GLARGINE 100 [IU]/ML
52 INJECTION, SOLUTION SUBCUTANEOUS EVERY 12 HOURS SCHEDULED
Status: DISCONTINUED | OUTPATIENT
Start: 2022-02-03 | End: 2022-02-04

## 2022-02-03 RX ORDER — DEXTROSE MONOHYDRATE 25 G/50ML
12.5 INJECTION, SOLUTION INTRAVENOUS PRN
Status: DISCONTINUED | OUTPATIENT
Start: 2022-02-03 | End: 2022-02-09 | Stop reason: HOSPADM

## 2022-02-03 RX ORDER — ASCORBIC ACID 500 MG
500 TABLET ORAL DAILY
Status: DISCONTINUED | OUTPATIENT
Start: 2022-02-04 | End: 2022-02-09 | Stop reason: HOSPADM

## 2022-02-03 RX ORDER — FUROSEMIDE 10 MG/ML
40 INJECTION INTRAMUSCULAR; INTRAVENOUS ONCE
Status: COMPLETED | OUTPATIENT
Start: 2022-02-03 | End: 2022-02-03

## 2022-02-03 RX ORDER — ASPIRIN 81 MG/1
324 TABLET, CHEWABLE ORAL ONCE
Status: COMPLETED | OUTPATIENT
Start: 2022-02-03 | End: 2022-02-03

## 2022-02-03 RX ORDER — SPIRONOLACTONE 25 MG/1
12.5 TABLET, FILM COATED ORAL DAILY
Status: DISCONTINUED | OUTPATIENT
Start: 2022-02-04 | End: 2022-02-04

## 2022-02-03 RX ORDER — NICOTINE POLACRILEX 4 MG
15 LOZENGE BUCCAL PRN
Status: DISCONTINUED | OUTPATIENT
Start: 2022-02-03 | End: 2022-02-09 | Stop reason: HOSPADM

## 2022-02-03 RX ORDER — ATORVASTATIN CALCIUM 10 MG/1
10 TABLET, FILM COATED ORAL DAILY
COMMUNITY

## 2022-02-03 RX ORDER — NICOTINE POLACRILEX 4 MG
30 LOZENGE BUCCAL PRN
Status: DISCONTINUED | OUTPATIENT
Start: 2022-02-03 | End: 2022-02-09 | Stop reason: HOSPADM

## 2022-02-03 RX ORDER — BUMETANIDE 0.25 MG/ML
1 INJECTION INTRAMUSCULAR; INTRAVENOUS
Status: DISCONTINUED | OUTPATIENT
Start: 2022-02-03 | End: 2022-02-03

## 2022-02-03 RX ORDER — ENOXAPARIN SODIUM 100 MG/ML
40 INJECTION SUBCUTANEOUS DAILY
Status: DISCONTINUED | OUTPATIENT
Start: 2022-02-04 | End: 2022-02-04

## 2022-02-03 RX ORDER — DEXTROSE MONOHYDRATE 25 G/50ML
25 INJECTION, SOLUTION INTRAVENOUS PRN
Status: DISCONTINUED | OUTPATIENT
Start: 2022-02-03 | End: 2022-02-09 | Stop reason: HOSPADM

## 2022-02-03 RX ORDER — FUROSEMIDE 10 MG/ML
40 INJECTION INTRAMUSCULAR; INTRAVENOUS
Status: DISCONTINUED | OUTPATIENT
Start: 2022-02-03 | End: 2022-02-04

## 2022-02-03 RX ORDER — ALLOPURINOL 100 MG/1
100 TABLET ORAL DAILY
Status: DISCONTINUED | OUTPATIENT
Start: 2022-02-04 | End: 2022-02-09 | Stop reason: HOSPADM

## 2022-02-03 RX ORDER — GADOBUTROL 604.72 MG/ML
15 INJECTION INTRAVENOUS ONCE
Status: COMPLETED | OUTPATIENT
Start: 2022-02-03 | End: 2022-02-03

## 2022-02-03 RX ORDER — METOPROLOL SUCCINATE 25 MG/1
25 TABLET, EXTENDED RELEASE ORAL 2 TIMES DAILY
Status: DISCONTINUED | OUTPATIENT
Start: 2022-02-03 | End: 2022-02-09 | Stop reason: HOSPADM

## 2022-02-03 RX ORDER — DEXAMETHASONE SODIUM PHOSPHATE 10 MG/ML
6 INJECTION, SOLUTION INTRAMUSCULAR; INTRAVENOUS ONCE
Status: COMPLETED | OUTPATIENT
Start: 2022-02-03 | End: 2022-02-03

## 2022-02-03 RX ORDER — ISOSORBIDE MONONITRATE 30 MG/1
30 TABLET, EXTENDED RELEASE ORAL DAILY
Status: DISCONTINUED | OUTPATIENT
Start: 2022-02-04 | End: 2022-02-09 | Stop reason: HOSPADM

## 2022-02-03 RX ORDER — FAMOTIDINE 20 MG/1
20 TABLET, FILM COATED ORAL DAILY
Status: DISCONTINUED | OUTPATIENT
Start: 2022-02-04 | End: 2022-02-09 | Stop reason: HOSPADM

## 2022-02-03 RX ORDER — TORSEMIDE 20 MG/1
60 TABLET ORAL DAILY
Status: DISCONTINUED | OUTPATIENT
Start: 2022-02-04 | End: 2022-02-03

## 2022-02-03 RX ADMIN — FUROSEMIDE 40 MG: 10 INJECTION, SOLUTION INTRAVENOUS at 16:13

## 2022-02-03 RX ADMIN — INSULIN GLARGINE 52 UNITS: 100 INJECTION, SOLUTION SUBCUTANEOUS at 22:41

## 2022-02-03 RX ADMIN — GADOBUTROL 15 ML: 604.72 INJECTION INTRAVENOUS at 18:12

## 2022-02-03 RX ADMIN — ASPIRIN 81 MG CHEWABLE TABLET 324 MG: 81 TABLET CHEWABLE at 16:11

## 2022-02-03 RX ADMIN — DEXAMETHASONE SODIUM PHOSPHATE 6 MG: 10 INJECTION INTRAMUSCULAR; INTRAVENOUS at 16:09

## 2022-02-03 RX ADMIN — FUROSEMIDE 40 MG: 10 INJECTION, SOLUTION INTRAVENOUS at 21:45

## 2022-02-03 RX ADMIN — METOPROLOL SUCCINATE 25 MG: 25 TABLET, EXTENDED RELEASE ORAL at 22:11

## 2022-02-03 ASSESSMENT — LIFESTYLE VARIABLES
AUDIT-C TOTAL SCORE: 2
HOW MANY STANDARD DRINKS CONTAINING ALCOHOL DO YOU HAVE ON A TYPICAL DAY: 3 OR 4
HOW OFTEN DO YOU HAVE 6 OR MORE DRINKS ON ONE OCCASION: NEVER
ALCOHOL_USE_STATUS: NO OR LOW RISK WITH VALIDATED TOOL
HOW OFTEN DO YOU HAVE A DRINK CONTAINING ALCOHOL: MONTHLY OR LESS

## 2022-02-03 ASSESSMENT — COGNITIVE AND FUNCTIONAL STATUS - GENERAL
ARE YOU BLIND OR DO YOU HAVE SERIOUS DIFFICULTY SEEING, EVEN WHEN WEARING GLASSES: NO
ARE YOU DEAF OR DO YOU HAVE SERIOUS DIFFICULTY  HEARING: NO
DO YOU HAVE SERIOUS DIFFICULTY WALKING OR CLIMBING STAIRS: YES
BECAUSE OF A PHYSICAL, MENTAL, OR EMOTIONAL CONDITION, DO YOU HAVE DIFFICULTY DOING ERRANDS ALONE: YES
BECAUSE OF A PHYSICAL, MENTAL, OR EMOTIONAL CONDITION, DO YOU HAVE SERIOUS DIFFICULTY CONCENTRATING, REMEMBERING OR MAKING DECISIONS: NO
DO YOU HAVE DIFFICULTY DRESSING OR BATHING: YES

## 2022-02-03 ASSESSMENT — PATIENT HEALTH QUESTIONNAIRE - PHQ9
1. LITTLE INTEREST OR PLEASURE IN DOING THINGS: NOT AT ALL
2. FEELING DOWN, DEPRESSED OR HOPELESS: NOT AT ALL
CLINICAL INTERPRETATION OF PHQ2 SCORE: NO FURTHER SCREENING NEEDED
SUM OF ALL RESPONSES TO PHQ9 QUESTIONS 1 AND 2: 0
IS PATIENT ABLE TO COMPLETE PHQ2 OR PHQ9: YES
SUM OF ALL RESPONSES TO PHQ9 QUESTIONS 1 AND 2: 0

## 2022-02-03 ASSESSMENT — ACTIVITIES OF DAILY LIVING (ADL)
TOILETING: INDEPENDENT
CHRONIC_PAIN_PRESENT: NO
FEEDING YOURSELF: INDEPENDENT
ADL_SHORT_OF_BREATH: YES
CONTINENCE: INDEPENDENT
ADL_SCORE: 10
BATHING: NEEDS ASSISTANCE
ADL_BEFORE_ADMISSION: NEEDS/REQUIRES ASSISTANCE
TRANSFERRING: INDEPENDENT
MOBILITY_ASSIST_DEVICES: CRUTCHES;WHEELCHAIR;STANDARD WALKER
DRESSING YOURSELF: NEEDS ASSISTANCE
RECENT_DECLINE_ADL: YES, DECLINE IN BATHING/DRESSING/FEEDING, COLLABORATE WITH PROVIDER (T)

## 2022-02-03 ASSESSMENT — PAIN SCALES - GENERAL
PAINLEVEL_OUTOF10: 0
PAINLEVEL_OUTOF10: 0

## 2022-02-04 ENCOUNTER — CASE MANAGEMENT (OUTPATIENT)
Dept: CARE COORDINATION | Age: 67
End: 2022-02-04

## 2022-02-04 PROBLEM — U07.1 PNEUMONIA DUE TO COVID-19 VIRUS: Status: ACTIVE | Noted: 2022-02-04

## 2022-02-04 PROBLEM — J12.82 PNEUMONIA DUE TO COVID-19 VIRUS: Status: ACTIVE | Noted: 2022-02-04

## 2022-02-04 PROBLEM — J96.01 ACUTE RESPIRATORY FAILURE WITH HYPOXIA (CMD): Status: ACTIVE | Noted: 2022-02-04

## 2022-02-04 PROBLEM — E87.1 HYPONATREMIA: Status: ACTIVE | Noted: 2022-02-04

## 2022-02-04 LAB
ANION GAP SERPL CALC-SCNC: 16 MMOL/L (ref 10–20)
ATRIAL RATE (BPM): 98
BASOPHILS # BLD: 0 K/MCL (ref 0–0.3)
BASOPHILS NFR BLD: 0 %
BUN SERPL-MCNC: 84 MG/DL (ref 6–20)
BUN/CREAT SERPL: 34 (ref 7–25)
CALCIUM SERPL-MCNC: 9.2 MG/DL (ref 8.4–10.2)
CHLORIDE SERPL-SCNC: 96 MMOL/L (ref 98–107)
CO2 SERPL-SCNC: 24 MMOL/L (ref 21–32)
CREAT SERPL-MCNC: 2.47 MG/DL (ref 0.67–1.17)
DEPRECATED RDW RBC: 48.5 FL (ref 39–50)
EOSINOPHIL # BLD: 0 K/MCL (ref 0–0.5)
EOSINOPHIL NFR BLD: 0 %
ERYTHROCYTE [DISTWIDTH] IN BLOOD: 15.3 % (ref 11–15)
FASTING DURATION TIME PATIENT: ABNORMAL H
GFR SERPLBLD BASED ON 1.73 SQ M-ARVRAT: 26 ML/MIN
GLUCOSE BLDC GLUCOMTR-MCNC: 390 MG/DL (ref 70–99)
GLUCOSE BLDC GLUCOMTR-MCNC: 467 MG/DL (ref 70–99)
GLUCOSE BLDC GLUCOMTR-MCNC: 483 MG/DL (ref 70–99)
GLUCOSE BLDC GLUCOMTR-MCNC: 484 MG/DL (ref 70–99)
GLUCOSE SERPL-MCNC: 499 MG/DL (ref 70–99)
HBA1C MFR BLD: 7.4 % (ref 4.5–5.6)
HBA1C MFR BLD: 7.5 % (ref 4.5–5.6)
HCT VFR BLD CALC: 36.5 % (ref 39–51)
HGB BLD-MCNC: 11.7 G/DL (ref 13–17)
IMM GRANULOCYTES # BLD AUTO: 0 K/MCL (ref 0–0.2)
IMM GRANULOCYTES # BLD: 1 %
LYMPHOCYTES # BLD: 0.6 K/MCL (ref 1–4)
LYMPHOCYTES NFR BLD: 12 %
MAGNESIUM SERPL-MCNC: 2.7 MG/DL (ref 1.7–2.4)
MCH RBC QN AUTO: 27.9 PG (ref 26–34)
MCHC RBC AUTO-ENTMCNC: 32.1 G/DL (ref 32–36.5)
MCV RBC AUTO: 86.9 FL (ref 78–100)
MONOCYTES # BLD: 0.2 K/MCL (ref 0.3–0.9)
MONOCYTES NFR BLD: 4 %
NEUTROPHILS # BLD: 4.3 K/MCL (ref 1.8–7.7)
NEUTROPHILS NFR BLD: 83 %
NRBC BLD MANUAL-RTO: 0 /100 WBC
P AXIS (DEGREES): 27
PLATELET # BLD AUTO: 335 K/MCL (ref 140–450)
POTASSIUM SERPL-SCNC: 4.5 MMOL/L (ref 3.4–5.1)
PR-INTERVAL (MSEC): 200
QRS-INTERVAL (MSEC): 86
QT-INTERVAL (MSEC): 346
QTC: 442
R AXIS (DEGREES): 43
RBC # BLD: 4.2 MIL/MCL (ref 4.5–5.9)
REPORT TEXT: NORMAL
SODIUM SERPL-SCNC: 131 MMOL/L (ref 135–145)
T AXIS (DEGREES): 141
TSH SERPL-ACNC: 1.1 MCUNITS/ML (ref 0.35–5)
VENTRICULAR RATE EKG/MIN (BPM): 98
WBC # BLD: 5.2 K/MCL (ref 4.2–11)

## 2022-02-04 PROCEDURE — 10002803 HB RX 637: Performed by: INTERNAL MEDICINE

## 2022-02-04 PROCEDURE — 10003585 HB ROOM CHARGE INTERMEDIATE CARE

## 2022-02-04 PROCEDURE — 10002800 HB RX 250 W HCPCS: Performed by: NURSE PRACTITIONER

## 2022-02-04 PROCEDURE — 83735 ASSAY OF MAGNESIUM: CPT | Performed by: INTERNAL MEDICINE

## 2022-02-04 PROCEDURE — 10002800 HB RX 250 W HCPCS: Performed by: INTERNAL MEDICINE

## 2022-02-04 PROCEDURE — 13003289 HB OXYGEN THERAPY DAILY

## 2022-02-04 PROCEDURE — 85025 COMPLETE CBC W/AUTO DIFF WBC: CPT | Performed by: INTERNAL MEDICINE

## 2022-02-04 PROCEDURE — 10002803 HB RX 637: Performed by: NURSE PRACTITIONER

## 2022-02-04 PROCEDURE — 84443 ASSAY THYROID STIM HORMONE: CPT | Performed by: INTERNAL MEDICINE

## 2022-02-04 PROCEDURE — 36415 COLL VENOUS BLD VENIPUNCTURE: CPT | Performed by: INTERNAL MEDICINE

## 2022-02-04 PROCEDURE — 80048 BASIC METABOLIC PNL TOTAL CA: CPT | Performed by: INTERNAL MEDICINE

## 2022-02-04 PROCEDURE — 83036 HEMOGLOBIN GLYCOSYLATED A1C: CPT | Performed by: INTERNAL MEDICINE

## 2022-02-04 PROCEDURE — 99232 SBSQ HOSP IP/OBS MODERATE 35: CPT | Performed by: CLINICAL NURSE SPECIALIST

## 2022-02-04 RX ORDER — ATORVASTATIN CALCIUM 10 MG/1
10 TABLET, FILM COATED ORAL DAILY
Status: DISCONTINUED | OUTPATIENT
Start: 2022-02-04 | End: 2022-02-09 | Stop reason: HOSPADM

## 2022-02-04 RX ORDER — DEXAMETHASONE SODIUM PHOSPHATE 10 MG/ML
6 INJECTION, SOLUTION INTRAMUSCULAR; INTRAVENOUS DAILY
Status: DISCONTINUED | OUTPATIENT
Start: 2022-02-04 | End: 2022-02-05 | Stop reason: CLARIF

## 2022-02-04 RX ORDER — INSULIN LISPRO 100 [IU]/ML
7 INJECTION, SOLUTION INTRAVENOUS; SUBCUTANEOUS
Status: DISCONTINUED | OUTPATIENT
Start: 2022-02-04 | End: 2022-02-04

## 2022-02-04 RX ORDER — INSULIN GLARGINE 100 [IU]/ML
60 INJECTION, SOLUTION SUBCUTANEOUS EVERY 12 HOURS SCHEDULED
Status: DISCONTINUED | OUTPATIENT
Start: 2022-02-04 | End: 2022-02-05

## 2022-02-04 RX ORDER — ENOXAPARIN SODIUM 100 MG/ML
40 INJECTION SUBCUTANEOUS EVERY 12 HOURS
Status: DISCONTINUED | OUTPATIENT
Start: 2022-02-04 | End: 2022-02-09 | Stop reason: HOSPADM

## 2022-02-04 RX ORDER — INSULIN LISPRO 100 [IU]/ML
15 INJECTION, SOLUTION INTRAVENOUS; SUBCUTANEOUS
Status: DISCONTINUED | OUTPATIENT
Start: 2022-02-04 | End: 2022-02-05

## 2022-02-04 RX ORDER — FUROSEMIDE 10 MG/ML
80 INJECTION INTRAMUSCULAR; INTRAVENOUS
Status: DISCONTINUED | OUTPATIENT
Start: 2022-02-04 | End: 2022-02-05

## 2022-02-04 RX ORDER — INSULIN LISPRO 100 [IU]/ML
15 INJECTION, SOLUTION INTRAVENOUS; SUBCUTANEOUS
Status: DISCONTINUED | OUTPATIENT
Start: 2022-02-04 | End: 2022-02-04

## 2022-02-04 RX ADMIN — ATORVASTATIN CALCIUM 10 MG: 10 TABLET, FILM COATED ORAL at 17:34

## 2022-02-04 RX ADMIN — INSULIN LISPRO 10 UNITS: 100 INJECTION, SOLUTION INTRAVENOUS; SUBCUTANEOUS at 07:58

## 2022-02-04 RX ADMIN — FUROSEMIDE 40 MG: 10 INJECTION, SOLUTION INTRAVENOUS at 07:56

## 2022-02-04 RX ADMIN — INSULIN LISPRO 10 UNITS: 100 INJECTION, SOLUTION INTRAVENOUS; SUBCUTANEOUS at 12:34

## 2022-02-04 RX ADMIN — SPIRONOLACTONE 12.5 MG: 25 TABLET, FILM COATED ORAL at 07:56

## 2022-02-04 RX ADMIN — FUROSEMIDE 80 MG: 10 INJECTION, SOLUTION INTRAVENOUS at 17:34

## 2022-02-04 RX ADMIN — METOPROLOL SUCCINATE 25 MG: 25 TABLET, EXTENDED RELEASE ORAL at 20:27

## 2022-02-04 RX ADMIN — OXYCODONE HYDROCHLORIDE AND ACETAMINOPHEN 500 MG: 500 TABLET ORAL at 07:56

## 2022-02-04 RX ADMIN — METOPROLOL SUCCINATE 25 MG: 25 TABLET, EXTENDED RELEASE ORAL at 07:56

## 2022-02-04 RX ADMIN — ISOSORBIDE MONONITRATE 30 MG: 30 TABLET, EXTENDED RELEASE ORAL at 07:56

## 2022-02-04 RX ADMIN — INSULIN LISPRO 7 UNITS: 100 INJECTION, SOLUTION INTRAVENOUS; SUBCUTANEOUS at 07:57

## 2022-02-04 RX ADMIN — INSULIN GLARGINE 52 UNITS: 100 INJECTION, SOLUTION SUBCUTANEOUS at 07:57

## 2022-02-04 RX ADMIN — ALLOPURINOL 100 MG: 100 TABLET ORAL at 07:56

## 2022-02-04 RX ADMIN — FAMOTIDINE 20 MG: 20 TABLET, FILM COATED ORAL at 07:56

## 2022-02-04 RX ADMIN — ENOXAPARIN SODIUM 40 MG: 40 INJECTION SUBCUTANEOUS at 20:29

## 2022-02-04 RX ADMIN — ENOXAPARIN SODIUM 40 MG: 40 INJECTION SUBCUTANEOUS at 07:57

## 2022-02-04 RX ADMIN — INSULIN LISPRO 10 UNITS: 100 INJECTION, SOLUTION INTRAVENOUS; SUBCUTANEOUS at 17:34

## 2022-02-04 RX ADMIN — INSULIN GLARGINE 60 UNITS: 100 INJECTION, SOLUTION SUBCUTANEOUS at 20:27

## 2022-02-04 RX ADMIN — DEXAMETHASONE SODIUM PHOSPHATE 6 MG: 10 INJECTION INTRAMUSCULAR; INTRAVENOUS at 12:34

## 2022-02-04 RX ADMIN — INSULIN LISPRO 15 UNITS: 100 INJECTION, SOLUTION INTRAVENOUS; SUBCUTANEOUS at 17:34

## 2022-02-04 RX ADMIN — INSULIN LISPRO 15 UNITS: 100 INJECTION, SOLUTION INTRAVENOUS; SUBCUTANEOUS at 12:34

## 2022-02-04 ASSESSMENT — ENCOUNTER SYMPTOMS
COLOR CHANGE: 0
CONFUSION: 0
SHORTNESS OF BREATH: 1
ACTIVITY CHANGE: 1
BRUISES/BLEEDS EASILY: 0
WEAKNESS: 1
EYES NEGATIVE: 1
EYE ITCHING: 0
FEVER: 0
CONSTIPATION: 0
EYE DISCHARGE: 0
COUGH: 0
VOMITING: 0
LIGHT-HEADEDNESS: 0
CHILLS: 0
FATIGUE: 0
ABDOMINAL DISTENTION: 0
ABDOMINAL PAIN: 0

## 2022-02-04 ASSESSMENT — PAIN SCALES - GENERAL
PAINLEVEL_OUTOF10: 0
PAINLEVEL_OUTOF10: 0

## 2022-02-05 LAB
ALBUMIN SERPL-MCNC: 2.2 G/DL (ref 3.6–5.1)
ALBUMIN/GLOB SERPL: 0.4 {RATIO} (ref 1–2.4)
ALP SERPL-CCNC: 102 UNITS/L (ref 45–117)
ALT SERPL-CCNC: 34 UNITS/L
ANION GAP SERPL CALC-SCNC: 14 MMOL/L (ref 10–20)
AST SERPL-CCNC: 31 UNITS/L
BASOPHILS # BLD: 0 K/MCL (ref 0–0.3)
BASOPHILS NFR BLD: 0 %
BILIRUB SERPL-MCNC: 0.3 MG/DL (ref 0.2–1)
BUN SERPL-MCNC: 104 MG/DL (ref 6–20)
BUN/CREAT SERPL: 39 (ref 7–25)
CALCIUM SERPL-MCNC: 9.7 MG/DL (ref 8.4–10.2)
CHLORIDE SERPL-SCNC: 99 MMOL/L (ref 98–107)
CO2 SERPL-SCNC: 24 MMOL/L (ref 21–32)
CREAT SERPL-MCNC: 2.68 MG/DL (ref 0.67–1.17)
CRP SERPL-MCNC: 5.1 MG/DL
D DIMER PPP FEU-MCNC: 0.8 MG/L (FEU)
DEPRECATED RDW RBC: 47.9 FL (ref 39–50)
EOSINOPHIL # BLD: 0 K/MCL (ref 0–0.5)
EOSINOPHIL NFR BLD: 0 %
ERYTHROCYTE [DISTWIDTH] IN BLOOD: 14.9 % (ref 11–15)
FASTING DURATION TIME PATIENT: ABNORMAL H
FERRITIN SERPL-MCNC: 697 NG/ML (ref 26–388)
GFR SERPLBLD BASED ON 1.73 SQ M-ARVRAT: 24 ML/MIN
GLOBULIN SER-MCNC: 6 G/DL (ref 2–4)
GLUCOSE BLDC GLUCOMTR-MCNC: 379 MG/DL (ref 70–99)
GLUCOSE BLDC GLUCOMTR-MCNC: 416 MG/DL (ref 70–99)
GLUCOSE BLDC GLUCOMTR-MCNC: 419 MG/DL (ref 70–99)
GLUCOSE BLDC GLUCOMTR-MCNC: 491 MG/DL (ref 70–99)
GLUCOSE SERPL-MCNC: 410 MG/DL (ref 70–99)
HCT VFR BLD CALC: 39 % (ref 39–51)
HGB BLD-MCNC: 12.1 G/DL (ref 13–17)
IMM GRANULOCYTES # BLD AUTO: 0.2 K/MCL (ref 0–0.2)
IMM GRANULOCYTES # BLD: 1 %
LDH SERPL L TO P-CCNC: 326 UNITS/L (ref 86–234)
LYMPHOCYTES # BLD: 0.8 K/MCL (ref 1–4)
LYMPHOCYTES NFR BLD: 5 %
MAGNESIUM SERPL-MCNC: 2.8 MG/DL (ref 1.7–2.4)
MCH RBC QN AUTO: 27.1 PG (ref 26–34)
MCHC RBC AUTO-ENTMCNC: 31 G/DL (ref 32–36.5)
MCV RBC AUTO: 87.2 FL (ref 78–100)
MONOCYTES # BLD: 0.5 K/MCL (ref 0.3–0.9)
MONOCYTES NFR BLD: 3 %
NEUTROPHILS # BLD: 14.3 K/MCL (ref 1.8–7.7)
NEUTROPHILS NFR BLD: 91 %
NRBC BLD MANUAL-RTO: 0 /100 WBC
PLATELET # BLD AUTO: 454 K/MCL (ref 140–450)
POTASSIUM SERPL-SCNC: 4.7 MMOL/L (ref 3.4–5.1)
PROT SERPL-MCNC: 8.2 G/DL (ref 6.4–8.2)
RBC # BLD: 4.47 MIL/MCL (ref 4.5–5.9)
SODIUM SERPL-SCNC: 132 MMOL/L (ref 135–145)
WBC # BLD: 15.9 K/MCL (ref 4.2–11)

## 2022-02-05 PROCEDURE — 10002803 HB RX 637: Performed by: INTERNAL MEDICINE

## 2022-02-05 PROCEDURE — 82728 ASSAY OF FERRITIN: CPT | Performed by: NURSE PRACTITIONER

## 2022-02-05 PROCEDURE — 10002800 HB RX 250 W HCPCS: Performed by: FAMILY MEDICINE

## 2022-02-05 PROCEDURE — 36415 COLL VENOUS BLD VENIPUNCTURE: CPT | Performed by: NURSE PRACTITIONER

## 2022-02-05 PROCEDURE — 80053 COMPREHEN METABOLIC PANEL: CPT | Performed by: NURSE PRACTITIONER

## 2022-02-05 PROCEDURE — 99233 SBSQ HOSP IP/OBS HIGH 50: CPT | Performed by: NURSE PRACTITIONER

## 2022-02-05 PROCEDURE — 86140 C-REACTIVE PROTEIN: CPT | Performed by: NURSE PRACTITIONER

## 2022-02-05 PROCEDURE — 10002803 HB RX 637: Performed by: NURSE PRACTITIONER

## 2022-02-05 PROCEDURE — 83615 LACTATE (LD) (LDH) ENZYME: CPT | Performed by: NURSE PRACTITIONER

## 2022-02-05 PROCEDURE — 10003585 HB ROOM CHARGE INTERMEDIATE CARE

## 2022-02-05 PROCEDURE — 10002800 HB RX 250 W HCPCS: Performed by: NURSE PRACTITIONER

## 2022-02-05 PROCEDURE — 10002800 HB RX 250 W HCPCS: Performed by: INTERNAL MEDICINE

## 2022-02-05 PROCEDURE — 85025 COMPLETE CBC W/AUTO DIFF WBC: CPT | Performed by: NURSE PRACTITIONER

## 2022-02-05 PROCEDURE — 83735 ASSAY OF MAGNESIUM: CPT | Performed by: NURSE PRACTITIONER

## 2022-02-05 PROCEDURE — 85379 FIBRIN DEGRADATION QUANT: CPT | Performed by: NURSE PRACTITIONER

## 2022-02-05 RX ORDER — TORSEMIDE 20 MG/1
40 TABLET ORAL 2 TIMES DAILY WITH MEALS
Status: DISCONTINUED | OUTPATIENT
Start: 2022-02-05 | End: 2022-02-07

## 2022-02-05 RX ORDER — INSULIN GLARGINE 100 [IU]/ML
80 INJECTION, SOLUTION SUBCUTANEOUS EVERY 12 HOURS SCHEDULED
Status: DISCONTINUED | OUTPATIENT
Start: 2022-02-05 | End: 2022-02-06

## 2022-02-05 RX ORDER — DEXAMETHASONE 6 MG/1
6 TABLET ORAL
Status: DISCONTINUED | OUTPATIENT
Start: 2022-02-06 | End: 2022-02-09

## 2022-02-05 RX ORDER — INSULIN LISPRO 100 [IU]/ML
25 INJECTION, SOLUTION INTRAVENOUS; SUBCUTANEOUS
Status: DISCONTINUED | OUTPATIENT
Start: 2022-02-05 | End: 2022-02-06

## 2022-02-05 RX ADMIN — INSULIN LISPRO 10 UNITS: 100 INJECTION, SOLUTION INTRAVENOUS; SUBCUTANEOUS at 08:17

## 2022-02-05 RX ADMIN — METOPROLOL SUCCINATE 25 MG: 25 TABLET, EXTENDED RELEASE ORAL at 20:52

## 2022-02-05 RX ADMIN — INSULIN LISPRO 15 UNITS: 100 INJECTION, SOLUTION INTRAVENOUS; SUBCUTANEOUS at 08:16

## 2022-02-05 RX ADMIN — DEXAMETHASONE SODIUM PHOSPHATE 6 MG: 10 INJECTION INTRAMUSCULAR; INTRAVENOUS at 08:16

## 2022-02-05 RX ADMIN — ENOXAPARIN SODIUM 40 MG: 40 INJECTION SUBCUTANEOUS at 20:41

## 2022-02-05 RX ADMIN — TORSEMIDE 40 MG: 20 TABLET ORAL at 18:10

## 2022-02-05 RX ADMIN — INSULIN LISPRO 25 UNITS: 100 INJECTION, SOLUTION INTRAVENOUS; SUBCUTANEOUS at 18:49

## 2022-02-05 RX ADMIN — FUROSEMIDE 80 MG: 10 INJECTION, SOLUTION INTRAVENOUS at 08:16

## 2022-02-05 RX ADMIN — INSULIN LISPRO 25 UNITS: 100 INJECTION, SOLUTION INTRAVENOUS; SUBCUTANEOUS at 23:00

## 2022-02-05 RX ADMIN — ALLOPURINOL 100 MG: 100 TABLET ORAL at 08:16

## 2022-02-05 RX ADMIN — INSULIN LISPRO 10 UNITS: 100 INJECTION, SOLUTION INTRAVENOUS; SUBCUTANEOUS at 18:50

## 2022-02-05 RX ADMIN — FAMOTIDINE 20 MG: 20 TABLET, FILM COATED ORAL at 08:16

## 2022-02-05 RX ADMIN — INSULIN GLARGINE 60 UNITS: 100 INJECTION, SOLUTION SUBCUTANEOUS at 08:17

## 2022-02-05 RX ADMIN — ENOXAPARIN SODIUM 40 MG: 40 INJECTION SUBCUTANEOUS at 08:16

## 2022-02-05 RX ADMIN — METOPROLOL SUCCINATE 25 MG: 25 TABLET, EXTENDED RELEASE ORAL at 08:16

## 2022-02-05 RX ADMIN — ATORVASTATIN CALCIUM 10 MG: 10 TABLET, FILM COATED ORAL at 08:16

## 2022-02-05 RX ADMIN — OXYCODONE HYDROCHLORIDE AND ACETAMINOPHEN 500 MG: 500 TABLET ORAL at 08:16

## 2022-02-05 RX ADMIN — ISOSORBIDE MONONITRATE 30 MG: 30 TABLET, EXTENDED RELEASE ORAL at 08:16

## 2022-02-05 RX ADMIN — INSULIN LISPRO 10 UNITS: 100 INJECTION, SOLUTION INTRAVENOUS; SUBCUTANEOUS at 13:11

## 2022-02-05 RX ADMIN — INSULIN GLARGINE 80 UNITS: 100 INJECTION, SOLUTION SUBCUTANEOUS at 20:40

## 2022-02-05 ASSESSMENT — ENCOUNTER SYMPTOMS
COLOR CHANGE: 0
EYES NEGATIVE: 1
COUGH: 0
FATIGUE: 0
BRUISES/BLEEDS EASILY: 0
VOMITING: 0
CONSTIPATION: 0
ACTIVITY CHANGE: 1
EYE ITCHING: 0
ABDOMINAL PAIN: 0
EYE DISCHARGE: 0
ABDOMINAL DISTENTION: 0
SHORTNESS OF BREATH: 1
FEVER: 0
LIGHT-HEADEDNESS: 0
CONFUSION: 0
CHILLS: 0
RESPIRATORY NEGATIVE: 1
WEAKNESS: 1

## 2022-02-05 ASSESSMENT — PAIN SCALES - GENERAL
PAINLEVEL_OUTOF10: 0
PAINLEVEL_OUTOF10: 0

## 2022-02-06 LAB
ALBUMIN SERPL-MCNC: 2.3 G/DL (ref 3.6–5.1)
ALBUMIN/GLOB SERPL: 0.4 {RATIO} (ref 1–2.4)
ALP SERPL-CCNC: 105 UNITS/L (ref 45–117)
ALT SERPL-CCNC: 32 UNITS/L
ANION GAP SERPL CALC-SCNC: 13 MMOL/L (ref 10–20)
AST SERPL-CCNC: 29 UNITS/L
BASOPHILS # BLD: 0 K/MCL (ref 0–0.3)
BASOPHILS NFR BLD: 0 %
BILIRUB SERPL-MCNC: 0.4 MG/DL (ref 0.2–1)
BUN SERPL-MCNC: 109 MG/DL (ref 6–20)
BUN/CREAT SERPL: 43 (ref 7–25)
CALCIUM SERPL-MCNC: 9.6 MG/DL (ref 8.4–10.2)
CHLORIDE SERPL-SCNC: 100 MMOL/L (ref 98–107)
CO2 SERPL-SCNC: 25 MMOL/L (ref 21–32)
CREAT SERPL-MCNC: 2.55 MG/DL (ref 0.67–1.17)
DEPRECATED RDW RBC: 47.4 FL (ref 39–50)
EOSINOPHIL # BLD: 0 K/MCL (ref 0–0.5)
EOSINOPHIL NFR BLD: 0 %
ERYTHROCYTE [DISTWIDTH] IN BLOOD: 15 % (ref 11–15)
FASTING DURATION TIME PATIENT: ABNORMAL H
GFR SERPLBLD BASED ON 1.73 SQ M-ARVRAT: 25 ML/MIN
GLOBULIN SER-MCNC: 6.2 G/DL (ref 2–4)
GLUCOSE BLDC GLUCOMTR-MCNC: 311 MG/DL (ref 70–99)
GLUCOSE BLDC GLUCOMTR-MCNC: 327 MG/DL (ref 70–99)
GLUCOSE BLDC GLUCOMTR-MCNC: 352 MG/DL (ref 70–99)
GLUCOSE BLDC GLUCOMTR-MCNC: 390 MG/DL (ref 70–99)
GLUCOSE BLDC GLUCOMTR-MCNC: 411 MG/DL (ref 70–99)
GLUCOSE BLDC GLUCOMTR-MCNC: 453 MG/DL (ref 70–99)
GLUCOSE SERPL-MCNC: 296 MG/DL (ref 70–99)
HCT VFR BLD CALC: 39.9 % (ref 39–51)
HGB BLD-MCNC: 12.8 G/DL (ref 13–17)
IMM GRANULOCYTES # BLD AUTO: 0.3 K/MCL (ref 0–0.2)
IMM GRANULOCYTES # BLD: 2 %
LYMPHOCYTES # BLD: 1 K/MCL (ref 1–4)
LYMPHOCYTES NFR BLD: 6 %
MAGNESIUM SERPL-MCNC: 2.7 MG/DL (ref 1.7–2.4)
MCH RBC QN AUTO: 27.6 PG (ref 26–34)
MCHC RBC AUTO-ENTMCNC: 32.1 G/DL (ref 32–36.5)
MCV RBC AUTO: 86.2 FL (ref 78–100)
MONOCYTES # BLD: 0.7 K/MCL (ref 0.3–0.9)
MONOCYTES NFR BLD: 4 %
NEUTROPHILS # BLD: 16.3 K/MCL (ref 1.8–7.7)
NEUTROPHILS NFR BLD: 88 %
NRBC BLD MANUAL-RTO: 0 /100 WBC
PHOSPHATE SERPL-MCNC: 5.5 MG/DL (ref 2.4–4.7)
PLATELET # BLD AUTO: 522 K/MCL (ref 140–450)
POTASSIUM SERPL-SCNC: 4.3 MMOL/L (ref 3.4–5.1)
PROT SERPL-MCNC: 8.5 G/DL (ref 6.4–8.2)
RBC # BLD: 4.63 MIL/MCL (ref 4.5–5.9)
SODIUM SERPL-SCNC: 134 MMOL/L (ref 135–145)
TROPONIN I SERPL DL<=0.01 NG/ML-MCNC: 425 NG/L
WBC # BLD: 18.3 K/MCL (ref 4.2–11)

## 2022-02-06 PROCEDURE — 80053 COMPREHEN METABOLIC PANEL: CPT | Performed by: FAMILY MEDICINE

## 2022-02-06 PROCEDURE — 10002803 HB RX 637: Performed by: INTERNAL MEDICINE

## 2022-02-06 PROCEDURE — 10002800 HB RX 250 W HCPCS: Performed by: FAMILY MEDICINE

## 2022-02-06 PROCEDURE — 10002800 HB RX 250 W HCPCS: Performed by: INTERNAL MEDICINE

## 2022-02-06 PROCEDURE — 10003585 HB ROOM CHARGE INTERMEDIATE CARE

## 2022-02-06 PROCEDURE — 83735 ASSAY OF MAGNESIUM: CPT | Performed by: INTERNAL MEDICINE

## 2022-02-06 PROCEDURE — 10002803 HB RX 637: Performed by: FAMILY MEDICINE

## 2022-02-06 PROCEDURE — 84484 ASSAY OF TROPONIN QUANT: CPT | Performed by: NURSE PRACTITIONER

## 2022-02-06 PROCEDURE — 99233 SBSQ HOSP IP/OBS HIGH 50: CPT | Performed by: NURSE PRACTITIONER

## 2022-02-06 PROCEDURE — 85025 COMPLETE CBC W/AUTO DIFF WBC: CPT | Performed by: FAMILY MEDICINE

## 2022-02-06 PROCEDURE — 84100 ASSAY OF PHOSPHORUS: CPT | Performed by: INTERNAL MEDICINE

## 2022-02-06 PROCEDURE — 10002803 HB RX 637: Performed by: NURSE PRACTITIONER

## 2022-02-06 PROCEDURE — 36415 COLL VENOUS BLD VENIPUNCTURE: CPT | Performed by: FAMILY MEDICINE

## 2022-02-06 RX ORDER — INSULIN GLARGINE 100 [IU]/ML
50 INJECTION, SOLUTION SUBCUTANEOUS EVERY 12 HOURS SCHEDULED
Status: DISCONTINUED | OUTPATIENT
Start: 2022-02-06 | End: 2022-02-07

## 2022-02-06 RX ORDER — INSULIN LISPRO 100 [IU]/ML
35 INJECTION, SOLUTION INTRAVENOUS; SUBCUTANEOUS
Status: DISCONTINUED | OUTPATIENT
Start: 2022-02-06 | End: 2022-02-07

## 2022-02-06 RX ORDER — INSULIN GLARGINE 100 [IU]/ML
100 INJECTION, SOLUTION SUBCUTANEOUS EVERY 12 HOURS SCHEDULED
Status: DISCONTINUED | OUTPATIENT
Start: 2022-02-06 | End: 2022-02-06 | Stop reason: SDUPTHER

## 2022-02-06 RX ADMIN — ISOSORBIDE MONONITRATE 30 MG: 30 TABLET, EXTENDED RELEASE ORAL at 08:08

## 2022-02-06 RX ADMIN — TORSEMIDE 40 MG: 20 TABLET ORAL at 17:36

## 2022-02-06 RX ADMIN — METOPROLOL SUCCINATE 25 MG: 25 TABLET, EXTENDED RELEASE ORAL at 08:08

## 2022-02-06 RX ADMIN — ATORVASTATIN CALCIUM 10 MG: 10 TABLET, FILM COATED ORAL at 08:08

## 2022-02-06 RX ADMIN — ALLOPURINOL 100 MG: 100 TABLET ORAL at 08:09

## 2022-02-06 RX ADMIN — ENOXAPARIN SODIUM 40 MG: 40 INJECTION SUBCUTANEOUS at 21:34

## 2022-02-06 RX ADMIN — INSULIN LISPRO 25 UNITS: 100 INJECTION, SOLUTION INTRAVENOUS; SUBCUTANEOUS at 08:08

## 2022-02-06 RX ADMIN — INSULIN LISPRO 8 UNITS: 100 INJECTION, SOLUTION INTRAVENOUS; SUBCUTANEOUS at 08:09

## 2022-02-06 RX ADMIN — TORSEMIDE 40 MG: 20 TABLET ORAL at 08:08

## 2022-02-06 RX ADMIN — INSULIN GLARGINE 50 UNITS: 100 INJECTION, SOLUTION SUBCUTANEOUS at 21:33

## 2022-02-06 RX ADMIN — INSULIN LISPRO 35 UNITS: 100 INJECTION, SOLUTION INTRAVENOUS; SUBCUTANEOUS at 12:40

## 2022-02-06 RX ADMIN — DEXAMETHASONE 6 MG: 6 TABLET ORAL at 08:08

## 2022-02-06 RX ADMIN — ENOXAPARIN SODIUM 40 MG: 40 INJECTION SUBCUTANEOUS at 08:07

## 2022-02-06 RX ADMIN — INSULIN LISPRO 10 UNITS: 100 INJECTION, SOLUTION INTRAVENOUS; SUBCUTANEOUS at 17:36

## 2022-02-06 RX ADMIN — FAMOTIDINE 20 MG: 20 TABLET, FILM COATED ORAL at 08:08

## 2022-02-06 RX ADMIN — INSULIN LISPRO 35 UNITS: 100 INJECTION, SOLUTION INTRAVENOUS; SUBCUTANEOUS at 17:35

## 2022-02-06 RX ADMIN — INSULIN LISPRO 10 UNITS: 100 INJECTION, SOLUTION INTRAVENOUS; SUBCUTANEOUS at 12:40

## 2022-02-06 RX ADMIN — PSYLLIUM HUSK 1 PACKET: 3.4 POWDER ORAL at 17:37

## 2022-02-06 RX ADMIN — OXYCODONE HYDROCHLORIDE AND ACETAMINOPHEN 500 MG: 500 TABLET ORAL at 08:08

## 2022-02-06 RX ADMIN — METOPROLOL SUCCINATE 25 MG: 25 TABLET, EXTENDED RELEASE ORAL at 21:34

## 2022-02-06 RX ADMIN — INSULIN GLARGINE 80 UNITS: 100 INJECTION, SOLUTION SUBCUTANEOUS at 08:09

## 2022-02-06 ASSESSMENT — ENCOUNTER SYMPTOMS
ACTIVITY CHANGE: 1
CONFUSION: 0
EYE ITCHING: 0
BRUISES/BLEEDS EASILY: 0
LIGHT-HEADEDNESS: 0
COLOR CHANGE: 0
SHORTNESS OF BREATH: 1
COUGH: 1
FEVER: 0
ABDOMINAL DISTENTION: 0
EYES NEGATIVE: 1
WEAKNESS: 1
CHILLS: 0
EYE DISCHARGE: 0
CONSTIPATION: 0
COUGH: 0
ABDOMINAL PAIN: 0
VOMITING: 0
FATIGUE: 0

## 2022-02-06 ASSESSMENT — PAIN SCALES - GENERAL
PAINLEVEL_OUTOF10: 0
PAINLEVEL_OUTOF10: 0

## 2022-02-07 LAB
ANION GAP SERPL CALC-SCNC: 14 MMOL/L (ref 10–20)
BASOPHILS # BLD: 0 K/MCL (ref 0–0.3)
BASOPHILS NFR BLD: 0 %
BUN SERPL-MCNC: 128 MG/DL (ref 6–20)
BUN/CREAT SERPL: 53 (ref 7–25)
CALCIUM SERPL-MCNC: 10.2 MG/DL (ref 8.4–10.2)
CHLORIDE SERPL-SCNC: 98 MMOL/L (ref 98–107)
CO2 SERPL-SCNC: 25 MMOL/L (ref 21–32)
CREAT SERPL-MCNC: 2.43 MG/DL (ref 0.67–1.17)
DEPRECATED RDW RBC: 46.3 FL (ref 39–50)
EOSINOPHIL # BLD: 0 K/MCL (ref 0–0.5)
EOSINOPHIL NFR BLD: 0 %
ERYTHROCYTE [DISTWIDTH] IN BLOOD: 14.9 % (ref 11–15)
FASTING DURATION TIME PATIENT: ABNORMAL H
GFR SERPLBLD BASED ON 1.73 SQ M-ARVRAT: 27 ML/MIN
GLUCOSE BLDC GLUCOMTR-MCNC: 317 MG/DL (ref 70–99)
GLUCOSE BLDC GLUCOMTR-MCNC: 415 MG/DL (ref 70–99)
GLUCOSE BLDC GLUCOMTR-MCNC: 454 MG/DL (ref 70–99)
GLUCOSE BLDC GLUCOMTR-MCNC: 469 MG/DL (ref 70–99)
GLUCOSE SERPL-MCNC: 359 MG/DL (ref 70–99)
HCT VFR BLD CALC: 41.4 % (ref 39–51)
HGB BLD-MCNC: 13.5 G/DL (ref 13–17)
IMM GRANULOCYTES # BLD AUTO: 0.3 K/MCL (ref 0–0.2)
IMM GRANULOCYTES # BLD: 2 %
LYMPHOCYTES # BLD: 0.9 K/MCL (ref 1–4)
LYMPHOCYTES NFR BLD: 5 %
MCH RBC QN AUTO: 27.9 PG (ref 26–34)
MCHC RBC AUTO-ENTMCNC: 32.6 G/DL (ref 32–36.5)
MCV RBC AUTO: 85.5 FL (ref 78–100)
MONOCYTES # BLD: 0.9 K/MCL (ref 0.3–0.9)
MONOCYTES NFR BLD: 5 %
NEUTROPHILS # BLD: 17.5 K/MCL (ref 1.8–7.7)
NEUTROPHILS NFR BLD: 88 %
NRBC BLD MANUAL-RTO: 0 /100 WBC
PLATELET # BLD AUTO: 563 K/MCL (ref 140–450)
POTASSIUM SERPL-SCNC: 4.5 MMOL/L (ref 3.4–5.1)
RBC # BLD: 4.84 MIL/MCL (ref 4.5–5.9)
SODIUM SERPL-SCNC: 132 MMOL/L (ref 135–145)
WBC # BLD: 19.7 K/MCL (ref 4.2–11)

## 2022-02-07 PROCEDURE — 10002803 HB RX 637: Performed by: NURSE PRACTITIONER

## 2022-02-07 PROCEDURE — 10003585 HB ROOM CHARGE INTERMEDIATE CARE

## 2022-02-07 PROCEDURE — 10002803 HB RX 637: Performed by: INTERNAL MEDICINE

## 2022-02-07 PROCEDURE — 85025 COMPLETE CBC W/AUTO DIFF WBC: CPT | Performed by: FAMILY MEDICINE

## 2022-02-07 PROCEDURE — 36415 COLL VENOUS BLD VENIPUNCTURE: CPT | Performed by: FAMILY MEDICINE

## 2022-02-07 PROCEDURE — 10002800 HB RX 250 W HCPCS: Performed by: INTERNAL MEDICINE

## 2022-02-07 PROCEDURE — 10002803 HB RX 637: Performed by: FAMILY MEDICINE

## 2022-02-07 PROCEDURE — 80048 BASIC METABOLIC PNL TOTAL CA: CPT | Performed by: INTERNAL MEDICINE

## 2022-02-07 PROCEDURE — 99232 SBSQ HOSP IP/OBS MODERATE 35: CPT | Performed by: NURSE PRACTITIONER

## 2022-02-07 PROCEDURE — 13003289 HB OXYGEN THERAPY DAILY

## 2022-02-07 RX ORDER — INSULIN GLARGINE 100 [IU]/ML
120 INJECTION, SOLUTION SUBCUTANEOUS EVERY 12 HOURS SCHEDULED
Status: DISCONTINUED | OUTPATIENT
Start: 2022-02-07 | End: 2022-02-07 | Stop reason: SDUPTHER

## 2022-02-07 RX ORDER — INSULIN GLARGINE 100 [IU]/ML
70 INJECTION, SOLUTION SUBCUTANEOUS EVERY 12 HOURS SCHEDULED
Status: DISCONTINUED | OUTPATIENT
Start: 2022-02-07 | End: 2022-02-07

## 2022-02-07 RX ORDER — TORSEMIDE 20 MG/1
40 TABLET ORAL 2 TIMES DAILY WITH MEALS
Qty: 180 TABLET | Refills: 3 | Status: SHIPPED | OUTPATIENT
Start: 2022-02-07 | End: 2022-02-09

## 2022-02-07 RX ORDER — TORSEMIDE 20 MG/1
40 TABLET ORAL DAILY
Status: DISCONTINUED | OUTPATIENT
Start: 2022-02-08 | End: 2022-02-09 | Stop reason: HOSPADM

## 2022-02-07 RX ORDER — INSULIN GLARGINE 100 [IU]/ML
60 INJECTION, SOLUTION SUBCUTANEOUS EVERY 12 HOURS SCHEDULED
Status: DISCONTINUED | OUTPATIENT
Start: 2022-02-07 | End: 2022-02-08

## 2022-02-07 RX ORDER — INSULIN LISPRO 100 [IU]/ML
15 INJECTION, SOLUTION INTRAVENOUS; SUBCUTANEOUS ONCE
Status: COMPLETED | OUTPATIENT
Start: 2022-02-07 | End: 2022-02-07

## 2022-02-07 RX ORDER — INSULIN GLARGINE 100 [IU]/ML
50 INJECTION, SOLUTION SUBCUTANEOUS EVERY 12 HOURS SCHEDULED
Status: DISCONTINUED | OUTPATIENT
Start: 2022-02-07 | End: 2022-02-07

## 2022-02-07 RX ORDER — INSULIN LISPRO 100 [IU]/ML
50 INJECTION, SOLUTION INTRAVENOUS; SUBCUTANEOUS
Status: DISCONTINUED | OUTPATIENT
Start: 2022-02-07 | End: 2022-02-07

## 2022-02-07 RX ORDER — INSULIN LISPRO 100 [IU]/ML
60 INJECTION, SOLUTION INTRAVENOUS; SUBCUTANEOUS
Status: DISCONTINUED | OUTPATIENT
Start: 2022-02-08 | End: 2022-02-09 | Stop reason: HOSPADM

## 2022-02-07 RX ADMIN — INSULIN LISPRO 50 UNITS: 100 INJECTION, SOLUTION INTRAVENOUS; SUBCUTANEOUS at 17:44

## 2022-02-07 RX ADMIN — INSULIN LISPRO 15 UNITS: 100 INJECTION, SOLUTION INTRAVENOUS; SUBCUTANEOUS at 13:33

## 2022-02-07 RX ADMIN — INSULIN GLARGINE 50 UNITS: 100 INJECTION, SOLUTION SUBCUTANEOUS at 09:28

## 2022-02-07 RX ADMIN — METOPROLOL SUCCINATE 25 MG: 25 TABLET, EXTENDED RELEASE ORAL at 09:24

## 2022-02-07 RX ADMIN — INSULIN LISPRO 8 UNITS: 100 INJECTION, SOLUTION INTRAVENOUS; SUBCUTANEOUS at 09:27

## 2022-02-07 RX ADMIN — INSULIN LISPRO 10 UNITS: 100 INJECTION, SOLUTION INTRAVENOUS; SUBCUTANEOUS at 12:46

## 2022-02-07 RX ADMIN — ALLOPURINOL 100 MG: 100 TABLET ORAL at 09:24

## 2022-02-07 RX ADMIN — TORSEMIDE 40 MG: 20 TABLET ORAL at 09:24

## 2022-02-07 RX ADMIN — ISOSORBIDE MONONITRATE 30 MG: 30 TABLET, EXTENDED RELEASE ORAL at 09:24

## 2022-02-07 RX ADMIN — ENOXAPARIN SODIUM 40 MG: 40 INJECTION SUBCUTANEOUS at 09:26

## 2022-02-07 RX ADMIN — INSULIN LISPRO 15 UNITS: 100 INJECTION, SOLUTION INTRAVENOUS; SUBCUTANEOUS at 17:45

## 2022-02-07 RX ADMIN — INSULIN GLARGINE 60 UNITS: 100 INJECTION, SOLUTION SUBCUTANEOUS at 21:42

## 2022-02-07 RX ADMIN — PSYLLIUM HUSK 1 PACKET: 3.4 POWDER ORAL at 09:23

## 2022-02-07 RX ADMIN — ENOXAPARIN SODIUM 40 MG: 40 INJECTION SUBCUTANEOUS at 21:35

## 2022-02-07 RX ADMIN — INSULIN GLARGINE 50 UNITS: 100 INJECTION, SOLUTION SUBCUTANEOUS at 09:29

## 2022-02-07 RX ADMIN — INSULIN LISPRO 35 UNITS: 100 INJECTION, SOLUTION INTRAVENOUS; SUBCUTANEOUS at 12:46

## 2022-02-07 RX ADMIN — FAMOTIDINE 20 MG: 20 TABLET, FILM COATED ORAL at 09:24

## 2022-02-07 RX ADMIN — OXYCODONE HYDROCHLORIDE AND ACETAMINOPHEN 500 MG: 500 TABLET ORAL at 09:24

## 2022-02-07 RX ADMIN — DEXAMETHASONE 6 MG: 6 TABLET ORAL at 09:24

## 2022-02-07 RX ADMIN — ATORVASTATIN CALCIUM 10 MG: 10 TABLET, FILM COATED ORAL at 09:42

## 2022-02-07 RX ADMIN — METOPROLOL SUCCINATE 25 MG: 25 TABLET, EXTENDED RELEASE ORAL at 21:35

## 2022-02-07 RX ADMIN — INSULIN LISPRO 35 UNITS: 100 INJECTION, SOLUTION INTRAVENOUS; SUBCUTANEOUS at 09:27

## 2022-02-07 ASSESSMENT — PAIN SCALES - GENERAL
PAINLEVEL_OUTOF10: 0

## 2022-02-07 ASSESSMENT — ENCOUNTER SYMPTOMS
SHORTNESS OF BREATH: 1
COUGH: 1
FATIGUE: 1

## 2022-02-08 ENCOUNTER — APPOINTMENT (OUTPATIENT)
Dept: GENERAL RADIOLOGY | Age: 67
DRG: 177 | End: 2022-02-08
Attending: INTERNAL MEDICINE

## 2022-02-08 LAB
ANION GAP SERPL CALC-SCNC: 12 MMOL/L (ref 10–20)
BUN SERPL-MCNC: 129 MG/DL (ref 6–20)
BUN/CREAT SERPL: 52 (ref 7–25)
CALCIUM SERPL-MCNC: 9.7 MG/DL (ref 8.4–10.2)
CHLORIDE SERPL-SCNC: 98 MMOL/L (ref 98–107)
CO2 SERPL-SCNC: 29 MMOL/L (ref 21–32)
CREAT SERPL-MCNC: 2.49 MG/DL (ref 0.67–1.17)
FASTING DURATION TIME PATIENT: ABNORMAL H
GFR SERPLBLD BASED ON 1.73 SQ M-ARVRAT: 26 ML/MIN
GLUCOSE BLDC GLUCOMTR-MCNC: 190 MG/DL (ref 70–99)
GLUCOSE BLDC GLUCOMTR-MCNC: 252 MG/DL (ref 70–99)
GLUCOSE BLDC GLUCOMTR-MCNC: 263 MG/DL (ref 70–99)
GLUCOSE BLDC GLUCOMTR-MCNC: 284 MG/DL (ref 70–99)
GLUCOSE SERPL-MCNC: 244 MG/DL (ref 70–99)
POTASSIUM SERPL-SCNC: 4.6 MMOL/L (ref 3.4–5.1)
SODIUM SERPL-SCNC: 134 MMOL/L (ref 135–145)

## 2022-02-08 PROCEDURE — 13003289 HB OXYGEN THERAPY DAILY

## 2022-02-08 PROCEDURE — 99232 SBSQ HOSP IP/OBS MODERATE 35: CPT | Performed by: NURSE PRACTITIONER

## 2022-02-08 PROCEDURE — 10003585 HB ROOM CHARGE INTERMEDIATE CARE

## 2022-02-08 PROCEDURE — 10002803 HB RX 637: Performed by: FAMILY MEDICINE

## 2022-02-08 PROCEDURE — 10002803 HB RX 637: Performed by: INTERNAL MEDICINE

## 2022-02-08 PROCEDURE — 71045 X-RAY EXAM CHEST 1 VIEW: CPT

## 2022-02-08 PROCEDURE — 10002803 HB RX 637: Performed by: NURSE PRACTITIONER

## 2022-02-08 PROCEDURE — 10002800 HB RX 250 W HCPCS: Performed by: INTERNAL MEDICINE

## 2022-02-08 PROCEDURE — 36415 COLL VENOUS BLD VENIPUNCTURE: CPT | Performed by: INTERNAL MEDICINE

## 2022-02-08 PROCEDURE — 80048 BASIC METABOLIC PNL TOTAL CA: CPT | Performed by: INTERNAL MEDICINE

## 2022-02-08 RX ORDER — INSULIN GLARGINE 100 [IU]/ML
70 INJECTION, SOLUTION SUBCUTANEOUS EVERY 12 HOURS SCHEDULED
Status: DISCONTINUED | OUTPATIENT
Start: 2022-02-08 | End: 2022-02-09 | Stop reason: HOSPADM

## 2022-02-08 RX ADMIN — INSULIN LISPRO 60 UNITS: 100 INJECTION, SOLUTION INTRAVENOUS; SUBCUTANEOUS at 11:11

## 2022-02-08 RX ADMIN — ENOXAPARIN SODIUM 40 MG: 40 INJECTION SUBCUTANEOUS at 10:59

## 2022-02-08 RX ADMIN — ALLOPURINOL 100 MG: 100 TABLET ORAL at 11:00

## 2022-02-08 RX ADMIN — OXYCODONE HYDROCHLORIDE AND ACETAMINOPHEN 500 MG: 500 TABLET ORAL at 11:00

## 2022-02-08 RX ADMIN — TORSEMIDE 40 MG: 20 TABLET ORAL at 11:00

## 2022-02-08 RX ADMIN — INSULIN LISPRO 60 UNITS: 100 INJECTION, SOLUTION INTRAVENOUS; SUBCUTANEOUS at 18:51

## 2022-02-08 RX ADMIN — PSYLLIUM HUSK 1 PACKET: 3.4 POWDER ORAL at 10:59

## 2022-02-08 RX ADMIN — ISOSORBIDE MONONITRATE 30 MG: 30 TABLET, EXTENDED RELEASE ORAL at 11:01

## 2022-02-08 RX ADMIN — METOPROLOL SUCCINATE 25 MG: 25 TABLET, EXTENDED RELEASE ORAL at 20:55

## 2022-02-08 RX ADMIN — INSULIN LISPRO 3 UNITS: 100 INJECTION, SOLUTION INTRAVENOUS; SUBCUTANEOUS at 18:51

## 2022-02-08 RX ADMIN — FAMOTIDINE 20 MG: 20 TABLET, FILM COATED ORAL at 11:00

## 2022-02-08 RX ADMIN — ENOXAPARIN SODIUM 40 MG: 40 INJECTION SUBCUTANEOUS at 20:56

## 2022-02-08 RX ADMIN — METOPROLOL SUCCINATE 25 MG: 25 TABLET, EXTENDED RELEASE ORAL at 11:00

## 2022-02-08 RX ADMIN — INSULIN LISPRO 9 UNITS: 100 INJECTION, SOLUTION INTRAVENOUS; SUBCUTANEOUS at 11:11

## 2022-02-08 RX ADMIN — INSULIN GLARGINE 70 UNITS: 100 INJECTION, SOLUTION SUBCUTANEOUS at 20:54

## 2022-02-08 RX ADMIN — INSULIN GLARGINE 60 UNITS: 100 INJECTION, SOLUTION SUBCUTANEOUS at 10:59

## 2022-02-08 RX ADMIN — DEXAMETHASONE 6 MG: 6 TABLET ORAL at 11:00

## 2022-02-08 RX ADMIN — ATORVASTATIN CALCIUM 10 MG: 10 TABLET, FILM COATED ORAL at 11:01

## 2022-02-08 ASSESSMENT — PAIN SCALES - GENERAL
PAINLEVEL_OUTOF10: 0

## 2022-02-08 ASSESSMENT — ENCOUNTER SYMPTOMS
FATIGUE: 1
COUGH: 1
SHORTNESS OF BREATH: 1

## 2022-02-09 VITALS
OXYGEN SATURATION: 94 % | HEART RATE: 72 BPM | DIASTOLIC BLOOD PRESSURE: 80 MMHG | HEIGHT: 72 IN | SYSTOLIC BLOOD PRESSURE: 146 MMHG | RESPIRATION RATE: 16 BRPM | WEIGHT: 304.45 LBS | BODY MASS INDEX: 41.24 KG/M2 | TEMPERATURE: 97.3 F

## 2022-02-09 LAB
ANION GAP SERPL CALC-SCNC: 12 MMOL/L (ref 10–20)
BASOPHILS # BLD: 0 K/MCL (ref 0–0.3)
BASOPHILS NFR BLD: 0 %
BUN SERPL-MCNC: 127 MG/DL (ref 6–20)
BUN/CREAT SERPL: 60 (ref 7–25)
C JEJUNI+C COLI AG STL QL: NORMAL
CALCIUM SERPL-MCNC: 9.8 MG/DL (ref 8.4–10.2)
CHLORIDE SERPL-SCNC: 101 MMOL/L (ref 98–107)
CO2 SERPL-SCNC: 27 MMOL/L (ref 21–32)
CREAT SERPL-MCNC: 2.12 MG/DL (ref 0.67–1.17)
DEPRECATED RDW RBC: 46.5 FL (ref 39–50)
EOSINOPHIL # BLD: 0 K/MCL (ref 0–0.5)
EOSINOPHIL NFR BLD: 0 %
ERYTHROCYTE [DISTWIDTH] IN BLOOD: 15 % (ref 11–15)
FASTING DURATION TIME PATIENT: ABNORMAL H
GFR SERPLBLD BASED ON 1.73 SQ M-ARVRAT: 31 ML/MIN
GLUCOSE BLDC GLUCOMTR-MCNC: 126 MG/DL (ref 70–99)
GLUCOSE BLDC GLUCOMTR-MCNC: 189 MG/DL (ref 70–99)
GLUCOSE SERPL-MCNC: 148 MG/DL (ref 70–99)
HCT VFR BLD CALC: 44.9 % (ref 39–51)
HGB BLD-MCNC: 14.6 G/DL (ref 13–17)
IMM GRANULOCYTES # BLD AUTO: 0.5 K/MCL (ref 0–0.2)
IMM GRANULOCYTES # BLD: 2 %
LYMPHOCYTES # BLD: 1.3 K/MCL (ref 1–4)
LYMPHOCYTES NFR BLD: 6 %
MCH RBC QN AUTO: 27.7 PG (ref 26–34)
MCHC RBC AUTO-ENTMCNC: 32.5 G/DL (ref 32–36.5)
MCV RBC AUTO: 85.2 FL (ref 78–100)
MONOCYTES # BLD: 1.3 K/MCL (ref 0.3–0.9)
MONOCYTES NFR BLD: 6 %
NEUTROPHILS # BLD: 20.2 K/MCL (ref 1.8–7.7)
NEUTROPHILS NFR BLD: 86 %
NRBC BLD MANUAL-RTO: 0 /100 WBC
PLATELET # BLD AUTO: 600 K/MCL (ref 140–450)
POTASSIUM SERPL-SCNC: 4.3 MMOL/L (ref 3.4–5.1)
RBC # BLD: 5.27 MIL/MCL (ref 4.5–5.9)
SODIUM SERPL-SCNC: 136 MMOL/L (ref 135–145)
WBC # BLD: 23.4 K/MCL (ref 4.2–11)

## 2022-02-09 PROCEDURE — 87209 SMEAR COMPLEX STAIN: CPT | Performed by: INTERNAL MEDICINE

## 2022-02-09 PROCEDURE — 87449 NOS EACH ORGANISM AG IA: CPT | Performed by: INTERNAL MEDICINE

## 2022-02-09 PROCEDURE — 10002016 HB COUNTER INCENTIVE SPIROMETRY

## 2022-02-09 PROCEDURE — 10002800 HB RX 250 W HCPCS: Performed by: INTERNAL MEDICINE

## 2022-02-09 PROCEDURE — 36415 COLL VENOUS BLD VENIPUNCTURE: CPT | Performed by: FAMILY MEDICINE

## 2022-02-09 PROCEDURE — 10002803 HB RX 637: Performed by: FAMILY MEDICINE

## 2022-02-09 PROCEDURE — 80048 BASIC METABOLIC PNL TOTAL CA: CPT | Performed by: INTERNAL MEDICINE

## 2022-02-09 PROCEDURE — 85025 COMPLETE CBC W/AUTO DIFF WBC: CPT | Performed by: FAMILY MEDICINE

## 2022-02-09 PROCEDURE — 13003289 HB OXYGEN THERAPY DAILY

## 2022-02-09 PROCEDURE — 10004281 HB COUNTER-STAFF TIME PER 15 MIN

## 2022-02-09 PROCEDURE — 87046 STOOL CULTR AEROBIC BACT EA: CPT | Performed by: INTERNAL MEDICINE

## 2022-02-09 PROCEDURE — 10002803 HB RX 637: Performed by: INTERNAL MEDICINE

## 2022-02-09 PROCEDURE — 87427 SHIGA-LIKE TOXIN AG IA: CPT | Performed by: INTERNAL MEDICINE

## 2022-02-09 PROCEDURE — 10002803 HB RX 637: Performed by: NURSE PRACTITIONER

## 2022-02-09 RX ORDER — TORSEMIDE 20 MG/1
40 TABLET ORAL DAILY
Qty: 180 TABLET | Refills: 3 | Status: SHIPPED | OUTPATIENT
Start: 2022-02-09 | End: 2022-08-26 | Stop reason: DRUGHIGH

## 2022-02-09 RX ADMIN — INSULIN LISPRO 3 UNITS: 100 INJECTION, SOLUTION INTRAVENOUS; SUBCUTANEOUS at 14:27

## 2022-02-09 RX ADMIN — DEXAMETHASONE 6 MG: 6 TABLET ORAL at 09:19

## 2022-02-09 RX ADMIN — PSYLLIUM HUSK 1 PACKET: 3.4 POWDER ORAL at 09:19

## 2022-02-09 RX ADMIN — FAMOTIDINE 20 MG: 20 TABLET, FILM COATED ORAL at 09:20

## 2022-02-09 RX ADMIN — ALLOPURINOL 100 MG: 100 TABLET ORAL at 09:20

## 2022-02-09 RX ADMIN — ATORVASTATIN CALCIUM 10 MG: 10 TABLET, FILM COATED ORAL at 09:19

## 2022-02-09 RX ADMIN — INSULIN GLARGINE 70 UNITS: 100 INJECTION, SOLUTION SUBCUTANEOUS at 09:19

## 2022-02-09 RX ADMIN — TORSEMIDE 40 MG: 20 TABLET ORAL at 09:19

## 2022-02-09 RX ADMIN — METOPROLOL SUCCINATE 25 MG: 25 TABLET, EXTENDED RELEASE ORAL at 09:19

## 2022-02-09 RX ADMIN — INSULIN LISPRO 60 UNITS: 100 INJECTION, SOLUTION INTRAVENOUS; SUBCUTANEOUS at 09:20

## 2022-02-09 RX ADMIN — OXYCODONE HYDROCHLORIDE AND ACETAMINOPHEN 500 MG: 500 TABLET ORAL at 09:19

## 2022-02-09 RX ADMIN — ISOSORBIDE MONONITRATE 30 MG: 30 TABLET, EXTENDED RELEASE ORAL at 09:19

## 2022-02-09 RX ADMIN — ENOXAPARIN SODIUM 40 MG: 40 INJECTION SUBCUTANEOUS at 09:19

## 2022-02-09 ASSESSMENT — PAIN SCALES - GENERAL: PAINLEVEL_OUTOF10: 0

## 2022-02-09 ASSESSMENT — ENCOUNTER SYMPTOMS
FATIGUE: 1
SHORTNESS OF BREATH: 1

## 2022-02-10 ENCOUNTER — CASE MANAGEMENT (OUTPATIENT)
Dept: CARE COORDINATION | Age: 67
End: 2022-02-10

## 2022-02-10 ENCOUNTER — TELEPHONE (OUTPATIENT)
Dept: CARDIOLOGY | Age: 67
End: 2022-02-10

## 2022-02-10 LAB — E COLI SHIGA-LIKE TOXIN 1+2 STL QL IA: NORMAL

## 2022-02-11 ENCOUNTER — APPOINTMENT (OUTPATIENT)
Dept: CARDIOLOGY | Age: 67
End: 2022-02-11

## 2022-02-11 LAB
BACTERIA STL CULT: NORMAL
O+P SPEC MICRO: NORMAL

## 2022-02-15 ENCOUNTER — CASE MANAGEMENT (OUTPATIENT)
Dept: CARE COORDINATION | Age: 67
End: 2022-02-15

## 2022-02-16 ENCOUNTER — CASE MANAGEMENT (OUTPATIENT)
Dept: CARE COORDINATION | Age: 67
End: 2022-02-16

## 2022-03-03 ENCOUNTER — HOSPITAL ENCOUNTER (OUTPATIENT)
Dept: WOUND CARE | Age: 67
Discharge: STILL ADMITTED OR ADMINISTRATIVE | End: 2022-03-03
Attending: PODIATRIST

## 2022-03-03 ENCOUNTER — APPOINTMENT (OUTPATIENT)
Dept: LAB | Age: 67
End: 2022-03-03

## 2022-03-03 VITALS — TEMPERATURE: 96.1 F

## 2022-03-03 DIAGNOSIS — S91.301D OPEN WOUND OF RIGHT FOOT, SUBSEQUENT ENCOUNTER: ICD-10-CM

## 2022-03-03 DIAGNOSIS — L97.519 TYPE 2 DIABETES MELLITUS WITH RIGHT DIABETIC FOOT ULCER (CMD): Primary | ICD-10-CM

## 2022-03-03 DIAGNOSIS — I73.9 PVD (PERIPHERAL VASCULAR DISEASE) (CMD): ICD-10-CM

## 2022-03-03 DIAGNOSIS — Z89.429 STATUS POST AMPUTATION OF TOE (CMD): ICD-10-CM

## 2022-03-03 DIAGNOSIS — E11.621 TYPE 2 DIABETES MELLITUS WITH RIGHT DIABETIC FOOT ULCER (CMD): Primary | ICD-10-CM

## 2022-03-03 DIAGNOSIS — M86.071 ACUTE HEMATOGENOUS OSTEOMYELITIS OF RIGHT FOOT (CMD): ICD-10-CM

## 2022-03-03 DIAGNOSIS — S91.302D OPEN WOUND OF LEFT FOOT, SUBSEQUENT ENCOUNTER: ICD-10-CM

## 2022-03-03 DIAGNOSIS — Z89.422 STATUS POST AMPUTATION OF LESSER TOE, LEFT (CMD): ICD-10-CM

## 2022-03-03 DIAGNOSIS — L97.412 MIDFOOT ULCERATION, RIGHT, WITH FAT LAYER EXPOSED (CMD): ICD-10-CM

## 2022-03-03 LAB — GLUCOSE BLDC GLUCOMTR-MCNC: 130 MG/DL (ref 70–99)

## 2022-03-03 PROCEDURE — 82962 GLUCOSE BLOOD TEST: CPT

## 2022-03-03 PROCEDURE — 11042 DBRDMT SUBQ TIS 1ST 20SQCM/<: CPT

## 2022-03-03 ASSESSMENT — PAIN SCALES - GENERAL: PAINLEVEL_OUTOF10: 0

## 2022-03-03 ASSESSMENT — PAIN SCALES - WONG BAKER: WONGBAKER_NUMERICALRESPONSE: 0

## 2022-03-17 ENCOUNTER — HOSPITAL ENCOUNTER (OUTPATIENT)
Dept: WOUND CARE | Age: 67
Discharge: STILL ADMITTED OR ADMINISTRATIVE | End: 2022-03-17
Attending: PODIATRIST

## 2022-03-17 ENCOUNTER — HOSPITAL ENCOUNTER (OUTPATIENT)
Dept: LAB | Age: 67
Discharge: HOME | End: 2022-03-17
Attending: NURSE PRACTITIONER

## 2022-03-17 ENCOUNTER — CASE MANAGEMENT (OUTPATIENT)
Dept: CARE COORDINATION | Age: 67
End: 2022-03-17

## 2022-03-17 ENCOUNTER — HOSPITAL ENCOUNTER (OUTPATIENT)
Dept: GENERAL RADIOLOGY | Age: 67
Discharge: HOME | End: 2022-03-17

## 2022-03-17 VITALS — TEMPERATURE: 97 F

## 2022-03-17 DIAGNOSIS — L97.519 TYPE 2 DIABETES MELLITUS WITH RIGHT DIABETIC FOOT ULCER (CMD): Primary | ICD-10-CM

## 2022-03-17 DIAGNOSIS — E11.621 TYPE 2 DIABETES MELLITUS WITH RIGHT DIABETIC FOOT ULCER (CMD): Primary | ICD-10-CM

## 2022-03-17 DIAGNOSIS — I73.9 PVD (PERIPHERAL VASCULAR DISEASE) (CMD): ICD-10-CM

## 2022-03-17 DIAGNOSIS — D64.9 ANEMIA: ICD-10-CM

## 2022-03-17 DIAGNOSIS — M86.571 OTHER CHRONIC HEMATOGENOUS OSTEOMYELITIS OF RIGHT FOOT (CMD): ICD-10-CM

## 2022-03-17 DIAGNOSIS — L97.412 MIDFOOT ULCERATION, RIGHT, WITH FAT LAYER EXPOSED (CMD): ICD-10-CM

## 2022-03-17 DIAGNOSIS — S91.301D OPEN WOUND OF RIGHT FOOT, SUBSEQUENT ENCOUNTER: ICD-10-CM

## 2022-03-17 DIAGNOSIS — N18.4 CHRONIC KIDNEY DISEASE, STAGE 4 (SEVERE) (CMD): ICD-10-CM

## 2022-03-17 DIAGNOSIS — I12.9 HYPERTENSIVE CHRONIC KIDNEY DISEASE WITH STAGE 1 THROUGH STAGE 4 CHRONIC KIDNEY DISEASE, OR UNSPECIFIED CHRONIC KIDNEY DISEASE: ICD-10-CM

## 2022-03-17 DIAGNOSIS — Z89.422 STATUS POST AMPUTATION OF LESSER TOE, LEFT (CMD): ICD-10-CM

## 2022-03-17 DIAGNOSIS — N18.30 CHRONIC KIDNEY DISEASE, STAGE III (MODERATE) (CMD): ICD-10-CM

## 2022-03-17 DIAGNOSIS — S91.302D OPEN WOUND OF LEFT FOOT, SUBSEQUENT ENCOUNTER: ICD-10-CM

## 2022-03-17 DIAGNOSIS — Z89.432 S/P TRANSMETATARSAL AMPUTATION OF FOOT, LEFT (CMD): ICD-10-CM

## 2022-03-17 LAB
ALBUMIN SERPL-MCNC: 3.4 G/DL (ref 3.6–5.1)
ALBUMIN/GLOB SERPL: 0.8 {RATIO} (ref 1–2.4)
ALP SERPL-CCNC: 118 UNITS/L (ref 45–117)
ALT SERPL-CCNC: 25 UNITS/L
ANION GAP SERPL CALC-SCNC: 10 MMOL/L (ref 10–20)
AST SERPL-CCNC: 22 UNITS/L
BILIRUB SERPL-MCNC: 0.5 MG/DL (ref 0.2–1)
BUN SERPL-MCNC: 52 MG/DL (ref 6–20)
BUN/CREAT SERPL: 29 (ref 7–25)
CALCIUM SERPL-MCNC: 9.2 MG/DL (ref 8.4–10.2)
CHLORIDE SERPL-SCNC: 101 MMOL/L (ref 98–107)
CO2 SERPL-SCNC: 29 MMOL/L (ref 21–32)
CREAT SERPL-MCNC: 1.79 MG/DL (ref 0.67–1.17)
CREAT UR-MCNC: 88.2 MG/DL
DEPRECATED RDW RBC: 50.6 FL (ref 39–50)
ERYTHROCYTE [DISTWIDTH] IN BLOOD: 15.3 % (ref 11–15)
FASTING DURATION TIME PATIENT: 0 HOURS (ref 0–999)
GFR SERPLBLD BASED ON 1.73 SQ M-ARVRAT: 39 ML/MIN
GLOBULIN SER-MCNC: 4.1 G/DL (ref 2–4)
GLUCOSE SERPL-MCNC: 97 MG/DL (ref 70–99)
HCT VFR BLD CALC: 39.4 % (ref 39–51)
HGB BLD-MCNC: 12.4 G/DL (ref 13–17)
MCH RBC QN AUTO: 28.4 PG (ref 26–34)
MCHC RBC AUTO-ENTMCNC: 31.5 G/DL (ref 32–36.5)
MCV RBC AUTO: 90.2 FL (ref 78–100)
NRBC BLD MANUAL-RTO: 0 /100 WBC
PHOSPHATE SERPL-MCNC: 4.9 MG/DL (ref 2.4–4.7)
PLATELET # BLD AUTO: 289 K/MCL (ref 140–450)
POTASSIUM SERPL-SCNC: 4 MMOL/L (ref 3.4–5.1)
PROT SERPL-MCNC: 7.5 G/DL (ref 6.4–8.2)
PROT UR-MCNC: 6 MG/DL
PROT/CREAT UR: 68 MGPR/GCR
RBC # BLD: 4.37 MIL/MCL (ref 4.5–5.9)
SODIUM SERPL-SCNC: 136 MMOL/L (ref 135–145)
WBC # BLD: 9.6 K/MCL (ref 4.2–11)

## 2022-03-17 PROCEDURE — 84100 ASSAY OF PHOSPHORUS: CPT | Performed by: CLINICAL MEDICAL LABORATORY

## 2022-03-17 PROCEDURE — 85027 COMPLETE CBC AUTOMATED: CPT | Performed by: CLINICAL MEDICAL LABORATORY

## 2022-03-17 PROCEDURE — 84156 ASSAY OF PROTEIN URINE: CPT | Performed by: CLINICAL MEDICAL LABORATORY

## 2022-03-17 PROCEDURE — 82570 ASSAY OF URINE CREATININE: CPT | Performed by: CLINICAL MEDICAL LABORATORY

## 2022-03-17 PROCEDURE — 36415 COLL VENOUS BLD VENIPUNCTURE: CPT | Performed by: CLINICAL MEDICAL LABORATORY

## 2022-03-17 PROCEDURE — 73630 X-RAY EXAM OF FOOT: CPT

## 2022-03-17 PROCEDURE — 11042 DBRDMT SUBQ TIS 1ST 20SQCM/<: CPT

## 2022-03-17 PROCEDURE — 80053 COMPREHEN METABOLIC PANEL: CPT | Performed by: CLINICAL MEDICAL LABORATORY

## 2022-03-17 PROCEDURE — 83970 ASSAY OF PARATHORMONE: CPT | Performed by: CLINICAL MEDICAL LABORATORY

## 2022-03-17 ASSESSMENT — PAIN SCALES - WONG BAKER: WONGBAKER_NUMERICALRESPONSE: 0

## 2022-03-17 ASSESSMENT — PAIN SCALES - GENERAL: PAINLEVEL_OUTOF10: 0

## 2022-03-18 LAB — PTH-INTACT SERPL-MCNC: 155 PG/ML (ref 19–88)

## 2022-03-21 ENCOUNTER — APPOINTMENT (OUTPATIENT)
Dept: HYPERBARIC MEDICINE | Age: 67
End: 2022-03-21

## 2022-03-22 ENCOUNTER — HOSPITAL ENCOUNTER (OUTPATIENT)
Dept: HYPERBARIC MEDICINE | Age: 67
Discharge: STILL ADMITTED OR ADMINISTRATIVE | End: 2022-03-22

## 2022-03-22 DIAGNOSIS — L97.519 TYPE 2 DIABETES MELLITUS WITH RIGHT DIABETIC FOOT ULCER (CMD): Primary | ICD-10-CM

## 2022-03-22 DIAGNOSIS — E11.621 TYPE 2 DIABETES MELLITUS WITH RIGHT DIABETIC FOOT ULCER (CMD): Primary | ICD-10-CM

## 2022-03-22 LAB
GLUCOSE BLDC GLUCOMTR-MCNC: 117 MG/DL (ref 70–99)
GLUCOSE BLDC GLUCOMTR-MCNC: 140 MG/DL (ref 70–99)

## 2022-03-22 PROCEDURE — G0277 HBOT, FULL BODY CHAMBER, 30M: HCPCS

## 2022-03-22 PROCEDURE — 82962 GLUCOSE BLOOD TEST: CPT

## 2022-03-22 PROCEDURE — 10004281 HB COUNTER-STAFF TIME PER 15 MIN

## 2022-03-23 ENCOUNTER — HOSPITAL ENCOUNTER (OUTPATIENT)
Dept: HYPERBARIC MEDICINE | Age: 67
Discharge: STILL ADMITTED OR ADMINISTRATIVE | End: 2022-03-23

## 2022-03-23 DIAGNOSIS — L97.519 TYPE 2 DIABETES MELLITUS WITH RIGHT DIABETIC FOOT ULCER (CMD): Primary | ICD-10-CM

## 2022-03-23 DIAGNOSIS — E11.621 TYPE 2 DIABETES MELLITUS WITH RIGHT DIABETIC FOOT ULCER (CMD): Primary | ICD-10-CM

## 2022-03-23 LAB
GLUCOSE BLDC GLUCOMTR-MCNC: 116 MG/DL (ref 70–99)
GLUCOSE BLDC GLUCOMTR-MCNC: 137 MG/DL (ref 70–99)

## 2022-03-23 PROCEDURE — 10004281 HB COUNTER-STAFF TIME PER 15 MIN

## 2022-03-23 PROCEDURE — 82962 GLUCOSE BLOOD TEST: CPT

## 2022-03-23 PROCEDURE — G0277 HBOT, FULL BODY CHAMBER, 30M: HCPCS

## 2022-03-24 ENCOUNTER — HOSPITAL ENCOUNTER (OUTPATIENT)
Dept: HYPERBARIC MEDICINE | Age: 67
Discharge: STILL ADMITTED OR ADMINISTRATIVE | End: 2022-03-24

## 2022-03-24 DIAGNOSIS — L97.519 TYPE 2 DIABETES MELLITUS WITH RIGHT DIABETIC FOOT ULCER (CMD): Primary | ICD-10-CM

## 2022-03-24 DIAGNOSIS — E11.621 TYPE 2 DIABETES MELLITUS WITH RIGHT DIABETIC FOOT ULCER (CMD): Primary | ICD-10-CM

## 2022-03-24 LAB
GLUCOSE BLDC GLUCOMTR-MCNC: 126 MG/DL (ref 70–99)
GLUCOSE BLDC GLUCOMTR-MCNC: 154 MG/DL (ref 70–99)

## 2022-03-24 PROCEDURE — 82962 GLUCOSE BLOOD TEST: CPT

## 2022-03-24 PROCEDURE — 10004281 HB COUNTER-STAFF TIME PER 15 MIN

## 2022-03-24 PROCEDURE — G0277 HBOT, FULL BODY CHAMBER, 30M: HCPCS

## 2022-03-25 ENCOUNTER — HOSPITAL ENCOUNTER (OUTPATIENT)
Dept: HYPERBARIC MEDICINE | Age: 67
Discharge: STILL ADMITTED OR ADMINISTRATIVE | End: 2022-03-25

## 2022-03-25 DIAGNOSIS — E11.621 TYPE 2 DIABETES MELLITUS WITH RIGHT DIABETIC FOOT ULCER (CMD): Primary | ICD-10-CM

## 2022-03-25 DIAGNOSIS — L97.519 TYPE 2 DIABETES MELLITUS WITH RIGHT DIABETIC FOOT ULCER (CMD): Primary | ICD-10-CM

## 2022-03-25 LAB
GLUCOSE BLDC GLUCOMTR-MCNC: 158 MG/DL (ref 70–99)
GLUCOSE BLDC GLUCOMTR-MCNC: 176 MG/DL (ref 70–99)

## 2022-03-25 PROCEDURE — 10004281 HB COUNTER-STAFF TIME PER 15 MIN

## 2022-03-25 PROCEDURE — G0277 HBOT, FULL BODY CHAMBER, 30M: HCPCS

## 2022-03-25 PROCEDURE — 82962 GLUCOSE BLOOD TEST: CPT

## 2022-03-28 ENCOUNTER — HOSPITAL ENCOUNTER (OUTPATIENT)
Dept: HYPERBARIC MEDICINE | Age: 67
Discharge: STILL ADMITTED OR ADMINISTRATIVE | End: 2022-03-28

## 2022-03-28 DIAGNOSIS — E11.621 TYPE 2 DIABETES MELLITUS WITH RIGHT DIABETIC FOOT ULCER (CMD): Primary | ICD-10-CM

## 2022-03-28 DIAGNOSIS — L97.519 TYPE 2 DIABETES MELLITUS WITH RIGHT DIABETIC FOOT ULCER (CMD): Primary | ICD-10-CM

## 2022-03-28 LAB
GLUCOSE BLDC GLUCOMTR-MCNC: 164 MG/DL (ref 70–99)
GLUCOSE BLDC GLUCOMTR-MCNC: 186 MG/DL (ref 70–99)

## 2022-03-28 PROCEDURE — G0277 HBOT, FULL BODY CHAMBER, 30M: HCPCS

## 2022-03-28 PROCEDURE — 10004281 HB COUNTER-STAFF TIME PER 15 MIN

## 2022-03-28 PROCEDURE — 82962 GLUCOSE BLOOD TEST: CPT

## 2022-03-29 ENCOUNTER — HOSPITAL ENCOUNTER (OUTPATIENT)
Dept: HYPERBARIC MEDICINE | Age: 67
Discharge: STILL ADMITTED OR ADMINISTRATIVE | End: 2022-03-29

## 2022-03-29 DIAGNOSIS — E11.621 TYPE 2 DIABETES MELLITUS WITH RIGHT DIABETIC FOOT ULCER (CMD): Primary | ICD-10-CM

## 2022-03-29 DIAGNOSIS — L97.519 TYPE 2 DIABETES MELLITUS WITH RIGHT DIABETIC FOOT ULCER (CMD): Primary | ICD-10-CM

## 2022-03-29 LAB
GLUCOSE BLDC GLUCOMTR-MCNC: 111 MG/DL (ref 70–99)
GLUCOSE BLDC GLUCOMTR-MCNC: 141 MG/DL (ref 70–99)

## 2022-03-29 PROCEDURE — 10004281 HB COUNTER-STAFF TIME PER 15 MIN

## 2022-03-29 PROCEDURE — 82962 GLUCOSE BLOOD TEST: CPT

## 2022-03-29 PROCEDURE — G0277 HBOT, FULL BODY CHAMBER, 30M: HCPCS

## 2022-03-30 ENCOUNTER — HOSPITAL ENCOUNTER (OUTPATIENT)
Dept: HYPERBARIC MEDICINE | Age: 67
Discharge: STILL ADMITTED OR ADMINISTRATIVE | End: 2022-03-30

## 2022-03-30 DIAGNOSIS — L97.519 TYPE 2 DIABETES MELLITUS WITH RIGHT DIABETIC FOOT ULCER (CMD): Primary | ICD-10-CM

## 2022-03-30 DIAGNOSIS — E11.621 TYPE 2 DIABETES MELLITUS WITH RIGHT DIABETIC FOOT ULCER (CMD): Primary | ICD-10-CM

## 2022-03-30 LAB
GLUCOSE BLDC GLUCOMTR-MCNC: 146 MG/DL (ref 70–99)
GLUCOSE BLDC GLUCOMTR-MCNC: 156 MG/DL (ref 70–99)

## 2022-03-30 PROCEDURE — 10004281 HB COUNTER-STAFF TIME PER 15 MIN

## 2022-03-30 PROCEDURE — G0277 HBOT, FULL BODY CHAMBER, 30M: HCPCS

## 2022-03-30 PROCEDURE — 82962 GLUCOSE BLOOD TEST: CPT

## 2022-03-31 ENCOUNTER — HOSPITAL ENCOUNTER (OUTPATIENT)
Dept: HYPERBARIC MEDICINE | Age: 67
Discharge: STILL ADMITTED OR ADMINISTRATIVE | End: 2022-03-31

## 2022-03-31 ENCOUNTER — HOSPITAL ENCOUNTER (OUTPATIENT)
Dept: WOUND CARE | Age: 67
Discharge: STILL ADMITTED OR ADMINISTRATIVE | End: 2022-03-31
Attending: PODIATRIST

## 2022-03-31 DIAGNOSIS — L97.519 TYPE 2 DIABETES MELLITUS WITH RIGHT DIABETIC FOOT ULCER (CMD): Primary | ICD-10-CM

## 2022-03-31 DIAGNOSIS — E11.621 TYPE 2 DIABETES MELLITUS WITH RIGHT DIABETIC FOOT ULCER (CMD): Primary | ICD-10-CM

## 2022-03-31 DIAGNOSIS — S91.302D OPEN WOUND OF LEFT FOOT, SUBSEQUENT ENCOUNTER: Primary | ICD-10-CM

## 2022-03-31 DIAGNOSIS — L97.519 TYPE 2 DIABETES MELLITUS WITH RIGHT DIABETIC FOOT ULCER (CMD): ICD-10-CM

## 2022-03-31 DIAGNOSIS — E11.621 TYPE 2 DIABETES MELLITUS WITH RIGHT DIABETIC FOOT ULCER (CMD): ICD-10-CM

## 2022-03-31 DIAGNOSIS — I73.9 PVD (PERIPHERAL VASCULAR DISEASE) (CMD): ICD-10-CM

## 2022-03-31 DIAGNOSIS — Z89.422 STATUS POST AMPUTATION OF LESSER TOE, LEFT (CMD): ICD-10-CM

## 2022-03-31 LAB
GLUCOSE BLDC GLUCOMTR-MCNC: 116 MG/DL (ref 70–99)
GLUCOSE BLDC GLUCOMTR-MCNC: 119 MG/DL (ref 70–99)

## 2022-03-31 PROCEDURE — G0277 HBOT, FULL BODY CHAMBER, 30M: HCPCS

## 2022-03-31 PROCEDURE — 11042 DBRDMT SUBQ TIS 1ST 20SQCM/<: CPT

## 2022-03-31 PROCEDURE — 10004281 HB COUNTER-STAFF TIME PER 15 MIN

## 2022-03-31 PROCEDURE — 82962 GLUCOSE BLOOD TEST: CPT

## 2022-04-01 ENCOUNTER — HOSPITAL ENCOUNTER (OUTPATIENT)
Dept: HYPERBARIC MEDICINE | Age: 67
Discharge: STILL ADMITTED OR ADMINISTRATIVE | End: 2022-04-01

## 2022-04-01 DIAGNOSIS — L97.519 TYPE 2 DIABETES MELLITUS WITH RIGHT DIABETIC FOOT ULCER (CMD): Primary | ICD-10-CM

## 2022-04-01 DIAGNOSIS — E11.621 TYPE 2 DIABETES MELLITUS WITH RIGHT DIABETIC FOOT ULCER (CMD): Primary | ICD-10-CM

## 2022-04-01 LAB
GLUCOSE BLDC GLUCOMTR-MCNC: 137 MG/DL (ref 70–99)
GLUCOSE BLDC GLUCOMTR-MCNC: 153 MG/DL (ref 70–99)

## 2022-04-01 PROCEDURE — 82962 GLUCOSE BLOOD TEST: CPT

## 2022-04-01 PROCEDURE — G0277 HBOT, FULL BODY CHAMBER, 30M: HCPCS

## 2022-04-01 PROCEDURE — 10004281 HB COUNTER-STAFF TIME PER 15 MIN

## 2022-04-05 ENCOUNTER — HOSPITAL ENCOUNTER (OUTPATIENT)
Dept: HYPERBARIC MEDICINE | Age: 67
Discharge: STILL ADMITTED OR ADMINISTRATIVE | End: 2022-04-05

## 2022-04-05 DIAGNOSIS — E11.621 TYPE 2 DIABETES MELLITUS WITH RIGHT DIABETIC FOOT ULCER (CMD): Primary | ICD-10-CM

## 2022-04-05 DIAGNOSIS — L97.519 TYPE 2 DIABETES MELLITUS WITH RIGHT DIABETIC FOOT ULCER (CMD): Primary | ICD-10-CM

## 2022-04-05 LAB
GLUCOSE BLDC GLUCOMTR-MCNC: 111 MG/DL (ref 70–99)
GLUCOSE BLDC GLUCOMTR-MCNC: 129 MG/DL (ref 70–99)

## 2022-04-05 PROCEDURE — G0277 HBOT, FULL BODY CHAMBER, 30M: HCPCS

## 2022-04-05 PROCEDURE — 10004281 HB COUNTER-STAFF TIME PER 15 MIN

## 2022-04-05 PROCEDURE — 82962 GLUCOSE BLOOD TEST: CPT

## 2022-04-06 ENCOUNTER — HOSPITAL ENCOUNTER (OUTPATIENT)
Dept: HYPERBARIC MEDICINE | Age: 67
Discharge: STILL ADMITTED OR ADMINISTRATIVE | End: 2022-04-06

## 2022-04-06 DIAGNOSIS — L97.519 TYPE 2 DIABETES MELLITUS WITH RIGHT DIABETIC FOOT ULCER (CMD): Primary | ICD-10-CM

## 2022-04-06 DIAGNOSIS — E11.621 TYPE 2 DIABETES MELLITUS WITH RIGHT DIABETIC FOOT ULCER (CMD): Primary | ICD-10-CM

## 2022-04-06 LAB
GLUCOSE BLDC GLUCOMTR-MCNC: 139 MG/DL (ref 70–99)
GLUCOSE BLDC GLUCOMTR-MCNC: 140 MG/DL (ref 70–99)

## 2022-04-06 PROCEDURE — 10004281 HB COUNTER-STAFF TIME PER 15 MIN

## 2022-04-06 PROCEDURE — 82962 GLUCOSE BLOOD TEST: CPT

## 2022-04-06 PROCEDURE — G0277 HBOT, FULL BODY CHAMBER, 30M: HCPCS

## 2022-04-07 ENCOUNTER — HOSPITAL ENCOUNTER (OUTPATIENT)
Dept: HYPERBARIC MEDICINE | Age: 67
Discharge: STILL ADMITTED OR ADMINISTRATIVE | End: 2022-04-07

## 2022-04-07 DIAGNOSIS — E11.621 TYPE 2 DIABETES MELLITUS WITH RIGHT DIABETIC FOOT ULCER (CMD): Primary | ICD-10-CM

## 2022-04-07 DIAGNOSIS — L97.519 TYPE 2 DIABETES MELLITUS WITH RIGHT DIABETIC FOOT ULCER (CMD): Primary | ICD-10-CM

## 2022-04-07 LAB
GLUCOSE BLDC GLUCOMTR-MCNC: 182 MG/DL (ref 70–99)
GLUCOSE BLDC GLUCOMTR-MCNC: 190 MG/DL (ref 70–99)

## 2022-04-07 PROCEDURE — 10004281 HB COUNTER-STAFF TIME PER 15 MIN

## 2022-04-07 PROCEDURE — 82962 GLUCOSE BLOOD TEST: CPT

## 2022-04-07 PROCEDURE — G0277 HBOT, FULL BODY CHAMBER, 30M: HCPCS

## 2022-04-08 ENCOUNTER — HOSPITAL ENCOUNTER (OUTPATIENT)
Dept: HYPERBARIC MEDICINE | Age: 67
Discharge: STILL ADMITTED OR ADMINISTRATIVE | End: 2022-04-08

## 2022-04-08 DIAGNOSIS — E11.621 TYPE 2 DIABETES MELLITUS WITH RIGHT DIABETIC FOOT ULCER (CMD): Primary | ICD-10-CM

## 2022-04-08 DIAGNOSIS — L97.519 TYPE 2 DIABETES MELLITUS WITH RIGHT DIABETIC FOOT ULCER (CMD): Primary | ICD-10-CM

## 2022-04-08 LAB
GLUCOSE BLDC GLUCOMTR-MCNC: 149 MG/DL (ref 70–99)
GLUCOSE BLDC GLUCOMTR-MCNC: 150 MG/DL (ref 70–99)

## 2022-04-08 PROCEDURE — 10004281 HB COUNTER-STAFF TIME PER 15 MIN

## 2022-04-08 PROCEDURE — G0277 HBOT, FULL BODY CHAMBER, 30M: HCPCS

## 2022-04-08 PROCEDURE — 82962 GLUCOSE BLOOD TEST: CPT

## 2022-04-11 ENCOUNTER — HOSPITAL ENCOUNTER (OUTPATIENT)
Dept: HYPERBARIC MEDICINE | Age: 67
Discharge: STILL ADMITTED OR ADMINISTRATIVE | End: 2022-04-11

## 2022-04-11 DIAGNOSIS — L97.519 TYPE 2 DIABETES MELLITUS WITH RIGHT DIABETIC FOOT ULCER (CMD): Primary | ICD-10-CM

## 2022-04-11 DIAGNOSIS — E11.621 TYPE 2 DIABETES MELLITUS WITH RIGHT DIABETIC FOOT ULCER (CMD): Primary | ICD-10-CM

## 2022-04-11 LAB
GLUCOSE BLDC GLUCOMTR-MCNC: 122 MG/DL (ref 70–99)
GLUCOSE BLDC GLUCOMTR-MCNC: 134 MG/DL (ref 70–99)

## 2022-04-11 PROCEDURE — G0277 HBOT, FULL BODY CHAMBER, 30M: HCPCS

## 2022-04-11 PROCEDURE — 82962 GLUCOSE BLOOD TEST: CPT

## 2022-04-12 ENCOUNTER — HOSPITAL ENCOUNTER (OUTPATIENT)
Dept: HYPERBARIC MEDICINE | Age: 67
Discharge: STILL ADMITTED OR ADMINISTRATIVE | End: 2022-04-12

## 2022-04-12 DIAGNOSIS — L97.519 TYPE 2 DIABETES MELLITUS WITH RIGHT DIABETIC FOOT ULCER (CMD): Primary | ICD-10-CM

## 2022-04-12 DIAGNOSIS — E11.621 TYPE 2 DIABETES MELLITUS WITH RIGHT DIABETIC FOOT ULCER (CMD): Primary | ICD-10-CM

## 2022-04-12 LAB
GLUCOSE BLDC GLUCOMTR-MCNC: 126 MG/DL (ref 70–99)
GLUCOSE BLDC GLUCOMTR-MCNC: 126 MG/DL (ref 70–99)

## 2022-04-12 PROCEDURE — G0277 HBOT, FULL BODY CHAMBER, 30M: HCPCS

## 2022-04-12 PROCEDURE — 82962 GLUCOSE BLOOD TEST: CPT

## 2022-04-13 ENCOUNTER — HOSPITAL ENCOUNTER (OUTPATIENT)
Dept: HYPERBARIC MEDICINE | Age: 67
Discharge: STILL ADMITTED OR ADMINISTRATIVE | End: 2022-04-13

## 2022-04-13 DIAGNOSIS — S91.302D OPEN WOUND OF LEFT FOOT, SUBSEQUENT ENCOUNTER: Primary | ICD-10-CM

## 2022-04-13 DIAGNOSIS — Z89.422 STATUS POST AMPUTATION OF LESSER TOE, LEFT (CMD): ICD-10-CM

## 2022-04-13 LAB
GLUCOSE BLDC GLUCOMTR-MCNC: 162 MG/DL (ref 70–99)
GLUCOSE BLDC GLUCOMTR-MCNC: 175 MG/DL (ref 70–99)

## 2022-04-13 PROCEDURE — 82962 GLUCOSE BLOOD TEST: CPT

## 2022-04-13 PROCEDURE — G0277 HBOT, FULL BODY CHAMBER, 30M: HCPCS

## 2022-04-14 ENCOUNTER — HOSPITAL ENCOUNTER (OUTPATIENT)
Dept: WOUND CARE | Age: 67
Discharge: STILL ADMITTED OR ADMINISTRATIVE | End: 2022-04-14
Attending: PODIATRIST

## 2022-04-14 VITALS — TEMPERATURE: 96.8 F

## 2022-04-14 DIAGNOSIS — S91.302D OPEN WOUND OF LEFT FOOT, SUBSEQUENT ENCOUNTER: ICD-10-CM

## 2022-04-14 DIAGNOSIS — Z89.432 S/P TRANSMETATARSAL AMPUTATION OF FOOT, LEFT (CMD): ICD-10-CM

## 2022-04-14 DIAGNOSIS — I73.9 PVD (PERIPHERAL VASCULAR DISEASE) (CMD): ICD-10-CM

## 2022-04-14 DIAGNOSIS — L97.519 TYPE 2 DIABETES MELLITUS WITH RIGHT DIABETIC FOOT ULCER (CMD): Primary | ICD-10-CM

## 2022-04-14 DIAGNOSIS — Z89.422 STATUS POST AMPUTATION OF LESSER TOE, LEFT (CMD): ICD-10-CM

## 2022-04-14 DIAGNOSIS — L97.412 MIDFOOT ULCERATION, RIGHT, WITH FAT LAYER EXPOSED (CMD): ICD-10-CM

## 2022-04-14 DIAGNOSIS — E11.621 TYPE 2 DIABETES MELLITUS WITH RIGHT DIABETIC FOOT ULCER (CMD): Primary | ICD-10-CM

## 2022-04-14 DIAGNOSIS — M86.571 OTHER CHRONIC HEMATOGENOUS OSTEOMYELITIS OF RIGHT FOOT (CMD): ICD-10-CM

## 2022-04-14 PROCEDURE — 11042 DBRDMT SUBQ TIS 1ST 20SQCM/<: CPT

## 2022-04-14 ASSESSMENT — PAIN SCALES - WONG BAKER: WONGBAKER_NUMERICALRESPONSE: 0

## 2022-04-14 ASSESSMENT — PAIN SCALES - GENERAL: PAINLEVEL_OUTOF10: 0

## 2022-04-21 ENCOUNTER — HOSPITAL ENCOUNTER (OUTPATIENT)
Dept: HYPERBARIC MEDICINE | Age: 67
Discharge: STILL ADMITTED OR ADMINISTRATIVE | End: 2022-04-21

## 2022-04-21 DIAGNOSIS — E11.621 TYPE 2 DIABETES MELLITUS WITH RIGHT DIABETIC FOOT ULCER (CMD): Primary | ICD-10-CM

## 2022-04-21 DIAGNOSIS — L97.519 TYPE 2 DIABETES MELLITUS WITH RIGHT DIABETIC FOOT ULCER (CMD): Primary | ICD-10-CM

## 2022-04-21 LAB
GLUCOSE BLDC GLUCOMTR-MCNC: 108 MG/DL (ref 70–99)
GLUCOSE BLDC GLUCOMTR-MCNC: 116 MG/DL (ref 70–99)

## 2022-04-21 PROCEDURE — 82962 GLUCOSE BLOOD TEST: CPT

## 2022-04-21 PROCEDURE — 10004281 HB COUNTER-STAFF TIME PER 15 MIN

## 2022-04-21 PROCEDURE — G0277 HBOT, FULL BODY CHAMBER, 30M: HCPCS

## 2022-04-22 ENCOUNTER — HOSPITAL ENCOUNTER (OUTPATIENT)
Dept: HYPERBARIC MEDICINE | Age: 67
Discharge: STILL ADMITTED OR ADMINISTRATIVE | End: 2022-04-22

## 2022-04-22 DIAGNOSIS — E11.621 TYPE 2 DIABETES MELLITUS WITH RIGHT DIABETIC FOOT ULCER (CMD): Primary | ICD-10-CM

## 2022-04-22 DIAGNOSIS — L97.519 TYPE 2 DIABETES MELLITUS WITH RIGHT DIABETIC FOOT ULCER (CMD): Primary | ICD-10-CM

## 2022-04-22 LAB
GLUCOSE BLDC GLUCOMTR-MCNC: 127 MG/DL (ref 70–99)
GLUCOSE BLDC GLUCOMTR-MCNC: 143 MG/DL (ref 70–99)

## 2022-04-22 PROCEDURE — G0277 HBOT, FULL BODY CHAMBER, 30M: HCPCS

## 2022-04-22 PROCEDURE — 10004281 HB COUNTER-STAFF TIME PER 15 MIN

## 2022-04-22 PROCEDURE — 82962 GLUCOSE BLOOD TEST: CPT

## 2022-04-25 ENCOUNTER — HOSPITAL ENCOUNTER (OUTPATIENT)
Dept: HYPERBARIC MEDICINE | Age: 67
Discharge: STILL ADMITTED OR ADMINISTRATIVE | End: 2022-04-25

## 2022-04-25 DIAGNOSIS — E11.621 TYPE 2 DIABETES MELLITUS WITH RIGHT DIABETIC FOOT ULCER (CMD): Primary | ICD-10-CM

## 2022-04-25 DIAGNOSIS — L97.519 TYPE 2 DIABETES MELLITUS WITH RIGHT DIABETIC FOOT ULCER (CMD): Primary | ICD-10-CM

## 2022-04-25 LAB
GLUCOSE BLDC GLUCOMTR-MCNC: 102 MG/DL (ref 70–99)
GLUCOSE BLDC GLUCOMTR-MCNC: 117 MG/DL (ref 70–99)

## 2022-04-25 PROCEDURE — 82962 GLUCOSE BLOOD TEST: CPT

## 2022-04-25 PROCEDURE — G0277 HBOT, FULL BODY CHAMBER, 30M: HCPCS

## 2022-04-25 PROCEDURE — 10004281 HB COUNTER-STAFF TIME PER 15 MIN

## 2022-04-27 ENCOUNTER — HOSPITAL ENCOUNTER (OUTPATIENT)
Dept: HYPERBARIC MEDICINE | Age: 67
Discharge: STILL ADMITTED OR ADMINISTRATIVE | End: 2022-04-27

## 2022-04-27 DIAGNOSIS — L97.519 TYPE 2 DIABETES MELLITUS WITH RIGHT DIABETIC FOOT ULCER (CMD): Primary | ICD-10-CM

## 2022-04-27 DIAGNOSIS — E11.621 TYPE 2 DIABETES MELLITUS WITH RIGHT DIABETIC FOOT ULCER (CMD): Primary | ICD-10-CM

## 2022-04-27 LAB
GLUCOSE BLDC GLUCOMTR-MCNC: 172 MG/DL (ref 70–99)
GLUCOSE BLDC GLUCOMTR-MCNC: 182 MG/DL (ref 70–99)

## 2022-04-27 PROCEDURE — G0277 HBOT, FULL BODY CHAMBER, 30M: HCPCS

## 2022-04-27 PROCEDURE — 10004281 HB COUNTER-STAFF TIME PER 15 MIN

## 2022-04-27 PROCEDURE — 82962 GLUCOSE BLOOD TEST: CPT

## 2022-04-28 ENCOUNTER — HOSPITAL ENCOUNTER (OUTPATIENT)
Dept: HYPERBARIC MEDICINE | Age: 67
Discharge: STILL ADMITTED OR ADMINISTRATIVE | End: 2022-04-28

## 2022-04-28 DIAGNOSIS — L97.519 TYPE 2 DIABETES MELLITUS WITH RIGHT DIABETIC FOOT ULCER (CMD): Primary | ICD-10-CM

## 2022-04-28 DIAGNOSIS — E11.621 TYPE 2 DIABETES MELLITUS WITH RIGHT DIABETIC FOOT ULCER (CMD): Primary | ICD-10-CM

## 2022-04-28 LAB
GLUCOSE BLDC GLUCOMTR-MCNC: 122 MG/DL (ref 70–99)
GLUCOSE BLDC GLUCOMTR-MCNC: 140 MG/DL (ref 70–99)

## 2022-04-28 PROCEDURE — 82962 GLUCOSE BLOOD TEST: CPT

## 2022-04-28 PROCEDURE — G0277 HBOT, FULL BODY CHAMBER, 30M: HCPCS

## 2022-04-28 PROCEDURE — 10004281 HB COUNTER-STAFF TIME PER 15 MIN

## 2022-04-29 ENCOUNTER — HOSPITAL ENCOUNTER (OUTPATIENT)
Dept: HYPERBARIC MEDICINE | Age: 67
Discharge: STILL ADMITTED OR ADMINISTRATIVE | End: 2022-04-29

## 2022-04-29 DIAGNOSIS — E11.621 TYPE 2 DIABETES MELLITUS WITH RIGHT DIABETIC FOOT ULCER (CMD): Primary | ICD-10-CM

## 2022-04-29 DIAGNOSIS — L97.519 TYPE 2 DIABETES MELLITUS WITH RIGHT DIABETIC FOOT ULCER (CMD): Primary | ICD-10-CM

## 2022-04-29 LAB
GLUCOSE BLDC GLUCOMTR-MCNC: 117 MG/DL (ref 70–99)
GLUCOSE BLDC GLUCOMTR-MCNC: 165 MG/DL (ref 70–99)

## 2022-04-29 PROCEDURE — 10004281 HB COUNTER-STAFF TIME PER 15 MIN

## 2022-04-29 PROCEDURE — 82962 GLUCOSE BLOOD TEST: CPT

## 2022-04-29 PROCEDURE — G0277 HBOT, FULL BODY CHAMBER, 30M: HCPCS

## 2022-05-02 ENCOUNTER — HOSPITAL ENCOUNTER (OUTPATIENT)
Dept: HYPERBARIC MEDICINE | Age: 67
Discharge: STILL ADMITTED OR ADMINISTRATIVE | End: 2022-05-02

## 2022-05-02 DIAGNOSIS — L97.519 TYPE 2 DIABETES MELLITUS WITH RIGHT DIABETIC FOOT ULCER (CMD): Primary | ICD-10-CM

## 2022-05-02 DIAGNOSIS — E11.621 TYPE 2 DIABETES MELLITUS WITH RIGHT DIABETIC FOOT ULCER (CMD): Primary | ICD-10-CM

## 2022-05-02 LAB
GLUCOSE BLDC GLUCOMTR-MCNC: 123 MG/DL (ref 70–99)
GLUCOSE BLDC GLUCOMTR-MCNC: 141 MG/DL (ref 70–99)

## 2022-05-02 PROCEDURE — G0277 HBOT, FULL BODY CHAMBER, 30M: HCPCS

## 2022-05-02 PROCEDURE — 10004281 HB COUNTER-STAFF TIME PER 15 MIN

## 2022-05-02 PROCEDURE — 82962 GLUCOSE BLOOD TEST: CPT

## 2022-05-03 ENCOUNTER — HOSPITAL ENCOUNTER (OUTPATIENT)
Dept: HYPERBARIC MEDICINE | Age: 67
Discharge: STILL ADMITTED OR ADMINISTRATIVE | End: 2022-05-03

## 2022-05-03 DIAGNOSIS — L97.519 TYPE 2 DIABETES MELLITUS WITH RIGHT DIABETIC FOOT ULCER (CMD): Primary | ICD-10-CM

## 2022-05-03 DIAGNOSIS — E11.621 TYPE 2 DIABETES MELLITUS WITH RIGHT DIABETIC FOOT ULCER (CMD): Primary | ICD-10-CM

## 2022-05-03 LAB
GLUCOSE BLDC GLUCOMTR-MCNC: 103 MG/DL (ref 70–99)
GLUCOSE BLDC GLUCOMTR-MCNC: 105 MG/DL (ref 70–99)

## 2022-05-03 PROCEDURE — G0277 HBOT, FULL BODY CHAMBER, 30M: HCPCS

## 2022-05-03 PROCEDURE — 10004281 HB COUNTER-STAFF TIME PER 15 MIN

## 2022-05-03 PROCEDURE — 82962 GLUCOSE BLOOD TEST: CPT

## 2022-05-05 ENCOUNTER — HOSPITAL ENCOUNTER (OUTPATIENT)
Dept: WOUND CARE | Age: 67
Discharge: STILL ADMITTED OR ADMINISTRATIVE | End: 2022-05-05
Attending: PODIATRIST

## 2022-05-05 ENCOUNTER — HOSPITAL ENCOUNTER (OUTPATIENT)
Dept: HYPERBARIC MEDICINE | Age: 67
Discharge: STILL ADMITTED OR ADMINISTRATIVE | End: 2022-05-05

## 2022-05-05 VITALS — TEMPERATURE: 95.9 F

## 2022-05-05 DIAGNOSIS — E11.621 TYPE 2 DIABETES MELLITUS WITH RIGHT DIABETIC FOOT ULCER (CMD): Primary | ICD-10-CM

## 2022-05-05 DIAGNOSIS — L97.412 MIDFOOT ULCERATION, RIGHT, WITH FAT LAYER EXPOSED (CMD): ICD-10-CM

## 2022-05-05 DIAGNOSIS — S91.302D OPEN WOUND OF LEFT FOOT, SUBSEQUENT ENCOUNTER: ICD-10-CM

## 2022-05-05 DIAGNOSIS — Z89.432 S/P TRANSMETATARSAL AMPUTATION OF FOOT, LEFT (CMD): ICD-10-CM

## 2022-05-05 DIAGNOSIS — L97.519 TYPE 2 DIABETES MELLITUS WITH RIGHT DIABETIC FOOT ULCER (CMD): Primary | ICD-10-CM

## 2022-05-05 DIAGNOSIS — I73.9 PVD (PERIPHERAL VASCULAR DISEASE) (CMD): ICD-10-CM

## 2022-05-05 DIAGNOSIS — Z89.422 STATUS POST AMPUTATION OF LESSER TOE, LEFT (CMD): ICD-10-CM

## 2022-05-05 LAB
GLUCOSE BLDC GLUCOMTR-MCNC: 114 MG/DL (ref 70–99)
GLUCOSE BLDC GLUCOMTR-MCNC: 126 MG/DL (ref 70–99)

## 2022-05-05 PROCEDURE — 82962 GLUCOSE BLOOD TEST: CPT

## 2022-05-05 PROCEDURE — 10004281 HB COUNTER-STAFF TIME PER 15 MIN

## 2022-05-05 PROCEDURE — 11042 DBRDMT SUBQ TIS 1ST 20SQCM/<: CPT

## 2022-05-05 PROCEDURE — G0277 HBOT, FULL BODY CHAMBER, 30M: HCPCS

## 2022-05-05 ASSESSMENT — PAIN SCALES - WONG BAKER: WONGBAKER_NUMERICALRESPONSE: 0

## 2022-05-06 ENCOUNTER — HOSPITAL ENCOUNTER (OUTPATIENT)
Dept: HYPERBARIC MEDICINE | Age: 67
Discharge: STILL ADMITTED OR ADMINISTRATIVE | End: 2022-05-06

## 2022-05-06 DIAGNOSIS — L97.519 TYPE 2 DIABETES MELLITUS WITH RIGHT DIABETIC FOOT ULCER (CMD): Primary | ICD-10-CM

## 2022-05-06 DIAGNOSIS — E11.621 TYPE 2 DIABETES MELLITUS WITH RIGHT DIABETIC FOOT ULCER (CMD): Primary | ICD-10-CM

## 2022-05-06 LAB
GLUCOSE BLDC GLUCOMTR-MCNC: 102 MG/DL (ref 70–99)
GLUCOSE BLDC GLUCOMTR-MCNC: 90 MG/DL (ref 70–99)

## 2022-05-06 PROCEDURE — 82962 GLUCOSE BLOOD TEST: CPT

## 2022-05-06 PROCEDURE — G0277 HBOT, FULL BODY CHAMBER, 30M: HCPCS

## 2022-05-06 PROCEDURE — 10004281 HB COUNTER-STAFF TIME PER 15 MIN

## 2022-05-09 ENCOUNTER — HOSPITAL ENCOUNTER (OUTPATIENT)
Dept: HYPERBARIC MEDICINE | Age: 67
Discharge: STILL ADMITTED OR ADMINISTRATIVE | End: 2022-05-09

## 2022-05-09 DIAGNOSIS — L97.519 TYPE 2 DIABETES MELLITUS WITH RIGHT DIABETIC FOOT ULCER (CMD): Primary | ICD-10-CM

## 2022-05-09 DIAGNOSIS — E11.621 TYPE 2 DIABETES MELLITUS WITH RIGHT DIABETIC FOOT ULCER (CMD): Primary | ICD-10-CM

## 2022-05-09 LAB
GLUCOSE BLDC GLUCOMTR-MCNC: 129 MG/DL (ref 70–99)
GLUCOSE BLDC GLUCOMTR-MCNC: 92 MG/DL (ref 70–99)

## 2022-05-09 PROCEDURE — G0277 HBOT, FULL BODY CHAMBER, 30M: HCPCS

## 2022-05-09 PROCEDURE — 10004281 HB COUNTER-STAFF TIME PER 15 MIN

## 2022-05-09 PROCEDURE — 82962 GLUCOSE BLOOD TEST: CPT

## 2022-05-10 ENCOUNTER — HOSPITAL ENCOUNTER (OUTPATIENT)
Dept: HYPERBARIC MEDICINE | Age: 67
Discharge: STILL ADMITTED OR ADMINISTRATIVE | End: 2022-05-10

## 2022-05-10 DIAGNOSIS — E11.621 TYPE 2 DIABETES MELLITUS WITH RIGHT DIABETIC FOOT ULCER (CMD): Primary | ICD-10-CM

## 2022-05-10 DIAGNOSIS — L97.519 TYPE 2 DIABETES MELLITUS WITH RIGHT DIABETIC FOOT ULCER (CMD): Primary | ICD-10-CM

## 2022-05-10 LAB
GLUCOSE BLDC GLUCOMTR-MCNC: 144 MG/DL (ref 70–99)
GLUCOSE BLDC GLUCOMTR-MCNC: 154 MG/DL (ref 70–99)

## 2022-05-10 PROCEDURE — 82962 GLUCOSE BLOOD TEST: CPT

## 2022-05-10 PROCEDURE — 10004281 HB COUNTER-STAFF TIME PER 15 MIN

## 2022-05-10 PROCEDURE — G0277 HBOT, FULL BODY CHAMBER, 30M: HCPCS

## 2022-05-11 ENCOUNTER — HOSPITAL ENCOUNTER (OUTPATIENT)
Dept: HYPERBARIC MEDICINE | Age: 67
Discharge: STILL ADMITTED OR ADMINISTRATIVE | End: 2022-05-11

## 2022-05-11 DIAGNOSIS — E11.621 TYPE 2 DIABETES MELLITUS WITH RIGHT DIABETIC FOOT ULCER (CMD): Primary | ICD-10-CM

## 2022-05-11 DIAGNOSIS — L97.519 TYPE 2 DIABETES MELLITUS WITH RIGHT DIABETIC FOOT ULCER (CMD): Primary | ICD-10-CM

## 2022-05-11 LAB
GLUCOSE BLDC GLUCOMTR-MCNC: 155 MG/DL (ref 70–99)
GLUCOSE BLDC GLUCOMTR-MCNC: 166 MG/DL (ref 70–99)

## 2022-05-11 PROCEDURE — 10004281 HB COUNTER-STAFF TIME PER 15 MIN

## 2022-05-11 PROCEDURE — G0277 HBOT, FULL BODY CHAMBER, 30M: HCPCS

## 2022-05-11 PROCEDURE — 82962 GLUCOSE BLOOD TEST: CPT

## 2022-05-12 ENCOUNTER — HOSPITAL ENCOUNTER (OUTPATIENT)
Dept: HYPERBARIC MEDICINE | Age: 67
Discharge: STILL ADMITTED OR ADMINISTRATIVE | End: 2022-05-12

## 2022-05-12 DIAGNOSIS — L97.519 TYPE 2 DIABETES MELLITUS WITH RIGHT DIABETIC FOOT ULCER (CMD): Primary | ICD-10-CM

## 2022-05-12 DIAGNOSIS — E11.621 TYPE 2 DIABETES MELLITUS WITH RIGHT DIABETIC FOOT ULCER (CMD): Primary | ICD-10-CM

## 2022-05-12 LAB
GLUCOSE BLDC GLUCOMTR-MCNC: 112 MG/DL (ref 70–99)
GLUCOSE BLDC GLUCOMTR-MCNC: 137 MG/DL (ref 70–99)

## 2022-05-12 PROCEDURE — 10004281 HB COUNTER-STAFF TIME PER 15 MIN

## 2022-05-12 PROCEDURE — 82962 GLUCOSE BLOOD TEST: CPT

## 2022-05-12 PROCEDURE — G0277 HBOT, FULL BODY CHAMBER, 30M: HCPCS

## 2022-05-26 ENCOUNTER — HOSPITAL ENCOUNTER (OUTPATIENT)
Dept: GENERAL RADIOLOGY | Age: 67
Discharge: HOME | End: 2022-05-26

## 2022-05-26 ENCOUNTER — HOSPITAL ENCOUNTER (OUTPATIENT)
Dept: WOUND CARE | Age: 67
Discharge: STILL ADMITTED OR ADMINISTRATIVE | End: 2022-05-26
Attending: PODIATRIST

## 2022-05-26 VITALS — TEMPERATURE: 96.8 F

## 2022-05-26 DIAGNOSIS — Z89.422 STATUS POST AMPUTATION OF LESSER TOE, LEFT (CMD): ICD-10-CM

## 2022-05-26 DIAGNOSIS — M86.071 ACUTE HEMATOGENOUS OSTEOMYELITIS OF RIGHT FOOT (CMD): Primary | ICD-10-CM

## 2022-05-26 DIAGNOSIS — S91.302D OPEN WOUND OF LEFT FOOT, SUBSEQUENT ENCOUNTER: ICD-10-CM

## 2022-05-26 DIAGNOSIS — Z89.432 S/P TRANSMETATARSAL AMPUTATION OF FOOT, LEFT (CMD): ICD-10-CM

## 2022-05-26 DIAGNOSIS — E11.621 TYPE 2 DIABETES MELLITUS WITH RIGHT DIABETIC FOOT ULCER (CMD): ICD-10-CM

## 2022-05-26 DIAGNOSIS — L97.519 TYPE 2 DIABETES MELLITUS WITH RIGHT DIABETIC FOOT ULCER (CMD): ICD-10-CM

## 2022-05-26 DIAGNOSIS — I73.9 PVD (PERIPHERAL VASCULAR DISEASE) (CMD): ICD-10-CM

## 2022-05-26 DIAGNOSIS — M86.071 ACUTE HEMATOGENOUS OSTEOMYELITIS OF RIGHT FOOT (CMD): ICD-10-CM

## 2022-05-26 PROCEDURE — 73630 X-RAY EXAM OF FOOT: CPT

## 2022-05-26 PROCEDURE — 99213 OFFICE O/P EST LOW 20 MIN: CPT

## 2022-05-26 ASSESSMENT — PAIN SCALES - GENERAL: PAINLEVEL_OUTOF10: 0

## 2022-05-26 ASSESSMENT — PAIN SCALES - WONG BAKER: WONGBAKER_NUMERICALRESPONSE: 0

## 2022-05-27 ENCOUNTER — HOSPITAL ENCOUNTER (EMERGENCY)
Age: 67
Discharge: LEFT BEFORE DISCHARGE | End: 2022-05-27
Attending: EMERGENCY MEDICINE

## 2022-05-27 ENCOUNTER — APPOINTMENT (OUTPATIENT)
Dept: GENERAL RADIOLOGY | Age: 67
End: 2022-05-27
Attending: EMERGENCY MEDICINE

## 2022-05-27 VITALS
TEMPERATURE: 98.6 F | DIASTOLIC BLOOD PRESSURE: 70 MMHG | HEART RATE: 82 BPM | SYSTOLIC BLOOD PRESSURE: 168 MMHG | HEIGHT: 72 IN | BODY MASS INDEX: 42.66 KG/M2 | RESPIRATION RATE: 20 BRPM | OXYGEN SATURATION: 100 % | WEIGHT: 315 LBS

## 2022-05-27 DIAGNOSIS — S91.109A OPEN WOUND OF TOE, INITIAL ENCOUNTER: Primary | ICD-10-CM

## 2022-05-27 LAB
ANION GAP SERPL CALC-SCNC: 13 MMOL/L (ref 7–19)
BASOPHILS # BLD: 0.1 K/MCL (ref 0–0.3)
BASOPHILS NFR BLD: 1 %
BUN SERPL-MCNC: 49 MG/DL (ref 6–20)
BUN/CREAT SERPL: 25 (ref 7–25)
CALCIUM SERPL-MCNC: 9.3 MG/DL (ref 8.4–10.2)
CHLORIDE SERPL-SCNC: 102 MMOL/L (ref 97–110)
CO2 SERPL-SCNC: 28 MMOL/L (ref 21–32)
CREAT SERPL-MCNC: 1.94 MG/DL (ref 0.67–1.17)
DEPRECATED RDW RBC: 45 FL (ref 39–50)
EOSINOPHIL # BLD: 0.3 K/MCL (ref 0–0.5)
EOSINOPHIL NFR BLD: 3 %
ERYTHROCYTE [DISTWIDTH] IN BLOOD: 13.9 % (ref 11–15)
FASTING DURATION TIME PATIENT: ABNORMAL H
GFR SERPLBLD BASED ON 1.73 SQ M-ARVRAT: 37 ML/MIN
GLUCOSE SERPL-MCNC: 204 MG/DL (ref 70–99)
HCT VFR BLD CALC: 41.1 % (ref 39–51)
HGB BLD-MCNC: 13 G/DL (ref 13–17)
IMM GRANULOCYTES # BLD AUTO: 0 K/MCL (ref 0–0.2)
IMM GRANULOCYTES # BLD: 0 %
LYMPHOCYTES # BLD: 2.4 K/MCL (ref 1–4)
LYMPHOCYTES NFR BLD: 28 %
MCH RBC QN AUTO: 28.2 PG (ref 26–34)
MCHC RBC AUTO-ENTMCNC: 31.6 G/DL (ref 32–36.5)
MCV RBC AUTO: 89.2 FL (ref 78–100)
MONOCYTES # BLD: 0.7 K/MCL (ref 0.3–0.9)
MONOCYTES NFR BLD: 9 %
NEUTROPHILS # BLD: 5.1 K/MCL (ref 1.8–7.7)
NEUTROPHILS NFR BLD: 59 %
NRBC BLD MANUAL-RTO: 0 /100 WBC
PLATELET # BLD AUTO: 263 K/MCL (ref 140–450)
POTASSIUM SERPL-SCNC: 4.1 MMOL/L (ref 3.4–5.1)
RBC # BLD: 4.61 MIL/MCL (ref 4.5–5.9)
SODIUM SERPL-SCNC: 139 MMOL/L (ref 135–145)
WBC # BLD: 8.5 K/MCL (ref 4.2–11)

## 2022-05-27 PROCEDURE — 36415 COLL VENOUS BLD VENIPUNCTURE: CPT

## 2022-05-27 PROCEDURE — 85025 COMPLETE CBC W/AUTO DIFF WBC: CPT | Performed by: EMERGENCY MEDICINE

## 2022-05-27 PROCEDURE — 80048 BASIC METABOLIC PNL TOTAL CA: CPT | Performed by: EMERGENCY MEDICINE

## 2022-05-27 PROCEDURE — 73660 X-RAY EXAM OF TOE(S): CPT

## 2022-05-27 PROCEDURE — 99283 EMERGENCY DEPT VISIT LOW MDM: CPT

## 2022-05-27 RX ORDER — LINEZOLID 600 MG/1
600 TABLET, FILM COATED ORAL 2 TIMES DAILY
Qty: 20 TABLET | Refills: 0 | Status: SHIPPED | OUTPATIENT
Start: 2022-05-27 | End: 2022-06-06

## 2022-05-27 RX ORDER — AMOXICILLIN AND CLAVULANATE POTASSIUM 875; 125 MG/1; MG/1
1 TABLET, FILM COATED ORAL EVERY 12 HOURS
Qty: 20 TABLET | Refills: 0 | Status: SHIPPED | OUTPATIENT
Start: 2022-05-27 | End: 2022-06-06

## 2022-05-27 ASSESSMENT — ENCOUNTER SYMPTOMS
FEVER: 0
BACK PAIN: 0
SHORTNESS OF BREATH: 0
CHEST TIGHTNESS: 0
ABDOMINAL PAIN: 0
DIARRHEA: 0
CONFUSION: 0
DIZZINESS: 0
COUGH: 0
VOMITING: 0
NUMBNESS: 0
EYE PAIN: 0
WEAKNESS: 0
WOUND: 0
SINUS PAIN: 0

## 2022-06-09 ENCOUNTER — HOSPITAL ENCOUNTER (OUTPATIENT)
Dept: WOUND CARE | Age: 67
Discharge: STILL ADMITTED OR ADMINISTRATIVE | End: 2022-06-09
Attending: PODIATRIST

## 2022-06-09 VITALS — TEMPERATURE: 96 F

## 2022-06-09 DIAGNOSIS — Z89.422 STATUS POST AMPUTATION OF LESSER TOE, LEFT (CMD): ICD-10-CM

## 2022-06-09 DIAGNOSIS — E11.621 TYPE 2 DIABETES MELLITUS WITH RIGHT DIABETIC FOOT ULCER (CMD): ICD-10-CM

## 2022-06-09 DIAGNOSIS — S91.302D OPEN WOUND OF LEFT FOOT, SUBSEQUENT ENCOUNTER: ICD-10-CM

## 2022-06-09 DIAGNOSIS — M86.671 OTHER CHRONIC OSTEOMYELITIS OF RIGHT FOOT (CMD): Primary | ICD-10-CM

## 2022-06-09 DIAGNOSIS — Z89.432 S/P TRANSMETATARSAL AMPUTATION OF FOOT, LEFT (CMD): ICD-10-CM

## 2022-06-09 DIAGNOSIS — I73.9 PVD (PERIPHERAL VASCULAR DISEASE) (CMD): ICD-10-CM

## 2022-06-09 DIAGNOSIS — M86.071 ACUTE HEMATOGENOUS OSTEOMYELITIS OF RIGHT FOOT (CMD): ICD-10-CM

## 2022-06-09 DIAGNOSIS — I27.0 PVOD (PULMONARY VENO-OCCLUSIVE DISEASE) (CMD): ICD-10-CM

## 2022-06-09 DIAGNOSIS — L97.519 TYPE 2 DIABETES MELLITUS WITH RIGHT DIABETIC FOOT ULCER (CMD): ICD-10-CM

## 2022-06-09 PROCEDURE — 11042 DBRDMT SUBQ TIS 1ST 20SQCM/<: CPT

## 2022-06-09 ASSESSMENT — PAIN SCALES - GENERAL: PAINLEVEL_OUTOF10: 0

## 2022-06-23 ENCOUNTER — HOSPITAL ENCOUNTER (OUTPATIENT)
Dept: WOUND CARE | Age: 67
Discharge: STILL ADMITTED OR ADMINISTRATIVE | End: 2022-06-23
Attending: PODIATRIST

## 2022-06-23 VITALS — TEMPERATURE: 96.3 F

## 2022-06-23 DIAGNOSIS — I73.9 PVD (PERIPHERAL VASCULAR DISEASE) (CMD): ICD-10-CM

## 2022-06-23 DIAGNOSIS — L97.412 MIDFOOT ULCERATION, RIGHT, WITH FAT LAYER EXPOSED (CMD): ICD-10-CM

## 2022-06-23 DIAGNOSIS — M86.071 ACUTE HEMATOGENOUS OSTEOMYELITIS OF RIGHT FOOT (CMD): ICD-10-CM

## 2022-06-23 DIAGNOSIS — S91.302D OPEN WOUND OF LEFT FOOT, SUBSEQUENT ENCOUNTER: ICD-10-CM

## 2022-06-23 DIAGNOSIS — E11.621 TYPE 2 DIABETES MELLITUS WITH RIGHT DIABETIC FOOT ULCER (CMD): Primary | ICD-10-CM

## 2022-06-23 DIAGNOSIS — L97.519 TYPE 2 DIABETES MELLITUS WITH RIGHT DIABETIC FOOT ULCER (CMD): Primary | ICD-10-CM

## 2022-06-23 DIAGNOSIS — Z89.422 STATUS POST AMPUTATION OF LESSER TOE, LEFT (CMD): ICD-10-CM

## 2022-06-23 PROCEDURE — 11042 DBRDMT SUBQ TIS 1ST 20SQCM/<: CPT

## 2022-06-23 ASSESSMENT — PAIN SCALES - GENERAL: PAINLEVEL_OUTOF10: 0

## 2022-06-23 ASSESSMENT — PAIN SCALES - WONG BAKER: WONGBAKER_NUMERICALRESPONSE: 0

## 2022-07-07 ENCOUNTER — HOSPITAL ENCOUNTER (OUTPATIENT)
Dept: WOUND CARE | Age: 67
Discharge: STILL ADMITTED OR ADMINISTRATIVE | End: 2022-07-07
Attending: PODIATRIST

## 2022-07-07 ENCOUNTER — HOSPITAL ENCOUNTER (OUTPATIENT)
Dept: GENERAL RADIOLOGY | Age: 67
Discharge: HOME | End: 2022-07-07

## 2022-07-07 VITALS — TEMPERATURE: 97.5 F

## 2022-07-07 DIAGNOSIS — E11.621 TYPE 2 DIABETES MELLITUS WITH RIGHT DIABETIC FOOT ULCER (CMD): ICD-10-CM

## 2022-07-07 DIAGNOSIS — M86.671 OTHER CHRONIC OSTEOMYELITIS OF RIGHT FOOT (CMD): ICD-10-CM

## 2022-07-07 DIAGNOSIS — Z89.422 STATUS POST AMPUTATION OF LESSER TOE, LEFT (CMD): ICD-10-CM

## 2022-07-07 DIAGNOSIS — S91.301D OPEN WOUND OF RIGHT FOOT, SUBSEQUENT ENCOUNTER: ICD-10-CM

## 2022-07-07 DIAGNOSIS — L97.519 TYPE 2 DIABETES MELLITUS WITH RIGHT DIABETIC FOOT ULCER (CMD): ICD-10-CM

## 2022-07-07 DIAGNOSIS — S91.302D OPEN WOUND OF LEFT FOOT, SUBSEQUENT ENCOUNTER: Primary | ICD-10-CM

## 2022-07-07 PROCEDURE — 87077 CULTURE AEROBIC IDENTIFY: CPT | Performed by: PODIATRIST

## 2022-07-07 PROCEDURE — 73630 X-RAY EXAM OF FOOT: CPT

## 2022-07-07 PROCEDURE — 11042 DBRDMT SUBQ TIS 1ST 20SQCM/<: CPT

## 2022-07-07 RX ORDER — LINEZOLID 600 MG/1
600 TABLET, FILM COATED ORAL 2 TIMES DAILY
Qty: 20 TABLET | Refills: 0 | Status: SHIPPED | OUTPATIENT
Start: 2022-07-07 | End: 2022-07-17

## 2022-07-07 ASSESSMENT — PAIN SCALES - WONG BAKER: WONGBAKER_NUMERICALRESPONSE: 0

## 2022-07-07 ASSESSMENT — PAIN SCALES - GENERAL: PAINLEVEL_OUTOF10: 0

## 2022-07-14 ENCOUNTER — HOSPITAL ENCOUNTER (OUTPATIENT)
Dept: WOUND CARE | Age: 67
Discharge: STILL ADMITTED OR ADMINISTRATIVE | End: 2022-07-14
Attending: PODIATRIST

## 2022-07-14 DIAGNOSIS — L97.519 TYPE 2 DIABETES MELLITUS WITH RIGHT DIABETIC FOOT ULCER (CMD): ICD-10-CM

## 2022-07-14 DIAGNOSIS — S91.302D OPEN WOUND OF LEFT FOOT, SUBSEQUENT ENCOUNTER: Primary | ICD-10-CM

## 2022-07-14 DIAGNOSIS — M86.171 OTHER ACUTE OSTEOMYELITIS OF RIGHT FOOT (CMD): ICD-10-CM

## 2022-07-14 DIAGNOSIS — E11.621 TYPE 2 DIABETES MELLITUS WITH RIGHT DIABETIC FOOT ULCER (CMD): ICD-10-CM

## 2022-07-14 DIAGNOSIS — Z89.422 STATUS POST AMPUTATION OF LESSER TOE, LEFT (CMD): ICD-10-CM

## 2022-07-14 PROCEDURE — 11042 DBRDMT SUBQ TIS 1ST 20SQCM/<: CPT

## 2022-07-14 ASSESSMENT — PAIN SCALES - GENERAL: PAINLEVEL_OUTOF10: 0

## 2022-07-15 ENCOUNTER — HOSPITAL ENCOUNTER (OUTPATIENT)
Dept: MRI IMAGING | Age: 67
Discharge: HOME | End: 2022-07-15
Attending: PODIATRIST

## 2022-07-15 DIAGNOSIS — M86.671 OTHER CHRONIC OSTEOMYELITIS OF RIGHT FOOT (CMD): ICD-10-CM

## 2022-07-15 PROCEDURE — G1004 CDSM NDSC: HCPCS

## 2022-07-15 PROCEDURE — 73718 MRI LOWER EXTREMITY W/O DYE: CPT

## 2022-07-15 RX ORDER — SPIRONOLACTONE 25 MG/1
TABLET ORAL
Qty: 45 TABLET | OUTPATIENT
Start: 2022-07-15

## 2022-07-15 RX ORDER — ALLOPURINOL 100 MG/1
100 TABLET ORAL DAILY
Qty: 30 TABLET | Refills: 3 | OUTPATIENT
Start: 2022-07-15

## 2022-07-18 ENCOUNTER — HOSPITAL ENCOUNTER (EMERGENCY)
Age: 67
Discharge: HOME | End: 2022-07-18
Attending: EMERGENCY MEDICINE

## 2022-07-18 VITALS
TEMPERATURE: 98 F | OXYGEN SATURATION: 98 % | SYSTOLIC BLOOD PRESSURE: 135 MMHG | DIASTOLIC BLOOD PRESSURE: 62 MMHG | HEART RATE: 80 BPM | RESPIRATION RATE: 16 BRPM

## 2022-07-18 DIAGNOSIS — R79.89 ABNORMAL BLOOD CREATININE LEVEL: Primary | ICD-10-CM

## 2022-07-18 LAB
ALBUMIN SERPL-MCNC: 3 G/DL (ref 3.6–5.1)
ALBUMIN/GLOB SERPL: 0.7 {RATIO} (ref 1–2.4)
ALP SERPL-CCNC: 104 UNITS/L (ref 45–117)
ALT SERPL-CCNC: 20 UNITS/L
ANION GAP SERPL CALC-SCNC: 8 MMOL/L (ref 7–19)
AST SERPL-CCNC: 15 UNITS/L
BASOPHILS # BLD: 0.1 K/MCL (ref 0–0.3)
BASOPHILS NFR BLD: 1 %
BILIRUB SERPL-MCNC: 0.2 MG/DL (ref 0.2–1)
BUN SERPL-MCNC: 51 MG/DL (ref 6–20)
BUN/CREAT SERPL: 30 (ref 7–25)
CALCIUM SERPL-MCNC: 9 MG/DL (ref 8.4–10.2)
CHLORIDE SERPL-SCNC: 104 MMOL/L (ref 97–110)
CO2 SERPL-SCNC: 29 MMOL/L (ref 21–32)
CREAT SERPL-MCNC: 1.68 MG/DL (ref 0.67–1.17)
DEPRECATED RDW RBC: 43.2 FL (ref 39–50)
EOSINOPHIL # BLD: 0.3 K/MCL (ref 0–0.5)
EOSINOPHIL NFR BLD: 4 %
ERYTHROCYTE [DISTWIDTH] IN BLOOD: 13.6 % (ref 11–15)
FASTING DURATION TIME PATIENT: ABNORMAL H
GFR SERPLBLD BASED ON 1.73 SQ M-ARVRAT: 44 ML/MIN
GLOBULIN SER-MCNC: 4.6 G/DL (ref 2–4)
GLUCOSE SERPL-MCNC: 229 MG/DL (ref 70–99)
HCT VFR BLD CALC: 37.8 % (ref 39–51)
HGB BLD-MCNC: 12.1 G/DL (ref 13–17)
IMM GRANULOCYTES # BLD AUTO: 0 K/MCL (ref 0–0.2)
IMM GRANULOCYTES # BLD: 0 %
LYMPHOCYTES # BLD: 2.3 K/MCL (ref 1–4)
LYMPHOCYTES NFR BLD: 27 %
MCH RBC QN AUTO: 28.1 PG (ref 26–34)
MCHC RBC AUTO-ENTMCNC: 32 G/DL (ref 32–36.5)
MCV RBC AUTO: 87.9 FL (ref 78–100)
MONOCYTES # BLD: 0.7 K/MCL (ref 0.3–0.9)
MONOCYTES NFR BLD: 9 %
NEUTROPHILS # BLD: 5 K/MCL (ref 1.8–7.7)
NEUTROPHILS NFR BLD: 59 %
NRBC BLD MANUAL-RTO: 0 /100 WBC
PLATELET # BLD AUTO: 181 K/MCL (ref 140–450)
POTASSIUM SERPL-SCNC: 4.7 MMOL/L (ref 3.4–5.1)
PROT SERPL-MCNC: 7.6 G/DL (ref 6.4–8.2)
RBC # BLD: 4.3 MIL/MCL (ref 4.5–5.9)
SODIUM SERPL-SCNC: 136 MMOL/L (ref 135–145)
WBC # BLD: 8.5 K/MCL (ref 4.2–11)

## 2022-07-18 PROCEDURE — 80053 COMPREHEN METABOLIC PANEL: CPT | Performed by: EMERGENCY MEDICINE

## 2022-07-18 PROCEDURE — 99283 EMERGENCY DEPT VISIT LOW MDM: CPT

## 2022-07-18 PROCEDURE — 36415 COLL VENOUS BLD VENIPUNCTURE: CPT

## 2022-07-18 PROCEDURE — 85025 COMPLETE CBC W/AUTO DIFF WBC: CPT | Performed by: EMERGENCY MEDICINE

## 2022-07-18 ASSESSMENT — ENCOUNTER SYMPTOMS
SHORTNESS OF BREATH: 0
ADENOPATHY: 0
NAUSEA: 0
SORE THROAT: 0
DIZZINESS: 0
DIARRHEA: 0
LIGHT-HEADEDNESS: 0
COUGH: 0
CHILLS: 0
FEVER: 0
ABDOMINAL PAIN: 0
VOMITING: 0
BACK PAIN: 0

## 2022-07-18 ASSESSMENT — PAIN SCALES - GENERAL: PAINLEVEL_OUTOF10: 0

## 2022-07-19 LAB
RAINBOW EXTRA TUBES HOLD SPECIMEN: NORMAL

## 2022-07-21 ENCOUNTER — HOSPITAL ENCOUNTER (OUTPATIENT)
Dept: WOUND CARE | Age: 67
Discharge: STILL ADMITTED OR ADMINISTRATIVE | End: 2022-07-21
Attending: PODIATRIST

## 2022-07-21 VITALS — TEMPERATURE: 96.6 F

## 2022-07-21 DIAGNOSIS — Z89.422 STATUS POST AMPUTATION OF LESSER TOE, LEFT (CMD): Primary | ICD-10-CM

## 2022-07-21 DIAGNOSIS — S91.302D OPEN WOUND OF LEFT FOOT, SUBSEQUENT ENCOUNTER: ICD-10-CM

## 2022-07-21 DIAGNOSIS — E11.621 TYPE 2 DIABETES MELLITUS WITH RIGHT DIABETIC FOOT ULCER (CMD): ICD-10-CM

## 2022-07-21 DIAGNOSIS — M86.171 OTHER ACUTE OSTEOMYELITIS OF RIGHT FOOT (CMD): ICD-10-CM

## 2022-07-21 DIAGNOSIS — L97.519 TYPE 2 DIABETES MELLITUS WITH RIGHT DIABETIC FOOT ULCER (CMD): ICD-10-CM

## 2022-07-21 DIAGNOSIS — I73.9 PVD (PERIPHERAL VASCULAR DISEASE) (CMD): ICD-10-CM

## 2022-07-21 DIAGNOSIS — S91.301D OPEN WOUND OF RIGHT FOOT, SUBSEQUENT ENCOUNTER: ICD-10-CM

## 2022-07-21 DIAGNOSIS — L97.412 MIDFOOT ULCERATION, RIGHT, WITH FAT LAYER EXPOSED (CMD): ICD-10-CM

## 2022-07-21 PROCEDURE — 11042 DBRDMT SUBQ TIS 1ST 20SQCM/<: CPT

## 2022-07-21 ASSESSMENT — PAIN SCALES - GENERAL: PAINLEVEL_OUTOF10: 0

## 2022-07-21 ASSESSMENT — PAIN SCALES - WONG BAKER: WONGBAKER_NUMERICALRESPONSE: 0

## 2022-08-04 ENCOUNTER — HOSPITAL ENCOUNTER (OUTPATIENT)
Dept: WOUND CARE | Age: 67
Discharge: STILL ADMITTED OR ADMINISTRATIVE | End: 2022-08-04
Attending: PODIATRIST

## 2022-08-04 DIAGNOSIS — Z89.429 STATUS POST AMPUTATION OF TOE (CMD): ICD-10-CM

## 2022-08-04 DIAGNOSIS — M86.171 OTHER ACUTE OSTEOMYELITIS OF RIGHT FOOT (CMD): ICD-10-CM

## 2022-08-04 DIAGNOSIS — S91.302D OPEN WOUND OF LEFT FOOT, SUBSEQUENT ENCOUNTER: ICD-10-CM

## 2022-08-04 DIAGNOSIS — Z89.422 STATUS POST AMPUTATION OF LESSER TOE, LEFT (CMD): ICD-10-CM

## 2022-08-04 DIAGNOSIS — L97.519 TYPE 2 DIABETES MELLITUS WITH RIGHT DIABETIC FOOT ULCER (CMD): Primary | ICD-10-CM

## 2022-08-04 DIAGNOSIS — R60.0 LEG EDEMA: ICD-10-CM

## 2022-08-04 DIAGNOSIS — I73.9 PVD (PERIPHERAL VASCULAR DISEASE) (CMD): ICD-10-CM

## 2022-08-04 DIAGNOSIS — E11.621 TYPE 2 DIABETES MELLITUS WITH RIGHT DIABETIC FOOT ULCER (CMD): Primary | ICD-10-CM

## 2022-08-04 PROCEDURE — 11042 DBRDMT SUBQ TIS 1ST 20SQCM/<: CPT

## 2022-08-04 ASSESSMENT — PAIN SCALES - GENERAL: PAINLEVEL_OUTOF10: 0

## 2022-08-04 ASSESSMENT — PAIN SCALES - WONG BAKER: WONGBAKER_NUMERICALRESPONSE: 0

## 2022-08-18 ENCOUNTER — HOSPITAL ENCOUNTER (OUTPATIENT)
Dept: WOUND CARE | Age: 67
Discharge: STILL ADMITTED OR ADMINISTRATIVE | End: 2022-08-18
Attending: PODIATRIST

## 2022-08-18 ENCOUNTER — APPOINTMENT (OUTPATIENT)
Dept: ULTRASOUND IMAGING | Age: 67
DRG: 291 | End: 2022-08-18
Attending: EMERGENCY MEDICINE

## 2022-08-18 ENCOUNTER — APPOINTMENT (OUTPATIENT)
Dept: GENERAL RADIOLOGY | Age: 67
DRG: 291 | End: 2022-08-18
Attending: EMERGENCY MEDICINE

## 2022-08-18 ENCOUNTER — APPOINTMENT (OUTPATIENT)
Dept: NUCLEAR MEDICINE | Age: 67
DRG: 291 | End: 2022-08-18
Attending: EMERGENCY MEDICINE

## 2022-08-18 ENCOUNTER — HOSPITAL ENCOUNTER (INPATIENT)
Age: 67
LOS: 4 days | Discharge: HOME | DRG: 291 | End: 2022-08-22
Attending: EMERGENCY MEDICINE | Admitting: INTERNAL MEDICINE

## 2022-08-18 VITALS — TEMPERATURE: 96.8 F

## 2022-08-18 DIAGNOSIS — L97.519 TYPE 2 DIABETES MELLITUS WITH RIGHT DIABETIC FOOT ULCER (CMD): ICD-10-CM

## 2022-08-18 DIAGNOSIS — Z89.422 STATUS POST AMPUTATION OF LESSER TOE, LEFT (CMD): Primary | ICD-10-CM

## 2022-08-18 DIAGNOSIS — E11.621 TYPE 2 DIABETES MELLITUS WITH RIGHT DIABETIC FOOT ULCER (CMD): ICD-10-CM

## 2022-08-18 DIAGNOSIS — R60.0 LEG EDEMA: ICD-10-CM

## 2022-08-18 DIAGNOSIS — M86.171 OTHER ACUTE OSTEOMYELITIS OF RIGHT FOOT (CMD): ICD-10-CM

## 2022-08-18 DIAGNOSIS — I73.9 PVD (PERIPHERAL VASCULAR DISEASE) (CMD): ICD-10-CM

## 2022-08-18 DIAGNOSIS — I50.9 ACUTE ON CHRONIC CONGESTIVE HEART FAILURE, UNSPECIFIED HEART FAILURE TYPE (CMD): Primary | ICD-10-CM

## 2022-08-18 DIAGNOSIS — S91.302D OPEN WOUND OF LEFT FOOT, SUBSEQUENT ENCOUNTER: ICD-10-CM

## 2022-08-18 PROBLEM — Z86.14 HISTORY OF MRSA INFECTION: Status: ACTIVE | Noted: 2022-08-18

## 2022-08-18 PROBLEM — D64.9 ANEMIA: Status: ACTIVE | Noted: 2021-07-01

## 2022-08-18 PROBLEM — I10 ESSENTIAL HYPERTENSION: Status: ACTIVE | Noted: 2021-07-01

## 2022-08-18 PROBLEM — E66.9 OBESITY: Status: ACTIVE | Noted: 2017-02-21

## 2022-08-18 PROBLEM — E78.5 HYPERLIPIDEMIA: Status: ACTIVE | Noted: 2021-07-01

## 2022-08-18 PROBLEM — R09.02 HYPOXIA: Status: ACTIVE | Noted: 2022-08-18

## 2022-08-18 LAB
ALBUMIN SERPL-MCNC: 3 G/DL (ref 3.6–5.1)
ALBUMIN/GLOB SERPL: 0.6 {RATIO} (ref 1–2.4)
ALP SERPL-CCNC: 107 UNITS/L (ref 45–117)
ALT SERPL-CCNC: 21 UNITS/L
ANION GAP SERPL CALC-SCNC: 6 MMOL/L (ref 7–19)
APTT PPP: 28 SEC (ref 22–30)
AST SERPL-CCNC: 22 UNITS/L
BASE EXCESS / DEFICIT, ARTERIAL - RESPIRATORY: 8 MMOL/L (ref -2–3)
BASOPHILS # BLD: 0.1 K/MCL (ref 0–0.3)
BASOPHILS NFR BLD: 1 %
BDY SITE: ABNORMAL
BILIRUB SERPL-MCNC: 0.4 MG/DL (ref 0.2–1)
BODY TEMPERATURE: 37 DEGREES
BUN SERPL-MCNC: 54 MG/DL (ref 6–20)
BUN/CREAT SERPL: 26 (ref 7–25)
CALCIUM SERPL-MCNC: 8.8 MG/DL (ref 8.4–10.2)
CHLORIDE SERPL-SCNC: 101 MMOL/L (ref 97–110)
CO2 SERPL-SCNC: 35 MMOL/L (ref 21–32)
COHGB MFR BLDV: 0.8 %
CREAT SERPL-MCNC: 2.1 MG/DL (ref 0.67–1.17)
D DIMER PPP FEU-MCNC: 1.09 MG/L (FEU)
DEPRECATED RDW RBC: 45.9 FL (ref 39–50)
EOSINOPHIL # BLD: 0.5 K/MCL (ref 0–0.5)
EOSINOPHIL NFR BLD: 5 %
ERYTHROCYTE [DISTWIDTH] IN BLOOD: 14.3 % (ref 11–15)
FASTING DURATION TIME PATIENT: ABNORMAL H
FIO2 ON VENT: 21 %
GFR SERPLBLD BASED ON 1.73 SQ M-ARVRAT: 34 ML/MIN
GLOBULIN SER-MCNC: 4.7 G/DL (ref 2–4)
GLUCOSE BLDC GLUCOMTR-MCNC: 170 MG/DL (ref 70–99)
GLUCOSE SERPL-MCNC: 146 MG/DL (ref 70–99)
HCO3 BLDA-SCNC: 34 MMOL/L (ref 22–28)
HCT VFR BLD CALC: 35 % (ref 39–51)
HCT VFR BLD CALC: 37.3 % (ref 39–51)
HGB BLD-MCNC: 11.6 G/DL (ref 13–17)
HGB BLD-MCNC: 11.8 G/DL (ref 13–17)
HOROWITZ INDEX BLDA+IHG-RTO: 514 MM[HG] (ref 300–500)
IMM GRANULOCYTES # BLD AUTO: 0 K/MCL (ref 0–0.2)
IMM GRANULOCYTES # BLD: 0 %
INR PPP: 1
LYMPHOCYTES # BLD: 2 K/MCL (ref 1–4)
LYMPHOCYTES NFR BLD: 22 %
MAGNESIUM SERPL-MCNC: 2.5 MG/DL (ref 1.7–2.4)
MCH RBC QN AUTO: 28.1 PG (ref 26–34)
MCHC RBC AUTO-ENTMCNC: 31.6 G/DL (ref 32–36.5)
MCV RBC AUTO: 88.8 FL (ref 78–100)
METHGB MFR BLDMV: 0.8 %
MONOCYTES # BLD: 0.8 K/MCL (ref 0.3–0.9)
MONOCYTES NFR BLD: 9 %
NEUTROPHILS # BLD: 5.8 K/MCL (ref 1.8–7.7)
NEUTROPHILS NFR BLD: 63 %
NRBC BLD MANUAL-RTO: 0 /100 WBC
NT-PROBNP SERPL-MCNC: 1338 PG/ML
OXYHGB MFR BLDA: 96.4 % (ref 94–98)
PCO2 BLDA: 51 MM HG (ref 35–48)
PH BLDA: 7.43 UNITS (ref 7.35–7.45)
PLATELET # BLD AUTO: 262 K/MCL (ref 140–450)
PO2 BLDA: 108 MM HG (ref 83–108)
POTASSIUM SERPL-SCNC: 3.8 MMOL/L (ref 3.4–5.1)
PROT SERPL-MCNC: 7.7 G/DL (ref 6.4–8.2)
PROTHROMBIN TIME: 10.9 SEC (ref 9.7–11.8)
RAINBOW EXTRA TUBES HOLD SPECIMEN: NORMAL
RBC # BLD: 4.2 MIL/MCL (ref 4.5–5.9)
SAO2 % BLDA: 16 % (ref 15–23)
SAO2 % BLDA: 98 % (ref 95–99)
SODIUM SERPL-SCNC: 138 MMOL/L (ref 135–145)
TROPONIN I SERPL DL<=0.01 NG/ML-MCNC: 272 NG/L
WBC # BLD: 9.1 K/MCL (ref 4.2–11)

## 2022-08-18 PROCEDURE — 71045 X-RAY EXAM CHEST 1 VIEW: CPT

## 2022-08-18 PROCEDURE — 96374 THER/PROPH/DIAG INJ IV PUSH: CPT

## 2022-08-18 PROCEDURE — 10006150 HB RX 343: Performed by: EMERGENCY MEDICINE

## 2022-08-18 PROCEDURE — 80053 COMPREHEN METABOLIC PANEL: CPT | Performed by: EMERGENCY MEDICINE

## 2022-08-18 PROCEDURE — 10004651 HB RX, NO CHARGE ITEM: Performed by: NURSE PRACTITIONER

## 2022-08-18 PROCEDURE — 93005 ELECTROCARDIOGRAM TRACING: CPT | Performed by: EMERGENCY MEDICINE

## 2022-08-18 PROCEDURE — G1004 CDSM NDSC: HCPCS

## 2022-08-18 PROCEDURE — 11042 DBRDMT SUBQ TIS 1ST 20SQCM/<: CPT

## 2022-08-18 PROCEDURE — 99285 EMERGENCY DEPT VISIT HI MDM: CPT

## 2022-08-18 PROCEDURE — 84484 ASSAY OF TROPONIN QUANT: CPT | Performed by: EMERGENCY MEDICINE

## 2022-08-18 PROCEDURE — 99223 1ST HOSP IP/OBS HIGH 75: CPT | Performed by: INTERNAL MEDICINE

## 2022-08-18 PROCEDURE — 85025 COMPLETE CBC W/AUTO DIFF WBC: CPT | Performed by: EMERGENCY MEDICINE

## 2022-08-18 PROCEDURE — 36600 WITHDRAWAL OF ARTERIAL BLOOD: CPT

## 2022-08-18 PROCEDURE — 13003289 HB OXYGEN THERAPY DAILY

## 2022-08-18 PROCEDURE — 10003585 HB ROOM CHARGE INTERMEDIATE CARE

## 2022-08-18 PROCEDURE — 78580 LUNG PERFUSION IMAGING: CPT

## 2022-08-18 PROCEDURE — 10002800 HB RX 250 W HCPCS: Performed by: EMERGENCY MEDICINE

## 2022-08-18 PROCEDURE — 83880 ASSAY OF NATRIURETIC PEPTIDE: CPT | Performed by: EMERGENCY MEDICINE

## 2022-08-18 PROCEDURE — 10002803 HB RX 637: Performed by: NURSE PRACTITIONER

## 2022-08-18 PROCEDURE — 85730 THROMBOPLASTIN TIME PARTIAL: CPT | Performed by: EMERGENCY MEDICINE

## 2022-08-18 PROCEDURE — A9540 TC99M MAA: HCPCS | Performed by: EMERGENCY MEDICINE

## 2022-08-18 PROCEDURE — 10002800 HB RX 250 W HCPCS: Performed by: NURSE PRACTITIONER

## 2022-08-18 PROCEDURE — 10004281 HB COUNTER-STAFF TIME PER 15 MIN

## 2022-08-18 PROCEDURE — 83050 HGB METHEMOGLOBIN QUAN: CPT

## 2022-08-18 PROCEDURE — 10002800 HB RX 250 W HCPCS: Performed by: INTERNAL MEDICINE

## 2022-08-18 PROCEDURE — 85379 FIBRIN DEGRADATION QUANT: CPT | Performed by: EMERGENCY MEDICINE

## 2022-08-18 PROCEDURE — 11045 DBRDMT SUBQ TISS EACH ADDL: CPT

## 2022-08-18 PROCEDURE — 83735 ASSAY OF MAGNESIUM: CPT | Performed by: EMERGENCY MEDICINE

## 2022-08-18 PROCEDURE — 85610 PROTHROMBIN TIME: CPT | Performed by: EMERGENCY MEDICINE

## 2022-08-18 PROCEDURE — 93970 EXTREMITY STUDY: CPT

## 2022-08-18 PROCEDURE — 85018 HEMOGLOBIN: CPT

## 2022-08-18 RX ORDER — NICOTINE POLACRILEX 4 MG
15 LOZENGE BUCCAL PRN
Status: DISCONTINUED | OUTPATIENT
Start: 2022-08-18 | End: 2022-08-22 | Stop reason: HOSPADM

## 2022-08-18 RX ORDER — FUROSEMIDE 10 MG/ML
40 INJECTION INTRAMUSCULAR; INTRAVENOUS ONCE
Status: COMPLETED | OUTPATIENT
Start: 2022-08-18 | End: 2022-08-18

## 2022-08-18 RX ORDER — ALLOPURINOL 100 MG/1
100 TABLET ORAL DAILY
Status: DISCONTINUED | OUTPATIENT
Start: 2022-08-18 | End: 2022-08-22 | Stop reason: HOSPADM

## 2022-08-18 RX ORDER — ISOSORBIDE MONONITRATE 30 MG/1
30 TABLET, EXTENDED RELEASE ORAL DAILY
Status: DISCONTINUED | OUTPATIENT
Start: 2022-08-18 | End: 2022-08-22 | Stop reason: HOSPADM

## 2022-08-18 RX ORDER — DEXTROSE MONOHYDRATE 25 G/50ML
25 INJECTION, SOLUTION INTRAVENOUS PRN
Status: DISCONTINUED | OUTPATIENT
Start: 2022-08-18 | End: 2022-08-22 | Stop reason: HOSPADM

## 2022-08-18 RX ORDER — ONDANSETRON 2 MG/ML
4 INJECTION INTRAMUSCULAR; INTRAVENOUS 2 TIMES DAILY PRN
Status: DISCONTINUED | OUTPATIENT
Start: 2022-08-18 | End: 2022-08-22 | Stop reason: HOSPADM

## 2022-08-18 RX ORDER — AMOXICILLIN 250 MG
2 CAPSULE ORAL DAILY PRN
Status: DISCONTINUED | OUTPATIENT
Start: 2022-08-18 | End: 2022-08-22 | Stop reason: HOSPADM

## 2022-08-18 RX ORDER — 0.9 % SODIUM CHLORIDE 0.9 %
2 VIAL (ML) INJECTION EVERY 12 HOURS SCHEDULED
Status: DISCONTINUED | OUTPATIENT
Start: 2022-08-18 | End: 2022-08-22 | Stop reason: HOSPADM

## 2022-08-18 RX ORDER — ASCORBIC ACID 500 MG
500 TABLET ORAL DAILY
Status: DISCONTINUED | OUTPATIENT
Start: 2022-08-18 | End: 2022-08-22 | Stop reason: HOSPADM

## 2022-08-18 RX ORDER — FAMOTIDINE 20 MG/1
20 TABLET, FILM COATED ORAL DAILY
Status: DISCONTINUED | OUTPATIENT
Start: 2022-08-18 | End: 2022-08-22 | Stop reason: HOSPADM

## 2022-08-18 RX ORDER — TORSEMIDE 20 MG/1
40 TABLET ORAL DAILY
Status: DISCONTINUED | OUTPATIENT
Start: 2022-08-18 | End: 2022-08-22 | Stop reason: HOSPADM

## 2022-08-18 RX ORDER — NICOTINE POLACRILEX 4 MG
30 LOZENGE BUCCAL PRN
Status: DISCONTINUED | OUTPATIENT
Start: 2022-08-18 | End: 2022-08-22 | Stop reason: HOSPADM

## 2022-08-18 RX ORDER — ACETAMINOPHEN 325 MG/1
650 TABLET ORAL EVERY 4 HOURS PRN
Status: DISCONTINUED | OUTPATIENT
Start: 2022-08-18 | End: 2022-08-22 | Stop reason: HOSPADM

## 2022-08-18 RX ORDER — INSULIN GLARGINE 100 [IU]/ML
52 INJECTION, SOLUTION SUBCUTANEOUS EVERY 12 HOURS SCHEDULED
Status: DISCONTINUED | OUTPATIENT
Start: 2022-08-18 | End: 2022-08-22 | Stop reason: HOSPADM

## 2022-08-18 RX ORDER — METOPROLOL SUCCINATE 25 MG/1
25 TABLET, EXTENDED RELEASE ORAL 2 TIMES DAILY
Status: DISCONTINUED | OUTPATIENT
Start: 2022-08-18 | End: 2022-08-22 | Stop reason: HOSPADM

## 2022-08-18 RX ORDER — POLYETHYLENE GLYCOL 3350 17 G/17G
17 POWDER, FOR SOLUTION ORAL DAILY PRN
Status: DISCONTINUED | OUTPATIENT
Start: 2022-08-18 | End: 2022-08-22 | Stop reason: HOSPADM

## 2022-08-18 RX ORDER — INSULIN LISPRO 100 [IU]/ML
14 INJECTION, SOLUTION INTRAVENOUS; SUBCUTANEOUS
Status: DISCONTINUED | OUTPATIENT
Start: 2022-08-19 | End: 2022-08-22 | Stop reason: HOSPADM

## 2022-08-18 RX ORDER — DEXTROSE MONOHYDRATE 25 G/50ML
12.5 INJECTION, SOLUTION INTRAVENOUS PRN
Status: DISCONTINUED | OUTPATIENT
Start: 2022-08-18 | End: 2022-08-22 | Stop reason: HOSPADM

## 2022-08-18 RX ORDER — ATORVASTATIN CALCIUM 10 MG/1
10 TABLET, FILM COATED ORAL DAILY
Status: DISCONTINUED | OUTPATIENT
Start: 2022-08-18 | End: 2022-08-19

## 2022-08-18 RX ORDER — SPIRONOLACTONE 25 MG/1
25 TABLET ORAL DAILY
COMMUNITY
End: 2022-08-26 | Stop reason: SDUPTHER

## 2022-08-18 RX ORDER — MAGNESIUM HYDROXIDE/ALUMINUM HYDROXICE/SIMETHICONE 120; 1200; 1200 MG/30ML; MG/30ML; MG/30ML
30 SUSPENSION ORAL EVERY 4 HOURS PRN
Status: DISCONTINUED | OUTPATIENT
Start: 2022-08-18 | End: 2022-08-22 | Stop reason: HOSPADM

## 2022-08-18 RX ORDER — ENOXAPARIN SODIUM 100 MG/ML
40 INJECTION SUBCUTANEOUS EVERY 12 HOURS SCHEDULED
Status: DISCONTINUED | OUTPATIENT
Start: 2022-08-18 | End: 2022-08-22 | Stop reason: HOSPADM

## 2022-08-18 RX ADMIN — ENOXAPARIN SODIUM 40 MG: 40 INJECTION SUBCUTANEOUS at 22:05

## 2022-08-18 RX ADMIN — SODIUM CHLORIDE, PRESERVATIVE FREE 2 ML: 5 INJECTION INTRAVENOUS at 22:12

## 2022-08-18 RX ADMIN — FUROSEMIDE 40 MG: 10 INJECTION, SOLUTION INTRAMUSCULAR; INTRAVENOUS at 12:14

## 2022-08-18 RX ADMIN — ATORVASTATIN CALCIUM 10 MG: 10 TABLET, FILM COATED ORAL at 22:11

## 2022-08-18 RX ADMIN — INSULIN GLARGINE 52 UNITS: 100 INJECTION, SOLUTION SUBCUTANEOUS at 23:03

## 2022-08-18 RX ADMIN — OXYCODONE HYDROCHLORIDE AND ACETAMINOPHEN 500 MG: 500 TABLET ORAL at 22:12

## 2022-08-18 RX ADMIN — METOPROLOL SUCCINATE 25 MG: 25 TABLET, EXTENDED RELEASE ORAL at 22:23

## 2022-08-18 RX ADMIN — Medication 10 MG/HR: at 18:04

## 2022-08-18 RX ADMIN — ALLOPURINOL 100 MG: 100 TABLET ORAL at 22:11

## 2022-08-18 RX ADMIN — KIT FOR THE PREPARATION OF TECHNETIUM TC 99M ALBUMIN AGGREGATED 5 MILLICURIE: 2.5 INJECTION, POWDER, FOR SOLUTION INTRAVENOUS at 13:56

## 2022-08-18 ASSESSMENT — COLUMBIA-SUICIDE SEVERITY RATING SCALE - C-SSRS
2. HAVE YOU ACTUALLY HAD ANY THOUGHTS OF KILLING YOURSELF?: NO
1. WITHIN THE PAST MONTH, HAVE YOU WISHED YOU WERE DEAD OR WISHED YOU COULD GO TO SLEEP AND NOT WAKE UP?: NO
6. HAVE YOU EVER DONE ANYTHING, STARTED TO DO ANYTHING, OR PREPARED TO DO ANYTHING TO END YOUR LIFE?: NO
IS THE PATIENT ABLE TO COMPLETE C-SSRS: YES

## 2022-08-18 ASSESSMENT — ACTIVITIES OF DAILY LIVING (ADL)
CHRONIC_PAIN_PRESENT: NO
ADL_SCORE: 5
MOBILITY_ASSIST_DEVICES: FRONT-WHEELED WALKER
DRESSING YOURSELF: NEEDS ASSISTANCE
CONTINENCE: NEEDS ASSISTANCE
FEEDING YOURSELF: INDEPENDENT
TRANSFERRING: DEPENDENT
ADL_SHORT_OF_BREATH: YES
RECENT_DECLINE_ADL: YES, DECLINE IN BATHING/DRESSING/FEEDING, COLLABORATE WITH PROVIDER (T)
TOILETING: INDEPENDENT
CHRONIC_PAIN_PRESENT: NO
MOBILITY_ASSIST_DEVICES: FRONT-WHEELED WALKER
ADL_BEFORE_ADMISSION: NEEDS/REQUIRES ASSISTANCE
ADL_SCORE: 6
ADL_SHORT_OF_BREATH: YES
ADL_BEFORE_ADMISSION: NEEDS/REQUIRES ASSISTANCE
FEEDING YOURSELF: INDEPENDENT
CONTINENCE: NEEDS ASSISTANCE
BATHING: DEPENDENT
TOILETING: NEEDS ASSISTANCE
DRESSING YOURSELF: NEEDS ASSISTANCE
BATHING: DEPENDENT
TRANSFERRING: DEPENDENT

## 2022-08-18 ASSESSMENT — ENCOUNTER SYMPTOMS
BACK PAIN: 0
DIZZINESS: 0
ADENOPATHY: 0
COUGH: 0
LIGHT-HEADEDNESS: 0
SHORTNESS OF BREATH: 1
CONFUSION: 0
VOMITING: 0
CHILLS: 0
DIARRHEA: 0
WOUND: 1
NAUSEA: 0
ABDOMINAL PAIN: 0
FEVER: 0

## 2022-08-18 ASSESSMENT — COGNITIVE AND FUNCTIONAL STATUS - GENERAL
DO YOU HAVE SERIOUS DIFFICULTY WALKING OR CLIMBING STAIRS: NO
ARE YOU DEAF OR DO YOU HAVE SERIOUS DIFFICULTY  HEARING: NO
ARE YOU BLIND OR DO YOU HAVE SERIOUS DIFFICULTY SEEING, EVEN WHEN WEARING GLASSES: NO
BECAUSE OF A PHYSICAL, MENTAL, OR EMOTIONAL CONDITION, DO YOU HAVE DIFFICULTY DOING ERRANDS ALONE: NO
ARE YOU BLIND OR DO YOU HAVE SERIOUS DIFFICULTY SEEING, EVEN WHEN WEARING GLASSES: NO
DO YOU HAVE DIFFICULTY DRESSING OR BATHING: NO
BECAUSE OF A PHYSICAL, MENTAL, OR EMOTIONAL CONDITION, DO YOU HAVE SERIOUS DIFFICULTY CONCENTRATING, REMEMBERING OR MAKING DECISIONS: NO
ARE YOU DEAF OR DO YOU HAVE SERIOUS DIFFICULTY  HEARING: NO

## 2022-08-18 ASSESSMENT — PATIENT HEALTH QUESTIONNAIRE - PHQ9
1. LITTLE INTEREST OR PLEASURE IN DOING THINGS: NOT AT ALL
CLINICAL INTERPRETATION OF PHQ2 SCORE: NO FURTHER SCREENING NEEDED
IS PATIENT ABLE TO COMPLETE PHQ2 OR PHQ9: YES
SUM OF ALL RESPONSES TO PHQ9 QUESTIONS 1 AND 2: 0
2. FEELING DOWN, DEPRESSED OR HOPELESS: NOT AT ALL
SUM OF ALL RESPONSES TO PHQ9 QUESTIONS 1 AND 2: 0

## 2022-08-18 ASSESSMENT — PAIN SCALES - GENERAL
PAINLEVEL_OUTOF10: 3
PAINLEVEL_OUTOF10: 5
PAINLEVEL_OUTOF10: 0

## 2022-08-18 ASSESSMENT — LIFESTYLE VARIABLES
HOW OFTEN DO YOU HAVE 6 OR MORE DRINKS ON ONE OCCASION: NEVER
HOW OFTEN DO YOU HAVE A DRINK CONTAINING ALCOHOL: NEVER
ALCOHOL_USE_STATUS: NO OR LOW RISK WITH VALIDATED TOOL
AUDIT-C TOTAL SCORE: 0
HOW MANY STANDARD DRINKS CONTAINING ALCOHOL DO YOU HAVE ON A TYPICAL DAY: 0,1 OR 2
ALCOHOL_USE_STATUS: NO OR LOW RISK WITH VALIDATED TOOL
HOW OFTEN DO YOU HAVE 6 OR MORE DRINKS ON ONE OCCASION: NEVER
AUDIT-C TOTAL SCORE: 0
HOW MANY STANDARD DRINKS CONTAINING ALCOHOL DO YOU HAVE ON A TYPICAL DAY: 0,1 OR 2
HOW OFTEN DO YOU HAVE A DRINK CONTAINING ALCOHOL: NEVER

## 2022-08-18 ASSESSMENT — PAIN SCALES - WONG BAKER: WONGBAKER_NUMERICALRESPONSE: 0

## 2022-08-19 ENCOUNTER — APPOINTMENT (OUTPATIENT)
Dept: CARDIOLOGY | Age: 67
DRG: 291 | End: 2022-08-19
Attending: NURSE PRACTITIONER

## 2022-08-19 LAB
ALBUMIN SERPL-MCNC: 2.9 G/DL (ref 3.6–5.1)
ALBUMIN/GLOB SERPL: 0.7 {RATIO} (ref 1–2.4)
ALP SERPL-CCNC: 105 UNITS/L (ref 45–117)
ALT SERPL-CCNC: 23 UNITS/L
ANION GAP SERPL CALC-SCNC: 8 MMOL/L (ref 7–19)
AORTIC VALVE AREA: 1.99
AST SERPL-CCNC: 20 UNITS/L
ATRIAL RATE (BPM): 82
AV MEAN GRADIENT (AVMG): 10
AV MEAN VELOCITY (AVMV): NORMAL
AV PEAK GRADIENT (AVPG): 17
AV PEAK VELOCITY (AVPV): NORMAL
AV STENOSIS SEVERITY TEXT: NORMAL
BASOPHILS # BLD: 0.1 K/MCL (ref 0–0.3)
BASOPHILS NFR BLD: 1 %
BILIRUB SERPL-MCNC: 0.7 MG/DL (ref 0.2–1)
BUN SERPL-MCNC: 45 MG/DL (ref 6–20)
BUN/CREAT SERPL: 24 (ref 7–25)
CALCIUM SERPL-MCNC: 8.3 MG/DL (ref 8.4–10.2)
CHLORIDE SERPL-SCNC: 102 MMOL/L (ref 97–110)
CHOLEST SERPL-MCNC: 154 MG/DL
CHOLEST/HDLC SERPL: 5.7 {RATIO}
CO2 SERPL-SCNC: 34 MMOL/L (ref 21–32)
CREAT SERPL-MCNC: 1.91 MG/DL (ref 0.67–1.17)
DEPRECATED RDW RBC: 47.6 FL (ref 39–50)
DOP CALC LVOT PEAK VEL (LVOTPV): 1.36
E WAVE DECELARATION TIME (MDT): 232
EOSINOPHIL # BLD: 0.4 K/MCL (ref 0–0.5)
EOSINOPHIL NFR BLD: 5 %
ERYTHROCYTE [DISTWIDTH] IN BLOOD: 14.4 % (ref 11–15)
FASTING DURATION TIME PATIENT: ABNORMAL H
FASTING DURATION TIME PATIENT: ABNORMAL H
GFR SERPLBLD BASED ON 1.73 SQ M-ARVRAT: 38 ML/MIN
GLOBULIN SER-MCNC: 4.4 G/DL (ref 2–4)
GLUCOSE BLDC GLUCOMTR-MCNC: 136 MG/DL (ref 70–99)
GLUCOSE BLDC GLUCOMTR-MCNC: 149 MG/DL (ref 70–99)
GLUCOSE BLDC GLUCOMTR-MCNC: 165 MG/DL (ref 70–99)
GLUCOSE BLDC GLUCOMTR-MCNC: 167 MG/DL (ref 70–99)
GLUCOSE BLDC GLUCOMTR-MCNC: 172 MG/DL (ref 70–99)
GLUCOSE BLDC GLUCOMTR-MCNC: 97 MG/DL (ref 70–99)
GLUCOSE SERPL-MCNC: 176 MG/DL (ref 70–99)
HBA1C MFR BLD: 8.6 % (ref 4.5–5.6)
HCT VFR BLD CALC: 37.4 % (ref 39–51)
HDLC SERPL-MCNC: 27 MG/DL
HGB BLD-MCNC: 11.5 G/DL (ref 13–17)
IMM GRANULOCYTES # BLD AUTO: 0 K/MCL (ref 0–0.2)
IMM GRANULOCYTES # BLD: 0 %
IRON SATN MFR SERPL: 21 % (ref 15–45)
IRON SERPL-MCNC: 54 MCG/DL (ref 65–175)
LDLC SERPL CALC-MCNC: 91 MG/DL
LV EF: NORMAL %
LVOT 2D (LVOTD): 2
LVOT VTI (LVOTVTI): 20.5
LYMPHOCYTES # BLD: 1.7 K/MCL (ref 1–4)
LYMPHOCYTES NFR BLD: 20 %
MAGNESIUM SERPL-MCNC: 2.5 MG/DL (ref 1.7–2.4)
MCH RBC QN AUTO: 27.7 PG (ref 26–34)
MCHC RBC AUTO-ENTMCNC: 30.7 G/DL (ref 32–36.5)
MCV RBC AUTO: 90.1 FL (ref 78–100)
MONOCYTES # BLD: 0.9 K/MCL (ref 0.3–0.9)
MONOCYTES NFR BLD: 10 %
MV E TISSUE VEL LAT (MELV): 6.42
MV E TISSUE VEL MED (MESV): 5.77
MV E WAVE VEL/E TISSUE VEL MED(MSR): 16.26
NEUTROPHILS # BLD: 5.4 K/MCL (ref 1.8–7.7)
NEUTROPHILS NFR BLD: 64 %
NONHDLC SERPL-MCNC: 127 MG/DL
NRBC BLD MANUAL-RTO: 0 /100 WBC
P AXIS (DEGREES): 59
PLATELET # BLD AUTO: 244 K/MCL (ref 140–450)
POTASSIUM SERPL-SCNC: 3.2 MMOL/L (ref 3.4–5.1)
POTASSIUM SERPL-SCNC: 3.8 MMOL/L (ref 3.4–5.1)
PR-INTERVAL (MSEC): 234
PROT SERPL-MCNC: 7.3 G/DL (ref 6.4–8.2)
QRS-INTERVAL (MSEC): 96
QT-INTERVAL (MSEC): 392
QTC: 458
R AXIS (DEGREES): 51
RBC # BLD: 4.15 MIL/MCL (ref 4.5–5.9)
REPORT TEXT: NORMAL
SODIUM SERPL-SCNC: 141 MMOL/L (ref 135–145)
T AXIS (DEGREES): 174
TIBC SERPL-MCNC: 252 MCG/DL (ref 250–450)
TRIGL SERPL-MCNC: 178 MG/DL
TSH SERPL-ACNC: 2.09 MCUNITS/ML (ref 0.35–5)
VENTRICULAR RATE EKG/MIN (BPM): 82
WBC # BLD: 8.5 K/MCL (ref 4.2–11)

## 2022-08-19 PROCEDURE — 10002803 HB RX 637: Performed by: INTERNAL MEDICINE

## 2022-08-19 PROCEDURE — 10002800 HB RX 250 W HCPCS: Performed by: INTERNAL MEDICINE

## 2022-08-19 PROCEDURE — 10003585 HB ROOM CHARGE INTERMEDIATE CARE

## 2022-08-19 PROCEDURE — 84443 ASSAY THYROID STIM HORMONE: CPT | Performed by: NURSE PRACTITIONER

## 2022-08-19 PROCEDURE — 84132 ASSAY OF SERUM POTASSIUM: CPT | Performed by: INTERNAL MEDICINE

## 2022-08-19 PROCEDURE — 99233 SBSQ HOSP IP/OBS HIGH 50: CPT | Performed by: INTERNAL MEDICINE

## 2022-08-19 PROCEDURE — 36415 COLL VENOUS BLD VENIPUNCTURE: CPT | Performed by: NURSE PRACTITIONER

## 2022-08-19 PROCEDURE — 83540 ASSAY OF IRON: CPT | Performed by: INTERNAL MEDICINE

## 2022-08-19 PROCEDURE — 10002805 HB CONTRAST AGENT: Performed by: INTERNAL MEDICINE

## 2022-08-19 PROCEDURE — 83735 ASSAY OF MAGNESIUM: CPT | Performed by: NURSE PRACTITIONER

## 2022-08-19 PROCEDURE — 13003289 HB OXYGEN THERAPY DAILY

## 2022-08-19 PROCEDURE — 93306 TTE W/DOPPLER COMPLETE: CPT | Performed by: INTERNAL MEDICINE

## 2022-08-19 PROCEDURE — 10004651 HB RX, NO CHARGE ITEM: Performed by: NURSE PRACTITIONER

## 2022-08-19 PROCEDURE — 10002800 HB RX 250 W HCPCS: Performed by: NURSE PRACTITIONER

## 2022-08-19 PROCEDURE — 85025 COMPLETE CBC W/AUTO DIFF WBC: CPT | Performed by: NURSE PRACTITIONER

## 2022-08-19 PROCEDURE — 93306 TTE W/DOPPLER COMPLETE: CPT

## 2022-08-19 PROCEDURE — 83036 HEMOGLOBIN GLYCOSYLATED A1C: CPT | Performed by: NURSE PRACTITIONER

## 2022-08-19 PROCEDURE — 80061 LIPID PANEL: CPT | Performed by: NURSE PRACTITIONER

## 2022-08-19 PROCEDURE — 80053 COMPREHEN METABOLIC PANEL: CPT | Performed by: NURSE PRACTITIONER

## 2022-08-19 PROCEDURE — 10002803 HB RX 637: Performed by: NURSE PRACTITIONER

## 2022-08-19 RX ORDER — ATORVASTATIN CALCIUM 40 MG/1
40 TABLET, FILM COATED ORAL NIGHTLY
Status: DISCONTINUED | OUTPATIENT
Start: 2022-08-19 | End: 2022-08-22 | Stop reason: HOSPADM

## 2022-08-19 RX ORDER — POTASSIUM CHLORIDE 20 MEQ/1
40 TABLET, EXTENDED RELEASE ORAL ONCE
Status: COMPLETED | OUTPATIENT
Start: 2022-08-19 | End: 2022-08-19

## 2022-08-19 RX ADMIN — Medication 50 MCG: at 08:17

## 2022-08-19 RX ADMIN — ISOSORBIDE MONONITRATE 30 MG: 30 TABLET, EXTENDED RELEASE ORAL at 13:00

## 2022-08-19 RX ADMIN — INSULIN GLARGINE 52 UNITS: 100 INJECTION, SOLUTION SUBCUTANEOUS at 08:17

## 2022-08-19 RX ADMIN — SODIUM CHLORIDE, PRESERVATIVE FREE 2 ML: 5 INJECTION INTRAVENOUS at 08:17

## 2022-08-19 RX ADMIN — POTASSIUM CHLORIDE 40 MEQ: 1500 TABLET, EXTENDED RELEASE ORAL at 07:35

## 2022-08-19 RX ADMIN — ENOXAPARIN SODIUM 40 MG: 40 INJECTION SUBCUTANEOUS at 08:17

## 2022-08-19 RX ADMIN — ENOXAPARIN SODIUM 40 MG: 40 INJECTION SUBCUTANEOUS at 21:23

## 2022-08-19 RX ADMIN — METOPROLOL SUCCINATE 25 MG: 25 TABLET, EXTENDED RELEASE ORAL at 08:17

## 2022-08-19 RX ADMIN — INSULIN GLARGINE 52 UNITS: 100 INJECTION, SOLUTION SUBCUTANEOUS at 21:23

## 2022-08-19 RX ADMIN — Medication 10 MG/HR: at 17:25

## 2022-08-19 RX ADMIN — INSULIN LISPRO 14 UNITS: 100 INJECTION, SOLUTION INTRAVENOUS; SUBCUTANEOUS at 13:00

## 2022-08-19 RX ADMIN — SODIUM CHLORIDE, PRESERVATIVE FREE 2 ML: 5 INJECTION INTRAVENOUS at 21:24

## 2022-08-19 RX ADMIN — PERFLUTREN 4 ML: 6.52 INJECTION, SUSPENSION INTRAVENOUS at 16:12

## 2022-08-19 RX ADMIN — INSULIN LISPRO 14 UNITS: 100 INJECTION, SOLUTION INTRAVENOUS; SUBCUTANEOUS at 06:39

## 2022-08-19 RX ADMIN — ATORVASTATIN CALCIUM 40 MG: 40 TABLET, FILM COATED ORAL at 21:23

## 2022-08-19 RX ADMIN — METOPROLOL SUCCINATE 25 MG: 25 TABLET, EXTENDED RELEASE ORAL at 21:22

## 2022-08-19 RX ADMIN — INSULIN LISPRO 14 UNITS: 100 INJECTION, SOLUTION INTRAVENOUS; SUBCUTANEOUS at 17:23

## 2022-08-19 ASSESSMENT — PAIN SCALES - GENERAL
PAINLEVEL_OUTOF10: 0
PAINLEVEL_OUTOF10: 0

## 2022-08-19 ASSESSMENT — ENCOUNTER SYMPTOMS
WEAKNESS: 1
FATIGUE: 1
ACTIVITY CHANGE: 1
RESPIRATORY NEGATIVE: 1
APPETITE CHANGE: 1

## 2022-08-19 ASSESSMENT — PAIN SCALES - WONG BAKER
WONGBAKER_NUMERICALRESPONSE: 0

## 2022-08-20 LAB
ALBUMIN SERPL-MCNC: 2.9 G/DL (ref 3.6–5.1)
ALBUMIN/GLOB SERPL: 0.6 {RATIO} (ref 1–2.4)
ALP SERPL-CCNC: 99 UNITS/L (ref 45–117)
ALT SERPL-CCNC: 20 UNITS/L
ANION GAP SERPL CALC-SCNC: 7 MMOL/L (ref 7–19)
AST SERPL-CCNC: 18 UNITS/L
ATRIAL RATE (BPM): 79
BASOPHILS # BLD: 0.1 K/MCL (ref 0–0.3)
BASOPHILS NFR BLD: 1 %
BILIRUB SERPL-MCNC: 0.7 MG/DL (ref 0.2–1)
BUN SERPL-MCNC: 44 MG/DL (ref 6–20)
BUN/CREAT SERPL: 21 (ref 7–25)
CALCIUM SERPL-MCNC: 8.8 MG/DL (ref 8.4–10.2)
CHLORIDE SERPL-SCNC: 101 MMOL/L (ref 97–110)
CO2 SERPL-SCNC: 35 MMOL/L (ref 21–32)
CREAT SERPL-MCNC: 2.09 MG/DL (ref 0.67–1.17)
DEPRECATED RDW RBC: 45.8 FL (ref 39–50)
EOSINOPHIL # BLD: 0.4 K/MCL (ref 0–0.5)
EOSINOPHIL NFR BLD: 5 %
ERYTHROCYTE [DISTWIDTH] IN BLOOD: 14.3 % (ref 11–15)
FASTING DURATION TIME PATIENT: ABNORMAL H
GFR SERPLBLD BASED ON 1.73 SQ M-ARVRAT: 34 ML/MIN
GLOBULIN SER-MCNC: 4.5 G/DL (ref 2–4)
GLUCOSE BLDC GLUCOMTR-MCNC: 113 MG/DL (ref 70–99)
GLUCOSE BLDC GLUCOMTR-MCNC: 121 MG/DL (ref 70–99)
GLUCOSE BLDC GLUCOMTR-MCNC: 146 MG/DL (ref 70–99)
GLUCOSE SERPL-MCNC: 123 MG/DL (ref 70–99)
HCT VFR BLD CALC: 36.5 % (ref 39–51)
HGB BLD-MCNC: 11.5 G/DL (ref 13–17)
IMM GRANULOCYTES # BLD AUTO: 0.1 K/MCL (ref 0–0.2)
IMM GRANULOCYTES # BLD: 1 %
LYMPHOCYTES # BLD: 2 K/MCL (ref 1–4)
LYMPHOCYTES NFR BLD: 21 %
MCH RBC QN AUTO: 28 PG (ref 26–34)
MCHC RBC AUTO-ENTMCNC: 31.5 G/DL (ref 32–36.5)
MCV RBC AUTO: 89 FL (ref 78–100)
MONOCYTES # BLD: 0.8 K/MCL (ref 0.3–0.9)
MONOCYTES NFR BLD: 9 %
NEUTROPHILS # BLD: 6.2 K/MCL (ref 1.8–7.7)
NEUTROPHILS NFR BLD: 63 %
NRBC BLD MANUAL-RTO: 0 /100 WBC
P AXIS (DEGREES): 58
PLATELET # BLD AUTO: 240 K/MCL (ref 140–450)
POTASSIUM SERPL-SCNC: 3 MMOL/L (ref 3.4–5.1)
POTASSIUM SERPL-SCNC: 3.7 MMOL/L (ref 3.4–5.1)
PR-INTERVAL (MSEC): 222
PROT SERPL-MCNC: 7.4 G/DL (ref 6.4–8.2)
QRS-INTERVAL (MSEC): 86
QT-INTERVAL (MSEC): 400
QTC: 458
R AXIS (DEGREES): 55
RBC # BLD: 4.1 MIL/MCL (ref 4.5–5.9)
REPORT TEXT: NORMAL
SODIUM SERPL-SCNC: 140 MMOL/L (ref 135–145)
T AXIS (DEGREES): -179
VENTRICULAR RATE EKG/MIN (BPM): 79
WBC # BLD: 9.5 K/MCL (ref 4.2–11)

## 2022-08-20 PROCEDURE — 10004651 HB RX, NO CHARGE ITEM: Performed by: NURSE PRACTITIONER

## 2022-08-20 PROCEDURE — 10003585 HB ROOM CHARGE INTERMEDIATE CARE

## 2022-08-20 PROCEDURE — 84132 ASSAY OF SERUM POTASSIUM: CPT | Performed by: INTERNAL MEDICINE

## 2022-08-20 PROCEDURE — 10002803 HB RX 637: Performed by: INTERNAL MEDICINE

## 2022-08-20 PROCEDURE — 36415 COLL VENOUS BLD VENIPUNCTURE: CPT | Performed by: INTERNAL MEDICINE

## 2022-08-20 PROCEDURE — 13003289 HB OXYGEN THERAPY DAILY

## 2022-08-20 PROCEDURE — 85025 COMPLETE CBC W/AUTO DIFF WBC: CPT | Performed by: INTERNAL MEDICINE

## 2022-08-20 PROCEDURE — 10002800 HB RX 250 W HCPCS: Performed by: NURSE PRACTITIONER

## 2022-08-20 PROCEDURE — 80053 COMPREHEN METABOLIC PANEL: CPT | Performed by: INTERNAL MEDICINE

## 2022-08-20 PROCEDURE — 10002803 HB RX 637: Performed by: NURSE PRACTITIONER

## 2022-08-20 PROCEDURE — 99233 SBSQ HOSP IP/OBS HIGH 50: CPT | Performed by: NURSE PRACTITIONER

## 2022-08-20 PROCEDURE — 10002800 HB RX 250 W HCPCS: Performed by: INTERNAL MEDICINE

## 2022-08-20 PROCEDURE — 93005 ELECTROCARDIOGRAM TRACING: CPT | Performed by: INTERNAL MEDICINE

## 2022-08-20 RX ORDER — POTASSIUM CHLORIDE 20 MEQ/1
40 TABLET, EXTENDED RELEASE ORAL ONCE
Status: COMPLETED | OUTPATIENT
Start: 2022-08-20 | End: 2022-08-20

## 2022-08-20 RX ADMIN — ISOSORBIDE MONONITRATE 30 MG: 30 TABLET, EXTENDED RELEASE ORAL at 08:20

## 2022-08-20 RX ADMIN — POTASSIUM CHLORIDE 40 MEQ: 20 TABLET, EXTENDED RELEASE ORAL at 08:20

## 2022-08-20 RX ADMIN — ENOXAPARIN SODIUM 40 MG: 40 INJECTION SUBCUTANEOUS at 08:20

## 2022-08-20 RX ADMIN — INSULIN LISPRO 14 UNITS: 100 INJECTION, SOLUTION INTRAVENOUS; SUBCUTANEOUS at 13:37

## 2022-08-20 RX ADMIN — ALLOPURINOL 100 MG: 100 TABLET ORAL at 08:20

## 2022-08-20 RX ADMIN — INSULIN LISPRO 14 UNITS: 100 INJECTION, SOLUTION INTRAVENOUS; SUBCUTANEOUS at 10:31

## 2022-08-20 RX ADMIN — ATORVASTATIN CALCIUM 40 MG: 40 TABLET, FILM COATED ORAL at 20:53

## 2022-08-20 RX ADMIN — Medication 50 MCG: at 08:20

## 2022-08-20 RX ADMIN — OXYCODONE HYDROCHLORIDE AND ACETAMINOPHEN 500 MG: 500 TABLET ORAL at 08:20

## 2022-08-20 RX ADMIN — METOPROLOL SUCCINATE 25 MG: 25 TABLET, EXTENDED RELEASE ORAL at 20:53

## 2022-08-20 RX ADMIN — SODIUM CHLORIDE, PRESERVATIVE FREE 2 ML: 5 INJECTION INTRAVENOUS at 08:23

## 2022-08-20 RX ADMIN — ENOXAPARIN SODIUM 40 MG: 40 INJECTION SUBCUTANEOUS at 20:53

## 2022-08-20 RX ADMIN — METOPROLOL SUCCINATE 25 MG: 25 TABLET, EXTENDED RELEASE ORAL at 08:20

## 2022-08-20 RX ADMIN — INSULIN GLARGINE 52 UNITS: 100 INJECTION, SOLUTION SUBCUTANEOUS at 20:52

## 2022-08-20 RX ADMIN — INSULIN GLARGINE 30 UNITS: 100 INJECTION, SOLUTION SUBCUTANEOUS at 10:30

## 2022-08-20 RX ADMIN — Medication 10 MG/HR: at 19:07

## 2022-08-20 RX ADMIN — SODIUM CHLORIDE, PRESERVATIVE FREE 2 ML: 5 INJECTION INTRAVENOUS at 20:53

## 2022-08-20 RX ADMIN — INSULIN LISPRO 14 UNITS: 100 INJECTION, SOLUTION INTRAVENOUS; SUBCUTANEOUS at 17:47

## 2022-08-20 ASSESSMENT — PAIN SCALES - GENERAL
PAINLEVEL_OUTOF10: 0

## 2022-08-20 ASSESSMENT — ENCOUNTER SYMPTOMS: SHORTNESS OF BREATH: 1

## 2022-08-20 ASSESSMENT — PAIN SCALES - WONG BAKER
WONGBAKER_NUMERICALRESPONSE: 0
WONGBAKER_NUMERICALRESPONSE: 0

## 2022-08-21 LAB
ANION GAP SERPL CALC-SCNC: 9 MMOL/L (ref 7–19)
BUN SERPL-MCNC: 46 MG/DL (ref 6–20)
BUN/CREAT SERPL: 21 (ref 7–25)
CALCIUM SERPL-MCNC: 9.2 MG/DL (ref 8.4–10.2)
CHLORIDE SERPL-SCNC: 99 MMOL/L (ref 97–110)
CO2 SERPL-SCNC: 34 MMOL/L (ref 21–32)
CREAT SERPL-MCNC: 2.14 MG/DL (ref 0.67–1.17)
FASTING DURATION TIME PATIENT: ABNORMAL H
GFR SERPLBLD BASED ON 1.73 SQ M-ARVRAT: 33 ML/MIN
GLUCOSE BLDC GLUCOMTR-MCNC: 125 MG/DL (ref 70–99)
GLUCOSE BLDC GLUCOMTR-MCNC: 134 MG/DL (ref 70–99)
GLUCOSE BLDC GLUCOMTR-MCNC: 178 MG/DL (ref 70–99)
GLUCOSE SERPL-MCNC: 131 MG/DL (ref 70–99)
MAGNESIUM SERPL-MCNC: 2.9 MG/DL (ref 1.7–2.4)
POTASSIUM SERPL-SCNC: 3.4 MMOL/L (ref 3.4–5.1)
POTASSIUM SERPL-SCNC: 3.7 MMOL/L (ref 3.4–5.1)
RAINBOW EXTRA TUBES HOLD SPECIMEN: NORMAL
SODIUM SERPL-SCNC: 139 MMOL/L (ref 135–145)

## 2022-08-21 PROCEDURE — 10004281 HB COUNTER-STAFF TIME PER 15 MIN

## 2022-08-21 PROCEDURE — 13003289 HB OXYGEN THERAPY DAILY

## 2022-08-21 PROCEDURE — 36415 COLL VENOUS BLD VENIPUNCTURE: CPT | Performed by: INTERNAL MEDICINE

## 2022-08-21 PROCEDURE — 10004651 HB RX, NO CHARGE ITEM: Performed by: NURSE PRACTITIONER

## 2022-08-21 PROCEDURE — 10002800 HB RX 250 W HCPCS: Performed by: NURSE PRACTITIONER

## 2022-08-21 PROCEDURE — 10002803 HB RX 637: Performed by: INTERNAL MEDICINE

## 2022-08-21 PROCEDURE — 10002800 HB RX 250 W HCPCS: Performed by: INTERNAL MEDICINE

## 2022-08-21 PROCEDURE — 10003585 HB ROOM CHARGE INTERMEDIATE CARE

## 2022-08-21 PROCEDURE — 10002807 HB RX 258: Performed by: INTERNAL MEDICINE

## 2022-08-21 PROCEDURE — 83735 ASSAY OF MAGNESIUM: CPT | Performed by: INTERNAL MEDICINE

## 2022-08-21 PROCEDURE — 10002803 HB RX 637: Performed by: NURSE PRACTITIONER

## 2022-08-21 PROCEDURE — 80048 BASIC METABOLIC PNL TOTAL CA: CPT | Performed by: INTERNAL MEDICINE

## 2022-08-21 PROCEDURE — 10002019 HB COUNTER RESP ASSESSMENT

## 2022-08-21 PROCEDURE — 84132 ASSAY OF SERUM POTASSIUM: CPT | Performed by: INTERNAL MEDICINE

## 2022-08-21 RX ORDER — POTASSIUM CHLORIDE 20 MEQ/1
40 TABLET, EXTENDED RELEASE ORAL ONCE
Status: COMPLETED | OUTPATIENT
Start: 2022-08-21 | End: 2022-08-21

## 2022-08-21 RX ADMIN — INSULIN GLARGINE 52 UNITS: 100 INJECTION, SOLUTION SUBCUTANEOUS at 10:19

## 2022-08-21 RX ADMIN — ENOXAPARIN SODIUM 40 MG: 40 INJECTION SUBCUTANEOUS at 10:19

## 2022-08-21 RX ADMIN — INSULIN GLARGINE 52 UNITS: 100 INJECTION, SOLUTION SUBCUTANEOUS at 20:30

## 2022-08-21 RX ADMIN — ISOSORBIDE MONONITRATE 30 MG: 30 TABLET, EXTENDED RELEASE ORAL at 10:15

## 2022-08-21 RX ADMIN — SODIUM CHLORIDE, PRESERVATIVE FREE 2 ML: 5 INJECTION INTRAVENOUS at 12:48

## 2022-08-21 RX ADMIN — Medication 10 MG/HR: at 22:16

## 2022-08-21 RX ADMIN — POTASSIUM CHLORIDE 40 MEQ: 20 TABLET, EXTENDED RELEASE ORAL at 10:14

## 2022-08-21 RX ADMIN — METOPROLOL SUCCINATE 25 MG: 25 TABLET, EXTENDED RELEASE ORAL at 10:14

## 2022-08-21 RX ADMIN — ENOXAPARIN SODIUM 40 MG: 40 INJECTION SUBCUTANEOUS at 20:30

## 2022-08-21 RX ADMIN — ATORVASTATIN CALCIUM 40 MG: 40 TABLET, FILM COATED ORAL at 20:28

## 2022-08-21 RX ADMIN — OXYCODONE HYDROCHLORIDE AND ACETAMINOPHEN 500 MG: 500 TABLET ORAL at 10:15

## 2022-08-21 RX ADMIN — METOPROLOL SUCCINATE 25 MG: 25 TABLET, EXTENDED RELEASE ORAL at 20:28

## 2022-08-21 RX ADMIN — Medication 50 MCG: at 10:15

## 2022-08-21 RX ADMIN — INSULIN LISPRO 14 UNITS: 100 INJECTION, SOLUTION INTRAVENOUS; SUBCUTANEOUS at 18:49

## 2022-08-21 RX ADMIN — INSULIN LISPRO 14 UNITS: 100 INJECTION, SOLUTION INTRAVENOUS; SUBCUTANEOUS at 12:46

## 2022-08-21 RX ADMIN — ALLOPURINOL 100 MG: 100 TABLET ORAL at 10:15

## 2022-08-21 ASSESSMENT — ENCOUNTER SYMPTOMS: SHORTNESS OF BREATH: 1

## 2022-08-21 ASSESSMENT — PAIN SCALES - GENERAL
PAINLEVEL_OUTOF10: 0

## 2022-08-21 ASSESSMENT — PULMONARY FUNCTION TESTS
FEV1: 0
FEV1/FVC: UNABLE TO OBTAIN, OR GREATER THAN 70%
FEV1: 0
FEV1_PREDICTED: 0
FEV1: 0

## 2022-08-22 VITALS
BODY MASS INDEX: 42.66 KG/M2 | DIASTOLIC BLOOD PRESSURE: 81 MMHG | HEIGHT: 72 IN | OXYGEN SATURATION: 93 % | RESPIRATION RATE: 14 BRPM | HEART RATE: 81 BPM | TEMPERATURE: 98.1 F | SYSTOLIC BLOOD PRESSURE: 134 MMHG | WEIGHT: 315 LBS

## 2022-08-22 PROBLEM — E87.6 HYPOKALEMIA: Status: ACTIVE | Noted: 2022-08-22

## 2022-08-22 LAB
ALBUMIN SERPL-MCNC: 3 G/DL (ref 3.6–5.1)
ALP SERPL-CCNC: 109 UNITS/L (ref 45–117)
ALT SERPL-CCNC: 21 UNITS/L
ANION GAP SERPL CALC-SCNC: 9 MMOL/L (ref 7–19)
AST SERPL-CCNC: 20 UNITS/L
BILIRUB CONJ SERPL-MCNC: 0.2 MG/DL (ref 0–0.2)
BILIRUB SERPL-MCNC: 0.5 MG/DL (ref 0.2–1)
BUN SERPL-MCNC: 50 MG/DL (ref 6–20)
BUN/CREAT SERPL: 22 (ref 7–25)
CALCIUM SERPL-MCNC: 9.3 MG/DL (ref 8.4–10.2)
CHLORIDE SERPL-SCNC: 100 MMOL/L (ref 97–110)
CO2 SERPL-SCNC: 34 MMOL/L (ref 21–32)
CREAT SERPL-MCNC: 2.23 MG/DL (ref 0.67–1.17)
DEPRECATED RDW RBC: 45.4 FL (ref 39–50)
ERYTHROCYTE [DISTWIDTH] IN BLOOD: 14 % (ref 11–15)
FASTING DURATION TIME PATIENT: ABNORMAL H
GFR SERPLBLD BASED ON 1.73 SQ M-ARVRAT: 32 ML/MIN
GLUCOSE BLDC GLUCOMTR-MCNC: 178 MG/DL (ref 70–99)
GLUCOSE BLDC GLUCOMTR-MCNC: 85 MG/DL (ref 70–99)
GLUCOSE SERPL-MCNC: 87 MG/DL (ref 70–99)
HCT VFR BLD CALC: 39.6 % (ref 39–51)
HGB BLD-MCNC: 12.4 G/DL (ref 13–17)
MCH RBC QN AUTO: 27.9 PG (ref 26–34)
MCHC RBC AUTO-ENTMCNC: 31.3 G/DL (ref 32–36.5)
MCV RBC AUTO: 89.2 FL (ref 78–100)
NRBC BLD MANUAL-RTO: 0 /100 WBC
PLATELET # BLD AUTO: 279 K/MCL (ref 140–450)
POTASSIUM SERPL-SCNC: 3.5 MMOL/L (ref 3.4–5.1)
PROT SERPL-MCNC: 7.8 G/DL (ref 6.4–8.2)
RBC # BLD: 4.44 MIL/MCL (ref 4.5–5.9)
SODIUM SERPL-SCNC: 139 MMOL/L (ref 135–145)
WBC # BLD: 9.2 K/MCL (ref 4.2–11)

## 2022-08-22 PROCEDURE — 10002803 HB RX 637: Performed by: NURSE PRACTITIONER

## 2022-08-22 PROCEDURE — 36415 COLL VENOUS BLD VENIPUNCTURE: CPT | Performed by: INTERNAL MEDICINE

## 2022-08-22 PROCEDURE — 85027 COMPLETE CBC AUTOMATED: CPT | Performed by: NURSE PRACTITIONER

## 2022-08-22 PROCEDURE — 10004281 HB COUNTER-STAFF TIME PER 15 MIN

## 2022-08-22 PROCEDURE — 10004180 HB COUNTER-TRANSPORT

## 2022-08-22 PROCEDURE — 10002800 HB RX 250 W HCPCS: Performed by: NURSE PRACTITIONER

## 2022-08-22 PROCEDURE — 80048 BASIC METABOLIC PNL TOTAL CA: CPT | Performed by: INTERNAL MEDICINE

## 2022-08-22 PROCEDURE — 80076 HEPATIC FUNCTION PANEL: CPT | Performed by: NURSE PRACTITIONER

## 2022-08-22 PROCEDURE — 99233 SBSQ HOSP IP/OBS HIGH 50: CPT | Performed by: INTERNAL MEDICINE

## 2022-08-22 PROCEDURE — 94660 CPAP INITIATION&MGMT: CPT

## 2022-08-22 RX ORDER — POTASSIUM CHLORIDE 20 MEQ/1
40 TABLET, EXTENDED RELEASE ORAL ONCE
Status: DISCONTINUED | OUTPATIENT
Start: 2022-08-22 | End: 2022-08-22 | Stop reason: HOSPADM

## 2022-08-22 RX ADMIN — ALLOPURINOL 100 MG: 100 TABLET ORAL at 09:40

## 2022-08-22 RX ADMIN — ENOXAPARIN SODIUM 40 MG: 40 INJECTION SUBCUTANEOUS at 09:43

## 2022-08-22 RX ADMIN — INSULIN GLARGINE 52 UNITS: 100 INJECTION, SOLUTION SUBCUTANEOUS at 09:43

## 2022-08-22 RX ADMIN — ISOSORBIDE MONONITRATE 30 MG: 30 TABLET, EXTENDED RELEASE ORAL at 09:42

## 2022-08-22 RX ADMIN — METOPROLOL SUCCINATE 25 MG: 25 TABLET, EXTENDED RELEASE ORAL at 09:42

## 2022-08-22 RX ADMIN — INSULIN LISPRO 14 UNITS: 100 INJECTION, SOLUTION INTRAVENOUS; SUBCUTANEOUS at 13:48

## 2022-08-22 RX ADMIN — OXYCODONE HYDROCHLORIDE AND ACETAMINOPHEN 500 MG: 500 TABLET ORAL at 09:42

## 2022-08-22 RX ADMIN — Medication 50 MCG: at 09:42

## 2022-08-22 ASSESSMENT — PAIN SCALES - GENERAL: PAINLEVEL_OUTOF10: 0

## 2022-08-22 ASSESSMENT — ENCOUNTER SYMPTOMS: SHORTNESS OF BREATH: 1

## 2022-08-23 ENCOUNTER — CASE MANAGEMENT (OUTPATIENT)
Dept: CARE COORDINATION | Age: 67
End: 2022-08-23

## 2022-08-23 ASSESSMENT — ACTIVITIES OF DAILY LIVING (ADL)
DME_IN_USE: WALKER;GLUCOMETER
DME_IN_USE: YES

## 2022-08-25 ENCOUNTER — TELEPHONE (OUTPATIENT)
Dept: CARDIOLOGY | Age: 67
End: 2022-08-25

## 2022-08-26 ENCOUNTER — OFFICE VISIT (OUTPATIENT)
Dept: CARDIOLOGY | Age: 67
End: 2022-08-26
Attending: NURSE PRACTITIONER

## 2022-08-26 VITALS
RESPIRATION RATE: 22 BRPM | SYSTOLIC BLOOD PRESSURE: 120 MMHG | BODY MASS INDEX: 47.81 KG/M2 | DIASTOLIC BLOOD PRESSURE: 64 MMHG | HEART RATE: 78 BPM | WEIGHT: 315 LBS

## 2022-08-26 DIAGNOSIS — I50.33 ACUTE ON CHRONIC DIASTOLIC HEART FAILURE (CMD): Primary | ICD-10-CM

## 2022-08-26 DIAGNOSIS — R60.9 EDEMA, UNSPECIFIED TYPE: ICD-10-CM

## 2022-08-26 DIAGNOSIS — R63.5 WEIGHT GAIN: ICD-10-CM

## 2022-08-26 DIAGNOSIS — N18.9 CHRONIC KIDNEY DISEASE, UNSPECIFIED CKD STAGE: ICD-10-CM

## 2022-08-26 DIAGNOSIS — E87.6 HYPOKALEMIA: ICD-10-CM

## 2022-08-26 DIAGNOSIS — R06.09 DYSPNEA ON EXERTION: ICD-10-CM

## 2022-08-26 LAB
ANION GAP SERPL CALC-SCNC: 9 MMOL/L (ref 7–19)
BUN SERPL-MCNC: 68 MG/DL (ref 6–20)
BUN/CREAT SERPL: 30 (ref 7–25)
CALCIUM SERPL-MCNC: 8.6 MG/DL (ref 8.4–10.2)
CHLORIDE SERPL-SCNC: 99 MMOL/L (ref 97–110)
CO2 SERPL-SCNC: 33 MMOL/L (ref 21–32)
CREAT SERPL-MCNC: 2.24 MG/DL (ref 0.67–1.17)
FASTING DURATION TIME PATIENT: ABNORMAL H
GFR SERPLBLD BASED ON 1.73 SQ M-ARVRAT: 31 ML/MIN
GLUCOSE SERPL-MCNC: 137 MG/DL (ref 70–99)
NT-PROBNP SERPL-MCNC: 825 PG/ML
POTASSIUM SERPL-SCNC: 3.4 MMOL/L (ref 3.4–5.1)
SODIUM SERPL-SCNC: 138 MMOL/L (ref 135–145)

## 2022-08-26 PROCEDURE — 10002801 HB RX 250 W/O HCPCS: Performed by: INTERNAL MEDICINE

## 2022-08-26 PROCEDURE — 80048 BASIC METABOLIC PNL TOTAL CA: CPT | Performed by: NURSE PRACTITIONER

## 2022-08-26 PROCEDURE — 36415 COLL VENOUS BLD VENIPUNCTURE: CPT | Performed by: NURSE PRACTITIONER

## 2022-08-26 PROCEDURE — 10002803 HB RX 637: Performed by: INTERNAL MEDICINE

## 2022-08-26 PROCEDURE — 83880 ASSAY OF NATRIURETIC PEPTIDE: CPT | Performed by: NURSE PRACTITIONER

## 2022-08-26 PROCEDURE — 3078F DIAST BP <80 MM HG: CPT | Performed by: NURSE PRACTITIONER

## 2022-08-26 PROCEDURE — 96374 THER/PROPH/DIAG INJ IV PUSH: CPT

## 2022-08-26 PROCEDURE — 3074F SYST BP LT 130 MM HG: CPT | Performed by: NURSE PRACTITIONER

## 2022-08-26 PROCEDURE — 99215 OFFICE O/P EST HI 40 MIN: CPT | Performed by: NURSE PRACTITIONER

## 2022-08-26 RX ORDER — POTASSIUM CHLORIDE 10 MEQ
10 TABLET, EXT RELEASE, PARTICLES/CRYSTALS ORAL ONCE
Status: COMPLETED | OUTPATIENT
Start: 2022-08-26 | End: 2022-08-26

## 2022-08-26 RX ORDER — METOPROLOL SUCCINATE 25 MG/1
25 TABLET, EXTENDED RELEASE ORAL DAILY
Qty: 60 TABLET | Refills: 3 | Status: SHIPPED | OUTPATIENT
Start: 2022-08-26

## 2022-08-26 RX ORDER — SPIRONOLACTONE 25 MG/1
25 TABLET ORAL 2 TIMES DAILY
Qty: 60 TABLET | Refills: 3 | Status: SHIPPED | OUTPATIENT
Start: 2022-08-26 | End: 2022-09-01 | Stop reason: DRUGHIGH

## 2022-08-26 RX ORDER — TORSEMIDE 20 MG/1
40 TABLET ORAL 2 TIMES DAILY
Qty: 150 TABLET | Refills: 3 | Status: SHIPPED | OUTPATIENT
Start: 2022-08-26 | End: 2022-09-01 | Stop reason: DRUGHIGH

## 2022-08-26 RX ORDER — BUMETANIDE 0.25 MG/ML
2.5 INJECTION INTRAMUSCULAR; INTRAVENOUS ONCE
Status: COMPLETED | OUTPATIENT
Start: 2022-08-26 | End: 2022-08-26

## 2022-08-26 RX ADMIN — POTASSIUM CHLORIDE 10 MEQ: 750 TABLET, FILM COATED, EXTENDED RELEASE ORAL at 09:46

## 2022-08-26 RX ADMIN — BUMETANIDE 2.5 MG: 0.25 INJECTION INTRAMUSCULAR; INTRAVENOUS at 09:45

## 2022-08-31 ENCOUNTER — TELEPHONE (OUTPATIENT)
Dept: CARDIOLOGY | Age: 67
End: 2022-08-31

## 2022-09-01 ENCOUNTER — TELEPHONE (OUTPATIENT)
Dept: CARDIOLOGY | Age: 67
End: 2022-09-01

## 2022-09-01 ENCOUNTER — OFFICE VISIT (OUTPATIENT)
Dept: CARDIOLOGY | Age: 67
End: 2022-09-01
Attending: NURSE PRACTITIONER

## 2022-09-01 VITALS
DIASTOLIC BLOOD PRESSURE: 76 MMHG | SYSTOLIC BLOOD PRESSURE: 128 MMHG | HEART RATE: 77 BPM | WEIGHT: 315 LBS | BODY MASS INDEX: 48.24 KG/M2

## 2022-09-01 DIAGNOSIS — N18.9 CHRONIC KIDNEY DISEASE, UNSPECIFIED CKD STAGE: ICD-10-CM

## 2022-09-01 DIAGNOSIS — E13.51 PERIPHERAL VASCULAR DISEASE DUE TO SECONDARY DIABETES MELLITUS (CMD): Chronic | ICD-10-CM

## 2022-09-01 DIAGNOSIS — E87.6 HYPOKALEMIA: ICD-10-CM

## 2022-09-01 DIAGNOSIS — I50.32 CHRONIC DIASTOLIC HEART FAILURE (CMD): Primary | ICD-10-CM

## 2022-09-01 DIAGNOSIS — R60.0 LEG EDEMA: ICD-10-CM

## 2022-09-01 DIAGNOSIS — R06.09 DYSPNEA ON EXERTION: ICD-10-CM

## 2022-09-01 LAB
ANION GAP SERPL CALC-SCNC: 6 MMOL/L (ref 7–19)
BUN SERPL-MCNC: 44 MG/DL (ref 6–20)
BUN/CREAT SERPL: 20 (ref 7–25)
CALCIUM SERPL-MCNC: 8.4 MG/DL (ref 8.4–10.2)
CHLORIDE SERPL-SCNC: 102 MMOL/L (ref 97–110)
CO2 SERPL-SCNC: 35 MMOL/L (ref 21–32)
CREAT SERPL-MCNC: 2.17 MG/DL (ref 0.67–1.17)
FASTING DURATION TIME PATIENT: ABNORMAL H
GFR SERPLBLD BASED ON 1.73 SQ M-ARVRAT: 33 ML/MIN
GLUCOSE SERPL-MCNC: 85 MG/DL (ref 70–99)
NT-PROBNP SERPL-MCNC: 866 PG/ML
POTASSIUM SERPL-SCNC: 3.4 MMOL/L (ref 3.4–5.1)
SODIUM SERPL-SCNC: 140 MMOL/L (ref 135–145)

## 2022-09-01 PROCEDURE — 10002801 HB RX 250 W/O HCPCS: Performed by: NURSE PRACTITIONER

## 2022-09-01 PROCEDURE — 80048 BASIC METABOLIC PNL TOTAL CA: CPT | Performed by: NURSE PRACTITIONER

## 2022-09-01 PROCEDURE — 10002803 HB RX 637: Performed by: NURSE PRACTITIONER

## 2022-09-01 PROCEDURE — 96374 THER/PROPH/DIAG INJ IV PUSH: CPT

## 2022-09-01 PROCEDURE — 3074F SYST BP LT 130 MM HG: CPT | Performed by: NURSE PRACTITIONER

## 2022-09-01 PROCEDURE — 3078F DIAST BP <80 MM HG: CPT | Performed by: NURSE PRACTITIONER

## 2022-09-01 PROCEDURE — 99214 OFFICE O/P EST MOD 30 MIN: CPT | Performed by: NURSE PRACTITIONER

## 2022-09-01 PROCEDURE — 36415 COLL VENOUS BLD VENIPUNCTURE: CPT | Performed by: NURSE PRACTITIONER

## 2022-09-01 PROCEDURE — 83880 ASSAY OF NATRIURETIC PEPTIDE: CPT | Performed by: NURSE PRACTITIONER

## 2022-09-01 RX ORDER — BUMETANIDE 0.25 MG/ML
2.5 INJECTION INTRAMUSCULAR; INTRAVENOUS ONCE
Status: COMPLETED | OUTPATIENT
Start: 2022-09-01 | End: 2022-09-01

## 2022-09-01 RX ORDER — SPIRONOLACTONE 25 MG/1
25 TABLET ORAL 2 TIMES DAILY
Qty: 90 TABLET | Refills: 3 | Status: SHIPPED | COMMUNITY
Start: 2022-09-01

## 2022-09-01 RX ORDER — POTASSIUM CHLORIDE 10 MEQ
10 TABLET, EXT RELEASE, PARTICLES/CRYSTALS ORAL ONCE
Status: COMPLETED | OUTPATIENT
Start: 2022-09-01 | End: 2022-09-01

## 2022-09-01 RX ORDER — TORSEMIDE 20 MG/1
60 TABLET ORAL 3 TIMES DAILY
Qty: 150 TABLET | Refills: 3 | Status: SHIPPED | COMMUNITY
Start: 2022-09-01 | End: 2022-12-27 | Stop reason: SDUPTHER

## 2022-09-01 RX ORDER — INSULIN GLARGINE 100 [IU]/ML
50 INJECTION, SOLUTION SUBCUTANEOUS 2 TIMES DAILY
COMMUNITY

## 2022-09-01 RX ADMIN — POTASSIUM CHLORIDE 10 MEQ: 750 TABLET, FILM COATED, EXTENDED RELEASE ORAL at 09:24

## 2022-09-01 RX ADMIN — BUMETANIDE 2.5 MG: 0.25 INJECTION INTRAMUSCULAR; INTRAVENOUS at 09:24

## 2022-09-07 ENCOUNTER — TELEPHONE (OUTPATIENT)
Dept: CARDIOLOGY | Age: 67
End: 2022-09-07

## 2022-09-08 ENCOUNTER — HOSPITAL ENCOUNTER (OUTPATIENT)
Dept: WOUND CARE | Age: 67
Discharge: STILL ADMITTED OR ADMINISTRATIVE | End: 2022-09-08
Attending: PODIATRIST

## 2022-09-08 ENCOUNTER — OFFICE VISIT (OUTPATIENT)
Dept: CARDIOLOGY | Age: 67
End: 2022-09-08
Attending: NURSE PRACTITIONER

## 2022-09-08 VITALS
BODY MASS INDEX: 47.51 KG/M2 | WEIGHT: 315 LBS | DIASTOLIC BLOOD PRESSURE: 76 MMHG | HEART RATE: 84 BPM | SYSTOLIC BLOOD PRESSURE: 128 MMHG

## 2022-09-08 DIAGNOSIS — N18.9 CHRONIC KIDNEY DISEASE, UNSPECIFIED CKD STAGE: ICD-10-CM

## 2022-09-08 DIAGNOSIS — R06.09 DYSPNEA ON EXERTION: ICD-10-CM

## 2022-09-08 DIAGNOSIS — R60.0 LEG EDEMA: ICD-10-CM

## 2022-09-08 DIAGNOSIS — I50.33 ACUTE ON CHRONIC DIASTOLIC HEART FAILURE (CMD): Primary | ICD-10-CM

## 2022-09-08 LAB
ANION GAP SERPL CALC-SCNC: 10 MMOL/L (ref 7–19)
BUN SERPL-MCNC: 50 MG/DL (ref 6–20)
BUN/CREAT SERPL: 19 (ref 7–25)
CALCIUM SERPL-MCNC: 8.9 MG/DL (ref 8.4–10.2)
CHLORIDE SERPL-SCNC: 100 MMOL/L (ref 97–110)
CO2 SERPL-SCNC: 31 MMOL/L (ref 21–32)
CREAT SERPL-MCNC: 2.67 MG/DL (ref 0.67–1.17)
FASTING DURATION TIME PATIENT: ABNORMAL H
GFR SERPLBLD BASED ON 1.73 SQ M-ARVRAT: 25 ML/MIN
GLUCOSE SERPL-MCNC: 166 MG/DL (ref 70–99)
NT-PROBNP SERPL-MCNC: 640 PG/ML
POTASSIUM SERPL-SCNC: 4 MMOL/L (ref 3.4–5.1)
SODIUM SERPL-SCNC: 137 MMOL/L (ref 135–145)

## 2022-09-08 PROCEDURE — 3074F SYST BP LT 130 MM HG: CPT | Performed by: NURSE PRACTITIONER

## 2022-09-08 PROCEDURE — 36415 COLL VENOUS BLD VENIPUNCTURE: CPT | Performed by: NURSE PRACTITIONER

## 2022-09-08 PROCEDURE — 83880 ASSAY OF NATRIURETIC PEPTIDE: CPT | Performed by: NURSE PRACTITIONER

## 2022-09-08 PROCEDURE — 3078F DIAST BP <80 MM HG: CPT | Performed by: NURSE PRACTITIONER

## 2022-09-08 PROCEDURE — 99211 OFF/OP EST MAY X REQ PHY/QHP: CPT

## 2022-09-08 PROCEDURE — 99213 OFFICE O/P EST LOW 20 MIN: CPT | Performed by: NURSE PRACTITIONER

## 2022-09-08 PROCEDURE — 80048 BASIC METABOLIC PNL TOTAL CA: CPT | Performed by: NURSE PRACTITIONER

## 2022-09-09 ENCOUNTER — CASE MANAGEMENT (OUTPATIENT)
Dept: CARE COORDINATION | Age: 67
End: 2022-09-09

## 2022-09-09 ENCOUNTER — OFFICE VISIT (OUTPATIENT)
Dept: CARDIOLOGY | Age: 67
End: 2022-09-09

## 2022-09-09 VITALS
HEART RATE: 74 BPM | OXYGEN SATURATION: 95 % | BODY MASS INDEX: 42.66 KG/M2 | WEIGHT: 315 LBS | SYSTOLIC BLOOD PRESSURE: 115 MMHG | DIASTOLIC BLOOD PRESSURE: 66 MMHG | HEIGHT: 72 IN

## 2022-09-09 DIAGNOSIS — I50.23 ACUTE ON CHRONIC SYSTOLIC CONGESTIVE HEART FAILURE (CMD): ICD-10-CM

## 2022-09-09 DIAGNOSIS — I10 BENIGN ESSENTIAL HYPERTENSION: ICD-10-CM

## 2022-09-09 DIAGNOSIS — I50.32 CHRONIC DIASTOLIC HEART FAILURE (CMD): Primary | ICD-10-CM

## 2022-09-09 DIAGNOSIS — I73.9 PVD (PERIPHERAL VASCULAR DISEASE) (CMD): ICD-10-CM

## 2022-09-09 PROCEDURE — 3074F SYST BP LT 130 MM HG: CPT | Performed by: INTERNAL MEDICINE

## 2022-09-09 PROCEDURE — 99214 OFFICE O/P EST MOD 30 MIN: CPT | Performed by: INTERNAL MEDICINE

## 2022-09-09 PROCEDURE — 3078F DIAST BP <80 MM HG: CPT | Performed by: INTERNAL MEDICINE

## 2022-09-09 RX ORDER — FAMOTIDINE 20 MG/1
20 TABLET, FILM COATED ORAL DAILY
Qty: 90 TABLET | Refills: 11 | Status: SHIPPED | OUTPATIENT
Start: 2022-09-09

## 2022-09-13 ENCOUNTER — CASE MANAGEMENT (OUTPATIENT)
Dept: CARE COORDINATION | Age: 67
End: 2022-09-13

## 2022-09-15 ENCOUNTER — HOSPITAL ENCOUNTER (OUTPATIENT)
Dept: WOUND CARE | Age: 67
Discharge: STILL ADMITTED OR ADMINISTRATIVE | End: 2022-09-15
Attending: PODIATRIST

## 2022-09-15 ENCOUNTER — OFFICE VISIT (OUTPATIENT)
Dept: CARDIOLOGY | Age: 67
End: 2022-09-15
Attending: NURSE PRACTITIONER

## 2022-09-15 VITALS
WEIGHT: 315 LBS | DIASTOLIC BLOOD PRESSURE: 82 MMHG | BODY MASS INDEX: 47.3 KG/M2 | SYSTOLIC BLOOD PRESSURE: 128 MMHG | HEART RATE: 74 BPM

## 2022-09-15 VITALS — TEMPERATURE: 97 F

## 2022-09-15 DIAGNOSIS — E11.621 TYPE 2 DIABETES MELLITUS WITH RIGHT DIABETIC FOOT ULCER (CMD): ICD-10-CM

## 2022-09-15 DIAGNOSIS — I50.23 ACUTE ON CHRONIC SYSTOLIC CONGESTIVE HEART FAILURE (CMD): ICD-10-CM

## 2022-09-15 DIAGNOSIS — Z89.422 STATUS POST AMPUTATION OF LESSER TOE, LEFT (CMD): Primary | ICD-10-CM

## 2022-09-15 DIAGNOSIS — N18.9 CHRONIC KIDNEY DISEASE, UNSPECIFIED CKD STAGE: ICD-10-CM

## 2022-09-15 DIAGNOSIS — S91.302D OPEN WOUND OF LEFT FOOT, SUBSEQUENT ENCOUNTER: ICD-10-CM

## 2022-09-15 DIAGNOSIS — I50.32 CHRONIC DIASTOLIC HEART FAILURE (CMD): Primary | ICD-10-CM

## 2022-09-15 DIAGNOSIS — L97.412 MIDFOOT ULCERATION, RIGHT, WITH FAT LAYER EXPOSED (CMD): ICD-10-CM

## 2022-09-15 DIAGNOSIS — M86.671 OTHER CHRONIC OSTEOMYELITIS OF RIGHT FOOT (CMD): ICD-10-CM

## 2022-09-15 DIAGNOSIS — L97.519 TYPE 2 DIABETES MELLITUS WITH RIGHT DIABETIC FOOT ULCER (CMD): ICD-10-CM

## 2022-09-15 DIAGNOSIS — Z79.4 TYPE 2 DIABETES MELLITUS WITH OTHER SKIN COMPLICATION, WITH LONG-TERM CURRENT USE OF INSULIN (CMD): ICD-10-CM

## 2022-09-15 DIAGNOSIS — E11.628 TYPE 2 DIABETES MELLITUS WITH OTHER SKIN COMPLICATION, WITH LONG-TERM CURRENT USE OF INSULIN (CMD): ICD-10-CM

## 2022-09-15 LAB
ANION GAP SERPL CALC-SCNC: 9 MMOL/L (ref 7–19)
BUN SERPL-MCNC: 66 MG/DL (ref 6–20)
BUN/CREAT SERPL: 26 (ref 7–25)
CALCIUM SERPL-MCNC: 9.2 MG/DL (ref 8.4–10.2)
CHLORIDE SERPL-SCNC: 99 MMOL/L (ref 97–110)
CO2 SERPL-SCNC: 32 MMOL/L (ref 21–32)
CREAT SERPL-MCNC: 2.51 MG/DL (ref 0.67–1.17)
FASTING DURATION TIME PATIENT: ABNORMAL H
GFR SERPLBLD BASED ON 1.73 SQ M-ARVRAT: 27 ML/MIN
GLUCOSE SERPL-MCNC: 84 MG/DL (ref 70–99)
NT-PROBNP SERPL-MCNC: 760 PG/ML
POTASSIUM SERPL-SCNC: 4.2 MMOL/L (ref 3.4–5.1)
SODIUM SERPL-SCNC: 136 MMOL/L (ref 135–145)

## 2022-09-15 PROCEDURE — 11042 DBRDMT SUBQ TIS 1ST 20SQCM/<: CPT

## 2022-09-15 PROCEDURE — 83880 ASSAY OF NATRIURETIC PEPTIDE: CPT | Performed by: NURSE PRACTITIONER

## 2022-09-15 PROCEDURE — 99211 OFF/OP EST MAY X REQ PHY/QHP: CPT

## 2022-09-15 PROCEDURE — 36415 COLL VENOUS BLD VENIPUNCTURE: CPT | Performed by: NURSE PRACTITIONER

## 2022-09-15 PROCEDURE — 3079F DIAST BP 80-89 MM HG: CPT | Performed by: NURSE PRACTITIONER

## 2022-09-15 PROCEDURE — 80048 BASIC METABOLIC PNL TOTAL CA: CPT | Performed by: NURSE PRACTITIONER

## 2022-09-15 PROCEDURE — 99213 OFFICE O/P EST LOW 20 MIN: CPT | Performed by: NURSE PRACTITIONER

## 2022-09-15 PROCEDURE — 3074F SYST BP LT 130 MM HG: CPT | Performed by: NURSE PRACTITIONER

## 2022-09-15 ASSESSMENT — PAIN SCALES - GENERAL: PAINLEVEL_OUTOF10: 0

## 2022-09-15 ASSESSMENT — PAIN SCALES - WONG BAKER: WONGBAKER_NUMERICALRESPONSE: 0

## 2022-09-23 ENCOUNTER — CASE MANAGEMENT (OUTPATIENT)
Dept: CARE COORDINATION | Age: 67
End: 2022-09-23

## 2022-09-23 DIAGNOSIS — Z79.4 DIABETES MELLITUS TYPE 2, INSULIN DEPENDENT (CMD): ICD-10-CM

## 2022-09-23 DIAGNOSIS — E11.9 DIABETES MELLITUS TYPE 2, INSULIN DEPENDENT (CMD): ICD-10-CM

## 2022-09-23 DIAGNOSIS — I50.9 CONGESTIVE HEART FAILURE, UNSPECIFIED HF CHRONICITY, UNSPECIFIED HEART FAILURE TYPE (CMD): Primary | ICD-10-CM

## 2022-09-27 ENCOUNTER — CASE MANAGEMENT (OUTPATIENT)
Dept: CARE COORDINATION | Age: 67
End: 2022-09-27

## 2022-09-28 ENCOUNTER — CASE MANAGEMENT (OUTPATIENT)
Dept: CARE COORDINATION | Age: 67
End: 2022-09-28

## 2022-09-29 ENCOUNTER — OFFICE VISIT (OUTPATIENT)
Dept: CARDIOLOGY | Age: 67
End: 2022-09-29
Attending: NURSE PRACTITIONER

## 2022-09-29 ENCOUNTER — HOSPITAL ENCOUNTER (OUTPATIENT)
Dept: WOUND CARE | Age: 67
Discharge: STILL ADMITTED OR ADMINISTRATIVE | End: 2022-09-29
Attending: PODIATRIST

## 2022-09-29 VITALS
SYSTOLIC BLOOD PRESSURE: 120 MMHG | BODY MASS INDEX: 47.2 KG/M2 | WEIGHT: 315 LBS | DIASTOLIC BLOOD PRESSURE: 70 MMHG | HEART RATE: 84 BPM

## 2022-09-29 VITALS — TEMPERATURE: 95.6 F

## 2022-09-29 DIAGNOSIS — S91.301D OPEN WOUND OF RIGHT FOOT, SUBSEQUENT ENCOUNTER: ICD-10-CM

## 2022-09-29 DIAGNOSIS — I50.32 CHRONIC DIASTOLIC HEART FAILURE (CMD): Primary | ICD-10-CM

## 2022-09-29 DIAGNOSIS — R60.0 LEG EDEMA: ICD-10-CM

## 2022-09-29 DIAGNOSIS — N18.30 STAGE 3 CHRONIC KIDNEY DISEASE, UNSPECIFIED WHETHER STAGE 3A OR 3B CKD (CMD): ICD-10-CM

## 2022-09-29 DIAGNOSIS — I73.9 PVD (PERIPHERAL VASCULAR DISEASE) (CMD): ICD-10-CM

## 2022-09-29 DIAGNOSIS — L97.412 MIDFOOT ULCERATION, RIGHT, WITH FAT LAYER EXPOSED (CMD): ICD-10-CM

## 2022-09-29 DIAGNOSIS — E11.628 TYPE 2 DIABETES MELLITUS WITH OTHER SKIN COMPLICATION, WITH LONG-TERM CURRENT USE OF INSULIN (CMD): ICD-10-CM

## 2022-09-29 DIAGNOSIS — Z79.4 TYPE 2 DIABETES MELLITUS WITH OTHER SKIN COMPLICATION, WITH LONG-TERM CURRENT USE OF INSULIN (CMD): ICD-10-CM

## 2022-09-29 DIAGNOSIS — L97.519 TYPE 2 DIABETES MELLITUS WITH RIGHT DIABETIC FOOT ULCER (CMD): Primary | ICD-10-CM

## 2022-09-29 DIAGNOSIS — Z89.422 STATUS POST AMPUTATION OF LESSER TOE, LEFT (CMD): ICD-10-CM

## 2022-09-29 DIAGNOSIS — S91.302D OPEN WOUND OF LEFT FOOT, SUBSEQUENT ENCOUNTER: ICD-10-CM

## 2022-09-29 DIAGNOSIS — E11.621 TYPE 2 DIABETES MELLITUS WITH RIGHT DIABETIC FOOT ULCER (CMD): Primary | ICD-10-CM

## 2022-09-29 DIAGNOSIS — Z89.429 STATUS POST AMPUTATION OF TOE (CMD): ICD-10-CM

## 2022-09-29 DIAGNOSIS — M86.071 ACUTE HEMATOGENOUS OSTEOMYELITIS OF RIGHT FOOT (CMD): ICD-10-CM

## 2022-09-29 LAB
ALBUMIN SERPL-MCNC: 3.3 G/DL (ref 3.6–5.1)
ALBUMIN/GLOB SERPL: 0.7 {RATIO} (ref 1–2.4)
ALP SERPL-CCNC: 111 UNITS/L (ref 45–117)
ALT SERPL-CCNC: 21 UNITS/L
ANION GAP SERPL CALC-SCNC: 9 MMOL/L (ref 7–19)
AST SERPL-CCNC: 20 UNITS/L
BILIRUB SERPL-MCNC: 0.5 MG/DL (ref 0.2–1)
BUN SERPL-MCNC: 63 MG/DL (ref 6–20)
BUN/CREAT SERPL: 26 (ref 7–25)
CALCIUM SERPL-MCNC: 8.9 MG/DL (ref 8.4–10.2)
CHLORIDE SERPL-SCNC: 102 MMOL/L (ref 97–110)
CO2 SERPL-SCNC: 31 MMOL/L (ref 21–32)
CREAT SERPL-MCNC: 2.41 MG/DL (ref 0.67–1.17)
FASTING DURATION TIME PATIENT: ABNORMAL H
GFR SERPLBLD BASED ON 1.73 SQ M-ARVRAT: 29 ML/MIN
GLOBULIN SER-MCNC: 4.6 G/DL (ref 2–4)
GLUCOSE SERPL-MCNC: 135 MG/DL (ref 70–99)
NT-PROBNP SERPL-MCNC: 628 PG/ML
POTASSIUM SERPL-SCNC: 4.3 MMOL/L (ref 3.4–5.1)
PROT SERPL-MCNC: 7.9 G/DL (ref 6.4–8.2)
SODIUM SERPL-SCNC: 138 MMOL/L (ref 135–145)

## 2022-09-29 PROCEDURE — 3078F DIAST BP <80 MM HG: CPT | Performed by: NURSE PRACTITIONER

## 2022-09-29 PROCEDURE — 11042 DBRDMT SUBQ TIS 1ST 20SQCM/<: CPT

## 2022-09-29 PROCEDURE — 80053 COMPREHEN METABOLIC PANEL: CPT | Performed by: NURSE PRACTITIONER

## 2022-09-29 PROCEDURE — 99211 OFF/OP EST MAY X REQ PHY/QHP: CPT

## 2022-09-29 PROCEDURE — 36415 COLL VENOUS BLD VENIPUNCTURE: CPT | Performed by: NURSE PRACTITIONER

## 2022-09-29 PROCEDURE — 3074F SYST BP LT 130 MM HG: CPT | Performed by: NURSE PRACTITIONER

## 2022-09-29 PROCEDURE — 83880 ASSAY OF NATRIURETIC PEPTIDE: CPT | Performed by: NURSE PRACTITIONER

## 2022-09-29 PROCEDURE — 99213 OFFICE O/P EST LOW 20 MIN: CPT | Performed by: NURSE PRACTITIONER

## 2022-09-29 ASSESSMENT — PAIN SCALES - GENERAL: PAINLEVEL_OUTOF10: 0

## 2022-09-29 ASSESSMENT — PAIN SCALES - WONG BAKER: WONGBAKER_NUMERICALRESPONSE: 0

## 2022-10-04 ENCOUNTER — APPOINTMENT (OUTPATIENT)
Dept: MRI IMAGING | Age: 67
End: 2022-10-04
Attending: PODIATRIST

## 2022-10-05 ENCOUNTER — CASE MANAGEMENT (OUTPATIENT)
Dept: CARE COORDINATION | Age: 67
End: 2022-10-05

## 2022-10-12 ENCOUNTER — HOSPITAL ENCOUNTER (OUTPATIENT)
Dept: MRI IMAGING | Age: 67
Discharge: HOME | End: 2022-10-12
Attending: PODIATRIST

## 2022-10-12 ENCOUNTER — APPOINTMENT (OUTPATIENT)
Dept: MRI IMAGING | Age: 67
End: 2022-10-12
Attending: PODIATRIST

## 2022-10-12 DIAGNOSIS — L97.412 MIDFOOT ULCERATION, RIGHT, WITH FAT LAYER EXPOSED (CMD): ICD-10-CM

## 2022-10-12 PROCEDURE — 73718 MRI LOWER EXTREMITY W/O DYE: CPT

## 2022-10-12 PROCEDURE — G1004 CDSM NDSC: HCPCS

## 2022-10-13 ENCOUNTER — APPOINTMENT (OUTPATIENT)
Dept: WOUND CARE | Age: 67
End: 2022-10-13
Attending: PODIATRIST

## 2022-10-18 ENCOUNTER — APPOINTMENT (OUTPATIENT)
Dept: CARDIOLOGY | Age: 67
End: 2022-10-18

## 2022-10-20 ENCOUNTER — HOSPITAL ENCOUNTER (OUTPATIENT)
Dept: WOUND CARE | Age: 67
Discharge: STILL ADMITTED OR ADMINISTRATIVE | End: 2022-10-20
Attending: PODIATRIST

## 2022-10-20 VITALS — TEMPERATURE: 96.4 F

## 2022-10-20 DIAGNOSIS — S91.301D OPEN WOUND OF RIGHT FOOT, SUBSEQUENT ENCOUNTER: ICD-10-CM

## 2022-10-20 DIAGNOSIS — Z89.422 STATUS POST AMPUTATION OF LESSER TOE, LEFT (CMD): ICD-10-CM

## 2022-10-20 DIAGNOSIS — I73.9 PVD (PERIPHERAL VASCULAR DISEASE) (CMD): ICD-10-CM

## 2022-10-20 DIAGNOSIS — L97.519 TYPE 2 DIABETES MELLITUS WITH RIGHT DIABETIC FOOT ULCER (CMD): ICD-10-CM

## 2022-10-20 DIAGNOSIS — E11.621 TYPE 2 DIABETES MELLITUS WITH RIGHT DIABETIC FOOT ULCER (CMD): ICD-10-CM

## 2022-10-20 DIAGNOSIS — L97.412 MIDFOOT ULCERATION, RIGHT, WITH FAT LAYER EXPOSED (CMD): ICD-10-CM

## 2022-10-20 DIAGNOSIS — M86.671 OTHER CHRONIC OSTEOMYELITIS OF RIGHT FOOT (CMD): Primary | ICD-10-CM

## 2022-10-20 DIAGNOSIS — S91.302D OPEN WOUND OF LEFT FOOT, SUBSEQUENT ENCOUNTER: ICD-10-CM

## 2022-10-20 PROCEDURE — 11042 DBRDMT SUBQ TIS 1ST 20SQCM/<: CPT

## 2022-10-20 ASSESSMENT — PAIN SCALES - GENERAL: PAINLEVEL_OUTOF10: 0

## 2022-11-03 ENCOUNTER — HOSPITAL ENCOUNTER (OUTPATIENT)
Dept: WOUND CARE | Age: 67
Discharge: STILL ADMITTED OR ADMINISTRATIVE | End: 2022-11-03
Attending: PODIATRIST

## 2022-11-03 VITALS — TEMPERATURE: 96.3 F

## 2022-11-03 DIAGNOSIS — Z89.422 STATUS POST AMPUTATION OF LESSER TOE, LEFT (CMD): Primary | ICD-10-CM

## 2022-11-03 DIAGNOSIS — L97.412 MIDFOOT ULCERATION, RIGHT, WITH FAT LAYER EXPOSED (CMD): ICD-10-CM

## 2022-11-03 DIAGNOSIS — L97.519 TYPE 2 DIABETES MELLITUS WITH RIGHT DIABETIC FOOT ULCER (CMD): ICD-10-CM

## 2022-11-03 DIAGNOSIS — E11.621 TYPE 2 DIABETES MELLITUS WITH RIGHT DIABETIC FOOT ULCER (CMD): ICD-10-CM

## 2022-11-03 DIAGNOSIS — M86.671 OTHER CHRONIC OSTEOMYELITIS OF RIGHT FOOT (CMD): ICD-10-CM

## 2022-11-03 PROCEDURE — 11042 DBRDMT SUBQ TIS 1ST 20SQCM/<: CPT

## 2022-11-03 ASSESSMENT — PAIN SCALES - GENERAL: PAINLEVEL_OUTOF10: 0

## 2022-11-03 ASSESSMENT — PAIN SCALES - WONG BAKER: WONGBAKER_NUMERICALRESPONSE: 0

## 2022-11-14 ENCOUNTER — EXTERNAL RECORD (OUTPATIENT)
Dept: HEALTH INFORMATION MANAGEMENT | Facility: OTHER | Age: 67
End: 2022-11-14

## 2022-11-18 ENCOUNTER — EXTERNAL RECORD (OUTPATIENT)
Dept: HEALTH INFORMATION MANAGEMENT | Facility: OTHER | Age: 67
End: 2022-11-18

## 2022-11-19 ENCOUNTER — EXTERNAL RECORD (OUTPATIENT)
Dept: HEALTH INFORMATION MANAGEMENT | Facility: OTHER | Age: 67
End: 2022-11-19

## 2022-12-01 ENCOUNTER — HOSPITAL ENCOUNTER (OUTPATIENT)
Dept: WOUND CARE | Age: 67
Discharge: STILL ADMITTED OR ADMINISTRATIVE | End: 2022-12-01
Attending: PODIATRIST

## 2022-12-01 VITALS — TEMPERATURE: 96.6 F

## 2022-12-01 DIAGNOSIS — Z89.422 STATUS POST AMPUTATION OF LESSER TOE, LEFT (CMD): Primary | ICD-10-CM

## 2022-12-01 DIAGNOSIS — E11.621 TYPE 2 DIABETES MELLITUS WITH RIGHT DIABETIC FOOT ULCER (CMD): ICD-10-CM

## 2022-12-01 DIAGNOSIS — L97.519 TYPE 2 DIABETES MELLITUS WITH RIGHT DIABETIC FOOT ULCER (CMD): ICD-10-CM

## 2022-12-01 DIAGNOSIS — R60.0 LEG EDEMA: ICD-10-CM

## 2022-12-01 DIAGNOSIS — M86.671 OTHER CHRONIC OSTEOMYELITIS OF RIGHT FOOT (CMD): ICD-10-CM

## 2022-12-01 DIAGNOSIS — S91.302D OPEN WOUND OF LEFT FOOT, SUBSEQUENT ENCOUNTER: ICD-10-CM

## 2022-12-01 PROCEDURE — 11042 DBRDMT SUBQ TIS 1ST 20SQCM/<: CPT

## 2022-12-01 ASSESSMENT — PAIN SCALES - GENERAL: PAINLEVEL_OUTOF10: 0

## 2022-12-02 ENCOUNTER — OFFICE VISIT (OUTPATIENT)
Dept: CARDIOLOGY | Age: 67
End: 2022-12-02

## 2022-12-02 VITALS
BODY MASS INDEX: 42.66 KG/M2 | DIASTOLIC BLOOD PRESSURE: 84 MMHG | SYSTOLIC BLOOD PRESSURE: 148 MMHG | OXYGEN SATURATION: 94 % | WEIGHT: 315 LBS | HEART RATE: 76 BPM | HEIGHT: 72 IN

## 2022-12-02 DIAGNOSIS — I73.9 PVD (PERIPHERAL VASCULAR DISEASE) (CMD): ICD-10-CM

## 2022-12-02 DIAGNOSIS — E78.00 HYPERCHOLESTEROLEMIA: ICD-10-CM

## 2022-12-02 DIAGNOSIS — I50.32 CHRONIC DIASTOLIC HEART FAILURE (CMD): Primary | ICD-10-CM

## 2022-12-02 DIAGNOSIS — I50.23 ACUTE ON CHRONIC SYSTOLIC CONGESTIVE HEART FAILURE (CMD): ICD-10-CM

## 2022-12-02 DIAGNOSIS — I10 BENIGN ESSENTIAL HYPERTENSION: ICD-10-CM

## 2022-12-02 PROCEDURE — 3077F SYST BP >= 140 MM HG: CPT | Performed by: INTERNAL MEDICINE

## 2022-12-02 PROCEDURE — 3079F DIAST BP 80-89 MM HG: CPT | Performed by: INTERNAL MEDICINE

## 2022-12-02 PROCEDURE — 99214 OFFICE O/P EST MOD 30 MIN: CPT | Performed by: INTERNAL MEDICINE

## 2022-12-02 SDOH — HEALTH STABILITY: MENTAL HEALTH: FEELING DOWN, DEPRESSED OR HOPELESS?: NOT AT ALL

## 2022-12-02 SDOH — HEALTH STABILITY: MENTAL HEALTH: PHQ2 INTERPRETATION: NO FURTHER SCREENING NEEDED

## 2022-12-02 SDOH — HEALTH STABILITY: PHYSICAL HEALTH: ON AVERAGE, HOW MANY MINUTES DO YOU ENGAGE IN EXERCISE AT THIS LEVEL?: 0 MIN

## 2022-12-02 SDOH — HEALTH STABILITY: MENTAL HEALTH: DEPRESSION SCREENING SCORE: 0

## 2022-12-02 SDOH — HEALTH STABILITY: PHYSICAL HEALTH: ON AVERAGE, HOW MANY DAYS PER WEEK DO YOU ENGAGE IN MODERATE TO STRENUOUS EXERCISE (LIKE A BRISK WALK)?: 0 DAYS

## 2022-12-02 SDOH — HEALTH STABILITY: MENTAL HEALTH: LITTLE INTEREST OR PLEASURE IN ACTIVITY?: NOT AT ALL

## 2022-12-02 ASSESSMENT — PATIENT HEALTH QUESTIONNAIRE - PHQ9: SUM OF ALL RESPONSES TO PHQ9 QUESTIONS 1 AND 2: 0

## 2022-12-05 ENCOUNTER — HOSPITAL ENCOUNTER (OUTPATIENT)
Dept: LAB | Age: 67
Discharge: HOME | End: 2022-12-05
Attending: NURSE PRACTITIONER

## 2022-12-05 DIAGNOSIS — N18.32 CHRONIC KIDNEY DISEASE, STAGE 3B (CMD): ICD-10-CM

## 2022-12-05 DIAGNOSIS — I50.30 UNSPECIFIED DIASTOLIC (CONGESTIVE) HEART FAILURE (CMD): ICD-10-CM

## 2022-12-05 DIAGNOSIS — E11.21 TYPE 2 DIABETES MELLITUS WITH DIABETIC NEPHROPATHY (CMD): Primary | ICD-10-CM

## 2022-12-05 DIAGNOSIS — M86.9 OSTEOMYELITIS, UNSPECIFIED (CMD): ICD-10-CM

## 2022-12-05 LAB
ALBUMIN SERPL-MCNC: 3.5 G/DL (ref 3.6–5.1)
ALBUMIN/GLOB SERPL: 0.9 {RATIO} (ref 1–2.4)
ALP SERPL-CCNC: 111 UNITS/L (ref 45–117)
ALT SERPL-CCNC: 23 UNITS/L
ANION GAP SERPL CALC-SCNC: 12 MMOL/L (ref 7–19)
AST SERPL-CCNC: 15 UNITS/L
BILIRUB SERPL-MCNC: 0.4 MG/DL (ref 0.2–1)
BUN SERPL-MCNC: 67 MG/DL (ref 6–20)
BUN/CREAT SERPL: 24 (ref 7–25)
CALCIUM SERPL-MCNC: 9.2 MG/DL (ref 8.4–10.2)
CHLORIDE SERPL-SCNC: 97 MMOL/L (ref 97–110)
CO2 SERPL-SCNC: 32 MMOL/L (ref 21–32)
CREAT SERPL-MCNC: 2.85 MG/DL (ref 0.67–1.17)
CREAT UR-MCNC: 59.4 MG/DL
FASTING DURATION TIME PATIENT: ABNORMAL H
GFR SERPLBLD BASED ON 1.73 SQ M-ARVRAT: 23 ML/MIN
GLOBULIN SER-MCNC: 3.9 G/DL (ref 2–4)
GLUCOSE SERPL-MCNC: 191 MG/DL (ref 70–99)
PHOSPHATE SERPL-MCNC: 5.2 MG/DL (ref 2.4–4.7)
POTASSIUM SERPL-SCNC: 4.5 MMOL/L (ref 3.4–5.1)
PROT SERPL-MCNC: 7.4 G/DL (ref 6.4–8.2)
PROT UR-MCNC: 9 MG/DL
PROT/CREAT UR: 152 MGPR/GCR
SODIUM SERPL-SCNC: 136 MMOL/L (ref 135–145)

## 2022-12-05 PROCEDURE — 84156 ASSAY OF PROTEIN URINE: CPT | Performed by: CLINICAL MEDICAL LABORATORY

## 2022-12-05 PROCEDURE — 36415 COLL VENOUS BLD VENIPUNCTURE: CPT | Performed by: CLINICAL MEDICAL LABORATORY

## 2022-12-05 PROCEDURE — 82570 ASSAY OF URINE CREATININE: CPT | Performed by: CLINICAL MEDICAL LABORATORY

## 2022-12-05 PROCEDURE — 80053 COMPREHEN METABOLIC PANEL: CPT | Performed by: CLINICAL MEDICAL LABORATORY

## 2022-12-05 PROCEDURE — 83970 ASSAY OF PARATHORMONE: CPT | Performed by: CLINICAL MEDICAL LABORATORY

## 2022-12-05 PROCEDURE — 82306 VITAMIN D 25 HYDROXY: CPT | Performed by: CLINICAL MEDICAL LABORATORY

## 2022-12-05 PROCEDURE — 84100 ASSAY OF PHOSPHORUS: CPT | Performed by: CLINICAL MEDICAL LABORATORY

## 2022-12-06 LAB
25(OH)D3+25(OH)D2 SERPL-MCNC: 21.8 NG/ML (ref 30–100)
PTH-INTACT SERPL-MCNC: 220 PG/ML (ref 19–88)

## 2022-12-08 ENCOUNTER — APPOINTMENT (OUTPATIENT)
Dept: WOUND CARE | Age: 67
End: 2022-12-08
Attending: PODIATRIST

## 2022-12-09 ENCOUNTER — CLINICAL DOCUMENTATION (OUTPATIENT)
Dept: CARDIOLOGY | Age: 67
End: 2022-12-09

## 2022-12-09 ENCOUNTER — TELEPHONE (OUTPATIENT)
Dept: CARDIOLOGY | Age: 67
End: 2022-12-09

## 2022-12-13 ENCOUNTER — CLINICAL DOCUMENTATION (OUTPATIENT)
Dept: CARDIOLOGY | Age: 67
End: 2022-12-13

## 2022-12-27 ENCOUNTER — TELEPHONE (OUTPATIENT)
Dept: CARDIOLOGY | Age: 67
End: 2022-12-27

## 2022-12-27 RX ORDER — TORSEMIDE 20 MG/1
60 TABLET ORAL 3 TIMES DAILY
Qty: 150 TABLET | Refills: 3 | Status: SHIPPED | OUTPATIENT
Start: 2022-12-27 | End: 2023-05-08

## 2022-12-27 RX ORDER — TORSEMIDE 20 MG/1
TABLET ORAL
Qty: 150 TABLET | Refills: 3 | OUTPATIENT
Start: 2022-12-27

## 2023-01-05 ENCOUNTER — HOSPITAL ENCOUNTER (OUTPATIENT)
Dept: WOUND CARE | Age: 68
Discharge: STILL ADMITTED OR ADMINISTRATIVE | End: 2023-01-05
Attending: PODIATRIST

## 2023-01-05 ENCOUNTER — HOSPITAL ENCOUNTER (OUTPATIENT)
Dept: GENERAL RADIOLOGY | Age: 68
Discharge: HOME | End: 2023-01-05

## 2023-01-05 VITALS — TEMPERATURE: 96.6 F

## 2023-01-05 DIAGNOSIS — M86.571 OTHER CHRONIC HEMATOGENOUS OSTEOMYELITIS OF RIGHT FOOT (CMD): ICD-10-CM

## 2023-01-05 DIAGNOSIS — I73.9 PVD (PERIPHERAL VASCULAR DISEASE) (CMD): ICD-10-CM

## 2023-01-05 DIAGNOSIS — Z89.422 STATUS POST AMPUTATION OF LESSER TOE, LEFT (CMD): Primary | ICD-10-CM

## 2023-01-05 DIAGNOSIS — L97.412 MIDFOOT ULCERATION, RIGHT, WITH FAT LAYER EXPOSED (CMD): ICD-10-CM

## 2023-01-05 DIAGNOSIS — R60.0 LEG EDEMA: ICD-10-CM

## 2023-01-05 DIAGNOSIS — L97.519 TYPE 2 DIABETES MELLITUS WITH RIGHT DIABETIC FOOT ULCER (CMD): ICD-10-CM

## 2023-01-05 DIAGNOSIS — E11.621 TYPE 2 DIABETES MELLITUS WITH RIGHT DIABETIC FOOT ULCER (CMD): ICD-10-CM

## 2023-01-05 DIAGNOSIS — S91.302D OPEN WOUND OF LEFT FOOT, SUBSEQUENT ENCOUNTER: ICD-10-CM

## 2023-01-05 PROCEDURE — 11042 DBRDMT SUBQ TIS 1ST 20SQCM/<: CPT

## 2023-01-05 PROCEDURE — 73630 X-RAY EXAM OF FOOT: CPT

## 2023-01-05 ASSESSMENT — PAIN SCALES - GENERAL: PAINLEVEL_OUTOF10: 0

## 2023-02-02 ENCOUNTER — HOSPITAL ENCOUNTER (OUTPATIENT)
Dept: WOUND CARE | Age: 68
Discharge: STILL ADMITTED OR ADMINISTRATIVE | End: 2023-02-02
Attending: PODIATRIST

## 2023-02-02 VITALS — TEMPERATURE: 97.1 F

## 2023-02-02 DIAGNOSIS — S91.302D OPEN WOUND OF LEFT FOOT, SUBSEQUENT ENCOUNTER: ICD-10-CM

## 2023-02-02 DIAGNOSIS — I73.9 PVD (PERIPHERAL VASCULAR DISEASE) (CMD): ICD-10-CM

## 2023-02-02 DIAGNOSIS — R60.0 LEG EDEMA: ICD-10-CM

## 2023-02-02 DIAGNOSIS — Z89.422 STATUS POST AMPUTATION OF LESSER TOE, LEFT (CMD): Primary | ICD-10-CM

## 2023-02-02 DIAGNOSIS — L97.412 MIDFOOT ULCERATION, RIGHT, WITH FAT LAYER EXPOSED (CMD): ICD-10-CM

## 2023-02-02 DIAGNOSIS — E11.621 TYPE 2 DIABETES MELLITUS WITH RIGHT DIABETIC FOOT ULCER (CMD): ICD-10-CM

## 2023-02-02 DIAGNOSIS — L97.519 TYPE 2 DIABETES MELLITUS WITH RIGHT DIABETIC FOOT ULCER (CMD): ICD-10-CM

## 2023-02-02 PROCEDURE — 11042 DBRDMT SUBQ TIS 1ST 20SQCM/<: CPT

## 2023-02-02 PROCEDURE — 87070 CULTURE OTHR SPECIMN AEROBIC: CPT | Performed by: PODIATRIST

## 2023-02-02 ASSESSMENT — PAIN SCALES - WONG BAKER: WONGBAKER_NUMERICALRESPONSE: 0

## 2023-02-02 ASSESSMENT — PAIN SCALES - GENERAL: PAINLEVEL_OUTOF10: 0

## 2023-02-05 RX ORDER — AMOXICILLIN AND CLAVULANATE POTASSIUM 875; 125 MG/1; MG/1
1 TABLET, FILM COATED ORAL 2 TIMES DAILY
Qty: 20 TABLET | Refills: 0 | Status: CANCELLED | OUTPATIENT
Start: 2023-02-05 | End: 2023-02-15

## 2023-02-09 ENCOUNTER — HOSPITAL ENCOUNTER (OUTPATIENT)
Dept: WOUND CARE | Age: 68
Discharge: STILL ADMITTED OR ADMINISTRATIVE | End: 2023-02-09
Attending: PODIATRIST

## 2023-02-09 VITALS — TEMPERATURE: 97 F

## 2023-02-09 DIAGNOSIS — R60.0 LEG EDEMA: ICD-10-CM

## 2023-02-09 DIAGNOSIS — Z89.422 STATUS POST AMPUTATION OF LESSER TOE, LEFT (CMD): Primary | ICD-10-CM

## 2023-02-09 DIAGNOSIS — I73.9 PVD (PERIPHERAL VASCULAR DISEASE) (CMD): ICD-10-CM

## 2023-02-09 DIAGNOSIS — L97.412 MIDFOOT ULCERATION, RIGHT, WITH FAT LAYER EXPOSED (CMD): ICD-10-CM

## 2023-02-09 DIAGNOSIS — S91.302D OPEN WOUND OF LEFT FOOT, SUBSEQUENT ENCOUNTER: ICD-10-CM

## 2023-02-09 DIAGNOSIS — L97.519 TYPE 2 DIABETES MELLITUS WITH RIGHT DIABETIC FOOT ULCER (CMD): ICD-10-CM

## 2023-02-09 DIAGNOSIS — S91.301D OPEN WOUND OF RIGHT FOOT, SUBSEQUENT ENCOUNTER: ICD-10-CM

## 2023-02-09 DIAGNOSIS — E11.621 TYPE 2 DIABETES MELLITUS WITH RIGHT DIABETIC FOOT ULCER (CMD): ICD-10-CM

## 2023-02-09 DIAGNOSIS — M86.671 CHRONIC OSTEOMYELITIS OF RIGHT FOOT (CMD): ICD-10-CM

## 2023-02-09 PROCEDURE — 11042 DBRDMT SUBQ TIS 1ST 20SQCM/<: CPT

## 2023-02-09 RX ORDER — AMOXICILLIN AND CLAVULANATE POTASSIUM 875; 125 MG/1; MG/1
1 TABLET, FILM COATED ORAL 2 TIMES DAILY
Qty: 28 TABLET | Refills: 0 | Status: SHIPPED | OUTPATIENT
Start: 2023-02-09 | End: 2023-02-23

## 2023-02-09 ASSESSMENT — PAIN SCALES - GENERAL: PAINLEVEL_OUTOF10: 0

## 2023-02-09 ASSESSMENT — PAIN SCALES - WONG BAKER: WONGBAKER_NUMERICALRESPONSE: 0

## 2023-02-23 ENCOUNTER — HOSPITAL ENCOUNTER (OUTPATIENT)
Dept: WOUND CARE | Age: 68
Discharge: STILL ADMITTED OR ADMINISTRATIVE | End: 2023-02-23
Attending: PODIATRIST

## 2023-02-23 VITALS — TEMPERATURE: 96 F

## 2023-02-23 DIAGNOSIS — Z89.422 STATUS POST AMPUTATION OF LESSER TOE, LEFT (CMD): Primary | ICD-10-CM

## 2023-02-23 DIAGNOSIS — I73.9 PVD (PERIPHERAL VASCULAR DISEASE) (CMD): ICD-10-CM

## 2023-02-23 DIAGNOSIS — R60.0 LEG EDEMA: ICD-10-CM

## 2023-02-23 DIAGNOSIS — E11.621 TYPE 2 DIABETES MELLITUS WITH RIGHT DIABETIC FOOT ULCER (CMD): ICD-10-CM

## 2023-02-23 DIAGNOSIS — L97.412 MIDFOOT ULCERATION, RIGHT, WITH FAT LAYER EXPOSED (CMD): ICD-10-CM

## 2023-02-23 DIAGNOSIS — M86.671 CHRONIC OSTEOMYELITIS OF RIGHT FOOT (CMD): ICD-10-CM

## 2023-02-23 DIAGNOSIS — S91.302D OPEN WOUND OF LEFT FOOT, SUBSEQUENT ENCOUNTER: ICD-10-CM

## 2023-02-23 DIAGNOSIS — L97.519 TYPE 2 DIABETES MELLITUS WITH RIGHT DIABETIC FOOT ULCER (CMD): ICD-10-CM

## 2023-02-23 PROCEDURE — 11042 DBRDMT SUBQ TIS 1ST 20SQCM/<: CPT

## 2023-02-23 ASSESSMENT — PAIN SCALES - GENERAL: PAINLEVEL_OUTOF10: 0

## 2023-02-23 ASSESSMENT — PAIN SCALES - WONG BAKER: WONGBAKER_NUMERICALRESPONSE: 0

## 2023-03-10 ENCOUNTER — CLINICAL DOCUMENTATION (OUTPATIENT)
Dept: CARDIOLOGY | Age: 68
End: 2023-03-10

## 2023-03-10 ENCOUNTER — OFFICE VISIT (OUTPATIENT)
Dept: CARDIOLOGY | Age: 68
End: 2023-03-10

## 2023-03-10 ENCOUNTER — LAB SERVICES (OUTPATIENT)
Dept: LAB | Age: 68
End: 2023-03-10

## 2023-03-10 VITALS
BODY MASS INDEX: 50.83 KG/M2 | SYSTOLIC BLOOD PRESSURE: 126 MMHG | HEART RATE: 79 BPM | HEIGHT: 72 IN | DIASTOLIC BLOOD PRESSURE: 76 MMHG | OXYGEN SATURATION: 98 %

## 2023-03-10 DIAGNOSIS — E78.00 HYPERCHOLESTEROLEMIA: ICD-10-CM

## 2023-03-10 DIAGNOSIS — I50.32 CHRONIC DIASTOLIC HEART FAILURE (CMD): ICD-10-CM

## 2023-03-10 DIAGNOSIS — I10 BENIGN ESSENTIAL HYPERTENSION: Primary | ICD-10-CM

## 2023-03-10 DIAGNOSIS — I10 BENIGN ESSENTIAL HYPERTENSION: ICD-10-CM

## 2023-03-10 LAB
ALBUMIN SERPL-MCNC: 3.1 G/DL (ref 3.6–5.1)
ALBUMIN/GLOB SERPL: 0.7 {RATIO} (ref 1–2.4)
ALP SERPL-CCNC: 88 UNITS/L (ref 45–117)
ALT SERPL-CCNC: 21 UNITS/L
ANION GAP SERPL CALC-SCNC: 11 MMOL/L (ref 7–19)
AST SERPL-CCNC: 17 UNITS/L
BASOPHILS # BLD: 0.1 K/MCL (ref 0–0.3)
BASOPHILS NFR BLD: 1 %
BILIRUB SERPL-MCNC: 0.4 MG/DL (ref 0.2–1)
BUN SERPL-MCNC: 100 MG/DL (ref 6–20)
BUN/CREAT SERPL: 39 (ref 7–25)
CALCIUM SERPL-MCNC: 9.1 MG/DL (ref 8.4–10.2)
CHLORIDE SERPL-SCNC: 99 MMOL/L (ref 97–110)
CO2 SERPL-SCNC: 28 MMOL/L (ref 21–32)
CREAT SERPL-MCNC: 2.59 MG/DL (ref 0.67–1.17)
DEPRECATED RDW RBC: 46.8 FL (ref 39–50)
EOSINOPHIL # BLD: 0.3 K/MCL (ref 0–0.5)
EOSINOPHIL NFR BLD: 3 %
ERYTHROCYTE [DISTWIDTH] IN BLOOD: 14.6 % (ref 11–15)
FASTING DURATION TIME PATIENT: 0 HOURS (ref 0–999)
GFR SERPLBLD BASED ON 1.73 SQ M-ARVRAT: 26 ML/MIN
GLOBULIN SER-MCNC: 4.5 G/DL (ref 2–4)
GLUCOSE SERPL-MCNC: 226 MG/DL (ref 70–99)
HCT VFR BLD CALC: 41 % (ref 39–51)
HGB BLD-MCNC: 13.2 G/DL (ref 13–17)
IMM GRANULOCYTES # BLD AUTO: 0 K/MCL (ref 0–0.2)
IMM GRANULOCYTES # BLD: 0 %
LYMPHOCYTES # BLD: 1.8 K/MCL (ref 1–4)
LYMPHOCYTES NFR BLD: 21 %
MCH RBC QN AUTO: 28.4 PG (ref 26–34)
MCHC RBC AUTO-ENTMCNC: 32.2 G/DL (ref 32–36.5)
MCV RBC AUTO: 88.4 FL (ref 78–100)
MONOCYTES # BLD: 0.7 K/MCL (ref 0.3–0.9)
MONOCYTES NFR BLD: 8 %
NEUTROPHILS # BLD: 5.8 K/MCL (ref 1.8–7.7)
NEUTROPHILS NFR BLD: 67 %
NRBC BLD MANUAL-RTO: 0 /100 WBC
NT-PROBNP SERPL-MCNC: 919 PG/ML
PLATELET # BLD AUTO: 250 K/MCL (ref 140–450)
POTASSIUM SERPL-SCNC: 4.6 MMOL/L (ref 3.4–5.1)
PROT SERPL-MCNC: 7.6 G/DL (ref 6.4–8.2)
RBC # BLD: 4.64 MIL/MCL (ref 4.5–5.9)
SODIUM SERPL-SCNC: 133 MMOL/L (ref 135–145)
WBC # BLD: 8.6 K/MCL (ref 4.2–11)

## 2023-03-10 PROCEDURE — 99214 OFFICE O/P EST MOD 30 MIN: CPT | Performed by: INTERNAL MEDICINE

## 2023-03-10 PROCEDURE — 85025 COMPLETE CBC W/AUTO DIFF WBC: CPT | Performed by: INTERNAL MEDICINE

## 2023-03-10 PROCEDURE — 3078F DIAST BP <80 MM HG: CPT | Performed by: INTERNAL MEDICINE

## 2023-03-10 PROCEDURE — 3074F SYST BP LT 130 MM HG: CPT | Performed by: INTERNAL MEDICINE

## 2023-03-10 PROCEDURE — 80053 COMPREHEN METABOLIC PANEL: CPT | Performed by: INTERNAL MEDICINE

## 2023-03-10 PROCEDURE — 36415 COLL VENOUS BLD VENIPUNCTURE: CPT | Performed by: INTERNAL MEDICINE

## 2023-03-10 PROCEDURE — 83880 ASSAY OF NATRIURETIC PEPTIDE: CPT | Performed by: INTERNAL MEDICINE

## 2023-03-10 SDOH — HEALTH STABILITY: PHYSICAL HEALTH: ON AVERAGE, HOW MANY DAYS PER WEEK DO YOU ENGAGE IN MODERATE TO STRENUOUS EXERCISE (LIKE A BRISK WALK)?: 0 DAYS

## 2023-03-10 SDOH — HEALTH STABILITY: PHYSICAL HEALTH: ON AVERAGE, HOW MANY MINUTES DO YOU ENGAGE IN EXERCISE AT THIS LEVEL?: 0 MIN

## 2023-03-10 ASSESSMENT — PATIENT HEALTH QUESTIONNAIRE - PHQ9
1. LITTLE INTEREST OR PLEASURE IN DOING THINGS: NOT AT ALL
CLINICAL INTERPRETATION OF PHQ2 SCORE: NO FURTHER SCREENING NEEDED
SUM OF ALL RESPONSES TO PHQ9 QUESTIONS 1 AND 2: 0
SUM OF ALL RESPONSES TO PHQ9 QUESTIONS 1 AND 2: 0
2. FEELING DOWN, DEPRESSED OR HOPELESS: NOT AT ALL

## 2023-03-13 ENCOUNTER — APPOINTMENT (OUTPATIENT)
Dept: PHYSICAL MEDICINE AND REHAB | Age: 68
End: 2023-03-13
Attending: INTERNAL MEDICINE

## 2023-03-30 LAB — HBA1C MFR BLD: 8.8 %

## 2023-05-08 RX ORDER — TORSEMIDE 20 MG/1
TABLET ORAL
Qty: 150 TABLET | Refills: 3 | Status: SHIPPED | OUTPATIENT
Start: 2023-05-08 | End: 2023-08-31 | Stop reason: SDUPTHER

## 2023-05-18 ENCOUNTER — HOSPITAL ENCOUNTER (OUTPATIENT)
Dept: WOUND CARE | Age: 68
Discharge: STILL ADMITTED OR ADMINISTRATIVE | End: 2023-05-18
Attending: PODIATRIST

## 2023-05-18 VITALS — TEMPERATURE: 96.3 F

## 2023-05-18 DIAGNOSIS — M86.671 OTHER CHRONIC OSTEOMYELITIS OF RIGHT FOOT (CMD): Primary | ICD-10-CM

## 2023-05-18 DIAGNOSIS — Z89.422 STATUS POST AMPUTATION OF LESSER TOE, LEFT (CMD): ICD-10-CM

## 2023-05-18 DIAGNOSIS — I73.9 PVD (PERIPHERAL VASCULAR DISEASE) (CMD): ICD-10-CM

## 2023-05-18 DIAGNOSIS — L97.519 TYPE 2 DIABETES MELLITUS WITH RIGHT DIABETIC FOOT ULCER (CMD): ICD-10-CM

## 2023-05-18 DIAGNOSIS — M86.671 CHRONIC OSTEOMYELITIS OF RIGHT FOOT (CMD): ICD-10-CM

## 2023-05-18 DIAGNOSIS — S91.302D OPEN WOUND OF LEFT FOOT, SUBSEQUENT ENCOUNTER: ICD-10-CM

## 2023-05-18 DIAGNOSIS — R60.0 LEG EDEMA: ICD-10-CM

## 2023-05-18 DIAGNOSIS — E11.621 TYPE 2 DIABETES MELLITUS WITH RIGHT DIABETIC FOOT ULCER (CMD): ICD-10-CM

## 2023-05-18 PROCEDURE — 11042 DBRDMT SUBQ TIS 1ST 20SQCM/<: CPT

## 2023-05-18 ASSESSMENT — PAIN SCALES - GENERAL: PAINLEVEL_OUTOF10: 0

## 2023-05-18 ASSESSMENT — PAIN SCALES - WONG BAKER: WONGBAKER_NUMERICALRESPONSE: 0

## 2023-06-08 ENCOUNTER — HOSPITAL ENCOUNTER (OUTPATIENT)
Dept: GENERAL RADIOLOGY | Age: 68
Discharge: HOME | End: 2023-06-08
Attending: PODIATRIST

## 2023-06-08 ENCOUNTER — HOSPITAL ENCOUNTER (OUTPATIENT)
Dept: WOUND CARE | Age: 68
Discharge: STILL ADMITTED OR ADMINISTRATIVE | End: 2023-06-08
Attending: PODIATRIST

## 2023-06-08 ENCOUNTER — EXTERNAL LAB (OUTPATIENT)
Dept: OTHER | Age: 68
End: 2023-06-08

## 2023-06-08 VITALS — TEMPERATURE: 96.8 F

## 2023-06-08 DIAGNOSIS — E11.621 TYPE 2 DIABETES MELLITUS WITH RIGHT DIABETIC FOOT ULCER (CMD): ICD-10-CM

## 2023-06-08 DIAGNOSIS — L97.519 TYPE 2 DIABETES MELLITUS WITH RIGHT DIABETIC FOOT ULCER (CMD): ICD-10-CM

## 2023-06-08 DIAGNOSIS — R60.0 LEG EDEMA: ICD-10-CM

## 2023-06-08 DIAGNOSIS — S91.302D OPEN WOUND OF LEFT FOOT, SUBSEQUENT ENCOUNTER: ICD-10-CM

## 2023-06-08 DIAGNOSIS — M86.671 CHRONIC OSTEOMYELITIS OF RIGHT FOOT (CMD): ICD-10-CM

## 2023-06-08 DIAGNOSIS — I73.9 PVD (PERIPHERAL VASCULAR DISEASE) (CMD): ICD-10-CM

## 2023-06-08 DIAGNOSIS — Z89.422 STATUS POST AMPUTATION OF LESSER TOE, LEFT (CMD): Primary | ICD-10-CM

## 2023-06-08 DIAGNOSIS — M86.671 OTHER CHRONIC OSTEOMYELITIS OF RIGHT FOOT (CMD): ICD-10-CM

## 2023-06-08 LAB
ALBUMIN SERPL-MCNC: 3.8 G/DL (ref 3.6–5.1)
ALBUMIN/GLOB SERPL: 1.2 (CALC) (ref 1–2.5)
ALP SERPL-CCNC: 89 U/L (ref 35–144)
ALT SERPL-CCNC: 14 U/L (ref 9–46)
AST SERPL-CCNC: 15 U/L (ref 10–35)
BILIRUB SERPL-MCNC: 0.6 MG/DL (ref 0.2–1.2)
BUN SERPL-MCNC: 68 MG/DL (ref 7–25)
BUN/CREAT SERPL: 24 (CALC) (ref 6–22)
CALCIUM SERPL-MCNC: 8.4 MG/DL (ref 8.6–10.3)
CHLORIDE SERPL-SCNC: 97 MMOL/L (ref 98–110)
CO2 SERPL-SCNC: 33 MMOL/L (ref 20–32)
CREAT SERPL-MCNC: 2.82 MG/DL (ref 0.7–1.35)
GFR SERPLBLD SCHWARTZ-ARVRAT: 24 ML/MIN/1.73M2
GLOBULIN SER-MCNC: 3.2 G/DL (CALC) (ref 1.9–3.7)
GLUCOSE SERPL-MCNC: 131 MG/DL (ref 55–139)
LENGTH OF FAST TIME PATIENT: NO H
POTASSIUM SERPL-SCNC: 4.4 MMOL/L (ref 3.5–5.3)
PROT SERPL-MCNC: 7 G/DL (ref 6.1–8.1)
SODIUM SERPL-SCNC: 139 MMOL/L (ref 135–146)

## 2023-06-08 PROCEDURE — 73630 X-RAY EXAM OF FOOT: CPT

## 2023-06-08 PROCEDURE — 11042 DBRDMT SUBQ TIS 1ST 20SQCM/<: CPT

## 2023-06-08 ASSESSMENT — PAIN SCALES - GENERAL: PAINLEVEL_OUTOF10: 0

## 2023-06-08 ASSESSMENT — PAIN SCALES - WONG BAKER: WONGBAKER_NUMERICALRESPONSE: 0

## 2023-06-13 ENCOUNTER — CLINICAL ABSTRACT (OUTPATIENT)
Dept: OTHER | Age: 68
End: 2023-06-13

## 2023-06-14 ENCOUNTER — OFFICE VISIT (OUTPATIENT)
Dept: CARDIOLOGY | Age: 68
End: 2023-06-14

## 2023-06-14 VITALS
SYSTOLIC BLOOD PRESSURE: 138 MMHG | HEART RATE: 80 BPM | DIASTOLIC BLOOD PRESSURE: 85 MMHG | BODY MASS INDEX: 42.66 KG/M2 | HEIGHT: 72 IN | WEIGHT: 315 LBS

## 2023-06-14 DIAGNOSIS — I50.32 CHRONIC DIASTOLIC HEART FAILURE (CMD): ICD-10-CM

## 2023-06-14 DIAGNOSIS — I10 BENIGN ESSENTIAL HYPERTENSION: Primary | ICD-10-CM

## 2023-06-14 DIAGNOSIS — Z01.810 PREOPERATIVE CARDIOVASCULAR EXAMINATION: ICD-10-CM

## 2023-06-14 PROCEDURE — 3079F DIAST BP 80-89 MM HG: CPT | Performed by: INTERNAL MEDICINE

## 2023-06-14 PROCEDURE — 3075F SYST BP GE 130 - 139MM HG: CPT | Performed by: INTERNAL MEDICINE

## 2023-06-14 PROCEDURE — 99214 OFFICE O/P EST MOD 30 MIN: CPT | Performed by: INTERNAL MEDICINE

## 2023-06-14 SDOH — HEALTH STABILITY: PHYSICAL HEALTH: ON AVERAGE, HOW MANY MINUTES DO YOU ENGAGE IN EXERCISE AT THIS LEVEL?: 0 MIN

## 2023-06-14 SDOH — HEALTH STABILITY: PHYSICAL HEALTH: ON AVERAGE, HOW MANY DAYS PER WEEK DO YOU ENGAGE IN MODERATE TO STRENUOUS EXERCISE (LIKE A BRISK WALK)?: 0 DAYS

## 2023-06-14 ASSESSMENT — PATIENT HEALTH QUESTIONNAIRE - PHQ9
CLINICAL INTERPRETATION OF PHQ2 SCORE: NO FURTHER SCREENING NEEDED
2. FEELING DOWN, DEPRESSED OR HOPELESS: NOT AT ALL
SUM OF ALL RESPONSES TO PHQ9 QUESTIONS 1 AND 2: 0
1. LITTLE INTEREST OR PLEASURE IN DOING THINGS: NOT AT ALL
SUM OF ALL RESPONSES TO PHQ9 QUESTIONS 1 AND 2: 0

## 2023-06-16 ENCOUNTER — ANESTHESIA EVENT (OUTPATIENT)
Dept: SURGERY | Age: 68
End: 2023-06-16

## 2023-06-19 ENCOUNTER — ANESTHESIA (OUTPATIENT)
Dept: SURGERY | Age: 68
End: 2023-06-19

## 2023-06-19 ENCOUNTER — HOSPITAL ENCOUNTER (OUTPATIENT)
Age: 68
Discharge: HOME | End: 2023-06-19
Attending: OPHTHALMOLOGY | Admitting: OPHTHALMOLOGY

## 2023-06-19 VITALS
WEIGHT: 315 LBS | DIASTOLIC BLOOD PRESSURE: 60 MMHG | HEIGHT: 72 IN | SYSTOLIC BLOOD PRESSURE: 117 MMHG | HEART RATE: 77 BPM | RESPIRATION RATE: 14 BRPM | TEMPERATURE: 97.2 F | OXYGEN SATURATION: 97 % | BODY MASS INDEX: 42.66 KG/M2

## 2023-06-19 LAB — GLUCOSE BLDC GLUCOMTR-MCNC: 186 MG/DL (ref 70–99)

## 2023-06-19 PROCEDURE — C9257 BEVACIZUMAB INJECTION: HCPCS | Performed by: OPHTHALMOLOGY

## 2023-06-19 PROCEDURE — 13000039 HB MAJOR COMPLEX CASE EA ADD MINUTE: Performed by: OPHTHALMOLOGY

## 2023-06-19 PROCEDURE — 10002801 HB RX 250 W/O HCPCS: Performed by: STUDENT IN AN ORGANIZED HEALTH CARE EDUCATION/TRAINING PROGRAM

## 2023-06-19 PROCEDURE — 10002801 HB RX 250 W/O HCPCS: Performed by: OPHTHALMOLOGY

## 2023-06-19 PROCEDURE — 13000001 HB PHASE II RECOVERY EA 30 MINUTES: Performed by: OPHTHALMOLOGY

## 2023-06-19 PROCEDURE — 10002800 HB RX 250 W HCPCS: Performed by: STUDENT IN AN ORGANIZED HEALTH CARE EDUCATION/TRAINING PROGRAM

## 2023-06-19 PROCEDURE — 13000038 HB MAJOR COMPLEX CASE S/U + 1ST 15 MIN: Performed by: OPHTHALMOLOGY

## 2023-06-19 PROCEDURE — 10002800 HB RX 250 W HCPCS: Performed by: OPHTHALMOLOGY

## 2023-06-19 PROCEDURE — 10002803 HB RX 637: Performed by: OPHTHALMOLOGY

## 2023-06-19 PROCEDURE — 13000006 HB ANESTHESIA MAC S/U + 1ST 15 MIN: Performed by: OPHTHALMOLOGY

## 2023-06-19 PROCEDURE — 10002803 HB RX 637

## 2023-06-19 PROCEDURE — 10002807 HB RX 258: Performed by: STUDENT IN AN ORGANIZED HEALTH CARE EDUCATION/TRAINING PROGRAM

## 2023-06-19 PROCEDURE — 82962 GLUCOSE BLOOD TEST: CPT

## 2023-06-19 PROCEDURE — 13000007 HB ANESTHESIA MAC EA ADD MINUTE: Performed by: OPHTHALMOLOGY

## 2023-06-19 PROCEDURE — 10002801 HB RX 250 W/O HCPCS

## 2023-06-19 PROCEDURE — 10006023 HB SUPPLY 272: Performed by: OPHTHALMOLOGY

## 2023-06-19 RX ORDER — TETRACAINE HYDROCHLORIDE 5 MG/ML
SOLUTION OPHTHALMIC PRN
Status: DISCONTINUED | OUTPATIENT
Start: 2023-06-19 | End: 2023-06-19 | Stop reason: HOSPADM

## 2023-06-19 RX ORDER — LIDOCAINE HYDROCHLORIDE 20 MG/ML
INJECTION, SOLUTION INFILTRATION; PERINEURAL PRN
Status: DISCONTINUED | OUTPATIENT
Start: 2023-06-19 | End: 2023-06-19

## 2023-06-19 RX ORDER — PHENYLEPHRINE HYDROCHLORIDE 25 MG/ML
1 SOLUTION/ DROPS OPHTHALMIC
Status: DISCONTINUED | OUTPATIENT
Start: 2023-06-19 | End: 2023-06-19 | Stop reason: DRUGHIGH

## 2023-06-19 RX ORDER — PHENYLEPHRINE HYDROCHLORIDE 25 MG/ML
1 SOLUTION/ DROPS OPHTHALMIC ONCE
Status: COMPLETED | OUTPATIENT
Start: 2023-06-19 | End: 2023-06-19

## 2023-06-19 RX ORDER — ONDANSETRON 2 MG/ML
4 INJECTION INTRAMUSCULAR; INTRAVENOUS
Status: DISCONTINUED | OUTPATIENT
Start: 2023-06-19 | End: 2023-06-19 | Stop reason: HOSPADM

## 2023-06-19 RX ORDER — SODIUM CHLORIDE 9 MG/ML
INJECTION, SOLUTION INTRAVENOUS CONTINUOUS PRN
Status: DISCONTINUED | OUTPATIENT
Start: 2023-06-19 | End: 2023-06-19

## 2023-06-19 RX ORDER — PREDNISOLONE ACETATE 10 MG/ML
SUSPENSION/ DROPS OPHTHALMIC PRN
Status: DISCONTINUED | OUTPATIENT
Start: 2023-06-19 | End: 2023-06-19 | Stop reason: HOSPADM

## 2023-06-19 RX ORDER — ATROPINE SULFATE 10 MG/ML
SOLUTION/ DROPS OPHTHALMIC PRN
Status: DISCONTINUED | OUTPATIENT
Start: 2023-06-19 | End: 2023-06-19 | Stop reason: HOSPADM

## 2023-06-19 RX ORDER — CYCLOPENTOLATE HYDROCHLORIDE 10 MG/ML
1 SOLUTION/ DROPS OPHTHALMIC
Status: COMPLETED | OUTPATIENT
Start: 2023-06-19 | End: 2023-06-19

## 2023-06-19 RX ORDER — CYCLOPENTOLATE HYDROCHLORIDE 10 MG/ML
1 SOLUTION/ DROPS OPHTHALMIC ONCE
Status: COMPLETED | OUTPATIENT
Start: 2023-06-19 | End: 2023-06-19

## 2023-06-19 RX ORDER — CYCLOPENTOLATE HYDROCHLORIDE 10 MG/ML
1 SOLUTION/ DROPS OPHTHALMIC
Status: DISCONTINUED | OUTPATIENT
Start: 2023-06-19 | End: 2023-06-19 | Stop reason: DRUGHIGH

## 2023-06-19 RX ORDER — PHENYLEPHRINE HYDROCHLORIDE 25 MG/ML
1 SOLUTION/ DROPS OPHTHALMIC
Status: ACTIVE | OUTPATIENT
Start: 2023-06-19 | End: 2023-06-19

## 2023-06-19 RX ORDER — MOXIFLOXACIN 5 MG/ML
1 SOLUTION/ DROPS OPHTHALMIC
Status: COMPLETED | OUTPATIENT
Start: 2023-06-19 | End: 2023-06-19

## 2023-06-19 RX ORDER — TROPICAMIDE 10 MG/ML
1 SOLUTION/ DROPS OPHTHALMIC
Status: DISCONTINUED | OUTPATIENT
Start: 2023-06-19 | End: 2023-06-19 | Stop reason: DRUGHIGH

## 2023-06-19 RX ORDER — DEXAMETHASONE SODIUM PHOSPHATE 4 MG/ML
INJECTION, SOLUTION INTRA-ARTICULAR; INTRALESIONAL; INTRAMUSCULAR; INTRAVENOUS; SOFT TISSUE PRN
Status: DISCONTINUED | OUTPATIENT
Start: 2023-06-19 | End: 2023-06-19 | Stop reason: HOSPADM

## 2023-06-19 RX ORDER — MOXIFLOXACIN 5 MG/ML
SOLUTION/ DROPS OPHTHALMIC PRN
Status: DISCONTINUED | OUTPATIENT
Start: 2023-06-19 | End: 2023-06-19 | Stop reason: HOSPADM

## 2023-06-19 RX ORDER — PROPOFOL 10 MG/ML
INJECTION, EMULSION INTRAVENOUS PRN
Status: DISCONTINUED | OUTPATIENT
Start: 2023-06-19 | End: 2023-06-19

## 2023-06-19 RX ORDER — OFLOXACIN 3 MG/ML
SOLUTION AURICULAR (OTIC) PRN
Status: DISCONTINUED | OUTPATIENT
Start: 2023-06-19 | End: 2023-06-19 | Stop reason: HOSPADM

## 2023-06-19 RX ORDER — BALANCED SALT SOLUTION 6.4; .75; .48; .3; 3.9; 1.7 MG/ML; MG/ML; MG/ML; MG/ML; MG/ML; MG/ML
SOLUTION OPHTHALMIC PRN
Status: DISCONTINUED | OUTPATIENT
Start: 2023-06-19 | End: 2023-06-19 | Stop reason: HOSPADM

## 2023-06-19 RX ORDER — MOXIFLOXACIN 5 MG/ML
1 SOLUTION/ DROPS OPHTHALMIC
Status: DISCONTINUED | OUTPATIENT
Start: 2023-06-19 | End: 2023-06-19 | Stop reason: DRUGHIGH

## 2023-06-19 RX ORDER — DROPERIDOL 2.5 MG/ML
0.62 INJECTION, SOLUTION INTRAMUSCULAR; INTRAVENOUS
Status: DISCONTINUED | OUTPATIENT
Start: 2023-06-19 | End: 2023-06-19 | Stop reason: HOSPADM

## 2023-06-19 RX ORDER — TROPICAMIDE 10 MG/ML
1 SOLUTION/ DROPS OPHTHALMIC ONCE
Status: COMPLETED | OUTPATIENT
Start: 2023-06-19 | End: 2023-06-19

## 2023-06-19 RX ORDER — METOCLOPRAMIDE HYDROCHLORIDE 5 MG/ML
5 INJECTION INTRAMUSCULAR; INTRAVENOUS
Status: DISCONTINUED | OUTPATIENT
Start: 2023-06-19 | End: 2023-06-19 | Stop reason: HOSPADM

## 2023-06-19 RX ORDER — BRIMONIDINE TARTRATE 2 MG/ML
SOLUTION/ DROPS OPHTHALMIC PRN
Status: DISCONTINUED | OUTPATIENT
Start: 2023-06-19 | End: 2023-06-19 | Stop reason: HOSPADM

## 2023-06-19 RX ORDER — TROPICAMIDE 10 MG/ML
1 SOLUTION/ DROPS OPHTHALMIC
Status: COMPLETED | OUTPATIENT
Start: 2023-06-19 | End: 2023-06-19

## 2023-06-19 RX ADMIN — CYCLOPENTOLATE HYDROCHLORIDE 1 DROP: 10 SOLUTION/ DROPS OPHTHALMIC at 10:19

## 2023-06-19 RX ADMIN — CYCLOPENTOLATE HYDROCHLORIDE 1 DROP: 10 SOLUTION/ DROPS OPHTHALMIC at 10:14

## 2023-06-19 RX ADMIN — MOXIFLOXACIN 1 DROP: 5 SOLUTION/ DROPS OPHTHALMIC at 10:29

## 2023-06-19 RX ADMIN — PHENYLEPHRINE HYDROCHLORIDE 1 DROP: 25 SOLUTION/ DROPS OPHTHALMIC at 10:26

## 2023-06-19 RX ADMIN — PROPOFOL 50 MCG/KG/MIN: 10 INJECTION, EMULSION INTRAVENOUS at 12:55

## 2023-06-19 RX ADMIN — TROPICAMIDE 1 DROP: 10 SOLUTION/ DROPS OPHTHALMIC at 10:21

## 2023-06-19 RX ADMIN — CYCLOPENTOLATE HYDROCHLORIDE 1 DROP: 10 SOLUTION/ DROPS OPHTHALMIC at 10:31

## 2023-06-19 RX ADMIN — PHENYLEPHRINE HYDROCHLORIDE 1 DROP: 25 SOLUTION/ DROPS OPHTHALMIC at 12:05

## 2023-06-19 RX ADMIN — SODIUM CHLORIDE: 9 INJECTION, SOLUTION INTRAVENOUS at 12:38

## 2023-06-19 RX ADMIN — PROPOFOL 50 MG: 10 INJECTION, EMULSION INTRAVENOUS at 12:48

## 2023-06-19 RX ADMIN — CYCLOPENTOLATE HYDROCHLORIDE 1 DROP: 10 SOLUTION/ DROPS OPHTHALMIC at 10:25

## 2023-06-19 RX ADMIN — TROPICAMIDE 1 DROP: 10 SOLUTION/ DROPS OPHTHALMIC at 10:10

## 2023-06-19 RX ADMIN — MOXIFLOXACIN 1 DROP: 5 SOLUTION/ DROPS OPHTHALMIC at 10:24

## 2023-06-19 RX ADMIN — MOXIFLOXACIN 1 DROP: 5 SOLUTION/ DROPS OPHTHALMIC at 10:13

## 2023-06-19 RX ADMIN — PHENYLEPHRINE HYDROCHLORIDE 1 DROP: 25 SOLUTION/ DROPS OPHTHALMIC at 10:34

## 2023-06-19 RX ADMIN — MOXIFLOXACIN 1 DROP: 5 SOLUTION/ DROPS OPHTHALMIC at 10:18

## 2023-06-19 RX ADMIN — TROPICAMIDE 1 DROP: 10 SOLUTION/ DROPS OPHTHALMIC at 10:15

## 2023-06-19 RX ADMIN — PHENYLEPHRINE HYDROCHLORIDE 1 DROP: 25 SOLUTION/ DROPS OPHTHALMIC at 10:20

## 2023-06-19 RX ADMIN — LIDOCAINE HYDROCHLORIDE 3 ML: 20 INJECTION, SOLUTION INFILTRATION; PERINEURAL at 12:48

## 2023-06-19 RX ADMIN — TROPICAMIDE 1 DROP: 10 SOLUTION/ DROPS OPHTHALMIC at 10:28

## 2023-06-19 RX ADMIN — PHENYLEPHRINE HYDROCHLORIDE 1 DROP: 25 SOLUTION/ DROPS OPHTHALMIC at 10:14

## 2023-06-19 RX ADMIN — CYCLOPENTOLATE HYDROCHLORIDE 1 DROP: 10 SOLUTION/ DROPS OPHTHALMIC at 12:05

## 2023-06-19 RX ADMIN — TROPICAMIDE 1 DROP: 10 SOLUTION/ DROPS OPHTHALMIC at 12:05

## 2023-06-19 ASSESSMENT — ACTIVITIES OF DAILY LIVING (ADL)
HISTORY OF FALLING IN THE LAST YEAR (PRIOR TO ADMISSION): NO
ADL_SCORE: 12
MOBILITY_ASSIST_DEVICES: STANDARD WALKER
ADL_SHORT_OF_BREATH: NO
NEEDS_ASSIST: NO
ADL_BEFORE_ADMISSION: INDEPENDENT
RECENT_DECLINE_ADL: NO
CHRONIC_PAIN_PRESENT: NO

## 2023-06-19 ASSESSMENT — PAIN SCALES - GENERAL
PAINLEVEL_OUTOF10: 0
PAINLEVEL_OUTOF10: 2
PAINLEVEL_OUTOF10: 2

## 2023-06-19 ASSESSMENT — COGNITIVE AND FUNCTIONAL STATUS - GENERAL: ARE YOU DEAF OR DO YOU HAVE SERIOUS DIFFICULTY  HEARING: NO

## 2023-07-06 ENCOUNTER — HOSPITAL ENCOUNTER (OUTPATIENT)
Dept: WOUND CARE | Age: 68
Discharge: STILL ADMITTED OR ADMINISTRATIVE | End: 2023-07-06
Attending: PODIATRIST

## 2023-07-06 VITALS — TEMPERATURE: 98.2 F

## 2023-07-06 DIAGNOSIS — E11.621 TYPE 2 DIABETES MELLITUS WITH RIGHT DIABETIC FOOT ULCER (CMD): ICD-10-CM

## 2023-07-06 DIAGNOSIS — S91.302D OPEN WOUND OF LEFT FOOT, SUBSEQUENT ENCOUNTER: ICD-10-CM

## 2023-07-06 DIAGNOSIS — L97.519 TYPE 2 DIABETES MELLITUS WITH RIGHT DIABETIC FOOT ULCER (CMD): ICD-10-CM

## 2023-07-06 DIAGNOSIS — I73.9 PVD (PERIPHERAL VASCULAR DISEASE) (CMD): ICD-10-CM

## 2023-07-06 DIAGNOSIS — R60.0 LEG EDEMA: ICD-10-CM

## 2023-07-06 DIAGNOSIS — M86.671 CHRONIC OSTEOMYELITIS OF RIGHT FOOT (CMD): ICD-10-CM

## 2023-07-06 DIAGNOSIS — Z89.422 STATUS POST AMPUTATION OF LESSER TOE, LEFT (CMD): Primary | ICD-10-CM

## 2023-07-06 PROCEDURE — 11042 DBRDMT SUBQ TIS 1ST 20SQCM/<: CPT

## 2023-07-06 ASSESSMENT — PAIN SCALES - GENERAL: PAINLEVEL_OUTOF10: 0

## 2023-07-27 ENCOUNTER — HOSPITAL ENCOUNTER (OUTPATIENT)
Dept: WOUND CARE | Age: 68
Discharge: STILL ADMITTED OR ADMINISTRATIVE | End: 2023-07-27
Attending: PODIATRIST

## 2023-07-27 VITALS — TEMPERATURE: 96.6 F

## 2023-07-27 DIAGNOSIS — M86.671 CHRONIC OSTEOMYELITIS OF RIGHT FOOT (CMD): ICD-10-CM

## 2023-07-27 DIAGNOSIS — L97.519 TYPE 2 DIABETES MELLITUS WITH RIGHT DIABETIC FOOT ULCER (CMD): ICD-10-CM

## 2023-07-27 DIAGNOSIS — R60.0 LEG EDEMA: ICD-10-CM

## 2023-07-27 DIAGNOSIS — E11.621 TYPE 2 DIABETES MELLITUS WITH RIGHT DIABETIC FOOT ULCER (CMD): ICD-10-CM

## 2023-07-27 DIAGNOSIS — S91.302D OPEN WOUND OF LEFT FOOT, SUBSEQUENT ENCOUNTER: ICD-10-CM

## 2023-07-27 DIAGNOSIS — I73.9 PVD (PERIPHERAL VASCULAR DISEASE) (CMD): ICD-10-CM

## 2023-07-27 DIAGNOSIS — Z89.422 STATUS POST AMPUTATION OF LESSER TOE, LEFT (CMD): Primary | ICD-10-CM

## 2023-07-27 PROCEDURE — 11042 DBRDMT SUBQ TIS 1ST 20SQCM/<: CPT

## 2023-07-27 ASSESSMENT — PAIN SCALES - GENERAL: PAINLEVEL_OUTOF10: 0

## 2023-08-16 ENCOUNTER — OFFICE VISIT (OUTPATIENT)
Dept: INTERNAL MEDICINE CLINIC | Facility: CLINIC | Age: 68
End: 2023-08-16

## 2023-08-16 VITALS
DIASTOLIC BLOOD PRESSURE: 54 MMHG | OXYGEN SATURATION: 94 % | HEART RATE: 78 BPM | SYSTOLIC BLOOD PRESSURE: 126 MMHG | HEIGHT: 72 IN

## 2023-08-16 DIAGNOSIS — I10 MALIGNANT ESSENTIAL HYPERTENSION: ICD-10-CM

## 2023-08-16 DIAGNOSIS — Z00.00 ENCOUNTER FOR MEDICAL EXAMINATION TO ESTABLISH CARE: Primary | ICD-10-CM

## 2023-08-16 DIAGNOSIS — Z99.3 DEPENDENCE ON WHEELCHAIR: ICD-10-CM

## 2023-08-16 DIAGNOSIS — N18.4 CHRONIC KIDNEY DISEASE, STAGE 4 (SEVERE) (HCC): ICD-10-CM

## 2023-08-16 DIAGNOSIS — E13.51 PERIPHERAL VASCULAR DISEASE DUE TO SECONDARY DIABETES MELLITUS (HCC): Chronic | ICD-10-CM

## 2023-08-16 DIAGNOSIS — L97.519 TYPE 2 DIABETES MELLITUS WITH RIGHT DIABETIC FOOT ULCER (HCC): ICD-10-CM

## 2023-08-16 DIAGNOSIS — I50.32 CHRONIC DIASTOLIC HEART FAILURE (HCC): ICD-10-CM

## 2023-08-16 DIAGNOSIS — M86.9 OSTEOMYELITIS, ANKLE AND FOOT (HCC): ICD-10-CM

## 2023-08-16 DIAGNOSIS — E11.621 TYPE 2 DIABETES MELLITUS WITH RIGHT DIABETIC FOOT ULCER (HCC): ICD-10-CM

## 2023-08-16 PROBLEM — E11.40 DIABETIC NEUROPATHY (HCC): Status: ACTIVE | Noted: 2023-06-14

## 2023-08-16 PROBLEM — R79.89 ELEVATED BRAIN NATRIURETIC PEPTIDE (BNP) LEVEL: Status: RESOLVED | Noted: 2021-07-01 | Resolved: 2023-08-16

## 2023-08-16 PROBLEM — N17.0 ACUTE KIDNEY INJURY (AKI) WITH ACUTE TUBULAR NECROSIS (ATN) (HCC): Status: RESOLVED | Noted: 2020-07-08 | Resolved: 2023-08-16

## 2023-08-16 PROBLEM — I50.9 CONGESTIVE HEART FAILURE (HCC): Status: ACTIVE | Noted: 2021-07-01

## 2023-08-16 PROBLEM — N17.0 ACUTE KIDNEY INJURY (AKI) WITH ACUTE TUBULAR NECROSIS (ATN): Status: RESOLVED | Noted: 2020-07-08 | Resolved: 2023-08-16

## 2023-08-16 PROBLEM — E78.00 HYPERCHOLESTEROLEMIA: Status: ACTIVE | Noted: 2018-07-17

## 2023-08-16 PROBLEM — R06.09 DYSPNEA ON EXERTION: Status: RESOLVED | Noted: 2019-08-13 | Resolved: 2023-08-16

## 2023-08-16 PROBLEM — E87.1 HYPONATREMIA: Status: RESOLVED | Noted: 2022-02-04 | Resolved: 2023-08-16

## 2023-08-16 PROBLEM — E87.6 HYPOKALEMIA: Status: RESOLVED | Noted: 2022-08-22 | Resolved: 2023-08-16

## 2023-08-16 PROBLEM — I96 GANGRENE OF LEFT FOOT (HCC): Status: RESOLVED | Noted: 2021-04-13 | Resolved: 2023-08-16

## 2023-08-16 PROBLEM — I50.23 ACUTE ON CHRONIC SYSTOLIC CONGESTIVE HEART FAILURE (HCC): Status: ACTIVE | Noted: 2020-11-02

## 2023-08-16 PROBLEM — K59.00 CONSTIPATION: Status: RESOLVED | Noted: 2020-08-04 | Resolved: 2023-08-16

## 2023-08-16 LAB
CARTRIDGE LOT#: 611 NUMERIC
HEMOGLOBIN A1C: 8.7 % (ref 4.3–5.6)

## 2023-08-16 PROCEDURE — 3008F BODY MASS INDEX DOCD: CPT

## 2023-08-16 PROCEDURE — 1160F RVW MEDS BY RX/DR IN RCRD: CPT

## 2023-08-16 PROCEDURE — 3074F SYST BP LT 130 MM HG: CPT

## 2023-08-16 PROCEDURE — 3078F DIAST BP <80 MM HG: CPT

## 2023-08-16 PROCEDURE — 1126F AMNT PAIN NOTED NONE PRSNT: CPT

## 2023-08-16 PROCEDURE — 99215 OFFICE O/P EST HI 40 MIN: CPT

## 2023-08-16 PROCEDURE — 1159F MED LIST DOCD IN RCRD: CPT

## 2023-08-16 PROCEDURE — 83036 HEMOGLOBIN GLYCOSYLATED A1C: CPT

## 2023-08-16 PROCEDURE — 3052F HG A1C>EQUAL 8.0%<EQUAL 9.0%: CPT

## 2023-08-16 RX ORDER — ATORVASTATIN CALCIUM 20 MG/1
20 TABLET, FILM COATED ORAL DAILY
COMMUNITY

## 2023-08-16 RX ORDER — SPIRONOLACTONE 25 MG/1
25 TABLET ORAL 2 TIMES DAILY
Qty: 90 TABLET | Refills: 0 | Status: SHIPPED | OUTPATIENT
Start: 2023-08-16

## 2023-08-16 RX ORDER — INSULIN ASPART 100 [IU]/ML
17 INJECTION, SOLUTION INTRAVENOUS; SUBCUTANEOUS 4 TIMES DAILY
COMMUNITY
Start: 2022-08-05 | End: 2023-08-16 | Stop reason: ALTCHOICE

## 2023-08-16 RX ORDER — INSULIN ASPART 100 [IU]/ML
18 INJECTION, SOLUTION INTRAVENOUS; SUBCUTANEOUS
Qty: 48.6 ML | Refills: 1 | Status: SHIPPED | OUTPATIENT
Start: 2023-08-16 | End: 2023-11-14

## 2023-08-16 RX ORDER — INSULIN ASPART 100 [IU]/ML
INJECTION, SOLUTION INTRAVENOUS; SUBCUTANEOUS
COMMUNITY
Start: 2023-08-15 | End: 2023-08-16

## 2023-08-16 RX ORDER — TORSEMIDE 20 MG/1
60 TABLET ORAL 3 TIMES DAILY
COMMUNITY

## 2023-08-16 RX ORDER — INSULIN GLARGINE 100 [IU]/ML
50 INJECTION, SOLUTION SUBCUTANEOUS 2 TIMES DAILY
COMMUNITY
End: 2023-08-16 | Stop reason: ALTCHOICE

## 2023-08-16 RX ORDER — METOPROLOL SUCCINATE 25 MG/1
25 TABLET, EXTENDED RELEASE ORAL DAILY
COMMUNITY
Start: 2022-08-26 | End: 2023-08-16

## 2023-08-16 RX ORDER — FAMOTIDINE 20 MG/1
20 TABLET, FILM COATED ORAL DAILY
COMMUNITY
Start: 2022-09-09

## 2023-08-16 RX ORDER — PEN NEEDLE, DIABETIC 32GX 5/32"
NEEDLE, DISPOSABLE MISCELLANEOUS
COMMUNITY
Start: 2023-05-10 | End: 2023-08-16

## 2023-08-16 RX ORDER — INSULIN GLARGINE 100 [IU]/ML
50 INJECTION, SOLUTION SUBCUTANEOUS 2 TIMES DAILY
COMMUNITY
Start: 2022-08-05

## 2023-08-16 RX ORDER — METOPROLOL SUCCINATE 25 MG/1
25 TABLET, EXTENDED RELEASE ORAL DAILY
Qty: 90 TABLET | Refills: 0 | Status: SHIPPED | OUTPATIENT
Start: 2023-08-16

## 2023-08-16 RX ORDER — ISOSORBIDE MONONITRATE 30 MG/1
30 TABLET, EXTENDED RELEASE ORAL DAILY
COMMUNITY
Start: 2021-07-30

## 2023-08-16 RX ORDER — BLOOD SUGAR DIAGNOSTIC
1 STRIP MISCELLANEOUS 3 TIMES DAILY
COMMUNITY
Start: 2023-03-30

## 2023-08-16 RX ORDER — SPIRONOLACTONE 25 MG/1
25 TABLET ORAL DAILY
Qty: 90 TABLET | Refills: 0 | Status: SHIPPED | OUTPATIENT
Start: 2023-08-16 | End: 2023-08-16

## 2023-08-16 RX ORDER — SPIRONOLACTONE 25 MG/1
25 TABLET ORAL DAILY
COMMUNITY
Start: 2022-09-01 | End: 2023-08-16

## 2023-08-16 RX ORDER — LANCETS
EACH MISCELLANEOUS
COMMUNITY
Start: 2023-08-15

## 2023-08-16 RX ORDER — SPIRONOLACTONE 25 MG/1
25 TABLET ORAL 2 TIMES DAILY
Qty: 90 TABLET | Refills: 0 | COMMUNITY
Start: 2023-08-16 | End: 2023-08-16

## 2023-08-16 RX ORDER — PEN NEEDLE, DIABETIC 32GX 5/32"
NEEDLE, DISPOSABLE MISCELLANEOUS
Qty: 90 EACH | Refills: 1 | Status: SHIPPED | OUTPATIENT
Start: 2023-08-16

## 2023-08-16 NOTE — PROGRESS NOTES
Subjective:   Trista Talbert is a 76year old male who presents for Medication Request (Needs refills on novolog, metoprolol, and spironolactone)     Here with girlfriend for establish care- needs new PCP - and medication refills. PMHx of CKD stage 4 due to diabetic nephropathy, heart failure with preserved ejection fraction, hypertension, Type 2 diabetes mellitus with mild nonproliferative diabetic retinopathy, peripheral vascular disease, hyperlipidemia, ulceration right fifth metatarsal head with chronic osteomyelitis  History of amputation first three toes of left foot   Also history of noncompliance with medications per chart review    Was getting all of his care through [facility]  PCP was Dr. Michelle Fuentes and HTN- cardiologist is Dr. Dat Clarke, last saw 3/10/2023. Patient has been taking spironolactone twice a day, was reported on latest cardiology note as once daily but on latest nephrology note as twice daily. Also taking torsemide only twice a day instead of three times as ordered. DM - last A1C is 8.8% from 3/2023; takes novolog and lantus pens   He has been taking novolog 18 units twice a day- in the morning and evening, and sometimes wakes up at 2am and gives himself another dose. States if he does not do this his fasting BG is 180s. If he gives himself the overnight dose it is in the 120s in the morning. CKD 4- last creatinine 2.59 on 3/10/2023 Sees a nephrologist, last saw Nadia Rodriguez NP on 6/23/2023 via telehealth visit    Goes to wound clinic - Follows with Dr. Malina Biggs and [facility] wound clinic     He is mostly wheelchair-bound but has crutches and also uses a walker to walk short distances. He is interested in physical therapy to increase his strength and endurance.      History/Other:    Chief Complaint Reviewed and Verified  Nursing Notes Reviewed and   Verified  Tobacco Reviewed  Allergies Reviewed  Medications Reviewed    Medical History Reviewed  Surgical History Reviewed Family History   Reviewed  Social History Reviewed         Tobacco:  He has never smoked tobacco.    Current Outpatient Medications   Medication Sig Dispense Refill    famotidine 20 MG Oral Tab Take 1 tablet (20 mg total) by mouth daily. torsemide 20 MG Oral Tab Take 3 tablets (60 mg total) by mouth 3 (three) times daily. isosorbide mononitrate ER 30 MG Oral Tablet 24 Hr Take 1 tablet (30 mg total) by mouth daily. atorvastatin 20 MG Oral Tab Take 1 tablet (20 mg total) by mouth daily. Glucose Blood (ACCU-CHEK GUIDE) In Vitro Strip 1 Lancet by Finger stick route 3 (three) times daily. LANTUS SOLOSTAR 100 UNIT/ML Subcutaneous Solution Pen-injector Inject 50 Units into the skin 2 (two) times daily. Accu-Chek FastClix Lancets Does not apply Misc       metoprolol succinate ER 25 MG Oral Tablet 24 Hr Take 1 tablet (25 mg total) by mouth daily. 90 tablet 0    NOVOLOG FLEXPEN 100 UNIT/ML Subcutaneous Solution Pen-injector Inject 18 Units into the skin 3 (three) times daily before meals. 48.6 mL 1    BD PEN NEEDLE RHONDA 2ND GEN 32G X 4 MM Does not apply Misc USE UNDER THE SKIN FOUR TIMES DAILY AS DIRECTED 90 each 1    spironolactone 25 MG Oral Tab Take 1 tablet (25 mg total) by mouth 2 (two) times daily. 90 tablet 0         Review of Systems:  Review of Systems   Constitutional: Negative. Respiratory:  Positive for shortness of breath. Negative for cough. Cardiovascular:  Positive for leg swelling. Negative for chest pain and palpitations. Gastrointestinal: Negative. Skin: Negative. Neurological: Negative. Objective:   /54   Pulse 78   Ht 6' (1.829 m)   SpO2 94%  There is no height or weight on file to calculate BMI. Physical Exam  Vitals reviewed. Constitutional:       General: He is not in acute distress. Appearance: Normal appearance. He is well-developed. Cardiovascular:      Rate and Rhythm: Normal rate and regular rhythm.       Heart sounds: Normal heart sounds. Pulmonary:      Effort: Pulmonary effort is normal.      Breath sounds: Normal breath sounds. Musculoskeletal:      Right lower le+ Edema present. Left lower le+ Edema present. Skin:     General: Skin is warm and dry. Neurological:      Mental Status: He is alert and oriented to person, place, and time. Assessment & Plan:   1. Chronic diastolic heart failure (HCC)   Overview:  Last Assessment & Plan: Will resume home meds as able Cardiology consulted  Orders:  -     Metoprolol Succinate ER; Take 1 tablet (25 mg total) by mouth daily. Dispense: 90 tablet; Refill: 0  -     PHYSICAL THERAPY - INTERNAL  -     Spironolactone; Take 1 tablet (25 mg total) by mouth 2 (two) times daily. Dispense: 90 tablet; Refill: 0  2. Malignant essential hypertension  -     Metoprolol Succinate ER; Take 1 tablet (25 mg total) by mouth daily. Dispense: 90 tablet; Refill: 0  -     Spironolactone; Take 1 tablet (25 mg total) by mouth 2 (two) times daily. Dispense: 90 tablet; Refill: 0  3. Type 2 diabetes mellitus with right diabetic foot ulcer ([address]  -     NovoLOG FlexPen; Inject 18 Units into the skin 3 (three) times daily before meals. Dispense: 48.6 mL; Refill: 1  -     HEMOGLOBIN A1C  -     BD Pen Needle Niya 2nd Gen; USE UNDER THE SKIN FOUR TIMES DAILY AS DIRECTED  Dispense: 90 each; Refill: 1  4. Osteomyelitis, ankle and foot ([address]  Overview:  Last Assessment & Plan: S/p debridement IV abx per ID Work up in progress Cultures: Staph Aureus and Staph Epidermidis - final cultures in process  Orders:  -     PHYSICAL THERAPY - INTERNAL  5. Peripheral vascular disease due to secondary diabetes mellitus ([address]  Overview:  Last Assessment & Plan: Formatting of this note might be different from the original. Work up in progress Podiatry following  Orders:  -     PHYSICAL THERAPY - INTERNAL  6. Dependence on wheelchair  -     PHYSICAL THERAPY - INTERNAL  7.  Chronic kidney disease, stage 4 (severe) [facility])  - follow up with nephrology, was encouraged at last video visit to follow up with an in person visit   8. Encounter for medical examination to establish care (Primary)    Follow up for physical with physician of his choice- provided business cards. Needs to establish a new PCP.      CARMEN Cherry, 8/16/2023, 9:40 AM

## 2023-08-24 ENCOUNTER — APPOINTMENT (OUTPATIENT)
Dept: WOUND CARE | Age: 68
End: 2023-08-24
Attending: PODIATRIST

## 2023-08-29 ENCOUNTER — TELEPHONE (OUTPATIENT)
Dept: CARDIOLOGY | Age: 68
End: 2023-08-29

## 2023-08-31 RX ORDER — TORSEMIDE 20 MG/1
TABLET ORAL
Qty: 150 TABLET | Refills: 3 | OUTPATIENT
Start: 2023-08-31

## 2023-08-31 RX ORDER — TORSEMIDE 20 MG/1
60 TABLET ORAL 3 TIMES DAILY
Qty: 150 TABLET | Refills: 0 | Status: SHIPPED | OUTPATIENT
Start: 2023-08-31 | End: 2023-09-14 | Stop reason: SDUPTHER

## 2023-09-07 ENCOUNTER — OFFICE VISIT (OUTPATIENT)
Dept: CARDIOLOGY | Age: 68
End: 2023-09-07

## 2023-09-07 ENCOUNTER — HOSPITAL ENCOUNTER (OUTPATIENT)
Dept: WOUND CARE | Age: 68
Discharge: STILL ADMITTED OR ADMINISTRATIVE | End: 2023-09-07
Attending: PODIATRIST

## 2023-09-07 VITALS
BODY MASS INDEX: 48.68 KG/M2 | SYSTOLIC BLOOD PRESSURE: 104 MMHG | DIASTOLIC BLOOD PRESSURE: 62 MMHG | HEART RATE: 78 BPM | WEIGHT: 315 LBS

## 2023-09-07 VITALS — TEMPERATURE: 96.3 F

## 2023-09-07 DIAGNOSIS — I10 ESSENTIAL HYPERTENSION: ICD-10-CM

## 2023-09-07 DIAGNOSIS — E11.9 DIABETES MELLITUS WITH INSULIN THERAPY (CMD): ICD-10-CM

## 2023-09-07 DIAGNOSIS — M86.671 CHRONIC OSTEOMYELITIS OF RIGHT FOOT (CMD): ICD-10-CM

## 2023-09-07 DIAGNOSIS — R60.0 LEG EDEMA: ICD-10-CM

## 2023-09-07 DIAGNOSIS — I73.9 PVD (PERIPHERAL VASCULAR DISEASE) (CMD): ICD-10-CM

## 2023-09-07 DIAGNOSIS — L97.519 TYPE 2 DIABETES MELLITUS WITH RIGHT DIABETIC FOOT ULCER (CMD): ICD-10-CM

## 2023-09-07 DIAGNOSIS — S91.302D OPEN WOUND OF LEFT FOOT, SUBSEQUENT ENCOUNTER: ICD-10-CM

## 2023-09-07 DIAGNOSIS — Z89.422 STATUS POST AMPUTATION OF LESSER TOE, LEFT (CMD): Primary | ICD-10-CM

## 2023-09-07 DIAGNOSIS — G47.33 OSA (OBSTRUCTIVE SLEEP APNEA): ICD-10-CM

## 2023-09-07 DIAGNOSIS — I50.23 ACUTE ON CHRONIC SYSTOLIC CONGESTIVE HEART FAILURE (CMD): Primary | ICD-10-CM

## 2023-09-07 DIAGNOSIS — E11.621 TYPE 2 DIABETES MELLITUS WITH RIGHT DIABETIC FOOT ULCER (CMD): ICD-10-CM

## 2023-09-07 DIAGNOSIS — N18.32 STAGE 3B CHRONIC KIDNEY DISEASE (CMD): ICD-10-CM

## 2023-09-07 DIAGNOSIS — Z86.14 HISTORY OF MRSA INFECTION: ICD-10-CM

## 2023-09-07 DIAGNOSIS — Z79.4 DIABETES MELLITUS WITH INSULIN THERAPY (CMD): ICD-10-CM

## 2023-09-07 LAB
ANION GAP SERPL CALC-SCNC: 9 MMOL/L (ref 7–19)
BUN SERPL-MCNC: 81 MG/DL (ref 6–20)
BUN/CREAT SERPL: 27 (ref 7–25)
CALCIUM SERPL-MCNC: 8.6 MG/DL (ref 8.4–10.2)
CHLORIDE SERPL-SCNC: 102 MMOL/L (ref 97–110)
CO2 SERPL-SCNC: 30 MMOL/L (ref 21–32)
CREAT SERPL-MCNC: 3.02 MG/DL (ref 0.67–1.17)
EGFRCR SERPLBLD CKD-EPI 2021: 22 ML/MIN/{1.73_M2}
FASTING DURATION TIME PATIENT: ABNORMAL H
GLUCOSE SERPL-MCNC: 143 MG/DL (ref 70–99)
NT-PROBNP SERPL-MCNC: 1014 PG/ML
POTASSIUM SERPL-SCNC: 3.9 MMOL/L (ref 3.4–5.1)
SODIUM SERPL-SCNC: 137 MMOL/L (ref 135–145)

## 2023-09-07 PROCEDURE — 3078F DIAST BP <80 MM HG: CPT | Performed by: NURSE PRACTITIONER

## 2023-09-07 PROCEDURE — 80048 BASIC METABOLIC PNL TOTAL CA: CPT | Performed by: NURSE PRACTITIONER

## 2023-09-07 PROCEDURE — 83880 ASSAY OF NATRIURETIC PEPTIDE: CPT | Performed by: NURSE PRACTITIONER

## 2023-09-07 PROCEDURE — 11042 DBRDMT SUBQ TIS 1ST 20SQCM/<: CPT

## 2023-09-07 PROCEDURE — 96374 THER/PROPH/DIAG INJ IV PUSH: CPT

## 2023-09-07 PROCEDURE — 99214 OFFICE O/P EST MOD 30 MIN: CPT | Performed by: NURSE PRACTITIONER

## 2023-09-07 PROCEDURE — 3074F SYST BP LT 130 MM HG: CPT | Performed by: NURSE PRACTITIONER

## 2023-09-07 PROCEDURE — 36415 COLL VENOUS BLD VENIPUNCTURE: CPT | Performed by: NURSE PRACTITIONER

## 2023-09-07 PROCEDURE — 10002801 HB RX 250 W/O HCPCS: Performed by: NURSE PRACTITIONER

## 2023-09-07 RX ORDER — BUMETANIDE 0.25 MG/ML
2 INJECTION INTRAMUSCULAR; INTRAVENOUS ONCE
Status: COMPLETED | OUTPATIENT
Start: 2023-09-07 | End: 2023-09-07

## 2023-09-07 RX ADMIN — BUMETANIDE 2 MG: 0.25 INJECTION INTRAMUSCULAR; INTRAVENOUS at 10:53

## 2023-09-07 ASSESSMENT — PAIN SCALES - GENERAL: PAINLEVEL_OUTOF10: 0

## 2023-09-14 ENCOUNTER — OFFICE VISIT (OUTPATIENT)
Dept: CARDIOLOGY | Age: 68
End: 2023-09-14
Attending: NURSE PRACTITIONER

## 2023-09-14 VITALS
WEIGHT: 315 LBS | SYSTOLIC BLOOD PRESSURE: 126 MMHG | HEART RATE: 80 BPM | DIASTOLIC BLOOD PRESSURE: 72 MMHG | BODY MASS INDEX: 48.38 KG/M2

## 2023-09-14 DIAGNOSIS — I50.23 ACUTE ON CHRONIC SYSTOLIC CONGESTIVE HEART FAILURE (CMD): Primary | ICD-10-CM

## 2023-09-14 DIAGNOSIS — R60.9 EDEMA, UNSPECIFIED TYPE: ICD-10-CM

## 2023-09-14 DIAGNOSIS — R40.0 DAYTIME SOMNOLENCE: ICD-10-CM

## 2023-09-14 DIAGNOSIS — N18.9 CHRONIC KIDNEY DISEASE, UNSPECIFIED CKD STAGE: ICD-10-CM

## 2023-09-14 DIAGNOSIS — R06.00 DYSPNEA, UNSPECIFIED TYPE: ICD-10-CM

## 2023-09-14 LAB
ANION GAP SERPL CALC-SCNC: 9 MMOL/L (ref 7–19)
BUN SERPL-MCNC: 69 MG/DL (ref 6–20)
BUN/CREAT SERPL: 24 (ref 7–25)
CALCIUM SERPL-MCNC: 8.7 MG/DL (ref 8.4–10.2)
CHLORIDE SERPL-SCNC: 99 MMOL/L (ref 97–110)
CO2 SERPL-SCNC: 32 MMOL/L (ref 21–32)
CREAT SERPL-MCNC: 2.82 MG/DL (ref 0.67–1.17)
EGFRCR SERPLBLD CKD-EPI 2021: 24 ML/MIN/{1.73_M2}
FASTING DURATION TIME PATIENT: ABNORMAL H
GLUCOSE SERPL-MCNC: 194 MG/DL (ref 70–99)
NT-PROBNP SERPL-MCNC: 965 PG/ML
POTASSIUM SERPL-SCNC: 4.2 MMOL/L (ref 3.4–5.1)
SODIUM SERPL-SCNC: 136 MMOL/L (ref 135–145)

## 2023-09-14 PROCEDURE — 10002801 HB RX 250 W/O HCPCS: Performed by: NURSE PRACTITIONER

## 2023-09-14 PROCEDURE — 99215 OFFICE O/P EST HI 40 MIN: CPT | Performed by: NURSE PRACTITIONER

## 2023-09-14 PROCEDURE — 36415 COLL VENOUS BLD VENIPUNCTURE: CPT | Performed by: NURSE PRACTITIONER

## 2023-09-14 PROCEDURE — 3074F SYST BP LT 130 MM HG: CPT | Performed by: NURSE PRACTITIONER

## 2023-09-14 PROCEDURE — 80048 BASIC METABOLIC PNL TOTAL CA: CPT | Performed by: NURSE PRACTITIONER

## 2023-09-14 PROCEDURE — 83880 ASSAY OF NATRIURETIC PEPTIDE: CPT | Performed by: NURSE PRACTITIONER

## 2023-09-14 PROCEDURE — 3078F DIAST BP <80 MM HG: CPT | Performed by: NURSE PRACTITIONER

## 2023-09-14 PROCEDURE — 96374 THER/PROPH/DIAG INJ IV PUSH: CPT

## 2023-09-14 RX ORDER — BUMETANIDE 0.25 MG/ML
2 INJECTION INTRAMUSCULAR; INTRAVENOUS ONCE
Status: COMPLETED | OUTPATIENT
Start: 2023-09-14 | End: 2023-09-14

## 2023-09-14 RX ORDER — TORSEMIDE 20 MG/1
60 TABLET ORAL 2 TIMES DAILY
COMMUNITY
End: 2023-09-27 | Stop reason: SDUPTHER

## 2023-09-14 RX ADMIN — BUMETANIDE 2 MG: 0.25 INJECTION INTRAMUSCULAR; INTRAVENOUS at 10:36

## 2023-09-15 ENCOUNTER — OFFICE VISIT (OUTPATIENT)
Dept: CARDIOLOGY | Age: 68
End: 2023-09-15

## 2023-09-15 VITALS
DIASTOLIC BLOOD PRESSURE: 74 MMHG | HEIGHT: 72 IN | SYSTOLIC BLOOD PRESSURE: 108 MMHG | BODY MASS INDEX: 42.66 KG/M2 | OXYGEN SATURATION: 96 % | HEART RATE: 61 BPM | WEIGHT: 315 LBS

## 2023-09-15 DIAGNOSIS — G47.33 OSA (OBSTRUCTIVE SLEEP APNEA): ICD-10-CM

## 2023-09-15 DIAGNOSIS — E78.00 HYPERCHOLESTEROLEMIA: ICD-10-CM

## 2023-09-15 DIAGNOSIS — E78.00 PURE HYPERCHOLESTEROLEMIA: ICD-10-CM

## 2023-09-15 DIAGNOSIS — I10 ESSENTIAL HYPERTENSION: ICD-10-CM

## 2023-09-15 DIAGNOSIS — I10 BENIGN ESSENTIAL HYPERTENSION: Primary | ICD-10-CM

## 2023-09-15 DIAGNOSIS — I50.32 CHRONIC DIASTOLIC HEART FAILURE (CMD): ICD-10-CM

## 2023-09-15 DIAGNOSIS — I73.9 PVD (PERIPHERAL VASCULAR DISEASE) (CMD): ICD-10-CM

## 2023-09-15 PROCEDURE — 99214 OFFICE O/P EST MOD 30 MIN: CPT | Performed by: INTERNAL MEDICINE

## 2023-09-15 PROCEDURE — 3078F DIAST BP <80 MM HG: CPT | Performed by: INTERNAL MEDICINE

## 2023-09-15 PROCEDURE — 3074F SYST BP LT 130 MM HG: CPT | Performed by: INTERNAL MEDICINE

## 2023-09-15 SDOH — HEALTH STABILITY: PHYSICAL HEALTH: ON AVERAGE, HOW MANY DAYS PER WEEK DO YOU ENGAGE IN MODERATE TO STRENUOUS EXERCISE (LIKE A BRISK WALK)?: 0 DAYS

## 2023-09-15 SDOH — HEALTH STABILITY: PHYSICAL HEALTH: ON AVERAGE, HOW MANY MINUTES DO YOU ENGAGE IN EXERCISE AT THIS LEVEL?: 0 MIN

## 2023-09-15 ASSESSMENT — PATIENT HEALTH QUESTIONNAIRE - PHQ9
SUM OF ALL RESPONSES TO PHQ9 QUESTIONS 1 AND 2: 0
2. FEELING DOWN, DEPRESSED OR HOPELESS: NOT AT ALL
SUM OF ALL RESPONSES TO PHQ9 QUESTIONS 1 AND 2: 0
CLINICAL INTERPRETATION OF PHQ2 SCORE: NO FURTHER SCREENING NEEDED
1. LITTLE INTEREST OR PLEASURE IN DOING THINGS: NOT AT ALL

## 2023-09-22 RX ORDER — ISOSORBIDE MONONITRATE 30 MG/1
30 TABLET, EXTENDED RELEASE ORAL DAILY
Qty: 90 TABLET | Refills: 0 | Status: SHIPPED | OUTPATIENT
Start: 2023-09-22

## 2023-09-25 ENCOUNTER — OFFICE VISIT (OUTPATIENT)
Dept: INTERNAL MEDICINE CLINIC | Facility: CLINIC | Age: 68
End: 2023-09-25

## 2023-09-25 VITALS — HEART RATE: 67 BPM | HEIGHT: 72 IN | SYSTOLIC BLOOD PRESSURE: 100 MMHG | DIASTOLIC BLOOD PRESSURE: 64 MMHG

## 2023-09-25 DIAGNOSIS — G47.33 OBSTRUCTIVE SLEEP APNEA SYNDROME: ICD-10-CM

## 2023-09-25 DIAGNOSIS — I50.9 CONGESTIVE HEART FAILURE, UNSPECIFIED HF CHRONICITY, UNSPECIFIED HEART FAILURE TYPE (HCC): Primary | ICD-10-CM

## 2023-09-25 DIAGNOSIS — E11.621 TYPE 2 DIABETES MELLITUS WITH RIGHT DIABETIC FOOT ULCER (HCC): ICD-10-CM

## 2023-09-25 DIAGNOSIS — L97.519 TYPE 2 DIABETES MELLITUS WITH RIGHT DIABETIC FOOT ULCER (HCC): ICD-10-CM

## 2023-09-25 PROCEDURE — 3008F BODY MASS INDEX DOCD: CPT | Performed by: INTERNAL MEDICINE

## 2023-09-25 PROCEDURE — 99214 OFFICE O/P EST MOD 30 MIN: CPT | Performed by: INTERNAL MEDICINE

## 2023-09-25 PROCEDURE — 1126F AMNT PAIN NOTED NONE PRSNT: CPT | Performed by: INTERNAL MEDICINE

## 2023-09-25 PROCEDURE — 1159F MED LIST DOCD IN RCRD: CPT | Performed by: INTERNAL MEDICINE

## 2023-09-25 PROCEDURE — 3074F SYST BP LT 130 MM HG: CPT | Performed by: INTERNAL MEDICINE

## 2023-09-25 PROCEDURE — 3078F DIAST BP <80 MM HG: CPT | Performed by: INTERNAL MEDICINE

## 2023-09-25 PROCEDURE — 1160F RVW MEDS BY RX/DR IN RCRD: CPT | Performed by: INTERNAL MEDICINE

## 2023-09-25 RX ORDER — TORSEMIDE 20 MG/1
60 TABLET ORAL 2 TIMES DAILY
COMMUNITY
Start: 2023-09-25

## 2023-09-25 RX ORDER — BLOOD SUGAR DIAGNOSTIC
1 STRIP MISCELLANEOUS DAILY
Qty: 100 STRIP | Refills: 0 | Status: SHIPPED | OUTPATIENT
Start: 2023-09-25

## 2023-09-25 NOTE — PROGRESS NOTES
Zach Alfonso is a 76year old male. Patient presents with:  New Patient: Establish care HTN / DM       HPI:   New pt   C/c establish care - pt used to be seen in [place]   C/o wants a different cpap machine - the one he has makes his mouth dry and doesn't fit well and it hurts his teeth and air blows out -we will see the pulmonary doctor at [facility] on 27 September  He states that the covid shots that are ignited in 11 G      PMH  HTN  DM2-   GERD   CHF -cardiologist  dr Liborio Turk [facility]   HL  CKD - dr fry   IVET - on cpap     Lives with Rowena Holley , works part time as a contractor         Current Outpatient Medications   Medication Sig Dispense Refill    Glucose Blood (ACCU-CHEK GUIDE) In Vitro Strip 1 Lancet by Finger stick route daily. 100 strip 0    torsemide 20 MG Oral Tab Take 3 tablets (60 mg total) by mouth in the morning and 3 tablets (60 mg total) before bedtime. famotidine 20 MG Oral Tab Take 1 tablet (20 mg total) by mouth daily. isosorbide mononitrate ER 30 MG Oral Tablet 24 Hr Take 1 tablet (30 mg total) by mouth daily. atorvastatin 20 MG Oral Tab Take 1 tablet (20 mg total) by mouth daily. LANTUS SOLOSTAR 100 UNIT/ML Subcutaneous Solution Pen-injector Inject 50 Units into the skin 2 (two) times daily. Accu-Chek FastClix Lancets Does not apply Misc       metoprolol succinate ER 25 MG Oral Tablet 24 Hr Take 1 tablet (25 mg total) by mouth daily. 90 tablet 0    NOVOLOG FLEXPEN 100 UNIT/ML Subcutaneous Solution Pen-injector Inject 18 Units into the skin 3 (three) times daily before meals. 48.6 mL 1    BD PEN NEEDLE RHONDA 2ND GEN 32G X 4 MM Does not apply Misc USE UNDER THE SKIN FOUR TIMES DAILY AS DIRECTED 90 each 1    spironolactone 25 MG Oral Tab Take 1 tablet (25 mg total) by mouth 2 (two) times daily.  90 tablet 0      Past Medical History:   Diagnosis Date    Acute kidney injury (KASEY) with acute tubular necrosis (ATN) ([facility] 07/08/2020    Last Assessment & Plan: Formatting of this note might be different from the original. Multifactorial, due to possible underlying infection, also diuresis with torsemide. Creatinine 3.05 which is down to 1.9, Patient was initially started on normal saline at 75 cc/h, diuresis, lisinopril and metformin was held Creatinine is downtrending to 1.3. Monitor urine output.     Congestive heart disease ([address]     Constipation 08/04/2020    Diabetes ([facility] [address]     Dyspnea on exertion 08/13/2019    Last Assessment & Plan: Formatting of this note might be different from the original. Admitted with dyspnea    Elevated brain natriuretic peptide (BNP) level 07/01/2021    Last Assessment & Plan: Formatting of this note might be different from the original. Cardiology consulted    Esophageal reflux     Essential hypertension     Hyperlipidemia     Hypokalemia 08/22/2022    Last Assessment & Plan: Formatting of this note might be different from the original. Replacement per nephrology    Hyponatremia 02/04/2022    Last Assessment & Plan: Formatting of this note might be different from the original. Will monitor Na: 133 on admission    Sleep apnea       Past Surgical History:   Procedure Laterality Date    Amputation foot,midtarsal-chopart Left     Eye surgery        Social History:  Social History     Socioeconomic History    Marital status: Single   Tobacco Use    Smoking status: Never     Passive exposure: Never    Smokeless tobacco: Never   Vaping Use    Vaping Use: Never used   Substance and Sexual Activity    Alcohol use: Never    Drug use: Never        REVIEW OF SYSTEMS:   GENERAL HEALTH: No fevers, chills, sweats, fatigue  VISION: No recent vision problems, blurry vision or double vision  RESPIRATORY: denies shortness of breath, cough, wheezing  CARDIOVASCULAR: denies chest pain on exertion, palpitations, swelling in feet  NEURO: denies headaches , anxiety, depression    EXAM:   /64 (BP Location: Right arm, Patient Position: Sitting, Cuff Size: large)   Pulse 67   Ht 6' (1.829 m)   GENERAL: well developed, well nourished,in no apparent distress  SKIN: no rashes,no suspicious lesions  HEENT: atraumatic, normocephalic  NECK: supple   LUNGS: clear to auscultation, no wheeze  CARDIO: RRR without murmur  GI: good BS's,no masses or tenderness  EXTREMITIES: left foot in boot       ASSESSMENT AND PLAN:   Diagnoses and all orders for this visit:    Congestive heart failure, unspecified HF chronicity, unspecified heart failure type (HCC)  Cont meds ,f/u cardiology     Type 2 diabetes mellitus with right diabetic foot ulcer (HCC)  -     Glucose Blood (ACCU-CHEK GUIDE) In Vitro Strip; 1 Lancet by Finger stick route daily. bl sugars   Hba1c 8.7 on 8/2023  [employer] will check   Eye exam - ? - he states he has one but doesn't know the name   Consider Asa , acei,on statin   Foot -sees podiatrist -- goes to wound care - dr Olu Coles -- advised to follow a low carb, low sugar diet , try to be consistent    Exercise 30 min a day     [address] apt with pulm coming up    Preventative medicine   Next visit       The patient indicates understanding of these issues and agrees to the plan. No follow-ups on file.

## 2023-09-27 ENCOUNTER — TELEPHONE (OUTPATIENT)
Dept: SLEEP MEDICINE | Age: 68
End: 2023-09-27

## 2023-09-27 ENCOUNTER — OFFICE VISIT (OUTPATIENT)
Dept: SLEEP MEDICINE | Age: 68
End: 2023-09-27
Attending: NURSE PRACTITIONER

## 2023-09-27 ENCOUNTER — TELEPHONE (OUTPATIENT)
Dept: CARDIOLOGY | Age: 68
End: 2023-09-27

## 2023-09-27 DIAGNOSIS — R40.0 DAYTIME SOMNOLENCE: ICD-10-CM

## 2023-09-27 LAB — REPORT TEXT: NORMAL

## 2023-09-27 PROCEDURE — 95806 SLEEP STUDY UNATT&RESP EFFT: CPT | Performed by: PEDIATRICS

## 2023-09-27 RX ORDER — INSULIN GLARGINE 100 [IU]/ML
50 INJECTION, SOLUTION SUBCUTANEOUS 2 TIMES DAILY
Qty: 90 ML | Refills: 0 | Status: SHIPPED | OUTPATIENT
Start: 2023-09-27

## 2023-09-27 RX ORDER — TORSEMIDE 20 MG/1
60 TABLET ORAL 2 TIMES DAILY
Qty: 180 TABLET | Refills: 5 | Status: SHIPPED | OUTPATIENT
Start: 2023-09-27

## 2023-09-27 NOTE — TELEPHONE ENCOUNTER
Please review. Protocol failed / Has no protocol. New patient. Needs signature     Requested Prescriptions   Pending Prescriptions Disp Refills    LANTUS SOLOSTAR 100 UNIT/ML Subcutaneous Solution Pen-injector 15 mL 0     Sig: Inject 50 Units into the skin 2 (two) times daily.        Diabetes Medication Protocol Failed - 9/27/2023 10:43 AM        Failed - Last A1C < 7.5 and within past 6 months     Lab Results   Component Value Date    A1C 8.7 (A) 08/16/2023             Failed - EGFRCR or GFRNAA > 50     GFR Evaluation            Failed - GFR in the past 12 months        Passed - In person appointment or virtual visit in the past 6 mos or appointment in next 3 mos     Recent Outpatient Visits              2 days ago Congestive heart failure, unspecified HF chronicity, unspecified heart failure type [facility])    Pan Stringer Bard Parsons, MD    Office Visit    1 month ago Encounter for medical examination to establish care    Pan Stringer Charlen[place]urt, Sarah, CARMEN    Office Visit                            Recent Outpatient Visits              2 days ago Congestive heart failure, unspecified HF chronicity, unspecified heart failure type [facility])    Pan Stringer Bard Parsons, MD    Office Visit    1 month ago Encounter for medical examination to establish care    Bela Beltran, [facility]    Office Visit

## 2023-09-28 ENCOUNTER — ANCILLARY PROCEDURE (OUTPATIENT)
Dept: CARDIOLOGY | Age: 68
End: 2023-09-28
Attending: INTERNAL MEDICINE

## 2023-09-28 ENCOUNTER — TELEPHONE (OUTPATIENT)
Dept: CARDIOLOGY | Age: 68
End: 2023-09-28

## 2023-09-28 DIAGNOSIS — I10 BENIGN ESSENTIAL HYPERTENSION: ICD-10-CM

## 2023-09-28 DIAGNOSIS — I50.32 CHRONIC DIASTOLIC HEART FAILURE (CMD): ICD-10-CM

## 2023-09-28 DIAGNOSIS — E78.00 HYPERCHOLESTEROLEMIA: ICD-10-CM

## 2023-09-28 LAB
AORTIC VALVE AREA (AVA): 1.13
AV PEAK GRADIENT (AVPG): 11
AV PEAK VELOCITY (AVPV): 1.64
AV STENOSIS SEVERITY TEXT: NORMAL
AVI LVOT PEAK GRADIENT (LVOTMG): 1.4
E WAVE DECELARATION TIME (MDT): 14.3
INTERVENTRICULAR SEPTUM IN END DIASTOLE (IVSD): 2.7
LEFT INTERNAL DIMENSION IN SYSTOLE (LVSD): 1.3
LEFT VENTRICULAR INTERNAL DIMENSION IN DIASTOLE (LVDD): 2.7
LEFT VENTRICULAR POSTERIOR WALL IN END DIASTOLE (LVPW): 4.2
LV EF: NORMAL %
LVOT VTI (LVOTVTI): 0.9
MV E TISSUE VEL MED (MESV): 7.4
MV E WAVE VEL/E TISSUE VEL MED(MSR): 7.9
MV PEAK A VELOCITY (MVPAV): 284
MV PEAK E VELOCITY (MVPEV): 0.98
RV END SYSTOLIC LONGITUDINAL STRAIN FREE WALL (RVGS): 1.9
TRICUSPID VALVE PEAK REGURGITATION VELOCITY (TRPV): 3.1
TV ESTIMATED RIGHT ARTERIAL PRESSURE (RAP): 18.2

## 2023-09-28 PROCEDURE — G0399 HOME SLEEP TEST/TYPE 3 PORTA: HCPCS | Performed by: PEDIATRICS

## 2023-09-28 PROCEDURE — 93306 TTE W/DOPPLER COMPLETE: CPT | Performed by: INTERNAL MEDICINE

## 2023-10-05 ENCOUNTER — HOSPITAL ENCOUNTER (OUTPATIENT)
Dept: WOUND CARE | Age: 68
Discharge: STILL ADMITTED OR ADMINISTRATIVE | End: 2023-10-05
Attending: PODIATRIST

## 2023-10-05 VITALS — TEMPERATURE: 97.3 F

## 2023-10-05 DIAGNOSIS — I73.9 PVD (PERIPHERAL VASCULAR DISEASE) (CMD): ICD-10-CM

## 2023-10-05 DIAGNOSIS — R60.0 LEG EDEMA: ICD-10-CM

## 2023-10-05 DIAGNOSIS — M86.671 CHRONIC OSTEOMYELITIS OF RIGHT FOOT (CMD): ICD-10-CM

## 2023-10-05 DIAGNOSIS — L97.519 TYPE 2 DIABETES MELLITUS WITH RIGHT DIABETIC FOOT ULCER (CMD): ICD-10-CM

## 2023-10-05 DIAGNOSIS — S91.302D OPEN WOUND OF LEFT FOOT, SUBSEQUENT ENCOUNTER: ICD-10-CM

## 2023-10-05 DIAGNOSIS — E11.621 TYPE 2 DIABETES MELLITUS WITH RIGHT DIABETIC FOOT ULCER (CMD): ICD-10-CM

## 2023-10-05 DIAGNOSIS — E11.621 TYPE 2 DIABETES MELLITUS WITH FOOT ULCER, WITH LONG-TERM CURRENT USE OF INSULIN (CMD): ICD-10-CM

## 2023-10-05 DIAGNOSIS — Z79.4 TYPE 2 DIABETES MELLITUS WITH FOOT ULCER, WITH LONG-TERM CURRENT USE OF INSULIN (CMD): ICD-10-CM

## 2023-10-05 DIAGNOSIS — L97.509 TYPE 2 DIABETES MELLITUS WITH FOOT ULCER, WITH LONG-TERM CURRENT USE OF INSULIN (CMD): ICD-10-CM

## 2023-10-05 DIAGNOSIS — Z89.422 STATUS POST AMPUTATION OF LESSER TOE, LEFT (CMD): Primary | ICD-10-CM

## 2023-10-05 DIAGNOSIS — M86.671 OTHER CHRONIC OSTEOMYELITIS OF RIGHT FOOT (CMD): ICD-10-CM

## 2023-10-05 PROCEDURE — 11042 DBRDMT SUBQ TIS 1ST 20SQCM/<: CPT

## 2023-10-05 ASSESSMENT — PAIN SCALES - WONG BAKER: WONGBAKER_NUMERICALRESPONSE: 0

## 2023-10-05 ASSESSMENT — PAIN SCALES - GENERAL: PAINLEVEL_OUTOF10: 0

## 2023-10-12 ENCOUNTER — HOSPITAL ENCOUNTER (OUTPATIENT)
Dept: WOUND CARE | Age: 68
Discharge: STILL ADMITTED OR ADMINISTRATIVE | End: 2023-10-12
Attending: PODIATRIST

## 2023-10-12 VITALS — TEMPERATURE: 96.8 F

## 2023-10-12 DIAGNOSIS — Z89.422 STATUS POST AMPUTATION OF LESSER TOE, LEFT (CMD): ICD-10-CM

## 2023-10-12 DIAGNOSIS — S91.302D OPEN WOUND OF LEFT FOOT, SUBSEQUENT ENCOUNTER: Primary | ICD-10-CM

## 2023-10-12 DIAGNOSIS — R60.0 LEG EDEMA: ICD-10-CM

## 2023-10-12 PROCEDURE — 11042 DBRDMT SUBQ TIS 1ST 20SQCM/<: CPT

## 2023-10-12 ASSESSMENT — PAIN SCALES - GENERAL: PAINLEVEL_OUTOF10: 0

## 2023-10-12 ASSESSMENT — PAIN SCALES - WONG BAKER: WONGBAKER_NUMERICALRESPONSE: 0

## 2023-10-19 ENCOUNTER — HOSPITAL ENCOUNTER (OUTPATIENT)
Dept: GENERAL RADIOLOGY | Age: 68
Discharge: HOME | End: 2023-10-19
Attending: PODIATRIST

## 2023-10-19 ENCOUNTER — HOSPITAL ENCOUNTER (OUTPATIENT)
Dept: GENERAL RADIOLOGY | Age: 68
Discharge: HOME | End: 2023-10-19

## 2023-10-19 ENCOUNTER — HOSPITAL ENCOUNTER (OUTPATIENT)
Dept: WOUND CARE | Age: 68
Discharge: STILL ADMITTED OR ADMINISTRATIVE | End: 2023-10-19
Attending: PODIATRIST

## 2023-10-19 VITALS — TEMPERATURE: 97.9 F

## 2023-10-19 DIAGNOSIS — E11.621 TYPE 2 DIABETES MELLITUS WITH FOOT ULCER, WITH LONG-TERM CURRENT USE OF INSULIN (CMD): Primary | ICD-10-CM

## 2023-10-19 DIAGNOSIS — S91.302D OPEN WOUND OF LEFT FOOT, SUBSEQUENT ENCOUNTER: ICD-10-CM

## 2023-10-19 DIAGNOSIS — L97.509 TYPE 2 DIABETES MELLITUS WITH FOOT ULCER, WITH LONG-TERM CURRENT USE OF INSULIN (CMD): Primary | ICD-10-CM

## 2023-10-19 DIAGNOSIS — I73.9 PVD (PERIPHERAL VASCULAR DISEASE) (CMD): ICD-10-CM

## 2023-10-19 DIAGNOSIS — M86.671 OTHER CHRONIC OSTEOMYELITIS OF RIGHT FOOT (CMD): ICD-10-CM

## 2023-10-19 DIAGNOSIS — Z79.4 TYPE 2 DIABETES MELLITUS WITH FOOT ULCER, WITH LONG-TERM CURRENT USE OF INSULIN (CMD): Primary | ICD-10-CM

## 2023-10-19 DIAGNOSIS — R60.0 LEG EDEMA: ICD-10-CM

## 2023-10-19 DIAGNOSIS — M86.571 OTHER CHRONIC HEMATOGENOUS OSTEOMYELITIS OF RIGHT FOOT (CMD): ICD-10-CM

## 2023-10-19 DIAGNOSIS — Z89.422 STATUS POST AMPUTATION OF LESSER TOE, LEFT (CMD): ICD-10-CM

## 2023-10-19 DIAGNOSIS — L97.412 MIDFOOT ULCERATION, RIGHT, WITH FAT LAYER EXPOSED (CMD): ICD-10-CM

## 2023-10-19 PROCEDURE — 11042 DBRDMT SUBQ TIS 1ST 20SQCM/<: CPT

## 2023-10-19 PROCEDURE — 73630 X-RAY EXAM OF FOOT: CPT

## 2023-10-19 PROCEDURE — 73620 X-RAY EXAM OF FOOT: CPT

## 2023-10-19 ASSESSMENT — PAIN SCALES - WONG BAKER: WONGBAKER_NUMERICALRESPONSE: 0

## 2023-10-19 ASSESSMENT — PAIN SCALES - GENERAL: PAINLEVEL_OUTOF10: 0

## 2023-11-02 ENCOUNTER — HOSPITAL ENCOUNTER (OUTPATIENT)
Dept: WOUND CARE | Age: 68
Discharge: STILL ADMITTED OR ADMINISTRATIVE | End: 2023-11-02
Attending: PODIATRIST

## 2023-11-02 VITALS — TEMPERATURE: 96.1 F

## 2023-11-02 DIAGNOSIS — E11.621 TYPE 2 DIABETES MELLITUS WITH FOOT ULCER, WITH LONG-TERM CURRENT USE OF INSULIN (CMD): Primary | ICD-10-CM

## 2023-11-02 DIAGNOSIS — S98.912S AMPUTATION OF LEFT FOOT, SEQUELA (CMD): ICD-10-CM

## 2023-11-02 DIAGNOSIS — Z89.422 STATUS POST AMPUTATION OF LESSER TOE, LEFT (CMD): ICD-10-CM

## 2023-11-02 DIAGNOSIS — S91.302D OPEN WOUND OF LEFT FOOT, SUBSEQUENT ENCOUNTER: ICD-10-CM

## 2023-11-02 DIAGNOSIS — L97.509 TYPE 2 DIABETES MELLITUS WITH FOOT ULCER, WITH LONG-TERM CURRENT USE OF INSULIN (CMD): Primary | ICD-10-CM

## 2023-11-02 DIAGNOSIS — Z79.4 TYPE 2 DIABETES MELLITUS WITH FOOT ULCER, WITH LONG-TERM CURRENT USE OF INSULIN (CMD): Primary | ICD-10-CM

## 2023-11-02 DIAGNOSIS — R60.0 LEG EDEMA: ICD-10-CM

## 2023-11-02 DIAGNOSIS — M86.671 CHRONIC OSTEOMYELITIS OF RIGHT FOOT (CMD): ICD-10-CM

## 2023-11-02 PROCEDURE — 11042 DBRDMT SUBQ TIS 1ST 20SQCM/<: CPT

## 2023-11-02 ASSESSMENT — PAIN SCALES - GENERAL: PAINLEVEL_OUTOF10: 0

## 2023-11-14 ENCOUNTER — TELEPHONE (OUTPATIENT)
Dept: OTHER | Age: 68
End: 2023-11-14

## 2023-11-30 ENCOUNTER — HOSPITAL ENCOUNTER (OUTPATIENT)
Dept: WOUND CARE | Age: 68
Discharge: STILL ADMITTED OR ADMINISTRATIVE | End: 2023-11-30
Attending: PODIATRIST

## 2023-11-30 VITALS — TEMPERATURE: 97.7 F

## 2023-11-30 DIAGNOSIS — Z79.4 TYPE 2 DIABETES MELLITUS WITH FOOT ULCER, WITH LONG-TERM CURRENT USE OF INSULIN (CMD): Primary | ICD-10-CM

## 2023-11-30 DIAGNOSIS — Z89.422 STATUS POST AMPUTATION OF LESSER TOE, LEFT (CMD): ICD-10-CM

## 2023-11-30 DIAGNOSIS — L97.509 TYPE 2 DIABETES MELLITUS WITH FOOT ULCER, WITH LONG-TERM CURRENT USE OF INSULIN (CMD): Primary | ICD-10-CM

## 2023-11-30 DIAGNOSIS — S91.302D OPEN WOUND OF LEFT FOOT, SUBSEQUENT ENCOUNTER: ICD-10-CM

## 2023-11-30 DIAGNOSIS — S98.912S AMPUTATION OF LEFT FOOT, SEQUELA (CMD): ICD-10-CM

## 2023-11-30 DIAGNOSIS — M86.671 CHRONIC OSTEOMYELITIS OF RIGHT FOOT (CMD): ICD-10-CM

## 2023-11-30 DIAGNOSIS — E11.621 TYPE 2 DIABETES MELLITUS WITH FOOT ULCER, WITH LONG-TERM CURRENT USE OF INSULIN (CMD): Primary | ICD-10-CM

## 2023-11-30 DIAGNOSIS — R60.0 LEG EDEMA: ICD-10-CM

## 2023-11-30 PROCEDURE — 11042 DBRDMT SUBQ TIS 1ST 20SQCM/<: CPT

## 2023-11-30 ASSESSMENT — PAIN SCALES - WONG BAKER: WONGBAKER_NUMERICALRESPONSE: 0

## 2023-11-30 ASSESSMENT — PAIN SCALES - GENERAL: PAINLEVEL_OUTOF10: 0

## 2023-12-11 RX ORDER — ALLOPURINOL 100 MG/1
100 TABLET ORAL DAILY
Qty: 30 TABLET | Refills: 3 | OUTPATIENT
Start: 2023-12-11

## 2023-12-11 RX ORDER — FAMOTIDINE 20 MG/1
20 TABLET, FILM COATED ORAL DAILY
Qty: 90 TABLET | Refills: 1 | Status: SHIPPED | OUTPATIENT
Start: 2023-12-11

## 2023-12-13 DIAGNOSIS — I10 MALIGNANT ESSENTIAL HYPERTENSION: ICD-10-CM

## 2023-12-13 DIAGNOSIS — I50.32 CHRONIC DIASTOLIC HEART FAILURE (HCC): ICD-10-CM

## 2023-12-14 RX ORDER — ISOSORBIDE MONONITRATE 30 MG/1
30 TABLET, EXTENDED RELEASE ORAL DAILY
Qty: 90 TABLET | Refills: 3 | Status: SHIPPED | OUTPATIENT
Start: 2023-12-14

## 2023-12-14 NOTE — TELEPHONE ENCOUNTER
Please review; Protocol Failed/ no protocol. Rx Pended, authorize if appropriate    Requested Prescriptions   Pending Prescriptions Disp Refills    metoprolol succinate ER 25 MG Oral Tablet 24 Hr [Pharmacy Med Name: METOPROLOL ER SUCCINATE 25MG TABS] 90 tablet 3     Sig: TAKE 1 TABLET(25 MG) BY MOUTH DAILY       Hypertensive Medications Protocol Failed - 12/13/2023  7:09 PM        Failed - CMP or BMP in past 6 months     No results found for this or any previous visit (from the past 4392 hour(s)).             Failed - EGFRCR or GFRNAA > 50     GFR Evaluation            Passed - In person appointment in the past 12 or next 3 months     Recent Outpatient Visits              2 months ago Congestive heart failure, unspecified HF chronicity, unspecified heart failure type [facility])    [address], [place] Etienne MD    Office Visit    4 months ago Encounter for medical examination to establish care    [address], Whitney Gray, [facility], [facility]    Office Visit                      Passed - Last BP reading less than 140/90     BP Readings from Last 1 Encounters:   09/25/23 100/64               Passed - In person appointment or virtual visit in the past 6 months     Recent Outpatient Visits              2 months ago Congestive heart failure, unspecified HF chronicity, unspecified heart failure type [facility])    [address], Newton Etienne MD    Office Visit    4 months ago Encounter for medical examination to establish care    [facility], Bela, [address] [place] [address]    Office Visit

## 2023-12-16 RX ORDER — METOPROLOL SUCCINATE 25 MG/1
25 TABLET, EXTENDED RELEASE ORAL DAILY
Qty: 90 TABLET | Refills: 0 | Status: SHIPPED | OUTPATIENT
Start: 2023-12-16

## 2023-12-17 NOTE — TELEPHONE ENCOUNTER
Refilled this time but pt needs f/u apt - last seen for the first time in sept , [facility] ?  Pls ask ptto make f/u apt

## 2023-12-20 ENCOUNTER — TELEPHONE (OUTPATIENT)
Dept: INTERNAL MEDICINE CLINIC | Facility: CLINIC | Age: 68
End: 2023-12-20

## 2023-12-20 DIAGNOSIS — I10 MALIGNANT ESSENTIAL HYPERTENSION: ICD-10-CM

## 2023-12-20 DIAGNOSIS — L97.519 TYPE 2 DIABETES MELLITUS WITH RIGHT DIABETIC FOOT ULCER (HCC): Primary | ICD-10-CM

## 2023-12-20 DIAGNOSIS — I50.9 CONGESTIVE HEART FAILURE, UNSPECIFIED HF CHRONICITY, UNSPECIFIED HEART FAILURE TYPE (HCC): ICD-10-CM

## 2023-12-20 DIAGNOSIS — E11.621 TYPE 2 DIABETES MELLITUS WITH RIGHT DIABETIC FOOT ULCER (HCC): Primary | ICD-10-CM

## 2023-12-20 DIAGNOSIS — I50.32 CHRONIC DIASTOLIC HEART FAILURE (HCC): ICD-10-CM

## 2023-12-20 RX ORDER — TORSEMIDE 20 MG/1
60 TABLET ORAL 2 TIMES DAILY
Qty: 540 TABLET | Refills: 1 | Status: SHIPPED | OUTPATIENT
Start: 2023-12-20

## 2023-12-20 RX ORDER — FAMOTIDINE 20 MG/1
20 TABLET, FILM COATED ORAL DAILY
Qty: 90 TABLET | Refills: 1 | Status: SHIPPED | OUTPATIENT
Start: 2023-12-20

## 2023-12-20 RX ORDER — SPIRONOLACTONE 25 MG/1
25 TABLET ORAL 2 TIMES DAILY
Qty: 180 TABLET | Refills: 1 | Status: SHIPPED | OUTPATIENT
Start: 2023-12-20

## 2023-12-20 NOTE — TELEPHONE ENCOUNTER
Pt needs refills of:    Spironlactone 25mg completely out  Torsemide 1w left  Famotidine 20mg    Send to [facility] [address] [place] please.

## 2023-12-20 NOTE — TELEPHONE ENCOUNTER
Please review. Protocol failed / Has no protocol. All meds previously signed by Mayco Fletcher DO  Famotidine is listed as patient reported. Requested Prescriptions   Pending Prescriptions Disp Refills    spironolactone 25 MG Oral Tab 90 tablet 0     Sig: Take 1 tablet (25 mg total) by mouth 2 (two) times daily. Hypertensive Medications Protocol Failed - 12/20/2023 11:15 AM        Failed - CMP or BMP in past 6 months     No results found for this or any previous visit (from the past 4392 hour(s)).             Failed - EGFRCR or GFRNAA > 50     GFR Evaluation            Passed - In person appointment in the past 12 or next 3 months     Recent Outpatient Visits              2 months ago Congestive heart failure, unspecified HF chronicity, unspecified heart failure type ([facility] [address]    Byron Crump MD    Office Visit    4 months ago Encounter for medical examination to establish care    [address], [address] [facility], [address] [place] [address],[address], [facility]    Office Visit          Future Appointments         Provider Department Appt Notes    [facility] 2 weeks [facility], [address] [place] [address] Followup on meds  *Pt is asking for a phone call the day before to remind him of this appt  at  - Last BP reading less than 140/90     BP Readings from Last 1 Encounters:   09/25/23 100/64               Passed - In person appointment or virtual visit in the past 6 months     Recent Outpatient Visits              2 months ago Congestive heart failure, unspecified HF chronicity, unspecified heart failure type [facility])    Byron Crump MD    Office Visit    4 months ago Encounter for medical examination to establish care    [address], [address] [facility], [address] [facility], [address] [facility], [facility]    Office Visit          Future Appointments Provider Department Appt Notes    In 2 weeks [facility], [address] [place] [address] Followup on meds  *Pt is asking for a phone call the day before to remind him of this appt  at [phone]                 torsemide 20 MG Oral Tab  0     Sig: Take 3 tablets (60 mg total) by mouth in the morning and 3 tablets (60 mg total) before bedtime. Hypertensive Medications Protocol Failed - 12/20/2023 11:15 AM        Failed - CMP or BMP in past 6 months     No results found for this or any previous visit (from the past 4392 hour(s)).             Failed - EGFRCR or GFRNAA > 50     GFR Evaluation            Passed - In person appointment in the past 12 or next 3 months     Recent Outpatient Visits              2 months ago Congestive heart failure, unspecified HF chronicity, unspecified heart failure type ([facility]    Giovanna Larsen MD    Office Visit    4 months ago Encounter for medical examination to establish care    [address], [address] [facility] CARMEN Alonso    Office Visit          Future Appointments         Provider Department Appt Notes    In 2 weeks Lucy Valentin MD [facility], [address] [place] [address] Followup on meds  *Pt is asking for a phone call the day before to remind him of this appt  at  - Last BP reading less than 140/90     BP Readings from Last 1 Encounters:   09/25/23 100/64               Passed - In person appointment or virtual visit in the past 6 months     Recent Outpatient Visits              2 months ago Congestive heart failure, unspecified HF chronicity, unspecified heart failure type [facility])    Giovanna Larsen MD    Office Visit    4 months ago Encounter for medical examination to establish care    [address], [facility] Phillip Dhaliwal, APRN    Office Visit          Future Appointments         Provider Department Appt Notes    In 2 weeks Joy Zepeda MD [facility], [address] [facility] Followup on meds  *Pt is asking for a phone call the day before to remind him of this appt  at [phone]                 famotidine 20 MG Oral Tab  0     Sig: Take 1 tablet (20 mg total) by mouth daily.        Gastrointestional Medication Protocol Passed - 12/20/2023 11:15 AM        Passed - In person appointment or virtual visit in the past 12 mos or appointment in next 3 mos     Recent Outpatient Visits              2 months ago Congestive heart failure, unspecified HF chronicity, unspecified heart failure type [facility])    [address], [address] Khoi Gutierrez MD    Office Visit    4 months ago Encounter for medical examination to establish care    [address], [address] Abbi Gutierrez APRN    Office Visit          Future Appointments         Provider Department Appt Notes    In 2 weeks Joy Zepeda MD [address], [address] [facility] Followup on meds  *Pt is asking for a phone call the day before to remind him of this appt  at [phone]                  Future Appointments         Provider Department Appt Notes    In 2 weeks Joy Zepeda MD [address], [address] [facility] Followup on meds  *Pt is asking for a phone call the day before to remind him of this appt  at [address] Visits              2 months ago Congestive heart failure, unspecified HF chronicity, unspecified heart failure type [facility])    [address], [address] Khoi Gutierrez MD    Office Visit    4 months ago Encounter for medical examination to establish care    Redd Green, [address] [facility],[address], 6 [facility], APR    Office Visit

## 2023-12-20 NOTE — TELEPHONE ENCOUNTER
Pt is requesting labwork be entered ahead of his followup visit on Jan 4th. He wants a phone call when they are entered, ok to leave a voice message.   [phone]

## 2023-12-28 ENCOUNTER — HOSPITAL ENCOUNTER (OUTPATIENT)
Dept: WOUND CARE | Age: 68
Discharge: STILL ADMITTED OR ADMINISTRATIVE | End: 2023-12-28
Attending: PODIATRIST

## 2023-12-28 VITALS — TEMPERATURE: 96.8 F

## 2023-12-28 DIAGNOSIS — S98.912S AMPUTATION OF LEFT FOOT, SEQUELA (CMD): ICD-10-CM

## 2023-12-28 DIAGNOSIS — L97.509 TYPE 2 DIABETES MELLITUS WITH FOOT ULCER, WITH LONG-TERM CURRENT USE OF INSULIN (CMD): ICD-10-CM

## 2023-12-28 DIAGNOSIS — R60.0 LEG EDEMA: ICD-10-CM

## 2023-12-28 DIAGNOSIS — Z79.4 TYPE 2 DIABETES MELLITUS WITH FOOT ULCER, WITH LONG-TERM CURRENT USE OF INSULIN (CMD): ICD-10-CM

## 2023-12-28 DIAGNOSIS — Z89.422 STATUS POST AMPUTATION OF LESSER TOE, LEFT (CMD): Primary | ICD-10-CM

## 2023-12-28 DIAGNOSIS — E11.621 TYPE 2 DIABETES MELLITUS WITH FOOT ULCER, WITH LONG-TERM CURRENT USE OF INSULIN (CMD): ICD-10-CM

## 2023-12-28 DIAGNOSIS — I73.9 PVD (PERIPHERAL VASCULAR DISEASE) (CMD): ICD-10-CM

## 2023-12-28 DIAGNOSIS — S91.302D OPEN WOUND OF LEFT FOOT, SUBSEQUENT ENCOUNTER: ICD-10-CM

## 2023-12-28 DIAGNOSIS — M86.671 CHRONIC OSTEOMYELITIS OF RIGHT FOOT (CMD): ICD-10-CM

## 2023-12-28 PROCEDURE — 11042 DBRDMT SUBQ TIS 1ST 20SQCM/<: CPT

## 2023-12-28 ASSESSMENT — PAIN SCALES - GENERAL: PAINLEVEL_OUTOF10: 0

## 2024-01-07 ENCOUNTER — TELEPHONE (OUTPATIENT)
Dept: INTERNAL MEDICINE CLINIC | Facility: CLINIC | Age: 69
End: 2024-01-07

## 2024-01-09 NOTE — TELEPHONE ENCOUNTER
Please review; protocol failed.    Requested Prescriptions   Pending Prescriptions Disp Refills    LANTUS SOLOSTAR 100 UNIT/ML Subcutaneous Solution Pen-injector [Pharmacy Med Name: LANTUS SOLOSTAR PEN INJ 3ML] 90 mL 0     Sig: ADMINISTER 50 UNITS UNDER THE SKIN TWICE DAILY       Diabetes Medication Protocol Failed - 1/7/2024 12:54 PM        Failed - Last A1C < 7.5 and within past 6 months     Lab Results   Component Value Date    A1C 8.7 (A) 08/16/2023             Failed - EGFRCR or GFRNAA > 50     GFR Evaluation            Failed - GFR in the past 12 months        Passed - In person appointment or virtual visit in the past 6 mos or appointment in next 3 mos     Recent Outpatient Visits              3 months ago Congestive heart failure, unspecified HF chronicity, unspecified heart failure type (HCC)    [facility] Tamy Wills MD    Office Visit    4 months ago Encounter for medical examination to establish care    [facility] Maxie Inessa Whitfield APRN    Office Visit          Future Appointments         Provider Department Appt Notes    In 1 month Tamy Wills MD [facility] Followup on meds  *Pt is asking for a phone call the day before to remind him of this appt  at [phone]

## 2024-01-10 RX ORDER — INSULIN GLARGINE 100 [IU]/ML
50 INJECTION, SOLUTION SUBCUTANEOUS 2 TIMES DAILY
Qty: 90 ML | Refills: 0 | Status: SHIPPED | OUTPATIENT
Start: 2024-01-10

## 2024-01-11 NOTE — TELEPHONE ENCOUNTER
Spoke with the patient,verified full name and     Stated will get in done this week    No further questions

## 2024-01-12 DIAGNOSIS — I10 MALIGNANT ESSENTIAL HYPERTENSION: ICD-10-CM

## 2024-01-12 DIAGNOSIS — I50.32 CHRONIC DIASTOLIC HEART FAILURE (HCC): ICD-10-CM

## 2024-01-13 RX ORDER — SPIRONOLACTONE 25 MG/1
25 TABLET ORAL 2 TIMES DAILY
Qty: 90 TABLET | Refills: 0 | OUTPATIENT
Start: 2024-01-13

## 2024-01-23 ENCOUNTER — LAB ENCOUNTER (OUTPATIENT)
Dept: LAB | Age: 69
End: 2024-01-23
Attending: INTERNAL MEDICINE
Payer: MEDICARE

## 2024-01-23 DIAGNOSIS — E11.621 TYPE 2 DIABETES MELLITUS WITH RIGHT DIABETIC FOOT ULCER (HCC): ICD-10-CM

## 2024-01-23 DIAGNOSIS — D64.9 ANEMIA, UNSPECIFIED TYPE: ICD-10-CM

## 2024-01-23 DIAGNOSIS — I10 MALIGNANT ESSENTIAL HYPERTENSION: ICD-10-CM

## 2024-01-23 DIAGNOSIS — L97.519 TYPE 2 DIABETES MELLITUS WITH RIGHT DIABETIC FOOT ULCER (HCC): ICD-10-CM

## 2024-01-23 LAB
ALBUMIN SERPL-MCNC: 4 G/DL (ref 3.2–4.8)
ALBUMIN/GLOB SERPL: 0.9 {RATIO} (ref 1–2)
ALP LIVER SERPL-CCNC: 86 U/L
ALT SERPL-CCNC: 11 U/L
ANION GAP SERPL CALC-SCNC: 7 MMOL/L (ref 0–18)
AST SERPL-CCNC: 16 U/L (ref ?–34)
BILIRUB SERPL-MCNC: 0.4 MG/DL (ref 0.2–1.1)
BUN BLD-MCNC: 60 MG/DL (ref 9–23)
BUN/CREAT SERPL: 26.2 (ref 10–20)
CALCIUM BLD-MCNC: 9 MG/DL (ref 8.7–10.4)
CHLORIDE SERPL-SCNC: 99 MMOL/L (ref 98–112)
CHOLEST SERPL-MCNC: 132 MG/DL (ref ?–200)
CO2 SERPL-SCNC: 31 MMOL/L (ref 21–32)
CREAT BLD-MCNC: 2.29 MG/DL
DEPRECATED RDW RBC AUTO: 47.4 FL (ref 35.1–46.3)
EGFRCR SERPLBLD CKD-EPI 2021: 30 ML/MIN/1.73M2 (ref 60–?)
ERYTHROCYTE [DISTWIDTH] IN BLOOD BY AUTOMATED COUNT: 14.8 % (ref 11–15)
FASTING PATIENT LIPID ANSWER: NO
FASTING STATUS PATIENT QL REPORTED: NO
GLOBULIN PLAS-MCNC: 4.4 G/DL (ref 2.8–4.4)
GLUCOSE BLD-MCNC: 121 MG/DL (ref 70–99)
HCT VFR BLD AUTO: 34.5 %
HDLC SERPL-MCNC: 25 MG/DL (ref 40–59)
HGB BLD-MCNC: 11.1 G/DL
LDLC SERPL CALC-MCNC: 79 MG/DL (ref ?–100)
MCH RBC QN AUTO: 28.2 PG (ref 26–34)
MCHC RBC AUTO-ENTMCNC: 32.2 G/DL (ref 31–37)
MCV RBC AUTO: 87.6 FL
NONHDLC SERPL-MCNC: 107 MG/DL (ref ?–130)
OSMOLALITY SERPL CALC.SUM OF ELEC: 302 MOSM/KG (ref 275–295)
PLATELET # BLD AUTO: 288 10(3)UL (ref 150–450)
POTASSIUM SERPL-SCNC: 4.4 MMOL/L (ref 3.5–5.1)
PROT SERPL-MCNC: 8.4 G/DL (ref 5.7–8.2)
RBC # BLD AUTO: 3.94 X10(6)UL
SODIUM SERPL-SCNC: 137 MMOL/L (ref 136–145)
TRIGL SERPL-MCNC: 163 MG/DL (ref 30–149)
TSI SER-ACNC: 3.11 MIU/ML (ref 0.55–4.78)
VLDLC SERPL CALC-MCNC: 26 MG/DL (ref 0–30)
WBC # BLD AUTO: 10.1 X10(3) UL (ref 4–11)

## 2024-01-23 PROCEDURE — 80053 COMPREHEN METABOLIC PANEL: CPT

## 2024-01-23 PROCEDURE — 82728 ASSAY OF FERRITIN: CPT

## 2024-01-23 PROCEDURE — 85027 COMPLETE CBC AUTOMATED: CPT

## 2024-01-23 PROCEDURE — 83540 ASSAY OF IRON: CPT

## 2024-01-23 PROCEDURE — 80061 LIPID PANEL: CPT

## 2024-01-23 PROCEDURE — 84443 ASSAY THYROID STIM HORMONE: CPT

## 2024-01-23 PROCEDURE — 36415 COLL VENOUS BLD VENIPUNCTURE: CPT

## 2024-01-23 PROCEDURE — 84466 ASSAY OF TRANSFERRIN: CPT

## 2024-01-26 DIAGNOSIS — D64.9 ANEMIA, UNSPECIFIED TYPE: Primary | ICD-10-CM

## 2024-01-26 LAB
DEPRECATED HBV CORE AB SER IA-ACNC: 104.2 NG/ML
IRON SATN MFR SERPL: 15 %
IRON SERPL-MCNC: 44 UG/DL
TIBC SERPL-MCNC: 291 UG/DL (ref 250–425)
TRANSFERRIN SERPL-MCNC: 195 MG/DL (ref 215–365)

## 2024-01-27 DIAGNOSIS — Z12.11 COLON CANCER SCREENING: Primary | ICD-10-CM

## 2024-02-12 ENCOUNTER — APPOINTMENT (OUTPATIENT)
Dept: GENERAL RADIOLOGY | Age: 69
End: 2024-02-12

## 2024-02-12 ENCOUNTER — HOSPITAL ENCOUNTER (EMERGENCY)
Age: 69
Discharge: HOME | End: 2024-02-12
Attending: EMERGENCY MEDICINE

## 2024-02-12 VITALS
DIASTOLIC BLOOD PRESSURE: 74 MMHG | TEMPERATURE: 97.4 F | HEIGHT: 72 IN | HEART RATE: 81 BPM | OXYGEN SATURATION: 98 % | BODY MASS INDEX: 48.15 KG/M2 | SYSTOLIC BLOOD PRESSURE: 142 MMHG | RESPIRATION RATE: 20 BRPM

## 2024-02-12 DIAGNOSIS — M19.041 ARTHRITIS OF RIGHT HAND: ICD-10-CM

## 2024-02-12 DIAGNOSIS — M25.531 RIGHT WRIST PAIN: Primary | ICD-10-CM

## 2024-02-12 PROCEDURE — 29125 APPL SHORT ARM SPLINT STATIC: CPT

## 2024-02-12 PROCEDURE — 73110 X-RAY EXAM OF WRIST: CPT

## 2024-02-12 PROCEDURE — 99284 EMERGENCY DEPT VISIT MOD MDM: CPT

## 2024-02-13 ENCOUNTER — TELEPHONE (OUTPATIENT)
Dept: INTERNAL MEDICINE CLINIC | Facility: CLINIC | Age: 69
End: 2024-02-13

## 2024-02-13 DIAGNOSIS — M25.531 RIGHT WRIST PAIN: Primary | ICD-10-CM

## 2024-02-13 NOTE — TELEPHONE ENCOUNTER
Patient calling ( identified name and  ) again ,stressing that  he needs the referral for Dr Jacome    Patient is upset and anxious that the referral has not been sent        Patient has an appointment  with Dr. Jacome   ( [facility] )  at 9am on 2024  in [facility]   please  Fax  referral to  Ayaz Ovalle  at [phone]    Please  call patient  at [phone]  when referral has been sent     Future Appointments   Date Time Provider Department Center   2024 11:40 AM [facility]   3/6/2024 11:00 AM Meg Hermosillo APRN [facility]

## 2024-02-13 NOTE — TELEPHONE ENCOUNTER
D/w kisha psr   I dont see notes   He has an apt tomorrow   I would that he bring all his ER paperwork and be seen and evaluated tomorrow as he already has an appointment and then can be referred to the appropriate consultant as needed

## 2024-02-13 NOTE — TELEPHONE ENCOUNTER
Dr. Wills,     Patient called requesting referral to Dr. Jacome for ER follow up appointment 2/14/24.     Notified patient to see an in  and that this specialist may not be in his network, he started yelling at me on the phone and said he wants the referral for tomorrows appointment.     Pended referral please review diagnosis and sign off if you agree.    Thank you.  Tiesha Laird  [facility]

## 2024-02-13 NOTE — TELEPHONE ENCOUNTER
Patient is requesting referral.     Name of specialist and specialty department :   Dr. Dhaval Hodge, Orthopedic    Reason for visit with the specialist:  ER visit at [facility] advised to follow up with specialist ASAP -    Phone # [phone]    Address of the specialist office:  [address] [place] [address]    Appointment date: 2/14/2024        CSS informed patient the turnaround time for referral is 5-7 business days.  Patient was informed to check their [employer] account for referral status.

## 2024-02-13 NOTE — TELEPHONE ENCOUNTER
I am not sure why pt is upset - I have no documentation on why this pt is seeing this specialist   What is the diag ??   I have to google this dr  and found out he is an upper extremity orthopedic dr   Apparently pt went to ER but I dont see anything in careeverywhere

## 2024-02-13 NOTE — TELEPHONE ENCOUNTER
Spoke to Pt , stated he need the referral today because he has that appt tomorrow at 10:30   Appt with you at 11:40

## 2024-02-13 NOTE — TELEPHONE ENCOUNTER
Patient called back to follow up on referral request and to provide a fax number where referral can be sent to:    [phone]  Appointment is scheduled 2/14 at 10:30am.     Patient will like Dr. Wills to know he is in extreme pain and to say Thank you very much.

## 2024-02-14 ENCOUNTER — LAB ENCOUNTER (OUTPATIENT)
Dept: LAB | Age: 69
End: 2024-02-14
Attending: INTERNAL MEDICINE
Payer: MEDICARE

## 2024-02-14 ENCOUNTER — OFFICE VISIT (OUTPATIENT)
Dept: INTERNAL MEDICINE CLINIC | Facility: CLINIC | Age: 69
End: 2024-02-14

## 2024-02-14 VITALS — SYSTOLIC BLOOD PRESSURE: 114 MMHG | DIASTOLIC BLOOD PRESSURE: 57 MMHG | HEART RATE: 64 BPM

## 2024-02-14 DIAGNOSIS — Z79.4 TYPE 2 DIABETES MELLITUS WITH STAGE 3B CHRONIC KIDNEY DISEASE, WITH LONG-TERM CURRENT USE OF INSULIN (HCC): ICD-10-CM

## 2024-02-14 DIAGNOSIS — E11.22 TYPE 2 DIABETES MELLITUS WITH STAGE 3B CHRONIC KIDNEY DISEASE, WITH LONG-TERM CURRENT USE OF INSULIN (HCC): ICD-10-CM

## 2024-02-14 DIAGNOSIS — Z12.5 ENCOUNTER FOR SCREENING FOR MALIGNANT NEOPLASM OF PROSTATE: ICD-10-CM

## 2024-02-14 DIAGNOSIS — N18.32 TYPE 2 DIABETES MELLITUS WITH STAGE 3B CHRONIC KIDNEY DISEASE, WITH LONG-TERM CURRENT USE OF INSULIN (HCC): ICD-10-CM

## 2024-02-14 DIAGNOSIS — M25.531 RIGHT WRIST PAIN: ICD-10-CM

## 2024-02-14 DIAGNOSIS — M25.531 RIGHT WRIST PAIN: Primary | ICD-10-CM

## 2024-02-14 LAB
ALBUMIN SERPL-MCNC: 3.8 G/DL (ref 3.2–4.8)
ALBUMIN/GLOB SERPL: 0.9 {RATIO} (ref 1–2)
ALP LIVER SERPL-CCNC: 70 U/L
ALT SERPL-CCNC: 7 U/L
ANION GAP SERPL CALC-SCNC: 7 MMOL/L (ref 0–18)
AST SERPL-CCNC: 14 U/L (ref ?–34)
BILIRUB SERPL-MCNC: 0.4 MG/DL (ref 0.2–1.1)
BUN BLD-MCNC: 72 MG/DL (ref 9–23)
BUN/CREAT SERPL: 28.3 (ref 10–20)
CALCIUM BLD-MCNC: 8.3 MG/DL (ref 8.7–10.4)
CHLORIDE SERPL-SCNC: 99 MMOL/L (ref 98–112)
CO2 SERPL-SCNC: 29 MMOL/L (ref 21–32)
COMPLEXED PSA SERPL-MCNC: 0.13 NG/ML (ref ?–4)
CREAT BLD-MCNC: 2.54 MG/DL
CREAT UR-SCNC: 74.4 MG/DL
EGFRCR SERPLBLD CKD-EPI 2021: 27 ML/MIN/1.73M2 (ref 60–?)
EST. AVERAGE GLUCOSE BLD GHB EST-MCNC: 200 MG/DL (ref 68–126)
FASTING STATUS PATIENT QL REPORTED: NO
GLOBULIN PLAS-MCNC: 4.3 G/DL (ref 2.8–4.4)
GLUCOSE BLD-MCNC: 242 MG/DL (ref 70–99)
HBA1C MFR BLD: 8.6 % (ref ?–5.7)
MICROALBUMIN UR-MCNC: 6.7 MG/DL
MICROALBUMIN/CREAT 24H UR-RTO: 90.1 UG/MG (ref ?–30)
OSMOLALITY SERPL CALC.SUM OF ELEC: 309 MOSM/KG (ref 275–295)
POTASSIUM SERPL-SCNC: 4.7 MMOL/L (ref 3.5–5.1)
PROT SERPL-MCNC: 8.1 G/DL (ref 5.7–8.2)
SODIUM SERPL-SCNC: 135 MMOL/L (ref 136–145)
URATE SERPL-MCNC: 12.1 MG/DL

## 2024-02-14 PROCEDURE — 82570 ASSAY OF URINE CREATININE: CPT

## 2024-02-14 PROCEDURE — 3074F SYST BP LT 130 MM HG: CPT | Performed by: INTERNAL MEDICINE

## 2024-02-14 PROCEDURE — 3078F DIAST BP <80 MM HG: CPT | Performed by: INTERNAL MEDICINE

## 2024-02-14 PROCEDURE — 83036 HEMOGLOBIN GLYCOSYLATED A1C: CPT

## 2024-02-14 PROCEDURE — 1160F RVW MEDS BY RX/DR IN RCRD: CPT | Performed by: INTERNAL MEDICINE

## 2024-02-14 PROCEDURE — 80053 COMPREHEN METABOLIC PANEL: CPT

## 2024-02-14 PROCEDURE — 1159F MED LIST DOCD IN RCRD: CPT | Performed by: INTERNAL MEDICINE

## 2024-02-14 PROCEDURE — 84550 ASSAY OF BLOOD/URIC ACID: CPT

## 2024-02-14 PROCEDURE — 82043 UR ALBUMIN QUANTITATIVE: CPT

## 2024-02-14 PROCEDURE — 36415 COLL VENOUS BLD VENIPUNCTURE: CPT

## 2024-02-14 PROCEDURE — 1125F AMNT PAIN NOTED PAIN PRSNT: CPT | Performed by: INTERNAL MEDICINE

## 2024-02-14 PROCEDURE — 99214 OFFICE O/P EST MOD 30 MIN: CPT | Performed by: INTERNAL MEDICINE

## 2024-02-14 RX ORDER — PREDNISONE 10 MG/1
TABLET ORAL
Qty: 20 TABLET | Refills: 0 | Status: SHIPPED | OUTPATIENT
Start: 2024-02-14

## 2024-02-14 RX ORDER — INSULIN ASPART 100 [IU]/ML
18 INJECTION, SOLUTION INTRAVENOUS; SUBCUTANEOUS
COMMUNITY
Start: 2024-01-15

## 2024-02-14 NOTE — PROGRESS NOTES
Rodo Mcclain is a 68 year old male.  Chief Complaint   Patient presents with    Follow - Up       HPI:   Pt comes for f/u  C/c follow up- here with his helped deanne   C/o went to ER at [facility] on 2/12/2024 for right wrist pain   Right wrist pain 5 days ago (sat ) and ext to the elbow and the next day it was   painful   Took ibuprofren and heating pad nd it got better   Refferal for ortho placed as pt already as   Saw pulm at [facility] - but still needs the hose and mask       HISTORY  9/2023   New pt   C/c establish care - pt used to be seen in [place]   C/o wants a different cpap machine - the one he has makes his mouth dry and doesn't fit well and it hurts his teeth and air blows out -we will see the pulmonary doctor at [facility] on 27 September  He states that the covid shots that are ignited in 5 G        PMH  HTN  DM2-   GERD   CHF -cardiologist  dr diaz at [facility]   HL  CKD - dr fry   IVET - on cpap      Lives with PHILIPPE rossi , works part time as a contractor        Current Outpatient Medications   Medication Sig Dispense Refill    NOVOLOG FLEXPEN 100 UNIT/ML Subcutaneous Solution Pen-injector Inject 18 Units into the skin 3 (three) times daily before meals.      predniSONE 10 MG Oral Tab Take 4 pills for 2 days. Then, take 3 pills for 2 days. Then take 2 pills for 2 days. Then, take 1 pill for 2 days. 20 tablet 0    LANTUS SOLOSTAR 100 UNIT/ML Subcutaneous Solution Pen-injector Inject 50 Units into the skin 2 (two) times daily. 90 mL 0    spironolactone 25 MG Oral Tab Take 1 tablet (25 mg total) by mouth 2 (two) times daily. 180 tablet 1    torsemide 20 MG Oral Tab Take 3 tablets (60 mg total) by mouth in the morning and 3 tablets (60 mg total) before bedtime. 540 tablet 1    famotidine 20 MG Oral Tab Take 1 tablet (20 mg total) by mouth daily. 90 tablet 1    metoprolol succinate ER 25 MG Oral Tablet 24 Hr Take 1 tablet (25 mg total) by mouth daily. 90 tablet 0    Glucose Blood (ACCU-CHEK GUIDE) In  Vitro Strip 1 Lancet by Finger stick route daily. 100 strip 0    isosorbide mononitrate ER 30 MG Oral Tablet 24 Hr Take 1 tablet (30 mg total) by mouth daily.      atorvastatin 20 MG Oral Tab Take 1 tablet (20 mg total) by mouth daily.      Accu-Chek FastClix Lancets Does not apply Misc       BD PEN NEEDLE RHONDA 2ND GEN 32G X 4 MM Does not apply Misc USE UNDER THE SKIN FOUR TIMES DAILY AS DIRECTED 90 each 1      Past Medical History:   Diagnosis Date    Acute kidney injury (KASEY) with acute tubular necrosis (ATN) ([facility]) 07/08/2020    Last Assessment & Plan: Formatting of this note might be different from the original. Multifactorial, due to possible underlying infection, also diuresis with torsemide. Creatinine 3.05 which is down to 1.9, Patient was initially started on normal saline at 75 cc/h, diuresis, lisinopril and metformin was held Creatinine is downtrending to 1.3.  Monitor urine output.    Congestive heart disease ([facility])     Constipation 08/04/2020    Diabetes ([facility])     Dyspnea on exertion 08/13/2019    Last Assessment & Plan: Formatting of this note might be different from the original. Admitted with dyspnea    Elevated brain natriuretic peptide (BNP) level 07/01/2021    Last Assessment & Plan: Formatting of this note might be different from the original. Cardiology consulted    Esophageal reflux     Essential hypertension     Hyperlipidemia     Hypokalemia 08/22/2022    Last Assessment & Plan: Formatting of this note might be different from the original. Replacement per nephrology    Hyponatremia 02/04/2022    Last Assessment & Plan: Formatting of this note might be different from the original. Will monitor Na: 133 on admission    Sleep apnea       Past Surgical History:   Procedure Laterality Date    Amputation foot,midtarsal-chopart Left     Eye surgery        Social History:  Social History     Socioeconomic History    Marital status: Single   Tobacco Use    Smoking status: Never     Passive exposure:  Never    Smokeless tobacco: Never   Vaping Use    Vaping Use: Never used   Substance and Sexual Activity    Alcohol use: Never    Drug use: Never        REVIEW OF SYSTEMS:   GENERAL HEALTH: No fevers, chills, sweats, fatigue  VISION: No recent vision problems, blurry vision or double vision  RESPIRATORY: denies shortness of breath, cough, wheezing, more GRIFFITH   CARDIOVASCULAR: denies chest pain on exertion, palpitations, swelling in feet  NEURO: denies headaches , anxiety, depression    EXAM:   /57   Pulse 64   GENERAL: well developed, well nourished,in no apparent distress, examined in the wheelchair  HEENT: atraumatic, normocephalic   NECK: supple,no adenopathy,  LUNGS: clear to auscultation, no wheeze  CARDIO: RRR   EXTREMITIES: no cyanosis, or edema  Swelling of the right wrist with extreme pain on minimum palpation, unable to flex or extend return the hand, no redness    ASSESSMENT AND PLAN:   Diagnoses and all orders for this visit:    Right wrist pain  -     Uric Acid; Future  -     predniSONE 10 MG Oral Tab; Take 4 pills for 2 days. Then, take 3 pills for 2 days. Then take 2 pills for 2 days. Then, take 1 pill for 2 days.  ?  Gout-will check uric acid level, will give steroids    Encounter for screening for malignant neoplasm of prostate  -     PSA Total, Screen; Future  Will check    Type 2 diabetes mellitus with stage 3b chronic kidney disease, with long-term current use of insulin (HCC)  -     Microalb/Creat Ratio, Random Urine; Future  -     Comp Metabolic Panel (14); Future  -     Hemoglobin A1C; Future  -     ENDOCRINOLOGY - INTERNAL    bl sugars   Hba1c 8.7 on 8/2023  U. Micro will check   Eye exam - ? - he states he has one but doesn't know the name   Consider Asa , acei,on statin   Foot -sees podiatrist -- goes to wound care - dr lopez   Diet -- advised to follow a low carb, low sugar diet , try to be consistent                Exercise 30 min a day        Preventative medicine   Next  visit   Labs 1/2024 reviewed        The patient indicates understanding of these issues and agrees to the plan.  No follow-ups on file.

## 2024-02-14 NOTE — TELEPHONE ENCOUNTER
Dr. Wills, updated chart using care everywhere.     Encounter Details  Date Type Department Care Team Description   02/12/2024 12:22 PM CST - 02/12/2024 2:44 PM CST Emergency [facility] EMERGENCY   [address] [place] [id number] [phone]  Jeff Crow MD   [address] [place] [address] [phone] (Work)   [phone] (Fax)      Jaylene Meza PA-C   [address] [place] [address] [phone] (Work)   [phone] (Fax)      Sanna Hernandez RN  Right wrist pain (Primary Dx);  Arthritis of right hand  Discharge Disposition: Home or Self Ca

## 2024-02-14 NOTE — TELEPHONE ENCOUNTER
Patient calling to ask referral for Dr. Jacome to be faxed to [phone], patient is at doctor's office waiting for referral.

## 2024-02-14 NOTE — TELEPHONE ENCOUNTER
Spoke with Ammy at Dr [facility] office,   verified  She stated pt is there in the office for appt but waiting for referral.   She was informed referral still has open status.   She was advised to call [facility] Department [phone] for further assistance.   She  stated understanding.

## 2024-02-16 ENCOUNTER — TELEPHONE (OUTPATIENT)
Dept: INTERNAL MEDICINE CLINIC | Facility: CLINIC | Age: 69
End: 2024-02-16

## 2024-02-16 DIAGNOSIS — N18.4 CHRONIC KIDNEY DISEASE, STAGE 4 (SEVERE) (HCC): Primary | Chronic | ICD-10-CM

## 2024-02-16 DIAGNOSIS — M10.9 GOUT OF RIGHT HAND, UNSPECIFIED CAUSE, UNSPECIFIED CHRONICITY: ICD-10-CM

## 2024-02-16 DIAGNOSIS — E11.621 TYPE 2 DIABETES MELLITUS WITH RIGHT DIABETIC FOOT ULCER (HCC): ICD-10-CM

## 2024-02-16 DIAGNOSIS — L97.519 TYPE 2 DIABETES MELLITUS WITH RIGHT DIABETIC FOOT ULCER (HCC): ICD-10-CM

## 2024-02-16 NOTE — TELEPHONE ENCOUNTER
Patient had said that he already has an appointment with the orthopedic doctor, although after labs , not sure how much more otho can help as you would benefit from seeing a rheumatologist  Can increase his Lantus by 10 units while he is on the steroids and adjust the units for the NovoLog as per his meals  Referral for endocrinologist and nephrologist (not sure if he is seeing nephrologist at outside facility )  placed  Appears he is having gout so placed refrral for rheum as well -- treatment would be steroids due to     Also he is due for his medicare physical

## 2024-02-16 NOTE — TELEPHONE ENCOUNTER
Condition update:  Was seen 2/14/2024 for hand swelling. Treated with steroid taper.  Took 40 mg prednisone on 2/14, but got alarmed when \"blood sugars were off the meter\" so he did not take any pills 2/15.  Today blood sugar was 118.  Took only one prednisone today.  Hand is less swollen but  has pain in the palm.  He is going to call to schedule appointment with Dr Dhaval Waldron.    Dr Wills, any orders for taking the remainder of his steroids?  Presently takes Novolog 20 units three times daily and Lantus 50 units twice daily.          Medication Detail    Medication Quantity Refills Start End   predniSONE 10 MG Oral Tab 20 tablet 0 2/14/2024 --   Sig:   Take 4 pills for 2 days. Then, take 3 pills for 2 days. Then take 2 pills for 2 days. Then, take 1 pill for 2 days.     Route:   (none)     Order #:   [id number]       Medication Administration Instructions    Take 4 pills for 2 days. T

## 2024-02-17 NOTE — TELEPHONE ENCOUNTER
Left a detailed message to cell (ok per BETO) regarding note below. Advised to call back as needed for the phone #'s for the specialists and to schedule Medicare physical.

## 2024-02-20 ENCOUNTER — HOSPITAL ENCOUNTER (EMERGENCY)
Age: 69
Discharge: HOME | End: 2024-02-20
Attending: STUDENT IN AN ORGANIZED HEALTH CARE EDUCATION/TRAINING PROGRAM

## 2024-02-20 VITALS
HEIGHT: 72 IN | BODY MASS INDEX: 42.66 KG/M2 | HEART RATE: 80 BPM | OXYGEN SATURATION: 96 % | RESPIRATION RATE: 18 BRPM | WEIGHT: 315 LBS | TEMPERATURE: 97.3 F | DIASTOLIC BLOOD PRESSURE: 59 MMHG | SYSTOLIC BLOOD PRESSURE: 111 MMHG

## 2024-02-20 DIAGNOSIS — M25.531 RIGHT WRIST PAIN: Primary | ICD-10-CM

## 2024-02-20 PROCEDURE — 99282 EMERGENCY DEPT VISIT SF MDM: CPT

## 2024-02-20 ASSESSMENT — PAIN SCALES - GENERAL: PAINLEVEL_OUTOF10: 2

## 2024-02-23 DIAGNOSIS — L97.519 TYPE 2 DIABETES MELLITUS WITH RIGHT DIABETIC FOOT ULCER (HCC): Primary | ICD-10-CM

## 2024-02-23 DIAGNOSIS — M10.9 ACUTE GOUT OF RIGHT HAND, UNSPECIFIED CAUSE: ICD-10-CM

## 2024-02-23 DIAGNOSIS — E11.621 TYPE 2 DIABETES MELLITUS WITH RIGHT DIABETIC FOOT ULCER (HCC): Primary | ICD-10-CM

## 2024-02-23 RX ORDER — ALLOPURINOL 100 MG/1
50 TABLET ORAL EVERY OTHER DAY
Qty: 90 TABLET | Refills: 0 | Status: SHIPPED | OUTPATIENT
Start: 2024-02-23

## 2024-02-28 ENCOUNTER — TELEPHONE (OUTPATIENT)
Dept: INTERNAL MEDICINE CLINIC | Facility: CLINIC | Age: 69
End: 2024-02-28

## 2024-02-28 NOTE — TELEPHONE ENCOUNTER
Unable to reach patient for medication adherence consult. LVM for patient to call me back at [phone].

## 2024-02-29 RX ORDER — ATORVASTATIN CALCIUM 20 MG/1
20 TABLET, FILM COATED ORAL DAILY
Qty: 90 TABLET | Refills: 3 | Status: SHIPPED | OUTPATIENT
Start: 2024-02-29

## 2024-02-29 NOTE — TELEPHONE ENCOUNTER
Reached patient for medication adherence consult. Patient overdue for refill on atorvastatin per insurance report.    Patient reports that he was taking his medication as prescribed but recently ran out of medication. Atorvastatin was prescribed by his previous provider and had no additional refills. Per patient, he was instructed to make an appointment with new PCP to get prescription. Patient agreeable for me to pend order for atorvastatin for PCP to review.    Provided education on importance of adherence. Patient denies any questions or concerns with medication.

## 2024-03-04 ENCOUNTER — PATIENT OUTREACH (OUTPATIENT)
Facility: LOCATION | Age: 69
End: 2024-03-04

## 2024-03-15 ENCOUNTER — APPOINTMENT (OUTPATIENT)
Dept: CARDIOLOGY | Age: 69
End: 2024-03-15

## 2024-04-10 ENCOUNTER — TELEPHONE (OUTPATIENT)
Dept: CARDIOLOGY | Age: 69
End: 2024-04-10

## 2024-04-10 DIAGNOSIS — I50.23 ACUTE ON CHRONIC SYSTOLIC CONGESTIVE HEART FAILURE (CMD): Primary | ICD-10-CM

## 2024-04-10 DIAGNOSIS — R06.09 DYSPNEA ON EXERTION: ICD-10-CM

## 2024-04-10 DIAGNOSIS — N50.89 SCROTAL EDEMA: ICD-10-CM

## 2024-04-10 RX ORDER — SPIRONOLACTONE 25 MG/1
25 TABLET ORAL 2 TIMES DAILY
Qty: 180 TABLET | Refills: 3 | Status: SHIPPED | OUTPATIENT
Start: 2024-04-10

## 2024-04-10 RX ORDER — METOPROLOL SUCCINATE 25 MG/1
25 TABLET, EXTENDED RELEASE ORAL DAILY
Qty: 90 TABLET | Refills: 3 | Status: SHIPPED | OUTPATIENT
Start: 2024-04-10

## 2024-04-10 RX ORDER — TORSEMIDE 20 MG/1
60 TABLET ORAL 2 TIMES DAILY
Qty: 540 TABLET | Refills: 3 | Status: SHIPPED | OUTPATIENT
Start: 2024-04-10

## 2024-04-11 ENCOUNTER — OFFICE VISIT (OUTPATIENT)
Dept: CARDIOLOGY | Age: 69
End: 2024-04-11

## 2024-04-11 VITALS
DIASTOLIC BLOOD PRESSURE: 62 MMHG | SYSTOLIC BLOOD PRESSURE: 132 MMHG | WEIGHT: 315 LBS | BODY MASS INDEX: 48.02 KG/M2 | HEART RATE: 84 BPM

## 2024-04-11 DIAGNOSIS — E78.00 HYPERCHOLESTEROLEMIA: ICD-10-CM

## 2024-04-11 DIAGNOSIS — L97.519 TYPE 2 DIABETES MELLITUS WITH RIGHT DIABETIC FOOT ULCER (HCC): ICD-10-CM

## 2024-04-11 DIAGNOSIS — G47.33 OSA (OBSTRUCTIVE SLEEP APNEA): ICD-10-CM

## 2024-04-11 DIAGNOSIS — I50.23 ACUTE ON CHRONIC SYSTOLIC CONGESTIVE HEART FAILURE (CMD): ICD-10-CM

## 2024-04-11 DIAGNOSIS — I50.32 CHRONIC DIASTOLIC HEART FAILURE (CMD): ICD-10-CM

## 2024-04-11 DIAGNOSIS — E66.01 MORBID OBESITY WITH BMI OF 40.0-44.9, ADULT (CMD): ICD-10-CM

## 2024-04-11 DIAGNOSIS — I10 MALIGNANT ESSENTIAL HYPERTENSION: ICD-10-CM

## 2024-04-11 DIAGNOSIS — I50.32 CHRONIC DIASTOLIC HEART FAILURE (HCC): ICD-10-CM

## 2024-04-11 DIAGNOSIS — E11.621 TYPE 2 DIABETES MELLITUS WITH RIGHT DIABETIC FOOT ULCER (HCC): ICD-10-CM

## 2024-04-11 DIAGNOSIS — E78.5 HYPERLIPIDEMIA, UNSPECIFIED HYPERLIPIDEMIA TYPE: ICD-10-CM

## 2024-04-11 DIAGNOSIS — I10 BENIGN ESSENTIAL HYPERTENSION: Primary | ICD-10-CM

## 2024-04-11 DIAGNOSIS — I10 ESSENTIAL HYPERTENSION: ICD-10-CM

## 2024-04-11 LAB
ALBUMIN SERPL-MCNC: 2.4 G/DL (ref 3.6–5.1)
ALBUMIN/GLOB SERPL: 0.4 {RATIO} (ref 1–2.4)
ALP SERPL-CCNC: 83 UNITS/L (ref 45–117)
ALT SERPL-CCNC: 20 UNITS/L
ANION GAP SERPL CALC-SCNC: 11 MMOL/L (ref 7–19)
AST SERPL-CCNC: 19 UNITS/L
BILIRUB SERPL-MCNC: 0.4 MG/DL (ref 0.2–1)
BUN SERPL-MCNC: 62 MG/DL (ref 6–20)
BUN/CREAT SERPL: 27 (ref 7–25)
CALCIUM SERPL-MCNC: 8.4 MG/DL (ref 8.4–10.2)
CHLORIDE SERPL-SCNC: 100 MMOL/L (ref 97–110)
CO2 SERPL-SCNC: 31 MMOL/L (ref 21–32)
CREAT SERPL-MCNC: 2.32 MG/DL (ref 0.67–1.17)
DEPRECATED RDW RBC: 53.3 FL (ref 39–50)
EGFRCR SERPLBLD CKD-EPI 2021: 30 ML/MIN/{1.73_M2}
ERYTHROCYTE [DISTWIDTH] IN BLOOD: 16 % (ref 11–15)
FASTING DURATION TIME PATIENT: ABNORMAL H
GLOBULIN SER-MCNC: 6.1 G/DL (ref 2–4)
GLUCOSE SERPL-MCNC: 203 MG/DL (ref 70–99)
HCT VFR BLD CALC: 29.9 % (ref 39–51)
HGB BLD-MCNC: 9 G/DL (ref 13–17)
MCH RBC QN AUTO: 27.1 PG (ref 26–34)
MCHC RBC AUTO-ENTMCNC: 30.1 G/DL (ref 32–36.5)
MCV RBC AUTO: 90.1 FL (ref 78–100)
NRBC BLD MANUAL-RTO: 0 /100 WBC
NT-PROBNP SERPL-MCNC: 2879 PG/ML
PLATELET # BLD AUTO: 412 K/MCL (ref 140–450)
POTASSIUM SERPL-SCNC: 4.1 MMOL/L (ref 3.4–5.1)
PROT SERPL-MCNC: 8.5 G/DL (ref 6.4–8.2)
RBC # BLD: 3.32 MIL/MCL (ref 4.5–5.9)
SODIUM SERPL-SCNC: 138 MMOL/L (ref 135–145)
WBC # BLD: 9.1 K/MCL (ref 4.2–11)

## 2024-04-11 PROCEDURE — 96374 THER/PROPH/DIAG INJ IV PUSH: CPT

## 2024-04-11 PROCEDURE — 80053 COMPREHEN METABOLIC PANEL: CPT | Performed by: NURSE PRACTITIONER

## 2024-04-11 PROCEDURE — 83880 ASSAY OF NATRIURETIC PEPTIDE: CPT | Performed by: NURSE PRACTITIONER

## 2024-04-11 PROCEDURE — 85027 COMPLETE CBC AUTOMATED: CPT | Performed by: NURSE PRACTITIONER

## 2024-04-11 PROCEDURE — 10002800 HB RX 250 W HCPCS: Performed by: NURSE PRACTITIONER

## 2024-04-11 RX ORDER — BUMETANIDE 0.25 MG/ML
2.5 INJECTION INTRAMUSCULAR; INTRAVENOUS ONCE
Status: COMPLETED | OUTPATIENT
Start: 2024-04-11 | End: 2024-04-11

## 2024-04-11 RX ORDER — BUMETANIDE 0.25 MG/ML
3 INJECTION INTRAMUSCULAR; INTRAVENOUS ONCE
Status: DISCONTINUED | OUTPATIENT
Start: 2024-04-11 | End: 2024-04-11

## 2024-04-11 RX ADMIN — BUMETANIDE 2.5 MG: 0.25 INJECTION INTRAMUSCULAR; INTRAVENOUS at 14:20

## 2024-04-12 ENCOUNTER — TELEPHONE (OUTPATIENT)
Facility: CLINIC | Age: 69
End: 2024-04-12

## 2024-04-12 ENCOUNTER — TELEPHONE (OUTPATIENT)
Dept: INTERNAL MEDICINE CLINIC | Facility: CLINIC | Age: 69
End: 2024-04-12

## 2024-04-12 DIAGNOSIS — R71.0 DROP IN HEMOGLOBIN: Primary | ICD-10-CM

## 2024-04-12 DIAGNOSIS — Z12.11 COLON CANCER SCREENING: ICD-10-CM

## 2024-04-12 RX ORDER — PEN NEEDLE, DIABETIC 32GX 5/32"
NEEDLE, DISPOSABLE MISCELLANEOUS
Qty: 90 EACH | Refills: 0 | Status: SHIPPED | OUTPATIENT
Start: 2024-04-12

## 2024-04-12 RX ORDER — INSULIN ASPART 100 [IU]/ML
18 INJECTION, SOLUTION INTRAVENOUS; SUBCUTANEOUS
Qty: 51 ML | Refills: 0 | Status: SHIPPED | OUTPATIENT
Start: 2024-04-12

## 2024-04-12 RX ORDER — INSULIN GLARGINE 100 [IU]/ML
50 INJECTION, SOLUTION SUBCUTANEOUS 2 TIMES DAILY
Qty: 90 ML | Refills: 0 | Status: SHIPPED | OUTPATIENT
Start: 2024-04-12

## 2024-04-12 RX ORDER — METOPROLOL SUCCINATE 25 MG/1
25 TABLET, EXTENDED RELEASE ORAL DAILY
Qty: 90 TABLET | Refills: 0 | Status: SHIPPED | OUTPATIENT
Start: 2024-04-12

## 2024-04-12 NOTE — TELEPHONE ENCOUNTER
Refill passed per [facility] protocol.  Requested Prescriptions   Pending Prescriptions Disp Refills    NOVOLOG FLEXPEN 100 UNIT/ML Subcutaneous Solution Pen-injector [Pharmacy Med Name: NOVOLOG FLEXPEN INJ 3ML (ORANGE)] 51 mL 0     Sig: Inject 18 Units into the skin 3 (three) times daily before meals.       Diabetes Medication Protocol Failed - 4/12/2024  3:26 PM        Failed - Last A1C < 7.5 and within past 6 months     Lab Results   Component Value Date    A1C 8.6 (H) 02/14/2024             Failed - EGFRCR or GFRNAA > 50     GFR Evaluation  EGFRCR: 27 , resulted on 2/14/2024          Passed - In person appointment or virtual visit in the past 6 mos or appointment in next 3 mos     Recent Outpatient Visits              [facility] month ago [facility]    [facility] Visit    6 months ago Congestive heart failure, unspecified HF chronicity, unspecified heart failure type (HCC)    [facility]    Office Visit    8 months ago Encounter for medical examination to establish care    [facility] Inessa Whitfield APRN    Office Visit          Future Appointments         Provider Department Appt Notes    In 1 month Rachel Nelson APRN [facility] DM                    Passed - Microalbumin procedure in past 12 months or taking ACE/ARB        Passed - GFR in the past 12 months          LANTUS SOLOSTAR 100 UNIT/ML Subcutaneous Solution Pen-injector [Pharmacy Med Name: LANTUS SOLOSTAR PEN INJ 3ML] 90 mL 0     Sig: Inject 50 Units into the skin 2 (two) times daily.       Diabetes Medication Protocol Failed - 4/12/2024  3:26 PM        Failed - Last A1C < 7.5 and within past 6 months     Lab Results   Component Value Date    A1C 8.6 (H) 02/14/2024             Failed - EGFRCR or GFRNAA > 50     GFR  Evaluation  EGFRCR: 27 , resulted on 2/14/2024          Passed - In person appointment or virtual visit in the past 6 mos or appointment in next 3 mos     Recent Outpatient Visits              1 month ago Right wrist pain    [facility]    Office Visit    6 months ago Congestive heart failure, unspecified HF chronicity, unspecified heart failure type (HCC)    [facility]    Office Visit    8 months ago Encounter for medical examination to establish care    [facility]    [facility] Visit          Future Appointments         Provider Department Appt Notes    In 1 month Rachel Nelson APRN [facility] DM                    Passed - Microalbumin procedure in past 12 months or taking ACE/ARB        Passed - GFR in the past 12 months          BD PEN NEEDLE RHONDA 2ND GEN 32G X 4 MM Does not apply Misc 90 each 1     Sig: USE UNDER THE SKIN FOUR TIMES DAILY AS DIRECTED       Diabetic Supplies Protocol Passed - 4/12/2024  3:26 PM        Passed - In person appointment or virtual visit in the past 12 mos or appointment in next 3 mos     Recent Outpatient Visits              1 month ago Right wrist pain    [facility]    Office Visit    6 months ago Congestive heart failure, unspecified HF chronicity, unspecified heart failure type (HCC)    [facility] Felecia Clay Moni, MD    Office Visit    8 months ago Encounter for medical examination to establish care    [facility]    Office Visit          Future Appointments         Provider Department Appt Notes    In 1 month Rachel Nelson APRN [facility],  Felecia PLUMMER                       Recent Outpatient Visits              1 month ago Right wrist pain    [facility] [place], Tamy Olivier MD    Office Visit    6 months ago Congestive heart failure, unspecified HF chronicity, unspecified heart failure type (HCC)    [facility] [place], Tamy Olivier MD    Office Visit    8 months ago Encounter for medical examination to establish care    [facility]    Office Visit          Future Appointments         Provider Department Appt Notes    In 1 month Rachel Nelson APRN [facility] [place]

## 2024-04-12 NOTE — TELEPHONE ENCOUNTER
Please review; protocol failed/ has no protocol    Requested Prescriptions   Pending Prescriptions Disp Refills    NOVOLOG FLEXPEN 100 UNIT/ML Subcutaneous Solution Pen-injector [Pharmacy Med Name: NOVOLOG FLEXPEN INJ 3ML (ORANGE)] 51 mL 0     Sig: Inject 18 Units into the skin 3 (three) times daily before meals.       Diabetes Medication Protocol Failed - 4/12/2024  3:26 PM        Failed - Last A1C < 7.5 and within past 6 months     Lab Results   Component Value Date    A1C 8.6 (H) 02/14/2024             Failed - EGFRCR or GFRNAA > 50     GFR Evaluation  EGFRCR: 27 , resulted on 2/14/2024          Passed - In person appointment or virtual visit in the past 6 mos or appointment in next 3 mos     Recent Outpatient Visits              1 month ago Right wrist pain    [facility]    Office Visit    6 months ago Congestive heart failure, unspecified HF chronicity, unspecified heart failure type (HCC)    [facility]    Office Visit    8 months ago Encounter for medical examination to establish care    [facility] Inessa Whitfield APRN    Office Visit          Future Appointments         Provider Department Appt Notes    In 1 month Rachel Nelson APRN [facility] include [facility] beds at the [facility] DM                    Passed - Microalbumin procedure in past 12 months or taking ACE/ARB        Passed - GFR in the past 12 months          LANTUS SOLOSTAR 100 UNIT/ML Subcutaneous Solution Pen-injector [Pharmacy Med Name: LANTUS SOLOSTAR PEN INJ 3ML] 90 mL 0     Sig: Inject 50 Units into the skin 2 (two) times daily.       Diabetes Medication Protocol Failed - 4/12/2024  3:26 PM        Failed - Last A1C < 7.5 and within past 6 months     Lab Results   Component Value Date    A1C 8.6 (H) 02/14/2024             Failed - EGFRCR or GFRNAA > 50     GFR  Evaluation  EGFRCR: 27 , resulted on 2/14/2024          Passed - In person appointment or virtual visit in the past 6 mos or appointment in next 3 mos     Recent Outpatient Visits              1 month ago Right wrist pain    [facility]    Office Visit    6 months ago Congestive heart failure, unspecified HF chronicity, unspecified heart failure type (HCC)    [facility]    Office Visit    8 months ago Encounter for medical examination to establish care    [facility]    Office Visit          Future Appointments         Provider Department Appt Notes    In 1 month Rachel Nelson APRN [facility] DM                    Passed - Microalbumin procedure in past 12 months or taking ACE/ARB        Passed - GFR in the past 12 months         Signed Prescriptions Disp Refills    BD PEN NEEDLE RHONDA 2ND GEN 32G X 4 MM Does not apply Misc 90 each 0     Sig: USE UNDER THE SKIN FOUR TIMES DAILY AS DIRECTED       Diabetic Supplies Protocol Passed - 4/12/2024  3:26 PM        Passed - In person appointment or virtual visit in the past 12 mos or appointment in next 3 mos     Recent Outpatient Visits              1 month ago Right wrist pain    [facility]    Office Visit    6 months ago Congestive heart failure, unspecified HF chronicity, unspecified heart failure type (HCC)    [facility]    Office Visit    8 months ago Encounter for medical examination to establish care    [facility]    Office Visit          Future Appointments         Provider Department Appt Notes    In 1 month Rachel Nelson APRN [facility] Felecia [facility]                       Recent Outpatient Visits              1 month ago Right wrist pain    [facility]    Office Visit    6 months ago Congestive heart failure, unspecified HF chronicity, unspecified heart failure type (HCC)    [facility] [place]    Office Visit    8 months ago Encounter for medical examination to establish care    [facility]    Office Visit          Future Appointments         Provider Department Appt Notes    In 1 month [facility]

## 2024-04-12 NOTE — TELEPHONE ENCOUNTER
Patient is requesting referral.     Name of specialist and specialty department : pulmonologist  Reason for visit with the specialist: cpap machine needs calibrating  Address of the specialist office:  Appointment date:      [phone] cell  Call pt when ready or to advise if appt needed      CSS informed patient the turnaround time for referral is 5-7 business days.  Patient was informed to check their [employer] account for referral status.

## 2024-04-12 NOTE — TELEPHONE ENCOUNTER
Please review; protocol failed/ has no protocol    Requested Prescriptions   Pending Prescriptions Disp Refills    METOPROLOL SUCCINATE ER 25 MG Oral Tablet 24 Hr [Pharmacy Med Name: METOPROLOL ER SUCCINATE 25MG TABS] 90 tablet 0     Sig: TAKE 1 TABLET(25 MG) BY MOUTH DAILY       Hypertension Medications Protocol Failed - 4/11/2024 10:28 PM        Failed - EGFRCR or GFRNAA > 50     GFR Evaluation  EGFRCR: 27 , resulted on 2/14/2024          Passed - CMP or BMP in past 12 months        Passed - Last BP reading less than 140/90     BP Readings from Last 1 Encounters:   02/14/24 114/57               Passed - In person appointment or virtual visit in the past 12 mos or appointment in next 3 mos     Recent Outpatient Visits              1 month ago [facility] wrist [facility]    [facility]    6 months ago Congestive heart failure, unspecified HF chronicity, unspecified heart failure type (HCC)    [facility]    Office Visit    8 months ago Encounter for medical examination to establish care    [facility]    Office Visit          Future Appointments         Provider Department Appt Notes    In 1 month Rachel Nelson APRN [facility]                       Recent Outpatient Visits              1 month ago Right wrist pain    [facility]    Office Visit    6 months ago Congestive heart failure, unspecified HF chronicity, unspecified heart failure type (HCC)    [facility]    Office Visit    8 months ago Encounter for medical examination to establish care    [facility] Inessa Whitfield APRN    Office Visit           Future Appointments         Provider Department Appt Notes    In 1 month aRchel Nelson, [facility], [facility], [facility]

## 2024-04-12 NOTE — TELEPHONE ENCOUNTER
Dallas Weaver -    Received this acute from [facility] Maldonado  FIT ordered but not completed yet  Ok to use a res24?

## 2024-04-12 NOTE — TELEPHONE ENCOUNTER
Patient calling to schedule CLN, upset because needs a consult first. Also, upset because he has drop in hemoglobin. States needs to be seen asap. Pt got upset and angry, declined to schedule. Please call and advise.

## 2024-04-12 NOTE — TELEPHONE ENCOUNTER
Spoke to patient    Our office was already aware of him.     Scheduled patient for appointment  Location, date and time confirmed    Your Appointments      Tuesday April 16, 2024 11:30 AM  Consult with CARMEN Harris  [facility], [facility] [place] [address] [facility] [address]  [facility] [address]  [phone]

## 2024-04-12 NOTE — TELEPHONE ENCOUNTER
Dr. Wills,     Please pend referral with specialist you recommend. Patient has CPAP machine.     Thank you.

## 2024-04-12 NOTE — TELEPHONE ENCOUNTER
Please review; protocol failed/ has no protocol      NOVOLOG FLEXPEN 100 UNIT/ML Subcutaneous Solution Pen-injector  listed as patient reported   Requested Prescriptions   Pending Prescriptions Disp Refills    NOVOLOG FLEXPEN 100 UNIT/ML Subcutaneous Solution Pen-injector [Pharmacy Med Name: NOVOLOG FLEXPEN INJ 3ML (ORANGE)] 51 mL 0     Sig: ADMINISTER 18 UNITS UNDER THE SKIN THREE TIMES DAILY BEFORE MEALS       Diabetes Medication Protocol Failed - 4/11/2024  5:49 PM        Failed - Last A1C < 7.5 and within past 6 months     Lab Results   Component Value Date    A1C 8.6 (H) 02/14/2024             Failed - EGFRCR or GFRNAA > 50     GFR Evaluation  EGFRCR: 27 , resulted on 2/14/2024          Passed - In person appointment or virtual visit in the past 6 mos or appointment in next 3 mos     Recent Outpatient Visits              1 month ago Right wrist pain    [facility] Tamy Wills MD    Office Visit    6 months ago Congestive heart failure, unspecified HF chronicity, unspecified heart failure type (HCC)    [facility] Tamy Olivier MD    Office Visit    8 months ago Encounter for medical examination to establish care    [facility] Inessa Whitfield APRN    Office Visit          Future Appointments         Provider Department Appt Notes    In 1 month Rachel Nelson APRN [facility] DM                    Passed - Microalbumin procedure in past 12 months or taking ACE/ARB        Passed - GFR in the past 12 months          LANTUS SOLOSTAR 100 UNIT/ML Subcutaneous Solution Pen-injector [Pharmacy Med Name: LANTUS SOLOSTAR PEN INJ 3ML] 90 mL 0     Sig: ADMINISTER 50 UNITS UNDER THE SKIN TWICE DAILY       Diabetes Medication Protocol Failed - 4/12/2024  3:24 PM        Failed - Last A1C < 7.5 and within past 6 months     Lab Results   Component  Value Date    A1C 8.6 (H) 02/14/2024             Failed - EGFRCR or GFRNAA > 50     GFR Evaluation  EGFRCR: 27 , resulted on 2/14/2024          Passed - In person appointment or virtual visit in the past 6 mos or appointment in next 3 mos     Recent Outpatient Visits              1 month ago Right wrist pain    [facility] e[facility]lecia Clay Moni, MD    Office Visit    6 months ago Congestive heart failure, unspecified HF chronicity, unspecified heart failure type (HCC)    [facility] Felecia Clay Moni, MD    Office Visit    8 months ago Encounter for medical examination to establish care    [facility]    [facility] Visit          Future Appointments         Provider Department Appt Notes    In 1 month Rachel Nelson APRN [facility]                    Passed - Microalbumin procedure in past 12 months or taking ACE/ARB        Passed - GFR in the past 12 months           Recent Outpatient Visits              1 month ago Right wrist pain    [facility] Felecia Clay Moni, MD    Office Visit    6 months ago Congestive heart failure, unspecified HF chronicity, unspecified heart failure type (HCC)    [facility] Felecia Clay Moni, MD    Office Visit    8 months ago Encounter for medical examination to establish care    [facility]    Office Visit          Future Appointments         Provider Department Appt Notes    In 1 month Rachel Nelson APRN [facility]

## 2024-04-12 NOTE — TELEPHONE ENCOUNTER
Patient has persistent hemoglobin drops from 13.2 on 3/10/23 to 11.1 on 1/23/24 to 9.0 today 4/12/24.   He denies signs of bleeding but had a change in bowel habits with recent onset significant constipation.   I am sending for a FIT test which he will do today but needs to see GI asap for colonoscopy.   I am having trouble sending to GI RN pool - can you please assist with getting this message to them?  Thank you!!

## 2024-04-13 ENCOUNTER — APPOINTMENT (OUTPATIENT)
Dept: LAB | Facility: HOSPITAL | Age: 69
End: 2024-04-13
Payer: MEDICARE

## 2024-04-13 PROCEDURE — 82274 ASSAY TEST FOR BLOOD FECAL: CPT

## 2024-04-15 ENCOUNTER — LAB ENCOUNTER (OUTPATIENT)
Dept: LAB | Age: 69
End: 2024-04-15
Payer: MEDICARE

## 2024-04-15 DIAGNOSIS — R71.0 DROP IN HEMOGLOBIN: ICD-10-CM

## 2024-04-15 DIAGNOSIS — Z12.11 COLON CANCER SCREENING: ICD-10-CM

## 2024-04-15 LAB — HEMOCCULT STL QL: NEGATIVE

## 2024-04-16 ENCOUNTER — TELEPHONE (OUTPATIENT)
Facility: CLINIC | Age: 69
End: 2024-04-16

## 2024-04-16 ENCOUNTER — OFFICE VISIT (OUTPATIENT)
Facility: CLINIC | Age: 69
End: 2024-04-16

## 2024-04-16 ENCOUNTER — TELEPHONE (OUTPATIENT)
Dept: INTERNAL MEDICINE CLINIC | Facility: CLINIC | Age: 69
End: 2024-04-16

## 2024-04-16 VITALS
BODY MASS INDEX: 42.66 KG/M2 | HEIGHT: 72 IN | SYSTOLIC BLOOD PRESSURE: 132 MMHG | DIASTOLIC BLOOD PRESSURE: 76 MMHG | WEIGHT: 315 LBS | HEART RATE: 91 BPM

## 2024-04-16 DIAGNOSIS — E66.01 MORBID OBESITY WITH BMI OF 45.0-49.9, ADULT (HCC): ICD-10-CM

## 2024-04-16 DIAGNOSIS — D50.9 IRON DEFICIENCY ANEMIA, UNSPECIFIED IRON DEFICIENCY ANEMIA TYPE: Primary | ICD-10-CM

## 2024-04-16 DIAGNOSIS — E11.621 TYPE 2 DIABETES MELLITUS WITH RIGHT DIABETIC FOOT ULCER (HCC): ICD-10-CM

## 2024-04-16 DIAGNOSIS — I50.30 DIASTOLIC HEART FAILURE, UNSPECIFIED HF CHRONICITY (HCC): ICD-10-CM

## 2024-04-16 DIAGNOSIS — I50.9 HEART FAILURE, UNSPECIFIED HF CHRONICITY, UNSPECIFIED HEART FAILURE TYPE (HCC): Primary | ICD-10-CM

## 2024-04-16 DIAGNOSIS — L97.519 TYPE 2 DIABETES MELLITUS WITH RIGHT DIABETIC FOOT ULCER (HCC): ICD-10-CM

## 2024-04-16 DIAGNOSIS — K92.1 BLACK STOOL: ICD-10-CM

## 2024-04-16 PROCEDURE — 3008F BODY MASS INDEX DOCD: CPT

## 2024-04-16 PROCEDURE — 1159F MED LIST DOCD IN RCRD: CPT

## 2024-04-16 PROCEDURE — 99214 OFFICE O/P EST MOD 30 MIN: CPT

## 2024-04-16 PROCEDURE — 1160F RVW MEDS BY RX/DR IN RCRD: CPT

## 2024-04-16 PROCEDURE — 3078F DIAST BP <80 MM HG: CPT

## 2024-04-16 PROCEDURE — 3075F SYST BP GE 130 - 139MM HG: CPT

## 2024-04-16 RX ORDER — FERROUS SULFATE 325(65) MG
325 TABLET ORAL
Qty: 90 TABLET | Refills: 3 | Status: SHIPPED | OUTPATIENT
Start: 2024-04-16 | End: 2025-04-11

## 2024-04-16 NOTE — TELEPHONE ENCOUNTER
*urgent pool, 1st available - see OV 4/16/2024*     Scheduled for:  Colonoscopy 09851 / EGD 79789    Location:  [facility] ([facility])  Provider: Lia Longoria MD     Date:  6/12/2024 Wednesday   Time:   8:15AM (Patient aware to arrive ONE hour early)    Sedation:  MAC  Prep:  Split GoLytely    Diagnosis with codes:    ICD-10-CM   1. Iron deficiency anemia, unspecified iron deficiency anemia type  D50.9   2. Black stool  K92.1        Meds/Allergies Reconciled?:   Provider Reviewed   Was patient informed to call insurance with codes (Y/N):  Yes  Referral sent?: Referral was sent at the time of electronic surgical scheduling.  [facility] or [facility] notified?: I sent an electronic request to ENDO Scheduling and received a confirmation today.     Medication Orders:  Patient is aware to NOT take iron pills, herbal meds and diet supplements for 7 days before exam. Also to NOT take any form of alcohol, recreational drugs and any forms of ED meds 24 hours before exam.     GI RNs - please reach out to Dr. Wills for insulin dosing prior to procedure    HOLD metformin day before & day of procedure     HOLD iron for 1 week prior to procedure     Misc Orders:  N/A   Further instructions given by staff:  I Provided prep instructions to patient and reviewed date, time and location. Patient verbalized that  He / His understood and is aware to call with any questions.  Patient was informed about the new cancellation policy for He / His procedure. Patient was also given copy of the cancellation policy at the time of the appointment and verbalized understanding.

## 2024-04-16 NOTE — H&P
[facility] - Gastroenterology                                                                                                               Reason for consult: anemia    Requesting physician or provider: No primary care provider on file.    Chief Complaint   Patient presents with    Consult       HPI:   Rodo Mcclain is a 68 year old year-old male with active diagnoses including chronic diastolic heart failure, chronic kidney disease, type 2 diabetes, hypercholesterolemia, hypertension, obesity, IVET. Prior medical/surgical history in table below.    he is here today for evaluation  #iron deficiency anemia  #black stool  -worsening anemia since January. Negative FIT test 4/13/24. Previous baseline hgb 13s in March 2023, 12s 2022 1/23: hgb 11.1. low iron, normal ferritin and normal TIBC  4/11: hgb 9.0  -reports stool today was black. First bowel movement was hard stool and next was soft and formed. Has not taken iron or pepto bismol. He is taking metamucil every day. Denies hematochezia or bloody emesis. He reports generalized central abdominal discomfort \"yucky\" feeling in stomach \"feels like I need to go more\". Has occasional heartburn if he has dairy or pizza  -reports he has been having episodes of dizziness and shakiness that gets better after he has sugar. He thinks it is hypoglycemia but has not checked his BG during these episodes. Is awaiting endocrine appt in May. Denies increase in fatigue    Patient denies symptoms of nausea, vomiting, dysphagia, odynophagia, globus sensation, hematemesis, change in bowel habits, diarrhea, hematochezia. he denies recent change in appetite, fever or unintentional weight loss.      Last colonoscopy: 2016 - [facility]  -1 polyp per patient  Last EGD: >30 years ago    NSAIDS: none   Tobacco: none   Alcohol: none   Marijuana:none   Illicit drugs:none     FH GI malignancy: none    FH IBD: none     No history of adverse reaction to sedation  YES IVET  No anticoagulants/antiplatelet  No pacemaker/defibrillator    Wt Readings from Last 6 Encounters:   04/16/24 (!) 354 lb (160.6 kg)        History, Medications, Allergies, ROS:      Past Medical History:    Acute kidney injury (KASEY) with acute tubular necrosis (ATN) (HCC)    Last Assessment & Plan: Formatting of this note might be different from the original. Multifactorial, due to possible underlying infection, also diuresis with torsemide. Creatinine 3.05 which is down to 1.9, Patient was initially started on normal saline at 75 cc/h, diuresis, lisinopril and metformin was held Creatinine is downtrending to 1.3.  Monitor urine output.    Congestive heart disease (HCC)    Constipation    Diabetes (HCC)    Dyspnea on exertion    Last Assessment & Plan: Formatting of this note might be different from the original. Admitted with dyspnea    Elevated brain natriuretic peptide (BNP) level    Last Assessment & Plan: Formatting of this note might be different from the original. Cardiology consulted    Esophageal reflux    Essential hypertension    Hyperlipidemia    Hypokalemia    Last Assessment & Plan: Formatting of this note might be different from the original. Replacement per nephrology    Hyponatremia    Last Assessment & Plan: Formatting of this note might be different from the original. Will monitor Na: 133 on admission    Sleep apnea      Past Surgical History:   Procedure Laterality Date    Amputation foot,midtarsal-chopart Left     Colonoscopy  2016    [facility]    Eye surgery        Family Hx:   Family History   Problem Relation Age of Onset    Prostate Cancer Father     Heart Disease Brother       Social History:   Social History     Socioeconomic History    Marital status: Single   Tobacco Use    Smoking status: Never     Passive exposure: Never    Smokeless tobacco: Never   Vaping Use    Vaping status: Never Used   Substance and Sexual  Activity    Alcohol use: Never    Drug use: Never     Social Determinants of Health      Received from [facility]    Financial Resource Strain   Transportation Needs: No Transportation Needs (3/12/2021)    [facility]    PRAPARE - Transportation     Lack of Transportation (Medical): No     Lack of Transportation (Non-Medical): No   Physical Activity: High Risk (9/15/2023)    [facility] from [facility]    Exercise Vital Sign     On average, how many days per week do you engage in moderate to strenuous exercise (like a brisk walk)?: 0 days     On average, how many minutes do you engage in exercise at this level?: 0 min    Received from [facility]    Stress        Medications (Active prior to today's visit):  Current Outpatient Medications   Medication Sig Dispense Refill    Ferrous Sulfate 325 (65 Fe) MG Oral Tab Take 1 tablet (325 mg total) by mouth daily with breakfast for 360 doses. 90 tablet 3    polyethylene glycol, PEG 3350-KCl-NaBcb-NaCl-NaSulf, 236 g Oral Recon Soln Take 4,000 mL by mouth As Directed. Take 2,000 mL the night before your procedure and 2,000 mL the morning of your procedure. Take as directed by GI clinic. Okay to substitute for generic. 1 each 0    NOVOLOG FLEXPEN 100 UNIT/ML Subcutaneous Solution Pen-injector Inject 18 Units into the skin 3 (three) times daily before meals. 51 mL 0    LANTUS SOLOSTAR 100 UNIT/ML Subcutaneous Solution Pen-injector Inject 50 Units into the skin 2 (two) times daily. 90 mL 0    metoprolol succinate ER 25 MG Oral Tablet 24 Hr Take 1 tablet (25 mg total) by mouth daily. 90 tablet 0    BD PEN NEEDLE RHONDA 2ND GEN 32G X 4 MM Does not apply Misc USE UNDER THE SKIN FOUR TIMES DAILY AS DIRECTED 90 each 0    atorvastatin 20 MG Oral Tab Take 1 tablet (20 mg total) by mouth daily. 90 tablet 3     allopurinol 100 MG Oral Tab Take 0.5 tablets (50 mg total) by mouth every other day. 90 tablet 0    predniSONE 10 MG Oral Tab Take 4 pills for 2 days. Then, take 3 pills for 2 days. Then take 2 pills for 2 days. Then, take 1 pill for 2 days. 20 tablet 0    spironolactone 25 MG Oral Tab Take 1 tablet (25 mg total) by mouth 2 (two) times daily. 180 tablet 1    torsemide 20 MG Oral Tab Take 3 tablets (60 mg total) by mouth in the morning and 3 tablets (60 mg total) before bedtime. 540 tablet 1    famotidine 20 MG Oral Tab Take 1 tablet (20 mg total) by mouth daily. 90 tablet 1    Glucose Blood (ACCU-CHEK GUIDE) In Vitro Strip 1 Lancet by Finger stick route daily. 100 strip 0    isosorbide mononitrate ER 30 MG Oral Tablet 24 Hr Take 1 tablet (30 mg total) by mouth daily.      Accu-Chek FastClix Lancets Does not apply Misc          Allergies:  Allergies   Allergen Reactions    Doxycycline OTHER (SEE COMMENTS)     Kidney pain    Sulfamethoxazole W/Trimethoprim OTHER (SEE COMMENTS)     Acute kidney injury    Sulfamethoxazole OTHER (SEE COMMENTS)    Trimethoprim OTHER (SEE COMMENTS)       ROS:   CONSTITUTIONAL: negative for fevers, chills, sweats  EYES Negative for scleral icterus or redness, and diplopia  HEENT: Negative for hoarseness  RESPIRATORY: Negative for cough and severe shortness of breath  CARDIOVASCULAR: Negative for crushing sub-sternal chest pain  GASTROINTESTINAL: See HPI  GENITOURINARY: Negative for dysuria  MUSCULOSKELETAL: Negative for arthralgias and myalgias  SKIN: Negative for jaundice, rash or pruritus  NEUROLOGICAL: Negative for dizziness and headaches  BEHAVIOR/PSYCH: Negative for psychotic behavior    PHYSICAL EXAM:   Blood pressure 132/76, pulse 91, height 6' (1.829 m), weight (!) 354 lb (160.6 kg).    GEN: Alert, no acute distress, well-nourished   HEENT: anicteric sclera, neck supple, trachea midline, MMM, no palpable or tender neck or supraclavicular lymph nodes  CV: RRR, the extremities are  warm and well perfused. +bilateral lower extremity edema  LUNGS: No increased work of breathing, CTAB  ABDOMEN: Soft, obese, round, symmetrical, non-tender without distention or guarding. No scars or lesions. Aorta is without bruit or visible pulsation. Umbilicus is midline with soft, compressible herniation. Normoactive bowel sounds are present, No masses, hepatomegaly or splenomegaly noted. Exam is greatly limited by body habitus  MSK: No erythema, no warmth, no swelling of joints  SKIN: No jaundice, no erythema, no rashes, no lesions  HEMATOLOGIC: No bleeding, no bruising  NEURO: Alert and interactive, VELAZQUEZ  PSYCH: appropriate mood & affect    Labs/Imaging/Procedures:     Patient's pertinent labs and imaging were reviewed and discussed with patient today.        .  ASSESSMENT/PLAN:   Rodo Mcclain is a 68 year old year-old male with active diagnoses including chronic diastolic heart failure, chronic kidney disease, type 2 diabetes, hypercholesterolemia, hypertension, obesity, IVET. Prior medical/surgical history in table below.    he is here today for evaluation  #iron deficiency anemia  #black stool  -worsening anemia since January. Negative FIT test 4/13/24. Previous baseline hgb 13s in March 2023, 12s 2022 1/23: hgb 11.1. low iron, normal ferritin and normal TIBC  4/11: hgb 9.0  -reports stool today was black. First bowel movement was hard stool and next was soft and formed. Has not taken iron or pepto bismol. He is taking metamucil every day. Denies hematochezia or bloody emesis. He reports generalized central abdominal discomfort \"yucky\" feeling in stomach \"feels like I need to go more\". Has occasional heartburn if he has dairy or pizza  -reports he has been having episodes of dizziness and shakiness that gets better after he has sugar. He thinks it is hypoglycemia but has not checked his BG during these episodes. Is awaiting endocrine appt in May. Denies increase in fatigue    -Low iron and anemia with  black stools warranted upper and lower endoscopy evaluation to rule out malignancy, ulcer, or other bleeding etiology.  While he may have chronic GI and low volume blood loss, he likely also has component of anemia of chronic disease given normal ferritin, normal TIBC, and anemia is normocytic. Will initiate iron supplement and waitlist for urgent pool EGD/CLN    #obesity   #diabetes type 2  -uncontrolled, last A1C 8.6. Followed by PCP for management, and recent referral to endocrine. PCP increased insulin dose in Feb. Continue to follow with specialist. Advised on antihyperglycemic agent modification in the context of colonoscopy preparation.  The patient has medical conditions including diabetes and/or obesity which impacts the clinical decision making for procedural planning.      Recommendations:  -If you have more black stool go to the ER for evaluation    -start daily iron supplement, sent to pharmacy    -go to lab in 1 week to recheck hemoglobin (April 23rd)    -follow up with me 1 month after endoscopy     Schedule EGD & colonoscopy with urgent pool, 1st available  Diagnosis: iron deficiency anemia, black stool  Sedation: MAC (morbid obesity, IVET)  Prep: split dose golytely    GI RNs - please reach out to Dr. Wills for insulin dosing prior to procedure  *HOLD metformin day before & day of procedure  HOLD iron for 1 week prior to procedure    ENDOSCOPIC RISK BENEFIT DISCUSSION: I described the procedure in great detail with the patient. I discussed the risks and benefits, including but not limited to: bleeding, perforation, infection, anesthesia complications, and even death. Patient will be NPO after midnight and will have a person physically present at time of pick-up to drive patient home. Patient verbalized understanding and agrees to proceed with procedure as planned.      Orders This Visit:  Orders Placed This Encounter   Procedures    CBC With Differential With Platelet       Meds This  Visit:  Requested Prescriptions     Signed Prescriptions Disp Refills    Ferrous Sulfate 325 (65 Fe) MG Oral Tab 90 tablet 3     Sig: Take 1 tablet (325 mg total) by mouth daily with breakfast for 360 doses.    polyethylene glycol, PEG 3350-KCl-NaBcb-NaCl-NaSulf, 236 g Oral Recon Soln 1 each 0     Sig: Take 4,000 mL by mouth As Directed. Take 2,000 mL the night before your procedure and 2,000 mL the morning of your procedure. Take as directed by GI clinic. Okay to substitute for generic.       Imaging & Referrals:  None      CARMEN Harris    [facility]  4/16/2024        This note was partially prepared using Dragon Medical voice recognition dictation software. As a result, errors may occur. When identified, these errors have been corrected. While every attempt is made to correct errors during dictation, discrepancies may still exist.

## 2024-04-16 NOTE — TELEPHONE ENCOUNTER
Patient is requesting referral.     Name of specialist and specialty department : SMITHA Taylor,Cardiology[facility]  Reason for visit with the specialist: Follow up diagnostic code I50.9, I50.30 and Procedure code 04768  Address of the specialist office: [address] [place] [id number]  Appointment date: 4/18 and was there on 4/11         CSS informed patient the turnaround time for referral is 5-7 business days.  Patient was informed to check their [employer] account for referral status.         Please ensure to make referral to below:   [facility]   NPI # [id number]     Lian is requesting 8 visits will be great       Please fax to [phone] (when ready)   Att: Lian

## 2024-04-16 NOTE — PATIENT INSTRUCTIONS
-If you have more black stool go to the ER for evaluation    -start daily iron supplement, sent to pharmacy    -go to lab in 1 week to recheck hemoglobin (April 23rd)    -follow up with me 1 month after endoscopy       1. Schedule EGD & colonoscopy with urgent pool, 1st available  Diagnosis: iron deficiency anemia, black stool  Sedation: MAC  Prep: split dose golytely    2. MEDICATION CHANGES PRIOR TO PROCEDURE    GI RNs - please reach out to Dr. Wills for insulin dosing prior to procedure  *HOLD metformin day before & day of procedure    HOLD iron for 1 week prior to procedure    3.  bowel prep from pharmacy   You can pick the bowel prep up now and store in a cool, dry place in your home until your scheduled bowel prep start date.    4. Read all bowel prep instructions carefully. Bowel prep instructions can also be found online at:  www.[facility].org/giprep     5. AVOID seeds, nuts, popcorn, raw fruits and vegetables for 5 days before procedure    6. If you start any NEW medication after your visit today, please notify us. Certain medications (like iron or weight loss medications) will need to be held before the procedure, or the procedure cannot be performed safely.

## 2024-04-16 NOTE — TELEPHONE ENCOUNTER
Dallas Wills,    Patient is scheduled for  EGD/Colonoscopy with Dr. Longoria on 6/12/24    Patient will have light breakfast the day before the procedure and then clear liquids starting at lunch.    Please advise Insulin adjustment prior to procedure.    Thank you.     [facility] Staff  [facility] Clinical Staff (pool)

## 2024-04-16 NOTE — TELEPHONE ENCOUNTER
GI RN's   Patient scheduled for 6/12/2024   Placed on Wait List for sooner appointment    Please get clearance for Insulin.    Team Member Role and Specialty Contact Info   Tamy Wills MD General (Internal Medicine) Phone: [phone]  Fax: [phone]       Thank you    GI Schedulers   Per Meg MORALES needs sooner appointment. Routed to Scheduling Pool.     EM ONLY    Orders:     1. Schedule EGD & colonoscopy with urgent pool, 1st available  Diagnosis: iron deficiency anemia, black stool  Sedation: MAC  Prep: split dose golytely     2. MEDICATION CHANGES PRIOR TO PROCEDURE     GI RNs - please reach out to Dr. Wills for insulin dosing prior to procedure  *HOLD metformin day before & day of procedure     HOLD iron for 1 week prior to procedure

## 2024-04-17 ENCOUNTER — EXTERNAL RECORD (OUTPATIENT)
Dept: HEALTH INFORMATION MANAGEMENT | Facility: OTHER | Age: 69
End: 2024-04-17

## 2024-04-17 NOTE — TELEPHONE ENCOUNTER
Pended referral. Please review diagnosis and sign off if you agree.    Thank you,  Lupe   [facility] [phone]

## 2024-04-18 ENCOUNTER — CLINICAL ABSTRACT (OUTPATIENT)
Dept: CARDIOLOGY | Age: 69
End: 2024-04-18

## 2024-04-18 ENCOUNTER — OFFICE VISIT (OUTPATIENT)
Dept: CARDIOLOGY | Age: 69
End: 2024-04-18

## 2024-04-18 VITALS
WEIGHT: 315 LBS | BODY MASS INDEX: 47.78 KG/M2 | DIASTOLIC BLOOD PRESSURE: 68 MMHG | HEART RATE: 72 BPM | SYSTOLIC BLOOD PRESSURE: 116 MMHG

## 2024-04-18 DIAGNOSIS — I10 BENIGN ESSENTIAL HYPERTENSION: ICD-10-CM

## 2024-04-18 DIAGNOSIS — E78.00 PURE HYPERCHOLESTEROLEMIA: ICD-10-CM

## 2024-04-18 DIAGNOSIS — G47.33 OSA (OBSTRUCTIVE SLEEP APNEA): ICD-10-CM

## 2024-04-18 DIAGNOSIS — Z79.4 DIABETES MELLITUS WITH INSULIN THERAPY (CMD): ICD-10-CM

## 2024-04-18 DIAGNOSIS — N18.32 STAGE 3B CHRONIC KIDNEY DISEASE (CMD): ICD-10-CM

## 2024-04-18 DIAGNOSIS — E11.9 DIABETES MELLITUS WITH INSULIN THERAPY (CMD): ICD-10-CM

## 2024-04-18 DIAGNOSIS — L97.509 TYPE 2 DIABETES MELLITUS WITH FOOT ULCER, WITH LONG-TERM CURRENT USE OF INSULIN (CMD): ICD-10-CM

## 2024-04-18 DIAGNOSIS — D50.9 IRON DEFICIENCY ANEMIA, UNSPECIFIED IRON DEFICIENCY ANEMIA TYPE: ICD-10-CM

## 2024-04-18 DIAGNOSIS — E11.621 TYPE 2 DIABETES MELLITUS WITH FOOT ULCER, WITH LONG-TERM CURRENT USE OF INSULIN (CMD): ICD-10-CM

## 2024-04-18 DIAGNOSIS — I73.9 PVD (PERIPHERAL VASCULAR DISEASE) (CMD): ICD-10-CM

## 2024-04-18 DIAGNOSIS — E78.00 HYPERCHOLESTEROLEMIA: ICD-10-CM

## 2024-04-18 DIAGNOSIS — I50.32 CHRONIC DIASTOLIC HEART FAILURE (CMD): ICD-10-CM

## 2024-04-18 DIAGNOSIS — E78.5 HYPERLIPIDEMIA, UNSPECIFIED HYPERLIPIDEMIA TYPE: ICD-10-CM

## 2024-04-18 DIAGNOSIS — Z79.4 TYPE 2 DIABETES MELLITUS WITH FOOT ULCER, WITH LONG-TERM CURRENT USE OF INSULIN (CMD): ICD-10-CM

## 2024-04-18 DIAGNOSIS — I50.23 ACUTE ON CHRONIC SYSTOLIC CONGESTIVE HEART FAILURE (CMD): Primary | ICD-10-CM

## 2024-04-18 LAB
ANION GAP SERPL CALC-SCNC: 9 MMOL/L (ref 7–19)
BUN SERPL-MCNC: 49 MG/DL (ref 6–20)
BUN/CREAT SERPL: 20 (ref 7–25)
CALCIUM SERPL-MCNC: 8.9 MG/DL (ref 8.4–10.2)
CHLORIDE SERPL-SCNC: 100 MMOL/L (ref 97–110)
CO2 SERPL-SCNC: 32 MMOL/L (ref 21–32)
CREAT SERPL-MCNC: 2.4 MG/DL (ref 0.67–1.17)
EGFRCR SERPLBLD CKD-EPI 2021: 29 ML/MIN/{1.73_M2}
FASTING DURATION TIME PATIENT: ABNORMAL H
FERRITIN SERPL-MCNC: 139 NG/ML (ref 26–388)
GLUCOSE SERPL-MCNC: 111 MG/DL (ref 70–99)
IRON SATN MFR SERPL: 14 % (ref 15–45)
IRON SERPL-MCNC: 35 MCG/DL (ref 65–175)
NT-PROBNP SERPL-MCNC: 1764 PG/ML
POTASSIUM SERPL-SCNC: 3.8 MMOL/L (ref 3.4–5.1)
SODIUM SERPL-SCNC: 137 MMOL/L (ref 135–145)
TIBC SERPL-MCNC: 244 MCG/DL (ref 250–450)

## 2024-04-18 PROCEDURE — 10002800 HB RX 250 W HCPCS: Performed by: NURSE PRACTITIONER

## 2024-04-18 PROCEDURE — 96365 THER/PROPH/DIAG IV INF INIT: CPT

## 2024-04-18 PROCEDURE — 99215 OFFICE O/P EST HI 40 MIN: CPT | Performed by: NURSE PRACTITIONER

## 2024-04-18 PROCEDURE — 80048 BASIC METABOLIC PNL TOTAL CA: CPT | Performed by: NURSE PRACTITIONER

## 2024-04-18 PROCEDURE — 36415 COLL VENOUS BLD VENIPUNCTURE: CPT | Performed by: NURSE PRACTITIONER

## 2024-04-18 PROCEDURE — 3074F SYST BP LT 130 MM HG: CPT | Performed by: NURSE PRACTITIONER

## 2024-04-18 PROCEDURE — 96366 THER/PROPH/DIAG IV INF ADDON: CPT

## 2024-04-18 PROCEDURE — 83880 ASSAY OF NATRIURETIC PEPTIDE: CPT | Performed by: NURSE PRACTITIONER

## 2024-04-18 PROCEDURE — 83540 ASSAY OF IRON: CPT | Performed by: NURSE PRACTITIONER

## 2024-04-18 PROCEDURE — 82728 ASSAY OF FERRITIN: CPT | Performed by: NURSE PRACTITIONER

## 2024-04-18 PROCEDURE — 3078F DIAST BP <80 MM HG: CPT | Performed by: NURSE PRACTITIONER

## 2024-04-18 RX ORDER — FAMOTIDINE 10 MG/ML
20 INJECTION, SOLUTION INTRAVENOUS
OUTPATIENT
Start: 2024-04-18

## 2024-04-18 RX ORDER — SODIUM CHLORIDE 9 MG/ML
INJECTION, SOLUTION INTRAVENOUS CONTINUOUS PRN
OUTPATIENT
Start: 2024-04-18

## 2024-04-18 RX ORDER — DIPHENHYDRAMINE HYDROCHLORIDE 50 MG/ML
50 INJECTION INTRAMUSCULAR; INTRAVENOUS
OUTPATIENT
Start: 2024-04-18

## 2024-04-18 RX ADMIN — BUMETANIDE 2 MG/HR: 0.25 INJECTION INTRAMUSCULAR; INTRAVENOUS at 11:10

## 2024-04-18 NOTE — TELEPHONE ENCOUNTER
Dallas Ramos,     Pt has consult appt w/ you on 5/24/24.     Dr. Wills gave recommendations below regarding Insulin adjustment prior to pt's  scheduled colonoscopy/egd on 6/12/24. He will have light breakfast the day prior and clear liquids after.     Please advise for updated Insulin recs ( if any) pre-procedure after you see pt.    Thank you. .

## 2024-04-18 NOTE — TELEPHONE ENCOUNTER
Pt scheduled for cscope in June and will see endo in may as of current meds and recs would be to take lantus 24 units bid and take 10 units of novvolog in the am and  hold the afternoon and evening  novolg   This may change after the endo apt if meds are changed

## 2024-04-19 ENCOUNTER — TELEPHONE (OUTPATIENT)
Dept: CARDIOLOGY | Age: 69
End: 2024-04-19

## 2024-04-23 ENCOUNTER — APPOINTMENT (OUTPATIENT)
Dept: CARDIOLOGY | Age: 69
End: 2024-04-23

## 2024-04-23 VITALS
HEART RATE: 68 BPM | BODY MASS INDEX: 47.93 KG/M2 | SYSTOLIC BLOOD PRESSURE: 118 MMHG | DIASTOLIC BLOOD PRESSURE: 70 MMHG | WEIGHT: 315 LBS

## 2024-04-23 DIAGNOSIS — E78.00 HYPERCHOLESTEROLEMIA: ICD-10-CM

## 2024-04-23 DIAGNOSIS — I10 BENIGN ESSENTIAL HYPERTENSION: ICD-10-CM

## 2024-04-23 DIAGNOSIS — Z86.14 HISTORY OF MRSA INFECTION: ICD-10-CM

## 2024-04-23 DIAGNOSIS — G47.33 OSA (OBSTRUCTIVE SLEEP APNEA): ICD-10-CM

## 2024-04-23 DIAGNOSIS — I10 ESSENTIAL HYPERTENSION: ICD-10-CM

## 2024-04-23 DIAGNOSIS — E11.9 DIABETES MELLITUS WITH INSULIN THERAPY (CMD): ICD-10-CM

## 2024-04-23 DIAGNOSIS — I73.9 PVD (PERIPHERAL VASCULAR DISEASE) (CMD): ICD-10-CM

## 2024-04-23 DIAGNOSIS — Z79.4 DIABETES MELLITUS WITH INSULIN THERAPY (CMD): ICD-10-CM

## 2024-04-23 DIAGNOSIS — I50.32 CHRONIC DIASTOLIC HEART FAILURE (CMD): ICD-10-CM

## 2024-04-23 DIAGNOSIS — I50.23 ACUTE ON CHRONIC SYSTOLIC CONGESTIVE HEART FAILURE (CMD): Primary | ICD-10-CM

## 2024-04-23 DIAGNOSIS — E66.01 MORBID OBESITY WITH BMI OF 40.0-44.9, ADULT (CMD): ICD-10-CM

## 2024-04-23 DIAGNOSIS — E78.5 HYPERLIPIDEMIA, UNSPECIFIED HYPERLIPIDEMIA TYPE: ICD-10-CM

## 2024-04-23 DIAGNOSIS — N18.32 STAGE 3B CHRONIC KIDNEY DISEASE (CMD): ICD-10-CM

## 2024-04-23 LAB
ANION GAP SERPL CALC-SCNC: 8 MMOL/L (ref 7–19)
BUN SERPL-MCNC: 55 MG/DL (ref 6–20)
BUN/CREAT SERPL: 25 (ref 7–25)
CALCIUM SERPL-MCNC: 8.6 MG/DL (ref 8.4–10.2)
CHLORIDE SERPL-SCNC: 99 MMOL/L (ref 97–110)
CO2 SERPL-SCNC: 32 MMOL/L (ref 21–32)
CREAT SERPL-MCNC: 2.22 MG/DL (ref 0.67–1.17)
EGFRCR SERPLBLD CKD-EPI 2021: 31 ML/MIN/{1.73_M2}
FASTING DURATION TIME PATIENT: ABNORMAL H
GLUCOSE SERPL-MCNC: 164 MG/DL (ref 70–99)
NT-PROBNP SERPL-MCNC: 2371 PG/ML
POTASSIUM SERPL-SCNC: 4.1 MMOL/L (ref 3.4–5.1)
SODIUM SERPL-SCNC: 135 MMOL/L (ref 135–145)

## 2024-04-23 PROCEDURE — 83880 ASSAY OF NATRIURETIC PEPTIDE: CPT | Performed by: NURSE PRACTITIONER

## 2024-04-23 PROCEDURE — 36415 COLL VENOUS BLD VENIPUNCTURE: CPT | Performed by: NURSE PRACTITIONER

## 2024-04-23 PROCEDURE — 96365 THER/PROPH/DIAG IV INF INIT: CPT

## 2024-04-23 PROCEDURE — 80048 BASIC METABOLIC PNL TOTAL CA: CPT | Performed by: NURSE PRACTITIONER

## 2024-04-23 PROCEDURE — 96366 THER/PROPH/DIAG IV INF ADDON: CPT

## 2024-04-23 PROCEDURE — 10002800 HB RX 250 W HCPCS: Performed by: NURSE PRACTITIONER

## 2024-04-23 RX ADMIN — BUMETANIDE 1.5 MG/HR: 0.25 INJECTION INTRAMUSCULAR; INTRAVENOUS at 10:36

## 2024-04-26 ENCOUNTER — TELEPHONE (OUTPATIENT)
Dept: PULMONOLOGY | Facility: CLINIC | Age: 69
End: 2024-04-26

## 2024-04-26 ENCOUNTER — TELEPHONE (OUTPATIENT)
Dept: INTERNAL MEDICINE CLINIC | Facility: CLINIC | Age: 69
End: 2024-04-26

## 2024-04-26 DIAGNOSIS — E66.9 OBESITY WITH SERIOUS COMORBIDITY, UNSPECIFIED CLASSIFICATION, UNSPECIFIED OBESITY TYPE: ICD-10-CM

## 2024-04-26 DIAGNOSIS — I50.32 CHRONIC DIASTOLIC HEART FAILURE (HCC): Primary | ICD-10-CM

## 2024-04-26 DIAGNOSIS — L97.519 TYPE 2 DIABETES MELLITUS WITH RIGHT DIABETIC FOOT ULCER (HCC): ICD-10-CM

## 2024-04-26 DIAGNOSIS — E11.621 TYPE 2 DIABETES MELLITUS WITH RIGHT DIABETIC FOOT ULCER (HCC): ICD-10-CM

## 2024-04-26 DIAGNOSIS — G47.33 OBSTRUCTIVE SLEEP APNEA SYNDROME: ICD-10-CM

## 2024-04-26 NOTE — TELEPHONE ENCOUNTER
Message left for pt referral is in system  Referred to Provider Information:  Provider Address Phone   Milton Kimble  [address] [address] [phone]

## 2024-04-26 NOTE — TELEPHONE ENCOUNTER
DR. Tamy Wills:  please advise on referral. Pended for your approval.     Patient called to f/u on pulmonology request. He requested 4/12/24 and not completed.     Patient states he needs to see pulmonologist.   He already had a sleep study down at [facility] and needs his Cpap machine calibrated. He is having difficulty with machine.     He has hx of IVET. And other medical conditions.

## 2024-04-26 NOTE — TELEPHONE ENCOUNTER
Spoke with patient, consult appointment scheduled with Dr. Morales on 5/20/24 at 12pm, verified date, time, location & parking, patient verbalized understanding.

## 2024-04-26 NOTE — TELEPHONE ENCOUNTER
Patient states he needs to be seen right away Obstructive sleep apnea syndrome patient declined next date with pulmonary please call

## 2024-05-01 ENCOUNTER — OFFICE VISIT (OUTPATIENT)
Dept: CARDIOLOGY | Age: 69
End: 2024-05-01

## 2024-05-01 VITALS
SYSTOLIC BLOOD PRESSURE: 130 MMHG | HEART RATE: 74 BPM | DIASTOLIC BLOOD PRESSURE: 74 MMHG | BODY MASS INDEX: 47.78 KG/M2 | WEIGHT: 315 LBS

## 2024-05-01 DIAGNOSIS — I50.23 ACUTE ON CHRONIC SYSTOLIC CONGESTIVE HEART FAILURE  (CMD): Primary | ICD-10-CM

## 2024-05-01 DIAGNOSIS — N18.32 STAGE 3B CHRONIC KIDNEY DISEASE  (CMD): ICD-10-CM

## 2024-05-01 DIAGNOSIS — N18.30 ANEMIA DUE TO STAGE 3 CHRONIC KIDNEY DISEASE, UNSPECIFIED WHETHER STAGE 3A OR 3B CKD  (CMD): ICD-10-CM

## 2024-05-01 DIAGNOSIS — G47.33 OSA (OBSTRUCTIVE SLEEP APNEA): ICD-10-CM

## 2024-05-01 DIAGNOSIS — D63.1 ANEMIA DUE TO STAGE 3 CHRONIC KIDNEY DISEASE, UNSPECIFIED WHETHER STAGE 3A OR 3B CKD  (CMD): ICD-10-CM

## 2024-05-01 DIAGNOSIS — E11.621 TYPE 2 DIABETES MELLITUS WITH FOOT ULCER, WITH LONG-TERM CURRENT USE OF INSULIN  (CMD): ICD-10-CM

## 2024-05-01 DIAGNOSIS — L97.509 TYPE 2 DIABETES MELLITUS WITH FOOT ULCER, WITH LONG-TERM CURRENT USE OF INSULIN  (CMD): ICD-10-CM

## 2024-05-01 DIAGNOSIS — I10 BENIGN ESSENTIAL HYPERTENSION: ICD-10-CM

## 2024-05-01 DIAGNOSIS — Z79.4 TYPE 2 DIABETES MELLITUS WITH FOOT ULCER, WITH LONG-TERM CURRENT USE OF INSULIN  (CMD): ICD-10-CM

## 2024-05-01 LAB
ANION GAP SERPL CALC-SCNC: 8 MMOL/L (ref 7–19)
BASOPHILS # BLD: 0.1 K/MCL (ref 0–0.3)
BASOPHILS NFR BLD: 1 %
BUN SERPL-MCNC: 57 MG/DL (ref 6–20)
BUN/CREAT SERPL: 23 (ref 7–25)
CALCIUM SERPL-MCNC: 8.6 MG/DL (ref 8.4–10.2)
CHLORIDE SERPL-SCNC: 100 MMOL/L (ref 97–110)
CO2 SERPL-SCNC: 29 MMOL/L (ref 21–32)
CREAT SERPL-MCNC: 2.49 MG/DL (ref 0.67–1.17)
DEPRECATED RDW RBC: 51.7 FL (ref 39–50)
EGFRCR SERPLBLD CKD-EPI 2021: 27 ML/MIN/{1.73_M2}
EOSINOPHIL # BLD: 0.4 K/MCL (ref 0–0.5)
EOSINOPHIL NFR BLD: 4 %
ERYTHROCYTE [DISTWIDTH] IN BLOOD: 16 % (ref 11–15)
FASTING DURATION TIME PATIENT: ABNORMAL H
GLUCOSE SERPL-MCNC: 154 MG/DL (ref 70–99)
HCT VFR BLD CALC: 31.1 % (ref 39–51)
HGB BLD-MCNC: 9.4 G/DL (ref 13–17)
IMM GRANULOCYTES # BLD AUTO: 0 K/MCL (ref 0–0.2)
IMM GRANULOCYTES # BLD: 0 %
LYMPHOCYTES # BLD: 1.9 K/MCL (ref 1–4)
LYMPHOCYTES NFR BLD: 22 %
MCH RBC QN AUTO: 26.8 PG (ref 26–34)
MCHC RBC AUTO-ENTMCNC: 30.2 G/DL (ref 32–36.5)
MCV RBC AUTO: 88.6 FL (ref 78–100)
MONOCYTES # BLD: 0.7 K/MCL (ref 0.3–0.9)
MONOCYTES NFR BLD: 8 %
NEUTROPHILS # BLD: 5.9 K/MCL (ref 1.8–7.7)
NEUTROPHILS NFR BLD: 65 %
NRBC BLD MANUAL-RTO: 0 /100 WBC
NT-PROBNP SERPL-MCNC: 1857 PG/ML
PLATELET # BLD AUTO: 281 K/MCL (ref 140–450)
POTASSIUM SERPL-SCNC: 4 MMOL/L (ref 3.4–5.1)
RBC # BLD: 3.51 MIL/MCL (ref 4.5–5.9)
SODIUM SERPL-SCNC: 133 MMOL/L (ref 135–145)
WBC # BLD: 9 K/MCL (ref 4.2–11)

## 2024-05-01 PROCEDURE — 80048 BASIC METABOLIC PNL TOTAL CA: CPT | Performed by: REGISTERED NURSE

## 2024-05-01 PROCEDURE — 10002800 HB RX 250 W HCPCS: Performed by: REGISTERED NURSE

## 2024-05-01 PROCEDURE — 96366 THER/PROPH/DIAG IV INF ADDON: CPT

## 2024-05-01 PROCEDURE — 83880 ASSAY OF NATRIURETIC PEPTIDE: CPT | Performed by: REGISTERED NURSE

## 2024-05-01 PROCEDURE — 85025 COMPLETE CBC W/AUTO DIFF WBC: CPT | Performed by: REGISTERED NURSE

## 2024-05-01 PROCEDURE — 96365 THER/PROPH/DIAG IV INF INIT: CPT

## 2024-05-01 RX ADMIN — BUMETANIDE 1.5 MG/HR: 0.25 INJECTION INTRAMUSCULAR; INTRAVENOUS at 11:24

## 2024-05-07 ENCOUNTER — OFFICE VISIT (OUTPATIENT)
Dept: CARDIOLOGY | Age: 69
End: 2024-05-07

## 2024-05-07 VITALS
SYSTOLIC BLOOD PRESSURE: 134 MMHG | WEIGHT: 315 LBS | DIASTOLIC BLOOD PRESSURE: 78 MMHG | BODY MASS INDEX: 47.15 KG/M2 | HEART RATE: 72 BPM

## 2024-05-07 DIAGNOSIS — I50.23 ACUTE ON CHRONIC SYSTOLIC CONGESTIVE HEART FAILURE  (CMD): Primary | ICD-10-CM

## 2024-05-07 DIAGNOSIS — E78.00 HYPERCHOLESTEROLEMIA: ICD-10-CM

## 2024-05-07 DIAGNOSIS — I10 BENIGN ESSENTIAL HYPERTENSION: ICD-10-CM

## 2024-05-07 DIAGNOSIS — E78.5 HYPERLIPIDEMIA, UNSPECIFIED HYPERLIPIDEMIA TYPE: ICD-10-CM

## 2024-05-07 DIAGNOSIS — I50.32 CHRONIC DIASTOLIC HEART FAILURE  (CMD): ICD-10-CM

## 2024-05-07 DIAGNOSIS — I10 ESSENTIAL HYPERTENSION: ICD-10-CM

## 2024-05-07 DIAGNOSIS — G47.33 OSA (OBSTRUCTIVE SLEEP APNEA): ICD-10-CM

## 2024-05-07 DIAGNOSIS — E66.01 MORBID OBESITY WITH BMI OF 40.0-44.9, ADULT  (CMD): ICD-10-CM

## 2024-05-07 DIAGNOSIS — N18.30 STAGE 3 CHRONIC KIDNEY DISEASE, UNSPECIFIED WHETHER STAGE 3A OR 3B CKD  (CMD): ICD-10-CM

## 2024-05-07 LAB
ANION GAP SERPL CALC-SCNC: 10 MMOL/L (ref 7–19)
BUN SERPL-MCNC: 70 MG/DL (ref 6–20)
BUN/CREAT SERPL: 26 (ref 7–25)
CALCIUM SERPL-MCNC: 8.9 MG/DL (ref 8.4–10.2)
CHLORIDE SERPL-SCNC: 98 MMOL/L (ref 97–110)
CO2 SERPL-SCNC: 31 MMOL/L (ref 21–32)
CREAT SERPL-MCNC: 2.71 MG/DL (ref 0.67–1.17)
EGFRCR SERPLBLD CKD-EPI 2021: 25 ML/MIN/{1.73_M2}
FASTING DURATION TIME PATIENT: ABNORMAL H
GLUCOSE SERPL-MCNC: 131 MG/DL (ref 70–99)
NT-PROBNP SERPL-MCNC: 1671 PG/ML
POTASSIUM SERPL-SCNC: 4 MMOL/L (ref 3.4–5.1)
SODIUM SERPL-SCNC: 135 MMOL/L (ref 135–145)

## 2024-05-07 PROCEDURE — 83880 ASSAY OF NATRIURETIC PEPTIDE: CPT | Performed by: NURSE PRACTITIONER

## 2024-05-07 PROCEDURE — 80048 BASIC METABOLIC PNL TOTAL CA: CPT | Performed by: NURSE PRACTITIONER

## 2024-05-07 PROCEDURE — 10002800 HB RX 250 W HCPCS: Performed by: NURSE PRACTITIONER

## 2024-05-07 PROCEDURE — 96366 THER/PROPH/DIAG IV INF ADDON: CPT

## 2024-05-07 PROCEDURE — 96365 THER/PROPH/DIAG IV INF INIT: CPT

## 2024-05-07 RX ADMIN — BUMETANIDE 1.5 MG/HR: 0.25 INJECTION INTRAMUSCULAR; INTRAVENOUS at 11:12

## 2024-05-10 ENCOUNTER — NURSE ONLY (OUTPATIENT)
Dept: CARDIOLOGY | Age: 69
End: 2024-05-10

## 2024-05-10 VITALS — WEIGHT: 315 LBS | SYSTOLIC BLOOD PRESSURE: 136 MMHG | DIASTOLIC BLOOD PRESSURE: 72 MMHG | BODY MASS INDEX: 47 KG/M2

## 2024-05-10 DIAGNOSIS — I50.23 ACUTE ON CHRONIC SYSTOLIC CONGESTIVE HEART FAILURE  (CMD): Primary | ICD-10-CM

## 2024-05-10 PROCEDURE — 96366 THER/PROPH/DIAG IV INF ADDON: CPT

## 2024-05-10 PROCEDURE — 96365 THER/PROPH/DIAG IV INF INIT: CPT

## 2024-05-10 PROCEDURE — 10002800 HB RX 250 W HCPCS: Performed by: NURSE PRACTITIONER

## 2024-05-10 RX ADMIN — BUMETANIDE 1.5 MG/HR: 0.25 INJECTION INTRAMUSCULAR; INTRAVENOUS at 09:52

## 2024-05-15 ENCOUNTER — NURSE ONLY (OUTPATIENT)
Dept: CARDIOLOGY | Age: 69
End: 2024-05-15

## 2024-05-15 VITALS
HEART RATE: 68 BPM | DIASTOLIC BLOOD PRESSURE: 62 MMHG | WEIGHT: 315 LBS | BODY MASS INDEX: 46.7 KG/M2 | SYSTOLIC BLOOD PRESSURE: 126 MMHG

## 2024-05-15 DIAGNOSIS — I50.23 ACUTE ON CHRONIC SYSTOLIC CONGESTIVE HEART FAILURE  (CMD): Primary | ICD-10-CM

## 2024-05-15 LAB
ANION GAP SERPL CALC-SCNC: 10 MMOL/L (ref 7–19)
BUN SERPL-MCNC: 60 MG/DL (ref 6–20)
BUN/CREAT SERPL: 22 (ref 7–25)
CALCIUM SERPL-MCNC: 9 MG/DL (ref 8.4–10.2)
CHLORIDE SERPL-SCNC: 99 MMOL/L (ref 97–110)
CO2 SERPL-SCNC: 30 MMOL/L (ref 21–32)
CREAT SERPL-MCNC: 2.69 MG/DL (ref 0.67–1.17)
EGFRCR SERPLBLD CKD-EPI 2021: 25 ML/MIN/{1.73_M2}
FASTING DURATION TIME PATIENT: ABNORMAL H
GLUCOSE SERPL-MCNC: 123 MG/DL (ref 70–99)
NT-PROBNP SERPL-MCNC: 1548 PG/ML
POTASSIUM SERPL-SCNC: 4 MMOL/L (ref 3.4–5.1)
SODIUM SERPL-SCNC: 135 MMOL/L (ref 135–145)

## 2024-05-15 PROCEDURE — 36415 COLL VENOUS BLD VENIPUNCTURE: CPT | Performed by: REGISTERED NURSE

## 2024-05-15 PROCEDURE — 10002800 HB RX 250 W HCPCS: Performed by: NURSE PRACTITIONER

## 2024-05-15 PROCEDURE — 96374 THER/PROPH/DIAG INJ IV PUSH: CPT

## 2024-05-15 PROCEDURE — 83880 ASSAY OF NATRIURETIC PEPTIDE: CPT | Performed by: REGISTERED NURSE

## 2024-05-15 PROCEDURE — 80048 BASIC METABOLIC PNL TOTAL CA: CPT | Performed by: REGISTERED NURSE

## 2024-05-15 RX ORDER — BUMETANIDE 0.25 MG/ML
3 INJECTION INTRAMUSCULAR; INTRAVENOUS ONCE
Status: COMPLETED | OUTPATIENT
Start: 2024-05-15 | End: 2024-05-15

## 2024-05-15 RX ADMIN — BUMETANIDE 3 MG: 0.25 INJECTION INTRAMUSCULAR; INTRAVENOUS at 12:18

## 2024-05-17 ENCOUNTER — HOSPITAL ENCOUNTER (OUTPATIENT)
Dept: WOUND CARE | Age: 69
Discharge: HOME | End: 2024-05-17
Attending: FAMILY MEDICINE

## 2024-05-17 VITALS
TEMPERATURE: 97.3 F | RESPIRATION RATE: 15 BRPM | SYSTOLIC BLOOD PRESSURE: 128 MMHG | OXYGEN SATURATION: 98 % | DIASTOLIC BLOOD PRESSURE: 63 MMHG | HEART RATE: 71 BPM

## 2024-05-17 DIAGNOSIS — S91.302D OPEN WOUND OF LEFT FOOT, SUBSEQUENT ENCOUNTER: ICD-10-CM

## 2024-05-17 DIAGNOSIS — E66.01 CLASS 3 SEVERE OBESITY DUE TO EXCESS CALORIES WITH SERIOUS COMORBIDITY IN ADULT, UNSPECIFIED BMI  (CMD): ICD-10-CM

## 2024-05-17 DIAGNOSIS — E11.621 TYPE 2 DIABETES MELLITUS WITH RIGHT DIABETIC FOOT ULCER  (CMD): Primary | ICD-10-CM

## 2024-05-17 DIAGNOSIS — S91.302A OPEN WOUND OF LEFT FOOT, INITIAL ENCOUNTER: ICD-10-CM

## 2024-05-17 DIAGNOSIS — I87.2 VENOUS INSUFFICIENCY OF BOTH LOWER EXTREMITIES: ICD-10-CM

## 2024-05-17 DIAGNOSIS — S81.801A OPEN WOUND OF RIGHT LOWER LEG, INITIAL ENCOUNTER: ICD-10-CM

## 2024-05-17 DIAGNOSIS — R60.0 LEG EDEMA: ICD-10-CM

## 2024-05-17 DIAGNOSIS — Z89.422 STATUS POST AMPUTATION OF LESSER TOE, LEFT  (CMD): ICD-10-CM

## 2024-05-17 DIAGNOSIS — L97.519 TYPE 2 DIABETES MELLITUS WITH RIGHT DIABETIC FOOT ULCER  (CMD): Primary | ICD-10-CM

## 2024-05-17 DIAGNOSIS — I73.9 PVD (PERIPHERAL VASCULAR DISEASE) (CMD): ICD-10-CM

## 2024-05-17 DIAGNOSIS — M86.671 OTHER CHRONIC OSTEOMYELITIS OF RIGHT FOOT  (CMD): ICD-10-CM

## 2024-05-17 DIAGNOSIS — G47.33 OSA (OBSTRUCTIVE SLEEP APNEA): ICD-10-CM

## 2024-05-17 PROCEDURE — 99214 OFFICE O/P EST MOD 30 MIN: CPT

## 2024-05-17 PROCEDURE — 99204 OFFICE O/P NEW MOD 45 MIN: CPT | Performed by: FAMILY MEDICINE

## 2024-05-17 PROCEDURE — 3074F SYST BP LT 130 MM HG: CPT | Performed by: FAMILY MEDICINE

## 2024-05-17 PROCEDURE — 3078F DIAST BP <80 MM HG: CPT | Performed by: FAMILY MEDICINE

## 2024-05-17 ASSESSMENT — PAIN SCALES - GENERAL: PAINLEVEL_OUTOF10: 0

## 2024-05-20 ENCOUNTER — OFFICE VISIT (OUTPATIENT)
Dept: PULMONOLOGY | Facility: CLINIC | Age: 69
End: 2024-05-20

## 2024-05-20 VITALS
HEART RATE: 79 BPM | DIASTOLIC BLOOD PRESSURE: 76 MMHG | BODY MASS INDEX: 42.66 KG/M2 | WEIGHT: 315 LBS | SYSTOLIC BLOOD PRESSURE: 119 MMHG | HEIGHT: 72 IN | OXYGEN SATURATION: 98 % | RESPIRATION RATE: 14 BRPM

## 2024-05-20 DIAGNOSIS — G47.33 OSA (OBSTRUCTIVE SLEEP APNEA): Primary | ICD-10-CM

## 2024-05-20 PROCEDURE — 3074F SYST BP LT 130 MM HG: CPT | Performed by: INTERNAL MEDICINE

## 2024-05-20 PROCEDURE — 1160F RVW MEDS BY RX/DR IN RCRD: CPT | Performed by: INTERNAL MEDICINE

## 2024-05-20 PROCEDURE — 1159F MED LIST DOCD IN RCRD: CPT | Performed by: INTERNAL MEDICINE

## 2024-05-20 PROCEDURE — 99204 OFFICE O/P NEW MOD 45 MIN: CPT | Performed by: INTERNAL MEDICINE

## 2024-05-20 PROCEDURE — 1126F AMNT PAIN NOTED NONE PRSNT: CPT | Performed by: INTERNAL MEDICINE

## 2024-05-20 PROCEDURE — 3078F DIAST BP <80 MM HG: CPT | Performed by: INTERNAL MEDICINE

## 2024-05-20 PROCEDURE — 3008F BODY MASS INDEX DOCD: CPT | Performed by: INTERNAL MEDICINE

## 2024-05-20 NOTE — PROGRESS NOTES
Orders for new CPAP set up sent to [facility]. Patients office visit notes, demographics, sleep study and prior office note sent. Confirmation recieved

## 2024-05-20 NOTE — H&P
[facility]    History and Physical    Rodo Mcclain Patient Status:  Specimen    1955 MRN [id number]   Location [facility] Attending No att. providers found   Hosp Day # 0 PCP Tamy Wills MD     Date:  2024    History provided by:patient  HPI:     69-year-old gentleman with history of DM, CKD, PVD, CHF, obesity with a BMI of 46, IVET  Patient had old CPAP machine over 20 years and now completely broken now not using any CPAP for years and he would like to get CPAP as soon as possible  Sleep study last year on 2023 reviewed and showed IVET with AHI of 38  No regular sleep-wake pattern and usually not refreshed after sleep with occasional headache denied significant restless leg symptoms with daytime sleepiness and fatigue but no sleep attack or sleep paralysis  Otherwise denies any chest pain or shortness of breath or cough or wheezes          History     Past Medical History:    Acute kidney injury (KASEY) with acute tubular necrosis (ATN) (HCC)    Last Assessment & Plan: Formatting of this note might be different from the original. Multifactorial, due to possible underlying infection, also diuresis with torsemide. Creatinine 3.05 which is down to 1.9, Patient was initially started on normal saline at 75 cc/h, diuresis, lisinopril and metformin was held Creatinine is downtrending to 1.3.  Monitor urine output.    Congestive heart disease (HCC)    Constipation    Diabetes (HCC)    Dyspnea on exertion    Last Assessment & Plan: Formatting of this note might be different from the original. Admitted with dyspnea    Elevated brain natriuretic peptide (BNP) level    Last Assessment & Plan: Formatting of this note might be different from the original. Cardiology consulted    Esophageal reflux    Essential hypertension    Hyperlipidemia    Hypokalemia    Last Assessment & Plan: Formatting of this note might be  different from the original. Replacement per nephrology    Hyponatremia    Last Assessment & Plan: Formatting of this note might be different from the original. Will monitor Na: 133 on admission    Sleep apnea     Past Surgical History:   Procedure Laterality Date    Amputation foot,midtarsal-chopart Left     Colonoscopy  2016    [facility]    Eye surgery       Family History   Problem Relation Age of Onset    Prostate Cancer Father     Heart Disease Brother      Social History:  Social History     Socioeconomic History    Marital status: Single   Tobacco Use    Smoking status: Never     Passive exposure: Never    Smokeless tobacco: Never   Vaping Use    Vaping status: Never Used   Substance and Sexual Activity    Alcohol use: Never    Drug use: Never     Social Determinants of Health      Received from [employer] [employer]    Financial Resource Strain   Transportation Needs: No Transportation Needs (3/12/2021)    Received from [employer] [employer]    PRAPARE - Transportation     Lack of Transportation (Medical): No     Lack of Transportation (Non-Medical): No   Physical Activity: High Risk (9/15/2023)    Received from [employer] [employer] [employer]    Exercise Vital Sign     On average, how many days per week do you engage in moderate to strenuous exercise (like a brisk walk)?: 0 days     On average, how many minutes do you engage in exercise at this level?: 0 min    Received from [employer] [employer]    Stress     Allergies/Medications:   Allergies:   Allergies   Allergen Reactions    Doxycycline OTHER (SEE COMMENTS)     Kidney pain    Sulfamethoxazole W/Trimethoprim OTHER (SEE COMMENTS)     Acute kidney injury    Sulfamethoxazole OTHER (SEE COMMENTS)    Trimethoprim OTHER (SEE COMMENTS)     (Not in a hospital admission)      Review of Systems:     Constitutional:  Positive for fatigue.   HENT:   Negative for congestion.    Respiratory:  Negative for cough.    Cardiovascular:  Negative for chest pain.   Gastrointestinal:  Negative for abdominal distention.   Neurological:  Negative for seizures.   Psychiatric/Behavioral:  Negative for agitation.        Physical Exam:   Vital Signs:  Blood pressure 119/76, pulse 79, resp. rate 14, height 6' (1.829 m), weight (!) 344 lb (156 kg), SpO2 98%.  Physical Exam  Constitutional:       General: He is not in acute distress.     Appearance: He is obese. He is not ill-appearing.   HENT:      Head: Atraumatic.      Nose: Nose normal.      Mouth/Throat:      Mouth: Mucous membranes are moist.      Comments: Upper airway class III Mallampati score  Abdominal:      General: Abdomen is flat. Bowel sounds are normal.      Tenderness: There is no guarding or rebound.   Musculoskeletal:      Comments: Mild lower extremity pitting edema   Skin:     General: Skin is dry.   Neurological:      General: No focal deficit present.      Mental Status: He is oriented to person, place, and time.           Results:     Lab Results   Component Value Date    WBC 10.1 01/23/2024    HGB 11.1 (L) 01/23/2024    HCT 34.5 (L) 01/23/2024    .0 01/23/2024    CREATSERUM 2.54 (H) 02/14/2024    BUN 72 (H) 02/14/2024     (L) 02/14/2024    K 4.7 02/14/2024    CL 99 02/14/2024    CO2 29.0 02/14/2024     (H) 02/14/2024    CA 8.3 (L) 02/14/2024    ALB 3.8 02/14/2024    ALKPHO 70 02/14/2024    BILT 0.4 02/14/2024    TP 8.1 02/14/2024    AST 14 02/14/2024    ALT 7 (L) 02/14/2024    TSH 3.112 01/23/2024       Assessment/Plan:     1-IVET  Last sleep study on 9/27/2023 with AHI of 38  He has old cpap machine over 20 years ? Pressure /now broken and patient not using CPAP for years.    Symptomatic with ESS of 8  BMI 46  H/o DM , CHF , CKD     Patient would like to start on CPAP as soon as possible  Plan ;  Educated about IVET and CPAP therapy  Order auto CPAP 5-15 CWP with mask fitting  Keep  regular sleep-wake cycles  Avoid driving at any time if sleepy  Avoid working in heavy machinery if sleepy  Avoid sedative and narcotic and alcohol    Reevaluate in 3 months after initiating auto CPAP              Eleazar Morales MD  5/20/2024

## 2024-05-21 ENCOUNTER — TELEPHONE (OUTPATIENT)
Dept: CASE MANAGEMENT | Age: 69
End: 2024-05-21

## 2024-05-21 ENCOUNTER — EXTERNAL RECORD (OUTPATIENT)
Dept: HEALTH INFORMATION MANAGEMENT | Facility: OTHER | Age: 69
End: 2024-05-21

## 2024-05-21 ENCOUNTER — MED REC SCAN ONLY (OUTPATIENT)
Dept: INTERNAL MEDICINE CLINIC | Facility: CLINIC | Age: 69
End: 2024-05-21

## 2024-05-21 DIAGNOSIS — I50.9 HEART FAILURE, UNSPECIFIED HF CHRONICITY, UNSPECIFIED HEART FAILURE TYPE (HCC): Primary | ICD-10-CM

## 2024-05-21 DIAGNOSIS — I50.30 DIASTOLIC HEART FAILURE, UNSPECIFIED HF CHRONICITY (HCC): ICD-10-CM

## 2024-05-21 NOTE — TELEPHONE ENCOUNTER
Dr. Wills,     Breonna Taylor's office requesting referral for follow up visits.     Next appointment 5/23/24.       Pended referral please review diagnosis and sign off if you agree.    Thank you.  Tiesha Laird  [facility]

## 2024-05-22 ENCOUNTER — TELEPHONE (OUTPATIENT)
Dept: PULMONOLOGY | Facility: CLINIC | Age: 69
End: 2024-05-22

## 2024-05-22 DIAGNOSIS — G47.33 OSA (OBSTRUCTIVE SLEEP APNEA): Primary | ICD-10-CM

## 2024-05-22 NOTE — TELEPHONE ENCOUNTER
Received request from [facility] for copy of baseline sleep study. Faxed copy to [facility], sent copy to scanning and placed copy in [facility] folder

## 2024-05-23 ENCOUNTER — NURSE ONLY (OUTPATIENT)
Dept: CARDIOLOGY | Age: 69
End: 2024-05-23
Attending: NURSE PRACTITIONER

## 2024-05-23 ENCOUNTER — OFFICE VISIT (OUTPATIENT)
Dept: CARDIOLOGY | Age: 69
End: 2024-05-23

## 2024-05-23 VITALS
SYSTOLIC BLOOD PRESSURE: 118 MMHG | WEIGHT: 315 LBS | HEART RATE: 88 BPM | BODY MASS INDEX: 46.7 KG/M2 | DIASTOLIC BLOOD PRESSURE: 60 MMHG

## 2024-05-23 VITALS
BODY MASS INDEX: 46.7 KG/M2 | HEART RATE: 90 BPM | WEIGHT: 315 LBS | SYSTOLIC BLOOD PRESSURE: 118 MMHG | DIASTOLIC BLOOD PRESSURE: 62 MMHG

## 2024-05-23 DIAGNOSIS — I10 ESSENTIAL HYPERTENSION: ICD-10-CM

## 2024-05-23 DIAGNOSIS — E66.01 MORBID OBESITY WITH BMI OF 40.0-44.9, ADULT  (CMD): ICD-10-CM

## 2024-05-23 DIAGNOSIS — I50.23 ACUTE ON CHRONIC SYSTOLIC CONGESTIVE HEART FAILURE  (CMD): Primary | ICD-10-CM

## 2024-05-23 DIAGNOSIS — E78.5 HYPERLIPIDEMIA, UNSPECIFIED HYPERLIPIDEMIA TYPE: ICD-10-CM

## 2024-05-23 DIAGNOSIS — E11.9 DIABETES MELLITUS WITH INSULIN THERAPY  (CMD): ICD-10-CM

## 2024-05-23 DIAGNOSIS — Z79.4 DIABETES MELLITUS WITH INSULIN THERAPY  (CMD): ICD-10-CM

## 2024-05-23 DIAGNOSIS — G47.33 OSA (OBSTRUCTIVE SLEEP APNEA): ICD-10-CM

## 2024-05-23 DIAGNOSIS — N18.32 STAGE 3B CHRONIC KIDNEY DISEASE  (CMD): ICD-10-CM

## 2024-05-23 DIAGNOSIS — I73.9 PVD (PERIPHERAL VASCULAR DISEASE) (CMD): ICD-10-CM

## 2024-05-23 DIAGNOSIS — E78.00 HYPERCHOLESTEROLEMIA: ICD-10-CM

## 2024-05-23 DIAGNOSIS — D50.9 IRON DEFICIENCY ANEMIA, UNSPECIFIED IRON DEFICIENCY ANEMIA TYPE: Primary | ICD-10-CM

## 2024-05-23 DIAGNOSIS — I10 BENIGN ESSENTIAL HYPERTENSION: ICD-10-CM

## 2024-05-23 DIAGNOSIS — I50.32 CHRONIC DIASTOLIC HEART FAILURE  (CMD): ICD-10-CM

## 2024-05-23 LAB
ANION GAP SERPL CALC-SCNC: 8 MMOL/L (ref 7–19)
BUN SERPL-MCNC: 79 MG/DL (ref 6–20)
BUN/CREAT SERPL: 27 (ref 7–25)
CALCIUM SERPL-MCNC: 8.9 MG/DL (ref 8.4–10.2)
CHLORIDE SERPL-SCNC: 97 MMOL/L (ref 97–110)
CO2 SERPL-SCNC: 31 MMOL/L (ref 21–32)
CREAT SERPL-MCNC: 2.93 MG/DL (ref 0.67–1.17)
EGFRCR SERPLBLD CKD-EPI 2021: 22 ML/MIN/{1.73_M2}
FASTING DURATION TIME PATIENT: ABNORMAL H
GLUCOSE SERPL-MCNC: 144 MG/DL (ref 70–99)
NT-PROBNP SERPL-MCNC: 1607 PG/ML
POTASSIUM SERPL-SCNC: 4.1 MMOL/L (ref 3.4–5.1)
SODIUM SERPL-SCNC: 132 MMOL/L (ref 135–145)

## 2024-05-23 PROCEDURE — 10002807 HB RX 258: Performed by: NURSE PRACTITIONER

## 2024-05-23 PROCEDURE — 80048 BASIC METABOLIC PNL TOTAL CA: CPT | Performed by: NURSE PRACTITIONER

## 2024-05-23 PROCEDURE — 96374 THER/PROPH/DIAG INJ IV PUSH: CPT

## 2024-05-23 PROCEDURE — 83880 ASSAY OF NATRIURETIC PEPTIDE: CPT | Performed by: NURSE PRACTITIONER

## 2024-05-23 PROCEDURE — 10002800 HB RX 250 W HCPCS: Performed by: NURSE PRACTITIONER

## 2024-05-23 PROCEDURE — 99211 OFF/OP EST MAY X REQ PHY/QHP: CPT

## 2024-05-23 RX ORDER — FAMOTIDINE 10 MG/ML
20 INJECTION, SOLUTION INTRAVENOUS
OUTPATIENT
Start: 2024-05-23

## 2024-05-23 RX ORDER — SODIUM CHLORIDE 9 MG/ML
INJECTION, SOLUTION INTRAVENOUS CONTINUOUS PRN
OUTPATIENT
Start: 2024-05-23

## 2024-05-23 RX ORDER — DIPHENHYDRAMINE HYDROCHLORIDE 50 MG/ML
50 INJECTION INTRAMUSCULAR; INTRAVENOUS
Status: DISCONTINUED | OUTPATIENT
Start: 2024-05-23 | End: 2024-05-23 | Stop reason: HOSPADM

## 2024-05-23 RX ORDER — FAMOTIDINE 10 MG/ML
20 INJECTION, SOLUTION INTRAVENOUS
Status: DISCONTINUED | OUTPATIENT
Start: 2024-05-23 | End: 2024-05-23 | Stop reason: HOSPADM

## 2024-05-23 RX ORDER — SODIUM CHLORIDE 9 MG/ML
INJECTION, SOLUTION INTRAVENOUS CONTINUOUS PRN
Status: DISCONTINUED | OUTPATIENT
Start: 2024-05-23 | End: 2024-05-23 | Stop reason: HOSPADM

## 2024-05-23 RX ORDER — DIPHENHYDRAMINE HYDROCHLORIDE 50 MG/ML
50 INJECTION INTRAMUSCULAR; INTRAVENOUS
OUTPATIENT
Start: 2024-05-23

## 2024-05-23 RX ADMIN — IRON SUCROSE 200 MG: 20 INJECTION, SOLUTION INTRAVENOUS at 13:47

## 2024-05-23 RX ADMIN — SODIUM CHLORIDE 250 ML: 9 INJECTION, SOLUTION INTRAVENOUS at 13:45

## 2024-05-28 ENCOUNTER — HOSPITAL ENCOUNTER (OUTPATIENT)
Dept: ULTRASOUND IMAGING | Age: 69
Discharge: HOME | End: 2024-05-28
Attending: FAMILY MEDICINE

## 2024-05-28 ENCOUNTER — HOSPITAL ENCOUNTER (OUTPATIENT)
Dept: GENERAL RADIOLOGY | Age: 69
Discharge: HOME | End: 2024-05-28
Attending: FAMILY MEDICINE

## 2024-05-28 DIAGNOSIS — M86.671 OTHER CHRONIC OSTEOMYELITIS OF RIGHT FOOT  (CMD): ICD-10-CM

## 2024-05-28 DIAGNOSIS — I73.9 PVD (PERIPHERAL VASCULAR DISEASE) (CMD): ICD-10-CM

## 2024-05-28 PROCEDURE — 73630 X-RAY EXAM OF FOOT: CPT

## 2024-05-28 PROCEDURE — 93925 LOWER EXTREMITY STUDY: CPT

## 2024-05-29 ENCOUNTER — TELEPHONE (OUTPATIENT)
Dept: CARDIOLOGY | Age: 69
End: 2024-05-29

## 2024-05-30 ENCOUNTER — NURSE ONLY (OUTPATIENT)
Dept: CARDIOLOGY | Age: 69
End: 2024-05-30

## 2024-05-30 ENCOUNTER — APPOINTMENT (OUTPATIENT)
Dept: WOUND CARE | Age: 69
End: 2024-05-30
Attending: FAMILY MEDICINE

## 2024-05-30 ENCOUNTER — OFFICE VISIT (OUTPATIENT)
Dept: CARDIOLOGY | Age: 69
End: 2024-05-30

## 2024-05-30 VITALS
SYSTOLIC BLOOD PRESSURE: 124 MMHG | DIASTOLIC BLOOD PRESSURE: 76 MMHG | WEIGHT: 315 LBS | HEART RATE: 66 BPM | BODY MASS INDEX: 46.4 KG/M2

## 2024-05-30 VITALS
HEART RATE: 68 BPM | SYSTOLIC BLOOD PRESSURE: 136 MMHG | WEIGHT: 315 LBS | BODY MASS INDEX: 46.4 KG/M2 | DIASTOLIC BLOOD PRESSURE: 74 MMHG

## 2024-05-30 DIAGNOSIS — N18.30 STAGE 3 CHRONIC KIDNEY DISEASE, UNSPECIFIED WHETHER STAGE 3A OR 3B CKD  (CMD): ICD-10-CM

## 2024-05-30 DIAGNOSIS — I10 BENIGN ESSENTIAL HYPERTENSION: ICD-10-CM

## 2024-05-30 DIAGNOSIS — E66.01 MORBID OBESITY WITH BMI OF 40.0-44.9, ADULT  (CMD): ICD-10-CM

## 2024-05-30 DIAGNOSIS — I50.23 ACUTE ON CHRONIC SYSTOLIC CONGESTIVE HEART FAILURE  (CMD): Primary | ICD-10-CM

## 2024-05-30 DIAGNOSIS — D50.9 IRON DEFICIENCY ANEMIA, UNSPECIFIED IRON DEFICIENCY ANEMIA TYPE: Primary | ICD-10-CM

## 2024-05-30 DIAGNOSIS — I50.32 CHRONIC DIASTOLIC HEART FAILURE  (CMD): ICD-10-CM

## 2024-05-30 DIAGNOSIS — E78.00 HYPERCHOLESTEROLEMIA: ICD-10-CM

## 2024-05-30 DIAGNOSIS — E78.5 HYPERLIPIDEMIA, UNSPECIFIED HYPERLIPIDEMIA TYPE: ICD-10-CM

## 2024-05-30 DIAGNOSIS — G47.33 OSA (OBSTRUCTIVE SLEEP APNEA): ICD-10-CM

## 2024-05-30 LAB
ANION GAP SERPL CALC-SCNC: 10 MMOL/L (ref 7–19)
BUN SERPL-MCNC: 68 MG/DL (ref 6–20)
BUN/CREAT SERPL: 26 (ref 7–25)
CALCIUM SERPL-MCNC: 8.7 MG/DL (ref 8.4–10.2)
CHLORIDE SERPL-SCNC: 100 MMOL/L (ref 97–110)
CO2 SERPL-SCNC: 29 MMOL/L (ref 21–32)
CREAT SERPL-MCNC: 2.62 MG/DL (ref 0.67–1.17)
EGFRCR SERPLBLD CKD-EPI 2021: 26 ML/MIN/{1.73_M2}
FASTING DURATION TIME PATIENT: ABNORMAL H
GLUCOSE SERPL-MCNC: 151 MG/DL (ref 70–99)
NT-PROBNP SERPL-MCNC: 1242 PG/ML
POTASSIUM SERPL-SCNC: 4.1 MMOL/L (ref 3.4–5.1)
SODIUM SERPL-SCNC: 135 MMOL/L (ref 135–145)

## 2024-05-30 PROCEDURE — 10002807 HB RX 258: Performed by: NURSE PRACTITIONER

## 2024-05-30 PROCEDURE — 83880 ASSAY OF NATRIURETIC PEPTIDE: CPT | Performed by: NURSE PRACTITIONER

## 2024-05-30 PROCEDURE — 99211 OFF/OP EST MAY X REQ PHY/QHP: CPT

## 2024-05-30 PROCEDURE — 96374 THER/PROPH/DIAG INJ IV PUSH: CPT

## 2024-05-30 PROCEDURE — 80048 BASIC METABOLIC PNL TOTAL CA: CPT | Performed by: NURSE PRACTITIONER

## 2024-05-30 PROCEDURE — 10002800 HB RX 250 W HCPCS: Performed by: NURSE PRACTITIONER

## 2024-05-30 RX ORDER — DIPHENHYDRAMINE HYDROCHLORIDE 50 MG/ML
50 INJECTION INTRAMUSCULAR; INTRAVENOUS
OUTPATIENT
Start: 2024-05-30

## 2024-05-30 RX ORDER — DIPHENHYDRAMINE HYDROCHLORIDE 50 MG/ML
50 INJECTION INTRAMUSCULAR; INTRAVENOUS
Status: DISCONTINUED | OUTPATIENT
Start: 2024-05-30 | End: 2024-05-30 | Stop reason: HOSPADM

## 2024-05-30 RX ORDER — SODIUM CHLORIDE 9 MG/ML
INJECTION, SOLUTION INTRAVENOUS CONTINUOUS PRN
OUTPATIENT
Start: 2024-05-30

## 2024-05-30 RX ORDER — FAMOTIDINE 10 MG/ML
20 INJECTION, SOLUTION INTRAVENOUS
Status: DISCONTINUED | OUTPATIENT
Start: 2024-05-30 | End: 2024-05-30 | Stop reason: HOSPADM

## 2024-05-30 RX ORDER — SODIUM CHLORIDE 9 MG/ML
INJECTION, SOLUTION INTRAVENOUS CONTINUOUS PRN
Status: DISCONTINUED | OUTPATIENT
Start: 2024-05-30 | End: 2024-05-30 | Stop reason: HOSPADM

## 2024-05-30 RX ORDER — FAMOTIDINE 10 MG/ML
20 INJECTION, SOLUTION INTRAVENOUS
OUTPATIENT
Start: 2024-05-30

## 2024-05-30 RX ADMIN — SODIUM CHLORIDE 250 ML: 9 INJECTION, SOLUTION INTRAVENOUS at 14:56

## 2024-05-30 RX ADMIN — IRON SUCROSE 200 MG: 20 INJECTION, SOLUTION INTRAVENOUS at 14:56

## 2024-05-31 ENCOUNTER — APPOINTMENT (OUTPATIENT)
Dept: CARDIOLOGY | Age: 69
End: 2024-05-31

## 2024-05-31 ENCOUNTER — TELEPHONE (OUTPATIENT)
Dept: WOUND CARE | Age: 69
End: 2024-05-31

## 2024-06-05 ENCOUNTER — TELEPHONE (OUTPATIENT)
Dept: INTERNAL MEDICINE CLINIC | Facility: CLINIC | Age: 69
End: 2024-06-05

## 2024-06-05 NOTE — TELEPHONE ENCOUNTER
Patient is requesting a call back to go over medications before the scheduled colonoscopy on 6/12/24.    Please advise.

## 2024-06-05 NOTE — PAT NURSING NOTE
Patient instructed to call Dr Longoria's office to obtain prep instructions and to call dr Wills's office to clarify insulins dosing prior to procedure. Patient verbalized understanding.

## 2024-06-06 ENCOUNTER — NURSE ONLY (OUTPATIENT)
Dept: CARDIOLOGY | Age: 69
End: 2024-06-06

## 2024-06-06 ENCOUNTER — OFFICE VISIT (OUTPATIENT)
Dept: CARDIOLOGY | Age: 69
End: 2024-06-06

## 2024-06-06 VITALS
BODY MASS INDEX: 46.76 KG/M2 | DIASTOLIC BLOOD PRESSURE: 64 MMHG | HEART RATE: 72 BPM | SYSTOLIC BLOOD PRESSURE: 124 MMHG | WEIGHT: 315 LBS

## 2024-06-06 VITALS
HEART RATE: 72 BPM | WEIGHT: 315 LBS | SYSTOLIC BLOOD PRESSURE: 126 MMHG | DIASTOLIC BLOOD PRESSURE: 64 MMHG | BODY MASS INDEX: 46.76 KG/M2

## 2024-06-06 DIAGNOSIS — I73.9 PVD (PERIPHERAL VASCULAR DISEASE) (CMD): ICD-10-CM

## 2024-06-06 DIAGNOSIS — N18.30 STAGE 3 CHRONIC KIDNEY DISEASE, UNSPECIFIED WHETHER STAGE 3A OR 3B CKD  (CMD): ICD-10-CM

## 2024-06-06 DIAGNOSIS — I50.23 ACUTE ON CHRONIC SYSTOLIC CONGESTIVE HEART FAILURE  (CMD): Primary | ICD-10-CM

## 2024-06-06 DIAGNOSIS — E11.65 TYPE 2 DIABETES MELLITUS WITH HYPERGLYCEMIA, WITH LONG-TERM CURRENT USE OF INSULIN  (CMD): ICD-10-CM

## 2024-06-06 DIAGNOSIS — Z79.4 TYPE 2 DIABETES MELLITUS WITH HYPERGLYCEMIA, WITH LONG-TERM CURRENT USE OF INSULIN  (CMD): ICD-10-CM

## 2024-06-06 DIAGNOSIS — D50.0 IRON DEFICIENCY ANEMIA DUE TO CHRONIC BLOOD LOSS: ICD-10-CM

## 2024-06-06 DIAGNOSIS — D50.9 IRON DEFICIENCY ANEMIA, UNSPECIFIED IRON DEFICIENCY ANEMIA TYPE: Primary | ICD-10-CM

## 2024-06-06 DIAGNOSIS — G47.33 OSA (OBSTRUCTIVE SLEEP APNEA): ICD-10-CM

## 2024-06-06 LAB
ANION GAP SERPL CALC-SCNC: 11 MMOL/L (ref 7–19)
BASOPHILS # BLD: 0.1 K/MCL (ref 0–0.3)
BASOPHILS NFR BLD: 1 %
BUN SERPL-MCNC: 77 MG/DL (ref 6–20)
BUN/CREAT SERPL: 29 (ref 7–25)
CALCIUM SERPL-MCNC: 9 MG/DL (ref 8.4–10.2)
CHLORIDE SERPL-SCNC: 98 MMOL/L (ref 97–110)
CO2 SERPL-SCNC: 29 MMOL/L (ref 21–32)
CREAT SERPL-MCNC: 2.7 MG/DL (ref 0.67–1.17)
DEPRECATED RDW RBC: 53.2 FL (ref 39–50)
EGFRCR SERPLBLD CKD-EPI 2021: 25 ML/MIN/{1.73_M2}
EOSINOPHIL # BLD: 0.3 K/MCL (ref 0–0.5)
EOSINOPHIL NFR BLD: 4 %
ERYTHROCYTE [DISTWIDTH] IN BLOOD: 17 % (ref 11–15)
FASTING DURATION TIME PATIENT: ABNORMAL H
GLUCOSE SERPL-MCNC: 192 MG/DL (ref 70–99)
HCT VFR BLD CALC: 34.4 % (ref 39–51)
HGB BLD-MCNC: 10.9 G/DL (ref 13–17)
IMM GRANULOCYTES # BLD AUTO: 0 K/MCL (ref 0–0.2)
IMM GRANULOCYTES # BLD: 0 %
LYMPHOCYTES # BLD: 1.8 K/MCL (ref 1–4)
LYMPHOCYTES NFR BLD: 22 %
MCH RBC QN AUTO: 27.6 PG (ref 26–34)
MCHC RBC AUTO-ENTMCNC: 31.7 G/DL (ref 32–36.5)
MCV RBC AUTO: 87.1 FL (ref 78–100)
MONOCYTES # BLD: 0.8 K/MCL (ref 0.3–0.9)
MONOCYTES NFR BLD: 10 %
NEUTROPHILS # BLD: 5.3 K/MCL (ref 1.8–7.7)
NEUTROPHILS NFR BLD: 63 %
NRBC BLD MANUAL-RTO: 0 /100 WBC
NT-PROBNP SERPL-MCNC: 1402 PG/ML
PLATELET # BLD AUTO: 269 K/MCL (ref 140–450)
POTASSIUM SERPL-SCNC: 4.5 MMOL/L (ref 3.4–5.1)
RBC # BLD: 3.95 MIL/MCL (ref 4.5–5.9)
SODIUM SERPL-SCNC: 133 MMOL/L (ref 135–145)
WBC # BLD: 8.2 K/MCL (ref 4.2–11)

## 2024-06-06 PROCEDURE — 96374 THER/PROPH/DIAG INJ IV PUSH: CPT

## 2024-06-06 PROCEDURE — 85025 COMPLETE CBC W/AUTO DIFF WBC: CPT | Performed by: NURSE PRACTITIONER

## 2024-06-06 PROCEDURE — 3078F DIAST BP <80 MM HG: CPT | Performed by: NURSE PRACTITIONER

## 2024-06-06 PROCEDURE — 99215 OFFICE O/P EST HI 40 MIN: CPT | Performed by: NURSE PRACTITIONER

## 2024-06-06 PROCEDURE — 83880 ASSAY OF NATRIURETIC PEPTIDE: CPT | Performed by: NURSE PRACTITIONER

## 2024-06-06 PROCEDURE — 3074F SYST BP LT 130 MM HG: CPT | Performed by: NURSE PRACTITIONER

## 2024-06-06 PROCEDURE — 10002800 HB RX 250 W HCPCS: Performed by: NURSE PRACTITIONER

## 2024-06-06 PROCEDURE — 80048 BASIC METABOLIC PNL TOTAL CA: CPT | Performed by: NURSE PRACTITIONER

## 2024-06-06 PROCEDURE — 10002807 HB RX 258: Performed by: NURSE PRACTITIONER

## 2024-06-06 RX ORDER — FAMOTIDINE 10 MG/ML
20 INJECTION, SOLUTION INTRAVENOUS
OUTPATIENT
Start: 2024-06-06

## 2024-06-06 RX ORDER — SODIUM CHLORIDE 9 MG/ML
INJECTION, SOLUTION INTRAVENOUS CONTINUOUS PRN
Status: DISCONTINUED | OUTPATIENT
Start: 2024-06-06 | End: 2024-06-06 | Stop reason: HOSPADM

## 2024-06-06 RX ORDER — FAMOTIDINE 10 MG/ML
20 INJECTION, SOLUTION INTRAVENOUS
Status: DISCONTINUED | OUTPATIENT
Start: 2024-06-06 | End: 2024-06-06 | Stop reason: HOSPADM

## 2024-06-06 RX ORDER — SODIUM CHLORIDE 9 MG/ML
INJECTION, SOLUTION INTRAVENOUS CONTINUOUS PRN
OUTPATIENT
Start: 2024-06-06

## 2024-06-06 RX ORDER — DIPHENHYDRAMINE HYDROCHLORIDE 50 MG/ML
50 INJECTION INTRAMUSCULAR; INTRAVENOUS
Status: DISCONTINUED | OUTPATIENT
Start: 2024-06-06 | End: 2024-06-06 | Stop reason: HOSPADM

## 2024-06-06 RX ORDER — BUMETANIDE 0.25 MG/ML
3 INJECTION INTRAMUSCULAR; INTRAVENOUS ONCE
Status: COMPLETED | OUTPATIENT
Start: 2024-06-06 | End: 2024-06-06

## 2024-06-06 RX ORDER — DIPHENHYDRAMINE HYDROCHLORIDE 50 MG/ML
50 INJECTION INTRAMUSCULAR; INTRAVENOUS
OUTPATIENT
Start: 2024-06-06

## 2024-06-06 RX ADMIN — SODIUM CHLORIDE 250 ML: 9 INJECTION, SOLUTION INTRAVENOUS at 13:16

## 2024-06-06 RX ADMIN — BUMETANIDE 3 MG: 0.25 INJECTION INTRAMUSCULAR; INTRAVENOUS at 14:11

## 2024-06-07 NOTE — TELEPHONE ENCOUNTER
Spoke to patient  Name,  verified    Discussed Insulin orders per Dr. Taylor recommendations - See TE 2024    We also discussed the rest of his medication. He no longer takes the iron supplement.  OK to continue all other medications.    Patient does not have the bowel prep instructions. I offered to send them via [facility] or have him pick them up - patient declined both options.    I discussed at length the bowel prep instructions. We went through the light breakfast, no solid foods after 10am, the clear liquid diet, and what is not appropriate to drink.    I let him now what time he needs to start drinking the bowel prep. I confirmed that he will arrive to [facility] surgical center at 7AM for his 8:15am appointment.    Will touch base with patient on Monday to discuss once more.

## 2024-06-11 ENCOUNTER — TELEPHONE (OUTPATIENT)
Facility: CLINIC | Age: 69
End: 2024-06-11

## 2024-06-11 NOTE — TELEPHONE ENCOUNTER
1st,overdue reminder letter mailed out to patient   Labs order    CBC With Differential With Platelet (Order #[id number]) on 4/16/24

## 2024-06-12 ENCOUNTER — TELEPHONE (OUTPATIENT)
Facility: CLINIC | Age: 69
End: 2024-06-12

## 2024-06-12 ENCOUNTER — ANESTHESIA (OUTPATIENT)
Dept: ENDOSCOPY | Facility: HOSPITAL | Age: 69
End: 2024-06-12
Payer: MEDICARE

## 2024-06-12 ENCOUNTER — ANESTHESIA EVENT (OUTPATIENT)
Dept: ENDOSCOPY | Facility: HOSPITAL | Age: 69
End: 2024-06-12
Payer: MEDICARE

## 2024-06-12 ENCOUNTER — TELEPHONE (OUTPATIENT)
Dept: GASTROENTEROLOGY | Facility: CLINIC | Age: 69
End: 2024-06-12

## 2024-06-12 ENCOUNTER — HOSPITAL ENCOUNTER (OUTPATIENT)
Facility: HOSPITAL | Age: 69
Setting detail: HOSPITAL OUTPATIENT SURGERY
Discharge: HOME | End: 2024-06-12
Attending: INTERNAL MEDICINE | Admitting: INTERNAL MEDICINE
Payer: MEDICARE

## 2024-06-12 VITALS
HEIGHT: 72 IN | RESPIRATION RATE: 19 BRPM | BODY MASS INDEX: 42.66 KG/M2 | DIASTOLIC BLOOD PRESSURE: 75 MMHG | WEIGHT: 315 LBS | HEART RATE: 83 BPM | SYSTOLIC BLOOD PRESSURE: 129 MMHG | OXYGEN SATURATION: 96 %

## 2024-06-12 DIAGNOSIS — D50.9 IRON DEFICIENCY ANEMIA, UNSPECIFIED IRON DEFICIENCY ANEMIA TYPE: ICD-10-CM

## 2024-06-12 DIAGNOSIS — K92.1 BLACK STOOL: ICD-10-CM

## 2024-06-12 LAB — GLUCOSE BLDC GLUCOMTR-MCNC: 142 MG/DL (ref 70–99)

## 2024-06-12 PROCEDURE — 0DB78ZX EXCISION OF STOMACH, PYLORUS, VIA NATURAL OR ARTIFICIAL OPENING ENDOSCOPIC, DIAGNOSTIC: ICD-10-PCS | Performed by: INTERNAL MEDICINE

## 2024-06-12 PROCEDURE — 43239 EGD BIOPSY SINGLE/MULTIPLE: CPT | Performed by: INTERNAL MEDICINE

## 2024-06-12 PROCEDURE — 0DB98ZX EXCISION OF DUODENUM, VIA NATURAL OR ARTIFICIAL OPENING ENDOSCOPIC, DIAGNOSTIC: ICD-10-PCS | Performed by: INTERNAL MEDICINE

## 2024-06-12 PROCEDURE — 0DBP8ZX EXCISION OF RECTUM, VIA NATURAL OR ARTIFICIAL OPENING ENDOSCOPIC, DIAGNOSTIC: ICD-10-PCS | Performed by: INTERNAL MEDICINE

## 2024-06-12 PROCEDURE — 45385 COLONOSCOPY W/LESION REMOVAL: CPT | Performed by: INTERNAL MEDICINE

## 2024-06-12 RX ORDER — SODIUM CHLORIDE, SODIUM LACTATE, POTASSIUM CHLORIDE, CALCIUM CHLORIDE 600; 310; 30; 20 MG/100ML; MG/100ML; MG/100ML; MG/100ML
INJECTION, SOLUTION INTRAVENOUS CONTINUOUS
Status: DISCONTINUED | OUTPATIENT
Start: 2024-06-12 | End: 2024-06-12

## 2024-06-12 RX ORDER — NALOXONE HYDROCHLORIDE 0.4 MG/ML
0.08 INJECTION, SOLUTION INTRAMUSCULAR; INTRAVENOUS; SUBCUTANEOUS ONCE AS NEEDED
Status: DISCONTINUED | OUTPATIENT
Start: 2024-06-12 | End: 2024-06-12

## 2024-06-12 RX ORDER — LIDOCAINE HYDROCHLORIDE 10 MG/ML
INJECTION, SOLUTION EPIDURAL; INFILTRATION; INTRACAUDAL; PERINEURAL AS NEEDED
Status: DISCONTINUED | OUTPATIENT
Start: 2024-06-12 | End: 2024-06-12 | Stop reason: SURG

## 2024-06-12 RX ADMIN — SODIUM CHLORIDE, SODIUM LACTATE, POTASSIUM CHLORIDE, CALCIUM CHLORIDE: 600; 310; 30; 20 INJECTION, SOLUTION INTRAVENOUS at 08:21:00

## 2024-06-12 RX ADMIN — SODIUM CHLORIDE, SODIUM LACTATE, POTASSIUM CHLORIDE, CALCIUM CHLORIDE: 600; 310; 30; 20 INJECTION, SOLUTION INTRAVENOUS at 08:49:00

## 2024-06-12 RX ADMIN — LIDOCAINE HYDROCHLORIDE 50 MG: 10 INJECTION, SOLUTION EPIDURAL; INFILTRATION; INTRACAUDAL; PERINEURAL at 08:29:00

## 2024-06-12 NOTE — OPERATIVE REPORT
ESOPHAGOGASTRODUODENOSCOPY (EGD) & COLONOSCOPY REPORT    Rodo Mcclain     1955 Age 69 year old   PCP Tamy Wills MD Endoscopist Lia Longoria MD     Date of procedure: 24    Procedure: EGD w/ cold biopsy & Colonoscopy w/ cold snare and cold biopsy    Pre-operative diagnosis: iron deficiency anemia, dark stools    Post-operative diagnosis: gastric polyps, small hiatal hernia, rectal polyps, fair to poor prep, internal hemorrhoids    Medications: MAC    Withdrawal time: 16 minutes    Complications: none    Procedure: Informed consent was obtained from the patient after the risks of the procedure were discussed, including but not limited to bleeding, perforation, aspiration, infection, or possibility of a missed lesion. We discussed the risks/benefits and alternatives to this procedure, as well as the planned sedation. EGD procedure: The patient was placed in the left lateral decubitus position and begun on continuous blood pressure pulse oximetry and EKG monitoring and this was maintained throughout the procedure. Once an adequate level of sedation was obtained a bite block was placed. Then the lubricated tip of the [id number] diagnostic video upper endoscope was inserted and advanced using direct visualization into the posterior pharynx and ultimately into the esophagus. Colonoscopy procedure: Once an adequate level of sedation was obtained a digital rectal exam was completed. Then the lubricated tip of the [id number] diagnostic video colonoscope was inserted and advanced without difficulty to the cecum using the CO2 insufflation technique. The cecum was identified by localizing the trifold, the appendix and the ileocecal valve. A routine second examination of the cecum/ascending colon was performed. Withdrawal was begun with thorough washing and careful examination of the colonic walls and folds. Photodocumentation was obtained. The bowel prep was fair to poor. Views of the colon  were fair to poor with washing. I then carefully withdrew the instrument from the patient who tolerated the procedure well.     Complications: None    EGD findings:      1. Esophagus: The squamocolumnar junction was noted at 45 cm and appeared normal. The diaphragmatic pinch was noted noted at 47 cm from the incisors. There was a 2 cm hiatal hernia. The esophageal mucosa appeared normal. There was no evidence of esophagitis, stricture or endoscopic evidence of Kirkpatrick's esophagus.  2. Stomach: The stomach distended normally. Normal rugal folds were seen. The pylorus was patent. The gastric mucosa appeared normal and cold forceps biopsies were taken of the antrum, incisura, and body. There were a few small gastric polyps in the proximal body that were less than 5 mm and were sampled with cold forceps biopsies. Retroflexion revealed a normal fundus and a mildly patulous cardia.   3. Duodenum: The duodenal mucosa appeared normal in the 1st and 2nd portion of the duodenum. Cold forceps biopsies were taken of the bulb and mucosa past the bulb.     Colonoscopy findings:    1. The prep was fair to poor with solid and liquid brown stool throughout the colon that could not be completely cleared and inhibited complete visualization. The prep was inadequate for screening purposes.  2.  2 polyps noted as follows:      A. 5 mm polyp in the rectum; sessile morphology; completely removed by cold snare polypectomy and retrieved.      B. 2 mm polyp in the rectum; sessile morphology; completely removed by cold forceps biopsies and retrieved.  2. Diverticulosis: none.  4. A retroflexed view of the rectum revealed small internal hemorrhoids.  5. The colonic mucosa throughout the colon showed normal vascular pattern, without evidence of angioectasias or inflammation.   6. ANGÉLICA: normal rectal tone, no masses palpated.     Impression:  Gastric polyps. Biopsied.   Small hiatal hernia.   Otherwise unremarkable EGD s/p biopsies of the stomach  and duodenum.  Fair to poor colonoscopy prep.  2 small rectal polyps, resected and retrieved.  Small internal hemorrhoids.      Recommend:  Await pathology.   Repeat colonoscopy recommended in the next 3-6 months given inadequate bowel prep.   High fiber diet.  Monitor for blood in the stool. If having more than just tinge of blood, call office or go to the ER.  Follow up with CARMEN Harris to discuss next steps.      >>>If tissue was obtained and you have not received your pathology results either by phone or letter within 2 weeks, please call our office at [phone].    Specimens: duodenum, gastric, gastric polyps, rectal polyps     Blood loss: <1 ml

## 2024-06-12 NOTE — TELEPHONE ENCOUNTER
Dallas Weaver, the prep was fair to poor. He'll need a repeat colonoscopy with an extended bowel prep. I advised him to follow up with you to discuss how to adequately prep for the next colonoscopy and to go over his biopsy results/next steps. Thanks, SS

## 2024-06-12 NOTE — ANESTHESIA POSTPROCEDURE EVALUATION
Patient: Rodo Mcclain    Procedure Summary       Date: 06/12/24 Room / Location: [facility] ENDOSCOPY 04 / [facility] ENDOSCOPY    Anesthesia Start: 0821 Anesthesia Stop: 0910    Procedures:       COLONOSCOPY/ ESOPHAGOGASTRODUODENOSCOPY (EGD)      ESOPHAGOGASTRODUODENOSCOPY (EGD) Diagnosis:       Iron deficiency anemia, unspecified iron deficiency anemia type      Black stool      (small hiatal hernia, gastric polyps; fair to poor prep)    Surgeons: Lia Longoria MD Anesthesiologist: Eugenia Baker CRNA    Anesthesia Type: MAC ASA Status: 4            Anesthesia Type: MAC    Vitals Value Taken Time   /79 06/12/24 0910        Pulse 87 06/12/24 0910   Resp 20 06/12/24 0910   SpO2 99 % 06/12/24 0910       [facility] AN Post Evaluation:   Patient Evaluated in PACU  Patient Participation: waiting for patient participation  Level of Consciousness: sleepy but conscious  Pain Management: adequate  Airway Patency:patent  Yes    Nausea/Vomiting: none  Cardiovascular Status: hemodynamically stable  Respiratory Status: acceptable and room air  Postoperative Hydration acceptable      Eugenia Baker CRNA  6/12/2024 9:10 AM

## 2024-06-12 NOTE — DISCHARGE INSTRUCTIONS
Home Care Instructions for Colonoscopy with Sedation    Diet:  - Resume your regular diet as tolerated unless otherwise instructed.  - Start with light meals to minimize bloating.  - Do not drink alcohol today.    Medication:  - If you have questions about resuming your normal medications, please contact your Primary Care Physician.    Activities:  - Take it easy today. Do not return to work today.  - Do not drive today.  - Do not operate any machinery today (including kitchen equipment).    Colonoscopy:  - You may notice some rectal \"spotting\" (a little blood on the toilet tissue) for a day or two after the exam. This is normal.  - If you experience any rectal bleeding (not spotting), persistent tenderness or sharp severe abdominal pains, oral temperature over 100 degrees Fahrenheit, light-headedness or dizziness, or any other problems, contact your doctor.    **If unable to reach your doctor, please go to the [facility] Emergency Room**    - Your referring physician will receive a full report of your examination.  - If you do not hear from your doctor's office within two weeks of your biopsy, please call them for your results.    You may be able to see your laboratory results in [facility] between 4 and 7 business days.  In some cases, your physician may not have viewed the results before they are released to [facility].  If you have questions regarding your results contact the physician who ordered the test/exam by phone or via [facility] by choosing \"Ask a Medical Question.\"Home Care Instructions for Gastroscopy with Sedation    Diet:  - Resume your regular diet as tolerated unless otherwise instructed.  - Start with light meals to minimize bloating.  - Do not drink alcohol today.    Medication:  - If you have questions about resuming your normal medications, please contact your Primary Care Physician.    Activities:  - Take it easy today. Do not return to work today.  - Do not drive today.  - Do not  operate any machinery today (including kitchen equipment).    Gastroscopy:  - You may have a sore throat for 2-3 days following the exam. This is normal. Gargling with warm salt water (1/2 tsp salt to 1 glass warm water) or using throat lozenges will help.  - If you experience any sharp pain in your neck, abdomen or chest, vomiting of blood, oral temperature over 100 degrees Fahrenheit, light-headedness or dizziness, or any other problems, contact your doctor.    **If unable to reach your doctor, please go to the [facility] Emergency Room**    - Your referring physician will receive a full report of your examination.  - If you do not hear from your doctor's office within two weeks of your biopsy, please call them for your results.    You may be able to see your laboratory results in [facility] [employer] between 4 and 7 business days.  In some cases, your physician may not have viewed the results before they are released to [employer].  If you have questions regarding your results contact the physician who ordered the test/exam by phone or via [facility] by choosing \"Ask a Medical Question.\"

## 2024-06-12 NOTE — ANESTHESIA PREPROCEDURE EVALUATION
Anesthesia PreOp Note    HPI:     Rodo Mcclain is a 69 year old male who presents for preoperative consultation requested by: Lia Longoria MD    Date of Surgery: 6/12/2024    Procedure(s):  COLONOSCOPY/ ESOPHAGOGASTRODUODENOSCOPY (EGD)  ESOPHAGOGASTRODUODENOSCOPY (EGD)  Indication: Iron deficiency anemia, unspecified iron deficiency anemia type / Black stool    Relevant Problems   No relevant active problems       NPO:  Last Liquid Consumption Date: 06/12/24  Last Liquid Consumption Time: 0200  Last Solid Consumption Date: 06/11/24  Last Solid Consumption Time: 0930  Last Liquid Consumption Date: 06/12/24          History Review:  Patient Active Problem List    Diagnosis Date Noted    Diabetic neuropathy (HCC) 06/14/2023    Type 2 diabetes mellitus with right diabetic foot ulcer (HCC) 12/08/2021    Chronic kidney disease, stage 4 (severe) (HCC) 07/01/2021    Congestive heart failure (HCC) 07/01/2021    Osteomyelitis, ankle and foot ([facility]) 11/12/2020    Acute on chronic systolic congestive heart failure (HCC) 11/02/2020    Peripheral vascular disease due to secondary diabetes mellitus (HCC) 07/08/2020    Chronic diastolic heart failure (HCC) 05/15/2020    Hypercholesterolemia 07/17/2018    Malignant essential hypertension 01/24/2013    Obesity 03/21/2012    Obstructive sleep apnea syndrome 02/25/2012       Past Medical History:    Acute kidney injury (KASEY) with acute tubular necrosis (ATN) ([facility])    Last Assessment & Plan: Formatting of this note might be different from the original. Multifactorial, due to possible underlying infection, also diuresis with torsemide. Creatinine 3.05 which is down to 1.9, Patient was initially started on normal saline at 75 cc/h, diuresis, lisinopril and metformin was held Creatinine is downtrending to 1.3.  Monitor urine output.    Congestive heart disease (HCC)    Constipation    Diabetes ([facility])    Dyspnea on exertion    Last Assessment & Plan: Formatting of this note might be  different from the original. Admitted with dyspnea    Elevated brain natriuretic peptide (BNP) level    Last Assessment & Plan: Formatting of this note might be different from the original. Cardiology consulted    Esophageal reflux    Essential hypertension    High blood pressure    High cholesterol    Hyperlipidemia    Hypokalemia    Last Assessment & Plan: Formatting of this note might be different from the original. Replacement per nephrology    Hyponatremia    Last Assessment & Plan: Formatting of this note might be different from the original. Will monitor Na: 133 on admission    Sleep apnea       Past Surgical History:   Procedure Laterality Date    Amputation foot,midtarsal-chopart Left     Colonoscopy  2016    [facility]    Eye surgery         Medications Prior to Admission   Medication Sig Dispense Refill Last Dose    Ferrous Sulfate 325 (65 Fe) MG Oral Tab Take 1 tablet (325 mg total) by mouth daily with breakfast for 360 doses. 90 tablet 3     NOVOLOG FLEXPEN 100 UNIT/ML Subcutaneous Solution Pen-injector Inject 18 Units into the skin 3 (three) times daily before meals. 51 mL 0 6/11/2024    LANTUS SOLOSTAR 100 UNIT/ML Subcutaneous Solution Pen-injector Inject 50 Units into the skin 2 (two) times daily. 90 mL 0 6/11/2024 at 0800    metoprolol succinate ER 25 MG Oral Tablet 24 Hr Take 1 tablet (25 mg total) by mouth daily. 90 tablet 0 6/10/2024    atorvastatin 20 MG Oral Tab Take 1 tablet (20 mg total) by mouth daily. 90 tablet 3 6/10/2024    spironolactone 25 MG Oral Tab Take 1 tablet (25 mg total) by mouth 2 (two) times daily. 180 tablet 1     torsemide 20 MG Oral Tab Take 3 tablets (60 mg total) by mouth in the morning and 3 tablets (60 mg total) before bedtime. 540 tablet 1 6/10/2024    famotidine 20 MG Oral Tab Take 1 tablet (20 mg total) by mouth daily. 90 tablet 1 6/10/2024    isosorbide mononitrate ER 30 MG Oral Tablet 24 Hr Take 1 tablet (30 mg total) by mouth daily.   6/11/2024    polyethylene  glycol, PEG 3350-KCl-NaBcb-NaCl-NaSulf, 236 g Oral Recon Soln Take 4,000 mL by mouth As Directed. Take 2,000 mL the night before your procedure and 2,000 mL the morning of your procedure. Take as directed by GI clinic. Okay to substitute for generic. 1 each 0     BD PEN NEEDLE RHONDA 2ND GEN 32G X 4 MM Does not apply Misc USE UNDER THE SKIN FOUR TIMES DAILY AS DIRECTED 90 each 0     allopurinol 100 MG Oral Tab Take 0.5 tablets (50 mg total) by mouth every other day. 90 tablet 0 6/10/2024    predniSONE 10 MG Oral Tab Take 4 pills for 2 days. Then, take 3 pills for 2 days. Then take 2 pills for 2 days. Then, take 1 pill for 2 days. 20 tablet 0     Glucose Blood (ACCU-CHEK GUIDE) In Vitro Strip 1 Lancet by Finger stick route daily. 100 strip 0     Accu-Chek FastClix Lancets Does not apply Misc         Current Facility-Administered Medications Ordered in Epic   Medication Dose Route Frequency Provider Last Rate Last Admin    lactated ringers infusion   Intravenous Continuous Lai Longoria MD         No current [facility]-ordered outpatient medications on file.       Allergies   Allergen Reactions    Doxycycline OTHER (SEE COMMENTS)     Kidney pain    Sulfamethoxazole W/Trimethoprim OTHER (SEE COMMENTS)     Acute kidney injury    Sulfamethoxazole OTHER (SEE COMMENTS)    Trimethoprim OTHER (SEE COMMENTS)       Family History   Problem Relation Age of Onset    Prostate Cancer Father     Heart Disease Brother      Social History     Socioeconomic History    Marital status: Single   Tobacco Use    Smoking status: Never     Passive exposure: Never    Smokeless tobacco: Never   Vaping Use    Vaping status: Never Used   Substance and Sexual Activity    Alcohol use: Never    Drug use: Never       Available pre-op labs reviewed.             Vital Signs:  Body mass index is 46.65 kg/m².   height is 1.829 m (6') and weight is 156 kg (344 lb) (abnormal). His blood pressure is 128/66 and his pulse is 79. His respiration is 16 and oxygen  saturation is 95%.   Vitals:    06/05/24 1057 06/12/24 0728   BP:  128/66   Pulse:  79   Resp:  16   SpO2:  95%   Weight: (!) 156 kg (344 lb)    Height: 1.829 m (6')         Anesthesia Evaluation     Patient summary reviewed and Nursing notes reviewed    No history of anesthetic complications   Airway   Mallampati: III  TM distance: <3 FB  Neck ROM: limited  Dental      Comment: Poor dentition, many teeth missing, teeth that are present are black/brown in color and chipped.  Right central incisor is very loose, informed patient that he will assist us place the mouthpiece with foam prior to sedation but there is a possibility of loosing the tooth if he bites down on the mouthpiece while asleep. Patient verbalized understanding.      Pulmonary    (+) shortness of breath, sleep apnea, decreased breath sounds    ROS comment: Does not use CPAP  Cardiovascular   Exercise tolerance: poor  (+) hypertension, CHF, GRIFFITH  (-) pacemaker, past MI, CABG/stent    Rhythm: regular    Neuro/Psych    (-) TIA, CVA    GI/Hepatic/Renal    (+) GERD, chronic renal disease, bowel prep    Endo/Other    (+) diabetes mellitus type 2 poorly controlled using insulin  (-) hypothyroidism, hyperthyroidism  Abdominal   (+) obese                 Anesthesia Plan:   ASA:  4  Plan:   MAC  Post-op Pain Management: Oral pain medication and IV analgesics  Informed Consent Plan and Risks Discussed With:  Patient  Discussed plan with:  Surgeon      I have informed Rodo Mcclain and/or legal guardian or family member of the nature of the anesthetic plan, benefits, risks including possible dental damage if relevant, major complications, and any alternative forms of anesthetic management.   All of the patient's questions were answered to the best of my ability. The patient desires the anesthetic management as planned.  Eugenia Baker CRNA  6/12/2024 7:47 AM  Present on Admission:  **None**

## 2024-06-12 NOTE — TELEPHONE ENCOUNTER
See 4/16/2024 telephone encounter    Left message for patient to call back to schedule Colonoscopy.     Please transfer call to GI surgery scheduling      Per completed procedure notes from today     Repeat colonoscopy recommended in the next 3-6 months given inadequate bowel prep.

## 2024-06-12 NOTE — H&P
History & Physical Examination    Patient Name: Rodo Mcclain  MRN: [id number]  [facility]: [id number]  YOB: 1955    Diagnosis: Iron deficiency anemia, history of dark stools    Medications Prior to Admission   Medication Sig Dispense Refill Last Dose    Ferrous Sulfate 325 (65 Fe) MG Oral Tab Take 1 tablet (325 mg total) by mouth daily with breakfast for 360 doses. 90 tablet 3     NOVOLOG FLEXPEN 100 UNIT/ML Subcutaneous Solution Pen-injector Inject 18 Units into the skin 3 (three) times daily before meals. 51 mL 0 6/11/2024    LANTUS SOLOSTAR 100 UNIT/ML Subcutaneous Solution Pen-injector Inject 50 Units into the skin 2 (two) times daily. 90 mL 0 6/11/2024 at 0800    metoprolol succinate ER 25 MG Oral Tablet 24 Hr Take 1 tablet (25 mg total) by mouth daily. 90 tablet 0 6/10/2024    atorvastatin 20 MG Oral Tab Take 1 tablet (20 mg total) by mouth daily. 90 tablet 3 6/10/2024    spironolactone 25 MG Oral Tab Take 1 tablet (25 mg total) by mouth 2 (two) times daily. 180 tablet 1     torsemide 20 MG Oral Tab Take 3 tablets (60 mg total) by mouth in the morning and 3 tablets (60 mg total) before bedtime. 540 tablet 1 6/10/2024    famotidine 20 MG Oral Tab Take 1 tablet (20 mg total) by mouth daily. 90 tablet 1 6/10/2024    isosorbide mononitrate ER 30 MG Oral Tablet 24 Hr Take 1 tablet (30 mg total) by mouth daily.   6/11/2024    polyethylene glycol, PEG 3350-KCl-NaBcb-NaCl-NaSulf, 236 g Oral Recon Soln Take 4,000 mL by mouth As Directed. Take 2,000 mL the night before your procedure and 2,000 mL the morning of your procedure. Take as directed by GI clinic. Okay to substitute for generic. 1 each 0     BD PEN NEEDLE RHONDA 2ND GEN 32G X 4 MM Does not apply Misc USE UNDER THE SKIN FOUR TIMES DAILY AS DIRECTED 90 each 0     allopurinol 100 MG Oral Tab Take 0.5 tablets (50 mg total) by mouth every other day. 90 tablet 0 6/10/2024    predniSONE 10 MG Oral Tab Take 4 pills for 2 days. Then, take 3 pills for 2 days.  Then take 2 pills for 2 days. Then, take 1 pill for 2 days. 20 tablet 0     Glucose Blood (ACCU-CHEK GUIDE) In Vitro Strip 1 Lancet by Finger stick route daily. 100 strip 0     Accu-Chek FastClix Lancets Does not apply Misc         Current Facility-Administered Medications   Medication Dose Route Frequency    lactated ringers infusion   Intravenous Continuous       Allergies:   Allergies   Allergen Reactions    Doxycycline OTHER (SEE COMMENTS)     Kidney pain    Sulfamethoxazole W/Trimethoprim OTHER (SEE COMMENTS)     Acute kidney injury    Sulfamethoxazole OTHER (SEE COMMENTS)    Trimethoprim OTHER (SEE COMMENTS)       Past Medical History:    Acute kidney injury (KASEY) with acute tubular necrosis (ATN) (HCC)    Last Assessment & Plan: Formatting of this note might be different from the original. Multifactorial, due to possible underlying infection, also diuresis with torsemide. Creatinine 3.05 which is down to 1.9, Patient was initially started on normal saline at 75 cc/h, diuresis, lisinopril and metformin was held Creatinine is downtrending to 1.3.  Monitor urine output.    Congestive heart disease (HCC)    Constipation    Diabetes (HCC)    Dyspnea on exertion    Last Assessment & Plan: Formatting of this note might be different from the original. Admitted with dyspnea    Elevated brain natriuretic peptide (BNP) level    Last Assessment & Plan: Formatting of this note might be different from the original. Cardiology consulted    Esophageal reflux    Essential hypertension    High blood pressure    High cholesterol    Hyperlipidemia    Hypokalemia    Last Assessment & Plan: Formatting of this note might be different from the original. Replacement per nephrology    Hyponatremia    Last Assessment & Plan: Formatting of this note might be different from the original. Will monitor Na: 133 on admission    Renal disorder    Sleep apnea     Past Surgical History:   Procedure Laterality Date    Amputation  foot,midtarsal-chopart Left     Colonoscopy  2016    [facility]    Eye surgery       Family History   Problem Relation Age of Onset    Prostate Cancer Father     Heart Disease Brother      Social History     Tobacco Use    Smoking status: Never     Passive exposure: Never    Smokeless tobacco: Never   Substance Use Topics    Alcohol use: Never         SYSTEM Check if Review is Normal Check if Physical Exam is Normal If not normal, please explain:   HEENT [X ] [ X]    NECK  [X ] [ X]    HEART [X ] [ X]    LUNGS [X ] [ X]    ABDOMEN [X ] [ X]    EXTREMITIES [X ] [ X]    OTHER        I have discussed the risks and benefits and alternatives of the procedure with the patient/family.  They understand and agree to proceed with plan of care.   I have reviewed the History and Physical done within the last 30 days.  Any changes noted above.    Lia Longoria MD  [facility] - Gastroenterology  6/12/2024

## 2024-06-12 NOTE — TELEPHONE ENCOUNTER
Patient was unable to complete 6/12/2024 colonoscopy and Esophagogastroduodenoscopy - requesting to reschedule for a different day.  Please call.  Thank you.

## 2024-06-13 ENCOUNTER — HOSPITAL ENCOUNTER (OUTPATIENT)
Dept: WOUND CARE | Age: 69
Discharge: STILL ADMITTED OR ADMINISTRATIVE | End: 2024-06-13
Attending: FAMILY MEDICINE

## 2024-06-13 ENCOUNTER — OFFICE VISIT (OUTPATIENT)
Dept: CARDIOLOGY | Age: 69
End: 2024-06-13

## 2024-06-13 ENCOUNTER — APPOINTMENT (OUTPATIENT)
Dept: WOUND CARE | Age: 69
End: 2024-06-13
Attending: FAMILY MEDICINE

## 2024-06-13 VITALS
WEIGHT: 315 LBS | SYSTOLIC BLOOD PRESSURE: 128 MMHG | HEART RATE: 66 BPM | DIASTOLIC BLOOD PRESSURE: 66 MMHG | BODY MASS INDEX: 46.49 KG/M2

## 2024-06-13 VITALS — TEMPERATURE: 97.7 F

## 2024-06-13 DIAGNOSIS — L97.519 TYPE 2 DIABETES MELLITUS WITH RIGHT DIABETIC FOOT ULCER  (CMD): Primary | ICD-10-CM

## 2024-06-13 DIAGNOSIS — I73.9 PVD (PERIPHERAL VASCULAR DISEASE) (CMD): ICD-10-CM

## 2024-06-13 DIAGNOSIS — I50.9 CHRONIC CONGESTIVE HEART FAILURE, UNSPECIFIED HEART FAILURE TYPE  (CMD): Primary | ICD-10-CM

## 2024-06-13 DIAGNOSIS — S91.302A OPEN WOUND OF LEFT FOOT, INITIAL ENCOUNTER: ICD-10-CM

## 2024-06-13 DIAGNOSIS — E11.621 TYPE 2 DIABETES MELLITUS WITH RIGHT DIABETIC FOOT ULCER  (CMD): Primary | ICD-10-CM

## 2024-06-13 DIAGNOSIS — R60.0 LEG EDEMA: ICD-10-CM

## 2024-06-13 DIAGNOSIS — Z89.422 STATUS POST AMPUTATION OF LESSER TOE, LEFT  (CMD): ICD-10-CM

## 2024-06-13 DIAGNOSIS — M86.671 OTHER CHRONIC OSTEOMYELITIS OF RIGHT FOOT  (CMD): ICD-10-CM

## 2024-06-13 DIAGNOSIS — S91.302D OPEN WOUND OF LEFT FOOT, SUBSEQUENT ENCOUNTER: ICD-10-CM

## 2024-06-13 LAB
ANION GAP SERPL CALC-SCNC: 9 MMOL/L (ref 7–19)
BUN SERPL-MCNC: 73 MG/DL (ref 6–20)
BUN/CREAT SERPL: 29 (ref 7–25)
CALCIUM SERPL-MCNC: 8.9 MG/DL (ref 8.4–10.2)
CHLORIDE SERPL-SCNC: 103 MMOL/L (ref 97–110)
CO2 SERPL-SCNC: 29 MMOL/L (ref 21–32)
CREAT SERPL-MCNC: 2.51 MG/DL (ref 0.67–1.17)
EGFRCR SERPLBLD CKD-EPI 2021: 27 ML/MIN/{1.73_M2}
FASTING DURATION TIME PATIENT: ABNORMAL H
GLUCOSE SERPL-MCNC: 81 MG/DL (ref 70–99)
NT-PROBNP SERPL-MCNC: 1107 PG/ML
POTASSIUM SERPL-SCNC: 4 MMOL/L (ref 3.4–5.1)
SODIUM SERPL-SCNC: 137 MMOL/L (ref 135–145)

## 2024-06-13 PROCEDURE — 83880 ASSAY OF NATRIURETIC PEPTIDE: CPT | Performed by: INTERNAL MEDICINE

## 2024-06-13 PROCEDURE — 99211 OFF/OP EST MAY X REQ PHY/QHP: CPT

## 2024-06-13 PROCEDURE — 80048 BASIC METABOLIC PNL TOTAL CA: CPT | Performed by: INTERNAL MEDICINE

## 2024-06-13 PROCEDURE — 11042 DBRDMT SUBQ TIS 1ST 20SQCM/<: CPT

## 2024-06-13 ASSESSMENT — PAIN SCALES - GENERAL: PAINLEVEL_OUTOF10: 0

## 2024-06-14 ENCOUNTER — TELEPHONE (OUTPATIENT)
Dept: CARDIOLOGY | Age: 69
End: 2024-06-14

## 2024-06-17 ENCOUNTER — TELEPHONE (OUTPATIENT)
Facility: CLINIC | Age: 69
End: 2024-06-17

## 2024-06-17 NOTE — TELEPHONE ENCOUNTER
Contacted patient and informed of appointment with orthopedic surgeon as noted below. No other questions/concerns. Wednesday, July 20th  Arrive at 2:15PM  [address]; [address]  [phone] GI Schedulers    Can you please assist with scheduling patient for a repeat colonoscopy in 3-6 months d/t inadequate prep?    See TE from 6/12  Thank you!

## 2024-06-17 NOTE — TELEPHONE ENCOUNTER
2nd Attempt    Left message for patient to call back to schedule Colonoscopy.     Please transfer call to GI surgery scheduling

## 2024-06-17 NOTE — TELEPHONE ENCOUNTER
Patient calling to schedule colonoscopy, since it was not completed prior. Please call at [phone],thanks.  *Patient informed of the 3 business day turnaround, says phone call tomorrow would work best, he is not available Friday for a return call.

## 2024-06-18 ENCOUNTER — TELEPHONE (OUTPATIENT)
Facility: CLINIC | Age: 69
End: 2024-06-18

## 2024-06-18 ENCOUNTER — TELEPHONE (OUTPATIENT)
Dept: INTERNAL MEDICINE CLINIC | Facility: CLINIC | Age: 69
End: 2024-06-18

## 2024-06-18 NOTE — TELEPHONE ENCOUNTER
Patient called stating he wants to speak with  to file a complaint. Per patient he has been waiting weeks to get his CPAP machine. Per chart there is an order from Eleazar Martinez from 05/20/2024. There is also a telephone encounter from 05/22/2024 from [facility] Evelio stating \"   Received request from [facility] for copy of baseline sleep study. Faxed copy to [facility], sent copy to scanning and placed copy in E folder      \". Please contact patient as he would like to speak to someone regarding this. Per patient he was told to throw his machine that he already had.

## 2024-06-18 NOTE — TELEPHONE ENCOUNTER
Evelio Arango, [facility]    5/22/24  3:42 PM  Note      Received request from [facility] for copy of baseline sleep study. Faxed copy to [facility], sent copy to scanning and placed copy in [facility] folder          Please call [facility] [phone] and follow up on request and update patient

## 2024-06-18 NOTE — TELEPHONE ENCOUNTER
Patient is returning call to schedule procedure please see telephone encounter 6/12 that is closed. Gi not available please call [phone] if it's in the morning and afternoon please call [phone]

## 2024-06-19 NOTE — TELEPHONE ENCOUNTER
Patient informed of below conversation with [facility]. Their phone number was provided. All of his concerns & questions were answered at this time.

## 2024-06-19 NOTE — TELEPHONE ENCOUNTER
I last saw pt in feb and he saw the pulm in may before that he was seeing pulm in [place] when he was seeing me in feb   In may it appears the pulm dept took care of this by faciara and suzie the orders   Pls fwd to pulm dept to see if they know what needs to be done   TY

## 2024-06-19 NOTE — TELEPHONE ENCOUNTER
Last office visit with Dr. Morales & DME ([facility]) order 5/20/24    Per Katelyn @ [facility] they have made several attempts to inform patient that he is in asset recovery (2021 from CPAP, missing documentation, form regarding asset recovery should have been mailed to him, they did not hear back from patient, account in asset recovery ever since) and they would need patient to call asset recovery at #[phone] in order to resolve current account status. She states they did receive our order, but can't move forward until patient contacts them.

## 2024-06-19 NOTE — TELEPHONE ENCOUNTER
Pulm, please assist patient with this  order bring managed by Dr. Morales.. patient has not heard back about CPAP. Please call [facility] at [phone] and provide status to patient

## 2024-06-20 ENCOUNTER — TELEPHONE (OUTPATIENT)
Facility: CLINIC | Age: 69
End: 2024-06-20

## 2024-06-20 NOTE — TELEPHONE ENCOUNTER
Patient calling states received a letter regards a test for his kidneys he needs to complete. Patient states he already [facility] which would be checked every week and kidney function as well. Last one was done last week Thursday. Please call.     [facility]   Phone number:  [phone]

## 2024-06-21 ENCOUNTER — TELEPHONE (OUTPATIENT)
Dept: INTERNAL MEDICINE CLINIC | Facility: CLINIC | Age: 69
End: 2024-06-21

## 2024-06-21 DIAGNOSIS — M86.9 OSTEOMYELITIS, ANKLE AND FOOT (HCC): ICD-10-CM

## 2024-06-21 DIAGNOSIS — I50.9 CHRONIC CONGESTIVE HEART FAILURE, UNSPECIFIED HEART FAILURE TYPE (HCC): ICD-10-CM

## 2024-06-21 DIAGNOSIS — E13.51 PERIPHERAL VASCULAR DISEASE DUE TO SECONDARY DIABETES MELLITUS (HCC): Primary | Chronic | ICD-10-CM

## 2024-06-21 DIAGNOSIS — I50.32 CHRONIC DIASTOLIC HEART FAILURE (HCC): ICD-10-CM

## 2024-06-21 NOTE — TELEPHONE ENCOUNTER
Patient called and said he called [employer] and was told he needed to call is doctor office, he was told that a script is needed for his wheelchair and CPAP machine because the issue is the wheelchair has not been paid, patient said dr office needs to call them to get this all straighten out.  Is asking for a call back with updates.

## 2024-06-21 NOTE — TELEPHONE ENCOUNTER
[place] AMADOR     Left message on his voicemail per release of information to disregard reminder letter regarding CBC ordered on 4/16/2024.    I canceled the CBC order    He completed CBC on 5/1/2024 and 6/6/2024 per cardiology.    Thank you

## 2024-06-25 NOTE — TELEPHONE ENCOUNTER
Spoke with patient, Date of Birth verified  Patient stated he needs an order for medium size wheelchair, he's been using his wheelchair since 10/2020.   Also he was advised to call Dr Morales office regarding the order/ referral that was placed on 5-20-24.  He stated understanding.    Pls send order to [employer] Tel # [phone]        Called [employer], spoke with Will, he provided below information.   Fax [phone]  Address: [address], [address]    Referral pended.

## 2024-06-25 NOTE — TELEPHONE ENCOUNTER
FYI: Called patient offered an appointment but he says he would like an explanation why he needs to see the doctor.  Transfer call to triage.

## 2024-06-25 NOTE — TELEPHONE ENCOUNTER
Reviewed record with HEIDY Calderon. Patient has Medicare and needs to be seen within the last 90 days in order to meet criteria for wheelchair, and then process a new order for CPAP will follow. This was explained to patient and then agreed to schedule an appointment.    Future Appointments   Date Time Provider Department Center   7/9/2024 12:00 PM Tamy Wills MD [facility]   9/6/2024  1:15 PM Eleazar Morales MD [facility]   10/16/2024  7:30 AM HAZEL, PROCEDURE [id number] None     Dr. Tamy Wills please be advised that [employer] was used for this appointment. Thanks.

## 2024-06-25 NOTE — TELEPHONE ENCOUNTER
Spoke to patient (verified Name and ) and relayed Dr. Wills's message below. Patient got distress and declined to schedule an appointment, and states he already has appointments left and right, and does not not want to come to the office just to be measured. States he already have the wheelchair since October, \"What I need is the script for CPAP to send to [facility] Does not already had the needs the script for the CPAP to send to [facility].\"

## 2024-06-25 NOTE — TELEPHONE ENCOUNTER
Please schedule patient     Last visit was in February.    Due to medicare patient needs visit and current height and weight for wheelchair order with documentation below:      Medical records include documentation of a face-to-face encounter between the beneficiary and the ordering practitioner that occurred within 6 months prior to completion of the order.    The records document that all of the following basic criteria are met:    The beneficiary has a mobility limitation that significantly impairs his/her ability to participate in one or more mobility-related activities of daily living such as toileting, feeding, dressing, grooming, and bathing in customary locations in the home; AND    The mobility limitation cannot be sufficiently resolved by the use of an appropriately fitted cane or walker; AND    Use of a manual wheelchair will significantly improve the beneficiary's ability to participate in mobility-related activities of daily living and the beneficiary will use it on a regular basis in the home; AND    The beneficiary has not expressed an unwillingness to use the manual wheelchair that is provided in the home; AND    The beneficiary has sufficient upper extremity function and other physical and mental capabilities needed to safely self-propel the manual wheelchair that is provided in the home during a typical day OR the beneficiary has a caregiver who is available, willing and able to provide assistance with the wheelchair

## 2024-07-02 ENCOUNTER — APPOINTMENT (OUTPATIENT)
Dept: CARDIOLOGY | Age: 69
End: 2024-07-02

## 2024-07-02 VITALS
OXYGEN SATURATION: 98 % | BODY MASS INDEX: 46.49 KG/M2 | HEART RATE: 67 BPM | DIASTOLIC BLOOD PRESSURE: 82 MMHG | SYSTOLIC BLOOD PRESSURE: 124 MMHG | HEIGHT: 72 IN

## 2024-07-02 DIAGNOSIS — E78.5 HYPERLIPIDEMIA, UNSPECIFIED HYPERLIPIDEMIA TYPE: ICD-10-CM

## 2024-07-02 DIAGNOSIS — M86.671 OTHER CHRONIC OSTEOMYELITIS OF RIGHT FOOT  (CMD): ICD-10-CM

## 2024-07-02 DIAGNOSIS — I73.9 PVD (PERIPHERAL VASCULAR DISEASE) (CMD): ICD-10-CM

## 2024-07-02 DIAGNOSIS — I27.0: ICD-10-CM

## 2024-07-02 DIAGNOSIS — I50.23 ACUTE ON CHRONIC SYSTOLIC CONGESTIVE HEART FAILURE  (CMD): ICD-10-CM

## 2024-07-02 DIAGNOSIS — Z79.4 TYPE 2 DIABETES MELLITUS WITH HYPERGLYCEMIA, WITH LONG-TERM CURRENT USE OF INSULIN  (CMD): ICD-10-CM

## 2024-07-02 DIAGNOSIS — R06.09 DYSPNEA ON EXERTION: ICD-10-CM

## 2024-07-02 DIAGNOSIS — E78.00 HYPERCHOLESTEROLEMIA: ICD-10-CM

## 2024-07-02 DIAGNOSIS — N17.0 ACUTE KIDNEY INJURY (AKI) WITH ACUTE TUBULAR NECROSIS (ATN) (CMD): ICD-10-CM

## 2024-07-02 DIAGNOSIS — M86.172 OTHER ACUTE OSTEOMYELITIS OF LEFT FOOT  (CMD): ICD-10-CM

## 2024-07-02 DIAGNOSIS — E11.65 TYPE 2 DIABETES MELLITUS WITH HYPERGLYCEMIA, WITH LONG-TERM CURRENT USE OF INSULIN  (CMD): ICD-10-CM

## 2024-07-02 DIAGNOSIS — I50.32 CHRONIC DIASTOLIC HEART FAILURE  (CMD): ICD-10-CM

## 2024-07-02 DIAGNOSIS — R79.89 ELEVATED BRAIN NATRIURETIC PEPTIDE (BNP) LEVEL: ICD-10-CM

## 2024-07-02 DIAGNOSIS — I96 GANGRENE OF LEFT FOOT  (CMD): ICD-10-CM

## 2024-07-02 DIAGNOSIS — I10 BENIGN ESSENTIAL HYPERTENSION: Primary | ICD-10-CM

## 2024-07-02 DIAGNOSIS — G47.33 OSA (OBSTRUCTIVE SLEEP APNEA): ICD-10-CM

## 2024-07-02 SDOH — HEALTH STABILITY: PHYSICAL HEALTH: ON AVERAGE, HOW MANY DAYS PER WEEK DO YOU ENGAGE IN MODERATE TO STRENUOUS EXERCISE (LIKE A BRISK WALK)?: 0 DAYS

## 2024-07-02 SDOH — HEALTH STABILITY: PHYSICAL HEALTH: ON AVERAGE, HOW MANY MINUTES DO YOU ENGAGE IN EXERCISE AT THIS LEVEL?: 0 MIN

## 2024-07-02 ASSESSMENT — PATIENT HEALTH QUESTIONNAIRE - PHQ9
SUM OF ALL RESPONSES TO PHQ9 QUESTIONS 1 AND 2: 0
1. LITTLE INTEREST OR PLEASURE IN DOING THINGS: NOT AT ALL
SUM OF ALL RESPONSES TO PHQ9 QUESTIONS 1 AND 2: 0
2. FEELING DOWN, DEPRESSED OR HOPELESS: NOT AT ALL
CLINICAL INTERPRETATION OF PHQ2 SCORE: NO FURTHER SCREENING NEEDED

## 2024-07-09 ENCOUNTER — OFFICE VISIT (OUTPATIENT)
Dept: INTERNAL MEDICINE CLINIC | Facility: CLINIC | Age: 69
End: 2024-07-09

## 2024-07-09 VITALS
HEIGHT: 72 IN | BODY MASS INDEX: 47 KG/M2 | HEART RATE: 90 BPM | SYSTOLIC BLOOD PRESSURE: 119 MMHG | DIASTOLIC BLOOD PRESSURE: 71 MMHG

## 2024-07-09 DIAGNOSIS — I50.32 CHRONIC DIASTOLIC HEART FAILURE (HCC): ICD-10-CM

## 2024-07-09 DIAGNOSIS — E11.65 TYPE 2 DIABETES MELLITUS WITH HYPERGLYCEMIA, WITH LONG-TERM CURRENT USE OF INSULIN (HCC): ICD-10-CM

## 2024-07-09 DIAGNOSIS — G47.33 OBSTRUCTIVE SLEEP APNEA SYNDROME: Primary | ICD-10-CM

## 2024-07-09 DIAGNOSIS — Z79.4 TYPE 2 DIABETES MELLITUS WITH HYPERGLYCEMIA, WITH LONG-TERM CURRENT USE OF INSULIN (HCC): ICD-10-CM

## 2024-07-09 DIAGNOSIS — E13.51 PERIPHERAL VASCULAR DISEASE DUE TO SECONDARY DIABETES MELLITUS (HCC): Chronic | ICD-10-CM

## 2024-07-09 PROBLEM — E66.01 SEVERE OBESITY (BMI >= 40) (HCC): Chronic | Status: ACTIVE | Noted: 2024-07-09

## 2024-07-09 PROCEDURE — 3074F SYST BP LT 130 MM HG: CPT | Performed by: INTERNAL MEDICINE

## 2024-07-09 PROCEDURE — 3061F NEG MICROALBUMINURIA REV: CPT | Performed by: INTERNAL MEDICINE

## 2024-07-09 PROCEDURE — 1159F MED LIST DOCD IN RCRD: CPT | Performed by: INTERNAL MEDICINE

## 2024-07-09 PROCEDURE — 99214 OFFICE O/P EST MOD 30 MIN: CPT | Performed by: INTERNAL MEDICINE

## 2024-07-09 PROCEDURE — 3078F DIAST BP <80 MM HG: CPT | Performed by: INTERNAL MEDICINE

## 2024-07-09 PROCEDURE — 3052F HG A1C>EQUAL 8.0%<EQUAL 9.0%: CPT | Performed by: INTERNAL MEDICINE

## 2024-07-09 PROCEDURE — 3060F POS MICROALBUMINURIA REV: CPT | Performed by: INTERNAL MEDICINE

## 2024-07-09 PROCEDURE — 1160F RVW MEDS BY RX/DR IN RCRD: CPT | Performed by: INTERNAL MEDICINE

## 2024-07-09 NOTE — PROGRESS NOTES
Rodo Mcclain is a 69 year old male.  Chief Complaint   Patient presents with    Obstructive Sleep Apnea (IVET)     Needs supplies        HPI:   Pt comes for f/u  C/c follow up  C/o pt states he will not get his cpap supplies until  he gets paid for the wheelchair so needs a script for that   Got his wheelchair 3 yrs ago   Upset that there was no    Here only to get the script   Was weighed  3 weeks ago [facility] hospital-weight was 344 pounds and height 6 foot   The patient has mobility limitations that are significant and impairs his ability to participate in 1 or more mobility related activities of daily living such as bathing in his own home  And   The mobility limitation cannot be sufficiently resolved by the use of a cane or walker alone as he does have a walker for very short distances and uses crutches for stairs  And  The use of a manual wheelchair will significantly improve the patient's ability to participate in mobility related activities of daily living and the patient will use it on a regular basis in the home  And  The patient is willing to use the manual wheelchair that is provided in the home and has  sufficient upper extremity function and other physical and mental capabilities needed to safely self propel the manual wheelchair that is provided in the home during a typical day            HISTORY  9/2023   New pt   C/c establish care - pt used to be seen in [place]   C/o wants a different cpap machine - the one he has makes his mouth dry and doesn't fit well and it hurts his teeth and air blows out -we will see the pulmonary doctor at [facility] on 27 September  He states that the covid shots that are ignited in 5 G        PMH  HTN  DM2-   GERD   CHF -cardiologist  dr diaz at [facility]   HL  CKD - dr fry   IVET - on cpap      Lives with PHILIPPE rossi , works part time as a contractor      Current Outpatient Medications   Medication Sig Dispense Refill    Ferrous Sulfate 325 (65 Fe) MG Oral  Tab Take 1 tablet (325 mg total) by mouth daily with breakfast for 360 doses. 90 tablet 3    NOVOLOG FLEXPEN 100 UNIT/ML Subcutaneous Solution Pen-injector Inject 18 Units into the skin 3 (three) times daily before meals. 51 mL 0    LANTUS SOLOSTAR 100 UNIT/ML Subcutaneous Solution Pen-injector Inject 50 Units into the skin 2 (two) times daily. 90 mL 0    metoprolol succinate ER 25 MG Oral Tablet 24 Hr Take 1 tablet (25 mg total) by mouth daily. 90 tablet 0    BD PEN NEEDLE RHONDA 2ND GEN 32G X 4 MM Does not apply Misc USE UNDER THE SKIN FOUR TIMES DAILY AS DIRECTED 90 each 0    atorvastatin 20 MG Oral Tab Take 1 tablet (20 mg total) by mouth daily. 90 tablet 3    allopurinol 100 MG Oral Tab Take 0.5 tablets (50 mg total) by mouth every other day. 90 tablet 0    predniSONE 10 MG Oral Tab Take 4 pills for 2 days. Then, take 3 pills for 2 days. Then take 2 pills for 2 days. Then, take 1 pill for 2 days. 20 tablet 0    spironolactone 25 MG Oral Tab Take 1 tablet (25 mg total) by mouth 2 (two) times daily. 180 tablet 1    torsemide 20 MG Oral Tab Take 3 tablets (60 mg total) by mouth in the morning and 3 tablets (60 mg total) before bedtime. 540 tablet 1    famotidine 20 MG Oral Tab Take 1 tablet (20 mg total) by mouth daily. 90 tablet 1    Glucose Blood (ACCU-CHEK GUIDE) In Vitro Strip 1 Lancet by Finger stick route daily. 100 strip 0    isosorbide mononitrate ER 30 MG Oral Tablet 24 Hr Take 1 tablet (30 mg total) by mouth daily.      Accu-Chek FastClix Lancets Does not apply Misc         Past Medical History:    Acute kidney injury (KASEY) with acute tubular necrosis (ATN) (HCC)    Last Assessment & Plan: Formatting of this note might be different from the original. Multifactorial, due to possible underlying infection, also diuresis with torsemide. Creatinine 3.05 which is down to 1.9, Patient was initially started on normal saline at 75 cc/h, diuresis, lisinopril and metformin was held Creatinine is downtrending to 1.3.   Monitor urine output.    Congestive heart disease (HCC)    Constipation    Diabetes (HCC)    Dyspnea on exertion    Last Assessment & Plan: Formatting of this note might be different from the original. Admitted with dyspnea    Elevated brain natriuretic peptide (BNP) level    Last Assessment & Plan: Formatting of this note might be different from the original. Cardiology consulted    Esophageal reflux    Essential hypertension    High blood pressure    High cholesterol    Hyperlipidemia    Hypokalemia    Last Assessment & Plan: Formatting of this note might be different from the original. Replacement per nephrology    Hyponatremia    Last Assessment & Plan: Formatting of this note might be different from the original. Will monitor Na: 133 on admission    Renal disorder    Sleep apnea      Past Surgical History:   Procedure Laterality Date    Amputation foot,midtarsal-chopart Left     Colonoscopy  2016    [facility]    Colonoscopy N/A 6/12/2024    Procedure: COLONOSCOPY;  Surgeon: Lia Longoria MD;  Location: [facility] ENDOSCOPY    Eye surgery        Social History:  Social History     Socioeconomic History    Marital status: Single   Tobacco Use    Smoking status: Never     Passive exposure: Never    Smokeless tobacco: Never   Vaping Use    Vaping status: Never Used   Substance and Sexual Activity    Alcohol use: Never    Drug use: Never     Social Determinants of Health      [facility]    Financial Resource Strain   Transportation Needs: No Transportation Needs (3/12/2021)    [facility]    PRAPARE - Transportation     Lack of Transportation (Medical): No     Lack of Transportation (Non-Medical): No   Physical Activity: High Risk (7/2/2024)    Received from [facility]    Exercise Vital Sign     On average, how many days per week do you engage in moderate to strenuous exercise (like a brisk walk)?: 0 days     On  average, how many minutes do you engage in exercise at this level?: 0 min    [facility]    Stress        REVIEW OF SYSTEMS:   GENERAL HEALTH: No fevers, chills, sweats, fatigue  RESPIRATORY: denies shortness of breath, cough, wheezing  CARDIOVASCULAR: denies chest pain on exertion, palpitations, + swelling in feet  NEURO: denies headaches , anxiety, depression    EXAM:   /71 (BP Location: Right arm, Patient Position: Sitting, Cuff Size: large)   Pulse 90   Ht 6' (1.829 m)   BMI 46.65 kg/m²   GENERAL: well developed, well nourished,in no apparent distress  HEENT: atraumatic, normocephalic  NECK: supple   EXTREMITIES: + edema    ASSESSMENT AND PLAN:   Diagnoses and all orders for this visit:    Obstructive sleep apnea syndrome  -     DME PAP DEVICE  Gave him scrpit for supplies     Chronic diastolic heart failure (HCC)  Overall stable , f/u with nurses, continue medications    Peripheral vascular disease due to secondary diabetes mellitus (HCC)  Continue medications, advised tight blood sugar control, follow-up with specialist    Type 2 diabetes mellitus with hyperglycemia, with long-term current use of insulin (HCC)    bl sugars   Hba1c 8.7 on 8/2023 and  8.6 on 2/2024   U. Micro2/2024   Eye exam - ? - he states he has one but doesn't know the name   Consider Asa , acei,on statin   Foot -sees podiatrist -- goes to wound care - dr lopez   Diet -- advised to follow a low carb, low sugar diet , try to be consistent                Exercise 30 min a day         Preventative medicine   Next visit   Labs 1/2024 and 6/2024 reviewed        The patient indicates understanding of these issues and agrees to the plan.  No follow-ups on file.

## 2024-07-11 ENCOUNTER — HOSPITAL ENCOUNTER (OUTPATIENT)
Dept: WOUND CARE | Age: 69
Discharge: STILL ADMITTED OR ADMINISTRATIVE | End: 2024-07-11
Attending: FAMILY MEDICINE

## 2024-07-11 VITALS — TEMPERATURE: 97 F

## 2024-07-11 DIAGNOSIS — S91.302D OPEN WOUND OF LEFT FOOT, SUBSEQUENT ENCOUNTER: ICD-10-CM

## 2024-07-11 DIAGNOSIS — L97.519 TYPE 2 DIABETES MELLITUS WITH RIGHT DIABETIC FOOT ULCER  (CMD): Primary | ICD-10-CM

## 2024-07-11 DIAGNOSIS — R60.0 LEG EDEMA: ICD-10-CM

## 2024-07-11 DIAGNOSIS — E11.621 TYPE 2 DIABETES MELLITUS WITH RIGHT DIABETIC FOOT ULCER  (CMD): Primary | ICD-10-CM

## 2024-07-11 DIAGNOSIS — M86.671 OTHER CHRONIC OSTEOMYELITIS OF RIGHT FOOT  (CMD): ICD-10-CM

## 2024-07-11 DIAGNOSIS — Z89.422 STATUS POST AMPUTATION OF LESSER TOE, LEFT  (CMD): ICD-10-CM

## 2024-07-11 DIAGNOSIS — S91.302A OPEN WOUND OF LEFT FOOT, INITIAL ENCOUNTER: ICD-10-CM

## 2024-07-11 DIAGNOSIS — I73.9 PVD (PERIPHERAL VASCULAR DISEASE) (CMD): ICD-10-CM

## 2024-07-11 PROCEDURE — 11042 DBRDMT SUBQ TIS 1ST 20SQCM/<: CPT

## 2024-07-11 ASSESSMENT — PAIN SCALES - GENERAL: PAINLEVEL_OUTOF10: 0

## 2024-07-17 DIAGNOSIS — E11.621 TYPE 2 DIABETES MELLITUS WITH RIGHT DIABETIC FOOT ULCER (HCC): ICD-10-CM

## 2024-07-17 DIAGNOSIS — L97.519 TYPE 2 DIABETES MELLITUS WITH RIGHT DIABETIC FOOT ULCER (HCC): ICD-10-CM

## 2024-07-17 DIAGNOSIS — I10 MALIGNANT ESSENTIAL HYPERTENSION: ICD-10-CM

## 2024-07-17 DIAGNOSIS — I50.9 CONGESTIVE HEART FAILURE, UNSPECIFIED HF CHRONICITY, UNSPECIFIED HEART FAILURE TYPE (HCC): ICD-10-CM

## 2024-07-17 DIAGNOSIS — I50.32 CHRONIC DIASTOLIC HEART FAILURE (HCC): ICD-10-CM

## 2024-07-17 RX ORDER — FAMOTIDINE 20 MG/1
20 TABLET, FILM COATED ORAL DAILY
Qty: 90 TABLET | Refills: 3 | Status: SHIPPED | OUTPATIENT
Start: 2024-07-17

## 2024-07-17 RX ORDER — INSULIN GLARGINE 100 [IU]/ML
50 INJECTION, SOLUTION SUBCUTANEOUS 2 TIMES DAILY
Qty: 90 ML | Refills: 0 | Status: SHIPPED | OUTPATIENT
Start: 2024-07-17

## 2024-07-17 RX ORDER — INSULIN ASPART 100 [IU]/ML
18 INJECTION, SOLUTION INTRAVENOUS; SUBCUTANEOUS
Qty: 51 ML | Refills: 0 | Status: SHIPPED | OUTPATIENT
Start: 2024-07-17

## 2024-07-17 RX ORDER — INSULIN GLARGINE 100 [IU]/ML
50 INJECTION, SOLUTION SUBCUTANEOUS 2 TIMES DAILY
Qty: 90 ML | Refills: 0 | OUTPATIENT
Start: 2024-07-17

## 2024-07-17 RX ORDER — FAMOTIDINE 20 MG/1
20 TABLET, FILM COATED ORAL DAILY
Qty: 90 TABLET | Refills: 3 | Status: CANCELLED | OUTPATIENT
Start: 2024-07-17

## 2024-07-17 RX ORDER — PEN NEEDLE, DIABETIC 32GX 5/32"
1 NEEDLE, DISPOSABLE MISCELLANEOUS 4 TIMES DAILY
Qty: 400 EACH | Refills: 3 | Status: SHIPPED | OUTPATIENT
Start: 2024-07-17

## 2024-07-17 RX ORDER — TORSEMIDE 20 MG/1
60 TABLET ORAL 2 TIMES DAILY
Qty: 540 TABLET | Refills: 3 | Status: CANCELLED | OUTPATIENT
Start: 2024-07-17

## 2024-07-17 RX ORDER — METOPROLOL SUCCINATE 25 MG/1
25 TABLET, EXTENDED RELEASE ORAL DAILY
Qty: 90 TABLET | Refills: 0 | Status: SHIPPED | OUTPATIENT
Start: 2024-07-17

## 2024-07-17 NOTE — TELEPHONE ENCOUNTER
Please review.  Protocol failed / Has no protocol.     Requested Prescriptions   Pending Prescriptions Disp Refills    LANTUS SOLOSTAR 100 UNIT/ML Subcutaneous Solution Pen-injector 90 mL 3     Sig: Inject 50 Units into the skin 2 (two) times daily.       Diabetes Medication Protocol Failed - 7/17/2024  4:59 PM        Failed - Last A1C < 7.5 and within past 6 months     Lab Results   Component Value Date    A1C 8.6 (H) 02/14/2024             Failed - EGFRCR or GFRNAA > 50     GFR Evaluation  EGFRCR: 27 , resulted on 2/14/2024          Passed - In person appointment or virtual visit in the past 6 mos or appointment in next 3 mos     Recent Outpatient Visits              1 week ago Obstructive sleep apnea syndrome    [facility] Tamy Wills MD    Office Visit    1 month ago IVET (obstructive sleep apnea)    [facility] Eleazar Morales MD    Office Visit    3 months ago Iron deficiency anemia, unspecified iron deficiency anemia type    [facility] Meg Hermosillo APRN    Office Visit    5 months ago [facility]    9 months ago Congestive heart failure, unspecified HF chronicity, unspecified heart failure type (HCC)    [facility] Tamy Wills MD    Office Visit          Future Appointments         Provider Department Appt Notes    In 1 month Eleazar Morales MD [facility] 3 month follow up    In 3 months ANDRES OLIVA [facility] @ [facility]                    Passed - Microalbumin procedure in past 12 months or taking ACE/ARB        Passed - GFR in the past 12 months          metoprolol succinate ER 25 MG Oral Tablet 24 Hr 90 tablet 3     Sig: Take 1  tablet (25 mg total) by mouth daily.       Hypertension Medications Protocol Failed - 7/17/2024  4:59 PM        Failed - EGFRCR or GFRNAA > 50     GFR Evaluation  EGFRCR: 27 , resulted on 2/14/2024          Passed - CMP or BMP in past 12 months        Passed - Last BP reading less than 140/90     BP Readings from Last 1 Encounters:   07/09/24 119/71               Passed - In person appointment or virtual visit in the past 12 mos or appointment in next 3 mos     Recent Outpatient Visits              1 week ago Obstructive sleep apnea syndrome    [facility]    Office Visit    1 month ago IVET (obstructive sleep apnea)    [facility] Eleazar Morales MD    Office Visit    3 months ago Iron deficiency anemia, unspecified iron deficiency anemia type    [facility] Meg Hermosillo APRN    Office Visit    5 months ago Right wrist pain    [facility]    Office Visit    9 months ago Congestive heart failure, unspecified HF chronicity, unspecified heart failure type (HCC)    [facility]          Future Appointments         Provider Department Appt Notes    In 1 month Eleazar Morales MD [facility] 3 month follow up    In 3 months ANDRES OLIVA [facility] @ [facility]                      NOVOLOG FLEXPEN 100 UNIT/ML Subcutaneous Solution Pen-injector 51 mL 3     Sig: Inject 18 Units into the skin 3 (three) times daily before meals.       Diabetes Medication Protocol Failed - 7/17/2024  4:59 PM        Failed - Last A1C < 7.5 and within past 6 months     Lab Results   Component Value Date    A1C 8.6 (H) 02/14/2024             Failed - EGFRCR or GFRNAA  > 50     GFR Evaluation  EGFRCR: 27 , resulted on 2/14/2024          Passed - In person appointment or virtual visit in the past 6 mos or appointment in next 3 mos     Recent Outpatient Visits              1 week ago Obstructive sleep apnea syndrome    [facility]    Office Visit    1 month ago IVET (obstructive sleep apnea)    [facility] Eleazar Morales MD    Office Visit    3 months ago Iron deficiency anemia, unspecified iron deficiency anemia type    [facility] Meg Hermosillo APRN    Office Visit    5 months ago [facility] wrist [facility]    [facility]    9 months ago Congestive heart failure, unspecified HF chronicity, unspecified heart failure type (HCC)    [facility]    [facility]          Future Appointments         Provider Department Appt Notes    In 1 month Eleazar Morales MD [facility] 3 month follow up    In 3 months ANDRES OLIVA [facility]                    Passed - Microalbumin procedure in past 12 months or taking ACE/ARB        Passed - GFR in the past 12 months          BD PEN NEEDLE RHONDA 2ND GEN 32G X 4 MM Does not apply Misc 400 each 3     Sig: Inject 1 Needle into the skin 4 (four) times daily.       Diabetic Supplies Protocol Passed - 7/17/2024  4:59 PM        Passed - In person appointment or virtual visit in the past 12 mos or appointment in next 3 mos     Recent Outpatient Visits              1 week ago Obstructive sleep apnea syndrome    [facility]    Office Visit    1 month ago IVET (obstructive sleep apnea)    [facility] Eleazar Morales MD    Office Visit    3 months ago Iron deficiency anemia, unspecified iron deficiency anemia type    [facility] Meg Moon APRN    Office Visit    5 months ago Right wrist pain    [facility]    Office Visit    9 months ago Congestive heart failure, unspecified HF chronicity, unspecified heart failure type (HCC)    [facility]    [facility]          Future Appointments         Provider Department Appt Notes    In 1 month Eleazar Morales MD [facility] 3 month follow up    In 3 months OLIVA, PROCEDURE [facility] @ [facility]                       Future Appointments         Provider Department Appt Notes    In 1 month Eleazar Morales MD [facility] 3 month follow up    In 3 months OLIVA, PROCEDURE [facility]          Recent Outpatient Visits              1 week ago Obstructive sleep apnea syndrome    [facility]    Office Visit    1 month ago IVET (obstructive sleep apnea)    [facility] Eleazar Morales MD    Office Visit    3 months ago Iron deficiency anemia, unspecified iron deficiency anemia type    [facility] Meg Moon APRN    Office Visit    5 months ago Right wrist pain    [facility]    Office Visit    9 months ago Congestive heart failure, unspecified HF chronicity, unspecified heart failure type (HCC)    [facility]     Office Visit

## 2024-07-17 NOTE — TELEPHONE ENCOUNTER
Patient is requesting refills for torsemide 20 MG Oral Tab ( would like a larger quantity since he takes a lot a day),     NOVOLOG FLEXPEN 100 UNIT/ML Subcutaneous Solution Pen-injector ,     and LANTUS SOLOSTAR 100 UNIT/ML Subcutaneous Solution Pen-injector, metoprolol succinate ER 25 MG Oral Tablet 24 Hr,famotidine 20 MG Oral Tab       [id number] - [place] - [address] AT [facility] [address] [place] [address], [phone], [phone]

## 2024-07-17 NOTE — TELEPHONE ENCOUNTER
Famotidine is in encounter from 7/16/24    Torsemide is being written for #540, by Cardio Dr. Jovan Chavarria. Jeanine Mccullough filled prescription 7/16/24

## 2024-07-17 NOTE — TELEPHONE ENCOUNTER
Refill passed per [facility] protocols.    Requested Prescriptions   Pending Prescriptions Disp Refills    famotidine 20 MG Oral Tab 90 tablet 3     Sig: Take 1 tablet (20 mg total) by mouth daily.       Gastrointestional Medication Protocol Passed - 7/17/2024  5:01 PM        Passed - In person appointment or virtual visit in the past 12 mos or appointment in next 3 mos     Recent Outpatient Visits              1 week ago Obstructive sleep apnea syndrome    [facility] Tamy Wills MD    Office Visit    1 month ago IVET (obstructive sleep apnea)    [facility] Eleazar Morales MD    Office Visit    3 months ago Iron deficiency anemia, unspecified iron deficiency anemia type    [facility] Meg Hermosillo APRN    Office Visit    5 months ago Right wrist pain    [facility]    Office Visit    9 months ago Congestive heart failure, unspecified HF chronicity, unspecified heart failure type (HCC)    [facility]    Office Visit          Future Appointments         Provider Department Appt Notes    In 1 month Eleazar Morales MD [facility] 3 month follow up    In 3 months ANDRES OLIVA [facility] @ [facility]                     Signed Prescriptions Disp Refills    famotidine 20 MG Oral Tab 90 tablet 3     Sig: Take 1 tablet (20 mg total) by mouth daily.       Gastrointestional Medication Protocol Passed - 7/16/2024  9:50 AM        Passed - In person appointment or virtual visit in the past 12 mos or appointment in next 3 mos     Recent Outpatient Visits              1 week ago Obstructive sleep apnea syndrome    [facility]  Tamy Wills MD    Office Visit    1 month ago IVET (obstructive sleep apnea)    [facility] Eleazar Morales MD    Office Visit    3 months ago Iron deficiency anemia, unspecified iron deficiency anemia type    [facility] Meg Hermosillo APRN    Office Visit    5 months ago Right wrist pain    [facility]    Office Visit    9 months ago Congestive heart failure, unspecified HF chronicity, unspecified heart failure type (HCC)    [facility] Tamy Wills MD    Office Visit          Future Appointments         Provider Department Appt Notes    In 1 month Eleazar Morales MD [facility] 3 month follow up    In 3 months ANDRES OLIVA [facility] @ [facility]                     Refused Prescriptions Disp Refills    LANTUS SOLOSTAR 100 UNIT/ML Subcutaneous Solution Pen-injector [Pharmacy Med Name: LANTUS SOLOSTAR PEN INJ 3ML] 90 mL 0     Sig: Inject 50 Units into the skin 2 (two) times daily.       Diabetes Medication Protocol Failed - 7/17/2024  5:01 PM        Failed - Last A1C < 7.5 and within past 6 months     Lab Results   Component Value Date    A1C 8.6 (H) 02/14/2024             Failed - EGFRCR or GFRNAA > 50     GFR Evaluation  EGFRCR: 27 , resulted on 2/14/2024          Passed - In person appointment or virtual visit in the past 6 mos or appointment in next 3 mos     Recent Outpatient Visits              1 week ago Obstructive sleep apnea syndrome    [facility] Tamy Wills MD    Office Visit    1 month ago IVET (obstructive sleep apnea)    [facility] Eleazar Morales MD    Office Visit    3 months ago Iron deficiency anemia, unspecified  iron deficiency anemia type    [facility] Meg Hermosillo APRN    Office Visit    5 months ago Right wrist pain    [facility] Tamy Wills MD    Office Visit    9 months ago Congestive heart failure, unspecified HF chronicity, unspecified heart failure type (HCC)    [facility] Tamy Wills MD    Office Visit          Future Appointments         Provider Department Appt Notes    In 1 month [facility] MD [facility] 3 month follow up    In 3 months ANDRES OLIVA [facility] @ [facility]                    Passed - Microalbumin procedure in past 12 months or taking ACE/ARB        Passed - GFR in the past 12 months

## 2024-07-19 ENCOUNTER — TELEPHONE (OUTPATIENT)
Dept: PULMONOLOGY | Facility: CLINIC | Age: 69
End: 2024-07-19

## 2024-07-19 NOTE — TELEPHONE ENCOUNTER
Spoke to patient who hasn't received CPAP yet-order placed by Dr. Morales 5/20/2024. Patient has been in asset recovery for 3+ years due to a wheelchair that wasn't paid off. Patient given the phone number for [facility]     Per E on 07/09/2024 account was sent to asset recovery team for possible release.

## 2024-07-19 NOTE — TELEPHONE ENCOUNTER
Patient appointment was canceled in September due to the office and patient declined the next soonest date. Patient states it is important to be seen right away for CPAP machine. Please call

## 2024-07-24 ENCOUNTER — TELEPHONE (OUTPATIENT)
Facility: CLINIC | Age: 69
End: 2024-07-24

## 2024-07-24 RX ORDER — FAMOTIDINE 20 MG/1
20 TABLET, FILM COATED ORAL DAILY
Qty: 90 TABLET | Refills: 3 | OUTPATIENT
Start: 2024-07-24

## 2024-07-24 NOTE — TELEPHONE ENCOUNTER
Please schedule for in person OR virtual/telephone visit in September to extended discuss bowel prep. Has endoscopy rescheduled for October b/c poor prep on prior attempt. Thank you.     Meg Hermosillo APRN

## 2024-07-25 NOTE — TELEPHONE ENCOUNTER
Patient called back and appointment made with Meg Hermosillo on 09/27/2024 at 10 am at [facility].  Patient advised to arrive 15 minutes prior to appointment and address given.  Patient voiced understanding.

## 2024-08-06 ENCOUNTER — TELEPHONE (OUTPATIENT)
Dept: INTERNAL MEDICINE CLINIC | Facility: CLINIC | Age: 69
End: 2024-08-06

## 2024-08-06 NOTE — TELEPHONE ENCOUNTER
Was this order ever sent to E?  Please confirm.    Route back to triage support to assist with parachute order.

## 2024-08-06 NOTE — TELEPHONE ENCOUNTER
Patient is requesting his cpap machine. Spoke to the doctor about it 3 weeks ago and does not understand why he has not received it. Please advise.

## 2024-08-07 ENCOUNTER — MED REC SCAN ONLY (OUTPATIENT)
Dept: INTERNAL MEDICINE CLINIC | Facility: CLINIC | Age: 69
End: 2024-08-07

## 2024-08-07 NOTE — TELEPHONE ENCOUNTER
Unknown to me if this was faxed.   Per Dr. Wills (and office notes 7/9/24) pt was to f/u with Pulmonology and MD provided script for device/supplies to patient at office visit.   Per Dr. Wills we will fax over necessary paperwork to E

## 2024-08-07 NOTE — TELEPHONE ENCOUNTER
Pls let me know if there is anything else I need to do - we have faxed it   Not sure if pulm clinic can help either as per TE

## 2024-08-08 ENCOUNTER — APPOINTMENT (OUTPATIENT)
Dept: WOUND CARE | Age: 69
End: 2024-08-08
Attending: FAMILY MEDICINE

## 2024-08-08 NOTE — TELEPHONE ENCOUNTER
Patient will need to settle asset recovery dispute before a new CPAP will be supplied by vendor. Patient was given phone number to do so.   Please see TE 7/19/2024.

## 2024-08-20 ENCOUNTER — TELEPHONE (OUTPATIENT)
Dept: INTERNAL MEDICINE CLINIC | Facility: CLINIC | Age: 69
End: 2024-08-20

## 2024-08-20 NOTE — TELEPHONE ENCOUNTER
Cassandra from [employer] is asking for provider to addend 7/9/2024 office note with the following (He is using and benefiting from PAP machine).    Clinical staff please fax that addend note along with baseline sleep study.  They need ASAP.    Fax # [phone]

## 2024-08-21 NOTE — TELEPHONE ENCOUNTER
Faxed twice successfully to [phone]  [facility]   Addend Notes 7/9/24   Sleep study   Face sheet

## 2024-08-22 ENCOUNTER — HOSPITAL ENCOUNTER (OUTPATIENT)
Dept: WOUND CARE | Age: 69
Discharge: STILL ADMITTED OR ADMINISTRATIVE | End: 2024-08-22
Attending: FAMILY MEDICINE

## 2024-08-22 VITALS — TEMPERATURE: 96.8 F

## 2024-08-22 DIAGNOSIS — M86.671 OTHER CHRONIC OSTEOMYELITIS OF RIGHT FOOT  (CMD): ICD-10-CM

## 2024-08-22 DIAGNOSIS — S91.302D OPEN WOUND OF LEFT FOOT, SUBSEQUENT ENCOUNTER: ICD-10-CM

## 2024-08-22 DIAGNOSIS — E11.621 TYPE 2 DIABETES MELLITUS WITH RIGHT DIABETIC FOOT ULCER  (CMD): ICD-10-CM

## 2024-08-22 DIAGNOSIS — L97.519 TYPE 2 DIABETES MELLITUS WITH RIGHT DIABETIC FOOT ULCER  (CMD): ICD-10-CM

## 2024-08-22 DIAGNOSIS — Z89.422 STATUS POST AMPUTATION OF LESSER TOE, LEFT  (CMD): ICD-10-CM

## 2024-08-22 DIAGNOSIS — S91.302D OPEN WOUND OF LEFT FOOT, SUBSEQUENT ENCOUNTER: Primary | ICD-10-CM

## 2024-08-22 DIAGNOSIS — R60.0 LEG EDEMA: ICD-10-CM

## 2024-08-22 DIAGNOSIS — I73.9 PVD (PERIPHERAL VASCULAR DISEASE) (CMD): ICD-10-CM

## 2024-08-22 PROCEDURE — 11042 DBRDMT SUBQ TIS 1ST 20SQCM/<: CPT

## 2024-08-22 ASSESSMENT — PAIN SCALES - GENERAL: PAINLEVEL_OUTOF10: 0

## 2024-08-26 ENCOUNTER — TELEPHONE (OUTPATIENT)
Dept: INTERNAL MEDICINE CLINIC | Facility: CLINIC | Age: 69
End: 2024-08-26

## 2024-08-26 ENCOUNTER — APPOINTMENT (OUTPATIENT)
Dept: CARDIOLOGY | Age: 69
End: 2024-08-26

## 2024-08-26 NOTE — TELEPHONE ENCOUNTER
Louann from [employer] called to ask if the order is for a wheelchair or cpap. Patient already had a wheelchair issued on 09/10/2020. The next time it can be issued is on 09/10/2025 or else the patient would have to pay out of pocket. Louann just needs clarification on what the order is

## 2024-08-26 NOTE — TELEPHONE ENCOUNTER
Called Samia, called her to clarify. The order is in processing and ready to be completed. Patient is aware.

## 2024-08-27 ENCOUNTER — TELEPHONE (OUTPATIENT)
Dept: INTERNAL MEDICINE CLINIC | Facility: CLINIC | Age: 69
End: 2024-08-27

## 2024-08-27 DIAGNOSIS — L97.519 TYPE 2 DIABETES MELLITUS WITH RIGHT DIABETIC FOOT ULCER (HCC): ICD-10-CM

## 2024-08-27 DIAGNOSIS — E11.621 TYPE 2 DIABETES MELLITUS WITH RIGHT DIABETIC FOOT ULCER (HCC): ICD-10-CM

## 2024-08-27 NOTE — TELEPHONE ENCOUNTER
[facility]  [phone]  She receive an order for a wheelchair for; patient; the insurance will only cover one every 5 years. They got one in Sept. 2020.

## 2024-08-28 RX ORDER — BLOOD SUGAR DIAGNOSTIC
1 STRIP MISCELLANEOUS DAILY
Qty: 100 STRIP | Refills: 3 | Status: SHIPPED | OUTPATIENT
Start: 2024-08-28

## 2024-08-28 NOTE — TELEPHONE ENCOUNTER
Refill passed per [facility] protocol.  Requested Prescriptions   Pending Prescriptions Disp Refills    ACCU-CHEK GUIDE In Vitro Strip [Pharmacy Med Name: ACCU-CHEK GUIDE TEST STRIPS 100S] 100 strip 0     Sig: USE TO TEST ONCE DAILY       Diabetic Supplies Protocol Passed - 8/27/2024 10:03 PM        Passed - In person appointment or virtual visit in the past 12 mos or appointment in next 3 mos     Recent Outpatient Visits              1 month ago Obstructive sleep apnea syndrome    [facility]    3 months ago IVET (obstructive sleep apnea)    [facility] Eleazar Morales MD    Office Visit    4 months ago Iron deficiency anemia, unspecified iron deficiency anemia type    [facility] Meg Hermosillo APRN    Office Visit    6 months ago Right wrist pain    [facility]    [facility]    11 months ago Congestive heart failure, unspecified HF chronicity, unspecified heart failure type (HCC)    [facility]          Future Appointments         Provider Department Appt Notes    In 3 weeks Eleazar Morales MD [facility] still needs new CPAP    In 1 month Meg Hermosillo APRN [facility] Discuss bowel prep per Meg Hermosillo    In 1 month HAZEL PROCEDURE [facility] Colon Mac @ [facility]                       Recent Outpatient Visits              1 month ago Obstructive sleep apnea syndrome    [facility]    [facility] Visit    3 months ago IVET (obstructive sleep apnea)    [facility] Eleazar Morales  MD    Office Visit    4 months ago Iron deficiency anemia, unspecified iron deficiency anemia type    [facility] Meg Hermosillo APRN    Office Visit    6 months ago Right wrist pain    [facility]    Office Visit    11 months ago Congestive heart failure, unspecified HF chronicity, unspecified heart failure type (HCC)    [facility] T[facility] W[facility]    Office Visit          Future Appointments         Provider Department Appt Notes    In 3 weeks Eleazar Morales MD [facility] still needs new CPAP    In 1 month Meg Hermosillo APRN [facility] Discuss bowel prep per Meg Hermosillo    In 1 month ANDRES OLIVA [facility] Colon Abran @ [facility]

## 2024-08-28 NOTE — TELEPHONE ENCOUNTER
Patient called asking status on glucose test strips he is out and was told he will have to pay for them if the provider doesn't send in the refill.

## 2024-08-28 NOTE — TELEPHONE ENCOUNTER
Refill passed per [facility] protocol.  Requested Prescriptions   Pending Prescriptions Disp Refills    Glucose Blood (ACCU-CHEK GUIDE) In Vitro Strip [Pharmacy Med Name: ACCU-CHEK GUIDE TEST STRIPS 100S] 100 strip 3     Si strip by In Vitro route daily.       Diabetic Supplies Protocol Passed - 2024 10:03 PM        Passed - In person appointment or virtual visit in the past 12 mos or appointment in next 3 mos     Recent Outpatient Visits              1 month ago Obstructive sleep apnea syndrome    [facility]    3 months ago IVET (obstructive sleep apnea)    [facility] Eleazar Morales MD    Office Visit    4 months ago Iron deficiency anemia, unspecified iron deficiency anemia type    [facility] Meg Hermosillo APRN    Office Visit    6 months ago Right wrist pain    [facility]    [facility]    11 months ago Congestive heart failure, unspecified HF chronicity, unspecified heart failure type (HCC)    [facility]    [facility]          Future Appointments         Provider Department Appt Notes    In 3 weeks Eleazar Morales MD [facility] still needs new CPAP    In 1 month Meg Hermosillo APRN [facility] Discuss bowel prep per Meg Hermosillo    In 1 month OLIVA, PROCEDURE [facility] Colon Mac @ [facility]                       Recent Outpatient Visits              1 month ago Obstructive sleep apnea syndrome    [facility]    Office Visit    3 months ago IVET (obstructive sleep apnea)    [facility]  [place] Eleazar Morales MD    Office Visit    4 months ago Iron deficiency anemia, unspecified iron deficiency anemia type    [facility] Meg Hermosillo APRN    Office Visit    6 months ago [facility] wrist [facility]    Office Visit    11 months ago Congestive heart failure, unspecified HF chronicity, unspecified heart failure type (HCC)    [facility]    Office Visit          Future Appointments         Provider Department Appt Notes    In 3 weeks Eleazar Morales MD [facility] still needs new CPAP    In 1 month Meg Hermosillo APRN [facility] Discuss bowel prep per Meg Hermosillo    In 1 month ANDRES OLIVA [facility] Colon Abran @ [facility]

## 2024-10-03 ENCOUNTER — APPOINTMENT (OUTPATIENT)
Dept: WOUND CARE | Age: 69
End: 2024-10-03
Attending: FAMILY MEDICINE

## 2024-10-10 ENCOUNTER — HOSPITAL ENCOUNTER (OUTPATIENT)
Dept: WOUND CARE | Age: 69
Discharge: STILL ADMITTED OR ADMINISTRATIVE | End: 2024-10-10
Attending: FAMILY MEDICINE

## 2024-10-10 VITALS — TEMPERATURE: 97 F

## 2024-10-10 DIAGNOSIS — M86.671 OTHER CHRONIC OSTEOMYELITIS OF RIGHT FOOT (CMD): ICD-10-CM

## 2024-10-10 DIAGNOSIS — L97.519 TYPE 2 DIABETES MELLITUS WITH RIGHT DIABETIC FOOT ULCER (CMD): Primary | ICD-10-CM

## 2024-10-10 DIAGNOSIS — Z89.422 STATUS POST AMPUTATION OF LESSER TOE, LEFT  (CMD): ICD-10-CM

## 2024-10-10 DIAGNOSIS — S91.302D OPEN WOUND OF LEFT FOOT, SUBSEQUENT ENCOUNTER: ICD-10-CM

## 2024-10-10 DIAGNOSIS — I73.9 PVD (PERIPHERAL VASCULAR DISEASE) (CMD): ICD-10-CM

## 2024-10-10 DIAGNOSIS — R60.0 LEG EDEMA: ICD-10-CM

## 2024-10-10 DIAGNOSIS — E11.621 TYPE 2 DIABETES MELLITUS WITH RIGHT DIABETIC FOOT ULCER (CMD): Primary | ICD-10-CM

## 2024-10-10 PROCEDURE — 11042 DBRDMT SUBQ TIS 1ST 20SQCM/<: CPT

## 2024-10-10 ASSESSMENT — PAIN SCALES - GENERAL: PAINLEVEL_OUTOF10: 0

## 2024-10-14 ENCOUNTER — TELEPHONE (OUTPATIENT)
Facility: CLINIC | Age: 69
End: 2024-10-14

## 2024-10-14 ENCOUNTER — TELEPHONE (OUTPATIENT)
Dept: INTERNAL MEDICINE CLINIC | Facility: CLINIC | Age: 69
End: 2024-10-14

## 2024-10-14 DIAGNOSIS — Z12.11 COLON CANCER SCREENING: Primary | ICD-10-CM

## 2024-10-14 NOTE — TELEPHONE ENCOUNTER
Spoke to patient    He had an incomplete colonsocopy 6/12/2024   recommended he follow up with Meg to discuss bowel prep.    Patient was a no show to his scheduled appt with Meg on 9/25    He called today wondering how to complete the bowel prep.  Patient states he does not have the instructions.  I offered to set him up with altaf but he declined.    Patient stated he will just follow the instructions on the bottle.  I explained that he needs to follow the instructions laid out by our clinic.    After 10 minutes of discussion, I let patient know it will be best to reschedule his appt after he sees one of our APRNs in clinic.    Patient was agreeable.    Procedure canceled in [facility]  Surgical change request ordered    FYCOLIN Longoria

## 2024-10-14 NOTE — TELEPHONE ENCOUNTER
Patient is requesting referral.     Name of specialist and specialty department : stated would like to have colonoscopy done at [facility],  stated has had issues getting it done at [place], does not have doctors name.   Reason for visit with the specialist: colonoscopy  Address of the specialist office:none  Appointment date: none         CSS informed patient the turnaround time for referral is 5-7 business days.  Patient was informed to check their [employer] account for referral status.

## 2024-10-14 NOTE — TELEPHONE ENCOUNTER
Please review; protocol failed/ has no protocol    Requested Prescriptions   Pending Prescriptions Disp Refills    NOVOLOG FLEXPEN 100 UNIT/ML Subcutaneous Solution Pen-injector [Pharmacy Med Name: NOVOLOG FLEXPEN INJ 3ML (ORANGE)] 51 mL 0     Sig: ADMINISTER 18 UNITS UNDER THE SKIN THREE TIMES DAILY BEFORE MEALS       Diabetes Medication Protocol Failed - 10/14/2024 10:09 AM        Failed - Last A1C < 7.5 and within past 6 months     Lab Results   Component Value Date    A1C 8.6 (H) 02/14/2024             Failed - EGFRCR or GFRNAA > 50     GFR Evaluation  EGFRCR: 27 , resulted on 2/14/2024          Passed - In person appointment or virtual visit in the past 6 mos or appointment in next 3 mos     Recent Outpatient Visits              3 months ago Obstructive sleep ap[facility]    [facility]    4 months ago IVET (obstructive sleep apnea)    [facility] Eleazar Morales MD    Office Visit    6 months ago Iron deficiency anemia, unspecified iron deficiency anemia type    [facility] Meg Hermosillo APRN    Office Visit    8 months ago Right wrist pain    [facility]    [facility] Visit    1 year ago Congestive heart failure, unspecified HF chronicity, unspecified heart failure type (HCC)    [facility]    [facility]          Future Appointments         Provider Department Appt Notes    In 2 days ANDRES OLIVA [facility] Colon Mac @ [facility]    In 2 months Eleazar Morales MD [facility] still needs new CPAP                    Passed - Microalbumin procedure in past 12 months or taking ACE/ARB        Passed - GFR in the past 12 months           Recent  Outpatient Visits              3 months ago Obstructive sleep apnea syndrome    [facility]    Office Visit    4 months ago IVET (obstructive sleep apnea)    [facility] Eleazar Morales MD    Office Visit    6 months ago Iron deficiency anemia, unspecified iron deficiency anemia type    [facility] Meg Hermosillo APRN    Office Visit    8 months ago Right wrist pain    [facility]    Office Visit    1 year ago Congestive heart failure, unspecified HF chronicity, unspecified heart failure type (HCC)    [facility]    Office Visit          Future Appointments         Provider Department Appt Notes    In 2 days ANDRES OLIVA [facility] Colon Mac @ [facility]    In 2 months Eleazar Morales MD [facility] still needs new CPAP

## 2024-10-15 RX ORDER — INSULIN ASPART 100 [IU]/ML
18 INJECTION, SOLUTION INTRAVENOUS; SUBCUTANEOUS
Qty: 51 ML | Refills: 0 | Status: SHIPPED | OUTPATIENT
Start: 2024-10-15

## 2024-10-15 NOTE — TELEPHONE ENCOUNTER
Patient will call his health plan to find a [facility] specialist in network.  Patient will call me back with information.     Thank you

## 2024-10-18 DIAGNOSIS — E11.621 TYPE 2 DIABETES MELLITUS WITH RIGHT DIABETIC FOOT ULCER (HCC): ICD-10-CM

## 2024-10-18 DIAGNOSIS — L97.519 TYPE 2 DIABETES MELLITUS WITH RIGHT DIABETIC FOOT ULCER (HCC): ICD-10-CM

## 2024-10-18 RX ORDER — BLOOD SUGAR DIAGNOSTIC
1 STRIP MISCELLANEOUS 2 TIMES DAILY
Qty: 300 STRIP | Refills: 2 | Status: CANCELLED | OUTPATIENT
Start: 2024-10-18

## 2024-10-18 RX ORDER — BLOOD SUGAR DIAGNOSTIC
1 STRIP MISCELLANEOUS 2 TIMES DAILY
Qty: 200 STRIP | Refills: 3 | Status: SHIPPED | OUTPATIENT
Start: 2024-10-18

## 2024-10-18 NOTE — TELEPHONE ENCOUNTER
Please review: patient states he tests his blood sugars twice daily. Prescription was last written for once daily. Patient is asking for new prescription reflecting twice daily directions.  Medication request is marked high priority: patient states he just about out of his test strips.    Prescription updated with twice daily directions, please review and approve, if appropriate to do so.    No future appointments with family medicine/internal medicine.  Last office visit: 2024    Requested Prescriptions   Pending Prescriptions Disp Refills    Glucose Blood (ACCU-CHEK GUIDE) In Vitro Strip 200 strip 3     Si strip by In Vitro route 2 (two) times daily.       Diabetic Supplies Protocol Passed - 10/18/2024 12:22 PM        Passed - In person appointment or virtual visit in the past 12 mos or appointment in next 3 mos     Recent Outpatient Visits              3 months ago Obstructive sleep apnea syndrome    [facility]    Office Visit    5 months ago IVET (obstructive sleep apnea)    [facility] Eleazar Morales MD    Office Visit    6 months ago Iron deficiency anemia, unspecified iron deficiency anemia type    [facility] Meg Hermosillo APRN    Office Visit    8 months ago Right wrist pain    [facility]    Office Visit    1 year ago Congestive heart failure, unspecified HF chronicity, unspecified heart failure type (HCC)    [facility]    Office Visit          Future Appointments         Provider Department Appt Notes    In 2 months Eleazar Morales MD [facility] still needs new CPAP                         Future Appointments         Provider Department Appt Notes    In 2 months Eleazar Morales  MD [facility] still needs new CPAP          Recent Outpatient Visits              3 months ago Obstructive sleep apnea syndrome    [facility]    Office Visit    5 months ago IVET (obstructive sleep apnea)    [facility] Eleazar Morales MD    Office Visit    6 months ago Iron deficiency anemia, unspecified iron deficiency anemia type    [facility] Meg Hermosillo APRN    Office Visit    8 months ago Right wrist pain    [facility]    Office Visit    1 year ago Congestive heart failure, unspecified HF chronicity, unspecified heart failure type (HCC)    [facility]    Office Visit

## 2024-10-18 NOTE — TELEPHONE ENCOUNTER
Patient has been testing blood sugars BID, last RX sent was for daily  Patient is on insulin    Patient asking for new RX with updated directions of BID testing on test strips be sent today to pharmacy if possible, he just about out of strips because he has been testing BID    Patient only need strips, he has plenty of lancets.    Pt awake, alert, with appropriate behavior noted. Family member at bedside denies additional needs at this time. Patient placed in position of comfort, bed locked and in lowest position. Call cazares within reach. ED MD at bedside to dc patient.

## 2024-10-21 ENCOUNTER — TELEPHONE (OUTPATIENT)
Dept: INTERNAL MEDICINE CLINIC | Facility: CLINIC | Age: 69
End: 2024-10-21

## 2024-10-21 NOTE — TELEPHONE ENCOUNTER
Patient calling ( name and date of birth of patient verified ) states nends his BG supplies for BG strips t be testing twice  a day     Informed patient of current RX:    Patient will contact his pharmacy   [employer] Blood (ACCU-CHEK GUIDE) In Vitro Strip 200 strip 3 10/18/2024 --    Sig - Route: 1 strip by In Vitro route 2 (two) times daily. - In Vitro    Sent to pharmacy as: Accu-Chek Guide In Vitro Strip    Notes to Pharmacy: Dx code: E11.621, L97.519 (Type 2 diabetes mellitus with right diabetic foot ulcer)    E-Prescribing Status: Receipt confirmed by pharmacy (10/21/2024  9:45 AM CDT)    Prior authorization: Canceled - Other      Associated Diagnoses    Type 2 diabetes mellitus with right diabetic foot ulcer (HCC)        Pharmacy    [id number] - [place]  [address]  [address] [facility] [address], [phone], [phone]

## 2024-10-21 NOTE — TELEPHONE ENCOUNTER
Called pharmacy regarding Glucose accu-Vitro strips.  Too soon to fill, will fill 11/11/24.    No prior auth required.

## 2024-10-28 ENCOUNTER — HOSPITAL ENCOUNTER (EMERGENCY)
Age: 69
Discharge: LEFT BEFORE DISCHARGE | End: 2024-10-28

## 2024-10-28 ENCOUNTER — APPOINTMENT (OUTPATIENT)
Dept: GENERAL RADIOLOGY | Age: 69
End: 2024-10-28

## 2024-10-28 VITALS
RESPIRATION RATE: 20 BRPM | OXYGEN SATURATION: 96 % | TEMPERATURE: 97.7 F | SYSTOLIC BLOOD PRESSURE: 126 MMHG | DIASTOLIC BLOOD PRESSURE: 69 MMHG | HEART RATE: 69 BPM

## 2024-10-28 DIAGNOSIS — M79.675 PAIN IN TOE OF LEFT FOOT: Primary | ICD-10-CM

## 2024-10-28 LAB
ANION GAP SERPL CALC-SCNC: 8 MMOL/L (ref 7–19)
BASOPHILS # BLD: 0.1 K/MCL (ref 0–0.3)
BASOPHILS NFR BLD: 1 %
BUN SERPL-MCNC: 71 MG/DL (ref 6–20)
BUN/CREAT SERPL: 26 (ref 7–25)
CALCIUM SERPL-MCNC: 8.9 MG/DL (ref 8.4–10.2)
CHLORIDE SERPL-SCNC: 100 MMOL/L (ref 97–110)
CO2 SERPL-SCNC: 32 MMOL/L (ref 21–32)
CREAT SERPL-MCNC: 2.72 MG/DL (ref 0.67–1.17)
CRP SERPL-MCNC: 18.4 MG/L
DEPRECATED RDW RBC: 46.8 FL (ref 39–50)
EGFRCR SERPLBLD CKD-EPI 2021: 25 ML/MIN/{1.73_M2}
EOSINOPHIL # BLD: 0.3 K/MCL (ref 0–0.5)
EOSINOPHIL NFR BLD: 3 %
ERYTHROCYTE [DISTWIDTH] IN BLOOD: 14.5 % (ref 11–15)
ERYTHROCYTE [SEDIMENTATION RATE] IN BLOOD BY WESTERGREN METHOD: 100 MM/HR (ref 0–20)
FASTING DURATION TIME PATIENT: ABNORMAL H
GLUCOSE SERPL-MCNC: 191 MG/DL (ref 70–99)
HCT VFR BLD CALC: 35 % (ref 39–51)
HGB BLD-MCNC: 11.1 G/DL (ref 13–17)
IMM GRANULOCYTES # BLD AUTO: 0 K/MCL (ref 0–0.2)
IMM GRANULOCYTES # BLD: 0 %
LYMPHOCYTES # BLD: 2 K/MCL (ref 1–4)
LYMPHOCYTES NFR BLD: 21 %
MCH RBC QN AUTO: 28.2 PG (ref 26–34)
MCHC RBC AUTO-ENTMCNC: 31.7 G/DL (ref 32–36.5)
MCV RBC AUTO: 89.1 FL (ref 78–100)
MONOCYTES # BLD: 0.8 K/MCL (ref 0.3–0.9)
MONOCYTES NFR BLD: 8 %
NEUTROPHILS # BLD: 6.6 K/MCL (ref 1.8–7.7)
NEUTROPHILS NFR BLD: 67 %
NRBC BLD MANUAL-RTO: 0 /100 WBC
PLATELET # BLD AUTO: 301 K/MCL (ref 140–450)
POTASSIUM SERPL-SCNC: 4.5 MMOL/L (ref 3.4–5.1)
RBC # BLD: 3.93 MIL/MCL (ref 4.5–5.9)
SODIUM SERPL-SCNC: 135 MMOL/L (ref 135–145)
WBC # BLD: 9.8 K/MCL (ref 4.2–11)

## 2024-10-28 PROCEDURE — 36415 COLL VENOUS BLD VENIPUNCTURE: CPT

## 2024-10-28 PROCEDURE — 73630 X-RAY EXAM OF FOOT: CPT

## 2024-10-28 PROCEDURE — 80048 BASIC METABOLIC PNL TOTAL CA: CPT | Performed by: PHYSICIAN ASSISTANT

## 2024-10-28 PROCEDURE — 85025 COMPLETE CBC W/AUTO DIFF WBC: CPT | Performed by: PHYSICIAN ASSISTANT

## 2024-10-28 PROCEDURE — 99281 EMR DPT VST MAYX REQ PHY/QHP: CPT

## 2024-10-28 PROCEDURE — 86140 C-REACTIVE PROTEIN: CPT | Performed by: PHYSICIAN ASSISTANT

## 2024-10-28 PROCEDURE — 85652 RBC SED RATE AUTOMATED: CPT | Performed by: PHYSICIAN ASSISTANT

## 2024-10-28 SDOH — SOCIAL STABILITY: SOCIAL INSECURITY: HOW OFTEN DOES ANYONE, INCLUDING FAMILY AND FRIENDS, PHYSICALLY HURT YOU?: NEVER

## 2024-10-28 SDOH — SOCIAL STABILITY: SOCIAL INSECURITY: HOW OFTEN DOES ANYONE, INCLUDING FAMILY AND FRIENDS, THREATEN YOU WITH HARM?: NEVER

## 2024-10-28 SDOH — SOCIAL STABILITY: SOCIAL INSECURITY: HOW OFTEN DOES ANYONE, INCLUDING FAMILY AND FRIENDS, INSULT OR TALK DOWN TO YOU?: NEVER

## 2024-10-28 SDOH — SOCIAL STABILITY: SOCIAL INSECURITY: HOW OFTEN DOES ANYONE, INCLUDING FAMILY AND FRIENDS, SCREAM OR CURSE AT YOU?: NEVER

## 2024-10-28 ASSESSMENT — PAIN SCALES - GENERAL: PAINLEVEL_OUTOF10: 0

## 2024-11-12 ENCOUNTER — TELEPHONE (OUTPATIENT)
Facility: LOCATION | Age: 69
End: 2024-11-12

## 2024-11-21 ENCOUNTER — HOSPITAL ENCOUNTER (OUTPATIENT)
Dept: WOUND CARE | Age: 69
Discharge: STILL ADMITTED OR ADMINISTRATIVE | End: 2024-11-21
Attending: FAMILY MEDICINE

## 2024-11-21 ENCOUNTER — HOSPITAL ENCOUNTER (OUTPATIENT)
Dept: GENERAL RADIOLOGY | Age: 69
Discharge: HOME | End: 2024-11-21
Attending: PODIATRIST

## 2024-11-21 VITALS — TEMPERATURE: 96.8 F

## 2024-11-21 DIAGNOSIS — Z79.4 DIABETES MELLITUS WITH INSULIN THERAPY  (CMD): ICD-10-CM

## 2024-11-21 DIAGNOSIS — Z89.422 STATUS POST AMPUTATION OF LESSER TOE, LEFT  (CMD): ICD-10-CM

## 2024-11-21 DIAGNOSIS — M86.671 OTHER CHRONIC OSTEOMYELITIS OF RIGHT FOOT (CMD): ICD-10-CM

## 2024-11-21 DIAGNOSIS — E11.621 TYPE 2 DIABETES MELLITUS WITH RIGHT DIABETIC FOOT ULCER (CMD): Primary | ICD-10-CM

## 2024-11-21 DIAGNOSIS — Z86.14 HISTORY OF MRSA INFECTION: ICD-10-CM

## 2024-11-21 DIAGNOSIS — L97.519 TYPE 2 DIABETES MELLITUS WITH RIGHT DIABETIC FOOT ULCER (CMD): Primary | ICD-10-CM

## 2024-11-21 DIAGNOSIS — E11.9 DIABETES MELLITUS WITH INSULIN THERAPY  (CMD): ICD-10-CM

## 2024-11-21 DIAGNOSIS — I73.9 PVD (PERIPHERAL VASCULAR DISEASE) (CMD): ICD-10-CM

## 2024-11-21 DIAGNOSIS — R60.0 LEG EDEMA: ICD-10-CM

## 2024-11-21 DIAGNOSIS — S91.302D OPEN WOUND OF LEFT FOOT, SUBSEQUENT ENCOUNTER: ICD-10-CM

## 2024-11-21 PROCEDURE — 11042 DBRDMT SUBQ TIS 1ST 20SQCM/<: CPT

## 2024-11-21 PROCEDURE — 73630 X-RAY EXAM OF FOOT: CPT

## 2024-11-21 RX ORDER — MUPIROCIN 20 MG/G
OINTMENT TOPICAL 2 TIMES DAILY
Qty: 22 G | Refills: 1 | Status: SHIPPED | OUTPATIENT
Start: 2024-11-21

## 2024-11-21 ASSESSMENT — PAIN SCALES - GENERAL: PAINLEVEL_OUTOF10: 0

## 2024-11-26 RX ORDER — INSULIN GLARGINE 100 [IU]/ML
50 INJECTION, SOLUTION SUBCUTANEOUS 2 TIMES DAILY
Qty: 90 ML | Refills: 0 | Status: SHIPPED | OUTPATIENT
Start: 2024-11-26

## 2024-11-26 NOTE — TELEPHONE ENCOUNTER
Please review. Protocol Failed; No Protocol    Requested Prescriptions   Pending Prescriptions Disp Refills    LANTUS SOLOSTAR 100 UNIT/ML Subcutaneous Solution Pen-injector [Pharmacy Med Name: LANTUS SOLOSTAR PEN INJ 3ML] 90 mL 0     Sig: ADMINISTER 50 UNITS UNDER THE SKIN TWICE DAILY       Diabetes Medication Protocol Failed - 11/26/2024 10:00 AM        Failed - Last A1C < 7.5 and within past 6 months     Lab Results   Component Value Date    A1C 8.6 (H) 02/14/2024             Failed - EGFRCR or GFRNAA > 50     GFR Evaluation  EGFRCR: 27 , resulted on 2/14/2024          Passed - In person appointment or virtual visit in the past 6 mos or appointment in next 3 mos     Recent Outpatient Visits              4 months ago Obstructive sleep apnea syndrome    [facility]    Office Visit    6 months ago IVET (obstructive sleep apnea)    [facility] Ele[facility] Morales [facility]    Office Visit    7 months ago Iron deficiency anemia, unspecified iron deficiency anemia type    [facility]    Office Visit    9 months ago Right wrist pain    [facility]    Office Visit    1 year ago Congestive heart failure, unspecified HF chronicity, unspecified heart failure type (HCC)    [facility]    Office Visit          Future Appointments         Provider Department Appt Notes    In 1 month Eleazar Morales MD [facility] still needs new CPAP                    Passed - Microalbumin procedure in past 12 months or taking ACE/ARB        Passed - GFR in the past 12 months               Future Appointments         Provider Department Appt Notes    In 1 month Eleazar Morales MD [facility] still needs new CPAP          Recent Outpatient Visits              4 months ago Obstructive sleep apnea syndrome    [facility]    Office Visit    6 months ago IVET (obstructive sleep apnea)    [facility] Eleazar Morales MD    Office Visit    7 months ago Iron deficiency anemia, unspecified iron deficiency anemia type    [facility] Meg Hermosillo APRN    Office Visit    9 months ago Right wrist pain    [facility]    Office Visit    1 year ago Congestive heart failure, unspecified HF chronicity, unspecified heart failure type (HCC)    [facility]    [facility] Visit           [phone]

## 2024-12-04 ENCOUNTER — NURSE TRIAGE (OUTPATIENT)
Dept: INTERNAL MEDICINE CLINIC | Facility: CLINIC | Age: 69
End: 2024-12-04

## 2024-12-04 NOTE — TELEPHONE ENCOUNTER
Is requesting a referral to see a urologist at [facility]. Has dark and smelly urine

## 2024-12-04 NOTE — TELEPHONE ENCOUNTER
I called the patient and he said he was on another important call and asked that we call him back in a little while     Routing back to RN pool so that someone can call a bit later today.

## 2024-12-04 NOTE — TELEPHONE ENCOUNTER
Okay to do telemedicine visit with any available provider  I believe he was going to change pcp as well

## 2024-12-04 NOTE — TELEPHONE ENCOUNTER
Action Requested: Summary for Provider     []  Critical Lab, Recommendations Needed  [x] Need Additional Advice  []   FYI    [x]   Need Orders  [] Need Medications Sent to Pharmacy  []  Other     SUMMARY:Per protocol advised : Office visit     Patient declines offer of office visit     Reason for call: Acute  Onset: Data Unavailable    Patient calling ( name and date of birth of patient verified )  states has noticed dark, smelly urine  ( for awhile )  having some diarrhea at times but has to push the stool out     Patient declines offer of office visit as he is wheelchair , we have no  and wants referral for a Urology at [facility]       Reason for Disposition   Bad or foul-smelling urine    Protocols used: Urinary Symptoms-A-[place]

## 2024-12-05 ENCOUNTER — TELEPHONE (OUTPATIENT)
Dept: WOUND CARE | Age: 69
End: 2024-12-05

## 2024-12-05 ENCOUNTER — HOSPITAL ENCOUNTER (OUTPATIENT)
Dept: WOUND CARE | Age: 69
Discharge: STILL ADMITTED OR ADMINISTRATIVE | End: 2024-12-05
Attending: FAMILY MEDICINE

## 2024-12-05 DIAGNOSIS — E11.621 TYPE 2 DIABETES MELLITUS WITH RIGHT DIABETIC FOOT ULCER (CMD): ICD-10-CM

## 2024-12-05 DIAGNOSIS — M86.571 OTHER CHRONIC HEMATOGENOUS OSTEOMYELITIS OF RIGHT FOOT (CMD): ICD-10-CM

## 2024-12-05 DIAGNOSIS — Z89.422 STATUS POST AMPUTATION OF LESSER TOE, LEFT  (CMD): ICD-10-CM

## 2024-12-05 DIAGNOSIS — Z79.4 DIABETES MELLITUS WITH INSULIN THERAPY  (CMD): ICD-10-CM

## 2024-12-05 DIAGNOSIS — R60.0 LEG EDEMA: ICD-10-CM

## 2024-12-05 DIAGNOSIS — I73.9 PVD (PERIPHERAL VASCULAR DISEASE) (CMD): ICD-10-CM

## 2024-12-05 DIAGNOSIS — L97.519 TYPE 2 DIABETES MELLITUS WITH RIGHT DIABETIC FOOT ULCER (CMD): ICD-10-CM

## 2024-12-05 DIAGNOSIS — S91.302D OPEN WOUND OF LEFT FOOT, SUBSEQUENT ENCOUNTER: ICD-10-CM

## 2024-12-05 DIAGNOSIS — M86.671 OTHER CHRONIC OSTEOMYELITIS OF RIGHT FOOT (CMD): ICD-10-CM

## 2024-12-05 DIAGNOSIS — Z86.14 HISTORY OF MRSA INFECTION: Primary | ICD-10-CM

## 2024-12-05 DIAGNOSIS — E11.9 DIABETES MELLITUS WITH INSULIN THERAPY  (CMD): ICD-10-CM

## 2024-12-05 PROCEDURE — 11042 DBRDMT SUBQ TIS 1ST 20SQCM/<: CPT

## 2024-12-05 ASSESSMENT — PAIN SCALES - GENERAL: PAINLEVEL_OUTOF10: 0

## 2024-12-06 ENCOUNTER — OFFICE VISIT (OUTPATIENT)
Dept: FAMILY MEDICINE CLINIC | Facility: CLINIC | Age: 69
End: 2024-12-06
Payer: MEDICARE

## 2024-12-06 VITALS
SYSTOLIC BLOOD PRESSURE: 145 MMHG | HEART RATE: 88 BPM | DIASTOLIC BLOOD PRESSURE: 113 MMHG | RESPIRATION RATE: 16 BRPM | TEMPERATURE: 98 F

## 2024-12-06 DIAGNOSIS — R39.9 URINARY SYMPTOM OR SIGN: Primary | ICD-10-CM

## 2024-12-06 LAB
APPEARANCE: CLEAR
BILIRUBIN: NEGATIVE
GLUCOSE (URINE DIPSTICK): NEGATIVE MG/DL
KETONES (URINE DIPSTICK): NEGATIVE MG/DL
LEUKOCYTES: NEGATIVE
MULTISTIX LOT#: ABNORMAL NUMERIC
NITRITE, URINE: NEGATIVE
PH, URINE: 6 (ref 4.5–8)
PROTEIN (URINE DIPSTICK): NEGATIVE MG/DL
SPECIFIC GRAVITY: 1.01 (ref 1–1.03)
URINE-COLOR: YELLOW
UROBILINOGEN,SEMI-QN: 0.2 MG/DL (ref 0–1.9)

## 2024-12-06 PROCEDURE — 3080F DIAST BP >= 90 MM HG: CPT

## 2024-12-06 PROCEDURE — 1160F RVW MEDS BY RX/DR IN RCRD: CPT

## 2024-12-06 PROCEDURE — 1159F MED LIST DOCD IN RCRD: CPT

## 2024-12-06 PROCEDURE — 3077F SYST BP >= 140 MM HG: CPT

## 2024-12-06 PROCEDURE — 87086 URINE CULTURE/COLONY COUNT: CPT

## 2024-12-06 PROCEDURE — 99203 OFFICE O/P NEW LOW 30 MIN: CPT

## 2024-12-06 PROCEDURE — 81003 URINALYSIS AUTO W/O SCOPE: CPT

## 2024-12-06 NOTE — PROGRESS NOTES
CHIEF COMPLAINT:     Chief Complaint   Patient presents with    UTI       HPI:   Rodo Mcclain is a 69 year old male who presents with urinary symptoms. Patient reporting symptoms of foamy, dark colored urine with a foul odor.  Symptoms have been present for \"months\".  Treatments tried: nothing prior to arrival.  Associated symptoms: none.  Patient denies flank pain, fever, hematuria, nausea, or vomiting. Patient Hx: CKD stage 4 and Diabetes      Current Outpatient Medications   Medication Sig Dispense Refill    LANTUS SOLOSTAR 100 UNIT/ML Subcutaneous Solution Pen-injector Inject 50 Units into the skin 2 (two) times daily. 90 mL 0    Glucose Blood (ACCU-CHEK GUIDE) In Vitro Strip 1 strip by In Vitro route 2 (two) times daily. 200 strip 3    NOVOLOG FLEXPEN 100 UNIT/ML Subcutaneous Solution Pen-injector Inject 18 Units into the skin 3 (three) times daily before meals. 51 mL 0    famotidine 20 MG Oral Tab Take 1 tablet (20 mg total) by mouth daily. 90 tablet 3    metoprolol succinate ER 25 MG Oral Tablet 24 Hr Take 1 tablet (25 mg total) by mouth daily. 90 tablet 0    Insulin Pen Needle (BD PEN NEEDLE RHONDA 2ND GEN) 32G X 4 MM Does not apply Misc Inject 1 Needle into the skin 4 (four) times daily. 400 each 3    Ferrous Sulfate 325 (65 Fe) MG Oral Tab Take 1 tablet (325 mg total) by mouth daily with breakfast for 360 doses. (Patient not taking: Reported on 10/12/2024) 90 tablet 3    atorvastatin 20 MG Oral Tab Take 1 tablet (20 mg total) by mouth daily. 90 tablet 3    allopurinol 100 MG Oral Tab Take 0.5 tablets (50 mg total) by mouth every other day. (Patient not taking: Reported on 10/12/2024) 90 tablet 0    predniSONE 10 MG Oral Tab Take 4 pills for 2 days. Then, take 3 pills for 2 days. Then take 2 pills for 2 days. Then, take 1 pill for 2 days. (Patient not taking: Reported on 10/12/2024) 20 tablet 0    spironolactone 25 MG Oral Tab Take 1 tablet (25 mg total) by mouth 2 (two) times daily. 180 tablet 1     torsemide 20 MG Oral Tab Take 3 tablets (60 mg total) by mouth in the morning and 3 tablets (60 mg total) before bedtime. 540 tablet 1    isosorbide mononitrate ER 30 MG Oral Tablet 24 Hr Take 1 tablet (30 mg total) by mouth daily.      Accu-Chek FastClix Lancets Does not apply Misc         Past Medical History:    Acute kidney injury (KASEY) with acute tubular necrosis (ATN) (HCC)    Last Assessment & Plan: Formatting of this note might be different from the original. Multifactorial, due to possible underlying infection, also diuresis with torsemide. Creatinine 3.05 which is down to 1.9, Patient was initially started on normal saline at 75 cc/h, diuresis, lisinopril and metformin was held Creatinine is downtrending to 1.3.  Monitor urine output.    Congestive heart disease (HCC)    Constipation    Diabetes (HCC)    Dyspnea on exertion    Last Assessment & Plan: Formatting of this note might be different from the original. Admitted with dyspnea    Elevated brain natriuretic peptide (BNP) level    Last Assessment & Plan: Formatting of this note might be different from the original. Cardiology consulted    Esophageal reflux    Essential hypertension    High blood pressure    High cholesterol    Hyperlipidemia    Hypokalemia    Last Assessment & Plan: Formatting of this note might be different from the original. Replacement per nephrology    Hyponatremia    Last Assessment & Plan: Formatting of this note might be different from the original. Will monitor Na: 133 on admission    Renal disorder    Sleep apnea      Social History:  Social History     Socioeconomic History    Marital status: Single   Tobacco Use    Smoking status: Never     Passive exposure: Never    Smokeless tobacco: Never   Vaping Use    Vaping status: Never Used   Substance and Sexual Activity    Alcohol use: Never    Drug use: Never     Social Drivers of Health      Received from [employer] [employer]    Financial Resource  Strain   Transportation Needs: No Transportation Needs (3/12/2021)    Received from [employer]    PRAPARE - Transportation     Lack of Transportation (Medical): No     Lack of Transportation (Non-Medical): No   Physical Activity: High Risk (7/2/2024)    Received from [employer]    Exercise Vital Sign     On average, how many days per week do you engage in moderate to strenuous exercise (like a brisk walk)?: 0 days     On average, how many minutes do you engage in exercise at this level?: 0 min    Received from [employer] [employer]    Stress         REVIEW OF SYSTEMS:   GENERAL: See above  GI: See HPI.    : See HPI.      EXAM:   BP (!) 145/113   Pulse 88   Temp 98 °F (36.7 °C)   Resp 16   Physical Exam  Vitals reviewed. Exam conducted with a chaperone present.   Constitutional:       General: He is not in acute distress.     Appearance: Normal appearance. He is not ill-appearing or toxic-appearing.   HENT:      Head: Normocephalic.      Nose: Nose normal.      Mouth/Throat:      Mouth: Mucous membranes are moist.   Eyes:      Conjunctiva/sclera: Conjunctivae normal.   Pulmonary:      Effort: Pulmonary effort is normal. No respiratory distress.   Abdominal:      Palpations: Abdomen is soft.      Tenderness: There is no abdominal tenderness. There is no right CVA tenderness or left CVA tenderness.   Musculoskeletal:         General: Normal range of motion.      Cervical back: Normal range of motion.   Skin:     General: Skin is warm and dry.      Findings: No rash.   Neurological:      General: No focal deficit present.      Mental Status: He is alert and oriented to person, place, and time.   Psychiatric:         Mood and Affect: Mood normal.         Behavior: Behavior normal.         Recent Results (from the past 24 hours)   URINALYSIS, AUTO, W/O SCOPE    Collection Time: 12/06/24  2:51 PM   Result Value Ref Range    Glucose Urine Negative  Negative mg/dL    Bilirubin Urine Negative Negative    Ketones, UA Negative Negative - Trace mg/dL    Spec Gravity 1.010 1.005 - 1.030    Blood Urine Trace-intact (A) Negative    PH Urine 6.0 5.0 - 8.0    Protein Urine Negative Negative - Trace mg/dL    Urobilinogen Urine 0.2 0.2 - 1.0 mg/dL    Nitrite Urine Negative Negative    Leukocyte Esterase Urine Negative Negative    APPEARANCE Clear Clear    Color Urine Yellow Yellow    Mult[employer] Lot# [id number] Numeric    [employer] Expiration Date 9/30/25 Date         ASSESSMENT AND PLAN:   Rodo Mcclain is a 69 year old male presents with urinary symptoms.    ASSESSMENT:  Encounter Diagnosis   Name Primary?    Urinary symptom or sign Yes       Orders Placed This Encounter   Procedures    URINALYSIS, AUTO, W/O SCOPE    Urine Culture, Routine       PLAN: Education provided.  Questions answered.  Reassurance given. Urine dip reviewed with patient and antibiotics are not required at present time. Urine sent for culture and sensitivity will prescribe antibiotic if needed. Patient will follow up with primary care if not improving and seek immediate care for worsening symptoms. Patient instructions provided. The patient indicates understanding of these issues and agrees to the plan.

## 2024-12-19 ENCOUNTER — APPOINTMENT (OUTPATIENT)
Dept: WOUND CARE | Age: 69
End: 2024-12-19
Attending: FAMILY MEDICINE

## 2024-12-23 ENCOUNTER — TELEPHONE (OUTPATIENT)
Dept: WOUND CARE | Age: 69
End: 2024-12-23

## 2024-12-24 ENCOUNTER — TELEPHONE (OUTPATIENT)
Dept: CASE MANAGEMENT | Age: 69
End: 2024-12-24

## 2024-12-24 DIAGNOSIS — I73.9 PERIPHERAL VASCULAR DISEASE (HCC): Primary | ICD-10-CM

## 2024-12-24 DIAGNOSIS — S91.302D OPEN WOUND OF LEFT FOOT, SUBSEQUENT ENCOUNTER: ICD-10-CM

## 2024-12-24 DIAGNOSIS — R60.0 LEG EDEMA: ICD-10-CM

## 2024-12-24 NOTE — TELEPHONE ENCOUNTER
Dr. Wills,     [facility] requesting backdated referral to Dr. Carrillo.    Pended referral please review diagnosis and sign off if you agree.    Thank you.  Tiesha Laird  Managed Care

## 2024-12-25 ENCOUNTER — TELEPHONE (OUTPATIENT)
Dept: WOUND CARE | Age: 69
End: 2024-12-25

## 2024-12-25 RX ORDER — CLINDAMYCIN HYDROCHLORIDE 300 MG/1
300 CAPSULE ORAL 4 TIMES DAILY
Qty: 40 CAPSULE | Refills: 0 | Status: ON HOLD | OUTPATIENT
Start: 2024-12-25 | End: 2025-01-07 | Stop reason: HOSPADM

## 2024-12-26 ENCOUNTER — HOSPITAL ENCOUNTER (OUTPATIENT)
Dept: MRI IMAGING | Age: 69
Discharge: HOME | End: 2024-12-26
Attending: PODIATRIST

## 2024-12-26 DIAGNOSIS — Z79.4 DIABETES MELLITUS WITH INSULIN THERAPY  (CMD): ICD-10-CM

## 2024-12-26 DIAGNOSIS — E11.9 DIABETES MELLITUS WITH INSULIN THERAPY  (CMD): ICD-10-CM

## 2024-12-26 DIAGNOSIS — M86.571 OTHER CHRONIC HEMATOGENOUS OSTEOMYELITIS OF RIGHT FOOT (CMD): ICD-10-CM

## 2024-12-26 DIAGNOSIS — M86.671 OTHER CHRONIC OSTEOMYELITIS OF RIGHT FOOT (CMD): ICD-10-CM

## 2024-12-26 DIAGNOSIS — Z86.14 HISTORY OF MRSA INFECTION: ICD-10-CM

## 2024-12-26 PROCEDURE — 73718 MRI LOWER EXTREMITY W/O DYE: CPT

## 2024-12-28 ENCOUNTER — HOSPITAL ENCOUNTER (INPATIENT)
Age: 69
Discharge: STILL ADMITTED OR ADMINISTRATIVE | DRG: 240 | End: 2024-12-28
Attending: EMERGENCY MEDICINE | Admitting: INTERNAL MEDICINE

## 2024-12-28 DIAGNOSIS — M86.8X7 OTHER OSTEOMYELITIS OF RIGHT FOOT (CMD): ICD-10-CM

## 2024-12-28 DIAGNOSIS — M86.9 OSTEOMYELITIS OF RIGHT FOOT, UNSPECIFIED TYPE  (CMD): Primary | ICD-10-CM

## 2024-12-28 DIAGNOSIS — M86.071 ACUTE HEMATOGENOUS OSTEOMYELITIS OF RIGHT FOOT  (CMD): ICD-10-CM

## 2024-12-28 DIAGNOSIS — I33.0 SUBACUTE BACTERIAL ENDOCARDITIS (CMD): Primary | ICD-10-CM

## 2024-12-28 LAB
ALBUMIN SERPL-MCNC: 2.6 G/DL (ref 3.4–5)
ALBUMIN/GLOB SERPL: 0.4 {RATIO} (ref 1–2.4)
ALP SERPL-CCNC: 79 UNITS/L (ref 45–117)
ALT SERPL-CCNC: 12 UNITS/L
ANION GAP SERPL CALC-SCNC: 9 MMOL/L (ref 7–19)
AST SERPL-CCNC: 14 UNITS/L
BASOPHILS # BLD: 0.1 K/MCL (ref 0–0.3)
BASOPHILS NFR BLD: 1 %
BILIRUB SERPL-MCNC: 0.4 MG/DL (ref 0.2–1)
BUN SERPL-MCNC: 79 MG/DL (ref 6–20)
BUN/CREAT SERPL: 25 (ref 7–25)
CALCIUM SERPL-MCNC: 9.5 MG/DL (ref 8.4–10.2)
CHLORIDE SERPL-SCNC: 97 MMOL/L (ref 97–110)
CO2 SERPL-SCNC: 29 MMOL/L (ref 21–32)
CREAT SERPL-MCNC: 3.19 MG/DL (ref 0.67–1.17)
CRP SERPL-MCNC: 47.3 MG/L
DEPRECATED RDW RBC: 46.8 FL (ref 39–50)
EGFRCR SERPLBLD CKD-EPI 2021: 20 ML/MIN/{1.73_M2}
EOSINOPHIL # BLD: 0.3 K/MCL (ref 0–0.5)
EOSINOPHIL NFR BLD: 2 %
ERYTHROCYTE [DISTWIDTH] IN BLOOD: 14.6 % (ref 11–15)
ERYTHROCYTE [SEDIMENTATION RATE] IN BLOOD BY WESTERGREN METHOD: 91 MM/HR (ref 0–20)
FASTING DURATION TIME PATIENT: ABNORMAL H
GLOBULIN SER-MCNC: 6.6 G/DL (ref 2–4)
GLUCOSE BLDC GLUCOMTR-MCNC: 157 MG/DL (ref 70–99)
GLUCOSE BLDC GLUCOMTR-MCNC: 224 MG/DL (ref 70–99)
GLUCOSE SERPL-MCNC: 178 MG/DL (ref 70–99)
HCT VFR BLD CALC: 33 % (ref 39–51)
HGB BLD-MCNC: 10.5 G/DL (ref 13–17)
IMM GRANULOCYTES # BLD AUTO: 0 K/MCL (ref 0–0.2)
IMM GRANULOCYTES # BLD: 0 %
LACTATE BLDV-SCNC: 0.7 MMOL/L (ref 0–2)
LYMPHOCYTES # BLD: 1.8 K/MCL (ref 1–4)
LYMPHOCYTES NFR BLD: 17 %
MCH RBC QN AUTO: 28.2 PG (ref 26–34)
MCHC RBC AUTO-ENTMCNC: 31.8 G/DL (ref 32–36.5)
MCV RBC AUTO: 88.7 FL (ref 78–100)
MONOCYTES # BLD: 0.9 K/MCL (ref 0.3–0.9)
MONOCYTES NFR BLD: 9 %
NEUTROPHILS # BLD: 7.2 K/MCL (ref 1.8–7.7)
NEUTROPHILS NFR BLD: 71 %
NRBC BLD MANUAL-RTO: 0 /100 WBC
PLATELET # BLD AUTO: 362 K/MCL (ref 140–450)
POTASSIUM SERPL-SCNC: 4.4 MMOL/L (ref 3.4–5.1)
PROT SERPL-MCNC: 9.2 G/DL (ref 6.4–8.2)
RAINBOW EXTRA TUBES HOLD SPECIMEN: NORMAL
RBC # BLD: 3.72 MIL/MCL (ref 4.5–5.9)
SODIUM SERPL-SCNC: 131 MMOL/L (ref 135–145)
WBC # BLD: 10.3 K/MCL (ref 4.2–11)

## 2024-12-28 PROCEDURE — 10002803 HB RX 637: Performed by: INTERNAL MEDICINE

## 2024-12-28 PROCEDURE — 83605 ASSAY OF LACTIC ACID: CPT | Performed by: EMERGENCY MEDICINE

## 2024-12-28 PROCEDURE — 10002800 HB RX 250 W HCPCS: Performed by: EMERGENCY MEDICINE

## 2024-12-28 PROCEDURE — 85652 RBC SED RATE AUTOMATED: CPT | Performed by: STUDENT IN AN ORGANIZED HEALTH CARE EDUCATION/TRAINING PROGRAM

## 2024-12-28 PROCEDURE — 87154 CUL TYP ID BLD PTHGN 6+ TRGT: CPT | Performed by: STUDENT IN AN ORGANIZED HEALTH CARE EDUCATION/TRAINING PROGRAM

## 2024-12-28 PROCEDURE — 36415 COLL VENOUS BLD VENIPUNCTURE: CPT

## 2024-12-28 PROCEDURE — 96372 THER/PROPH/DIAG INJ SC/IM: CPT | Performed by: INTERNAL MEDICINE

## 2024-12-28 PROCEDURE — 85025 COMPLETE CBC W/AUTO DIFF WBC: CPT | Performed by: EMERGENCY MEDICINE

## 2024-12-28 PROCEDURE — 80053 COMPREHEN METABOLIC PANEL: CPT | Performed by: EMERGENCY MEDICINE

## 2024-12-28 PROCEDURE — 10002800 HB RX 250 W HCPCS: Performed by: INTERNAL MEDICINE

## 2024-12-28 PROCEDURE — 86140 C-REACTIVE PROTEIN: CPT | Performed by: STUDENT IN AN ORGANIZED HEALTH CARE EDUCATION/TRAINING PROGRAM

## 2024-12-28 PROCEDURE — 99284 EMERGENCY DEPT VISIT MOD MDM: CPT

## 2024-12-28 PROCEDURE — 10002801 HB RX 250 W/O HCPCS: Performed by: EMERGENCY MEDICINE

## 2024-12-28 PROCEDURE — 10000002 HB ROOM CHARGE MED SURG

## 2024-12-28 PROCEDURE — 87040 BLOOD CULTURE FOR BACTERIA: CPT | Performed by: STUDENT IN AN ORGANIZED HEALTH CARE EDUCATION/TRAINING PROGRAM

## 2024-12-28 PROCEDURE — 83036 HEMOGLOBIN GLYCOSYLATED A1C: CPT | Performed by: INTERNAL MEDICINE

## 2024-12-28 PROCEDURE — 10002807 HB RX 258: Performed by: EMERGENCY MEDICINE

## 2024-12-28 RX ORDER — HYDROCODONE BITARTRATE AND ACETAMINOPHEN 5; 325 MG/1; MG/1
1 TABLET ORAL EVERY 4 HOURS PRN
Status: DISPENSED | OUTPATIENT
Start: 2024-12-28

## 2024-12-28 RX ORDER — DEXTROSE MONOHYDRATE 25 G/50ML
12.5 INJECTION, SOLUTION INTRAVENOUS PRN
Status: ACTIVE | OUTPATIENT
Start: 2024-12-28

## 2024-12-28 RX ORDER — POLYETHYLENE GLYCOL 3350 17 G/17G
17 POWDER, FOR SOLUTION ORAL DAILY PRN
Status: ACTIVE | OUTPATIENT
Start: 2024-12-28

## 2024-12-28 RX ORDER — NICOTINE POLACRILEX 4 MG
15 LOZENGE BUCCAL PRN
Status: ACTIVE | OUTPATIENT
Start: 2024-12-28

## 2024-12-28 RX ORDER — HEPARIN SODIUM 5000 [USP'U]/ML
5000 INJECTION, SOLUTION INTRAVENOUS; SUBCUTANEOUS EVERY 8 HOURS SCHEDULED
Status: DISCONTINUED | OUTPATIENT
Start: 2024-12-28 | End: 2024-12-28

## 2024-12-28 RX ORDER — LORATADINE 10 MG/1
10 TABLET ORAL DAILY PRN
Status: DISPENSED | OUTPATIENT
Start: 2024-12-28

## 2024-12-28 RX ORDER — INSULIN GLARGINE 100 [IU]/ML
35 INJECTION, SOLUTION SUBCUTANEOUS 2 TIMES DAILY
Status: DISPENSED | OUTPATIENT
Start: 2024-12-28

## 2024-12-28 RX ORDER — ALLOPURINOL 100 MG/1
100 TABLET ORAL DAILY
Status: DISPENSED | OUTPATIENT
Start: 2024-12-29

## 2024-12-28 RX ORDER — ACETAMINOPHEN 325 MG/1
650 TABLET ORAL EVERY 4 HOURS PRN
Status: DISPENSED | OUTPATIENT
Start: 2024-12-28

## 2024-12-28 RX ORDER — METOPROLOL SUCCINATE 25 MG/1
25 TABLET, EXTENDED RELEASE ORAL DAILY
Status: DISPENSED | OUTPATIENT
Start: 2024-12-29

## 2024-12-28 RX ORDER — NICOTINE POLACRILEX 4 MG
30 LOZENGE BUCCAL PRN
Status: ACTIVE | OUTPATIENT
Start: 2024-12-28

## 2024-12-28 RX ORDER — SPIRONOLACTONE 25 MG/1
25 TABLET ORAL 2 TIMES DAILY
Status: DISPENSED | OUTPATIENT
Start: 2024-12-28

## 2024-12-28 RX ORDER — FAMOTIDINE 20 MG/1
20 TABLET, FILM COATED ORAL DAILY
Status: DISPENSED | OUTPATIENT
Start: 2024-12-29

## 2024-12-28 RX ORDER — INSULIN LISPRO 100 [IU]/ML
10 INJECTION, SOLUTION INTRAVENOUS; SUBCUTANEOUS
Status: DISCONTINUED | OUTPATIENT
Start: 2024-12-29 | End: 2025-01-05

## 2024-12-28 RX ORDER — DEXTROSE MONOHYDRATE 25 G/50ML
25 INJECTION, SOLUTION INTRAVENOUS PRN
Status: ACTIVE | OUTPATIENT
Start: 2024-12-28

## 2024-12-28 RX ORDER — HEPARIN SODIUM 5000 [USP'U]/ML
7500 INJECTION, SOLUTION INTRAVENOUS; SUBCUTANEOUS EVERY 8 HOURS SCHEDULED
Status: DISPENSED | OUTPATIENT
Start: 2024-12-28

## 2024-12-28 RX ORDER — ATORVASTATIN CALCIUM 20 MG/1
20 TABLET, FILM COATED ORAL DAILY
Status: DISPENSED | OUTPATIENT
Start: 2024-12-29

## 2024-12-28 RX ORDER — TORSEMIDE 20 MG/1
60 TABLET ORAL
Status: DISPENSED | OUTPATIENT
Start: 2024-12-28

## 2024-12-28 RX ORDER — BISACODYL 10 MG
10 SUPPOSITORY, RECTAL RECTAL DAILY PRN
Status: ACTIVE | OUTPATIENT
Start: 2024-12-28

## 2024-12-28 RX ORDER — ACETAMINOPHEN 650 MG/1
650 SUPPOSITORY RECTAL EVERY 4 HOURS PRN
Status: ACTIVE | OUTPATIENT
Start: 2024-12-28

## 2024-12-28 RX ORDER — ISOSORBIDE MONONITRATE 60 MG/1
30 TABLET, EXTENDED RELEASE ORAL DAILY
Status: DISPENSED | OUTPATIENT
Start: 2024-12-29

## 2024-12-28 RX ADMIN — TORSEMIDE 60 MG: 20 TABLET ORAL at 21:51

## 2024-12-28 RX ADMIN — WATER 2000 MG: 1 INJECTION INTRAMUSCULAR; INTRAVENOUS; SUBCUTANEOUS at 14:46

## 2024-12-28 RX ADMIN — SPIRONOLACTONE 25 MG: 25 TABLET, COATED ORAL at 21:52

## 2024-12-28 RX ADMIN — INSULIN GLARGINE 35 UNITS: 100 INJECTION, SOLUTION SUBCUTANEOUS at 22:10

## 2024-12-28 RX ADMIN — HEPARIN SODIUM 7500 UNITS: 5000 INJECTION INTRAVENOUS; SUBCUTANEOUS at 21:51

## 2024-12-28 RX ADMIN — VANCOMYCIN HYDROCHLORIDE 2000 MG: 10 INJECTION, POWDER, LYOPHILIZED, FOR SOLUTION INTRAVENOUS at 14:50

## 2024-12-28 SDOH — HEALTH STABILITY: GENERAL
BECAUSE OF A PHYSICAL, MENTAL, OR EMOTIONAL CONDITION, DO YOU HAVE SERIOUS DIFFICULTY CONCENTRATING, REMEMBERING OR MAKING DECISIONS?: NO

## 2024-12-28 SDOH — ECONOMIC STABILITY: HOUSING INSECURITY: DO YOU HAVE PROBLEMS WITH ANY OF THE FOLLOWING?: NONE OF THE ABOVE

## 2024-12-28 SDOH — ECONOMIC STABILITY: GENERAL

## 2024-12-28 SDOH — SOCIAL STABILITY: SOCIAL INSECURITY: HOW OFTEN DOES ANYONE, INCLUDING FAMILY AND FRIENDS, THREATEN YOU WITH HARM?: NEVER

## 2024-12-28 SDOH — ECONOMIC STABILITY: FOOD INSECURITY: WITHIN THE PAST 12 MONTHS, THE FOOD YOU BOUGHT JUST DIDN'T LAST AND YOU DIDN'T HAVE MONEY TO GET MORE.: NEVER TRUE

## 2024-12-28 SDOH — SOCIAL STABILITY: SOCIAL NETWORK
HOW OFTEN DO YOU SEE OR TALK TO PEOPLE THAT YOU CARE ABOUT AND FEEL CLOSE TO? (FOR EXAMPLE: TALKING TO FRIENDS ON THE PHONE, VISITING FRIENDS OR FAMILY, GOING TO CHURCH OR CLUB MEETINGS): 5 OR MORE TIMES A WEEK

## 2024-12-28 SDOH — ECONOMIC STABILITY: HOUSING INSECURITY: WHAT IS YOUR LIVING SITUATION TODAY?: SPOUSE

## 2024-12-28 SDOH — ECONOMIC STABILITY: HOUSING INSECURITY: WHAT IS YOUR LIVING SITUATION TODAY?: I HAVE A STEADY PLACE TO LIVE

## 2024-12-28 SDOH — SOCIAL STABILITY: SOCIAL INSECURITY: HOW OFTEN DOES ANYONE, INCLUDING FAMILY AND FRIENDS, INSULT OR TALK DOWN TO YOU?: NEVER

## 2024-12-28 SDOH — ECONOMIC STABILITY: INCOME INSECURITY: IN THE PAST 12 MONTHS, HAS THE ELECTRIC, GAS, OIL, OR WATER COMPANY THREATENED TO SHUT OFF SERVICE IN YOUR HOME?: NO

## 2024-12-28 SDOH — HEALTH STABILITY: PHYSICAL HEALTH: DO YOU HAVE DIFFICULTY DRESSING OR BATHING?: NO

## 2024-12-28 SDOH — ECONOMIC STABILITY: GENERAL: WOULD YOU LIKE HELP WITH ANY OF THE FOLLOWING NEEDS?: I DON'T WANT HELP WITH ANY OF THESE

## 2024-12-28 SDOH — SOCIAL STABILITY: SOCIAL INSECURITY: HOW OFTEN DOES ANYONE, INCLUDING FAMILY AND FRIENDS, SCREAM OR CURSE AT YOU?: NEVER

## 2024-12-28 SDOH — HEALTH STABILITY: GENERAL: BECAUSE OF A PHYSICAL, MENTAL, OR EMOTIONAL CONDITION, DO YOU HAVE DIFFICULTY DOING ERRANDS ALONE?: NO

## 2024-12-28 SDOH — SOCIAL STABILITY: SOCIAL INSECURITY: HOW OFTEN DOES ANYONE, INCLUDING FAMILY AND FRIENDS, PHYSICALLY HURT YOU?: NEVER

## 2024-12-28 SDOH — ECONOMIC STABILITY: TRANSPORTATION INSECURITY
IN THE PAST 12 MONTHS, HAS LACK OF RELIABLE TRANSPORTATION KEPT YOU FROM MEDICAL APPOINTMENTS, MEETINGS, WORK OR FROM GETTING THINGS NEEDED FOR DAILY LIVING?: NO

## 2024-12-28 SDOH — ECONOMIC STABILITY: HOUSING INSECURITY: WHAT IS YOUR LIVING SITUATION TODAY?: HOUSE

## 2024-12-28 SDOH — SOCIAL STABILITY: SOCIAL NETWORK: SUPPORT SYSTEMS: FAMILY MEMBERS;FRIENDS

## 2024-12-28 SDOH — HEALTH STABILITY: PHYSICAL HEALTH: DO YOU HAVE SERIOUS DIFFICULTY WALKING OR CLIMBING STAIRS?: NO

## 2024-12-28 ASSESSMENT — ENCOUNTER SYMPTOMS
RESPIRATORY NEGATIVE: 1
DIZZINESS: 0
NEUROLOGICAL NEGATIVE: 1
SINUS PRESSURE: 0
EYES NEGATIVE: 1
BACK PAIN: 0
COLOR CHANGE: 0
ABDOMINAL DISTENTION: 0
HEADACHES: 0
HEMATOLOGIC/LYMPHATIC NEGATIVE: 1
PSYCHIATRIC NEGATIVE: 1
CHILLS: 0
SORE THROAT: 0
ENDOCRINE NEGATIVE: 1
WOUND: 1
LIGHT-HEADEDNESS: 0
ABDOMINAL PAIN: 0
DIARRHEA: 0
UNEXPECTED WEIGHT CHANGE: 0
SINUS PAIN: 0
CONSTIPATION: 0
ACTIVITY CHANGE: 1
SHORTNESS OF BREATH: 0
FEVER: 0
FATIGUE: 0
VOMITING: 0
ALLERGIC/IMMUNOLOGIC NEGATIVE: 1
NAUSEA: 0
COUGH: 0

## 2024-12-28 ASSESSMENT — COLUMBIA-SUICIDE SEVERITY RATING SCALE - C-SSRS
IS THE PATIENT ABLE TO COMPLETE C-SSRS: YES
1. WITHIN THE PAST MONTH, HAVE YOU WISHED YOU WERE DEAD OR WISHED YOU COULD GO TO SLEEP AND NOT WAKE UP?: NO
6. HAVE YOU EVER DONE ANYTHING, STARTED TO DO ANYTHING, OR PREPARED TO DO ANYTHING TO END YOUR LIFE?: NO
2. HAVE YOU ACTUALLY HAD ANY THOUGHTS OF KILLING YOURSELF?: NO

## 2024-12-28 ASSESSMENT — ACTIVITIES OF DAILY LIVING (ADL)
ADL_SCORE: 12
ADL_SHORT_OF_BREATH: NO
ADL_BEFORE_ADMISSION: INDEPENDENT
RECENT_DECLINE_ADL: NO

## 2024-12-28 ASSESSMENT — PAIN SCALES - GENERAL
PAINLEVEL_OUTOF10: 0
PAINLEVEL_OUTOF10: 0

## 2024-12-28 ASSESSMENT — PATIENT HEALTH QUESTIONNAIRE - PHQ9
CLINICAL INTERPRETATION OF PHQ2 SCORE: NO FURTHER SCREENING NEEDED
1. LITTLE INTEREST OR PLEASURE IN DOING THINGS: NOT AT ALL
IS PATIENT ABLE TO COMPLETE PHQ2 OR PHQ9: YES
2. FEELING DOWN, DEPRESSED OR HOPELESS: NOT AT ALL
SUM OF ALL RESPONSES TO PHQ9 QUESTIONS 1 AND 2: 0
SUM OF ALL RESPONSES TO PHQ9 QUESTIONS 1 AND 2: 0

## 2024-12-28 ASSESSMENT — LIFESTYLE VARIABLES
HOW OFTEN DO YOU HAVE 6 OR MORE DRINKS ON ONE OCCASION: NEVER
ALCOHOL_USE_STATUS: NO OR LOW RISK WITH VALIDATED TOOL
HOW MANY STANDARD DRINKS CONTAINING ALCOHOL DO YOU HAVE ON A TYPICAL DAY: 0,1 OR 2
HOW OFTEN DO YOU HAVE A DRINK CONTAINING ALCOHOL: 2 TO 4 TIMES PER MONTH
AUDIT-C TOTAL SCORE: 2

## 2024-12-29 LAB
ANION GAP SERPL CALC-SCNC: 10 MMOL/L (ref 7–19)
BASOPHILS # BLD: 0.1 K/MCL (ref 0–0.3)
BASOPHILS NFR BLD: 1 %
BUN SERPL-MCNC: 75 MG/DL (ref 6–20)
BUN/CREAT SERPL: 27 (ref 7–25)
CALCIUM SERPL-MCNC: 9.4 MG/DL (ref 8.4–10.2)
CHLORIDE SERPL-SCNC: 100 MMOL/L (ref 97–110)
CO2 SERPL-SCNC: 29 MMOL/L (ref 21–32)
CREAT SERPL-MCNC: 2.82 MG/DL (ref 0.67–1.17)
DEPRECATED RDW RBC: 46.5 FL (ref 39–50)
EGFRCR SERPLBLD CKD-EPI 2021: 23 ML/MIN/{1.73_M2}
EOSINOPHIL # BLD: 0.3 K/MCL (ref 0–0.5)
EOSINOPHIL NFR BLD: 4 %
ERYTHROCYTE [DISTWIDTH] IN BLOOD: 14.6 % (ref 11–15)
ERYTHROCYTE [SEDIMENTATION RATE] IN BLOOD BY WESTERGREN METHOD: 90 MM/HR (ref 0–20)
FASTING DURATION TIME PATIENT: ABNORMAL H
GLUCOSE BLDC GLUCOMTR-MCNC: 120 MG/DL (ref 70–99)
GLUCOSE BLDC GLUCOMTR-MCNC: 131 MG/DL (ref 70–99)
GLUCOSE BLDC GLUCOMTR-MCNC: 172 MG/DL (ref 70–99)
GLUCOSE BLDC GLUCOMTR-MCNC: 182 MG/DL (ref 70–99)
GLUCOSE SERPL-MCNC: 129 MG/DL (ref 70–99)
HBA1C MFR BLD: 7.9 % (ref 4.5–5.6)
HCT VFR BLD CALC: 31.6 % (ref 39–51)
HGB BLD-MCNC: 10 G/DL (ref 13–17)
IMM GRANULOCYTES # BLD AUTO: 0.1 K/MCL (ref 0–0.2)
IMM GRANULOCYTES # BLD: 1 %
LYMPHOCYTES # BLD: 2 K/MCL (ref 1–4)
LYMPHOCYTES NFR BLD: 22 %
MAGNESIUM SERPL-MCNC: 2.5 MG/DL (ref 1.7–2.4)
MCH RBC QN AUTO: 27.5 PG (ref 26–34)
MCHC RBC AUTO-ENTMCNC: 31.6 G/DL (ref 32–36.5)
MCV RBC AUTO: 87.1 FL (ref 78–100)
MONOCYTES # BLD: 0.7 K/MCL (ref 0.3–0.9)
MONOCYTES NFR BLD: 8 %
NEUTROPHILS # BLD: 6 K/MCL (ref 1.8–7.7)
NEUTROPHILS NFR BLD: 64 %
NRBC BLD MANUAL-RTO: 0 /100 WBC
PLATELET # BLD AUTO: 351 K/MCL (ref 140–450)
POTASSIUM SERPL-SCNC: 3.7 MMOL/L (ref 3.4–5.1)
RBC # BLD: 3.63 MIL/MCL (ref 4.5–5.9)
SODIUM SERPL-SCNC: 135 MMOL/L (ref 135–145)
WBC # BLD: 9.2 K/MCL (ref 4.2–11)

## 2024-12-29 PROCEDURE — 85025 COMPLETE CBC W/AUTO DIFF WBC: CPT | Performed by: INTERNAL MEDICINE

## 2024-12-29 PROCEDURE — 96372 THER/PROPH/DIAG INJ SC/IM: CPT | Performed by: INTERNAL MEDICINE

## 2024-12-29 PROCEDURE — 10002800 HB RX 250 W HCPCS: Performed by: INTERNAL MEDICINE

## 2024-12-29 PROCEDURE — 85652 RBC SED RATE AUTOMATED: CPT | Performed by: INTERNAL MEDICINE

## 2024-12-29 PROCEDURE — 83735 ASSAY OF MAGNESIUM: CPT | Performed by: INTERNAL MEDICINE

## 2024-12-29 PROCEDURE — 10002803 HB RX 637: Performed by: INTERNAL MEDICINE

## 2024-12-29 PROCEDURE — 80048 BASIC METABOLIC PNL TOTAL CA: CPT | Performed by: INTERNAL MEDICINE

## 2024-12-29 PROCEDURE — 10000002 HB ROOM CHARGE MED SURG

## 2024-12-29 PROCEDURE — 36415 COLL VENOUS BLD VENIPUNCTURE: CPT | Performed by: INTERNAL MEDICINE

## 2024-12-29 PROCEDURE — 10002801 HB RX 250 W/O HCPCS: Performed by: INTERNAL MEDICINE

## 2024-12-29 PROCEDURE — 10002807 HB RX 258: Performed by: INTERNAL MEDICINE

## 2024-12-29 PROCEDURE — 10004180 HB COUNTER-TRANSPORT

## 2024-12-29 RX ADMIN — ALLOPURINOL 100 MG: 100 TABLET ORAL at 09:14

## 2024-12-29 RX ADMIN — ISOSORBIDE MONONITRATE 30 MG: 60 TABLET, EXTENDED RELEASE ORAL at 09:14

## 2024-12-29 RX ADMIN — INSULIN GLARGINE 35 UNITS: 100 INJECTION, SOLUTION SUBCUTANEOUS at 11:27

## 2024-12-29 RX ADMIN — HEPARIN SODIUM 7500 UNITS: 5000 INJECTION INTRAVENOUS; SUBCUTANEOUS at 06:01

## 2024-12-29 RX ADMIN — WATER 2000 MG: 1 INJECTION INTRAMUSCULAR; INTRAVENOUS; SUBCUTANEOUS at 09:14

## 2024-12-29 RX ADMIN — SPIRONOLACTONE 25 MG: 25 TABLET, COATED ORAL at 09:14

## 2024-12-29 RX ADMIN — INSULIN GLARGINE 35 UNITS: 100 INJECTION, SOLUTION SUBCUTANEOUS at 21:58

## 2024-12-29 RX ADMIN — SPIRONOLACTONE 25 MG: 25 TABLET, COATED ORAL at 21:49

## 2024-12-29 RX ADMIN — ATORVASTATIN CALCIUM 20 MG: 20 TABLET, FILM COATED ORAL at 09:14

## 2024-12-29 RX ADMIN — METOPROLOL SUCCINATE 25 MG: 25 TABLET, EXTENDED RELEASE ORAL at 09:14

## 2024-12-29 RX ADMIN — TORSEMIDE 60 MG: 20 TABLET ORAL at 09:22

## 2024-12-29 RX ADMIN — HEPARIN SODIUM 7500 UNITS: 5000 INJECTION INTRAVENOUS; SUBCUTANEOUS at 13:51

## 2024-12-29 RX ADMIN — FAMOTIDINE 20 MG: 20 TABLET, FILM COATED ORAL at 09:14

## 2024-12-29 RX ADMIN — HEPARIN SODIUM 7500 UNITS: 5000 INJECTION INTRAVENOUS; SUBCUTANEOUS at 21:49

## 2024-12-29 RX ADMIN — INSULIN LISPRO 1 UNITS: 100 INJECTION, SOLUTION INTRAVENOUS; SUBCUTANEOUS at 17:37

## 2024-12-29 RX ADMIN — VANCOMYCIN HYDROCHLORIDE 1000 MG: 1 INJECTION, POWDER, LYOPHILIZED, FOR SOLUTION INTRAVENOUS at 14:05

## 2024-12-29 RX ADMIN — TORSEMIDE 60 MG: 20 TABLET ORAL at 17:37

## 2024-12-29 RX ADMIN — INSULIN LISPRO 10 UNITS: 100 INJECTION, SOLUTION INTRAVENOUS; SUBCUTANEOUS at 17:37

## 2024-12-29 ASSESSMENT — PAIN SCALES - GENERAL: PAINLEVEL_OUTOF10: 0

## 2024-12-30 ENCOUNTER — MED REC SCAN ONLY (OUTPATIENT)
Dept: INTERNAL MEDICINE CLINIC | Facility: CLINIC | Age: 69
End: 2024-12-30

## 2024-12-30 LAB
A BAUMANNII DNA BLD POS QL NAA+NON-PROBE: NOT DETECTED
ANION GAP SERPL CALC-SCNC: 6 MMOL/L (ref 7–19)
B FRAGILIS DNA BLD POS QL NAA+NON-PROBE: NOT DETECTED
BASOPHILS # BLD: 0.1 K/MCL (ref 0–0.3)
BASOPHILS NFR BLD: 1 %
BUN SERPL-MCNC: 75 MG/DL (ref 6–20)
BUN/CREAT SERPL: 27 (ref 7–25)
C ALBICANS DNA BLD POS QL NAA+NON-PROBE: NOT DETECTED
C AURIS DNA BLD POS QL NAA+NON-PROBE: NOT DETECTED
C GATTII+NEOFOR DNA BLD POS QL NAA+N-PRB: NOT DETECTED
C GLABRATA DNA BLD POS QL NAA+NON-PROBE: NOT DETECTED
C KRUSEI DNA BLD POS QL NAA+NON-PROBE: NOT DETECTED
C PARAP DNA BLD POS QL NAA+NON-PROBE: NOT DETECTED
C TROPICLS DNA BLD POS QL NAA+NON-PROBE: NOT DETECTED
CALCIUM SERPL-MCNC: 9.3 MG/DL (ref 8.4–10.2)
CHLORIDE SERPL-SCNC: 99 MMOL/L (ref 97–110)
CO2 SERPL-SCNC: 31 MMOL/L (ref 21–32)
CREAT SERPL-MCNC: 2.81 MG/DL (ref 0.67–1.17)
DEPRECATED RDW RBC: 46.2 FL (ref 39–50)
E CLOAC COMP DNA BLD POS NAA+NON-PROBE: NOT DETECTED
E COLI DNA BLD POS QL NAA+NON-PROBE: NOT DETECTED
E FAECALIS DNA BLD POS QL NAA+NON-PROBE: NOT DETECTED
E FAECIUM DNA BLD POS QL NAA+NON-PROBE: NOT DETECTED
EGFRCR SERPLBLD CKD-EPI 2021: 24 ML/MIN/{1.73_M2}
ENTEROBACTERALES DNA BLD POS NAA+N-PRB: NOT DETECTED
EOSINOPHIL # BLD: 0.4 K/MCL (ref 0–0.5)
EOSINOPHIL NFR BLD: 4 %
ERYTHROCYTE [DISTWIDTH] IN BLOOD: 14.6 % (ref 11–15)
FASTING DURATION TIME PATIENT: ABNORMAL H
GLUCOSE BLDC GLUCOMTR-MCNC: 108 MG/DL (ref 70–99)
GLUCOSE BLDC GLUCOMTR-MCNC: 156 MG/DL (ref 70–99)
GLUCOSE BLDC GLUCOMTR-MCNC: 164 MG/DL (ref 70–99)
GLUCOSE BLDC GLUCOMTR-MCNC: 182 MG/DL (ref 70–99)
GLUCOSE SERPL-MCNC: 105 MG/DL (ref 70–99)
GP B STREP DNA CSF QL NAA+NON-PROBE: NOT DETECTED
HAEM INFLU DNA BLD POS QL NAA+NON-PROBE: NOT DETECTED
HCT VFR BLD CALC: 32.9 % (ref 39–51)
HGB BLD-MCNC: 10.6 G/DL (ref 13–17)
IMM GRANULOCYTES # BLD AUTO: 0.1 K/MCL (ref 0–0.2)
IMM GRANULOCYTES # BLD: 1 %
K OXYTOCA DNA BLD POS QL NAA+NON-PROBE: NOT DETECTED
KLEBSIELLA SP DNA BLD POS QL NAA+NON-PRB: NOT DETECTED
KLEBSIELLA SP DNA BLD POS QL NAA+NON-PRB: NOT DETECTED
L MONOCYTOG DNA BLD POS QL NAA+NON-PROBE: NOT DETECTED
LYMPHOCYTES # BLD: 2.1 K/MCL (ref 1–4)
LYMPHOCYTES NFR BLD: 20 %
MCH RBC QN AUTO: 28.4 PG (ref 26–34)
MCHC RBC AUTO-ENTMCNC: 32.2 G/DL (ref 32–36.5)
MCV RBC AUTO: 88.2 FL (ref 78–100)
MONOCYTES # BLD: 0.9 K/MCL (ref 0.3–0.9)
MONOCYTES NFR BLD: 8 %
N MEN DNA BLD POS QL NAA+NON-PROBE: NOT DETECTED
NEUTROPHILS # BLD: 6.9 K/MCL (ref 1.8–7.7)
NEUTROPHILS NFR BLD: 66 %
NRBC BLD MANUAL-RTO: 0 /100 WBC
P AERUGINOSA DNA BLD POS NAA+NON-PROBE: NOT DETECTED
PLATELET # BLD AUTO: 368 K/MCL (ref 140–450)
POTASSIUM SERPL-SCNC: 4 MMOL/L (ref 3.4–5.1)
PROTEUS SP DNA BLD POS QL NAA+NON-PROBE: NOT DETECTED
RBC # BLD: 3.73 MIL/MCL (ref 4.5–5.9)
S LUGDUNENSIS DNA BLD POS QL NAA+NON-PRB: NOT DETECTED
S MALTOPHILIA DNA BLD POS QL NAA+NON-PRB: NOT DETECTED
S MARCESCENS DNA BLD POS NAA+NON-PROBE: NOT DETECTED
S PNEUM DNA BLD POS QL NAA+NON-PROBE: NOT DETECTED
S PYO DNA BLD POS QL NAA+NON-PROBE: NOT DETECTED
SALMONELLA DNA BLD POS QL NAA+NON-PROBE: NOT DETECTED
SODIUM SERPL-SCNC: 132 MMOL/L (ref 135–145)
STAPHYLOCOCCUS AUREUS: ABNORMAL
STREPTOCOCCUS DNA BLD POS NAA+NON-PROBE: NOT DETECTED
WBC # BLD: 10.4 K/MCL (ref 4.2–11)

## 2024-12-30 PROCEDURE — 10002807 HB RX 258: Performed by: INTERNAL MEDICINE

## 2024-12-30 PROCEDURE — 96372 THER/PROPH/DIAG INJ SC/IM: CPT | Performed by: INTERNAL MEDICINE

## 2024-12-30 PROCEDURE — 85025 COMPLETE CBC W/AUTO DIFF WBC: CPT | Performed by: INTERNAL MEDICINE

## 2024-12-30 PROCEDURE — 10004180 HB COUNTER-TRANSPORT

## 2024-12-30 PROCEDURE — 80048 BASIC METABOLIC PNL TOTAL CA: CPT | Performed by: INTERNAL MEDICINE

## 2024-12-30 PROCEDURE — 10002800 HB RX 250 W HCPCS: Performed by: INTERNAL MEDICINE

## 2024-12-30 PROCEDURE — 10002803 HB RX 637: Performed by: INTERNAL MEDICINE

## 2024-12-30 PROCEDURE — 36415 COLL VENOUS BLD VENIPUNCTURE: CPT | Performed by: INTERNAL MEDICINE

## 2024-12-30 PROCEDURE — 10002801 HB RX 250 W/O HCPCS: Performed by: INTERNAL MEDICINE

## 2024-12-30 PROCEDURE — 10000002 HB ROOM CHARGE MED SURG

## 2024-12-30 RX ADMIN — TORSEMIDE 60 MG: 20 TABLET ORAL at 18:00

## 2024-12-30 RX ADMIN — VANCOMYCIN HYDROCHLORIDE 1000 MG: 1 INJECTION, POWDER, LYOPHILIZED, FOR SOLUTION INTRAVENOUS at 13:49

## 2024-12-30 RX ADMIN — INSULIN GLARGINE 35 UNITS: 100 INJECTION, SOLUTION SUBCUTANEOUS at 21:59

## 2024-12-30 RX ADMIN — ALLOPURINOL 100 MG: 100 TABLET ORAL at 09:46

## 2024-12-30 RX ADMIN — HEPARIN SODIUM 7500 UNITS: 5000 INJECTION INTRAVENOUS; SUBCUTANEOUS at 05:44

## 2024-12-30 RX ADMIN — SPIRONOLACTONE 25 MG: 25 TABLET, COATED ORAL at 09:46

## 2024-12-30 RX ADMIN — TORSEMIDE 60 MG: 20 TABLET ORAL at 09:46

## 2024-12-30 RX ADMIN — INSULIN LISPRO 10 UNITS: 100 INJECTION, SOLUTION INTRAVENOUS; SUBCUTANEOUS at 05:47

## 2024-12-30 RX ADMIN — INSULIN LISPRO 1 UNITS: 100 INJECTION, SOLUTION INTRAVENOUS; SUBCUTANEOUS at 13:42

## 2024-12-30 RX ADMIN — ISOSORBIDE MONONITRATE 30 MG: 60 TABLET, EXTENDED RELEASE ORAL at 09:45

## 2024-12-30 RX ADMIN — INSULIN LISPRO 1 UNITS: 100 INJECTION, SOLUTION INTRAVENOUS; SUBCUTANEOUS at 18:00

## 2024-12-30 RX ADMIN — ATORVASTATIN CALCIUM 20 MG: 20 TABLET, FILM COATED ORAL at 09:46

## 2024-12-30 RX ADMIN — WATER 2000 MG: 1 INJECTION INTRAMUSCULAR; INTRAVENOUS; SUBCUTANEOUS at 09:46

## 2024-12-30 RX ADMIN — HEPARIN SODIUM 7500 UNITS: 5000 INJECTION INTRAVENOUS; SUBCUTANEOUS at 21:59

## 2024-12-30 RX ADMIN — HEPARIN SODIUM 7500 UNITS: 5000 INJECTION INTRAVENOUS; SUBCUTANEOUS at 13:43

## 2024-12-30 RX ADMIN — METOPROLOL SUCCINATE 25 MG: 25 TABLET, EXTENDED RELEASE ORAL at 09:46

## 2024-12-30 RX ADMIN — INSULIN LISPRO 10 UNITS: 100 INJECTION, SOLUTION INTRAVENOUS; SUBCUTANEOUS at 18:00

## 2024-12-30 RX ADMIN — INSULIN LISPRO 10 UNITS: 100 INJECTION, SOLUTION INTRAVENOUS; SUBCUTANEOUS at 13:42

## 2024-12-30 RX ADMIN — SPIRONOLACTONE 25 MG: 25 TABLET, COATED ORAL at 21:59

## 2024-12-30 RX ADMIN — INSULIN GLARGINE 35 UNITS: 100 INJECTION, SOLUTION SUBCUTANEOUS at 09:56

## 2024-12-30 RX ADMIN — FAMOTIDINE 20 MG: 20 TABLET, FILM COATED ORAL at 09:46

## 2024-12-30 ASSESSMENT — ORIENTATION MEMORY CONCENTRATION TEST (OMCT)
WHAT YEAR IS IT NOW (MUST BE EXACT): CORRECT
OMCT INTERPRETATION: 0-6: NO SIGNIFICANT IMPAIRMENT
WHAT TIME IS IT (NO WATCH OR CLOCK): CORRECT
OMCT SCORE: 0
SAY THE MONTHS IN REVERSE ORDER STARTING WITH LAST MONTH: CORRECT
WHAT MONTH IS IT NOW: CORRECT
REPEAT THE NAME AND ADDRESS I ASKED YOU TO REMEMBER: CORRECT
COUNT BACKWARDS FROM 20 TO 1: CORRECT

## 2024-12-30 ASSESSMENT — PAIN SCALES - GENERAL: PAINLEVEL_OUTOF10: 0

## 2024-12-31 ENCOUNTER — APPOINTMENT (OUTPATIENT)
Dept: CARDIOLOGY | Age: 69
DRG: 240 | End: 2024-12-31

## 2024-12-31 LAB
ABO + RH BLD: NORMAL
ANION GAP SERPL CALC-SCNC: 8 MMOL/L (ref 7–19)
AORTIC VALVE AREA (AVA): 0.88
ASCENDING AORTA (AAD): 8
AV STENOSIS SEVERITY TEXT: NORMAL
AVI LVOT PEAK GRADIENT (LVOTMG): 1.3
BASOPHILS # BLD: 0.1 K/MCL (ref 0–0.3)
BASOPHILS NFR BLD: 1 %
BLD GP AB SCN SERPL QL GEL: NEGATIVE
BUN SERPL-MCNC: 76 MG/DL (ref 6–20)
BUN/CREAT SERPL: 27 (ref 7–25)
CALCIUM SERPL-MCNC: 9.5 MG/DL (ref 8.4–10.2)
CHLORIDE SERPL-SCNC: 97 MMOL/L (ref 97–110)
CO2 SERPL-SCNC: 32 MMOL/L (ref 21–32)
CREAT SERPL-MCNC: 2.85 MG/DL (ref 0.67–1.17)
DEPRECATED RDW RBC: 46.8 FL (ref 39–50)
E WAVE DECELARATION TIME (MDT): 16.51
EGFRCR SERPLBLD CKD-EPI 2021: 23 ML/MIN/{1.73_M2}
EOSINOPHIL # BLD: 0.4 K/MCL (ref 0–0.5)
EOSINOPHIL NFR BLD: 4 %
ERYTHROCYTE [DISTWIDTH] IN BLOOD: 14.6 % (ref 11–15)
FASTING DURATION TIME PATIENT: ABNORMAL H
GLUCOSE BLDC GLUCOMTR-MCNC: 122 MG/DL (ref 70–99)
GLUCOSE BLDC GLUCOMTR-MCNC: 137 MG/DL (ref 70–99)
GLUCOSE BLDC GLUCOMTR-MCNC: 199 MG/DL (ref 70–99)
GLUCOSE BLDC GLUCOMTR-MCNC: 86 MG/DL (ref 70–99)
GLUCOSE SERPL-MCNC: 81 MG/DL (ref 70–99)
HCT VFR BLD CALC: 34.3 % (ref 39–51)
HGB BLD-MCNC: 10.7 G/DL (ref 13–17)
IMM GRANULOCYTES # BLD AUTO: 0.1 K/MCL (ref 0–0.2)
IMM GRANULOCYTES # BLD: 1 %
LEFT INTERNAL DIMENSION IN SYSTOLE (LVSD): 1.4
LEFT VENTRICULAR INTERNAL DIMENSION IN DIASTOLE (LVDD): 2.5
LEFT VENTRICULAR POSTERIOR WALL IN END DIASTOLE (LVPW): 4.3
LV EF: NORMAL %
LYMPHOCYTES # BLD: 2.6 K/MCL (ref 1–4)
LYMPHOCYTES NFR BLD: 24 %
MCH RBC QN AUTO: 27.5 PG (ref 26–34)
MCHC RBC AUTO-ENTMCNC: 31.2 G/DL (ref 32–36.5)
MCV RBC AUTO: 88.2 FL (ref 78–100)
MONOCYTES # BLD: 0.9 K/MCL (ref 0.3–0.9)
MONOCYTES NFR BLD: 8 %
MV E TISSUE VEL MED (MESV): 7.4
MV E WAVE VEL/E TISSUE VEL MED(MSR): 5.33
MV PEAK A VELOCITY (MVPAV): 306
MV PEAK E VELOCITY (MVPEV): 1.26
NEUTROPHILS # BLD: 6.9 K/MCL (ref 1.8–7.7)
NEUTROPHILS NFR BLD: 62 %
NRBC BLD MANUAL-RTO: 0 /100 WBC
PLATELET # BLD AUTO: 395 K/MCL (ref 140–450)
POTASSIUM SERPL-SCNC: 3.9 MMOL/L (ref 3.4–5.1)
RBC # BLD: 3.89 MIL/MCL (ref 4.5–5.9)
RV END SYSTOLIC LONGITUDINAL STRAIN FREE WALL (RVGS): 2.1
SODIUM SERPL-SCNC: 133 MMOL/L (ref 135–145)
TRICUSPID VALVE ANNULAR PEAK VELOCITY (TVAPV): 33
TRICUSPID VALVE PEAK REGURGITATION VELOCITY (TRPV): 3.3
TYPE AND SCREEN EXPIRATION DATE: NORMAL
WBC # BLD: 10.9 K/MCL (ref 4.2–11)

## 2024-12-31 PROCEDURE — 87040 BLOOD CULTURE FOR BACTERIA: CPT

## 2024-12-31 PROCEDURE — 36415 COLL VENOUS BLD VENIPUNCTURE: CPT | Performed by: INTERNAL MEDICINE

## 2024-12-31 PROCEDURE — 93306 TTE W/DOPPLER COMPLETE: CPT | Performed by: INTERNAL MEDICINE

## 2024-12-31 PROCEDURE — 10002803 HB RX 637: Performed by: INTERNAL MEDICINE

## 2024-12-31 PROCEDURE — 85025 COMPLETE CBC W/AUTO DIFF WBC: CPT | Performed by: INTERNAL MEDICINE

## 2024-12-31 PROCEDURE — 93306 TTE W/DOPPLER COMPLETE: CPT

## 2024-12-31 PROCEDURE — 10002801 HB RX 250 W/O HCPCS

## 2024-12-31 PROCEDURE — 10002800 HB RX 250 W HCPCS

## 2024-12-31 PROCEDURE — 96372 THER/PROPH/DIAG INJ SC/IM: CPT | Performed by: INTERNAL MEDICINE

## 2024-12-31 PROCEDURE — 86850 RBC ANTIBODY SCREEN: CPT | Performed by: INTERNAL MEDICINE

## 2024-12-31 PROCEDURE — 10000002 HB ROOM CHARGE MED SURG

## 2024-12-31 PROCEDURE — 10002805 HB CONTRAST AGENT: Performed by: INTERNAL MEDICINE

## 2024-12-31 PROCEDURE — 10002801 HB RX 250 W/O HCPCS: Performed by: INTERNAL MEDICINE

## 2024-12-31 PROCEDURE — 80048 BASIC METABOLIC PNL TOTAL CA: CPT | Performed by: INTERNAL MEDICINE

## 2024-12-31 PROCEDURE — 10002800 HB RX 250 W HCPCS: Performed by: INTERNAL MEDICINE

## 2024-12-31 RX ADMIN — INSULIN LISPRO 10 UNITS: 100 INJECTION, SOLUTION INTRAVENOUS; SUBCUTANEOUS at 17:37

## 2024-12-31 RX ADMIN — TORSEMIDE 60 MG: 20 TABLET ORAL at 09:14

## 2024-12-31 RX ADMIN — WATER 2000 MG: 1 INJECTION INTRAMUSCULAR; INTRAVENOUS; SUBCUTANEOUS at 09:14

## 2024-12-31 RX ADMIN — INSULIN GLARGINE 35 UNITS: 100 INJECTION, SOLUTION SUBCUTANEOUS at 09:13

## 2024-12-31 RX ADMIN — INSULIN LISPRO 10 UNITS: 100 INJECTION, SOLUTION INTRAVENOUS; SUBCUTANEOUS at 13:46

## 2024-12-31 RX ADMIN — WATER 2000 MG: 1 INJECTION INTRAMUSCULAR; INTRAVENOUS; SUBCUTANEOUS at 21:30

## 2024-12-31 RX ADMIN — HEPARIN SODIUM 7500 UNITS: 5000 INJECTION INTRAVENOUS; SUBCUTANEOUS at 21:30

## 2024-12-31 RX ADMIN — ATORVASTATIN CALCIUM 20 MG: 20 TABLET, FILM COATED ORAL at 09:15

## 2024-12-31 RX ADMIN — INSULIN GLARGINE 35 UNITS: 100 INJECTION, SOLUTION SUBCUTANEOUS at 21:30

## 2024-12-31 RX ADMIN — METOPROLOL SUCCINATE 25 MG: 25 TABLET, EXTENDED RELEASE ORAL at 09:14

## 2024-12-31 RX ADMIN — PERFLUTREN 2 ML: 6.52 INJECTION, SUSPENSION INTRAVENOUS at 15:25

## 2024-12-31 RX ADMIN — TORSEMIDE 60 MG: 20 TABLET ORAL at 17:38

## 2024-12-31 RX ADMIN — INSULIN LISPRO 10 UNITS: 100 INJECTION, SOLUTION INTRAVENOUS; SUBCUTANEOUS at 09:12

## 2024-12-31 RX ADMIN — SPIRONOLACTONE 25 MG: 25 TABLET, COATED ORAL at 09:14

## 2024-12-31 RX ADMIN — ALLOPURINOL 100 MG: 100 TABLET ORAL at 09:15

## 2024-12-31 RX ADMIN — HEPARIN SODIUM 7500 UNITS: 5000 INJECTION INTRAVENOUS; SUBCUTANEOUS at 13:48

## 2024-12-31 RX ADMIN — SPIRONOLACTONE 25 MG: 25 TABLET, COATED ORAL at 20:21

## 2024-12-31 RX ADMIN — FAMOTIDINE 20 MG: 20 TABLET, FILM COATED ORAL at 09:15

## 2024-12-31 RX ADMIN — ISOSORBIDE MONONITRATE 30 MG: 60 TABLET, EXTENDED RELEASE ORAL at 09:14

## 2024-12-31 RX ADMIN — WATER 2000 MG: 1 INJECTION INTRAMUSCULAR; INTRAVENOUS; SUBCUTANEOUS at 13:48

## 2024-12-31 ASSESSMENT — ENCOUNTER SYMPTOMS
ACTIVITY IMPAIRMENT: NORMAL
SHORTNESS OF BREATH: 0
DIETARY ISSUES: ADEQUATE INTAKE
NAUSEA: 0
NO PATIENT REPORTED PAIN: 1

## 2024-12-31 ASSESSMENT — PAIN SCALES - GENERAL: PAINLEVEL_OUTOF10: 0

## 2025-01-01 ENCOUNTER — ANESTHESIA EVENT (OUTPATIENT)
Dept: SURGERY | Age: 70
End: 2025-01-01

## 2025-01-01 ENCOUNTER — APPOINTMENT (OUTPATIENT)
Dept: GENERAL RADIOLOGY | Age: 70
DRG: 240 | End: 2025-01-01
Attending: PODIATRIST

## 2025-01-01 ENCOUNTER — ANESTHESIA (OUTPATIENT)
Dept: SURGERY | Age: 70
End: 2025-01-01

## 2025-01-01 LAB
ANION GAP SERPL CALC-SCNC: 11 MMOL/L (ref 7–19)
ATRIAL RATE (BPM): 82
BACTERIA BLD CULT: ABNORMAL
BASOPHILS # BLD: 0.1 K/MCL (ref 0–0.3)
BASOPHILS NFR BLD: 1 %
BUN SERPL-MCNC: 79 MG/DL (ref 6–20)
BUN/CREAT SERPL: 25 (ref 7–25)
CALCIUM SERPL-MCNC: 9.7 MG/DL (ref 8.4–10.2)
CHLORIDE SERPL-SCNC: 97 MMOL/L (ref 97–110)
CO2 SERPL-SCNC: 29 MMOL/L (ref 21–32)
CREAT SERPL-MCNC: 3.11 MG/DL (ref 0.67–1.17)
DEPRECATED RDW RBC: 46.6 FL (ref 39–50)
EGFRCR SERPLBLD CKD-EPI 2021: 21 ML/MIN/{1.73_M2}
EOSINOPHIL # BLD: 0.4 K/MCL (ref 0–0.5)
EOSINOPHIL NFR BLD: 3 %
ERYTHROCYTE [DISTWIDTH] IN BLOOD: 14.6 % (ref 11–15)
FASTING DURATION TIME PATIENT: ABNORMAL H
GLUCOSE BLDC GLUCOMTR-MCNC: 132 MG/DL (ref 70–99)
GLUCOSE BLDC GLUCOMTR-MCNC: 137 MG/DL (ref 70–99)
GLUCOSE BLDC GLUCOMTR-MCNC: 148 MG/DL (ref 70–99)
GLUCOSE BLDC GLUCOMTR-MCNC: 172 MG/DL (ref 70–99)
GLUCOSE BLDC GLUCOMTR-MCNC: 258 MG/DL (ref 70–99)
GLUCOSE SERPL-MCNC: 137 MG/DL (ref 70–99)
GRAM STN SPEC: ABNORMAL
HCT VFR BLD CALC: 36.2 % (ref 39–51)
HGB BLD-MCNC: 11.5 G/DL (ref 13–17)
IMM GRANULOCYTES # BLD AUTO: 0.1 K/MCL (ref 0–0.2)
IMM GRANULOCYTES # BLD: 1 %
LYMPHOCYTES # BLD: 2.1 K/MCL (ref 1–4)
LYMPHOCYTES NFR BLD: 16 %
MCH RBC QN AUTO: 27.8 PG (ref 26–34)
MCHC RBC AUTO-ENTMCNC: 31.8 G/DL (ref 32–36.5)
MCV RBC AUTO: 87.7 FL (ref 78–100)
MONOCYTES # BLD: 1.1 K/MCL (ref 0.3–0.9)
MONOCYTES NFR BLD: 9 %
NEUTROPHILS # BLD: 9.3 K/MCL (ref 1.8–7.7)
NEUTROPHILS NFR BLD: 70 %
NRBC BLD MANUAL-RTO: 0 /100 WBC
P AXIS (DEGREES): 17
PLATELET # BLD AUTO: 410 K/MCL (ref 140–450)
POTASSIUM SERPL-SCNC: 4 MMOL/L (ref 3.4–5.1)
PR-INTERVAL (MSEC): 258
QRS-INTERVAL (MSEC): 82
QT-INTERVAL (MSEC): 398
QTC: 465
R AXIS (DEGREES): 43
RBC # BLD: 4.13 MIL/MCL (ref 4.5–5.9)
REPORT TEXT: NORMAL
SODIUM SERPL-SCNC: 133 MMOL/L (ref 135–145)
T AXIS (DEGREES): 175
VENTRICULAR RATE EKG/MIN (BPM): 82
WBC # BLD: 13.1 K/MCL (ref 4.2–11)

## 2025-01-01 PROCEDURE — 85025 COMPLETE CBC W/AUTO DIFF WBC: CPT | Performed by: INTERNAL MEDICINE

## 2025-01-01 PROCEDURE — 10002803 HB RX 637: Performed by: INTERNAL MEDICINE

## 2025-01-01 PROCEDURE — 36415 COLL VENOUS BLD VENIPUNCTURE: CPT | Performed by: INTERNAL MEDICINE

## 2025-01-01 PROCEDURE — 10002800 HB RX 250 W HCPCS

## 2025-01-01 PROCEDURE — 10004451 HB PACU RECOVERY 1ST 30 MINUTES: Performed by: PODIATRIST

## 2025-01-01 PROCEDURE — 10004180 HB COUNTER-TRANSPORT

## 2025-01-01 PROCEDURE — 10005281 FL INTRAOPERATIVE C ARM WITH REPORT

## 2025-01-01 PROCEDURE — 0Y6M0ZF DETACHMENT AT RIGHT FOOT, PARTIAL 5TH RAY, OPEN APPROACH: ICD-10-PCS | Performed by: PODIATRIST

## 2025-01-01 PROCEDURE — 93010 ELECTROCARDIOGRAM REPORT: CPT | Performed by: INTERNAL MEDICINE

## 2025-01-01 PROCEDURE — 88305 TISSUE EXAM BY PATHOLOGIST: CPT | Performed by: PODIATRIST

## 2025-01-01 PROCEDURE — 10004452 HB PACU ADDL 30 MINUTES: Performed by: PODIATRIST

## 2025-01-01 PROCEDURE — 10006023 HB SUPPLY 272: Performed by: PODIATRIST

## 2025-01-01 PROCEDURE — 80048 BASIC METABOLIC PNL TOTAL CA: CPT | Performed by: INTERNAL MEDICINE

## 2025-01-01 PROCEDURE — 93005 ELECTROCARDIOGRAM TRACING: CPT | Performed by: INTERNAL MEDICINE

## 2025-01-01 PROCEDURE — 13000007 HB ANESTHESIA MAC EA ADD MINUTE: Performed by: PODIATRIST

## 2025-01-01 PROCEDURE — 10002807 HB RX 258: Performed by: ANESTHESIOLOGY

## 2025-01-01 PROCEDURE — 87070 CULTURE OTHR SPECIMN AEROBIC: CPT | Performed by: PODIATRIST

## 2025-01-01 PROCEDURE — 96372 THER/PROPH/DIAG INJ SC/IM: CPT | Performed by: INTERNAL MEDICINE

## 2025-01-01 PROCEDURE — 87077 CULTURE AEROBIC IDENTIFY: CPT | Performed by: PODIATRIST

## 2025-01-01 PROCEDURE — 13000114 HB ORTHO BASIC CASE S/U + 1ST 15 MIN: Performed by: PODIATRIST

## 2025-01-01 PROCEDURE — 13000006 HB ANESTHESIA MAC S/U + 1ST 15 MIN: Performed by: PODIATRIST

## 2025-01-01 PROCEDURE — 10000002 HB ROOM CHARGE MED SURG

## 2025-01-01 PROCEDURE — 13000115 HB ORTHO BASIC CASE EA ADD MINUTE: Performed by: PODIATRIST

## 2025-01-01 PROCEDURE — 82962 GLUCOSE BLOOD TEST: CPT

## 2025-01-01 PROCEDURE — 87102 FUNGUS ISOLATION CULTURE: CPT | Performed by: PODIATRIST

## 2025-01-01 PROCEDURE — 10002800 HB RX 250 W HCPCS: Performed by: ANESTHESIOLOGY

## 2025-01-01 PROCEDURE — 10002801 HB RX 250 W/O HCPCS

## 2025-01-01 PROCEDURE — 87015 SPECIMEN INFECT AGNT CONCNTJ: CPT | Performed by: PODIATRIST

## 2025-01-01 PROCEDURE — 10002801 HB RX 250 W/O HCPCS: Performed by: ANESTHESIOLOGY

## 2025-01-01 PROCEDURE — 10002800 HB RX 250 W HCPCS: Performed by: INTERNAL MEDICINE

## 2025-01-01 RX ORDER — DIPHENHYDRAMINE HCL 25 MG
25 CAPSULE ORAL
Status: ACTIVE | OUTPATIENT
Start: 2025-01-01 | End: 2025-01-01

## 2025-01-01 RX ORDER — HYDROCODONE BITARTRATE AND ACETAMINOPHEN 5; 325 MG/1; MG/1
1 TABLET ORAL
Status: DISCONTINUED | OUTPATIENT
Start: 2025-01-01 | End: 2025-01-01 | Stop reason: HOSPADM

## 2025-01-01 RX ORDER — KETAMINE HYDROCHLORIDE 50 MG/ML
INJECTION, SOLUTION INTRAMUSCULAR; INTRAVENOUS PRN
Status: DISCONTINUED | OUTPATIENT
Start: 2025-01-01 | End: 2025-01-01

## 2025-01-01 RX ORDER — PALONOSETRON 0.05 MG/ML
0.25 INJECTION, SOLUTION INTRAVENOUS
Status: ACTIVE | OUTPATIENT
Start: 2025-01-01 | End: 2025-01-01

## 2025-01-01 RX ORDER — OXYCODONE HYDROCHLORIDE 5 MG/1
5 TABLET ORAL
Status: DISCONTINUED | OUTPATIENT
Start: 2025-01-01 | End: 2025-01-01 | Stop reason: HOSPADM

## 2025-01-01 RX ORDER — HYDRALAZINE HYDROCHLORIDE 20 MG/ML
5 INJECTION INTRAMUSCULAR; INTRAVENOUS EVERY 10 MIN PRN
Status: DISCONTINUED | OUTPATIENT
Start: 2025-01-01 | End: 2025-01-01 | Stop reason: HOSPADM

## 2025-01-01 RX ORDER — ENALAPRILAT 1.25 MG/ML
1.25 INJECTION INTRAVENOUS
Status: DISCONTINUED | OUTPATIENT
Start: 2025-01-01 | End: 2025-01-01 | Stop reason: HOSPADM

## 2025-01-01 RX ORDER — DROPERIDOL 2.5 MG/ML
0.62 INJECTION, SOLUTION INTRAMUSCULAR; INTRAVENOUS
Status: ACTIVE | OUTPATIENT
Start: 2025-01-01 | End: 2025-01-01

## 2025-01-01 RX ORDER — MEPERIDINE HYDROCHLORIDE 50 MG/ML
12.5 INJECTION INTRAMUSCULAR; INTRAVENOUS; SUBCUTANEOUS EVERY 10 MIN PRN
Status: DISCONTINUED | OUTPATIENT
Start: 2025-01-01 | End: 2025-01-01 | Stop reason: HOSPADM

## 2025-01-01 RX ORDER — SODIUM CHLORIDE 9 MG/ML
INJECTION, SOLUTION INTRAVENOUS CONTINUOUS PRN
Status: DISCONTINUED | OUTPATIENT
Start: 2025-01-01 | End: 2025-01-01

## 2025-01-01 RX ORDER — PROCHLORPERAZINE EDISYLATE 5 MG/ML
5 INJECTION INTRAMUSCULAR; INTRAVENOUS
Status: ACTIVE | OUTPATIENT
Start: 2025-01-01 | End: 2025-01-01

## 2025-01-01 RX ORDER — OXYCODONE HYDROCHLORIDE 5 MG/1
5 TABLET ORAL
Status: ACTIVE | OUTPATIENT
Start: 2025-01-01 | End: 2025-01-01

## 2025-01-01 RX ORDER — ONDANSETRON 2 MG/ML
4 INJECTION INTRAMUSCULAR; INTRAVENOUS
Status: ACTIVE | OUTPATIENT
Start: 2025-01-01 | End: 2025-01-01

## 2025-01-01 RX ORDER — MIDAZOLAM HYDROCHLORIDE 1 MG/ML
INJECTION, SOLUTION INTRAMUSCULAR; INTRAVENOUS PRN
Status: DISCONTINUED | OUTPATIENT
Start: 2025-01-01 | End: 2025-01-01

## 2025-01-01 RX ADMIN — ATORVASTATIN CALCIUM 20 MG: 20 TABLET, FILM COATED ORAL at 11:05

## 2025-01-01 RX ADMIN — WATER 2000 MG: 1 INJECTION INTRAMUSCULAR; INTRAVENOUS; SUBCUTANEOUS at 21:43

## 2025-01-01 RX ADMIN — SPIRONOLACTONE 25 MG: 25 TABLET, COATED ORAL at 11:07

## 2025-01-01 RX ADMIN — METOPROLOL SUCCINATE 25 MG: 25 TABLET, EXTENDED RELEASE ORAL at 11:05

## 2025-01-01 RX ADMIN — TORSEMIDE 60 MG: 20 TABLET ORAL at 11:07

## 2025-01-01 RX ADMIN — SODIUM CHLORIDE: 9 INJECTION, SOLUTION INTRAVENOUS at 08:12

## 2025-01-01 RX ADMIN — ALLOPURINOL 100 MG: 100 TABLET ORAL at 11:07

## 2025-01-01 RX ADMIN — WATER 2000 MG: 1 INJECTION INTRAMUSCULAR; INTRAVENOUS; SUBCUTANEOUS at 08:25

## 2025-01-01 RX ADMIN — FAMOTIDINE 20 MG: 20 TABLET, FILM COATED ORAL at 11:04

## 2025-01-01 RX ADMIN — PROPOFOL INJECTABLE EMULSION 100 MCG/KG/MIN: 10 INJECTION, EMULSION INTRAVENOUS at 08:24

## 2025-01-01 RX ADMIN — INSULIN GLARGINE 35 UNITS: 100 INJECTION, SOLUTION SUBCUTANEOUS at 21:47

## 2025-01-01 RX ADMIN — HYDROCODONE BITARTRATE AND ACETAMINOPHEN 1 TABLET: 5; 325 TABLET ORAL at 21:41

## 2025-01-01 RX ADMIN — TORSEMIDE 60 MG: 20 TABLET ORAL at 17:53

## 2025-01-01 RX ADMIN — MIDAZOLAM HYDROCHLORIDE 2 MG: 1 INJECTION, SOLUTION INTRAMUSCULAR; INTRAVENOUS at 08:15

## 2025-01-01 RX ADMIN — INSULIN LISPRO 10 UNITS: 100 INJECTION, SOLUTION INTRAVENOUS; SUBCUTANEOUS at 15:12

## 2025-01-01 RX ADMIN — KETAMINE HYDROCHLORIDE 25 MG: 50 INJECTION INTRAMUSCULAR; INTRAVENOUS at 08:24

## 2025-01-01 RX ADMIN — SPIRONOLACTONE 25 MG: 25 TABLET, COATED ORAL at 21:41

## 2025-01-01 RX ADMIN — KETAMINE HYDROCHLORIDE 25 MG: 50 INJECTION INTRAMUSCULAR; INTRAVENOUS at 08:27

## 2025-01-01 RX ADMIN — FENTANYL CITRATE 100 MCG: 50 INJECTION INTRAMUSCULAR; INTRAVENOUS at 08:24

## 2025-01-01 RX ADMIN — WATER 2000 MG: 1 INJECTION INTRAMUSCULAR; INTRAVENOUS; SUBCUTANEOUS at 06:22

## 2025-01-01 RX ADMIN — ISOSORBIDE MONONITRATE 30 MG: 60 TABLET, EXTENDED RELEASE ORAL at 11:05

## 2025-01-01 RX ADMIN — HEPARIN SODIUM 7500 UNITS: 5000 INJECTION INTRAVENOUS; SUBCUTANEOUS at 21:43

## 2025-01-01 RX ADMIN — WATER 2000 MG: 1 INJECTION INTRAMUSCULAR; INTRAVENOUS; SUBCUTANEOUS at 14:48

## 2025-01-01 RX ADMIN — INSULIN LISPRO 3 UNITS: 100 INJECTION, SOLUTION INTRAVENOUS; SUBCUTANEOUS at 17:53

## 2025-01-01 ASSESSMENT — ENCOUNTER SYMPTOMS
EXERCISE TOLERANCE: GOOD (>4 METS)
DIETARY ISSUES: ADEQUATE INTAKE
NO PATIENT REPORTED PAIN: 1
ACTIVITY IMPAIRMENT: NORMAL
SHORTNESS OF BREATH: 0
NAUSEA: 0

## 2025-01-01 ASSESSMENT — PAIN SCALES - GENERAL
PAINLEVEL_OUTOF10: 1
PAINLEVEL_OUTOF10: 2
PAINLEVEL_OUTOF10: 2
PAINLEVEL_OUTOF10: 7
PAINLEVEL_OUTOF10: 0
PAINLEVEL_OUTOF10: 2

## 2025-01-02 ENCOUNTER — APPOINTMENT (OUTPATIENT)
Dept: GENERAL RADIOLOGY | Age: 70
DRG: 240 | End: 2025-01-02
Attending: PODIATRIST

## 2025-01-02 ENCOUNTER — APPOINTMENT (OUTPATIENT)
Dept: WOUND CARE | Age: 70
End: 2025-01-02
Attending: FAMILY MEDICINE

## 2025-01-02 ENCOUNTER — APPOINTMENT (OUTPATIENT)
Dept: GENERAL RADIOLOGY | Age: 70
DRG: 240 | End: 2025-01-02
Attending: INTERNAL MEDICINE

## 2025-01-02 LAB
ANION GAP SERPL CALC-SCNC: 10 MMOL/L (ref 7–19)
BACTERIA BLD CULT: NORMAL
BASOPHILS # BLD: 0.1 K/MCL (ref 0–0.3)
BASOPHILS NFR BLD: 1 %
BUN SERPL-MCNC: 77 MG/DL (ref 6–20)
BUN/CREAT SERPL: 25 (ref 7–25)
CALCIUM SERPL-MCNC: 9 MG/DL (ref 8.4–10.2)
CHLORIDE SERPL-SCNC: 99 MMOL/L (ref 97–110)
CO2 SERPL-SCNC: 28 MMOL/L (ref 21–32)
CREAT SERPL-MCNC: 3.03 MG/DL (ref 0.67–1.17)
DEPRECATED RDW RBC: 47.1 FL (ref 39–50)
EGFRCR SERPLBLD CKD-EPI 2021: 22 ML/MIN/{1.73_M2}
EOSINOPHIL # BLD: 0.1 K/MCL (ref 0–0.5)
EOSINOPHIL NFR BLD: 1 %
ERYTHROCYTE [DISTWIDTH] IN BLOOD: 14.8 % (ref 11–15)
FASTING DURATION TIME PATIENT: ABNORMAL H
GLUCOSE BLDC GLUCOMTR-MCNC: 162 MG/DL (ref 70–99)
GLUCOSE BLDC GLUCOMTR-MCNC: 176 MG/DL (ref 70–99)
GLUCOSE BLDC GLUCOMTR-MCNC: 177 MG/DL (ref 70–99)
GLUCOSE BLDC GLUCOMTR-MCNC: 198 MG/DL (ref 70–99)
GLUCOSE SERPL-MCNC: 163 MG/DL (ref 70–99)
HCT VFR BLD CALC: 29.6 % (ref 39–51)
HGB BLD-MCNC: 9.5 G/DL (ref 13–17)
IMM GRANULOCYTES # BLD AUTO: 0.1 K/MCL (ref 0–0.2)
IMM GRANULOCYTES # BLD: 1 %
LYMPHOCYTES # BLD: 2 K/MCL (ref 1–4)
LYMPHOCYTES NFR BLD: 16 %
MCH RBC QN AUTO: 28.5 PG (ref 26–34)
MCHC RBC AUTO-ENTMCNC: 32.1 G/DL (ref 32–36.5)
MCV RBC AUTO: 88.9 FL (ref 78–100)
MONOCYTES # BLD: 1.3 K/MCL (ref 0.3–0.9)
MONOCYTES NFR BLD: 10 %
NEUTROPHILS # BLD: 9 K/MCL (ref 1.8–7.7)
NEUTROPHILS NFR BLD: 71 %
NRBC BLD MANUAL-RTO: 0 /100 WBC
PLATELET # BLD AUTO: 343 K/MCL (ref 140–450)
POTASSIUM SERPL-SCNC: 3.7 MMOL/L (ref 3.4–5.1)
RBC # BLD: 3.33 MIL/MCL (ref 4.5–5.9)
SODIUM SERPL-SCNC: 133 MMOL/L (ref 135–145)
WBC # BLD: 12.5 K/MCL (ref 4.2–11)

## 2025-01-02 PROCEDURE — 10002800 HB RX 250 W HCPCS: Performed by: INTERNAL MEDICINE

## 2025-01-02 PROCEDURE — 85025 COMPLETE CBC W/AUTO DIFF WBC: CPT | Performed by: INTERNAL MEDICINE

## 2025-01-02 PROCEDURE — 97110 THERAPEUTIC EXERCISES: CPT

## 2025-01-02 PROCEDURE — 80048 BASIC METABOLIC PNL TOTAL CA: CPT | Performed by: INTERNAL MEDICINE

## 2025-01-02 PROCEDURE — 97166 OT EVAL MOD COMPLEX 45 MIN: CPT

## 2025-01-02 PROCEDURE — 10002801 HB RX 250 W/O HCPCS

## 2025-01-02 PROCEDURE — 96372 THER/PROPH/DIAG INJ SC/IM: CPT | Performed by: INTERNAL MEDICINE

## 2025-01-02 PROCEDURE — 10002800 HB RX 250 W HCPCS

## 2025-01-02 PROCEDURE — 97535 SELF CARE MNGMENT TRAINING: CPT

## 2025-01-02 PROCEDURE — 73562 X-RAY EXAM OF KNEE 3: CPT

## 2025-01-02 PROCEDURE — 10000002 HB ROOM CHARGE MED SURG

## 2025-01-02 PROCEDURE — 73630 X-RAY EXAM OF FOOT: CPT

## 2025-01-02 PROCEDURE — 10004180 HB COUNTER-TRANSPORT

## 2025-01-02 PROCEDURE — 10002803 HB RX 637: Performed by: INTERNAL MEDICINE

## 2025-01-02 PROCEDURE — 36415 COLL VENOUS BLD VENIPUNCTURE: CPT | Performed by: INTERNAL MEDICINE

## 2025-01-02 PROCEDURE — 97162 PT EVAL MOD COMPLEX 30 MIN: CPT

## 2025-01-02 RX ADMIN — FAMOTIDINE 20 MG: 20 TABLET, FILM COATED ORAL at 09:40

## 2025-01-02 RX ADMIN — HYDROCODONE BITARTRATE AND ACETAMINOPHEN 1 TABLET: 5; 325 TABLET ORAL at 01:59

## 2025-01-02 RX ADMIN — INSULIN LISPRO 1 UNITS: 100 INJECTION, SOLUTION INTRAVENOUS; SUBCUTANEOUS at 13:22

## 2025-01-02 RX ADMIN — INSULIN LISPRO 10 UNITS: 100 INJECTION, SOLUTION INTRAVENOUS; SUBCUTANEOUS at 17:51

## 2025-01-02 RX ADMIN — WATER 2000 MG: 1 INJECTION INTRAMUSCULAR; INTRAVENOUS; SUBCUTANEOUS at 22:03

## 2025-01-02 RX ADMIN — ATORVASTATIN CALCIUM 20 MG: 20 TABLET, FILM COATED ORAL at 09:40

## 2025-01-02 RX ADMIN — TORSEMIDE 60 MG: 20 TABLET ORAL at 17:16

## 2025-01-02 RX ADMIN — HYDROCODONE BITARTRATE AND ACETAMINOPHEN 1 TABLET: 5; 325 TABLET ORAL at 13:23

## 2025-01-02 RX ADMIN — HEPARIN SODIUM 7500 UNITS: 5000 INJECTION INTRAVENOUS; SUBCUTANEOUS at 13:23

## 2025-01-02 RX ADMIN — HEPARIN SODIUM 7500 UNITS: 5000 INJECTION INTRAVENOUS; SUBCUTANEOUS at 22:03

## 2025-01-02 RX ADMIN — INSULIN GLARGINE 35 UNITS: 100 INJECTION, SOLUTION SUBCUTANEOUS at 09:50

## 2025-01-02 RX ADMIN — INSULIN LISPRO 10 UNITS: 100 INJECTION, SOLUTION INTRAVENOUS; SUBCUTANEOUS at 13:22

## 2025-01-02 RX ADMIN — SPIRONOLACTONE 25 MG: 25 TABLET, COATED ORAL at 09:40

## 2025-01-02 RX ADMIN — SPIRONOLACTONE 25 MG: 25 TABLET, COATED ORAL at 20:00

## 2025-01-02 RX ADMIN — HYDROCODONE BITARTRATE AND ACETAMINOPHEN 1 TABLET: 5; 325 TABLET ORAL at 19:48

## 2025-01-02 RX ADMIN — HYDROCODONE BITARTRATE AND ACETAMINOPHEN 1 TABLET: 5; 325 TABLET ORAL at 09:40

## 2025-01-02 RX ADMIN — WATER 2000 MG: 1 INJECTION INTRAMUSCULAR; INTRAVENOUS; SUBCUTANEOUS at 13:24

## 2025-01-02 RX ADMIN — INSULIN LISPRO 1 UNITS: 100 INJECTION, SOLUTION INTRAVENOUS; SUBCUTANEOUS at 09:43

## 2025-01-02 RX ADMIN — INSULIN LISPRO 1 UNITS: 100 INJECTION, SOLUTION INTRAVENOUS; SUBCUTANEOUS at 17:51

## 2025-01-02 RX ADMIN — INSULIN GLARGINE 35 UNITS: 100 INJECTION, SOLUTION SUBCUTANEOUS at 20:00

## 2025-01-02 RX ADMIN — TORSEMIDE 60 MG: 20 TABLET ORAL at 09:40

## 2025-01-02 RX ADMIN — WATER 2000 MG: 1 INJECTION INTRAMUSCULAR; INTRAVENOUS; SUBCUTANEOUS at 05:40

## 2025-01-02 RX ADMIN — ISOSORBIDE MONONITRATE 30 MG: 60 TABLET, EXTENDED RELEASE ORAL at 09:41

## 2025-01-02 RX ADMIN — METOPROLOL SUCCINATE 25 MG: 25 TABLET, EXTENDED RELEASE ORAL at 09:41

## 2025-01-02 RX ADMIN — ALLOPURINOL 100 MG: 100 TABLET ORAL at 09:40

## 2025-01-02 RX ADMIN — HEPARIN SODIUM 7500 UNITS: 5000 INJECTION INTRAVENOUS; SUBCUTANEOUS at 05:40

## 2025-01-02 ASSESSMENT — PAIN SCALES - GENERAL
PAINLEVEL_OUTOF10: 5
PAINLEVEL_OUTOF10: 4
PAINLEVEL_OUTOF10: 7
PAINLEVEL_OUTOF10: 6
PAINLEVEL_OUTOF10: 2
PAINLEVEL_OUTOF10: 4
PAINLEVEL_OUTOF10: 4
PAINLEVEL_OUTOF10: 8
PAINLEVEL_OUTOF10: 7

## 2025-01-02 ASSESSMENT — COGNITIVE AND FUNCTIONAL STATUS - GENERAL
HELP NEEDED FOR BATHING: A LOT
BASIC_MOBILITY_CONVERTED_SCORE: 25.80
HELP NEEDED DRESSING REGULAR UPPER BODY CLOTHING: A LITTLE
DAILY_ACTIVITY_CONVERTED_SCORE: 37.26
DAILY_ACTIVITY_RAW_SCORE: 17
HELP NEEDED FOR TOILETING: A LOT
HELP NEEDED DRESSING REGULAR LOWER BODY CLOTHING: A LOT
BASIC_MOBILITY_RAW_SCORE: 9

## 2025-01-02 ASSESSMENT — ENCOUNTER SYMPTOMS
PAIN SEVERITY NOW: 5
ACTIVITY IMPAIRMENT: NORMAL
DIETARY ISSUES: ADEQUATE INTAKE
NAUSEA: 0
SHORTNESS OF BREATH: 0
NO PATIENT REPORTED PAIN: 1

## 2025-01-02 ASSESSMENT — ACTIVITIES OF DAILY LIVING (ADL)
PRIOR_ADL: MODIFIED INDEPENDENT
HOME_MANAGEMENT_TIME_ENTRY: 13

## 2025-01-03 ENCOUNTER — ANESTHESIA (OUTPATIENT)
Dept: CARDIOLOGY | Age: 70
End: 2025-01-03

## 2025-01-03 ENCOUNTER — APPOINTMENT (OUTPATIENT)
Dept: CARDIOLOGY | Age: 70
DRG: 240 | End: 2025-01-03
Attending: INTERNAL MEDICINE

## 2025-01-03 ENCOUNTER — ANESTHESIA EVENT (OUTPATIENT)
Dept: CARDIOLOGY | Age: 70
End: 2025-01-03

## 2025-01-03 LAB
ANION GAP SERPL CALC-SCNC: 10 MMOL/L (ref 7–19)
BUN SERPL-MCNC: 87 MG/DL (ref 6–20)
BUN/CREAT SERPL: 27 (ref 7–25)
CALCIUM SERPL-MCNC: 9.1 MG/DL (ref 8.4–10.2)
CHLORIDE SERPL-SCNC: 96 MMOL/L (ref 97–110)
CO2 SERPL-SCNC: 30 MMOL/L (ref 21–32)
CREAT SERPL-MCNC: 3.21 MG/DL (ref 0.67–1.17)
EGFRCR SERPLBLD CKD-EPI 2021: 20 ML/MIN/{1.73_M2}
FASTING DURATION TIME PATIENT: ABNORMAL H
GLUCOSE BLDC GLUCOMTR-MCNC: 157 MG/DL (ref 70–99)
GLUCOSE BLDC GLUCOMTR-MCNC: 166 MG/DL (ref 70–99)
GLUCOSE BLDC GLUCOMTR-MCNC: 182 MG/DL (ref 70–99)
GLUCOSE BLDC GLUCOMTR-MCNC: 216 MG/DL (ref 70–99)
GLUCOSE BLDC GLUCOMTR-MCNC: 250 MG/DL (ref 70–99)
GLUCOSE SERPL-MCNC: 146 MG/DL (ref 70–99)
LV EF: NORMAL %
POTASSIUM SERPL-SCNC: 3.9 MMOL/L (ref 3.4–5.1)
RAINBOW EXTRA TUBES HOLD SPECIMEN: NORMAL
SODIUM SERPL-SCNC: 132 MMOL/L (ref 135–145)

## 2025-01-03 PROCEDURE — 10002800 HB RX 250 W HCPCS: Performed by: INTERNAL MEDICINE

## 2025-01-03 PROCEDURE — B24CZZ4 ULTRASONOGRAPHY OF PERICARDIUM, TRANSESOPHAGEAL: ICD-10-PCS | Performed by: INTERNAL MEDICINE

## 2025-01-03 PROCEDURE — 10002803 HB RX 637: Performed by: INTERNAL MEDICINE

## 2025-01-03 PROCEDURE — 10004180 HB COUNTER-TRANSPORT

## 2025-01-03 PROCEDURE — 36415 COLL VENOUS BLD VENIPUNCTURE: CPT | Performed by: INTERNAL MEDICINE

## 2025-01-03 PROCEDURE — B24BZZ4 ULTRASONOGRAPHY OF HEART WITH AORTA, TRANSESOPHAGEAL: ICD-10-PCS | Performed by: INTERNAL MEDICINE

## 2025-01-03 PROCEDURE — 10002800 HB RX 250 W HCPCS

## 2025-01-03 PROCEDURE — 13000008 HB ANESTHESIA MAC OUTSIDE OR

## 2025-01-03 PROCEDURE — 10002800 HB RX 250 W HCPCS: Performed by: STUDENT IN AN ORGANIZED HEALTH CARE EDUCATION/TRAINING PROGRAM

## 2025-01-03 PROCEDURE — 13000001 HB PHASE II RECOVERY EA 30 MINUTES

## 2025-01-03 PROCEDURE — 93320 DOPPLER ECHO COMPLETE: CPT | Performed by: INTERNAL MEDICINE

## 2025-01-03 PROCEDURE — 10000002 HB ROOM CHARGE MED SURG

## 2025-01-03 PROCEDURE — 96372 THER/PROPH/DIAG INJ SC/IM: CPT | Performed by: INTERNAL MEDICINE

## 2025-01-03 PROCEDURE — 10002801 HB RX 250 W/O HCPCS

## 2025-01-03 PROCEDURE — 93319 3D ECHO IMG CGEN CAR ANOMAL: CPT | Performed by: INTERNAL MEDICINE

## 2025-01-03 PROCEDURE — 80048 BASIC METABOLIC PNL TOTAL CA: CPT | Performed by: INTERNAL MEDICINE

## 2025-01-03 PROCEDURE — 93325 DOPPLER ECHO COLOR FLOW MAPG: CPT

## 2025-01-03 PROCEDURE — 93312 ECHO TRANSESOPHAGEAL: CPT | Performed by: INTERNAL MEDICINE

## 2025-01-03 PROCEDURE — 10002807 HB RX 258: Performed by: STUDENT IN AN ORGANIZED HEALTH CARE EDUCATION/TRAINING PROGRAM

## 2025-01-03 RX ORDER — SODIUM CHLORIDE, SODIUM LACTATE, POTASSIUM CHLORIDE, CALCIUM CHLORIDE 600; 310; 30; 20 MG/100ML; MG/100ML; MG/100ML; MG/100ML
INJECTION, SOLUTION INTRAVENOUS CONTINUOUS PRN
Status: DISCONTINUED | OUTPATIENT
Start: 2025-01-03 | End: 2025-01-03

## 2025-01-03 RX ORDER — SODIUM CHLORIDE 9 MG/ML
INJECTION, SOLUTION INTRAVENOUS CONTINUOUS
Status: ACTIVE | OUTPATIENT
Start: 2025-01-03

## 2025-01-03 RX ORDER — TRIAMCINOLONE ACETONIDE 40 MG/ML
80 INJECTION, SUSPENSION INTRA-ARTICULAR; INTRAMUSCULAR ONCE
Status: DISPENSED | OUTPATIENT
Start: 2025-01-03

## 2025-01-03 RX ORDER — LIDOCAINE HYDROCHLORIDE 20 MG/ML
10 INJECTION, SOLUTION INFILTRATION; PERINEURAL ONCE
Status: DISPENSED | OUTPATIENT
Start: 2025-01-03

## 2025-01-03 RX ADMIN — SODIUM CHLORIDE, POTASSIUM CHLORIDE, SODIUM LACTATE AND CALCIUM CHLORIDE: 600; 310; 30; 20 INJECTION, SOLUTION INTRAVENOUS at 15:12

## 2025-01-03 RX ADMIN — ATORVASTATIN CALCIUM 20 MG: 20 TABLET, FILM COATED ORAL at 08:56

## 2025-01-03 RX ADMIN — INSULIN LISPRO 1 UNITS: 100 INJECTION, SOLUTION INTRAVENOUS; SUBCUTANEOUS at 18:53

## 2025-01-03 RX ADMIN — INSULIN GLARGINE 35 UNITS: 100 INJECTION, SOLUTION SUBCUTANEOUS at 10:39

## 2025-01-03 RX ADMIN — INSULIN LISPRO 1 UNITS: 100 INJECTION, SOLUTION INTRAVENOUS; SUBCUTANEOUS at 08:58

## 2025-01-03 RX ADMIN — FAMOTIDINE 20 MG: 20 TABLET, FILM COATED ORAL at 08:56

## 2025-01-03 RX ADMIN — INSULIN GLARGINE 35 UNITS: 100 INJECTION, SOLUTION SUBCUTANEOUS at 22:29

## 2025-01-03 RX ADMIN — ALLOPURINOL 100 MG: 100 TABLET ORAL at 08:56

## 2025-01-03 RX ADMIN — HEPARIN SODIUM 7500 UNITS: 5000 INJECTION INTRAVENOUS; SUBCUTANEOUS at 22:28

## 2025-01-03 RX ADMIN — SPIRONOLACTONE 25 MG: 25 TABLET, COATED ORAL at 20:23

## 2025-01-03 RX ADMIN — INSULIN LISPRO 10 UNITS: 100 INJECTION, SOLUTION INTRAVENOUS; SUBCUTANEOUS at 18:53

## 2025-01-03 RX ADMIN — TORSEMIDE 60 MG: 20 TABLET ORAL at 17:32

## 2025-01-03 RX ADMIN — PROPOFOL INJECTABLE EMULSION 100 MCG/KG/MIN: 10 INJECTION, EMULSION INTRAVENOUS at 15:13

## 2025-01-03 RX ADMIN — HYDROCODONE BITARTRATE AND ACETAMINOPHEN 1 TABLET: 5; 325 TABLET ORAL at 17:33

## 2025-01-03 RX ADMIN — WATER 2000 MG: 1 INJECTION INTRAMUSCULAR; INTRAVENOUS; SUBCUTANEOUS at 22:30

## 2025-01-03 RX ADMIN — TORSEMIDE 60 MG: 20 TABLET ORAL at 08:55

## 2025-01-03 RX ADMIN — METOPROLOL SUCCINATE 25 MG: 25 TABLET, EXTENDED RELEASE ORAL at 08:56

## 2025-01-03 RX ADMIN — HEPARIN SODIUM 7500 UNITS: 5000 INJECTION INTRAVENOUS; SUBCUTANEOUS at 05:52

## 2025-01-03 RX ADMIN — WATER 2000 MG: 1 INJECTION INTRAMUSCULAR; INTRAVENOUS; SUBCUTANEOUS at 17:33

## 2025-01-03 RX ADMIN — ISOSORBIDE MONONITRATE 30 MG: 60 TABLET, EXTENDED RELEASE ORAL at 10:39

## 2025-01-03 RX ADMIN — HYDROCODONE BITARTRATE AND ACETAMINOPHEN 1 TABLET: 5; 325 TABLET ORAL at 12:51

## 2025-01-03 RX ADMIN — WATER 2000 MG: 1 INJECTION INTRAMUSCULAR; INTRAVENOUS; SUBCUTANEOUS at 05:52

## 2025-01-03 RX ADMIN — INSULIN LISPRO 10 UNITS: 100 INJECTION, SOLUTION INTRAVENOUS; SUBCUTANEOUS at 08:55

## 2025-01-03 RX ADMIN — HYDROCODONE BITARTRATE AND ACETAMINOPHEN 1 TABLET: 5; 325 TABLET ORAL at 05:52

## 2025-01-03 RX ADMIN — HEPARIN SODIUM 7500 UNITS: 5000 INJECTION INTRAVENOUS; SUBCUTANEOUS at 17:33

## 2025-01-03 RX ADMIN — SPIRONOLACTONE 25 MG: 25 TABLET, COATED ORAL at 08:56

## 2025-01-03 ASSESSMENT — ENCOUNTER SYMPTOMS
NO PATIENT REPORTED PAIN: 1
ACTIVITY IMPAIRMENT: NORMAL
SHORTNESS OF BREATH: 0
NAUSEA: 0
DIETARY ISSUES: ADEQUATE INTAKE

## 2025-01-03 ASSESSMENT — PAIN SCALES - GENERAL
PAINLEVEL_OUTOF10: 0
PAINLEVEL_OUTOF10: 3
PAINLEVEL_OUTOF10: 0
PAINLEVEL_OUTOF10: 4
PAINLEVEL_OUTOF10: 0
PAINLEVEL_OUTOF10: 2
PAINLEVEL_OUTOF10: 1
PAINLEVEL_OUTOF10: 5
PAINLEVEL_OUTOF10: 3

## 2025-01-04 LAB
ANION GAP SERPL CALC-SCNC: 9 MMOL/L (ref 7–19)
BACTERIA SPEC ANAEROBE+AEROBE CULT: ABNORMAL
BUN SERPL-MCNC: 97 MG/DL (ref 6–20)
BUN/CREAT SERPL: 28 (ref 7–25)
CALCIUM SERPL-MCNC: 9.4 MG/DL (ref 8.4–10.2)
CHLORIDE SERPL-SCNC: 97 MMOL/L (ref 97–110)
CO2 SERPL-SCNC: 31 MMOL/L (ref 21–32)
CREAT SERPL-MCNC: 3.52 MG/DL (ref 0.67–1.17)
EGFRCR SERPLBLD CKD-EPI 2021: 18 ML/MIN/{1.73_M2}
FASTING DURATION TIME PATIENT: ABNORMAL H
GLUCOSE BLDC GLUCOMTR-MCNC: 152 MG/DL (ref 70–99)
GLUCOSE BLDC GLUCOMTR-MCNC: 161 MG/DL (ref 70–99)
GLUCOSE BLDC GLUCOMTR-MCNC: 236 MG/DL (ref 70–99)
GLUCOSE BLDC GLUCOMTR-MCNC: 334 MG/DL (ref 70–99)
GLUCOSE SERPL-MCNC: 163 MG/DL (ref 70–99)
GRAM STN SPEC: ABNORMAL
POTASSIUM SERPL-SCNC: 4 MMOL/L (ref 3.4–5.1)
PYROPHOS CRY FLD QL: ABNORMAL
RAINBOW EXTRA TUBES HOLD SPECIMEN: NORMAL
SODIUM SERPL-SCNC: 133 MMOL/L (ref 135–145)
URATE CRY FLD QL MICRO: ABNORMAL

## 2025-01-04 PROCEDURE — 10002803 HB RX 637: Performed by: INTERNAL MEDICINE

## 2025-01-04 PROCEDURE — 80048 BASIC METABOLIC PNL TOTAL CA: CPT | Performed by: INTERNAL MEDICINE

## 2025-01-04 PROCEDURE — 89060 EXAM SYNOVIAL FLUID CRYSTALS: CPT | Performed by: INTERNAL MEDICINE

## 2025-01-04 PROCEDURE — 10002800 HB RX 250 W HCPCS

## 2025-01-04 PROCEDURE — 97535 SELF CARE MNGMENT TRAINING: CPT

## 2025-01-04 PROCEDURE — 87070 CULTURE OTHR SPECIMN AEROBIC: CPT | Performed by: INTERNAL MEDICINE

## 2025-01-04 PROCEDURE — 96372 THER/PROPH/DIAG INJ SC/IM: CPT | Performed by: INTERNAL MEDICINE

## 2025-01-04 PROCEDURE — 10000002 HB ROOM CHARGE MED SURG

## 2025-01-04 PROCEDURE — 3E0U33Z INTRODUCTION OF ANTI-INFLAMMATORY INTO JOINTS, PERCUTANEOUS APPROACH: ICD-10-PCS | Performed by: INTERNAL MEDICINE

## 2025-01-04 PROCEDURE — 36415 COLL VENOUS BLD VENIPUNCTURE: CPT | Performed by: INTERNAL MEDICINE

## 2025-01-04 PROCEDURE — 10002801 HB RX 250 W/O HCPCS

## 2025-01-04 PROCEDURE — 10002800 HB RX 250 W HCPCS: Performed by: INTERNAL MEDICINE

## 2025-01-04 PROCEDURE — 99233 SBSQ HOSP IP/OBS HIGH 50: CPT | Performed by: INTERNAL MEDICINE

## 2025-01-04 RX ORDER — ASCORBIC ACID 500 MG
500 TABLET ORAL DAILY
Status: DISPENSED | OUTPATIENT
Start: 2025-01-04

## 2025-01-04 RX ADMIN — INSULIN GLARGINE 35 UNITS: 100 INJECTION, SOLUTION SUBCUTANEOUS at 10:13

## 2025-01-04 RX ADMIN — ISOSORBIDE MONONITRATE 30 MG: 60 TABLET, EXTENDED RELEASE ORAL at 10:13

## 2025-01-04 RX ADMIN — HYDROCODONE BITARTRATE AND ACETAMINOPHEN 1 TABLET: 5; 325 TABLET ORAL at 01:32

## 2025-01-04 RX ADMIN — WATER 2000 MG: 1 INJECTION INTRAMUSCULAR; INTRAVENOUS; SUBCUTANEOUS at 21:31

## 2025-01-04 RX ADMIN — WATER 2000 MG: 1 INJECTION INTRAMUSCULAR; INTRAVENOUS; SUBCUTANEOUS at 13:45

## 2025-01-04 RX ADMIN — INSULIN GLARGINE 35 UNITS: 100 INJECTION, SOLUTION SUBCUTANEOUS at 21:33

## 2025-01-04 RX ADMIN — OXYCODONE HYDROCHLORIDE AND ACETAMINOPHEN 500 MG: 500 TABLET ORAL at 21:31

## 2025-01-04 RX ADMIN — WATER 2000 MG: 1 INJECTION INTRAMUSCULAR; INTRAVENOUS; SUBCUTANEOUS at 05:30

## 2025-01-04 RX ADMIN — SPIRONOLACTONE 25 MG: 25 TABLET, COATED ORAL at 10:13

## 2025-01-04 RX ADMIN — TORSEMIDE 60 MG: 20 TABLET ORAL at 10:13

## 2025-01-04 RX ADMIN — ALLOPURINOL 100 MG: 100 TABLET ORAL at 10:13

## 2025-01-04 RX ADMIN — INSULIN LISPRO 10 UNITS: 100 INJECTION, SOLUTION INTRAVENOUS; SUBCUTANEOUS at 18:08

## 2025-01-04 RX ADMIN — HYDROCODONE BITARTRATE AND ACETAMINOPHEN 1 TABLET: 5; 325 TABLET ORAL at 14:48

## 2025-01-04 RX ADMIN — HEPARIN SODIUM 7500 UNITS: 5000 INJECTION INTRAVENOUS; SUBCUTANEOUS at 05:30

## 2025-01-04 RX ADMIN — METOPROLOL SUCCINATE 25 MG: 25 TABLET, EXTENDED RELEASE ORAL at 10:13

## 2025-01-04 RX ADMIN — INSULIN LISPRO 10 UNITS: 100 INJECTION, SOLUTION INTRAVENOUS; SUBCUTANEOUS at 13:46

## 2025-01-04 RX ADMIN — HEPARIN SODIUM 7500 UNITS: 5000 INJECTION INTRAVENOUS; SUBCUTANEOUS at 13:46

## 2025-01-04 RX ADMIN — ATORVASTATIN CALCIUM 20 MG: 20 TABLET, FILM COATED ORAL at 10:13

## 2025-01-04 RX ADMIN — TORSEMIDE 60 MG: 20 TABLET ORAL at 18:08

## 2025-01-04 RX ADMIN — HEPARIN SODIUM 7500 UNITS: 5000 INJECTION INTRAVENOUS; SUBCUTANEOUS at 21:32

## 2025-01-04 RX ADMIN — INSULIN LISPRO 1 UNITS: 100 INJECTION, SOLUTION INTRAVENOUS; SUBCUTANEOUS at 14:09

## 2025-01-04 RX ADMIN — INSULIN LISPRO 2 UNITS: 100 INJECTION, SOLUTION INTRAVENOUS; SUBCUTANEOUS at 18:10

## 2025-01-04 RX ADMIN — FAMOTIDINE 20 MG: 20 TABLET, FILM COATED ORAL at 10:13

## 2025-01-04 RX ADMIN — SPIRONOLACTONE 25 MG: 25 TABLET, COATED ORAL at 21:31

## 2025-01-04 ASSESSMENT — ACTIVITIES OF DAILY LIVING (ADL): HOME_MANAGEMENT_TIME_ENTRY: 24

## 2025-01-04 ASSESSMENT — PAIN SCALES - GENERAL
PAINLEVEL_OUTOF10: 3
PAINLEVEL_OUTOF10: 2
PAINLEVEL_OUTOF10: 0
PAINLEVEL_OUTOF10: 4
PAINLEVEL_OUTOF10: 2
PAINLEVEL_OUTOF10: 6

## 2025-01-04 ASSESSMENT — COGNITIVE AND FUNCTIONAL STATUS - GENERAL
HELP NEEDED DRESSING REGULAR LOWER BODY CLOTHING: A LOT
DAILY_ACTIVITY_RAW_SCORE: 17
HELP NEEDED FOR TOILETING: A LOT
HELP NEEDED FOR BATHING: A LOT
HELP NEEDED DRESSING REGULAR UPPER BODY CLOTHING: A LITTLE
DAILY_ACTIVITY_CONVERTED_SCORE: 37.26

## 2025-01-04 ASSESSMENT — ENCOUNTER SYMPTOMS
SHORTNESS OF BREATH: 0
NAUSEA: 0
NO PATIENT REPORTED PAIN: 1
ACTIVITY IMPAIRMENT: NORMAL
DIETARY ISSUES: ADEQUATE INTAKE

## 2025-01-05 VITALS
OXYGEN SATURATION: 96 % | HEART RATE: 78 BPM | BODY MASS INDEX: 42.66 KG/M2 | DIASTOLIC BLOOD PRESSURE: 67 MMHG | RESPIRATION RATE: 16 BRPM | SYSTOLIC BLOOD PRESSURE: 104 MMHG | HEIGHT: 72 IN | WEIGHT: 315 LBS | TEMPERATURE: 97.5 F

## 2025-01-05 LAB
ACID FAST STN SPEC: NORMAL
BACTERIA BLD CULT: NORMAL
BACTERIA BLD CULT: NORMAL
BACTERIA SPEC ANAEROBE+AEROBE CULT: NORMAL
FUNGUS SPEC CULT: NORMAL
FUNGUS SPEC FUNGUS STN: NORMAL
GLUCOSE BLDC GLUCOMTR-MCNC: 329 MG/DL (ref 70–99)
GLUCOSE BLDC GLUCOMTR-MCNC: 414 MG/DL (ref 70–99)
GLUCOSE BLDC GLUCOMTR-MCNC: 415 MG/DL (ref 70–99)
GLUCOSE BLDC GLUCOMTR-MCNC: 420 MG/DL (ref 70–99)
GLUCOSE BLDC GLUCOMTR-MCNC: 434 MG/DL (ref 70–99)
GLUCOSE BLDC GLUCOMTR-MCNC: 435 MG/DL (ref 70–99)
GLUCOSE BLDC GLUCOMTR-MCNC: 441 MG/DL (ref 70–99)
GLUCOSE BLDC GLUCOMTR-MCNC: 451 MG/DL (ref 70–99)
GRAM STN SPEC: NORMAL
GRAM STN SPEC: NORMAL
MYCOBACTERIUM SPEC CULT: NORMAL

## 2025-01-05 PROCEDURE — 10000002 HB ROOM CHARGE MED SURG

## 2025-01-05 PROCEDURE — 10002801 HB RX 250 W/O HCPCS

## 2025-01-05 PROCEDURE — 10002800 HB RX 250 W HCPCS

## 2025-01-05 PROCEDURE — 10002800 HB RX 250 W HCPCS: Performed by: INTERNAL MEDICINE

## 2025-01-05 PROCEDURE — 99233 SBSQ HOSP IP/OBS HIGH 50: CPT | Performed by: INTERNAL MEDICINE

## 2025-01-05 PROCEDURE — 96372 THER/PROPH/DIAG INJ SC/IM: CPT | Performed by: INTERNAL MEDICINE

## 2025-01-05 PROCEDURE — 10002803 HB RX 637: Performed by: INTERNAL MEDICINE

## 2025-01-05 RX ORDER — INSULIN LISPRO 100 [IU]/ML
20 INJECTION, SOLUTION INTRAVENOUS; SUBCUTANEOUS
Status: ACTIVE | OUTPATIENT
Start: 2025-01-06

## 2025-01-05 RX ORDER — INSULIN LISPRO 100 [IU]/ML
10 INJECTION, SOLUTION INTRAVENOUS; SUBCUTANEOUS ONCE
Status: COMPLETED | OUTPATIENT
Start: 2025-01-05 | End: 2025-01-05

## 2025-01-05 RX ORDER — INSULIN LISPRO 100 [IU]/ML
20 INJECTION, SOLUTION INTRAVENOUS; SUBCUTANEOUS
Status: DISCONTINUED | OUTPATIENT
Start: 2025-01-05 | End: 2025-01-05

## 2025-01-05 RX ADMIN — ISOSORBIDE MONONITRATE 30 MG: 60 TABLET, EXTENDED RELEASE ORAL at 09:38

## 2025-01-05 RX ADMIN — INSULIN LISPRO 10 UNITS: 100 INJECTION, SOLUTION INTRAVENOUS; SUBCUTANEOUS at 17:25

## 2025-01-05 RX ADMIN — INSULIN LISPRO 20 UNITS: 100 INJECTION, SOLUTION INTRAVENOUS; SUBCUTANEOUS at 17:25

## 2025-01-05 RX ADMIN — METOPROLOL SUCCINATE 25 MG: 25 TABLET, EXTENDED RELEASE ORAL at 09:38

## 2025-01-05 RX ADMIN — HEPARIN SODIUM 7500 UNITS: 5000 INJECTION INTRAVENOUS; SUBCUTANEOUS at 20:33

## 2025-01-05 RX ADMIN — INSULIN LISPRO 10 UNITS: 100 INJECTION, SOLUTION INTRAVENOUS; SUBCUTANEOUS at 11:45

## 2025-01-05 RX ADMIN — HYDROCODONE BITARTRATE AND ACETAMINOPHEN 1 TABLET: 5; 325 TABLET ORAL at 20:39

## 2025-01-05 RX ADMIN — INSULIN GLARGINE 35 UNITS: 100 INJECTION, SOLUTION SUBCUTANEOUS at 09:38

## 2025-01-05 RX ADMIN — INSULIN LISPRO 10 UNITS: 100 INJECTION, SOLUTION INTRAVENOUS; SUBCUTANEOUS at 12:59

## 2025-01-05 RX ADMIN — HEPARIN SODIUM 7500 UNITS: 5000 INJECTION INTRAVENOUS; SUBCUTANEOUS at 12:59

## 2025-01-05 RX ADMIN — TORSEMIDE 60 MG: 20 TABLET ORAL at 09:38

## 2025-01-05 RX ADMIN — INSULIN LISPRO 10 UNITS: 100 INJECTION, SOLUTION INTRAVENOUS; SUBCUTANEOUS at 09:40

## 2025-01-05 RX ADMIN — INSULIN LISPRO 4 UNITS: 100 INJECTION, SOLUTION INTRAVENOUS; SUBCUTANEOUS at 09:41

## 2025-01-05 RX ADMIN — HEPARIN SODIUM 7500 UNITS: 5000 INJECTION INTRAVENOUS; SUBCUTANEOUS at 04:39

## 2025-01-05 RX ADMIN — SPIRONOLACTONE 25 MG: 25 TABLET, COATED ORAL at 09:38

## 2025-01-05 RX ADMIN — ALLOPURINOL 100 MG: 100 TABLET ORAL at 09:38

## 2025-01-05 RX ADMIN — WATER 2000 MG: 1 INJECTION INTRAMUSCULAR; INTRAVENOUS; SUBCUTANEOUS at 20:33

## 2025-01-05 RX ADMIN — TORSEMIDE 60 MG: 20 TABLET ORAL at 17:25

## 2025-01-05 RX ADMIN — WATER 2000 MG: 1 INJECTION INTRAMUSCULAR; INTRAVENOUS; SUBCUTANEOUS at 12:59

## 2025-01-05 RX ADMIN — INSULIN LISPRO 5 UNITS: 100 INJECTION, SOLUTION INTRAVENOUS; SUBCUTANEOUS at 11:46

## 2025-01-05 RX ADMIN — FAMOTIDINE 20 MG: 20 TABLET, FILM COATED ORAL at 09:38

## 2025-01-05 RX ADMIN — ATORVASTATIN CALCIUM 20 MG: 20 TABLET, FILM COATED ORAL at 09:38

## 2025-01-05 RX ADMIN — WATER 2000 MG: 1 INJECTION INTRAMUSCULAR; INTRAVENOUS; SUBCUTANEOUS at 04:35

## 2025-01-05 RX ADMIN — INSULIN GLARGINE 35 UNITS: 100 INJECTION, SOLUTION SUBCUTANEOUS at 20:34

## 2025-01-05 RX ADMIN — SPIRONOLACTONE 25 MG: 25 TABLET, COATED ORAL at 20:33

## 2025-01-05 ASSESSMENT — PAIN SCALES - GENERAL
PAINLEVEL_OUTOF10: 0
PAINLEVEL_OUTOF10: 5
PAINLEVEL_OUTOF10: 2
PAINLEVEL_OUTOF10: 0

## 2025-01-06 LAB
ALBUMIN SERPL-MCNC: 2.4 G/DL (ref 3.4–5)
ALBUMIN/GLOB SERPL: 0.4 {RATIO} (ref 1–2.4)
ALP SERPL-CCNC: 82 UNITS/L (ref 45–117)
ALT SERPL-CCNC: <7 UNITS/L
ANION GAP SERPL CALC-SCNC: 11 MMOL/L (ref 7–19)
AST SERPL-CCNC: 14 UNITS/L
BACTERIA SPEC ANAEROBE+AEROBE CULT: ABNORMAL
BACTERIA SPEC ANAEROBE+AEROBE CULT: ABNORMAL
BILIRUB SERPL-MCNC: 0.2 MG/DL (ref 0.2–1)
BUN SERPL-MCNC: 130 MG/DL (ref 6–20)
BUN/CREAT SERPL: 43 (ref 7–25)
CALCIUM SERPL-MCNC: 9.3 MG/DL (ref 8.4–10.2)
CHLORIDE SERPL-SCNC: 93 MMOL/L (ref 97–110)
CO2 SERPL-SCNC: 30 MMOL/L (ref 21–32)
CREAT SERPL-MCNC: 3.01 MG/DL (ref 0.67–1.17)
DEPRECATED RDW RBC: 46.9 FL (ref 39–50)
EGFRCR SERPLBLD CKD-EPI 2021: 22 ML/MIN/{1.73_M2}
ERYTHROCYTE [DISTWIDTH] IN BLOOD: 14.9 % (ref 11–15)
FASTING DURATION TIME PATIENT: ABNORMAL H
GLOBULIN SER-MCNC: 6.8 G/DL (ref 2–4)
GLUCOSE BLDC GLUCOMTR-MCNC: 288 MG/DL (ref 70–99)
GLUCOSE BLDC GLUCOMTR-MCNC: 383 MG/DL (ref 70–99)
GLUCOSE BLDC GLUCOMTR-MCNC: 448 MG/DL (ref 70–99)
GLUCOSE BLDC GLUCOMTR-MCNC: 456 MG/DL (ref 70–99)
GLUCOSE BLDC GLUCOMTR-MCNC: 462 MG/DL (ref 70–99)
GLUCOSE SERPL-MCNC: 382 MG/DL (ref 70–99)
GRAM STN SPEC: ABNORMAL
HCT VFR BLD CALC: 30.4 % (ref 39–51)
HGB BLD-MCNC: 9.6 G/DL (ref 13–17)
MCH RBC QN AUTO: 27.5 PG (ref 26–34)
MCHC RBC AUTO-ENTMCNC: 31.6 G/DL (ref 32–36.5)
MCV RBC AUTO: 87.1 FL (ref 78–100)
NRBC BLD MANUAL-RTO: 0 /100 WBC
PLATELET # BLD AUTO: 422 K/MCL (ref 140–450)
POTASSIUM SERPL-SCNC: 4.2 MMOL/L (ref 3.4–5.1)
PROT SERPL-MCNC: 9.2 G/DL (ref 6.4–8.2)
RBC # BLD: 3.49 MIL/MCL (ref 4.5–5.9)
SODIUM SERPL-SCNC: 130 MMOL/L (ref 135–145)
WBC # BLD: 21.2 K/MCL (ref 4.2–11)

## 2025-01-06 PROCEDURE — 10002800 HB RX 250 W HCPCS: Performed by: INTERNAL MEDICINE

## 2025-01-06 PROCEDURE — 96372 THER/PROPH/DIAG INJ SC/IM: CPT | Performed by: INTERNAL MEDICINE

## 2025-01-06 PROCEDURE — 99233 SBSQ HOSP IP/OBS HIGH 50: CPT | Performed by: INTERNAL MEDICINE

## 2025-01-06 PROCEDURE — 36573 INSJ PICC RS&I 5 YR+: CPT

## 2025-01-06 PROCEDURE — 10002801 HB RX 250 W/O HCPCS

## 2025-01-06 PROCEDURE — 36415 COLL VENOUS BLD VENIPUNCTURE: CPT | Performed by: INTERNAL MEDICINE

## 2025-01-06 PROCEDURE — 10002807 HB RX 258: Performed by: INTERNAL MEDICINE

## 2025-01-06 PROCEDURE — 10006023 HB SUPPLY 272

## 2025-01-06 PROCEDURE — 10002803 HB RX 637: Performed by: INTERNAL MEDICINE

## 2025-01-06 PROCEDURE — 85027 COMPLETE CBC AUTOMATED: CPT | Performed by: INTERNAL MEDICINE

## 2025-01-06 PROCEDURE — C1751 CATH, INF, PER/CENT/MIDLINE: HCPCS

## 2025-01-06 PROCEDURE — 10002800 HB RX 250 W HCPCS

## 2025-01-06 PROCEDURE — 02HV33Z INSERTION OF INFUSION DEVICE INTO SUPERIOR VENA CAVA, PERCUTANEOUS APPROACH: ICD-10-PCS

## 2025-01-06 PROCEDURE — 80053 COMPREHEN METABOLIC PANEL: CPT | Performed by: INTERNAL MEDICINE

## 2025-01-06 PROCEDURE — 10004651 HB RX, NO CHARGE ITEM

## 2025-01-06 PROCEDURE — 10000002 HB ROOM CHARGE MED SURG

## 2025-01-06 PROCEDURE — 10004281 HB COUNTER-STAFF TIME PER 15 MIN

## 2025-01-06 RX ORDER — INSULIN GLARGINE 100 [IU]/ML
45 INJECTION, SOLUTION SUBCUTANEOUS 2 TIMES DAILY
Status: DISCONTINUED | OUTPATIENT
Start: 2025-01-06 | End: 2025-01-08 | Stop reason: HOSPADM

## 2025-01-06 RX ORDER — OXACILLIN SODIUM 2 G/1
12 INJECTION, POWDER, FOR SOLUTION INTRAMUSCULAR; INTRAVENOUS EVERY 24 HOURS
Qty: 222 EACH | Refills: 0 | Status: SHIPPED
Start: 2025-01-06 | End: 2025-02-12

## 2025-01-06 RX ORDER — 0.9 % SODIUM CHLORIDE 0.9 %
10 VIAL (ML) INJECTION EVERY 12 HOURS SCHEDULED
Status: DISCONTINUED | OUTPATIENT
Start: 2025-01-06 | End: 2025-01-08 | Stop reason: HOSPADM

## 2025-01-06 RX ORDER — 0.9 % SODIUM CHLORIDE 0.9 %
10 VIAL (ML) INJECTION PRN
Status: DISCONTINUED | OUTPATIENT
Start: 2025-01-06 | End: 2025-01-08 | Stop reason: HOSPADM

## 2025-01-06 RX ORDER — 0.9 % SODIUM CHLORIDE 0.9 %
20 VIAL (ML) INJECTION PRN
Status: DISCONTINUED | OUTPATIENT
Start: 2025-01-06 | End: 2025-01-08 | Stop reason: HOSPADM

## 2025-01-06 RX ADMIN — INSULIN GLARGINE 45 UNITS: 100 INJECTION, SOLUTION SUBCUTANEOUS at 11:15

## 2025-01-06 RX ADMIN — HEPARIN SODIUM 7500 UNITS: 5000 INJECTION INTRAVENOUS; SUBCUTANEOUS at 15:04

## 2025-01-06 RX ADMIN — FAMOTIDINE 20 MG: 20 TABLET, FILM COATED ORAL at 08:59

## 2025-01-06 RX ADMIN — TORSEMIDE 60 MG: 20 TABLET ORAL at 09:00

## 2025-01-06 RX ADMIN — HEPARIN SODIUM 7500 UNITS: 5000 INJECTION INTRAVENOUS; SUBCUTANEOUS at 20:47

## 2025-01-06 RX ADMIN — INSULIN LISPRO 20 UNITS: 100 INJECTION, SOLUTION INTRAVENOUS; SUBCUTANEOUS at 08:55

## 2025-01-06 RX ADMIN — WATER 2000 MG: 1 INJECTION INTRAMUSCULAR; INTRAVENOUS; SUBCUTANEOUS at 05:34

## 2025-01-06 RX ADMIN — INSULIN LISPRO 10 UNITS: 100 INJECTION, SOLUTION INTRAVENOUS; SUBCUTANEOUS at 00:36

## 2025-01-06 RX ADMIN — INSULIN LISPRO 10 UNITS: 100 INJECTION, SOLUTION INTRAVENOUS; SUBCUTANEOUS at 15:05

## 2025-01-06 RX ADMIN — OXACILLIN 2000 MG: 2 INJECTION, POWDER, FOR SOLUTION INTRAMUSCULAR; INTRAVENOUS at 20:57

## 2025-01-06 RX ADMIN — SPIRONOLACTONE 25 MG: 25 TABLET, COATED ORAL at 09:00

## 2025-01-06 RX ADMIN — INSULIN LISPRO 6 UNITS: 100 INJECTION, SOLUTION INTRAVENOUS; SUBCUTANEOUS at 08:55

## 2025-01-06 RX ADMIN — INSULIN GLARGINE 45 UNITS: 100 INJECTION, SOLUTION SUBCUTANEOUS at 20:44

## 2025-01-06 RX ADMIN — SPIRONOLACTONE 25 MG: 25 TABLET, COATED ORAL at 21:47

## 2025-01-06 RX ADMIN — ATORVASTATIN CALCIUM 20 MG: 20 TABLET, FILM COATED ORAL at 08:59

## 2025-01-06 RX ADMIN — INSULIN LISPRO 20 UNITS: 100 INJECTION, SOLUTION INTRAVENOUS; SUBCUTANEOUS at 17:44

## 2025-01-06 RX ADMIN — ALLOPURINOL 100 MG: 100 TABLET ORAL at 08:59

## 2025-01-06 RX ADMIN — OXYCODONE HYDROCHLORIDE AND ACETAMINOPHEN 500 MG: 500 TABLET ORAL at 09:00

## 2025-01-06 RX ADMIN — TORSEMIDE 60 MG: 20 TABLET ORAL at 15:04

## 2025-01-06 RX ADMIN — WATER 2000 MG: 1 INJECTION INTRAMUSCULAR; INTRAVENOUS; SUBCUTANEOUS at 15:04

## 2025-01-06 RX ADMIN — SODIUM CHLORIDE, PRESERVATIVE FREE 10 ML: 5 INJECTION INTRAVENOUS at 20:58

## 2025-01-06 RX ADMIN — INSULIN LISPRO 20 UNITS: 100 INJECTION, SOLUTION INTRAVENOUS; SUBCUTANEOUS at 15:04

## 2025-01-06 RX ADMIN — HEPARIN SODIUM 7500 UNITS: 5000 INJECTION INTRAVENOUS; SUBCUTANEOUS at 05:34

## 2025-01-06 RX ADMIN — METOPROLOL SUCCINATE 25 MG: 25 TABLET, EXTENDED RELEASE ORAL at 08:59

## 2025-01-06 RX ADMIN — ISOSORBIDE MONONITRATE 30 MG: 60 TABLET, EXTENDED RELEASE ORAL at 08:59

## 2025-01-06 RX ADMIN — INSULIN LISPRO 10 UNITS: 100 INJECTION, SOLUTION INTRAVENOUS; SUBCUTANEOUS at 17:44

## 2025-01-06 ASSESSMENT — ENCOUNTER SYMPTOMS
NO PATIENT REPORTED PAIN: 1
DIETARY ISSUES: ADEQUATE INTAKE
SHORTNESS OF BREATH: 0
NAUSEA: 0
ACTIVITY IMPAIRMENT: NORMAL

## 2025-01-06 ASSESSMENT — PAIN SCALES - GENERAL
PAINLEVEL_OUTOF10: 2
PAINLEVEL_OUTOF10: 0

## 2025-01-07 ENCOUNTER — APPOINTMENT (OUTPATIENT)
Dept: ULTRASOUND IMAGING | Age: 70
DRG: 240 | End: 2025-01-07

## 2025-01-07 ENCOUNTER — TELEPHONE (OUTPATIENT)
Dept: INTERNAL MEDICINE CLINIC | Facility: CLINIC | Age: 70
End: 2025-01-07

## 2025-01-07 LAB
BASOPHILS # BLD: 0 K/MCL (ref 0–0.3)
BASOPHILS NFR BLD: 0 %
DEPRECATED RDW RBC: 47.4 FL (ref 39–50)
EOSINOPHIL # BLD: 0 K/MCL (ref 0–0.5)
EOSINOPHIL NFR BLD: 0 %
ERYTHROCYTE [DISTWIDTH] IN BLOOD: 15 % (ref 11–15)
GLUCOSE BLDC GLUCOMTR-MCNC: 317 MG/DL (ref 70–99)
GLUCOSE BLDC GLUCOMTR-MCNC: 318 MG/DL (ref 70–99)
GLUCOSE BLDC GLUCOMTR-MCNC: 354 MG/DL (ref 70–99)
GLUCOSE BLDC GLUCOMTR-MCNC: 357 MG/DL (ref 70–99)
GLUCOSE BLDC GLUCOMTR-MCNC: 399 MG/DL (ref 70–99)
HCT VFR BLD CALC: 30.9 % (ref 39–51)
HGB BLD-MCNC: 9.8 G/DL (ref 13–17)
IMM GRANULOCYTES # BLD AUTO: 0.2 K/MCL (ref 0–0.2)
IMM GRANULOCYTES # BLD: 1 %
LYMPHOCYTES # BLD: 1.3 K/MCL (ref 1–4)
LYMPHOCYTES NFR BLD: 8 %
MCH RBC QN AUTO: 27.8 PG (ref 26–34)
MCHC RBC AUTO-ENTMCNC: 31.7 G/DL (ref 32–36.5)
MCV RBC AUTO: 87.8 FL (ref 78–100)
MONOCYTES # BLD: 0.7 K/MCL (ref 0.3–0.9)
MONOCYTES NFR BLD: 5 %
NEUTROPHILS # BLD: 13.6 K/MCL (ref 1.8–7.7)
NEUTROPHILS NFR BLD: 86 %
NRBC BLD MANUAL-RTO: 0 /100 WBC
PLATELET # BLD AUTO: 392 K/MCL (ref 140–450)
RBC # BLD: 3.52 MIL/MCL (ref 4.5–5.9)
WBC # BLD: 15.9 K/MCL (ref 4.2–11)

## 2025-01-07 PROCEDURE — 96372 THER/PROPH/DIAG INJ SC/IM: CPT | Performed by: INTERNAL MEDICINE

## 2025-01-07 PROCEDURE — 10002803 HB RX 637: Performed by: INTERNAL MEDICINE

## 2025-01-07 PROCEDURE — 10004651 HB RX, NO CHARGE ITEM

## 2025-01-07 PROCEDURE — 10002800 HB RX 250 W HCPCS: Performed by: INTERNAL MEDICINE

## 2025-01-07 PROCEDURE — 10002807 HB RX 258: Performed by: INTERNAL MEDICINE

## 2025-01-07 PROCEDURE — 85025 COMPLETE CBC W/AUTO DIFF WBC: CPT

## 2025-01-07 PROCEDURE — 93970 EXTREMITY STUDY: CPT

## 2025-01-07 PROCEDURE — 10000002 HB ROOM CHARGE MED SURG

## 2025-01-07 PROCEDURE — 97530 THERAPEUTIC ACTIVITIES: CPT

## 2025-01-07 RX ADMIN — HYDROCODONE BITARTRATE AND ACETAMINOPHEN 1 TABLET: 5; 325 TABLET ORAL at 01:25

## 2025-01-07 RX ADMIN — HEPARIN SODIUM 7500 UNITS: 5000 INJECTION INTRAVENOUS; SUBCUTANEOUS at 14:02

## 2025-01-07 RX ADMIN — OXACILLIN 2000 MG: 2 INJECTION, POWDER, FOR SOLUTION INTRAMUSCULAR; INTRAVENOUS at 12:10

## 2025-01-07 RX ADMIN — ISOSORBIDE MONONITRATE 30 MG: 60 TABLET, EXTENDED RELEASE ORAL at 08:55

## 2025-01-07 RX ADMIN — ALLOPURINOL 100 MG: 100 TABLET ORAL at 08:54

## 2025-01-07 RX ADMIN — INSULIN GLARGINE 45 UNITS: 100 INJECTION, SOLUTION SUBCUTANEOUS at 08:54

## 2025-01-07 RX ADMIN — OXYCODONE HYDROCHLORIDE AND ACETAMINOPHEN 500 MG: 500 TABLET ORAL at 08:54

## 2025-01-07 RX ADMIN — HYDROCODONE BITARTRATE AND ACETAMINOPHEN 1 TABLET: 5; 325 TABLET ORAL at 23:35

## 2025-01-07 RX ADMIN — INSULIN LISPRO 15 UNITS: 100 INJECTION, SOLUTION INTRAVENOUS; SUBCUTANEOUS at 17:46

## 2025-01-07 RX ADMIN — INSULIN GLARGINE 45 UNITS: 100 INJECTION, SOLUTION SUBCUTANEOUS at 21:25

## 2025-01-07 RX ADMIN — SPIRONOLACTONE 25 MG: 25 TABLET, COATED ORAL at 21:28

## 2025-01-07 RX ADMIN — INSULIN LISPRO 15 UNITS: 100 INJECTION, SOLUTION INTRAVENOUS; SUBCUTANEOUS at 00:08

## 2025-01-07 RX ADMIN — ATORVASTATIN CALCIUM 20 MG: 20 TABLET, FILM COATED ORAL at 08:55

## 2025-01-07 RX ADMIN — OXACILLIN 2000 MG: 2 INJECTION, POWDER, FOR SOLUTION INTRAMUSCULAR; INTRAVENOUS at 04:45

## 2025-01-07 RX ADMIN — TORSEMIDE 60 MG: 20 TABLET ORAL at 08:54

## 2025-01-07 RX ADMIN — Medication 6 MG: at 00:08

## 2025-01-07 RX ADMIN — FAMOTIDINE 20 MG: 20 TABLET, FILM COATED ORAL at 08:55

## 2025-01-07 RX ADMIN — INSULIN LISPRO 20 UNITS: 100 INJECTION, SOLUTION INTRAVENOUS; SUBCUTANEOUS at 12:11

## 2025-01-07 RX ADMIN — INSULIN LISPRO 12 UNITS: 100 INJECTION, SOLUTION INTRAVENOUS; SUBCUTANEOUS at 06:00

## 2025-01-07 RX ADMIN — INSULIN LISPRO 20 UNITS: 100 INJECTION, SOLUTION INTRAVENOUS; SUBCUTANEOUS at 08:54

## 2025-01-07 RX ADMIN — HEPARIN SODIUM 7500 UNITS: 5000 INJECTION INTRAVENOUS; SUBCUTANEOUS at 21:25

## 2025-01-07 RX ADMIN — HYDROCODONE BITARTRATE AND ACETAMINOPHEN 1 TABLET: 5; 325 TABLET ORAL at 06:18

## 2025-01-07 RX ADMIN — SPIRONOLACTONE 25 MG: 25 TABLET, COATED ORAL at 08:55

## 2025-01-07 RX ADMIN — INSULIN LISPRO 12 UNITS: 100 INJECTION, SOLUTION INTRAVENOUS; SUBCUTANEOUS at 12:11

## 2025-01-07 RX ADMIN — METOPROLOL SUCCINATE 25 MG: 25 TABLET, EXTENDED RELEASE ORAL at 08:55

## 2025-01-07 RX ADMIN — TORSEMIDE 60 MG: 20 TABLET ORAL at 16:50

## 2025-01-07 RX ADMIN — SODIUM CHLORIDE, PRESERVATIVE FREE 10 ML: 5 INJECTION INTRAVENOUS at 21:28

## 2025-01-07 RX ADMIN — OXACILLIN 2000 MG: 2 INJECTION, POWDER, FOR SOLUTION INTRAMUSCULAR; INTRAVENOUS at 00:07

## 2025-01-07 RX ADMIN — INSULIN LISPRO 20 UNITS: 100 INJECTION, SOLUTION INTRAVENOUS; SUBCUTANEOUS at 17:46

## 2025-01-07 RX ADMIN — OXACILLIN 2000 MG: 2 INJECTION, POWDER, FOR SOLUTION INTRAMUSCULAR; INTRAVENOUS at 20:13

## 2025-01-07 RX ADMIN — OXACILLIN 2000 MG: 2 INJECTION, POWDER, FOR SOLUTION INTRAMUSCULAR; INTRAVENOUS at 23:36

## 2025-01-07 RX ADMIN — HEPARIN SODIUM 7500 UNITS: 5000 INJECTION INTRAVENOUS; SUBCUTANEOUS at 05:51

## 2025-01-07 RX ADMIN — INSULIN LISPRO 12 UNITS: 100 INJECTION, SOLUTION INTRAVENOUS; SUBCUTANEOUS at 23:34

## 2025-01-07 RX ADMIN — OXACILLIN 2000 MG: 2 INJECTION, POWDER, FOR SOLUTION INTRAMUSCULAR; INTRAVENOUS at 16:48

## 2025-01-07 RX ADMIN — OXACILLIN 2000 MG: 2 INJECTION, POWDER, FOR SOLUTION INTRAMUSCULAR; INTRAVENOUS at 08:53

## 2025-01-07 ASSESSMENT — COGNITIVE AND FUNCTIONAL STATUS - GENERAL
BASIC_MOBILITY_CONVERTED_SCORE: 35.55
BASIC_MOBILITY_RAW_SCORE: 14

## 2025-01-07 ASSESSMENT — PAIN SCALES - GENERAL
PAINLEVEL_OUTOF10: 6
PAINLEVEL_OUTOF10: 5
PAINLEVEL_OUTOF10: 2
PAINLEVEL_OUTOF10: 9

## 2025-01-07 NOTE — TELEPHONE ENCOUNTER
Surya/[facility] calling to obtain approval from PCP SHON Wills for home health consisting of nursing care, physical therapy and occupational therapy.     Insurance is requesting the authorization.     PCP verbal authorization can be provided via phone [phone].

## 2025-01-07 NOTE — TELEPHONE ENCOUNTER
Dr. Wills - please advise on home health orders    Spoke to patient, full name and date of birth verified.  Patient stated he has not changed primary care providers.  Patient stated the process at [facility] almost cost him his life.     Patient had part of his foot removed, he was told the infection was close to going into his bones, heart, bloodstream.     Patient declined to schedule follow up appointment at this time, he is expecting to be put on an antibiotic pump for 6 weeks at home.    Patient stated he still has stitches and staples, he is requesting appointment at least 3-4 weeks from now.     Patient is not good with computers, does not have MyChart.     RN offered patient experience line, but patient declined.

## 2025-01-07 NOTE — TELEPHONE ENCOUNTER
Pls double check if ot is changing pcp -- if he is not then ok to start HH services and needs f//u ( needs reg f/u due to mult medical conditions )   He was supposed to see a provider on 12/6/2024 but I dont see that visit

## 2025-01-08 ENCOUNTER — PATIENT OUTREACH (OUTPATIENT)
Dept: CASE MANAGEMENT | Age: 70
End: 2025-01-08

## 2025-01-08 VITALS
DIASTOLIC BLOOD PRESSURE: 79 MMHG | TEMPERATURE: 98.1 F | BODY MASS INDEX: 42.66 KG/M2 | SYSTOLIC BLOOD PRESSURE: 143 MMHG | HEIGHT: 72 IN | RESPIRATION RATE: 16 BRPM | WEIGHT: 315 LBS | HEART RATE: 56 BPM | OXYGEN SATURATION: 97 %

## 2025-01-08 LAB
ASR DISCLAIMER: NORMAL
BACTERIA SPEC ANAEROBE+AEROBE CULT: NORMAL
CASE RPRT: NORMAL
CLINICAL INFO: NORMAL
FUNGUS SPEC CULT: NORMAL
FUNGUS SPEC FUNGUS STN: NORMAL
GLUCOSE BLDC GLUCOMTR-MCNC: 209 MG/DL (ref 70–99)
GRAM STN SPEC: NORMAL
GRAM STN SPEC: NORMAL
PATH REPORT.FINAL DX SPEC: NORMAL
PATH REPORT.FINAL DX SPEC: NORMAL
PATH REPORT.GROSS SPEC: NORMAL

## 2025-01-08 PROCEDURE — 10004651 HB RX, NO CHARGE ITEM

## 2025-01-08 PROCEDURE — 10002807 HB RX 258: Performed by: INTERNAL MEDICINE

## 2025-01-08 PROCEDURE — 96372 THER/PROPH/DIAG INJ SC/IM: CPT | Performed by: INTERNAL MEDICINE

## 2025-01-08 PROCEDURE — 10002800 HB RX 250 W HCPCS: Performed by: INTERNAL MEDICINE

## 2025-01-08 PROCEDURE — 10002803 HB RX 637: Performed by: INTERNAL MEDICINE

## 2025-01-08 PROCEDURE — 10004180 HB COUNTER-TRANSPORT

## 2025-01-08 RX ADMIN — HYDROCODONE BITARTRATE AND ACETAMINOPHEN 1 TABLET: 5; 325 TABLET ORAL at 08:02

## 2025-01-08 RX ADMIN — ISOSORBIDE MONONITRATE 30 MG: 60 TABLET, EXTENDED RELEASE ORAL at 08:02

## 2025-01-08 RX ADMIN — ATORVASTATIN CALCIUM 20 MG: 20 TABLET, FILM COATED ORAL at 08:02

## 2025-01-08 RX ADMIN — INSULIN LISPRO 20 UNITS: 100 INJECTION, SOLUTION INTRAVENOUS; SUBCUTANEOUS at 07:52

## 2025-01-08 RX ADMIN — TORSEMIDE 60 MG: 20 TABLET ORAL at 08:02

## 2025-01-08 RX ADMIN — METOPROLOL SUCCINATE 25 MG: 25 TABLET, EXTENDED RELEASE ORAL at 08:02

## 2025-01-08 RX ADMIN — ALLOPURINOL 100 MG: 100 TABLET ORAL at 08:02

## 2025-01-08 RX ADMIN — OXACILLIN 2000 MG: 2 INJECTION, POWDER, FOR SOLUTION INTRAMUSCULAR; INTRAVENOUS at 04:43

## 2025-01-08 RX ADMIN — INSULIN LISPRO 6 UNITS: 100 INJECTION, SOLUTION INTRAVENOUS; SUBCUTANEOUS at 07:51

## 2025-01-08 RX ADMIN — OXACILLIN 2000 MG: 2 INJECTION, POWDER, FOR SOLUTION INTRAMUSCULAR; INTRAVENOUS at 07:51

## 2025-01-08 RX ADMIN — FAMOTIDINE 20 MG: 20 TABLET, FILM COATED ORAL at 08:02

## 2025-01-08 RX ADMIN — OXYCODONE HYDROCHLORIDE AND ACETAMINOPHEN 500 MG: 500 TABLET ORAL at 08:02

## 2025-01-08 RX ADMIN — SODIUM CHLORIDE, PRESERVATIVE FREE 10 ML: 5 INJECTION INTRAVENOUS at 08:04

## 2025-01-08 RX ADMIN — INSULIN GLARGINE 45 UNITS: 100 INJECTION, SOLUTION SUBCUTANEOUS at 08:48

## 2025-01-08 RX ADMIN — HEPARIN SODIUM 7500 UNITS: 5000 INJECTION INTRAVENOUS; SUBCUTANEOUS at 05:58

## 2025-01-08 RX ADMIN — SPIRONOLACTONE 25 MG: 25 TABLET, COATED ORAL at 08:02

## 2025-01-08 ASSESSMENT — PAIN SCALES - GENERAL
PAINLEVEL_OUTOF10: 2
PAINLEVEL_OUTOF10: 4
PAINLEVEL_OUTOF10: 7

## 2025-01-08 NOTE — TELEPHONE ENCOUNTER
Advised MARIA INES [place] accent care of Dr. Wills's note. Surya verbalized understanding and just wanted confirmation that Dr Tamy Wills will sign off on home health orders?

## 2025-01-08 NOTE — TELEPHONE ENCOUNTER
Zahraa suero is fine     Dilma wanted to confirm that he was continuing to see me as his PCP as he was unhappy with his last visit at the office

## 2025-01-09 LAB
ACID FAST STN SPEC: NORMAL
MYCOBACTERIUM SPEC CULT: NORMAL

## 2025-01-09 ASSESSMENT — ENCOUNTER SYMPTOMS
NAUSEA: 0
NO PATIENT REPORTED PAIN: 1
SHORTNESS OF BREATH: 0
DIETARY ISSUES: ADEQUATE INTAKE
ACTIVITY IMPAIRMENT: NORMAL

## 2025-01-10 ENCOUNTER — TELEPHONE (OUTPATIENT)
Dept: CARE COORDINATION | Age: 70
End: 2025-01-10

## 2025-01-10 NOTE — PROGRESS NOTES
Spoke with patient he is lying down and asked that nurse care  him back at 1 pm.     Spoke with patient and he was in a meeting and stated he will call nurse care manager back.

## 2025-01-11 ENCOUNTER — MED REC SCAN ONLY (OUTPATIENT)
Dept: INTERNAL MEDICINE CLINIC | Facility: CLINIC | Age: 70
End: 2025-01-11

## 2025-01-13 RX ORDER — ISOSORBIDE MONONITRATE 30 MG/1
30 TABLET, EXTENDED RELEASE ORAL DAILY
Qty: 90 TABLET | Refills: 1 | Status: SHIPPED | OUTPATIENT
Start: 2025-01-13

## 2025-01-13 NOTE — PROGRESS NOTES
Third attempt -  Left message on mailbox for patient to call nurse care manager back for transitions of care call.  Nurse care  information included in message.

## 2025-01-14 NOTE — PROGRESS NOTES
Attempted to contact patient for initial assessment.  No return call after 3 calls and patient does not have Mychart TRANSITIONS OF CARE closed and primary care physician notified.

## 2025-01-15 LAB
FUNGUS SPEC CULT: NORMAL
FUNGUS SPEC FUNGUS STN: NORMAL

## 2025-01-16 LAB
ACID FAST STN SPEC: NORMAL
MYCOBACTERIUM SPEC CULT: NORMAL

## 2025-01-17 ENCOUNTER — TELEPHONE (OUTPATIENT)
Dept: CARE COORDINATION | Age: 70
End: 2025-01-17

## 2025-01-17 ENCOUNTER — TELEPHONE (OUTPATIENT)
Dept: INTERNAL MEDICINE CLINIC | Facility: CLINIC | Age: 70
End: 2025-01-17

## 2025-01-17 NOTE — TELEPHONE ENCOUNTER
Tiana, would like to inform the doctor that she went to the patient's home today and the patient refused wound care.

## 2025-01-22 ENCOUNTER — TELEPHONE (OUTPATIENT)
Dept: INTERNAL MEDICINE CLINIC | Facility: CLINIC | Age: 70
End: 2025-01-22

## 2025-01-22 LAB
FUNGUS SPEC CULT: NORMAL
FUNGUS SPEC FUNGUS STN: NORMAL

## 2025-01-22 NOTE — TELEPHONE ENCOUNTER
Giuliana, states that she will be seeing the patient today. Giuliana, would like to know if the doctor can give her lab orders for a TSH test.

## 2025-01-23 ENCOUNTER — TELEPHONE (OUTPATIENT)
Dept: INTERNAL MEDICINE CLINIC | Facility: CLINIC | Age: 70
End: 2025-01-23

## 2025-01-23 DIAGNOSIS — R60.0 LEG EDEMA: ICD-10-CM

## 2025-01-23 DIAGNOSIS — S91.302D OPEN WOUND OF LEFT FOOT, SUBSEQUENT ENCOUNTER: ICD-10-CM

## 2025-01-23 DIAGNOSIS — I73.9 PERIPHERAL VASCULAR DISEASE: Primary | ICD-10-CM

## 2025-01-23 LAB
ACID FAST STN SPEC: NORMAL
MYCOBACTERIUM SPEC CULT: NORMAL

## 2025-01-23 NOTE — TELEPHONE ENCOUNTER
Patient called and is needing to speak to the medical assistant in regards to [facility], Patient said he was suppose get stitches looked at today but was told that he is out of network. Patient upset and said we \"screwed up\" his paper work, please call to clarify.     Can call

## 2025-01-23 NOTE — TELEPHONE ENCOUNTER
Spoke to patient (verified Name and ) and relayed provider's message below. Patient verbalized understanding and had no further questions or concerns at this time.

## 2025-01-23 NOTE — TELEPHONE ENCOUNTER
Spoke to patient, verified Name and . States he wants to see Dr. Villarreal (Podiatry) for continuation of care to address his foot concern from amputation. States he was supposed to see the provider but was told that he is out of network. He called [employer] to complain about it  and was told that a form was sent to Dr. Villarreal to fill out so patient can go back to [facility] to see the provider. Patient calling to request for a new referral.    Dr. Tamy Wills please see pended referral for review and approval if appropriate.

## 2025-01-23 NOTE — TELEPHONE ENCOUNTER
Called and spoke to Giuliana, [facility] health nurse regarding TSH.  Advised Giuliana, per Dr. SHON Wills, he is not on any thyroid medication. Last TSH, January 2024, was normal.  Giuliana reported, the patient had asked for the TSH because he is seeing a [facility] provider. Giuliana did not have any further details. She will advise patient no order for TSH.    MARJORIE Salamanca. Thank you.

## 2025-01-23 NOTE — TELEPHONE ENCOUNTER
Dr. Wills please see Sadaf SPANN message below. Patient called us back.    Patient stated that the  homeopathic doctor is wanting for patient to get the blood test that I have listed below. The home health nurse can draw them if you approve. Per Patient she draws his blood every week. Please advise    RN patient is requesting a call back once Dr. Wills approves for him to have the test done below. Per Patient no order is needed a verbal is ok.     nutrient vitamin level   Hscrp level  Cbc level  Testosterone  Estrogen level  Cortisol

## 2025-01-23 NOTE — TELEPHONE ENCOUNTER
Spoke to patient (verified Name and ). States he is trying to find other ways to take care of his health and is seeing a homeopathic provider in [place] who is requesting information about some of his lab tests. Unable to provide information about what those tests are and will call back.

## 2025-01-24 ENCOUNTER — TELEPHONE (OUTPATIENT)
Dept: CARE COORDINATION | Age: 70
End: 2025-01-24

## 2025-01-24 NOTE — TELEPHONE ENCOUNTER
Patient contacted (name and  of patient verified). Dr. Tamy Wills's message reviewed.   Patient verbalizes understanding; denies further questions and agrees with plan of care. He states that no  was an issue initially, but has since had a friend bring him to his appointments and it is not a problem.  Transferred to IRAIS Wahl to schedule annual exam  Future Appointments   Date Time Provider Department Center   2025  9:30 AM Inessa Whitfield APRN [facility]

## 2025-01-24 NOTE — TELEPHONE ENCOUNTER
These are not labs I usually order   If his homopathic dr would like to take responsibility for this,  order this and interpret it that is the decision the pt and that doctor can make   I have not seen this pt for 6mns and he has mult medical issues that need to be discussed and properly addressed   At his last visit he was upset because they was no  and he had stated he was going to find a new doctor - is this homopathic doctor who he was referring to ?

## 2025-01-27 ENCOUNTER — TELEPHONE (OUTPATIENT)
Dept: INTERNAL MEDICINE CLINIC | Facility: CLINIC | Age: 70
End: 2025-01-27

## 2025-01-27 NOTE — TELEPHONE ENCOUNTER
Sent a handwritten note asking pt to update PCP with [employer]isha [employer] to Tamy Wills - insurance has him with Dr. Jerry Escobar.

## 2025-01-29 ENCOUNTER — TELEPHONE (OUTPATIENT)
Dept: INTERNAL MEDICINE CLINIC | Facility: CLINIC | Age: 70
End: 2025-01-29

## 2025-01-29 LAB
FUNGUS SPEC CULT: NORMAL
FUNGUS SPEC FUNGUS STN: NORMAL

## 2025-01-29 NOTE — TELEPHONE ENCOUNTER
[facility]  [phone]     Patient has not been back to the wound care clinic to get the sutures removed and to follow up with them to get updated wound care orders.  He is being very  non compliant with getting to the doctors.    What should home health care nurse do?

## 2025-01-30 ENCOUNTER — TELEPHONE (OUTPATIENT)
Dept: CASE MANAGEMENT | Age: 70
End: 2025-01-30

## 2025-01-30 DIAGNOSIS — S91.302D OPEN WOUND OF LEFT FOOT, SUBSEQUENT ENCOUNTER: ICD-10-CM

## 2025-01-30 DIAGNOSIS — R60.0 LEG EDEMA: Primary | ICD-10-CM

## 2025-01-30 DIAGNOSIS — I73.9 PERIPHERAL VASCULAR DISEASE: ICD-10-CM

## 2025-01-30 LAB
ACID FAST STN SPEC: NORMAL
MYCOBACTERIUM SPEC CULT: NORMAL

## 2025-01-30 NOTE — TELEPHONE ENCOUNTER
Please see referral encounter from 1/30  Isi was called and inform of  message below and about the referral. She verbalized understanding.

## 2025-01-30 NOTE — TELEPHONE ENCOUNTER
Dr. Wills, *    Verónica with wound clinic at [facility] and patient called requesting referral to Dr. Pfeiffer at wound clinic.     Pended referral please review diagnosis and sign off if you agree.    Thank you.  Tiesha Laird  [facility]

## 2025-01-30 NOTE — TELEPHONE ENCOUNTER
I did not know that he was getting home health as I received a message that patient was going to [facility] for wound care  Believe he cannot go to outside wound care as well as have home health so home health may need to discharge him especially if he is not compliant

## 2025-01-31 ENCOUNTER — TELEPHONE (OUTPATIENT)
Dept: CARE COORDINATION | Age: 70
End: 2025-01-31

## 2025-02-01 ENCOUNTER — MED REC SCAN ONLY (OUTPATIENT)
Dept: INTERNAL MEDICINE CLINIC | Facility: CLINIC | Age: 70
End: 2025-02-01

## 2025-02-06 LAB
ACID FAST STN SPEC: NORMAL
MYCOBACTERIUM SPEC CULT: NORMAL

## 2025-02-07 ENCOUNTER — TELEPHONE (OUTPATIENT)
Dept: INTERNAL MEDICINE CLINIC | Facility: CLINIC | Age: 70
End: 2025-02-07

## 2025-02-07 NOTE — TELEPHONE ENCOUNTER
Aziza RN  calling from [facility]    Calling with today's  weight of 335 lbs   ( outside of their parameters for CHF patient )       Routing as For Your Information

## 2025-02-08 NOTE — TELEPHONE ENCOUNTER
What is his baseline weight that they have for him?  The last weight we have an office is 340 pounds

## 2025-02-10 NOTE — TELEPHONE ENCOUNTER
I called the number left, went to fax line  I called and left message for patient to call us back    Also see Isi PT seeing patient from home care, called her and she was able to give me RN number to [facility]    [phone] and [phone]  Left message for RN Carlyn to call us back.

## 2025-02-12 ENCOUNTER — TELEPHONE (OUTPATIENT)
Dept: INTERNAL MEDICINE CLINIC | Facility: CLINIC | Age: 70
End: 2025-02-12

## 2025-02-12 ENCOUNTER — HOME HEALTH CHARGES (OUTPATIENT)
Dept: INTERNAL MEDICINE CLINIC | Facility: CLINIC | Age: 70
End: 2025-02-12

## 2025-02-12 DIAGNOSIS — I13.0 MALIGNANT HYPERTENSIVE HEART AND RENAL DISEASE WITH HEART FAILURE (HCC): ICD-10-CM

## 2025-02-12 DIAGNOSIS — N18.9 ANEMIA IN CHRONIC KIDNEY DISEASE, UNSPECIFIED CKD STAGE: ICD-10-CM

## 2025-02-12 DIAGNOSIS — Z79.4 TYPE 2 DIABETES MELLITUS WITH HYPERGLYCEMIA, WITH LONG-TERM CURRENT USE OF INSULIN (HCC): ICD-10-CM

## 2025-02-12 DIAGNOSIS — Z48.01 ENCOUNTER FOR SURGICAL WOUND DRESSING CHANGE: Primary | ICD-10-CM

## 2025-02-12 DIAGNOSIS — G89.29 OTHER CHRONIC PAIN: ICD-10-CM

## 2025-02-12 DIAGNOSIS — D63.1 ANEMIA IN CHRONIC KIDNEY DISEASE, UNSPECIFIED CKD STAGE: ICD-10-CM

## 2025-02-12 DIAGNOSIS — N18.4 CHRONIC KIDNEY DISEASE, STAGE 4 (SEVERE) (HCC): Chronic | ICD-10-CM

## 2025-02-12 DIAGNOSIS — I33.0 ACUTE BACTERIAL ENDOCARDITIS (HCC): ICD-10-CM

## 2025-02-12 DIAGNOSIS — K21.9 GASTROESOPHAGEAL REFLUX DISEASE, UNSPECIFIED WHETHER ESOPHAGITIS PRESENT: ICD-10-CM

## 2025-02-12 DIAGNOSIS — Z47.81 AFTERCARE FOR AMPUTATION STUMP: ICD-10-CM

## 2025-02-12 DIAGNOSIS — E78.00 HYPERCHOLESTEROLEMIA: ICD-10-CM

## 2025-02-12 DIAGNOSIS — N52.9 ERECTILE DYSFUNCTION, UNSPECIFIED ERECTILE DYSFUNCTION TYPE: ICD-10-CM

## 2025-02-12 DIAGNOSIS — E11.65 TYPE 2 DIABETES MELLITUS WITH HYPERGLYCEMIA, WITH LONG-TERM CURRENT USE OF INSULIN (HCC): ICD-10-CM

## 2025-02-12 DIAGNOSIS — M86.671 OTHER CHRONIC OSTEOMYELITIS, RIGHT ANKLE AND FOOT (HCC): ICD-10-CM

## 2025-02-12 NOTE — PROGRESS NOTES
EFM reviewed. Baseline 125bpm, moderate variability, +accelerations, no decelerations. Category I tracing.   Ctx q2-3min on toco    Pitocin at 14, will continue to increase as tolerated.   VS reviewed. BPs normotensive  Most recent SVE 5/70/-2, s/p AROM 0920 notable for meconium This is the initial home health certification.  [facility]  [facility]  Dates of service are for 1/8/2025-3/8/2025.  See scanned reports plan of care.

## 2025-02-13 LAB
ACID FAST STN SPEC: NORMAL
MYCOBACTERIUM SPEC CULT: NORMAL

## 2025-02-20 ENCOUNTER — HOSPITAL ENCOUNTER (OUTPATIENT)
Dept: WOUND CARE | Age: 70
Discharge: STILL ADMITTED OR ADMINISTRATIVE | End: 2025-02-20
Attending: FAMILY MEDICINE

## 2025-02-20 VITALS — TEMPERATURE: 96.4 F

## 2025-02-20 DIAGNOSIS — I73.9 PVD (PERIPHERAL VASCULAR DISEASE) (CMD): ICD-10-CM

## 2025-02-20 DIAGNOSIS — L97.519 TYPE 2 DIABETES MELLITUS WITH RIGHT DIABETIC FOOT ULCER (CMD): Primary | ICD-10-CM

## 2025-02-20 DIAGNOSIS — Z89.422 STATUS POST AMPUTATION OF LESSER TOE, LEFT  (CMD): ICD-10-CM

## 2025-02-20 DIAGNOSIS — E11.621 TYPE 2 DIABETES MELLITUS WITH RIGHT DIABETIC FOOT ULCER (CMD): Primary | ICD-10-CM

## 2025-02-20 DIAGNOSIS — M86.671 OTHER CHRONIC OSTEOMYELITIS OF RIGHT FOOT (CMD): ICD-10-CM

## 2025-02-20 DIAGNOSIS — R60.0 LEG EDEMA: ICD-10-CM

## 2025-02-20 DIAGNOSIS — L97.414: ICD-10-CM

## 2025-02-20 DIAGNOSIS — S91.302D OPEN WOUND OF LEFT FOOT, SUBSEQUENT ENCOUNTER: ICD-10-CM

## 2025-02-20 PROCEDURE — 11042 DBRDMT SUBQ TIS 1ST 20SQCM/<: CPT

## 2025-02-20 ASSESSMENT — PAIN SCALES - GENERAL: PAINLEVEL_OUTOF10: 0

## 2025-02-21 DIAGNOSIS — Z79.4 TYPE 2 DIABETES MELLITUS WITH HYPERGLYCEMIA, WITH LONG-TERM CURRENT USE OF INSULIN (HCC): Primary | ICD-10-CM

## 2025-02-21 DIAGNOSIS — E11.65 TYPE 2 DIABETES MELLITUS WITH HYPERGLYCEMIA, WITH LONG-TERM CURRENT USE OF INSULIN (HCC): Primary | ICD-10-CM

## 2025-02-21 RX ORDER — INSULIN ASPART 100 [IU]/ML
18 INJECTION, SOLUTION INTRAVENOUS; SUBCUTANEOUS
Qty: 51 ML | Refills: 1 | Status: SHIPPED | OUTPATIENT
Start: 2025-02-21

## 2025-02-21 NOTE — TELEPHONE ENCOUNTER
Please review; protocol failed/No Protocol      Last Office Visit: 07/09/2024    Patient had outside labs done

## 2025-02-22 NOTE — TELEPHONE ENCOUNTER
Refilled but he should be seeing an endocrinologist, noted that multiple referrals have been placed

## 2025-02-27 ENCOUNTER — APPOINTMENT (OUTPATIENT)
Dept: WOUND CARE | Age: 70
End: 2025-02-27
Attending: FAMILY MEDICINE

## 2025-03-05 ENCOUNTER — TELEPHONE (OUTPATIENT)
Dept: INTERNAL MEDICINE CLINIC | Facility: CLINIC | Age: 70
End: 2025-03-05

## 2025-03-05 ENCOUNTER — MED REC SCAN ONLY (OUTPATIENT)
Dept: INTERNAL MEDICINE CLINIC | Facility: CLINIC | Age: 70
End: 2025-03-05

## 2025-03-06 ENCOUNTER — HOSPITAL ENCOUNTER (OUTPATIENT)
Dept: WOUND CARE | Age: 70
Discharge: STILL ADMITTED OR ADMINISTRATIVE | End: 2025-03-06
Attending: FAMILY MEDICINE

## 2025-03-06 VITALS — TEMPERATURE: 96.8 F

## 2025-03-06 DIAGNOSIS — I73.9 PVD (PERIPHERAL VASCULAR DISEASE) (CMD): ICD-10-CM

## 2025-03-06 DIAGNOSIS — L97.519 TYPE 2 DIABETES MELLITUS WITH RIGHT DIABETIC FOOT ULCER (CMD): ICD-10-CM

## 2025-03-06 DIAGNOSIS — M86.571 OTHER CHRONIC HEMATOGENOUS OSTEOMYELITIS OF RIGHT FOOT (CMD): ICD-10-CM

## 2025-03-06 DIAGNOSIS — Z86.14 HISTORY OF MRSA INFECTION: ICD-10-CM

## 2025-03-06 DIAGNOSIS — S81.801A OPEN WOUND OF RIGHT LOWER LEG, INITIAL ENCOUNTER: ICD-10-CM

## 2025-03-06 DIAGNOSIS — M86.671 CHRONIC OSTEOMYELITIS OF RIGHT FOOT  (CMD): ICD-10-CM

## 2025-03-06 DIAGNOSIS — Z89.422 STATUS POST AMPUTATION OF LESSER TOE, LEFT  (CMD): ICD-10-CM

## 2025-03-06 DIAGNOSIS — S91.302D OPEN WOUND OF LEFT FOOT, SUBSEQUENT ENCOUNTER: ICD-10-CM

## 2025-03-06 DIAGNOSIS — R60.0 LEG EDEMA: Primary | ICD-10-CM

## 2025-03-06 DIAGNOSIS — E11.621 TYPE 2 DIABETES MELLITUS WITH RIGHT DIABETIC FOOT ULCER (CMD): ICD-10-CM

## 2025-03-06 PROCEDURE — 11042 DBRDMT SUBQ TIS 1ST 20SQCM/<: CPT

## 2025-03-06 ASSESSMENT — PAIN SCALES - GENERAL: PAINLEVEL_OUTOF10: 0

## 2025-03-12 ENCOUNTER — TELEPHONE (OUTPATIENT)
Dept: INTERNAL MEDICINE CLINIC | Facility: CLINIC | Age: 70
End: 2025-03-12

## 2025-03-12 NOTE — TELEPHONE ENCOUNTER
Css: please attempt to reach patient to schedule appointment with Dr. Wills.     Dr. Wills;    I received a call from [facility] , Yaritza. RN wound care.    She wanted provider to know that patient Had decreased his insulins because he was not feeling well, \"sweating\", and low blood sugars.      Yaritza RN reports patient lowered his novolog and is taking 20unit AM, 20units, NOON, and 12 units evening.  Lantus 45 units bid.     Patient's Fast blood sugar is 85 today.     Last office visit 7/9/24. Informed home health RN that patient will need to schedule follow up appointment.   Will have our office schedulers reach out to patient.     (Hx of Right foot wound, hx of DM, hospitalization 12/28/24 at [facility])

## 2025-03-17 DIAGNOSIS — M10.9 ACUTE GOUT OF RIGHT HAND, UNSPECIFIED CAUSE: ICD-10-CM

## 2025-03-17 DIAGNOSIS — M25.531 RIGHT WRIST PAIN: ICD-10-CM

## 2025-03-20 DIAGNOSIS — I50.32 CHRONIC DIASTOLIC HEART FAILURE (HCC): ICD-10-CM

## 2025-03-20 DIAGNOSIS — I10 MALIGNANT ESSENTIAL HYPERTENSION: ICD-10-CM

## 2025-03-20 RX ORDER — SPIRONOLACTONE 25 MG/1
25 TABLET ORAL 2 TIMES DAILY
Qty: 180 TABLET | Refills: 1 | Status: SHIPPED | OUTPATIENT
Start: 2025-03-20

## 2025-03-20 RX ORDER — PREDNISONE 10 MG/1
TABLET ORAL
Qty: 20 TABLET | Refills: 0 | OUTPATIENT
Start: 2025-03-20

## 2025-03-20 RX ORDER — PREDNISONE 10 MG/1
10 TABLET ORAL DAILY PRN
Qty: 20 TABLET | Refills: 0 | OUTPATIENT
Start: 2025-03-20

## 2025-03-20 NOTE — TELEPHONE ENCOUNTER
Patient uses prednisone sparingly. He uses it for gout in his hands. It is the only medication that helps his flare ups. Allopurinol does not help his pain. He states he cannot take prednisone as a taper because it does havoc on his body. He takes one 10 mg tablet daily as needed for gout pain. I have pended the medication for your review and approval.     Medication Quantity Refills Start End   predniSONE 10 MG Oral Tab 20 tablet 0 2/14/2024 --   Sig:   Take 4 pills for 2 days. Then, take 3 pills for 2 days. Then take 2 pills for 2 days. Then, take 1 pill for 2 days.     Patient not taking:   Reported on 10/12/2024     Route:   (none)     Order #:   [id number]

## 2025-03-20 NOTE — TELEPHONE ENCOUNTER
Dear Nursing staff.    Please call patient to clarify refill request for Prednisone 10 mg last written 2/14/2024. No other orders for this medication in history.      Thank You ,  Claudia Marshall

## 2025-03-20 NOTE — TELEPHONE ENCOUNTER
Prednisone was given to him for over a year ago for a gout attack, he needs an evaluation prior to just giving steroids as this is not good for his blood sugars--he can see any available provider and may even need to go to urgent care  I have asked him to seek care closer to his home as he states that sometimes it is difficult to come and see us at the [facility] office- I do not see he has chaned pcp  s

## 2025-03-20 NOTE — TELEPHONE ENCOUNTER
Patient called for a refill request for the following medication:     Medication Quantity Refills Start End   spironolactone 25 MG Oral Tab 180 tablet 1 12/20/2023 --       Pharmacy: [id number] - [place], [place] - [address] AT [facility] [address] [place] [address], [phone], [phone]     Patient is out of the this medication.

## 2025-03-24 NOTE — TELEPHONE ENCOUNTER
Patient is already scheduled for a telemedicine tomorrow 3/25/25.   Encounter closed.     Future Appointments   Date Time Provider Department Center   3/25/2025  3:20 PM Tamy Wills MD [facility]   5/1/2025 11:30 AM Loyda, Bre Jenifer, APRN [facility]

## 2025-03-25 ENCOUNTER — TELEMEDICINE (OUTPATIENT)
Dept: INTERNAL MEDICINE CLINIC | Facility: CLINIC | Age: 70
End: 2025-03-25

## 2025-03-25 DIAGNOSIS — M1A.0490 CHRONIC GOUT OF HAND, UNSPECIFIED CAUSE, UNSPECIFIED LATERALITY: Primary | ICD-10-CM

## 2025-03-25 DIAGNOSIS — L97.519 TYPE 2 DIABETES MELLITUS WITH RIGHT DIABETIC FOOT ULCER (HCC): ICD-10-CM

## 2025-03-25 DIAGNOSIS — I50.32 CHRONIC DIASTOLIC HEART FAILURE (HCC): ICD-10-CM

## 2025-03-25 DIAGNOSIS — E11.621 TYPE 2 DIABETES MELLITUS WITH RIGHT DIABETIC FOOT ULCER (HCC): ICD-10-CM

## 2025-03-25 DIAGNOSIS — I10 MALIGNANT ESSENTIAL HYPERTENSION: ICD-10-CM

## 2025-03-25 RX ORDER — PREDNISONE 20 MG/1
20 TABLET ORAL DAILY
Qty: 7 TABLET | Refills: 0 | Status: SHIPPED | OUTPATIENT
Start: 2025-03-25 | End: 2025-04-01

## 2025-03-25 RX ORDER — INSULIN GLARGINE 100 [IU]/ML
50 INJECTION, SOLUTION SUBCUTANEOUS 2 TIMES DAILY
Qty: 90 ML | Refills: 2 | Status: SHIPPED | OUTPATIENT
Start: 2025-03-25

## 2025-03-25 NOTE — PROGRESS NOTES
Patient ID: Rodo Mcclain is a 69 year old male.  No chief complaint on file.         HISTORY OF PRESENT ILLNESS:   Patient presents for above.  This visit is conducted using Telemedicine with live, interactive video and audio.  C/c follow up   C/o had a gout attack - in his hands - prednisone taper was given but only one pill of the prednisone and it goes away with only that one pill   Allopurinol he said doesn't help him with his pain - explained to pt the reason he is taking the allopurinol and not to be taken during an attack   Had surg and has HH that comes once a week and gets labs drawn at home as well   Finished 6 weeks of abx   Has not seen the kidney dr for \"lately \"         Review of Systems   Ten point review of systems otherwise negative with the exception of HPI and assessment and plan.    MEDICAL HISTORY:     Past Medical History:    Acute kidney injury (KASEY) with acute tubular necrosis (ATN)    Last Assessment & Plan: Formatting of this note might be different from the original. Multifactorial, due to possible underlying infection, also diuresis with torsemide. Creatinine 3.05 which is down to 1.9, Patient was initially started on normal saline at 75 cc/h, diuresis, lisinopril and metformin was held Creatinine is downtrending to 1.3.  Monitor urine output.    Congestive heart disease (HCC)    Constipation    Diabetes (HCC)    Dyspnea on exertion    Last Assessment & Plan: Formatting of this note might be different from the original. Admitted with dyspnea    Elevated brain natriuretic peptide (BNP) level    Last Assessment & Plan: Formatting of this note might be different from the original. Cardiology consulted    Esophageal reflux    Essential hypertension    High blood pressure    High cholesterol    Hyperlipidemia    Hypokalemia    Last Assessment & Plan: Formatting of this note might be different from the original. Replacement per nephrology    Hyponatremia    Last Assessment & Plan:  Formatting of this note might be different from the original. Will monitor Na: 133 on admission    Renal disorder    Sleep apnea       Past Surgical History:   Procedure Laterality Date    Amputation foot,midtarsal-chopart Left     Colon[facility]  2016    [facility]    Colonoscopy N/A 6/12/2024    Procedure: COLONOSCOPY;  Surgeon: Lia Longoria MD;  Location: [facility] ENDOSCOPY    Eye surgery           Current Outpatient Medications:     LANTUS SOLOSTAR 100 UNIT/ML Subcutaneous Solution Pen-injector, Inject 50 Units into the skin 2 (two) times daily., Disp: 90 mL, Rfl: 2    predniSONE 20 MG Oral Tab, Take 1 tablet (20 mg total) by mouth daily for 7 days., Disp: 7 tablet, Rfl: 0    spironolactone 25 MG Oral Tab, Take 1 tablet (25 mg total) by mouth 2 (two) times daily., Disp: 180 tablet, Rfl: 1    NOVOLOG FLEXPEN 100 UNIT/ML Subcutaneous Solution Pen-injector, Inject 18 Units into the skin 3 (three) times daily before meals., Disp: 51 mL, Rfl: 1    Glucose Blood (ACCU-CHEK GUIDE) In Vitro Strip, 1 strip by In Vitro route 2 (two) times daily., Disp: 200 strip, Rfl: 3    famotidine 20 MG Oral Tab, Take 1 tablet (20 mg total) by mouth daily., Disp: 90 tablet, Rfl: 3    metoprolol succinate ER 25 MG Oral Tablet 24 Hr, Take 1 tablet (25 mg total) by mouth daily., Disp: 90 tablet, Rfl: 0    Insulin Pen Needle (BD PEN NEEDLE RHONDA 2ND GEN) 32G X 4 MM Does not apply Misc, Inject 1 Needle into the skin 4 (four) times daily., Disp: 400 each, Rfl: 3    Ferrous Sulfate 325 (65 Fe) MG Oral Tab, Take 1 tablet (325 mg total) by mouth daily with breakfast for 360 doses. (Patient not taking: Reported on 10/12/2024), Disp: 90 tablet, Rfl: 3    atorvastatin 20 MG Oral Tab, Take 1 tablet (20 mg total) by mouth daily., Disp: 90 tablet, Rfl: 3    allopurinol 100 MG Oral Tab, Take 0.5 tablets (50 mg total) by mouth every other day. (Patient not taking: Reported on 10/12/2024), Disp: 90 tablet, Rfl: 0    predniSONE 10 MG Oral Tab, Take 4 pills for 2  days. Then, take 3 pills for 2 days. Then take 2 pills for 2 days. Then, take 1 pill for 2 days. (Patient not taking: Reported on 10/12/2024), Disp: 20 tablet, Rfl: 0    torsemide 20 MG Oral Tab, Take 3 tablets (60 mg total) by mouth in the morning and 3 tablets (60 mg total) before bedtime., Disp: 540 tablet, Rfl: 1    isosorbide mononitrate ER 30 MG Oral Tablet 24 Hr, Take 1 tablet (30 mg total) by mouth daily., Disp: , Rfl:     Accu-Chek FastClix Lancets Does not apply Misc, , Disp: , Rfl:     Allergies:Allergies[1]    Social History     Socioeconomic History    Marital status: Single     Spouse name: Not on file    Number of children: Not on file    Years of education: Not on file    Highest education level: Not on file   Occupational History    Not on file   Tobacco Use    Smoking status: Never     Passive exposure: Never    Smokeless tobacco: Never   Vaping Use    Vaping status: Never Used   Substance and Sexual Activity    Alcohol use: Never    Drug use: Never    Sexual activity: Not on file   Other Topics Concern    Not on file   Social History Narrative    Not on file     Social Drivers of Health     Food Insecurity: Low Risk  (12/28/2024)    Received from [facility]    Food Insecurity     Within the past 12 months, you worried that your food would run out before you got money to buy more.  : Never true     Within the past 12 months, the food you bought just didn't last and you didn't have money to get more. : Never true   Transportation Needs: Not At Risk (12/28/2024)    Received from [facility]    Transportation Needs     In the past 12 months, has lack of reliable transportation kept you from medical appointments, meetings, work or from getting things needed for daily living? : No   Stress: Medium Risk (3/29/2021)    Received from [facility]    Stress     How Stressed: Not on file   Housing Stability: Not on file       PHYSICAL EXAM:   Unable to  perform vitals or do physical exam as this is a virtual video visit.  Patient appears alert and oriented x 3, no acute distress  Patient speaking in complete sentences without  any conversational dyspnea or respiratory distress  No coughing heard      ASSESSMENT/PLAN:   1. Chronic gout of hand, unspecified cause, unspecified laterality  - predniSONE 20 MG Oral Tab; Take 1 tablet (20 mg total) by mouth daily for 7 days.  Dispense: 7 tablet; Refill: 0  Will give prednisone as this has helped in in the past and he is in severe pain   He is aware of the s/e such elevated blood sugars     2. Chronic diastolic heart failure (HCC)  Cont meds     3. Malignant essential hypertension  Advised pt to follow a low salt , low sodium (including fast foods and processed foods), can look up DASH diet, exercise/stay active with a goal of 30 min a day , monitor bp , cont meds     4. Type 2 diabetes mellitus with right diabetic foot ulcer (HCC)  - LANTUS SOLOSTAR 100 UNIT/ML Subcutaneous Solution Pen-injector; Inject 50 Units into the skin 2 (two) times daily.  Dispense: 90 mL; Refill: 2      No follow-ups on file.    Time spent on encounter  22 minutes   Video time 22 minutes   Documentation time 22 minutes     Rodo Mcclain understands video evaluation is not a substitute for face-to-face examination or emergency care. Patient advised to go to ER or call 911 for worsening symptoms or acute distress.     Telehealth outside of [facility]  Telehealth Verbal Consent   I conducted a telehealth visit with Rodo Mcclain today, 03/25/25, which was completed using two-way, real-time interactive audio and video communication. This has been done in good martín to provide continuity of care in the best interest of the provider-patient relationship, due to the COVID -19 public health crisis/national emergency where restrictions of face-to-face office visits are ongoing. Every conscious effort was taken to allow for sufficient and adequate  time to complete the visit.  The patient was made aware of the limitations of the telehealth visit, including treatment limitations as no physical exam could be performed.  The patient was advised to call 911 or to go to the ER in case there was an emergency.  The patient was also advised of the potential privacy & security concerns related to the telehealth platform.   The patient was made aware of where to find [facility]'s notice of privacy practices, telehealth consent form and other related consent forms and documents.  which are located on the [facility] website. The patient verbally agreed to telehealth consent form, related consents and the risks discussed.    Lastly, the patient confirmed that they were in [place].   Included in this visit, time may have been spent reviewing labs, medications, radiology tests and decision making. Appropriate medical decision-making and tests are ordered as detailed in the plan of care above.  Coding/billing information is submitted for this visit based on complexity of care and/or time spent for the visit.    This note was prepared using Dragon Medical voice recognition dictation software. As a result errors may occur. When identified these errors have been corrected. While every attempt is made to correct errors during dictation discrepancies may still exist.    Tamy Wills MD  3/25/2025         [1]   Allergies  Allergen Reactions    Doxycycline OTHER (SEE COMMENTS)     Kidney pain    Sulfamethoxazole W/Trimethoprim OTHER (SEE COMMENTS)     Acute kidney injury    Sulfamethoxazole OTHER (SEE COMMENTS)    Trimethoprim OTHER (SEE COMMENTS)

## 2025-03-26 ENCOUNTER — TELEPHONE (OUTPATIENT)
Dept: INTERNAL MEDICINE CLINIC | Facility: CLINIC | Age: 70
End: 2025-03-26

## 2025-03-27 ENCOUNTER — HOSPITAL ENCOUNTER (OUTPATIENT)
Dept: WOUND CARE | Age: 70
Discharge: STILL ADMITTED OR ADMINISTRATIVE | End: 2025-03-27
Attending: FAMILY MEDICINE

## 2025-03-27 VITALS — TEMPERATURE: 97.7 F

## 2025-03-27 DIAGNOSIS — I73.9 PVD (PERIPHERAL VASCULAR DISEASE) (CMD): ICD-10-CM

## 2025-03-27 DIAGNOSIS — R60.0 LEG EDEMA: ICD-10-CM

## 2025-03-27 DIAGNOSIS — L97.519 TYPE 2 DIABETES MELLITUS WITH RIGHT DIABETIC FOOT ULCER (CMD): ICD-10-CM

## 2025-03-27 DIAGNOSIS — S91.302D OPEN WOUND OF LEFT FOOT, SUBSEQUENT ENCOUNTER: ICD-10-CM

## 2025-03-27 DIAGNOSIS — L97.414: ICD-10-CM

## 2025-03-27 DIAGNOSIS — E11.621 TYPE 2 DIABETES MELLITUS WITH RIGHT DIABETIC FOOT ULCER (CMD): ICD-10-CM

## 2025-03-27 DIAGNOSIS — M86.671 OTHER CHRONIC OSTEOMYELITIS OF RIGHT FOOT (CMD): ICD-10-CM

## 2025-03-27 DIAGNOSIS — Z89.422 STATUS POST AMPUTATION OF LESSER TOE, LEFT  (CMD): Primary | ICD-10-CM

## 2025-03-27 PROCEDURE — 11042 DBRDMT SUBQ TIS 1ST 20SQCM/<: CPT

## 2025-03-27 ASSESSMENT — PAIN SCALES - GENERAL: PAINLEVEL_OUTOF10: 0

## 2025-03-28 NOTE — TELEPHONE ENCOUNTER
I spoke with patient verified name and  I informed Dr Wills of the patient's response who states he does not recognize the name of the provider's mentioned below - no further action needed

## 2025-04-07 ENCOUNTER — TELEPHONE (OUTPATIENT)
Dept: INTERNAL MEDICINE CLINIC | Facility: CLINIC | Age: 70
End: 2025-04-07

## 2025-04-07 NOTE — TELEPHONE ENCOUNTER
Nurse  from [facility] stated sent POC -  Faxed  March 9th 28th -Plan of Care   Order Number [id number]    Will refax again , 3rd time   Need 's signature and Faxed back        Please Advise was form received

## 2025-04-08 ENCOUNTER — HOME HEALTH CHARGES (OUTPATIENT)
Dept: INTERNAL MEDICINE CLINIC | Facility: CLINIC | Age: 70
End: 2025-04-08

## 2025-04-08 ENCOUNTER — MED REC SCAN ONLY (OUTPATIENT)
Dept: INTERNAL MEDICINE CLINIC | Facility: CLINIC | Age: 70
End: 2025-04-08

## 2025-04-08 ENCOUNTER — TELEPHONE (OUTPATIENT)
Dept: INTERNAL MEDICINE CLINIC | Facility: CLINIC | Age: 70
End: 2025-04-08

## 2025-04-08 DIAGNOSIS — S91.302D OPEN WOUND OF LEFT FOOT, SUBSEQUENT ENCOUNTER: Primary | ICD-10-CM

## 2025-04-08 DIAGNOSIS — E78.00 HYPERCHOLESTEROLEMIA: ICD-10-CM

## 2025-04-08 DIAGNOSIS — M86.9 OSTEOMYELITIS, ANKLE AND FOOT (HCC): ICD-10-CM

## 2025-04-08 DIAGNOSIS — N18.4 CHRONIC KIDNEY DISEASE, STAGE 4 (SEVERE) (HCC): Chronic | ICD-10-CM

## 2025-04-08 DIAGNOSIS — I50.32 CHRONIC DIASTOLIC HEART FAILURE (HCC): ICD-10-CM

## 2025-04-08 DIAGNOSIS — Z79.4 TYPE 2 DIABETES MELLITUS WITH HYPERGLYCEMIA, WITH LONG-TERM CURRENT USE OF INSULIN (HCC): ICD-10-CM

## 2025-04-08 DIAGNOSIS — E11.65 TYPE 2 DIABETES MELLITUS WITH HYPERGLYCEMIA, WITH LONG-TERM CURRENT USE OF INSULIN (HCC): ICD-10-CM

## 2025-04-08 NOTE — PROGRESS NOTES
This is the   home health recertification.  [facility]  [facility]  Dates of service are for 3/9/2025 to 5/7/2025 .  See scanned reports plan of care.

## 2025-04-09 ENCOUNTER — APPOINTMENT (OUTPATIENT)
Dept: PHYSICAL MEDICINE AND REHAB | Age: 70
End: 2025-04-09
Attending: INTERNAL MEDICINE

## 2025-04-14 ENCOUNTER — NURSE TRIAGE (OUTPATIENT)
Dept: INTERNAL MEDICINE CLINIC | Facility: CLINIC | Age: 70
End: 2025-04-14

## 2025-04-14 DIAGNOSIS — M10.9 ACUTE GOUT OF RIGHT HAND, UNSPECIFIED CAUSE: ICD-10-CM

## 2025-04-14 RX ORDER — ALLOPURINOL 100 MG/1
50 TABLET ORAL EVERY OTHER DAY
Qty: 90 TABLET | Refills: 1 | Status: CANCELLED | OUTPATIENT
Start: 2025-04-14

## 2025-04-14 NOTE — TELEPHONE ENCOUNTER
I have not seen this patient with multiple medical issues since July 2024 (did have a temedicine visit last mn for gout flare and had a long discussion with him that due to his complexity he needs proper evaluation and treatment plan )   Allopurinol is not given for gout attacks   Due to his health issues and need for mult apts and continuity of care I did suggest he finds a doctor closer to his home that he can see on a regualr basis   He should get this evaluated and treatment/refill if needed by his rheumatologist who he should be seeing if he has RA - I am not sure if he told me about this diag before --did someone tell him he had rheumatoid arthritis ?

## 2025-04-14 NOTE — TELEPHONE ENCOUNTER
Action Requested: Summary for Provider     []  Critical Lab, Recommendations Needed  [] Need Additional Advice  []   FYI    []   Need Orders  [x] Need Medications Sent to Pharmacy  []  Other     SUMMARY: Patient stated that he has a history of gout and has been having a flare up of gout in his right hand below the 5th finger 2 inches into the palm of the hand. Does have rheumatoid arthritis in the fingers. No swelling or redness, just sharp pain. No other symptoms. Has been using the heating pad all night which does help. Has taken steroid in the past but does not want to take that. Started taking the allopurinol 100mg 1/2 tablet every other day but almost out and needs a refill. No appointments with Dr Wills. Wanted to know if can get a refill of the allopurinol? Please advise.   Reason for call: Hand Pain (Right hand pain)  Onset: Data Unavailable  Reason for Disposition   SEVERE pain (e.g., excruciating, unable to use hand at all)    Protocols used: Hand and Wrist Pain-A-OH

## 2025-04-15 NOTE — TELEPHONE ENCOUNTER
Spoke to Patient. Verified name and date of birth.  Patient informed of Dr. Tamy Wills's message.  Patient does not have a rheumatologist.  Offered appointment for today with APNs.  Patient states he will need to call back to make sure he has someone to help him get to the appointment.  Patient informed phone lines closes at 430pm today.  Patient verbalized understanding.

## 2025-04-16 ENCOUNTER — TELEMEDICINE (OUTPATIENT)
Dept: INTERNAL MEDICINE CLINIC | Facility: CLINIC | Age: 70
End: 2025-04-16

## 2025-04-16 ENCOUNTER — APPOINTMENT (OUTPATIENT)
Dept: PHYSICAL MEDICINE AND REHAB | Age: 70
End: 2025-04-16
Attending: INTERNAL MEDICINE

## 2025-04-16 DIAGNOSIS — M79.641 PAIN OF RIGHT HAND: Primary | ICD-10-CM

## 2025-04-16 PROCEDURE — 1160F RVW MEDS BY RX/DR IN RCRD: CPT | Performed by: INTERNAL MEDICINE

## 2025-04-16 PROCEDURE — 1159F MED LIST DOCD IN RCRD: CPT | Performed by: INTERNAL MEDICINE

## 2025-04-16 PROCEDURE — 99213 OFFICE O/P EST LOW 20 MIN: CPT | Performed by: INTERNAL MEDICINE

## 2025-04-16 RX ORDER — PREDNISONE 20 MG/1
20 TABLET ORAL DAILY
Qty: 5 TABLET | Refills: 0 | Status: SHIPPED | OUTPATIENT
Start: 2025-04-16 | End: 2025-04-21

## 2025-04-16 NOTE — TELEPHONE ENCOUNTER
Patient called back, verified Name and .  Relayed provider's message below, verbalized understanding. Rheumatology contact information provided. Video visit scheduled.     Future Appointments   Date Time Provider Department Center   2025  2:30 PM Tyree Foley MD [facility]   2025 11:30 AM Loyda, Bre Jenifer, APRN [facility]

## 2025-04-16 NOTE — TELEPHONE ENCOUNTER
I am not in the office today   Signed pended order , patient to see first available provider today-video is okay since this is an acute issue but I do believe strongly for his health that he does need to be evaluated as I am not sure if his hand pain is due to gout alone  He can see first available rheumatologist

## 2025-04-16 NOTE — TELEPHONE ENCOUNTER
Patient states he can't come in but needs to address his hand pain soon. Dr. Wills, patient wants to know if you will do a video visit.    Patient states he had to take a steroid pill to calm the pain down in his right  hand.     Patient states he needs a doctor with a  service There is no  service.     He has no one available to take him to an appointment.     Patient also asking for referral for a rheumatologist as well.

## 2025-04-16 NOTE — PROGRESS NOTES
Patient ID: Rodo Mcclain is a 69 year old male.  Chief Complaint   Patient presents with    Hand Pain          HISTORY OF PRESENT ILLNESS:   Patient presents for above.  This visit is conducted using Telemedicine with live, interactive video and audio.  Patient with a several day history of progressively worsening pain in his right hand.  Pain goes from wrist to fifth finger.  He has several chronic medical conditions and uses a walker for ambulation.  States he may be pressing wrong on the walker handle.  He also does have a history of gout with uric acid levels being 12.1 one year ago.  States he takes 50 mg of allopurinol daily.  He had leftover prednisone and took 1 tablet and feels slightly better.  It appears that his tablets are 10 mg each.  It is very hard for the patient to get to the doctor because he uses a wheelchair and there is no .  Has no one to drive him.    Review of Systems   Ten point review of systems otherwise negative with the exception of HPI and assessment and plan.    MEDICAL HISTORY:   Past Medical History[1]    Past Surgical History[2]    Medications - Current[3]    Allergies:Allergies[4]    Social History     Socioeconomic History    Marital status: Single     Spouse name: Not on file    Number of children: Not on file    Years of education: Not on file    Highest education level: Not on file   Occupational History    Not on file   Tobacco Use    Smoking status: Never     Passive exposure: Never    Smokeless tobacco: Never   Vaping Use    Vaping status: Never Used   Substance and Sexual Activity    Alcohol use: Never    Drug use: Never    Sexual activity: Not on file   Other Topics Concern    Not on file   Social History Narrative    Not on file     Social Drivers of Health     Food Insecurity: Low Risk  (12/28/2024)    Received from [facility]    Food Insecurity     Within the past 12 months, you worried that your food would run out before you got money to buy  more.  : Never true     Within the past 12 months, the food you bought just didn't last and you didn't have money to get more. : Never true   Transportation Needs: Not At Risk (12/28/2024)    Received from [facility]    Transportation Needs     In the past 12 months, has lack of reliable transportation kept you from medical appointments, meetings, work or from getting things needed for daily living? : No   Stress: Medium Risk (3/29/2021)    Received from [facility]    Stress     How Stressed: Not on file   Housing Stability: Not on file       PHYSICAL EXAM:   Unable to perform vitals or do physical exam as this is a virtual video visit.  Patient appears alert.  No conversational dyspnea or distress.    ASSESSMENT/PLAN:   1. Pain of right hand  predniSONE 20 MG Oral Tab; Take 1 tablet (20 mg total) by mouth daily for 5 days.  Dispense: 5 tablet; Refill: 0  Without proper evaluation heart it is hard to determine if this is gout or some type of ulnar nerve neuropathy.  Have recommended that he come into the office but he states he cannot do so without  at having some park his car.  Gout diet recommended.  Also told to wear a wrist splint.  Did recommend he call his insurance company and see if home doctor can come visit him.    Return if symptoms worsen or fail to improve.    Time spent on encounter  20 minutes             Rodo Mcclain understands video evaluation is not a substitute for face-to-face examination or emergency care. Patient advised to go to ER or call 911 for worsening symptoms or acute distress.     Telehealth outside of [facility]  Telehealth Verbal Consent   I conducted a telehealth visit with Rodo Mcclain today, 04/16/25, which was completed using two-way, real-time interactive audio and video communication. This has been done in good martín to provide continuity of care in the best interest of the provider-patient relationship, due to the COVID -19 public health  crisis/national emergency where restrictions of face-to-face office visits are ongoing. Every conscious effort was taken to allow for sufficient and adequate time to complete the visit.  The patient was made aware of the limitations of the telehealth visit, including treatment limitations as no physical exam could be performed.  The patient was advised to call 911 or to go to the ER in case there was an emergency.  The patient was also advised of the potential privacy & security concerns related to the telehealth platform.   The patient was made aware of where to find [facility] of privacy practices, telehealth consent form and other related consent forms and documents.  which are located on the [facility] website. The patient verbally agreed to telehealth consent form, related consents and the risks discussed.    Lastly, the patient confirmed that they were in [place].   Included in this visit, time may have been spent reviewing labs, medications, radiology tests and decision making. Appropriate medical decision-making and tests are ordered as detailed in the plan of care above.  Coding/billing information is submitted for this visit based on complexity of care and/or time spent for the visit.    This note was prepared using Dragon Medical voice recognition dictation software. As a result errors may occur. When identified these errors have been corrected. While every attempt is made to correct errors during dictation discrepancies may still exist.    Tyree Foley MD  4/16/2025       [1]   Past Medical History:   Acute kidney injury (KASEY) with acute tubular necrosis (ATN)    Last Assessment & Plan: Formatting of this note might be different from the original. Multifactorial, due to possible underlying infection, also diuresis with torsemide. Creatinine 3.05 which is down to 1.9, Patient was initially started on normal saline at 75 cc/h, diuresis, lisinopril and metformin was held Creatinine is downtrending to 1.3.   Monitor urine output.    Congestive heart disease (HCC)    Constipation    Diabetes (HCC)    Dyspnea on exertion    Last Assessment & Plan: Formatting of this note might be different from the original. Admitted with dyspnea    Elevated brain natriuretic peptide (BNP) level    Last Assessment & Plan: Formatting of this note might be different from the original. Cardiology consulted    Esophageal reflux    Essential hypertension    High blood pressure    High cholesterol    Hyperlipidemia    Hypokalemia    Last Assessment & Plan: Formatting of this note might be different from the original. Replacement per nephrology    Hyponatremia    Last Assessment & Plan: Formatting of this note might be different from the original. Will monitor Na: 133 on admission    Renal disorder    Sleep apnea   [2]   Past Surgical History:  Procedure Laterality Date    Amputation foot,midtarsal-chopart Left     Colonoscopy  2016    [facility]    Colonoscopy N/A 6/12/2024    Procedure: COLONOSCOPY;  Surgeon: Lia Longoria MD;  Location: [facility] ENDOSCOPY    Eye surgery     [3]   Current Outpatient Medications:     predniSONE 20 MG Oral Tab, Take 1 tablet (20 mg total) by mouth daily for 5 days., Disp: 5 tablet, Rfl: 0    LANTUS SOLOSTAR 100 UNIT/ML Subcutaneous Solution Pen-injector, Inject 50 Units into the skin 2 (two) times daily., Disp: 90 mL, Rfl: 2    spironolactone 25 MG Oral Tab, Take 1 tablet (25 mg total) by mouth 2 (two) times daily., Disp: 180 tablet, Rfl: 1    NOVOLOG FLEXPEN 100 UNIT/ML Subcutaneous Solution Pen-injector, Inject 18 Units into the skin 3 (three) times daily before meals., Disp: 51 mL, Rfl: 1    Glucose Blood (ACCU-CHEK GUIDE) In Vitro Strip, 1 strip by In Vitro route 2 (two) times daily., Disp: 200 strip, Rfl: 3    famotidine 20 MG Oral Tab, Take 1 tablet (20 mg total) by mouth daily., Disp: 90 tablet, Rfl: 3    metoprolol succinate ER 25 MG Oral Tablet 24 Hr, Take 1 tablet (25 mg total) by mouth daily., Disp: 90  tablet, Rfl: 0    Insulin Pen Needle (BD PEN NEEDLE RHONDA 2ND GEN) 32G X 4 MM Does not apply Misc, Inject 1 Needle into the skin 4 (four) times daily., Disp: 400 each, Rfl: 3    atorvastatin 20 MG Oral Tab, Take 1 tablet (20 mg total) by mouth daily., Disp: 90 tablet, Rfl: 3    allopurinol 100 MG Oral Tab, Take 0.5 tablets (50 mg total) by mouth every other day. (Patient not taking: Reported on 10/12/2024), Disp: 90 tablet, Rfl: 0    predniSONE 10 MG Oral Tab, Take 4 pills for 2 days. Then, take 3 pills for 2 days. Then take 2 pills for 2 days. Then, take 1 pill for 2 days. (Patient not taking: Reported on 10/12/2024), Disp: 20 tablet, Rfl: 0    torsemide 20 MG Oral Tab, Take 3 tablets (60 mg total) by mouth in the morning and 3 tablets (60 mg total) before bedtime., Disp: 540 tablet, Rfl: 1    isosorbide mononitrate ER 30 MG Oral Tablet 24 Hr, Take 1 tablet (30 mg total) by mouth daily., Disp: , Rfl:     Accu-Chek FastClix Lancets Does not apply Misc, , Disp: , Rfl:   [4]   Allergies  Allergen Reactions    Doxycycline OTHER (SEE COMMENTS)     Kidney pain    Sulfamethoxazole W/Trimethoprim OTHER (SEE COMMENTS)     Acute kidney injury    Sulfamethoxazole OTHER (SEE COMMENTS)    Trimethoprim OTHER (SEE COMMENTS)

## 2025-04-17 ENCOUNTER — TELEPHONE (OUTPATIENT)
Dept: INTERNAL MEDICINE CLINIC | Facility: CLINIC | Age: 70
End: 2025-04-17

## 2025-04-17 NOTE — TELEPHONE ENCOUNTER
Patient calling ( name and date of birth of patient verified )  states his work order for Physical Therapy is being sent by [facility] wound [facility] to our office     [facility] staff please  be aware  and thank you

## 2025-04-18 NOTE — TELEPHONE ENCOUNTER
Called [facility] at  [phone], no answer, left  asking to get order faxed to us, provided fax number and callback number

## 2025-04-24 ENCOUNTER — TELEPHONE (OUTPATIENT)
Dept: PHYSICAL THERAPY | Facility: HOSPITAL | Age: 70
End: 2025-04-24

## 2025-04-24 ENCOUNTER — HOSPITAL ENCOUNTER (OUTPATIENT)
Dept: WOUND CARE | Age: 70
Discharge: STILL ADMITTED OR ADMINISTRATIVE | End: 2025-04-24
Attending: FAMILY MEDICINE

## 2025-04-24 VITALS — TEMPERATURE: 97.5 F

## 2025-04-24 DIAGNOSIS — L97.519 TYPE 2 DIABETES MELLITUS WITH RIGHT DIABETIC FOOT ULCER (CMD): ICD-10-CM

## 2025-04-24 DIAGNOSIS — L97.414: ICD-10-CM

## 2025-04-24 DIAGNOSIS — E11.621 TYPE 2 DIABETES MELLITUS WITH RIGHT DIABETIC FOOT ULCER (CMD): ICD-10-CM

## 2025-04-24 DIAGNOSIS — I73.9 PVD (PERIPHERAL VASCULAR DISEASE) (CMD): ICD-10-CM

## 2025-04-24 DIAGNOSIS — Z89.422 STATUS POST AMPUTATION OF LESSER TOE, LEFT  (CMD): Primary | ICD-10-CM

## 2025-04-24 PROCEDURE — 97597 DBRDMT OPN WND 1ST 20 CM/<: CPT

## 2025-04-24 ASSESSMENT — PAIN SCALES - GENERAL: PAINLEVEL_OUTOF10: 0

## 2025-05-01 ENCOUNTER — APPOINTMENT (OUTPATIENT)
Dept: LAB | Age: 70
End: 2025-05-01

## 2025-05-01 DIAGNOSIS — N18.4 CHRONIC KIDNEY DISEASE, STAGE 4 (SEVERE)  (CMD): Primary | ICD-10-CM

## 2025-05-01 LAB
ALBUMIN SERPL-MCNC: 3.1 G/DL (ref 3.4–5)
ALBUMIN/GLOB SERPL: 0.7 {RATIO} (ref 1–2.4)
ALP SERPL-CCNC: 95 UNITS/L (ref 45–117)
ALT SERPL-CCNC: 17 UNITS/L
ANION GAP SERPL CALC-SCNC: 10 MMOL/L (ref 7–19)
AST SERPL-CCNC: 15 UNITS/L
BASOPHILS # BLD: 0.1 K/MCL (ref 0–0.3)
BASOPHILS NFR BLD: 1 %
BILIRUB SERPL-MCNC: 0.4 MG/DL (ref 0.2–1)
BUN SERPL-MCNC: 82 MG/DL (ref 6–20)
BUN/CREAT SERPL: 29 (ref 7–25)
CALCIUM SERPL-MCNC: 8.6 MG/DL (ref 8.4–10.2)
CHLORIDE SERPL-SCNC: 99 MMOL/L (ref 97–110)
CO2 SERPL-SCNC: 31 MMOL/L (ref 21–32)
CREAT SERPL-MCNC: 2.78 MG/DL (ref 0.67–1.17)
DEPRECATED RDW RBC: 46.3 FL (ref 39–50)
EGFRCR SERPLBLD CKD-EPI 2021: 24 ML/MIN/{1.73_M2}
EOSINOPHIL # BLD: 0.2 K/MCL (ref 0–0.5)
EOSINOPHIL NFR BLD: 2 %
ERYTHROCYTE [DISTWIDTH] IN BLOOD: 14.2 % (ref 11–15)
FASTING DURATION TIME PATIENT: ABNORMAL H
GLOBULIN SER-MCNC: 4.2 G/DL (ref 2–4)
GLUCOSE SERPL-MCNC: 260 MG/DL (ref 70–99)
HCT VFR BLD CALC: 38.6 % (ref 39–51)
HGB BLD-MCNC: 12.3 G/DL (ref 13–17)
IMM GRANULOCYTES # BLD AUTO: 0 K/MCL (ref 0–0.2)
IMM GRANULOCYTES # BLD: 0 %
LYMPHOCYTES # BLD: 1.9 K/MCL (ref 1–4)
LYMPHOCYTES NFR BLD: 18 %
MCH RBC QN AUTO: 28.5 PG (ref 26–34)
MCHC RBC AUTO-ENTMCNC: 31.9 G/DL (ref 32–36.5)
MCV RBC AUTO: 89.6 FL (ref 78–100)
MONOCYTES # BLD: 0.7 K/MCL (ref 0.3–0.9)
MONOCYTES NFR BLD: 7 %
NEUTROPHILS # BLD: 8 K/MCL (ref 1.8–7.7)
NEUTROPHILS NFR BLD: 72 %
NRBC BLD MANUAL-RTO: 0 /100 WBC
PLATELET # BLD AUTO: 237 K/MCL (ref 140–450)
POTASSIUM SERPL-SCNC: 4.6 MMOL/L (ref 3.4–5.1)
PROT SERPL-MCNC: 7.3 G/DL (ref 6.4–8.2)
RBC # BLD: 4.31 MIL/MCL (ref 4.5–5.9)
SODIUM SERPL-SCNC: 135 MMOL/L (ref 135–145)
WBC # BLD: 10.9 K/MCL (ref 4.2–11)

## 2025-05-01 PROCEDURE — 85025 COMPLETE CBC W/AUTO DIFF WBC: CPT | Performed by: CLINICAL MEDICAL LABORATORY

## 2025-05-01 PROCEDURE — 36415 COLL VENOUS BLD VENIPUNCTURE: CPT | Performed by: CLINICAL MEDICAL LABORATORY

## 2025-05-01 PROCEDURE — 80053 COMPREHEN METABOLIC PANEL: CPT | Performed by: CLINICAL MEDICAL LABORATORY

## 2025-05-02 ENCOUNTER — TELEPHONE (OUTPATIENT)
Dept: PHYSICAL THERAPY | Facility: HOSPITAL | Age: 70
End: 2025-05-02

## 2025-05-04 DIAGNOSIS — M79.641 PAIN OF RIGHT HAND: ICD-10-CM

## 2025-05-05 ENCOUNTER — OFFICE VISIT (OUTPATIENT)
Dept: PHYSICAL THERAPY | Facility: HOSPITAL | Age: 70
End: 2025-05-05
Attending: INTERNAL MEDICINE
Payer: MEDICARE

## 2025-05-05 DIAGNOSIS — E11.65 TYPE 2 DIABETES MELLITUS WITH HYPERGLYCEMIA, WITH LONG-TERM CURRENT USE OF INSULIN (HCC): ICD-10-CM

## 2025-05-05 DIAGNOSIS — Z79.4 TYPE 2 DIABETES MELLITUS WITH HYPERGLYCEMIA, WITH LONG-TERM CURRENT USE OF INSULIN (HCC): ICD-10-CM

## 2025-05-05 DIAGNOSIS — E11.621 TYPE 2 DIABETES MELLITUS WITH RIGHT DIABETIC FOOT ULCER (HCC): ICD-10-CM

## 2025-05-05 DIAGNOSIS — Z47.81 AFTERCARE FOR AMPUTATION STUMP: ICD-10-CM

## 2025-05-05 DIAGNOSIS — R26.89 IMBALANCE: ICD-10-CM

## 2025-05-05 DIAGNOSIS — I73.9 PERIPHERAL VASCULAR DISEASE: Primary | ICD-10-CM

## 2025-05-05 DIAGNOSIS — L97.519 TYPE 2 DIABETES MELLITUS WITH RIGHT DIABETIC FOOT ULCER (HCC): ICD-10-CM

## 2025-05-05 DIAGNOSIS — G62.9 NEUROPATHY: ICD-10-CM

## 2025-05-05 PROCEDURE — 97161 PT EVAL LOW COMPLEX 20 MIN: CPT

## 2025-05-05 PROCEDURE — 97110 THERAPEUTIC EXERCISES: CPT

## 2025-05-05 RX ORDER — PREDNISONE 20 MG/1
20 TABLET ORAL DAILY
OUTPATIENT
Start: 2025-05-05

## 2025-05-05 NOTE — PATIENT INSTRUCTIONS
Up and walking short bouts every waking hour. Goal is 27 minutes of walking total per day.     Do these exercises 2 times a day     Sit up tall. March for 30 seconds, knees up towards chest as much as you can each time.     Sit up tall. Extend one knee, kicking leg straight, then slowly lower over a 3 second count. Do 10 with one leg, then 10 with the other.

## 2025-05-05 NOTE — TELEPHONE ENCOUNTER
Pt advised need OV, pt continue to state he was seen by a nephrology on Friday ,waiting on Dr to call him back , wants Dr Shaunna Hayes to be called for his results advised , [phone]  [facility] will call to have their nurse to call him tomorrow

## 2025-05-05 NOTE — PROGRESS NOTES
NEUROLOGICAL EVALUATION:     Diagnosis:   malaise, difficulty walking, decreased tolerance to activity Patient:  Rodo Mcclain (70 year old, male)        Referring Provider: Tamy Wills  Today's Date   5/5/2025    Precautions:  Fall Risk Date of Evaluation: 05/05/25       PATIENT SUMMARY   Summary of chief complaints:  difficulty walking and impaired tolerance to activity     History of current condition:  Pt reports hx of 2 amputations -first amputation in 2020 -3 toes on L foot. January 2025 underwent R 5th ray amputation. Does not have a shoe yet on the R. States he has \"not been able to walk\" since his first amputations. Upon further questioning states she ambulates limitedly in house with walker.  +hx of B knee pain which varies in severity at this time which can limit his walking. He also notes weakness and impaired tolerance to activity 2/2 SOB limiting tolerance to activity. Completed HH PT a few weeks ago- did not find this helpful. Reports he is generally sedentary and  is not doing exercises provided by HH therapists. +DM which he is trying his best to control per his report. Referred to PT by his PCP.      Pain level: denies pain currently      Occupation:  Works as a contractor.    Exercise:  does not exercise    Prior level of function:  walking longer distances without AD in 2020 prior to amputations. Was somewhat limited by SOB     Current limitations:  limited in ambulation (unable to tolerate more than ~50 ft at a time), decreased tolerance to activity, impaired endurance, B LE weakness   Pt goals:  to increase ability to walk.   History of falls:    denies recent hx of falls.    Home Environment:  Lives at home with partner in a house with 6 SAAD (rail). 2 level home with pt - pt has first floor accommodations       Past medical history was reviewed with Rodo.  Rodo  has a past medical history of Acute kidney injury (KASEY) with acute tubular necrosis (ATN) (07/08/2020), Congestive  heart disease (HCC), Constipation (08/04/2020), Diabetes (HCC), Dyspnea on exertion (08/13/2019), Elevated brain natriuretic peptide (BNP) level (07/01/2021), Esophageal reflux, Essential hypertension, High blood pressure, High cholesterol, Hyperlipidemia, Hypokalemia (08/22/2022), Hyponatremia (02/04/2022), Renal disorder, and Sleep apnea.  He  has a past surgical history that includes amputation foot,midtarsal-chopart (Left); Eye surgery; colonoscopy (2016); and colonoscopy (N/A, 6/12/2024).    ASSESSMENT  Rodo presents to physical therapy evaluation with primary c/o impaired tolerance to functional activity limiting ability to walk . He is considered a fall risk based on TUG. Is unable to complete 6MWT without rest. +GRIFFITH and SOB with activity that resolves within 1-2 minutes of rest. Demo's and verbalizes understanding of material taught today.     Rodo would benefit from skilled Physical Therapy that is medically necessary to improve tolerance to functional mobility in order to increase ability to participate in daily activities without significant limitations.   OBJECTIVE:    Observation/Posture:  presents in w/c; NAD    ROM: L ankle:  to neutral DF, PF ~15 degrees otherwise WFL    Strength:   Strength (-/5)    R L   Hip       Flexion 5 5   Knee       Flexion (seated) 5 5     Extension 5 5   Foot / Ankle        DF 5 5     PF (seated) 5      5       Sensation:      denies numbness and tingling         Gait: pt ambulates on level ground independently with  RW  Gait deviations : slow and labored gait pattern. +GRIFFITH    Timed Up and Go (AD,time):29.4 seconds, RW  ; Fall Risk:  YES     6 Minute Walk Test:  Distance walked:  218 ft RW, w/c follow    Total time walked:  4 mins 30 sec   Number of rest breaks:3      Today's Treatment and Response:   Pt education was provided on exam findings and POC. Discussed importance of regular, short bouts of activity throughout day. Encouraged engagement in walking program    Today's Treatment       5/5/2025   Neuro Treatment   Therapeutic Exercise Discussed importance of frequent, short bouts of activity throughout day. Encouraged walking program. Sleep hygiene handout also provided   Seated alt march 30 sec   Seated LAQ with slow lower x10 B -demo with return demo    Therapeutic Exercise Minutes 10   Evaluation Minutes 32   Total Time Of Timed Procedures 10   Total Time Of Service-Based Procedures 32   Total Treatment Time 42        Patient was instructed in and issued a HEP -see pt instructions.     Charges:  PT EVAL: Low Complexity, 1 TE                                                     PLAN OF CARE:    Goals: (to be met in 8 visits)   Pt to be independent in HEP  Pt will be able to complete 6MWT without seated rest in order to reflect improved tolerance to functional activity   Pt will improve functional gait speed in order to complete TUG in less than 25 seconds.      Frequency / Duration: Patient will be seen 2x/week or a total of 8 visits over a 90 day period. Treatment will include: Gait training; Neuromuscular Re-education; Self-Care Home Management; Therapeutic Activities; Therapeutic Exercise; Home Exercise Program instruction; Patient/Family Education    Education or treatment limitation: Compliance   Rehab Potential: fair       Patient was advised of these findings, precautions, and treatment options and has agreed to actively participate in planning and for this course of care.    Thank you for your referral. Please co-sign or sign and return this letter via fax as soon as possible to [phone]. If you have any questions, please contact me at Dept: [phone]    Sincerely,  Dominique Hennessy PT, NCS    Electronically signed by therapist: Dominique Hennessy, RAF  Physician's certification required: Yes  I certify the need for these services furnished under this plan of treatment and while under my care.    X___________________________________________________  Date____________________    Certification From: 5/5/2025  To:8/3/2025

## 2025-05-05 NOTE — TELEPHONE ENCOUNTER
Please Review. Protocol Failed; No Protocol   Per Last Office Visit:  . Pain of right hand  predniSONE 20 MG Oral Tab; Take 1 tablet (20 mg total) by mouth daily for 5 days.  Dispense: 5 tablet; Refill: 0  Without proper evaluation heart it is hard to determine if this is gout or some type of ulnar nerve neuropathy.  Have recommended that he come into the office but he states he cannot do so without  at having some park his car.  Gout diet recommended.  Also told to wear a wrist splint.  Did recommend he call his insurance company and see if home doctor can come visit him.  Is refill appropriate, Medication refill pended for your review/approval    Requested Prescriptions   Pending Prescriptions Disp Refills    PREDNISONE 20 MG Oral Tab [Pharmacy Med Name: PREDNISONE 20MG TABLETS] 5 tablet 0     Sig: TAKE 1 TABLET(20 MG) BY MOUTH DAILY FOR 5 DAYS       There is no refill protocol information for this order

## 2025-05-06 RX ORDER — ATORVASTATIN CALCIUM 20 MG/1
20 TABLET, FILM COATED ORAL DAILY
Qty: 90 TABLET | Refills: 3 | Status: SHIPPED | OUTPATIENT
Start: 2025-05-06

## 2025-05-08 ENCOUNTER — TELEPHONE (OUTPATIENT)
Dept: INTERNAL MEDICINE CLINIC | Facility: CLINIC | Age: 70
End: 2025-05-08

## 2025-05-08 DIAGNOSIS — M10.9 GOUT OF RIGHT HAND, UNSPECIFIED CAUSE, UNSPECIFIED CHRONICITY: ICD-10-CM

## 2025-05-08 DIAGNOSIS — M79.641 PAIN OF RIGHT HAND: Primary | ICD-10-CM

## 2025-05-08 DIAGNOSIS — Z87.39 HISTORY OF GOUT: ICD-10-CM

## 2025-05-08 DIAGNOSIS — E11.621 TYPE 2 DIABETES MELLITUS WITH RIGHT DIABETIC FOOT ULCER (HCC): ICD-10-CM

## 2025-05-08 DIAGNOSIS — L97.519 TYPE 2 DIABETES MELLITUS WITH RIGHT DIABETIC FOOT ULCER (HCC): ICD-10-CM

## 2025-05-08 NOTE — TELEPHONE ENCOUNTER
Patient called and said he needs an order for Uric Acid test it was not taken and is still needing     Patient does this with [facility] lab   [facility] number [phone] -the only phone number he provided

## 2025-05-09 NOTE — TELEPHONE ENCOUNTER
Left message to call back to clarify reason for uric acid test and possibly get more information on lab he is currently using.

## 2025-05-10 NOTE — TELEPHONE ENCOUNTER
Patient returning phone call. Provided message miguel.    Patient states order is to be able to obtain his gout medication refilled.     The lab he uses is a travel lab. Doctor need to call the number below and they will have someone go out to his house to draw his blood.

## 2025-05-12 ENCOUNTER — OFFICE VISIT (OUTPATIENT)
Dept: PHYSICAL THERAPY | Facility: HOSPITAL | Age: 70
End: 2025-05-12
Attending: INTERNAL MEDICINE
Payer: MEDICARE

## 2025-05-12 PROCEDURE — 97110 THERAPEUTIC EXERCISES: CPT

## 2025-05-12 NOTE — PATIENT INSTRUCTIONS
Up and walking short bouts every waking hour. Goal is 27 minutes of walking total per day.     Do these exercises 2 times a day     Sit up tall. March for 30 seconds, knees up towards chest as much as you can each time.     Sit up tall. Extend one knee, kicking leg straight, then slowly lower over a 3 second count. Do 10 with one leg, then 10 with the other.      Sit up tall. Squeeze knees together as much as you can and hold for 3 seconds before relaxing. Do this 15 times.

## 2025-05-12 NOTE — PROGRESS NOTES
Patient: Rodo Mcclain (70 year old, male) Referring Provider:  Insurance:   Diagnosis: malaise, difficulty walking, decreased tolerance to activity Tamy FREEMAN   Date of Surgery: No data recorded Next MD visit:  N/A   Precautions:  Fall Risk No data recorded Referral Information:    Date of Evaluation: Req: 8, Auth: 8, Exp: 8/5/2025 05/05/25 POC Auth Visits:          Today's Date   5/12/2025    Subjective  Reports slipping on a spill in home when using crutches. Sore after fall. Non-compliant to HEP.       Pain: pain not reported (NR/10 R knee)     Objective  see flowsheet            Assessment   +GRIFFITH noted which resolves with seated rest. Frequent rest breaks required in session.     Goals (to be met in 8 visits)   Pt to be independent in HEP  Pt will be able to complete 6MWT without seated rest in order to reflect improved tolerance to functional activity   Pt will improve functional gait speed in order to complete TUG in less than 25 seconds.        Plan  in future sessions: standing unsupported, PRE sitting and standing    Treatment Last 4 Visits  Treatment Day: 2       5/5/2025 5/12/2025   Neuro Treatment   Therapeutic Exercise Discussed importance of frequent, short bouts of activity throughout day. Encouraged walking program. Sleep hygiene handout also provided   Seated alt march 30 sec   Seated LAQ with slow lower x10 B -demo with return demo  Ambulation with RW ~142 ft - 1 seated rest during, 1 post; ambulation ~25 ft at end of session   Nu-step level 1 2x6 mins -extended rest after ea bout   Seated march 30 sec   Seated LAQ with slow lower (over 3 count) x10 B   Seated hip ADD isometric holds 3 sec x15        Therapeutic Exercise Minutes 10 38   Evaluation Minutes 32    Total Time Of Timed Procedures 10 38   Total Time Of Service-Based Procedures 32 0   Total Treatment Time 42 38        Patient education   Encouraged compliance to HEP. HEP updated to include hip ADD exercise      Charges  3 TE

## 2025-05-13 ENCOUNTER — APPOINTMENT (OUTPATIENT)
Dept: PHYSICAL THERAPY | Facility: HOSPITAL | Age: 70
End: 2025-05-13
Attending: INTERNAL MEDICINE
Payer: MEDICARE

## 2025-05-13 NOTE — TELEPHONE ENCOUNTER
Signed pended order and also added A1c and urine microalbumin as he is overdue for that  .  He had a chemistry done 2 weeks ago and his creatinine is elevated and he should follow-up with the kidney doctor this this is very important  Hopefully his wounds are all healed now as well   patient is to come at some point for his Medicare physical as he is overdue for that as well

## 2025-05-13 NOTE — TELEPHONE ENCOUNTER
Left message to patient to call back.   Pt is not mychart active.         Future Appointments   Date Time Provider Department Center   5/15/2025  9:00 AM Dmoinique Hennessy, PT [facility]   5/20/2025 10:00 AM DegDominique ly, PT [facility]   5/22/2025  9:00 AM DegDominique ly, PT [facility]   6/10/2025  2:45 PM Dominique Hennessy, PT [facility]   6/12/2025  9:45 AM DegDominique ly, PT [facility]   6/17/2025 10:00 AM DegDominique ly, PT [facility]   6/19/2025 12:00 PM DegDominique ly, PT [facility]   6/24/2025  9:45 AM DegDominique ly, PT [facility]   6/26/2025 10:30 AM DegDominique ly, PT [facility]

## 2025-05-14 NOTE — TELEPHONE ENCOUNTER
2nd call attempt.  Left message to call back.  Called [facility], they provided fax number [phone] to fax order.  Please verify with Patient if ok to fax lab orders to [facility] first.

## 2025-05-14 NOTE — TELEPHONE ENCOUNTER
Patient called back and was informed of message below. Verbalized understanding. Patient requests orders to go to [facility].    [facility]axed 3 lab orders to [facility] using [facility] Fax in [facility]. Confirmation was received.     Patient also states that he needs to switch PCP because he needs the office to have  parking.

## 2025-05-15 ENCOUNTER — OFFICE VISIT (OUTPATIENT)
Dept: PHYSICAL THERAPY | Facility: HOSPITAL | Age: 70
End: 2025-05-15
Attending: INTERNAL MEDICINE
Payer: MEDICARE

## 2025-05-15 PROCEDURE — 97110 THERAPEUTIC EXERCISES: CPT

## 2025-05-15 NOTE — PROGRESS NOTES
Patient: Rodo Mcclain (70 year old, male) Referring Provider:  Insurance:   Diagnosis: malaise, difficulty walking, decreased tolerance to activity Tamy FREEMAN   Date of Surgery: No data recorded Next MD visit:  N/A   Precautions:  Fall Risk No data recorded Referral Information:    Date of Evaluation: Req: 8, Auth: 8, Exp: 8/5/2025 05/05/25 POC Auth Visits:          Today's Date   5/15/2025    Subjective   Reports non-compliance to HEP.        Pain: 0/10     Objective  see flowsheet         Assessment   Pt requiring frequent rest breaks in session. +GRIFFITH that resolves with seated rest. Pt limited today with nu-step 2/2 knee pain. May benefit from orthopedic referral- advised patient to call PCP to discuss.     Goals (to be met in 8 visits)   Pt to be independent in HEP  Pt will be able to complete 6MWT without seated rest in order to reflect improved tolerance to functional activity   Pt will improve functional gait speed in order to complete TUG in less than 25 seconds.          Plan  in future sessions: standing unsupported, PRE sitting and standing    Treatment Last 4 Visits  Treatment Day: 3       5/5/2025 5/12/2025 5/15/2025   Neuro Treatment   Therapeutic Exercise Discussed importance of frequent, short bouts of activity throughout day. Encouraged walking program. Sleep hygiene handout also provided   Seated alt march 30 sec   Seated LAQ with slow lower x10 B -demo with return demo  Ambulation with RW ~142 ft - 1 seated rest during, 1 post; ambulation ~25 ft at end of session   Nu-step level 1 2x6 mins -extended rest after ea bout   Seated march 30 sec   Seated LAQ with slow lower (over 3 count) x10 B   Seated hip ADD isometric holds 3 sec x15      Ambulation with  ft - 2 seated rests during, 1 post; ambulation ~140 ft at end of session - w/c follow for both bouts   Nu-step level 1 5 mins, limited by R knee pain (not rated)   Seated   -Hip ADD squeeze within range 5 sec hold. 15  reps   - hip ABD isometrics with belt 5 sec hold 15 reps   -glut sets 5 sec hold x15     Therapeutic Exercise Minutes 10 38 39   Evaluation Minutes 32     Total Time Of Timed Procedures 10 38 39   Total Time Of Service-Based Procedures 32 0 0   Total Treatment Time 42 38 39        Patient Education   Discussed importance of compliance to HEP    Charges  3 TE

## 2025-05-20 ENCOUNTER — OFFICE VISIT (OUTPATIENT)
Dept: PHYSICAL THERAPY | Facility: HOSPITAL | Age: 70
End: 2025-05-20
Attending: INTERNAL MEDICINE
Payer: MEDICARE

## 2025-05-20 PROCEDURE — 97110 THERAPEUTIC EXERCISES: CPT

## 2025-05-20 PROCEDURE — 97112 NEUROMUSCULAR REEDUCATION: CPT

## 2025-05-20 NOTE — PROGRESS NOTES
Patient: Rodo Mcclain (70 year old, male) Referring Provider:  Insurance:   Diagnosis: malaise, difficulty walking, decreased tolerance to activity Tamy FREEMAN   Date of Surgery: No data recorded Next MD visit:  N/A   Precautions:  Fall Risk No data recorded Referral Information:    Date of Evaluation: Req: 8, Auth: 8, Exp: 8/5/2025 05/05/25 POC Auth Visits:          Today's Date   5/20/2025    Subjective  Reports R knee pain a couple days post using Nu-step.       Pain: 0/10     Objective  see flowsheet         Assessment   +GRIFFITH and SOB with standing activity- resolved with seated rest. Verbalized understanding of material taught today.     Goals (to be met in 8 visits)   Pt to be independent in HEP  Pt will be able to complete 6MWT without seated rest in order to reflect improved tolerance to functional activity   Pt will improve functional gait speed in order to complete TUG in less than 25 seconds.            Plan  in future sessions: continue  standing unsupported, PRE sitting and standing    Treatment Last 4 Visits  Treatment Day: 4       5/5/2025 5/12/2025 5/15/2025 5/20/2025   Neuro Treatment   Therapeutic Exercise Discussed importance of frequent, short bouts of activity throughout day. Encouraged walking program. Sleep hygiene handout also provided   Seated alt march 30 sec   Seated LAQ with slow lower x10 B -demo with return demo  Ambulation with RW ~142 ft - 1 seated rest during, 1 post; ambulation ~25 ft at end of session   Nu-step level 1 2x6 mins -extended rest after ea bout   Seated march 30 sec   Seated LAQ with slow lower (over 3 count) x10 B   Seated hip ADD isometric holds 3 sec x15      Ambulation with  ft - 2 seated rests during, 1 post; ambulation ~140 ft at end of session - w/c follow for both bouts   Nu-step level 1 5 mins, limited by R knee pain (not rated)   Seated   -Hip ADD squeeze within range 5 sec hold. 15 reps   - hip ABD isometrics with belt 5 sec hold 15  reps   -glut sets 5 sec hold x15   Ambulation with  ft - 3 seated rests during, 1 post (max consecutive distance 160 ft); ambulation ~140 ft at end of session - w/c follow for both bouts   Discussed importance of adherence to HEP as well as frequent bout of activity throughout day    Neuro Re-Education    Standing unsupported 1-0 UE support engaged in UE tasking reaching within CHANDLER- multiple reps- SBA   Standing walker in front: holding 3# bar -x10- demo with return demo  Standing walker in front: BUE support on walker, upper trunk rotation letting go with one hand -demo with return demo x10 B   Therapeutic Exercise Minutes 10 38 39 24   Neuro Re-Educ Minutes    15   Evaluation Minutes 32      Total Time Of Timed Procedures 10 38 39 39   Total Time Of Service-Based Procedures 32 0 0 0   Total Treatment Time 42 38 39 39        Patient education   See flowsheet    Charges  2 TE, 1 NMR

## 2025-05-22 ENCOUNTER — APPOINTMENT (OUTPATIENT)
Dept: PHYSICAL THERAPY | Facility: HOSPITAL | Age: 70
End: 2025-05-22
Attending: INTERNAL MEDICINE
Payer: MEDICARE

## 2025-05-22 PROCEDURE — 97110 THERAPEUTIC EXERCISES: CPT

## 2025-05-22 NOTE — PROGRESS NOTES
Patient: Rodo Mcclain (70 year old, male) Referring Provider:  Insurance:   Diagnosis: malaise, difficulty walking, decreased tolerance to activity Tamy FREEMAN   Date of Surgery: No data recorded Next MD visit:  N/A   Precautions:  Fall Risk No data recorded Referral Information:    Date of Evaluation: Req: 8, Auth: 8, Exp: 8/5/2025 05/05/25 POC Auth Visits:          Today's Date   5/22/2025    Subjective   Reports partial compliance to HEP- does not do them daily. Walks a few times a day.        Pain: pain not reported (not formally rated R knee with activity)     Objective       BP seated after activity 148/74 HR 84 SO2 on room air 98%        Assessment   Pt limited in activity today 2/2 R knee pain as well as SOB with activity. Reports feeling lightheaded as well with activity. Vitals stable. Pt verbalizes and demo's understanding of material taught today. Would benefit from evaluation from orthopedic to address knee pain.     Goals (to be met in 8 visits)   Pt to be independent in HEP  Pt will be able to complete 6MWT without seated rest in order to reflect improved tolerance to functional activity   Pt will improve functional gait speed in order to complete TUG in less than 25 seconds.              Plan   Assess response to HEP- update as appropriate     Treatment Last 4 Visits  Treatment Day: 5       5/12/2025 5/15/2025 5/20/2025 5/22/2025   Neuro Treatment   Therapeutic Exercise Ambulation with RW ~142 ft - 1 seated rest during, 1 post; ambulation ~25 ft at end of session   Nu-step level 1 2x6 mins -extended rest after ea bout   Seated march 30 sec   Seated LAQ with slow lower (over 3 count) x10 B   Seated hip ADD isometric holds 3 sec x15      Ambulation with  ft - 2 seated rests during, 1 post; ambulation ~140 ft at end of session - w/c follow for both bouts   Nu-step level 1 5 mins, limited by R knee pain (not rated)   Seated   -Hip ADD squeeze within range 5 sec hold. 15 reps    - hip ABD isometrics with belt 5 sec hold 15 reps   -glut sets 5 sec hold x15   Ambulation with  ft - 3 seated rests during, 1 post (max consecutive distance 160 ft); ambulation ~140 ft at end of session - w/c follow for both bouts   Discussed importance of adherence to HEP as well as frequent bout of activity throughout day  Ambulation with  ft - 4 seated rests during, 1 post (max consecutive distance 149 ft)  Pt educated on limitations to distances walked including impaired endurance and R knee pain. Discussed importance of increased activity levels throughout day and adherence to HEP. Advised follow up with PCP re: knee pain. Discussed fluctuations in tolerance to activity day to day are to be expected and normal.   Seated:   -hip ABD red TB -->green TB x20  -glut sets 3-5 sec hold x20  -hinge fwd hold<>lean back hold (arms crossed) x5 - demo with return demo    Neuro Re-Education   Standing unsupported 1-0 UE support engaged in UE tasking reaching within CHANDLER- multiple reps- SBA   Standing walker in front: holding 3# bar -x10- demo with return demo  Standing walker in front: BUE support on walker, upper trunk rotation letting go with one hand -demo with return demo x10 B    Therapeutic Exercise Minutes 38 39 24 39   Neuro Re-Educ Minutes   15    Total Time Of Timed Procedures 38 39 39 39   Total Time Of Service-Based Procedures 0 0 0 0   Total Treatment Time 38 39 39 39        Patient education   HEP updated, see pt instructions. Additional education as in flowsheet     Charges  3 TE

## 2025-05-22 NOTE — PATIENT INSTRUCTIONS
Up and walking or standing  short bouts each hour, every waking hour. Goal is 27 minutes of walking total per day.     Do these exercises 2 times a day     Sit up tall. March for 30 seconds, knees up towards chest as much as you can each time.     Sit up tall. Extend one knee, kicking leg straight, then slowly lower over a 3 second count. Do 10 with one leg, then 10 with the other.      Sit up tall. Squeeze knees together as much as you can and hold for 3 seconds before relaxing. Do this 15 times.     Sit up tall. Tie green band around thighs just above knees. Separate knees wide, hold a couple seconds, then relax. Do this 20 times.     Sit up tall. Tighten gluts, squeezing cheeks together for 3-5 seconds, then relax. Do this 20 times.      Sit up tall towards front edge of chair. Arms crossed. Lean forward and hold a couple seconds. Return to the center, pause, then lean back for a couple seconds before returning to the center. Do this 5 times.

## 2025-05-28 ENCOUNTER — TELEPHONE (OUTPATIENT)
Dept: INTERNAL MEDICINE CLINIC | Facility: CLINIC | Age: 70
End: 2025-05-28

## 2025-05-29 ENCOUNTER — APPOINTMENT (OUTPATIENT)
Dept: WOUND CARE | Age: 70
End: 2025-05-29
Attending: FAMILY MEDICINE

## 2025-06-05 ENCOUNTER — HOSPITAL ENCOUNTER (OUTPATIENT)
Dept: WOUND CARE | Age: 70
Discharge: STILL ADMITTED OR ADMINISTRATIVE | End: 2025-06-05
Attending: FAMILY MEDICINE

## 2025-06-05 VITALS — TEMPERATURE: 97.5 F

## 2025-06-05 DIAGNOSIS — L97.414: ICD-10-CM

## 2025-06-05 DIAGNOSIS — S91.302D OPEN WOUND OF LEFT FOOT, SUBSEQUENT ENCOUNTER: ICD-10-CM

## 2025-06-05 DIAGNOSIS — E11.621 TYPE 2 DIABETES MELLITUS WITH RIGHT DIABETIC FOOT ULCER (CMD): ICD-10-CM

## 2025-06-05 DIAGNOSIS — L97.519 TYPE 2 DIABETES MELLITUS WITH RIGHT DIABETIC FOOT ULCER (CMD): ICD-10-CM

## 2025-06-05 DIAGNOSIS — I73.9 PVD (PERIPHERAL VASCULAR DISEASE) (CMD): ICD-10-CM

## 2025-06-05 DIAGNOSIS — R60.0 LEG EDEMA: ICD-10-CM

## 2025-06-05 DIAGNOSIS — Z89.422 STATUS POST AMPUTATION OF LESSER TOE, LEFT  (CMD): Primary | ICD-10-CM

## 2025-06-05 PROCEDURE — 99214 OFFICE O/P EST MOD 30 MIN: CPT

## 2025-06-05 ASSESSMENT — PAIN SCALES - GENERAL: PAINLEVEL_OUTOF10: 0

## 2025-06-06 RX ORDER — TORSEMIDE 20 MG/1
60 TABLET ORAL 2 TIMES DAILY
Qty: 540 TABLET | Refills: 0 | Status: SHIPPED | OUTPATIENT
Start: 2025-06-06

## 2025-06-10 ENCOUNTER — TELEPHONE (OUTPATIENT)
Dept: PHYSICAL THERAPY | Facility: HOSPITAL | Age: 70
End: 2025-06-10

## 2025-06-12 ENCOUNTER — TELEPHONE (OUTPATIENT)
Dept: PHYSICAL THERAPY | Facility: HOSPITAL | Age: 70
End: 2025-06-12

## 2025-06-12 ENCOUNTER — OFFICE VISIT (OUTPATIENT)
Dept: PHYSICAL THERAPY | Facility: HOSPITAL | Age: 70
End: 2025-06-12
Attending: INTERNAL MEDICINE
Payer: MEDICARE

## 2025-06-12 PROCEDURE — 97110 THERAPEUTIC EXERCISES: CPT

## 2025-06-12 PROCEDURE — 97530 THERAPEUTIC ACTIVITIES: CPT

## 2025-06-12 NOTE — PROGRESS NOTES
Patient: Rodo Mcclain (70 year old, male) Referring Provider:  Insurance:   Diagnosis: malaise, difficulty walking, decreased tolerance to activity Tamy FREEMAN   Date of Surgery: No data recorded Next MD visit:  N/A   Precautions:  Fall Risk No data recorded Referral Information:    Date of Evaluation: Req: 8, Auth: 8, Exp: 8/5/2025 05/05/25 POC Auth Visits:          Today's Date   6/12/2025    Subjective  Pt reports that his knee is feeling better. He reports he obtained a \"multi-terrain walker\" for use at home that he has been using to walk his driveway. Has additional person carry a chair behind him so he can rest as needed. He states the driveway is about 200-250 ft and some times he is able to walk one length before needing rest. Does not explicitly disclose compliance to HEP when questioned but reports he is \"Straining\" with the exercises. He states he would not be able to ambulate from  stand to clinic as encouraged last session. Does feel as if he is getting stronger.       Pain: pain not reported     Objective  see flowsheet         Assessment   Pt highly resistant to education offered. Appears combative and closed to information discussed as well as outlined POC. Pt expressing frustration with this author and disagreement with POC outlined today. Pt with GRIFFITH and SOB with activity requiring seated rest to recover.     Goals (to be met in 8 visits)   Pt to be independent in HEP  Pt will be able to complete 6MWT without seated rest in order to reflect improved tolerance to functional activity   Pt will improve functional gait speed in order to complete TUG in less than 25 seconds.                Plan  update HEP as appropriate    Treatment Last 4 Visits  Treatment Day: 6       5/15/2025 5/20/2025 5/22/2025 6/12/2025   Neuro Treatment   Therapeutic Exercise Ambulation with  ft - 2 seated rests during, 1 post; ambulation ~140 ft at end of session - w/c follow for both bouts    Nu-step level 1 5 mins, limited by R knee pain (not rated)   Seated   -Hip ADD squeeze within range 5 sec hold. 15 reps   - hip ABD isometrics with belt 5 sec hold 15 reps   -glut sets 5 sec hold x15   Ambulation with  ft - 3 seated rests during, 1 post (max consecutive distance 160 ft); ambulation ~140 ft at end of session - w/c follow for both bouts   Discussed importance of adherence to HEP as well as frequent bout of activity throughout day  Ambulation with  ft - 4 seated rests during, 1 post (max consecutive distance 149 ft)  Pt educated on limitations to distances walked including impaired endurance and R knee pain. Discussed importance of increased activity levels throughout day and adherence to HEP. Advised follow up with PCP re: knee pain. Discussed fluctuations in tolerance to activity day to day are to be expected and normal.   Seated:   -hip ABD red TB -->green TB x20  -glut sets 3-5 sec hold x20  -hinge fwd hold<>lean back hold (arms crossed) x5 - demo with return demo  Ambulation with  ft - 7 seated rests during, 1 post (max consecutive distance 220 ft). W/c follow.    Neuro Re-Education  Standing unsupported 1-0 UE support engaged in UE tasking reaching within CHANDLER- multiple reps- SBA   Standing walker in front: holding 3# bar -x10- demo with return demo  Standing walker in front: BUE support on walker, upper trunk rotation letting go with one hand -demo with return demo x10 B     Therapeutic Activity    Long discussion re role of therapy and need for services to be skilled. Explained goals of therapy and POC.    Therapeutic Exercise Minutes 39 24 39 30   Neuro Re-Educ Minutes  15     Therapeutic Activity Minutes    10   Total Time Of Timed Procedures 39 39 39 40   Total Time Of Service-Based Procedures 0 0 0 0   Total Treatment Time 39 39 39 40        Patient education   See flowsheet     Charges  1 TA, 2 TE

## 2025-06-12 NOTE — TELEPHONE ENCOUNTER
Phoned pt due to his request to speak with a Supervisor. Discussed his goals for therapy and advised pt of Medicare coverage provisions regarding maintenance therapy and the requirements for skilled services in PT.

## 2025-06-16 ENCOUNTER — TELEPHONE (OUTPATIENT)
Dept: INTERNAL MEDICINE CLINIC | Facility: CLINIC | Age: 70
End: 2025-06-16

## 2025-06-16 NOTE — TELEPHONE ENCOUNTER
On call note  Patient called with c/o constipation and severe pain in R side of abdomen. Advised him to be seen in ER today. Patient agrees

## 2025-06-17 ENCOUNTER — APPOINTMENT (OUTPATIENT)
Dept: CT IMAGING | Facility: HOSPITAL | Age: 70
End: 2025-06-17
Attending: STUDENT IN AN ORGANIZED HEALTH CARE EDUCATION/TRAINING PROGRAM
Payer: MEDICARE

## 2025-06-17 ENCOUNTER — APPOINTMENT (OUTPATIENT)
Dept: PHYSICAL THERAPY | Facility: HOSPITAL | Age: 70
End: 2025-06-17
Attending: INTERNAL MEDICINE
Payer: MEDICARE

## 2025-06-17 ENCOUNTER — TELEPHONE (OUTPATIENT)
Dept: PHYSICAL THERAPY | Facility: HOSPITAL | Age: 70
End: 2025-06-17

## 2025-06-17 ENCOUNTER — HOSPITAL ENCOUNTER (EMERGENCY)
Facility: HOSPITAL | Age: 70
Discharge: HOME | End: 2025-06-17
Attending: STUDENT IN AN ORGANIZED HEALTH CARE EDUCATION/TRAINING PROGRAM
Payer: MEDICARE

## 2025-06-17 VITALS
SYSTOLIC BLOOD PRESSURE: 102 MMHG | HEART RATE: 74 BPM | RESPIRATION RATE: 17 BRPM | OXYGEN SATURATION: 96 % | DIASTOLIC BLOOD PRESSURE: 82 MMHG | WEIGHT: 315 LBS | TEMPERATURE: 97 F | BODY MASS INDEX: 45 KG/M2

## 2025-06-17 DIAGNOSIS — N28.9 ACUTE ON CHRONIC RENAL INSUFFICIENCY: Primary | ICD-10-CM

## 2025-06-17 DIAGNOSIS — K59.00 CONSTIPATION, UNSPECIFIED CONSTIPATION TYPE: ICD-10-CM

## 2025-06-17 DIAGNOSIS — N18.9 ACUTE ON CHRONIC RENAL INSUFFICIENCY: Primary | ICD-10-CM

## 2025-06-17 LAB
ANION GAP SERPL CALC-SCNC: 9 MMOL/L (ref 0–18)
BASOPHILS # BLD AUTO: 0.05 X10(3) UL (ref 0–0.2)
BASOPHILS NFR BLD AUTO: 0.6 %
BUN BLD-MCNC: 75 MG/DL (ref 9–23)
BUN/CREAT SERPL: 23 (ref 10–20)
CALCIUM BLD-MCNC: 9.2 MG/DL (ref 8.7–10.4)
CHLORIDE SERPL-SCNC: 101 MMOL/L (ref 98–112)
CO2 SERPL-SCNC: 29 MMOL/L (ref 21–32)
CREAT BLD-MCNC: 3.26 MG/DL (ref 0.7–1.3)
DEPRECATED RDW RBC AUTO: 48.2 FL (ref 35.1–46.3)
EGFRCR SERPLBLD CKD-EPI 2021: 20 ML/MIN/1.73M2 (ref 60–?)
EOSINOPHIL # BLD AUTO: 0.31 X10(3) UL (ref 0–0.7)
EOSINOPHIL NFR BLD AUTO: 3.5 %
ERYTHROCYTE [DISTWIDTH] IN BLOOD BY AUTOMATED COUNT: 15.1 % (ref 11–15)
GLUCOSE BLD-MCNC: 125 MG/DL (ref 70–99)
HCT VFR BLD AUTO: 35.1 % (ref 39–53)
HGB BLD-MCNC: 11.3 G/DL (ref 13–17.5)
IMM GRANULOCYTES # BLD AUTO: 0.05 X10(3) UL (ref 0–1)
IMM GRANULOCYTES NFR BLD: 0.6 %
LYMPHOCYTES # BLD AUTO: 1.59 X10(3) UL (ref 1–4)
LYMPHOCYTES NFR BLD AUTO: 18 %
MCH RBC QN AUTO: 27.8 PG (ref 26–34)
MCHC RBC AUTO-ENTMCNC: 32.2 G/DL (ref 31–37)
MCV RBC AUTO: 86.2 FL (ref 80–100)
MONOCYTES # BLD AUTO: 0.74 X10(3) UL (ref 0.1–1)
MONOCYTES NFR BLD AUTO: 8.4 %
NEUTROPHILS # BLD AUTO: 6.1 X10 (3) UL (ref 1.5–7.7)
NEUTROPHILS # BLD AUTO: 6.1 X10(3) UL (ref 1.5–7.7)
NEUTROPHILS NFR BLD AUTO: 68.9 %
OSMOLALITY SERPL CALC.SUM OF ELEC: 312 MOSM/KG (ref 275–295)
PLATELET # BLD AUTO: 273 10(3)UL (ref 150–450)
POTASSIUM SERPL-SCNC: 3.9 MMOL/L (ref 3.5–5.1)
RBC # BLD AUTO: 4.07 X10(6)UL (ref 3.8–5.8)
SODIUM SERPL-SCNC: 139 MMOL/L (ref 136–145)
WBC # BLD AUTO: 8.8 X10(3) UL (ref 4–11)

## 2025-06-17 PROCEDURE — 99284 EMERGENCY DEPT VISIT MOD MDM: CPT

## 2025-06-17 PROCEDURE — 85025 COMPLETE CBC W/AUTO DIFF WBC: CPT | Performed by: STUDENT IN AN ORGANIZED HEALTH CARE EDUCATION/TRAINING PROGRAM

## 2025-06-17 PROCEDURE — 80048 BASIC METABOLIC PNL TOTAL CA: CPT | Performed by: STUDENT IN AN ORGANIZED HEALTH CARE EDUCATION/TRAINING PROGRAM

## 2025-06-17 PROCEDURE — 96360 HYDRATION IV INFUSION INIT: CPT

## 2025-06-17 PROCEDURE — 74176 CT ABD & PELVIS W/O CONTRAST: CPT | Performed by: STUDENT IN AN ORGANIZED HEALTH CARE EDUCATION/TRAINING PROGRAM

## 2025-06-17 RX ORDER — POLYETHYLENE GLYCOL 3350 17 G/17G
17 POWDER, FOR SOLUTION ORAL DAILY PRN
Qty: 12 EACH | Refills: 0 | Status: SHIPPED | OUTPATIENT
Start: 2025-06-17 | End: 2025-07-17

## 2025-06-17 RX ORDER — BISACODYL 5 MG/1
5 TABLET, DELAYED RELEASE ORAL
Qty: 14 TABLET | Refills: 0 | Status: SHIPPED | OUTPATIENT
Start: 2025-06-17 | End: 2025-07-01

## 2025-06-17 NOTE — ED INITIAL ASSESSMENT (HPI)
Patient presents to ED with lower right back pain for the last few weeks. Patient reports constipation as well.

## 2025-06-17 NOTE — DISCHARGE INSTRUCTIONS
Thank you for seeking care at [facility].  You have been seen and evaluated for constipation   We discussed the results of your workup   Please read the instructions provided   If given prescriptions, take as instructed    Please make sure to drink plenty of water, eat foods rich in fiber, and supplement your diet with over-the-counter fiber supplements such as Murelax or Metamucil. Take other medications as prescribed.    Remember, your care process does not end after your visit today. Please follow-up with your doctor within 1-2 days for a follow-up check to ensure you are  improving, to see if you need any further evaluation/testing, or to evaluate for any alternate diagnoses.    Please return to the emergency department if you develop fevers, worsening abdominal pain, blood in your stool, inability to pass gas or stool, vomiting, fevers, lightheadedness or dizziness, dehydration, feeling very ill or if you develop any other new or concerning symptoms as these could be signs of more serious medical illness.    We hope you feel better.

## 2025-06-17 NOTE — ED QUICK NOTES
Patient discharged home in no acute distress. A&Ox4, skin p/w/d, denies cp/sob. Wheeled out by tech and verbalized understanding of d/c instructions and prescriptions.

## 2025-06-18 ENCOUNTER — PATIENT OUTREACH (OUTPATIENT)
Age: 70
End: 2025-06-18

## 2025-06-18 NOTE — PROGRESS NOTES
This writer contacted patient at [phone] for DYAN Navigation post-hospital discharge follow up call on 6/18/2025 at 8:52 AM. There was no answer and I was unable to reach the patient at this time. I left a nondescript message with my contact information and the reason for my call on voicemail.

## 2025-06-18 NOTE — ED PROVIDER NOTES
[place] Emergency Department Note  Patient: Rodo Mcclain Age: 70 year old Sex: male      MRN: [id number]  : 1955    Patient Seen in: [facility] Emergency Department    History     Chief Complaint   Patient presents with    Back Pain    Constipation     Stated Complaint: lower back pain    History obtained from: Patient    Patient is a 70-year-old male with a past medical history of hypertension, hyperlipidemia, CHF, CKD, diabetes, sleep apnea, GERD presenting today for evaluation of constipation.  He states over the past several weeks, he has been having difficulty with bowel movements.  He states that he is straining excessively Bowel movements.  He states that he is having bowel movements every 2 to 3 days.  He states that today, he was able to pass small anupam of stool.  He denies any associated nausea or vomiting.  He does endorse pain in the right low back.  No fevers.  No history of intra-abdominal surgeries.    Review of Systems:  Review of Systems  Positive for stated complaint: lower back pain. Constitutional and vital signs reviewed. All other systems reviewed and negative except as noted above.    Patient History:  Past Medical History[1]    Past Surgical History[2]     Family History[3]    Specific Social Determinants of Health:   Short Social Hx on File[4]        PSFH elements reviewed from today and agreed except as otherwise stated in HPI.    Physical Exam     ED Triage Vitals [25 1033]   /72   Pulse 70   Resp 18   Temp 97.4 °F (36.3 °C)   Temp src Oral   SpO2 97 %   O2 Device None (Room air)       Current:/82   Pulse 74   Temp 97.4 °F (36.3 °C) (Oral)   Resp 17   Wt (!) 149.7 kg   SpO2 96%   BMI 44.76 kg/m²         Physical Exam  Constitutional:       Appearance: He is well-developed.   HENT:      Head: Normocephalic and atraumatic.      Right Ear: External ear normal.      Left Ear: External ear normal.      Nose: Nose normal.   Eyes:       Conjunctiva/sclera: Conjunctivae normal.      Pupils: Pupils are equal, round, and reactive to light.   Cardiovascular:      Rate and Rhythm: Normal rate and regular rhythm.      Heart sounds: Normal heart sounds.   Pulmonary:      Effort: Pulmonary effort is normal.      Breath sounds: Normal breath sounds.   Abdominal:      General: Bowel sounds are normal.      Palpations: Abdomen is soft.      Tenderness: There is no abdominal tenderness.   Musculoskeletal:         General: Normal range of motion.      Cervical back: Normal range of motion and neck supple.   Skin:     General: Skin is warm and dry.      Findings: No rash.   Neurological:      General: No focal deficit present.      Mental Status: He is alert and oriented to person, place, and time.      Deep Tendon Reflexes: Reflexes are normal and symmetric.   Psychiatric:         Mood and Affect: Mood normal.         Behavior: Behavior normal.         ED Course   Labs:   Labs Reviewed   BASIC METABOLIC PANEL (8) - Abnormal; Notable for the following components:       Result Value    Glucose 125 (*)     BUN 75 (*)     Creatinine 3.26 (*)     BUN/CREA Ratio 23.0 (*)     Calculated Osmolality 312 (*)     eGFR-Cr 20 (*)     All other components within normal limits   CBC WITH DIFFERENTIAL WITH PLATELET - Abnormal; Notable for the following components:    HGB 11.3 (*)     HCT 35.1 (*)     RDW-SD 48.2 (*)     RDW 15.1 (*)     All other components within normal limits     Radiology findings:  I personally reviewed the images.   CT ABDOMEN+PELVIS(CPT=74176)  Result Date: 6/17/2025  CONCLUSION: No acute abnormality identified in the abdomen/pelvis.  Incidental nonacute findings are present and are described within the body of the report.    Dictated by (CST): Ryley Ibarra MD on 6/17/2025 at 1:47 PM     Finalized by (CST): Ryley Ibarra MD on 6/17/2025 at 1:49 PM              Cardiac Monitor: Interpreted by me.   Pulse Readings from Last 1 Encounters:   06/17/25  74   , sinus,     External non-ED records reviewed independently by me: PCP note from 5/2/2025 reviewed confirming patient has past medical history of CKD stage IV secondary to diabetic nephropathy with baseline creatinine of 2.78    MDM   70-year-old male with a past medical history of hypertension, hyperlipidemia, CKD  stage IV, HFpEF, diabetes, sleep apnea, GERD presenting for evaluation of constipation and right lower back pain over the past several days.  On exam, he is well-appearing and in no acute distress he has no reproducible abdominal tenderness.    Differential diagnoses considered includes, but is not limited to: Constipation, bowel obstruction, fecal impaction, dehydration    Will obtain the following tests: CBC, BMP, CT abdomen pelvis with IV contrast  Please see ED course for my independent review of these tests/imaging results.    Initial Medications/Therapeutics administered: 500 cc NS bolus    Chronic conditions affecting care: Hypertension, hyperlipidemia, CKD stage IV, HFpEF, diabetes, sleep apnea, GERD    ED course: Independently reviewed the CT abdomen pelvis images that show no evidence of bowel obstruction.  Agree with radiology read above.  Laboratory workup with acute on chronic renal insufficiency.  Patient is receiving small IV fluid bolus however cautiously in setting of CHF.  Discussed initiation of stool softeners and Dulcolax for constipation.  Discussed continued outpatient management.  Return precautions provided and all questions answered.  Patient expressed understanding and agreement with plan.    Disposition and Plan     Clinical Impression:  1. Acute on chronic renal insufficiency    2. Constipation, unspecified constipation type        Disposition:  Discharge    Follow-up:  Tamy Wills MD  [address] [place] [id number]  [phone]    Schedule an appointment as soon as possible for a visit in 2 day(s)  As needed, If symptoms worsen      Medications  Prescribed:  Discharge Medication List as of 6/17/2025  3:02 PM        START taking these medications    Details   bisacodyl 5 MG Oral Tab EC Take 1 tablet (5 mg total) by mouth daily as needed for constipation., Normal, Disp-14 tablet, R-0      polyethylene glycol, PEG 3350, 17 g Oral Powd Pack Take 17 g by mouth daily as needed., Normal, Disp-12 each, R-0               This note may have been created using voice dictation technology and may include inadvertent errors.      Maria Del Rosario Duggan MD  Emergency Medicine             [1]   Past Medical History:   Acute kidney injury (KASEY) with acute tubular necrosis (ATN)    Last Assessment & Plan: Formatting of this note might be different from the original. Multifactorial, due to possible underlying infection, also diuresis with torsemide. Creatinine 3.05 which is down to 1.9, Patient was initially started on normal saline at 75 cc/h, diuresis, lisinopril and metformin was held Creatinine is downtrending to 1.3.  Monitor urine output.    Congestive heart disease (HCC)    Constipation    Diabetes (HCC)    Dyspnea on exertion    Last Assessment & Plan: Formatting of this note might be different from the original. Admitted with dyspnea    Elevated brain natriuretic peptide (BNP) level    Last Assessment & Plan: Formatting of this note might be different from the original. Cardiology consulted    Esophageal reflux    Essential hypertension    High blood pressure    High cholesterol    Hyperlipidemia    Hypokalemia    Last Assessment & Plan: Formatting of this note might be different from the original. Replacement per nephrology    Hyponatremia    Last Assessment & Plan: Formatting of this note might be different from the original. Will monitor Na: 133 on admission    Renal disorder    Sleep apnea   [2]   Past Surgical History:  Procedure Laterality Date    Amputation foot,midtarsal-chopart Left     Colonoscopy  2016    [facility]    Colonoscopy N/A 6/12/2024    Procedure:  COLONOSCOPY;  Surgeon: Lia Longoria MD;  Location: [facility] ENDOSCOPY    Eye surgery     [3]   Family History  Problem Relation Age of Onset    Prostate Cancer Father     Heart Disease Brother    [4]   Social History  Socioeconomic History    Marital status: Life Partner   Tobacco Use    Smoking status: Never     Passive exposure: Never    Smokeless tobacco: Never   Vaping Use    Vaping status: Never Used   Substance and Sexual Activity    Alcohol use: Never    Drug use: Never     Social Drivers of Health     Food Insecurity: Low Risk  (12/28/2024)    Received from [facility]    Food Insecurity     Within the past 12 months, you worried that your food would run out before you got money to buy more.  : Never true     Within the past 12 months, the food you bought just didn't last and you didn't have money to get more. : Never true   Transportation Needs: Not At Risk (12/28/2024)    Received from [facility]    Transportation Needs     In the past 12 months, has lack of reliable transportation kept you from medical appointments, meetings, work or from getting things needed for daily living? : No

## 2025-06-18 NOTE — PROGRESS NOTES
This writer contacted patient at [phone] for DYAN Navigation post-hospital discharge follow up call on 6/18/2025 at 12:30 PM. There was no answer and I was unable to reach the patient at this time. I left a nondescript message with my contact information and the reason for my call on voicemail.

## 2025-06-19 ENCOUNTER — APPOINTMENT (OUTPATIENT)
Dept: PHYSICAL THERAPY | Facility: HOSPITAL | Age: 70
End: 2025-06-19
Attending: INTERNAL MEDICINE
Payer: MEDICARE

## 2025-06-19 ENCOUNTER — TELEPHONE (OUTPATIENT)
Dept: PHYSICAL THERAPY | Facility: HOSPITAL | Age: 70
End: 2025-06-19

## 2025-06-20 ENCOUNTER — TELEPHONE (OUTPATIENT)
Dept: INTERNAL MEDICINE CLINIC | Facility: CLINIC | Age: 70
End: 2025-06-20

## 2025-06-20 NOTE — PROGRESS NOTES
Transitions of Care Navigation  Discharge Date: 6/17/25  Contact Date: 6/18/2025    Transitions of Care Assessment:      Spoke with patient. Patient states he is doing fine. The patient states he is now having bowel movements. He picked up the stool softeners and this has helped. The patient states he continues to have right lower back pain. The patient denies any urinary symptoms such as dysuria, hematuria, frequency/urgency, cloudy or foul smelling urine. The patient states he is concerned because he has family history of cancer. The patient denies new or worsening symptoms. Nurse Care Manager attempted to schedule patient for an ER follow up with Dr. Wills however patient states he has an appointment on 07/14/2025 to establish care with a new PCP Dr. Reyes. I also attempted to review his medication list and patient declined this. Patient declined to continue with the call and states he has to disconnect. Phone call ended. A message was sent to the PCP office.

## 2025-06-24 ENCOUNTER — TELEPHONE (OUTPATIENT)
Dept: PHYSICAL THERAPY | Age: 70
End: 2025-06-24

## 2025-06-24 ENCOUNTER — TELEPHONE (OUTPATIENT)
Dept: PHYSICAL THERAPY | Facility: HOSPITAL | Age: 70
End: 2025-06-24

## 2025-06-26 ENCOUNTER — APPOINTMENT (OUTPATIENT)
Dept: PHYSICAL THERAPY | Facility: HOSPITAL | Age: 70
End: 2025-06-26
Attending: INTERNAL MEDICINE
Payer: MEDICARE

## 2025-08-03 ENCOUNTER — HOSPITAL ENCOUNTER (EMERGENCY)
Facility: HOSPITAL | Age: 70
Discharge: HOME | End: 2025-08-03
Attending: EMERGENCY MEDICINE

## 2025-08-03 ENCOUNTER — APPOINTMENT (OUTPATIENT)
Dept: ULTRASOUND IMAGING | Facility: HOSPITAL | Age: 70
End: 2025-08-03
Attending: EMERGENCY MEDICINE

## 2025-08-03 ENCOUNTER — APPOINTMENT (OUTPATIENT)
Dept: GENERAL RADIOLOGY | Facility: HOSPITAL | Age: 70
End: 2025-08-03
Attending: EMERGENCY MEDICINE

## 2025-08-03 VITALS
WEIGHT: 315 LBS | SYSTOLIC BLOOD PRESSURE: 127 MMHG | TEMPERATURE: 98 F | DIASTOLIC BLOOD PRESSURE: 66 MMHG | HEART RATE: 72 BPM | OXYGEN SATURATION: 96 % | RESPIRATION RATE: 18 BRPM | BODY MASS INDEX: 45 KG/M2

## 2025-08-03 DIAGNOSIS — N18.9 CHRONIC KIDNEY DISEASE, UNSPECIFIED CKD STAGE: ICD-10-CM

## 2025-08-03 DIAGNOSIS — M25.562 ACUTE PAIN OF LEFT KNEE: Primary | ICD-10-CM

## 2025-08-03 DIAGNOSIS — M17.12 OSTEOARTHRITIS OF LEFT KNEE, UNSPECIFIED OSTEOARTHRITIS TYPE: ICD-10-CM

## 2025-08-03 LAB
ANION GAP SERPL CALC-SCNC: 10 MMOL/L (ref 0–18)
BASOPHILS # BLD AUTO: 0.02 X10(3) UL (ref 0–0.2)
BASOPHILS NFR BLD AUTO: 0.2 %
BUN BLD-MCNC: 89 MG/DL (ref 9–23)
BUN/CREAT SERPL: 25.4 (ref 10–20)
CALCIUM BLD-MCNC: 8.2 MG/DL (ref 8.7–10.4)
CHLORIDE SERPL-SCNC: 102 MMOL/L (ref 98–112)
CO2 SERPL-SCNC: 26 MMOL/L (ref 21–32)
CREAT BLD-MCNC: 3.51 MG/DL (ref 0.7–1.3)
DEPRECATED RDW RBC AUTO: 47.8 FL (ref 35.1–46.3)
EGFRCR SERPLBLD CKD-EPI 2021: 18 ML/MIN/1.73M2 (ref 60–?)
EOSINOPHIL # BLD AUTO: 0 X10(3) UL (ref 0–0.7)
EOSINOPHIL NFR BLD AUTO: 0 %
ERYTHROCYTE [DISTWIDTH] IN BLOOD BY AUTOMATED COUNT: 14.9 % (ref 11–15)
GLUCOSE BLD-MCNC: 255 MG/DL (ref 70–99)
HCT VFR BLD AUTO: 33.6 % (ref 39–53)
HGB BLD-MCNC: 10.5 G/DL (ref 13–17.5)
IMM GRANULOCYTES # BLD AUTO: 0.05 X10(3) UL (ref 0–1)
IMM GRANULOCYTES NFR BLD: 0.5 %
LYMPHOCYTES # BLD AUTO: 0.46 X10(3) UL (ref 1–4)
LYMPHOCYTES NFR BLD AUTO: 4.5 %
MCH RBC QN AUTO: 27.5 PG (ref 26–34)
MCHC RBC AUTO-ENTMCNC: 31.3 G/DL (ref 31–37)
MCV RBC AUTO: 88 FL (ref 80–100)
MONOCYTES # BLD AUTO: 0.28 X10(3) UL (ref 0.1–1)
MONOCYTES NFR BLD AUTO: 2.7 %
NEUTROPHILS # BLD AUTO: 9.47 X10 (3) UL (ref 1.5–7.7)
NEUTROPHILS # BLD AUTO: 9.47 X10(3) UL (ref 1.5–7.7)
NEUTROPHILS NFR BLD AUTO: 92.1 %
OSMOLALITY SERPL CALC.SUM OF ELEC: 322 MOSM/KG (ref 275–295)
PLATELET # BLD AUTO: 239 10(3)UL (ref 150–450)
POTASSIUM SERPL-SCNC: 4.6 MMOL/L (ref 3.5–5.1)
RBC # BLD AUTO: 3.82 X10(6)UL (ref 3.8–5.8)
SODIUM SERPL-SCNC: 138 MMOL/L (ref 136–145)
WBC # BLD AUTO: 10.3 X10(3) UL (ref 4–11)

## 2025-08-03 PROCEDURE — 96375 TX/PRO/DX INJ NEW DRUG ADDON: CPT

## 2025-08-03 PROCEDURE — 93971 EXTREMITY STUDY: CPT | Performed by: EMERGENCY MEDICINE

## 2025-08-03 PROCEDURE — 99284 EMERGENCY DEPT VISIT MOD MDM: CPT

## 2025-08-03 PROCEDURE — 96374 THER/PROPH/DIAG INJ IV PUSH: CPT

## 2025-08-03 PROCEDURE — 80048 BASIC METABOLIC PNL TOTAL CA: CPT | Performed by: EMERGENCY MEDICINE

## 2025-08-03 PROCEDURE — 85025 COMPLETE CBC W/AUTO DIFF WBC: CPT | Performed by: EMERGENCY MEDICINE

## 2025-08-03 PROCEDURE — 99285 EMERGENCY DEPT VISIT HI MDM: CPT

## 2025-08-03 PROCEDURE — 73560 X-RAY EXAM OF KNEE 1 OR 2: CPT | Performed by: EMERGENCY MEDICINE

## 2025-08-03 RX ORDER — HYDROCODONE BITARTRATE AND ACETAMINOPHEN 5; 325 MG/1; MG/1
1 TABLET ORAL ONCE
Refills: 0 | Status: COMPLETED | OUTPATIENT
Start: 2025-08-03 | End: 2025-08-03

## 2025-08-03 RX ORDER — ORPHENADRINE CITRATE 100 MG/1
100 TABLET ORAL 2 TIMES DAILY
Qty: 20 TABLET | Refills: 0 | Status: SHIPPED | OUTPATIENT
Start: 2025-08-03 | End: 2025-08-13

## 2025-08-03 RX ORDER — HYDROCODONE BITARTRATE AND ACETAMINOPHEN 5; 325 MG/1; MG/1
1 TABLET ORAL EVERY 8 HOURS PRN
Qty: 9 TABLET | Refills: 0 | Status: SHIPPED | OUTPATIENT
Start: 2025-08-03 | End: 2025-08-06

## 2025-08-03 RX ORDER — MORPHINE SULFATE 2 MG/ML
2 INJECTION, SOLUTION INTRAMUSCULAR; INTRAVENOUS ONCE
Status: COMPLETED | OUTPATIENT
Start: 2025-08-03 | End: 2025-08-03

## 2025-08-03 RX ORDER — ORPHENADRINE CITRATE 30 MG/ML
30 INJECTION INTRAMUSCULAR; INTRAVENOUS ONCE
Status: COMPLETED | OUTPATIENT
Start: 2025-08-03 | End: 2025-08-03

## 2025-08-15 RX ORDER — INSULIN ASPART 100 [IU]/ML
18 INJECTION, SOLUTION INTRAVENOUS; SUBCUTANEOUS
Qty: 51 ML | Refills: 1 | OUTPATIENT
Start: 2025-08-15

## 2025-08-21 ENCOUNTER — APPOINTMENT (OUTPATIENT)
Dept: WOUND CARE | Age: 70
End: 2025-08-21
Attending: FAMILY MEDICINE

## 2025-08-26 RX ORDER — ISOSORBIDE MONONITRATE 30 MG/1
30 TABLET, EXTENDED RELEASE ORAL DAILY
Qty: 90 TABLET | Refills: 3 | Status: SHIPPED | OUTPATIENT
Start: 2025-08-26

## 2025-08-29 RX ORDER — FAMOTIDINE 20 MG/1
20 TABLET, FILM COATED ORAL DAILY
Qty: 90 TABLET | Refills: 3 | Status: SHIPPED | OUTPATIENT
Start: 2025-08-29

## (undated) DEVICE — GLOVE SURG 7.5 PROTEXIS PI LF CRM PF BEAD CUFF STRL PLISPRN

## (undated) DEVICE — Device

## (undated) DEVICE — DRAPE .75 SHT FNFLD 76X52IN SURG CNVRT STRL LF DISP TIBURON

## (undated) DEVICE — LAWSON - BANDAGE ESMARK STL LF 4INX12FT

## (undated) DEVICE — NEEDLE HPO 30GA .5IN REG WALL REG BVL LL HUB DEHP-FR STRL LF

## (undated) DEVICE — BLADE SURG 10 INDIV FOIL WRAP STD SCPL HNDL STRL PRSNA + SS

## (undated) DEVICE — LAWSON - CONTAINER SPCMN STL 5OZ

## (undated) DEVICE — SOLUTION ANTIFOG 6CC NTOX NABRSV RADOPQ ADH SPNG MDCHC STRL

## (undated) DEVICE — POSITIONER OR RSPBRY 8.5X8X4IN HEAD FOAM HI RSLNT SLOT LF

## (undated) DEVICE — MEDI-VAC NON-CONDUCTIVE SUCTION TUBING: Brand: CARDINAL HEALTH

## (undated) DEVICE — Device: Brand: DUAL NARE NASAL CANNULAE FEMALE LUER CON 7FT O2 TUBE

## (undated) DEVICE — SUTURE PROLENE 2-0 SH 30IN MONO NABSB BLUE 8833H

## (undated) DEVICE — SYRINGE, LUER SLIP, STERILE, 60ML: Brand: MEDLINE

## (undated) DEVICE — PACK OPHTHALMIC POSTERIOR VITRTM

## (undated) DEVICE — LAWSON - GOWN SURG STD XL STL DISP

## (undated) DEVICE — KIT ENDO ORCAPOD 160/180/190

## (undated) DEVICE — GLOVE SURG 8.5 PROTEXIS LF CRM PF BEAD CUFF STRL PLISPRN 12

## (undated) DEVICE — SUTURE COAT VICRYL 2-0 SH L27 IN BRAID COAT UNDYED ABS

## (undated) DEVICE — LAWSON - BANDAGE SOFORM CONFORM STL4X75

## (undated) DEVICE — SPONGE GAUZE 4X4IN CTN 16 PLY WOVEN FOLD EDGE STRL LF

## (undated) DEVICE — BLADE OPHTHALMIC STRGT GRINDLESS SHARP ALL ARND 2 BVL

## (undated) DEVICE — SOLUTION IRR 500ML GLU NAHCO3 GLTHNE GLASS BTL BSS+ IO STRL

## (undated) DEVICE — CONMED SCOPE SAVER BITE BLOCK, 20X27 MM: Brand: SCOPE SAVER

## (undated) DEVICE — TAPE SURG TRNSPR 10YDX1IN PLASTIC HPOAL WTR RES BIDIR TEAR

## (undated) DEVICE — LAWSON - BLADE MICRO FL COARSE 17X34.5

## (undated) DEVICE — FORCEPS BX LG 2.4MM X 240CM NDL RAD JAW 4

## (undated) DEVICE — LAWSON - PACK UNIV CONVERTORS

## (undated) DEVICE — PAD ABD L9 IN X W5 IN ABS NONWOVEN HDRPHB BACK SEAL EDGE

## (undated) DEVICE — GLOVE SURG 7.5 PROTEXIS CLASSIC LTX CRM NTR COAT PF BEAD

## (undated) DEVICE — PROBE LSR 2IN 25GA PUREPOINT 78D NTNL PLASTIC GLASS

## (undated) DEVICE — BLADE SAW MIC OSCILLATING SAGITTAL TPS L25 MM X W9 MM X H.51

## (undated) DEVICE — GOWN SURG LG FABRIC ASTOUND BLUE LVL 3 NONREINFORCE SET IN

## (undated) DEVICE — NEEDLE HPO 25GA 1.5IN REG WALL REG BVL LL HUB DEHP-FR STRL

## (undated) DEVICE — HANDPIECE SCT MDVC YNKR BLBS TIP CLR STRL LF DISP

## (undated) DEVICE — GLOVE SURG 8 PROTEXIS LF CRM PF BEAD CUFF STRL PLISPRN 12IN

## (undated) DEVICE — DRAPE EQUIPMENT MINI C ARM

## (undated) DEVICE — LASSO POLYPECTOMY SNARE: Brand: LASSO

## (undated) DEVICE — KIT RM TURNOVER CSTM IC DISP NS LF

## (undated) DEVICE — PEN SURGMRK DEVON SKIN DISP NONSMEAR REG TIP FLXB RULER LBL

## (undated) DEVICE — LAWSON - DRSG MEPITELONE SIL NADH 3X4IN

## (undated) DEVICE — SYRINGE 10 ML GRAD NONPYROGENIC DEHP FREE PVC FREE LOK MED

## (undated) DEVICE — SYRINGE 1ML GRAD STRL MED LF DISP LL

## (undated) DEVICE — ELECTRODE PT RTN C30- LB 9FT CORD NONIRRITATE NONSENSITIZE

## (undated) DEVICE — GOWN SURG XL L3 NONREINFORCE SET IN SLV STRL LF DISP BLUE

## (undated) DEVICE — YANKAUER,BULB TIP,W/O VENT,RIGID,STERILE: Brand: MEDLINE

## (undated) DEVICE — SOLUTION IV 0.9% NA CL PVC 1000 ML PH 5 PLASTIC CNTNR

## (undated) DEVICE — DRAPE FENESTRATE ADH STRIP POUCH 110X77IN 5X4IN SURG EYSCP

## (undated) DEVICE — SUTURE ETHILON SABRELOC 5-0 RD-1 18IN 2 ARM MONO NABSB BLK 749G

## (undated) DEVICE — MEDI-VAC NON-CONDUCTIVE SUCTION TUBING 6MM X 1.8M (6FT.) L: Brand: CARDINAL HEALTH

## (undated) DEVICE — SYRINGE 10ML GRAD N-PYRG DEHP-FR PVC FREE STRL MED LF DISP

## (undated) DEVICE — NEEDLE OPHTHALMIC 25GA 1.5IN LF ATKNSN RTRBLBR

## (undated) DEVICE — KIT CLEAN ENDOKIT 1.1OZ GOWNX2

## (undated) DEVICE — SYRINGE 18GA 1.5IN 3ML BLUNT FILL NDL CONC TIP LL HUB

## (undated) DEVICE — GLOVE SURG 7 PROTEXIS LF BLUE PF SMTH BEAD CUFF INTLK STRL

## (undated) DEVICE — PACK VITRTM 25+ GA CNSTL 10000 CPM STRL LF DISP

## (undated) DEVICE — WATER STRL 1000ML PLASTIC POUR BTL LF

## (undated) DEVICE — GLOVE SURG 6.5 PROTEXIS LF CRM PF BEAD CUFF STRL PLISPRN

## (undated) DEVICE — KIT ADM

## (undated) DEVICE — SUTURE PRMHND 4-0 30IN SILK BRAID TIES 12 STRN PCUT NABSB A303H

## (undated) DEVICE — SPONGE GAUZE 4X4IN CTN 12 PLY WOVEN FOLD EDGE XRAY DTCT STRL

## (undated) DEVICE — PACK VITRTM 25+ GA 3.2MM TOTAL + CNSTL ULTRAVIT EDGEPLUS

## (undated) DEVICE — BANDAGE ESMARK CMPR L12 FT X W4 IN ELASTIC BLUE

## (undated) NOTE — LETTER
[facility]  [address], [place], [place]    Authorization for Surgical Operation and Procedure                               I hereby authorize Lia Longoria MD, my physician and his/her assistants (if applicable), which may include medical students, residents, and/or fellows, to perform the following surgical operation/ procedure and administer such anesthesia as may be determined necessary by my physician: Operation/Procedure name (s) COLONOSCOPY/ ESOPHAGOGASTRODUODENOSCOPY (EGD) on Rodo Mcclain   2.   I recognize that during the surgical operation/procedure, unforeseen conditions may necessitate additional or different procedures than those listed above.  I, therefore, further authorize and request that the above-named surgeon, assistants, or designees perform such procedures as are, in their judgment, necessary and desirable.    3.   My surgeon/physician has discussed prior to my surgery the potential benefits, risks and side effects of this procedure; the likelihood of achieving goals; and potential problems that might occur during recuperation.  They also discussed reasonable alternatives to the procedure, including risks, benefits, and side effects related to the alternatives and risks related to not receiving this procedure.  I have had all my questions answered and I acknowledge that no guarantee has been made as to the result that may be obtained.    4.   Should the need arise during my operation/procedure, which includes change of level of care prior to discharge, I also consent to the administration of blood and/or blood products.  Further, I understand that despite careful testing and screening of blood or blood products by collecting agencies, I may still be subject to ill effects as a result of receiving a blood transfusion and/or blood products.  The following are some, but not all, of the potential risks that can occur: fever and allergic reactions, hemolytic reactions,  transmission of diseases such as Hepatitis, AIDS and Cytomegalovirus (CMV) and fluid overload.  In the event that I wish to have an autologous transfusion of my own blood, or a directed donor transfusion, I will discuss this with my physician.  Check only if Refusing Blood or Blood Products  I understand refusal of blood or blood products as deemed necessary by my physician may have serious consequences to my condition to include possible death. I hereby assume responsibility for my refusal and release the hospital, its personnel, and my physicians from any responsibility for the consequences of my refusal.    o  Refuse   5.   I authorize the use of any specimen, organs, tissues, body parts or foreign objects that may be removed from my body during the operation/procedure for diagnosis, research or teaching purposes and their subsequent disposal by hospital authorities.  I also authorize the release of specimen test results and/or written reports to my treating physician on the hospital medical staff or other referring or consulting physicians involved in my care, at the discretion of the Pathologist or my treating physician.    6.   I consent to the photographing or videotaping of the operations or procedures to be performed, including appropriate portions of my body for medical, scientific, or educational purposes, provided my identity is not revealed by the pictures or by descriptive texts accompanying them.  If the procedure has been photographed/videotaped, the surgeon will obtain the original picture, image, videotape or CD.  The hospital will not be responsible for storage, release or maintenance of the picture, image, tape or CD.    7.   I consent to the presence of a  or observers in the operating room as deemed necessary by my physician or their designees.    8.   I recognize that in the event my procedure results in extended X-Ray/fluoroscopy time, I may develop a skin reaction.    9. If  I have a Do Not Attempt Resuscitation (DNAR) order in place, that status will be suspended while in the operating room, procedural suite, and during the recovery period unless otherwise explicitly stated by me (or a person authorized to consent on my behalf). The surgeon or my attending physician will determine when the applicable recovery period ends for purposes of reinstating the DNAR order.  10. Patients having a sterilization procedure: I understand that if the procedure is successful the results will be permanent and it will therefore be impossible for me to inseminate, conceive, or bear children.  I also understand that the procedure is intended to result in sterility, although the result has not been guaranteed.   11. I acknowledge that my physician has explained sedation/analgesia administration to me including the risk and benefits I consent to the administration of sedation/analgesia as may be necessary or desirable in the judgment of my physician.    I CERTIFY THAT I HAVE READ AND FULLY UNDERSTAND THE ABOVE CONSENT TO OPERATION and/or OTHER PROCEDURE.     ____________________________________  _________________________________        ______________________________  Signature of Patient    Signature of Responsible Person                Printed Name of Responsible Person                                      ____________________________________  _____________________________                ________________________________  Signature of Witness        Date  Time         Relationship to Patient    STATEMENT OF PHYSICIAN My signature below affirms that prior to the time of the procedure; I have explained to the patient and/or his/her legal representative, the risks and benefits involved in the proposed treatment and any reasonable alternative to the proposed treatment. I have also explained the risks and benefits involved in refusal of the proposed treatment and alternatives to the proposed treatment and have  answered the patient's questions. If I have a significant financial interest in a co-management agreement or a significant financial interest in any product or implant, or other significant relationship used in this procedure/surgery, I have disclosed this and had a discussion with my patient.     _____________________________________________________              _____________________________  (Signature of Physician)                                                                                         (Date)                                   (Time)  Patient Name: Rodo GONSALEZ Johny      : 1955      Printed: 2024     Medical Record #: [id number]                                      Page 1 of 1

## (undated) NOTE — Clinical Note
Dallas vergara , the wand said to bill this pt as [phone] but I was wondering if I should do [phone] Thank you in advance for checking  Pls feel free to fwd this question to one of the coders who is supposed to look into this . I dont want to overwhelm you  Thank you

## (undated) NOTE — LETTER
6/12/2024              Rodo Mcclain        [address]        [facility] [address]         Dear Rodo,    Our records indicate that the tests ordered for you by CARMEN Harris  have not been done.  If you have, in fact, already completed the tests or you do not wish to have the tests done, please contact our office at THE NUMBER LISTED BELOW.  Otherwise, please proceed with the testing.  Enclosed is a duplicate order for your convenience.  Labs Order:    CBC With Differential With Platelet (Order #[id number]) on 4/16/24         Sincerely,    CARMEN Harris  [facility], [place]  [address]  [facility] [id number]  [phone]

## (undated) NOTE — LETTER
[facility]  Administration of Anesthesia  I, Rodo Mcclain agree to be cared for by a physician anesthesiologist alone and/or with a nurse anesthetist, who is specially trained to monitor me and give me medicine to put me to sleep or keep me comfortable during my procedure    I understand that my anesthesiologist and/or anesthetist is not an employee or agent of [facility] or [employer]. He or she works for [facility]    As the patient asking for anesthesia services, I agree to:  Allow the anesthesiologist (anesthesia doctor) to give me medicine and do additional procedures as necessary. Some examples are: Starting or using an “IV” to give me medicine, fluids or blood during my procedure, and having a breathing tube placed to help me breathe when I’m asleep (intubation). In the event that my heart stops working properly, I understand that my anesthesiologist will make every effort to sustain my life, unless otherwise directed by [facility] Do Not Resuscitate documents.  Tell my anesthesia doctor before my procedure:  If I am pregnant.  The last time that I ate or drank.  iii. All of the medicines I take (including prescriptions, herbal supplements, and pills I can buy without a prescription (including street drugs/illegal medications). Failure to inform my anesthesiologist about these medicines may increase my risk of anesthetic complications.  iv.If I am allergic to anything or have had a reaction to anesthesia before.  I understand how the anesthesia medicine will help me (benefits).  I understand that with any type of anesthesia medicine there are risks:  The most common risks are: nausea, vomiting, sore throat, muscle soreness, damage to my eyes, mouth, or teeth (from breathing tube placement).  Rare risks include: remembering what happened during my procedure, allergic reactions to medications, injury to my airway, heart, lungs, vision, nerves, or  muscles and in extremely rare instances death.  My doctor has explained to me other choices available to me for my care (alternatives).  Pregnant Patients (“epidural”):  I understand that the risks of having an epidural (medicine given into my back to help control pain during labor), include itching, low blood pressure, difficulty urinating, headache or slowing of the baby’s heart. Very rare risks include infection, bleeding, seizure, irregular heart rhythms and nerve injury.  Regional Anesthesia (“spinal”, “epidural”, & “nerve blocks”):  I understand that rare but potential complications include headache, bleeding, infection, seizure, irregular heart rhythms, and nerve injury.    _____________________________________________________________________________  Patient (or Representative) Signature/Relationship to Patient  Date   Time    _____________________________________________________________________________   Name (if used)    Language/Organization   Time    _____________________________________________________________________________  Nurse Anesthetist Signature     Date   Time  _____________________________________________________________________________  Anesthesiologist Signature     Date   Time  I have discussed the procedure and information above with the patient (or patient’s representative) and answered their questions. The patient or their representative has agreed to have anesthesia services.    _____________________________________________________________________________  Witness        Date   Time  I have verified that the signature is that of the patient or patient’s representative, and that it was signed before the procedure  Patient Name: Rodo Mcclain     : 1955                 Printed: 2024 at 10:35 AM    Medical Record #: [id number]                                            Page 1 of 1  ----------ANESTHESIA CONSENT----------

## (undated) NOTE — Clinical Note
Transition of Care outreach completed as patient failed to return outreach call for initial assessment.